# Patient Record
Sex: MALE | Race: WHITE | Employment: UNEMPLOYED | URBAN - METROPOLITAN AREA
[De-identification: names, ages, dates, MRNs, and addresses within clinical notes are randomized per-mention and may not be internally consistent; named-entity substitution may affect disease eponyms.]

---

## 2017-01-04 ENCOUNTER — OFFICE VISIT (OUTPATIENT)
Dept: INTERNAL MEDICINE CLINIC | Age: 42
End: 2017-01-04

## 2017-01-04 VITALS
DIASTOLIC BLOOD PRESSURE: 74 MMHG | BODY MASS INDEX: 24.14 KG/M2 | WEIGHT: 163 LBS | SYSTOLIC BLOOD PRESSURE: 116 MMHG | RESPIRATION RATE: 16 BRPM | HEIGHT: 69 IN | TEMPERATURE: 97.6 F | OXYGEN SATURATION: 97 % | HEART RATE: 72 BPM

## 2017-01-04 DIAGNOSIS — Z51.81 ENCOUNTER FOR MEDICATION MONITORING: ICD-10-CM

## 2017-01-04 DIAGNOSIS — F84.0 AUTISM: ICD-10-CM

## 2017-01-04 DIAGNOSIS — F10.21 ALCOHOL DEPENDENCE IN REMISSION (HCC): ICD-10-CM

## 2017-01-04 DIAGNOSIS — Z00.00 ROUTINE GENERAL MEDICAL EXAMINATION AT A HEALTH CARE FACILITY: ICD-10-CM

## 2017-01-04 DIAGNOSIS — I10 ESSENTIAL HYPERTENSION: Primary | ICD-10-CM

## 2017-01-04 DIAGNOSIS — G40.909 SEIZURE DISORDER (HCC): ICD-10-CM

## 2017-01-04 NOTE — PATIENT INSTRUCTIONS

## 2017-01-04 NOTE — PROGRESS NOTES
CC:  Chief Complaint   Patient presents with    Hypertension     HISTORY OF PRESENT ILLNESS  Larry Herring is a 37 y.o. male. Presents for 6 month follow up evaluation. He has HTN, seizure disorder, alcohol dependence, and Asperger's syndrome. Today he has no complaints. States that he has been feeling well.     Soc Hx  Lives alone in an apartment. Single. No children. Never smoker. Drinks 14 or more beers per week; he would not quantify but states that a 12-pack of beer lasts 3-4 days. Denies recreational drug use.  Gets regular exercise by walking.     ROS   Constitutional: negative for fevers, chills   ENT: negative for sore throat, runny nose, nasal congestion, sneezing, ear pains  Respiratory: negative for cough, dyspnea   CV: negative for chest pain, palpitations, lower extremity edema   GI: negative for heartburn, abd pain, nausea, vomiting, diarrhea, constipation   Genitourinary: negative for frequency, dysuria and hematuria   Musculoskel: negative for myalgias, arthralgias, back pain, muscle weakness, joint pain   Neurological: negative for headaches, dizziness, gait problems   Behavl/Psych: negative for feelings of anxiety; positive for mild depression (has been on depression medication briefly in past)     Patient Active Problem List   Diagnosis Code    Autism F84.0    Asperger syndrome F84.5    Seizure disorder ([facility] G40.909    Hypertension I10    EtOH dependence ([address] F10.20    Alcohol withdrawal ([facility] F10.239    Advance care planning Z71.89     Past Medical History   Diagnosis Date    Asperger syndrome 12/14/2011    Autism      dx 2010- highly functional    Other ill-defined conditions(799.89)      aspergers, syncopal episodes    Seizure disorder ([address] 5/22/2012    Seizures ([address]      Allergies   Allergen Reactions    Aspartame Other (comments)     Family believes it causes his seizures     Current Outpatient Prescriptions   Medication Sig Dispense Refill    lisinopril (PRINIVIL, ZESTRIL) 10 mg tablet TAKE 1 TABLET EVERY DAY FOR HYPERTENSION 90 Tab 1    levETIRAcetam (KEPPRA) 750 mg tablet Take 1 Tab by mouth two (2) times a day. 120 Tab 1    MULTIVITAMIN PO Take 1 Tab by mouth daily. PHYSICAL EXAM  Visit Vitals    /74 (BP 1 Location: Left arm, BP Patient Position: Sitting)    Pulse 72    Temp 97.6 °F (36.4 °C) (Oral)    Resp 16    Ht 5' 9\" (1.753 m)    Wt 163 lb (73.9 kg)    SpO2 97%    BMI 24.07 kg/m2       General: Well-developed and well-nourished, no distress. HEENT:  Head normocephalic/atraumatic, no scleral icterus  Lungs:  Clear to ausculation bilaterally. Good air movement. Heart:  Regular rate and rhythm, normal S1 and S2, no murmur, gallop, or rub  Back: Kyphosis. Normal ROM. Extremities: No clubbing, cyanosis, or edema. Neurological: Alert and oriented. Psychiatric: Normal mood; slightly flat affect. Behavior is normal.     Labs form 2/11/16 reviewed. ASSESSMENT AND PLAN    ICD-10-CM ICD-9-CM    1. Essential hypertension I10 401.9    2. Seizure disorder ([facility] G40.909 345.90    3. Alcohol dependence in remission ([facility] F10.21 303.93    4. Autism F84.0 299.00    5. Routine general medical examination at a health care facility Z00.00 V70.0 LIPID PANEL      METABOLIC PANEL, COMPREHENSIVE      CBC WITH AUTOMATED DIFF      TSH 3RD GENERATION      HEMOGLOBIN A1C WITH EAG   6. Encounter for medication monitoring Z51.81 V58.83 LEVETIRACETAM (KEPPRA)       Gaudencio Le was seen today for hypertension. Diagnoses and all orders for this visit:    Essential hypertension  Well-controlled. Continue lisinopril. Seizure disorder ([facility]  Stable. Continue Keppra and follow up with Dr. Yvonne Fan. Alcohol dependence in remission ([facility]  Counseled on decreasing alcohol intake to no more than 2 beers a day.     Autism    Routine general medical examination at a health care facility  -     LIPID PANEL  -     METABOLIC PANEL, COMPREHENSIVE  -     CBC WITH AUTOMATED DIFF  -     TSH 3RD GENERATION  -     HEMOGLOBIN A1C WITH EAG    Encounter for medication monitoring  -     LEVETIRACETAM (KEPPRA)    Follow-up Disposition:  Return in about 6 months (around 7/4/2017), or if symptoms worsen or fail to improve, for HTN, seizure disorder, Medicare AWV. Provided patient and/or family with advanced directive information and answered pertinent questions. Encouraged patient to provide a copy of advanced directive to the office when available. I have discussed the diagnosis with the patient and the intended plan as seen in the above orders. Patient is in agreement. The patient has received an after-visit summary and questions were answered concerning future plans. I have discussed medication side effects and warnings with the patient as well.

## 2017-01-04 NOTE — PROGRESS NOTES
Reviewed record  In preparation for visit and have obtained necessary documentation. 1. Have you been to the ER, urgent care clinic since your last visit? Hospitalized since your last visit?no  2. Have you seen or consulted any other health care providers outside of the [address] [place] [address] since your last visit? Include any pap smears or colon screening. No  Patient does not currently have advance directives. Patient given information on advance directives and brochure and printed blank advance directive form made available to patient. Patient is also asked to make copy of directives available to this office for our/[facility] records once advanced directives are completed.

## 2017-01-04 NOTE — MR AVS SNAPSHOT
Visit Information Date & Time Provider Department Dept. Phone Encounter #  
 1/4/2017  1:00 PM Ney Shine Darrell Welsh [phone] [id number] Follow-up Instructions Return in about 6 months (around 7/4/2017), or if symptoms worsen or fail to improve, for HTN, seizure disorder, Medicare AWV. Upcoming Health Maintenance Date Due DTaP/Tdap/Td series (1 - Tdap) 3/3/2020* MEDICARE YEARLY EXAM 4/26/2017 *Topic was postponed. The date shown is not the original due date. Allergies as of 1/4/2017  Review Complete On: 1/4/2017 By: Ney Shine MD  
  
 Severity Noted Reaction Type Reactions Aspartame  11/12/2012    Other (comments) Family believes it causes his seizures Current Immunizations  Reviewed on 8/11/2015 Name Date Influenza Vaccine Split 4/2/2012 TD Vaccine 3/2/2012  2:45 PM  
  
 Not reviewed this visit You Were Diagnosed With   
  
 Codes Comments Essential hypertension    -  Primary ICD-10-CM: I10 
ICD-9-CM: 401.9 Seizure disorder ([facility]     ICD-10-CM: G40.909 ICD-9-CM: 345.90 Alcohol dependence in remission [facility])     ICD-10-CM: H76.05 ICD-9-CM: 303.93 Autism     ICD-10-CM: F84.0 ICD-9-CM: 299.00 Routine general medical examination at a health care facility     ICD-10-CM: Z00.00 ICD-9-CM: V70.0 Encounter for medication monitoring     ICD-10-CM: Z51.81 
ICD-9-CM: V58.83 Vitals BP Pulse Temp Resp Height(growth percentile) Weight(growth percentile) 116/74 (BP 1 Location: Left arm, BP Patient Position: Sitting) 72 97.6 °F (36.4 °C) (Oral) 16 5' 9\" (1.753 m) 163 lb (73.9 kg) SpO2 BMI Smoking Status 97% 24.07 kg/m2 Never Smoker BMI and BSA Data Body Mass Index Body Surface Area 24.07 kg/m 2 1.9 m 2 Preferred Pharmacy Pharmacy Name Phone [facility] [address] [place], [place] - [address] [place] [address] [facility] [phone] Your Updated Medication List  
  
   
This list is accurate as of: 1/4/17  1:16 PM.  Always use your most recent med list.  
  
  
  
  
 levETIRAcetam 750 mg tablet Commonly known as:  KEPPRA Take 1 Tab by mouth two (2) times a day. lisinopril 10 mg tablet Commonly known as:  PRINIVIL, ZESTRIL  
TAKE 1 TABLET EVERY DAY FOR HYPERTENSION  
  
 MULTIVITAMIN PO Take 1 Tab by mouth daily. Follow-up Instructions Return in about 6 months (around 7/4/2017), or if symptoms worsen or fail to improve, for HTN, seizure disorder, Medicare AWV. Patient Instructions Well Visit, Ages 25 to 48: Care Instructions Your Care Instructions Physical exams can help you stay healthy. Your doctor has checked your overall health and may have suggested ways to take good care of yourself. He or she also may have recommended tests. At home, you can help prevent illness with healthy eating, regular exercise, and other steps. Follow-up care is a key part of your treatment and safety. Be sure to make and go to all appointments, and call your doctor if you are having problems. It's also a good idea to know your test results and keep a list of the medicines you take. How can you care for yourself at home? · Reach and stay at a healthy weight. This will lower your risk for many problems, such as obesity, diabetes, heart disease, and high blood pressure. · Get at least 30 minutes of physical activity on most days of the week. Walking is a good choice. You also may want to do other activities, such as running, swimming, cycling, or playing tennis or team sports. Discuss any changes in your exercise program with your doctor. · Do not smoke or allow others to smoke around you. If you need help quitting, talk to your doctor about stop-smoking programs and medicines. These can increase your chances of quitting for good.  
· Talk to your doctor about whether you have any risk factors for sexually transmitted infections (STIs). Having one sex partner (who does not have STIs and does not have sex with anyone else) is a good way to avoid these infections. · Use birth control if you do not want to have children at this time. Talk with your doctor about the choices available and what might be best for you. · Protect your skin from too much sun. When you're outdoors from 10 a.m. to 4 p.m., stay in the shade or cover up with clothing and a hat with a wide brim. Wear sunglasses that block UV rays. Even when it's cloudy, put broad-spectrum sunscreen (SPF 30 or higher) on any exposed skin. · See a dentist one or two times a year for checkups and to have your teeth cleaned. · Wear a seat belt in the car. · Drink alcohol in moderation, if at all. That means no more than 2 drinks a day for men and 1 drink a day for women. Follow your doctor's advice about when to have certain tests. These tests can spot problems early. For everyone · Cholesterol. Have the fat (cholesterol) in your blood tested after age 21. Your doctor will tell you how often to have this done based on your age, family history, or other things that can increase your risk for heart disease. · Blood pressure. Have your blood pressure checked during a routine doctor visit. Your doctor will tell you how often to check your blood pressure based on your age, your blood pressure results, and other factors. · Vision. Talk with your doctor about how often to have a glaucoma test. 
· Diabetes. Ask your doctor whether you should have tests for diabetes. · Colon cancer. Have a test for colon cancer at age 48. You may have one of several tests. If you are younger than 48, you may need a test earlier if you have any risk factors. Risk factors include whether you already had a precancerous polyp removed from your colon or whether your parent, brother, sister, or child has had colon cancer. For women · Breast exam and mammogram. Talk to your doctor about when you should have a clinical breast exam and a mammogram. Medical experts differ on whether and how often women under 50 should have these tests. Your doctor can help you decide what is right for you. · Pap test and pelvic exam. Begin Pap tests at age 24. A Pap test is the best way to find cervical cancer. The test often is part of a pelvic exam. Ask how often to have this test. 
· Tests for sexually transmitted infections (STIs). Ask whether you should have tests for STIs. You may be at risk if you have sex with more than one person, especially if your partners do not wear condoms. For men · Tests for sexually transmitted infections (STIs). Ask whether you should have tests for STIs. You may be at risk if you have sex with more than one person, especially if you do not wear a condom. · Testicular cancer exam. Ask your doctor whether you should check your testicles regularly. · Prostate exam. Talk to your doctor about whether you should have a blood test (called a PSA test) for prostate cancer. Experts differ on whether and when men should have this test. Some experts suggest it if you are older than 39 and are -American or have a father or brother who got prostate cancer when he was younger than 72. When should you call for help? Watch closely for changes in your health, and be sure to contact your doctor if you have any problems or symptoms that concern you. Where can you learn more? Go to http://margarette-anthony.info/. Enter P072 in the search box to learn more about \"Well Visit, Ages 25 to 48: Care Instructions. \" Current as of: July 19, 2016 Content Version: 11.1 © 7164-9300 Healthwise, Incorporated. Care instructions adapted under license by [facility] (which disclaims liability or warranty for this information).  If you have questions about a medical condition or this instruction, always ask your healthcare professional. [address] any warranty or liability for your use of this information. Introducing [facility]! Dear Esthela Patel: Thank you for requesting a [employer]In[employer] account. Our records indicate that you already have an active [employer] account. You can access your account anytime at [email or web address] Did you know that you can access your hospital and ER discharge instructions at any time in VaxInnate? You can also review all of your test results from your hospital stay or ER visit. Additional Information If you have questions, please visit the Frequently Asked Questions section of the VaxIn[employer]ate website at [email or web address] Remember, VaxInnate is NOT to be used for urgent needs. For medical emergencies, dial 911. Now available from your iPhone and Android! Please provide this summary of care documentation to your next provider. Your primary care clinician is listed as Bere De Leon. If you have any questions after today's visit, please call [phone].

## 2017-01-06 LAB
ALBUMIN SERPL-MCNC: 4.4 G/DL (ref 3.5–5.5)
ALBUMIN/GLOB SERPL: 2.1 {RATIO} (ref 1.1–2.5)
ALP SERPL-CCNC: 64 IU/L (ref 39–117)
ALT SERPL-CCNC: 21 IU/L (ref 0–44)
AST SERPL-CCNC: 20 IU/L (ref 0–40)
BASOPHILS # BLD AUTO: 0.1 X10E3/UL (ref 0–0.2)
BASOPHILS NFR BLD AUTO: 1 %
BILIRUB SERPL-MCNC: 0.3 MG/DL (ref 0–1.2)
BUN SERPL-MCNC: 14 MG/DL (ref 6–24)
BUN/CREAT SERPL: 21 (ref 9–20)
CALCIUM SERPL-MCNC: 9.2 MG/DL (ref 8.7–10.2)
CHLORIDE SERPL-SCNC: 97 MMOL/L (ref 96–106)
CHOLEST SERPL-MCNC: 192 MG/DL (ref 100–199)
CO2 SERPL-SCNC: 22 MMOL/L (ref 18–29)
CREAT SERPL-MCNC: 0.68 MG/DL (ref 0.76–1.27)
EOSINOPHIL # BLD AUTO: 0.1 X10E3/UL (ref 0–0.4)
EOSINOPHIL NFR BLD AUTO: 3 %
ERYTHROCYTE [DISTWIDTH] IN BLOOD BY AUTOMATED COUNT: 13.5 % (ref 12.3–15.4)
EST. AVERAGE GLUCOSE BLD GHB EST-MCNC: 108 MG/DL
GLOBULIN SER CALC-MCNC: 2.1 G/DL (ref 1.5–4.5)
GLUCOSE SERPL-MCNC: 91 MG/DL (ref 65–99)
HBA1C MFR BLD: 5.4 % (ref 4.8–5.6)
HCT VFR BLD AUTO: 42.3 % (ref 37.5–51)
HDLC SERPL-MCNC: 91 MG/DL
HGB BLD-MCNC: 14.1 G/DL (ref 12.6–17.7)
IMM GRANULOCYTES # BLD: 0 X10E3/UL (ref 0–0.1)
IMM GRANULOCYTES NFR BLD: 0 %
INTERPRETATION, 910389: NORMAL
LDLC SERPL CALC-MCNC: 73 MG/DL (ref 0–99)
LEVETIRACETAM SERPL-MCNC: 33.7 UG/ML (ref 10–40)
LYMPHOCYTES # BLD AUTO: 1.3 X10E3/UL (ref 0.7–3.1)
LYMPHOCYTES NFR BLD AUTO: 26 %
MCH RBC QN AUTO: 30.5 PG (ref 26.6–33)
MCHC RBC AUTO-ENTMCNC: 33.3 G/DL (ref 31.5–35.7)
MCV RBC AUTO: 91 FL (ref 79–97)
MONOCYTES # BLD AUTO: 0.5 X10E3/UL (ref 0.1–0.9)
MONOCYTES NFR BLD AUTO: 9 %
NEUTROPHILS # BLD AUTO: 3.1 X10E3/UL (ref 1.4–7)
NEUTROPHILS NFR BLD AUTO: 61 %
PLATELET # BLD AUTO: 272 X10E3/UL (ref 150–379)
POTASSIUM SERPL-SCNC: 4.3 MMOL/L (ref 3.5–5.2)
PROT SERPL-MCNC: 6.5 G/DL (ref 6–8.5)
RBC # BLD AUTO: 4.63 X10E6/UL (ref 4.14–5.8)
SODIUM SERPL-SCNC: 136 MMOL/L (ref 134–144)
TRIGL SERPL-MCNC: 141 MG/DL (ref 0–149)
TSH SERPL DL<=0.005 MIU/L-ACNC: 1.94 UIU/ML (ref 0.45–4.5)
VLDLC SERPL CALC-MCNC: 28 MG/DL (ref 5–40)
WBC # BLD AUTO: 5.1 X10E3/UL (ref 3.4–10.8)

## 2017-01-08 NOTE — PROGRESS NOTES
Inform patient that all his lab results returned normal. This includes his kidney and liver tests, blood counts, thyroid, diabetes screening test, cholesterol, and Keppra level. Keep up the good work!

## 2017-01-13 DIAGNOSIS — I10 ESSENTIAL HYPERTENSION WITH GOAL BLOOD PRESSURE LESS THAN 130/85: ICD-10-CM

## 2017-01-13 RX ORDER — LISINOPRIL 10 MG/1
TABLET ORAL
Qty: 90 TAB | Refills: 1 | Status: SHIPPED | OUTPATIENT
Start: 2017-01-13 | End: 2017-06-13 | Stop reason: SDUPTHER

## 2017-01-13 NOTE — TELEPHONE ENCOUNTER
States he is completely out and has no refills, would like a new script sent over to them today if possible   States that it should go to [facility]

## 2017-03-06 ENCOUNTER — OFFICE VISIT (OUTPATIENT)
Dept: INTERNAL MEDICINE CLINIC | Age: 42
End: 2017-03-06

## 2017-03-06 VITALS
HEIGHT: 69 IN | DIASTOLIC BLOOD PRESSURE: 66 MMHG | OXYGEN SATURATION: 98 % | HEART RATE: 74 BPM | SYSTOLIC BLOOD PRESSURE: 100 MMHG | TEMPERATURE: 97.4 F | WEIGHT: 163 LBS | RESPIRATION RATE: 16 BRPM | BODY MASS INDEX: 24.14 KG/M2

## 2017-03-06 DIAGNOSIS — Z48.02 ENCOUNTER FOR REMOVAL OF SUTURES: Primary | ICD-10-CM

## 2017-03-06 DIAGNOSIS — G40.909 SEIZURE DISORDER (HCC): ICD-10-CM

## 2017-03-06 NOTE — PROGRESS NOTES
Reviewed record  In preparation for visit and have obtained necessary documentation. 1. Have you been to the ER, urgent care clinic since your last visit? Hospitalized since your last visit?seen at [address] [place] [address] and had stitches placed above right eye. Patient states he may have had a seizure  2. Have you seen or consulted any other health care providers outside of the [address] [place] [place] [address] since your last visit? Include any pap smears or colon screening. No  Patient declines information on advanced directives.

## 2017-03-06 NOTE — PATIENT INSTRUCTIONS
Learning About Stitches and Staples Removal  When are stitches and staples removed? Your doctor will tell you when to have your stitches or staples removed, usually in 7 to 14 days. How long you'll be told to wait will depend on things like where the wound is located, how big and how deep the wound is, and what your general health is like. Do not remove the stitches on your own. Stitches on the face are usually removed within a week. But stitches and staples on other areas of the body, such as on the back or belly or over a joint, may need to stay in place longer, often a week or two. Be sure to follow your doctor's instructions. How are stitches and staples removed? It usually doesn't hurt when the doctor removes the stitches or staples. You may feel a tug as each stitch or staple is removed. · You will either be seated or lying down. · To remove stitches, the doctor will use scissors to cut each of the knots and then pull the threads out. · To remove staples, the doctor will use a tool to take out the staples one at a time. · The area may still feel tender after the stitches or staples are gone. But it should feel better within a few minutes or up to a few hours. What can you expect after stitches and staples are removed? Depending on the type and location of the cut, you will have a scar. Scars usually fade over time. Keep the area clean, but you won't need a bandage. When should you call for help? Call your doctor now or seek immediate medical care if:  · You have new pain, or your pain gets worse. · You have trouble moving the area near the scar. · You have symptoms of infection, such as:  ¨ Increased pain, swelling, warmth, or redness around the scar. ¨ Red streaks leading from the scar. ¨ Pus draining from the scar. ¨ A fever. Watch closely for changes in your health, and be sure to contact your doctor if:  · The scar opens. · You do not get better as expected.   Follow-up care is a key part of your treatment and safety. Be sure to make and go to all appointments, and call your doctor if you do not get better as expected. It's also a good idea to keep a list of the medicines you take. Where can you learn more? Go to http://margarette-anthony.info/. Enter Y524 in the search box to learn more about \"Learning About Stitches and Staples Removal.\"  Current as of: May 27, 2016  Content Version: 11.1  © 4198-0004 [employer]. Care instructions adapted under license by [employer] (which disclaims liability or warranty for this information). If you have questions about a medical condition or this instruction, always ask your healthcare professional. [address] any warranty or liability for your use of this information.

## 2017-03-06 NOTE — MR AVS SNAPSHOT
Visit Information Date & Time Provider Department Dept. Phone Encounter #  
 3/6/2017 11:30 AM [place]ma Scott [facility] [place] [phone] [id number] Follow-up Instructions Return in about 2 months (around 5/6/2017), or if symptoms worsen or fail to improve, for HTN. Your Appointments 4/3/2017  2:15 PM  
Follow Up with MD [facility]) Appt Note: f/u 3/2/2017 CW  
 [address] [address]  
[phone]  
  
   
 [address] [place] [id number] Upcoming Health Maintenance Date Due DTaP/Tdap/Td series (1 - Tdap) 3/3/2020* MEDICARE YEARLY EXAM 4/26/2017 *Topic was postponed. The date shown is not the original due date. Allergies as of 3/6/2017  Review Complete On: 3/6/2017 By: Sulma Scott MD  
  
 Severity Noted Reaction Type Reactions Aspartame  11/12/2012    Other (comments) Family believes it causes his seizures Current Immunizations  Reviewed on 8/11/2015 Name Date Influenza Vaccine Split 4/2/2012 TD Vaccine 3/2/2012  2:45 PM  
  
 Not reviewed this visit You Were Diagnosed With   
  
 Codes Comments Encounter for removal of sutures    -  Primary ICD-10-CM: Z48.02 
ICD-9-CM: V58.32 Seiz[facility] ([facility]     ICD-10-CM: G40.909 ICD-9-CM: 345.90 Vitals BP Pulse Temp Resp Height(growth percentile) Weight(growth percentile) 100/66 (BP 1 Location: Left arm, BP Patient Position: Sitting) 74 97.4 °F (36.3 °C) (Oral) 16 5' 9\" (1.753 m) 163 lb (73.9 kg) SpO2 BMI Smoking Status 98% 24.07 kg/m2 Never Smoker BMI and BSA Data Body Mass Index Body Surface Area 24.07 kg/m 2 1.9 m 2 Preferred Pharmacy Pharmacy Name Phone [facility] [address] [place], [place] - [address] [place] [address] Nan Sullivan [phone] Your Updated Medication List  
 This list is accurate as of: 3/6/17 11:55 AM.  Always use your most recent med list.  
  
  
  
  
 levETIRAcetam 750 mg tablet Commonly known as:  KEPPRA Take 1 Tab by mouth two (2) times a day. lisinopril 10 mg tablet Commonly known as:  PRINIVIL, ZESTRIL  
TAKE 1 TABLET EVERY DAY FOR HYPERTENSION  
  
 MULTIVITAMIN PO Take 1 Tab by mouth daily. Follow-up Instructions Return in about 2 months (around 5/6/2017), or if symptoms worsen or fail to improve, for HTN. Patient Instructions Learning About Stitches and Staples Removal 
When are stitches and staples removed? Your doctor will tell you when to have your stitches or staples removed, usually in 7 to 14 days. How long you'll be told to wait will depend on things like where the wound is located, how big and how deep the wound is, and what your general health is like. Do not remove the stitches on your own. Stitches on the face are usually removed within a week. But stitches and staples on other areas of the body, such as on the back or belly or over a joint, may need to stay in place longer, often a week or two. Be sure to follow your doctor's instructions. How are stitches and staples removed? It usually doesn't hurt when the doctor removes the stitches or staples. You may feel a tug as each stitch or staple is removed. · You will either be seated or lying down. · To remove stitches, the doctor will use scissors to cut each of the knots and then pull the threads out. · To remove staples, the doctor will use a tool to take out the staples one at a time. · The area may still feel tender after the stitches or staples are gone. But it should feel better within a few minutes or up to a few hours. What can you expect after stitches and staples are removed? Depending on the type and location of the cut, you will have a scar. Scars usually fade over time. Keep the area clean, but you won't need a bandage. When should you call for help? Call your doctor now or seek immediate medical care if: 
· You have new pain, or your pain gets worse. · You have trouble moving the area near the scar. · You have symptoms of infection, such as: 
¨ Increased pain, swelling, warmth, or redness around the scar. ¨ Red streaks leading from the scar. ¨ Pus draining from the scar. ¨ A fever. Watch closely for changes in your health, and be sure to contact your doctor if: · The scar opens. · You do not get better as expected. Follow-up care is a key part of your treatment and safety. Be sure to make and go to all appointments, and call your doctor if you do not get better as expected. It's also a good idea to keep a list of the medicines you take. Where can you learn more? Go to http://margarette-anthony.info/. Enter L386 in the search box to learn more about \"Learning About Stitches and Staples Removal.\" Current as of: May 27, 2016 Content Version: 11.1 © 5505-0173 [employer]. Care instructions adapted under license by [employer] (which disclaims liability or warranty for this information). If you have questions about a medical condition or this instruction, always ask your healthcare professional. [address] any warranty or liability for your use of this information. Introducing [facility]! Dear Jose Raul Moya: Thank you for requesting a [employer] account. Our records indicate that you already have an active [employer] account. You can access your account anytime at [email or web address] Did you know that you can access your hospital and ER discharge instructions at any time in [employer]? You can also review all of your test results from your hospital stay or ER visit. Additional Information If you have questions, please visit the Frequently Asked Questions section of the [employer] website at [email or web address] Remember, MyChart is NOT to be used for urgent needs. For medical emergencies, dial 911. Now available from your iPhone and Android! Please provide this summary of care documentation to your next provider. Your primary care clinician is listed as Lisset Montero. If you have any questions after today's visit, please call [phone].

## 2017-03-06 NOTE — PROGRESS NOTES
CC:  Chief Complaint   Patient presents with    Suture Removal     HISTORY OF PRESENT ILLNESS  Juju Schofield is a 40 y.o. male. Presents for suture removal. He has HTN, seizure disorder, alcohol dependence, and Asperger's syndrome. Reports he was seen at [facility] ED a week ago after sustaining a fall. Had sutures placed above his right eyelid. He thinks he had a seizure. Denies any further seizures, dyspnea, chest pain. Soc Hx  Lives alone in an apartment. Single. No children. Never smoker. Drinks about 20 beers a week. . Denies recreational drug use. Gets regular exercise by walking.      Patient Active Problem List   Diagnosis Code    Autism F84.0    Asperger syndrome F84.5    Seizure disorder ([facility] G40.909    Hypertension I10    EtOH dependence ([address] F10.20    Alcohol withdrawal ([facility] F10.239    Advance care planning Z71.89     Past Medical History:   Diagnosis Date    Asperger syndrome 12/14/2011    Autism     dx 2010- highly functional    Other ill-defined conditions(799.89)     aspergers, syncopal episodes    Seizure disorder ([address] 5/22/2012    Seizures ([address]      Allergies   Allergen Reactions    Aspartame Other (comments)     Family believes it causes his seizures     Current Outpatient Prescriptions   Medication Sig Dispense Refill    lisinopril (PRINIVIL, ZESTRIL) 10 mg tablet TAKE 1 TABLET EVERY DAY FOR HYPERTENSION 90 Tab 1    levETIRAcetam (KEPPRA) 750 mg tablet Take 1 Tab by mouth two (2) times a day. 120 Tab 1    MULTIVITAMIN PO Take 1 Tab by mouth daily. PHYSICAL EXAM  Visit Vitals    /66 (BP 1 Location: Left arm, BP Patient Position: Sitting)    Pulse 74    Temp 97.4 °F (36.3 °C) (Oral)    Resp 16    Ht 5' 9\" (1.753 m)    Wt 163 lb (73.9 kg)    SpO2 98%    BMI 24.07 kg/m2       General: Well-developed and well-nourished, no distress. HEENT:  Head normocephalic/atraumatic, no scleral icterus  Lungs:  Clear to ausculation bilaterally.  Good air movement. Heart:  Regular rate and rhythm, normal S1 and S2, no murmur, gallop, or rub  Skin: No clubbing, cyanosis, or edema. 2 black sutures removed from just above right eyebrow with no difficulty. Neurological: Alert and oriented. Psychiatric: Normal mood and affect. Behavior is normal.     Results for orders placed or performed in visit on 01/04/17   LIPID PANEL   Result Value Ref Range    Cholesterol, total 192 100 - 199 mg/dL    Triglyceride 141 0 - 149 mg/dL    HDL Cholesterol 91 >39 mg/dL    VLDL, calculated 28 5 - 40 mg/dL    LDL, calculated 73 0 - 99 mg/dL   METABOLIC PANEL, COMPREHENSIVE   Result Value Ref Range    Glucose 91 65 - 99 mg/dL    BUN 14 6 - 24 mg/dL    Creatinine 0.68 (L) 0.76 - 1.27 mg/dL    GFR est non- >59 mL/min/1.73    GFR est  >59 mL/min/1.73    BUN/Creatinine ratio 21 (H) 9 - 20    Sodium 136 134 - 144 mmol/L    Potassium 4.3 3.5 - 5.2 mmol/L    Chloride 97 96 - 106 mmol/L    CO2 22 18 - 29 mmol/L    Calcium 9.2 8.7 - 10.2 mg/dL    Protein, total 6.5 6.0 - 8.5 g/dL    Albumin 4.4 3.5 - 5.5 g/dL    GLOBULIN, TOTAL 2.1 1.5 - 4.5 g/dL    A-G Ratio 2.1 1.1 - 2.5    Bilirubin, total 0.3 0.0 - 1.2 mg/dL    Alk. phosphatase 64 39 - 117 IU/L    AST (SGOT) 20 0 - 40 IU/L    ALT (SGPT) 21 0 - 44 IU/L   CBC WITH AUTOMATED DIFF   Result Value Ref Range    WBC 5.1 3.4 - 10.8 x10E3/uL    RBC 4.63 4.14 - 5.80 x10E6/uL    HGB 14.1 12.6 - 17.7 g/dL    HCT 42.3 37.5 - 51.0 %    MCV 91 79 - 97 fL    MCH 30.5 26.6 - 33.0 pg    MCHC 33.3 31.5 - 35.7 g/dL    RDW 13.5 12.3 - 15.4 %    PLATELET 615 197 - 492 x10E3/uL    NEUTROPHILS 61 %    Lymphocytes 26 %    MONOCYTES 9 %    EOSINOPHILS 3 %    BASOPHILS 1 %    ABS. NEUTROPHILS 3.1 1.4 - 7.0 x10E3/uL    Abs Lymphocytes 1.3 0.7 - 3.1 x10E3/uL    ABS. MONOCYTES 0.5 0.1 - 0.9 x10E3/uL    ABS. EOSINOPHILS 0.1 0.0 - 0.4 x10E3/uL    ABS. BASOPHILS 0.1 0.0 - 0.2 x10E3/uL    IMMATURE GRANULOCYTES 0 %    ABS. IMM.  GRANS. 0.0 0.0 - 0.1 x10E3/uL   TSH 3RD GENERATION   Result Value Ref Range    TSH 1.940 0.450 - 4.500 uIU/mL   HEMOGLOBIN A1C WITH EAG   Result Value Ref Range    Hemoglobin A1c 5.4 4.8 - 5.6 %    Estimated average glucose 108 mg/dL   LEVETIRACETAM (KEPPRA)   Result Value Ref Range    LEVETIRACETAM, S 33.7 10.0 - 40.0 ug/mL   CVD REPORT   Result Value Ref Range    INTERPRETATION Note          ASSESSMENT AND PLAN    ICD-10-CM ICD-9-CM    1. Encounter for removal of sutures Z48.02 V58.32    2. Seizure disorder ([facility] G40.909 345.90      Current treatment plan is effective. No change in therapy. Follow up with Dr. Raymond Caban (Neurology) as scheduled next month. Advised strongly to cut down on beer/alcohol to no more than 14 drinks per week. Keep area above right eyelid clean. No bandage/Band-Aid needed. Follow-up Disposition:  Return in about 2 months (around 5/6/2017), or if symptoms worsen or fail to improve, for HTN. Provided patient and/or family with advanced directive information and answered pertinent questions. Encouraged patient to provide a copy of advanced directive to the office when available. I have discussed the diagnosis with the patient and the intended plan as seen in the above orders. Patient is in agreement. The patient has received an after-visit summary and questions were answered concerning future plans. I have discussed medication side effects and warnings with the patient as well.

## 2017-04-03 ENCOUNTER — OFFICE VISIT (OUTPATIENT)
Dept: NEUROLOGY | Age: 42
End: 2017-04-03

## 2017-04-03 VITALS
SYSTOLIC BLOOD PRESSURE: 100 MMHG | RESPIRATION RATE: 18 BRPM | HEART RATE: 67 BPM | OXYGEN SATURATION: 98 % | TEMPERATURE: 97.3 F | DIASTOLIC BLOOD PRESSURE: 60 MMHG

## 2017-04-03 DIAGNOSIS — G93.0 BRAIN CYST: ICD-10-CM

## 2017-04-03 DIAGNOSIS — G40.909 SEIZURE DISORDER (HCC): Primary | ICD-10-CM

## 2017-04-03 DIAGNOSIS — F84.5 ASPERGER SYNDROME: ICD-10-CM

## 2017-04-03 RX ORDER — LEVETIRACETAM 750 MG/1
3000 TABLET, FILM COATED, EXTENDED RELEASE ORAL DAILY
Qty: 120 TAB | Refills: 1 | Status: SHIPPED | OUTPATIENT
Start: 2017-04-03 | End: 2017-06-14 | Stop reason: SDUPTHER

## 2017-04-03 NOTE — MR AVS SNAPSHOT
Visit Information Date & Time Provider Department Dept. Phone Encounter #  
 4/3/2017  2:15 PM Saurabh Gu MD [facility] at [address]  Follow-up Instructions Return in about 3 months (around 7/3/2017). Your Appointments 5/8/2017  1:00 PM  
ROUTINE CARE with Katia Connell MD  
[facility]) Appt Note: 1mth f/u; HTN  
 [address] [address] [phone]  
  
   
 [address] Upcoming Health Maintenance Date Due DTaP/Tdap/Td series (1 - Tdap) 3/3/2020* MEDICARE YEARLY EXAM 4/26/2017 *Topic was postponed. The date shown is not the original due date. Allergies as of 4/3/2017  Review Complete On: 4/3/2017 By: Saurabh Gu MD  
  
 Severity Noted Reaction Type Reactions Aspartame  11/12/2012    Other (comments) Family believes it causes his seizures Current Immunizations  Reviewed on 8/11/2015 Name Date Influenza Vaccine Split 4/2/2012 TD Vaccine 3/2/2012  2:45 PM  
  
 Not reviewed this visit You Were Diagnosed With   
  
 Codes Comments Seizure disorder ([facility]    -  Primary ICD-10-CM: B45.878 ICD-9-CM: 345.90 Asperger syndrome     ICD-10-CM: F84.5 ICD-9-CM: 299.80 Brain cyst     ICD-10-CM: G93.0 ICD-9-CM: 874. 0 Vitals BP Pulse Temp Resp SpO2 Smoking Status 100/60 67 97.3 °F (36.3 °C) 18 98% Never Smoker Vitals History Preferred Pharmacy Pharmacy Name Phone [facility] [address] [place], [place] - [address] [place] [address] [place] [phone] Your Updated Medication List  
  
   
This list is accurate as of: 4/3/17  2:52 PM.  Always use your most recent med list.  
  
  
  
  
 KEPPRA  mg ER tablet Generic drug:  levETIRAcetam  
Take 4 Tabs by mouth daily. lisinopril 10 mg tablet Commonly known as:  Dayna Thompson  
 TAKE 1 TABLET EVERY DAY FOR HYPERTENSION  
  
 MULTIVITAMIN PO Take 1 Tab by mouth daily. Prescriptions Sent to Pharmacy Refills KEPPRA  mg ER tablet 1 Sig: Take 4 Tabs by mouth daily. Class: Normal  
 Pharmacy: [address] #: [phone] Route: Oral  
  
Follow-up Instructions Return in about 3 months (around 7/3/2017). To-Do List   
 04/10/2017 Imaging:  MRI BRAIN W WO CONT Patient Instructions PRESCRIPTION REFILL POLICY [facility] Statement to Patients April 1, 2014 In an effort to ensure the large volume of patient prescription refills is processed in the most efficient and expeditious manner, we are asking our patients to assist us by calling your Pharmacy for all prescription refills, this will include also your  Mail Order Pharmacy. The pharmacy will contact our office electronically to continue the refill process. Please do not wait until the last minute to call your pharmacy. We need at least 48 hours (2days) to fill prescriptions. We also encourage you to call your pharmacy before going to  your prescription to make sure it is ready. With regard to controlled substance prescription refill requests (narcotic refills) that need to be picked up at our office, we ask your cooperation by providing us with at least 72 hours (3days) notice that you will need a refill. We will not refill narcotic prescription refill requests after 4:00pm on any weekday, Monday through Thursday, or after 2:00pm on Fridays, or on the weekends. We encourage everyone to explore another way of getting your prescription refill request processed using [employer], our patient web portal through our electronic medical record system.  [employer] is an efficient and effective way to communicate your medication request directly to the office and downloadable as an ama on your smart phone . [employer] also features a review functionality that allows you to view your medication list as well as leave messages for your physician. Are you ready to get connected? If so please review the attatched instructions or speak to any of our staff to get you set up right away! Thank you so much for your cooperation. Should you have any questions please contact our Practice Administrator. The Physicians and Staff,  [facility] Thank you for choosing [facility] for your  
 
care. You may receive a survey about your visit. We appreciate you taking time  
 
to complete this survey as we use your feedback to improve our services. We  
 
realize we are not perfect, but we strive to provide excellent care. Introducing [facility] SERVICES! Dear Christiano Rose: Thank you for requesting a [employer] account. Our records indicate that you already have an active [employer] account. You can access your account anytime at [email or web address] Did you know that you can access your hospital and ER discharge instructions at any time in [employer]? You can also review all of your test results from your hospital stay or ER visit. Additional Information If you have questions, please visit the Frequently Asked Questions section of the [employer] website at [email or web address] Remember, [employer] is NOT to be used for urgent needs. For medical emergencies, dial 911. Now available from your iPhone and Android! Please provide this summary of care documentation to your next provider. Your primary care clinician is listed as Abrahan Padilla. If you have any questions after today's visit, please call [phone].

## 2017-04-03 NOTE — PROGRESS NOTES
Meliton Gosselin is a 40 y.o. male came back for follow-up. He has seizure disorder and autism/asperger syndrome. He has been having syncopal episodes associated with tensing/seizure-like activity. He has been on Keppra. He is currently taking 1500 mg twice a day. Continues to have a seizure every 3-4 months. His last seizure was a month ago. He was at [employer] where he works and was eating when he collapsed. Coworkers saw him have seizure-like activity in the extremities. He has been compliant with his medications. He continues to drink alcohol at least 2-3 drinks every night. Mother thinks that he drinks because of his social setback related to underlying autism and his inability to do things that he wants to do. EXAM  Exam:  Visit Vitals    /60    Pulse 67    Temp 97.3 °F (36.3 °C)    Resp 18    SpO2 98%     Gen:Alert, oriented. Speaks very little. CV: RRR  Lungs: non labored breathing  Abd: non distending  Neuro: A&O x 3, no dysarthria or aphasia  CN II-XII: PERRL, EOMI, face symmetric, tongue/palate midline  Motor: strength 5/5 all four ext  Sensory: intact to LT  Gait: normal    LABS/ IMAGING  MRI Results (most recent):    Results from Hospital Encounter encounter on 01/23/12   MRI BRAIN W AND W/O CONTRAST   Narrative **Final Report**      ICD Codes / Adm. Diagnosis: 345.90   / SEIZURE DISORDER    Examination:  MR BRAIN Sudarshan Guerrero  - [id number] - Jan 23 2012  1:00PM  Accession No:  [id number]  Reason:  new onset sz      REPORT:  INDICATION: seizures. 345.9. TECHNIQUE: Sagittal T1, axial FLAIR, T2, T1 and gradient echo T2-weighted   images of the head were obtained followed by intravenous infusion 15 mL   gadolinium repeat axial and coronal T1-weighted images and axial diffusion   weighted images. Angled thin coronal T2 temporal lobe images.     COMPARISON: CT head 01/12/2012    In the left posterior parahippocampal gyrus is a focal lesion measuring   approximately 11 x 10 x 8 mm which has heterogeneous internal architecture   with the rim of hypointensity and progressive blooming of hypointensity on   gradient-echo T2-weighted images. This is consistent with a cavernous   angioma. Adjacent to this is a prominent venous structure with imaging   characteristics consistent with a venous angioma (DVA). The area of decreased attenuation in the right midbrain on the CT scan is   better delineated on the MRI. This represents a well defined cyst of   uncertain etiology and significance. This measures approximately 11 x 6 x 6   mm. There is no significant displacement of adjacent structures in this is   felt to be quite chronic. No associated abnormal enhancement or restricted   diffusion. Otherwise normal signal in the cerebral hemispheres, brainstem and   cerebellum. Ventricular size and configuration are normal.   Normal flow-voids are present in the vertebral, basilar and carotid artery   systems. The craniocervical junction is normal.   The structures of the cranial base including paranasal sinuses are    unremarkable. IMPRESSION:   1. Left posterior parahippocampal cavernous angioma. 2. Associated venous angioma (developmental venous anomaly) left posterior   parahippocampal area. 3. Nonaggressive appearing and possibly incidental 11 mm cyst in the right   midbrain. Depending on clinical circumstance consider followup in 6 to 18   months. Signing/Reading Doctor: Maria T Mac ([id number])    Approved: Maria T Mac ([id number])  01/23/2012                                      EEG was normal      ASSESSMENT    ICD-10-CM ICD-9-CM    1. Seizure disorder (HCC) G40.909 345.90 KEPPRA  mg ER tablet   2. Asperger syndrome F84.5 299.80    3.  Brain cyst G93.0 348.0 MRI BRAIN W WO CONT   4       Cavernous angioma      PLAN  We will switch Keppra to extended release 1500 mg twice a day   If this does not help, will consider an adjunctive medication  The MRI findings appear chronic but will follow up on the cyst noted in the midbrain  I again discussed the importance of quitting alcohol and he states that he will try    Marianela Patel MD

## 2017-04-03 NOTE — PATIENT INSTRUCTIONS
[facility]   Statement to Patients  April 1, 2014      In an effort to ensure the large volume of patient prescription refills is processed in the most efficient and expeditious manner, we are asking our patients to assist us by calling your Pharmacy for all prescription refills, this will include also your  Mail Order Pharmacy. The pharmacy will contact our office electronically to continue the refill process. Please do not wait until the last minute to call your pharmacy. We need at least 48 hours (2days) to fill prescriptions. We also encourage you to call your pharmacy before going to  your prescription to make sure it is ready. With regard to controlled substance prescription refill requests (narcotic refills) that need to be picked up at our office, we ask your cooperation by providing us with at least 72 hours (3days) notice that you will need a refill. We will not refill narcotic prescription refill requests after 4:00pm on any weekday, Monday through Thursday, or after 2:00pm on Fridays, or on the weekends. We encourage everyone to explore another way of getting your prescription refill request processed using [employer], our patient web portal through our electronic medical record system. [employer] is an efficient and effective way to communicate your medication request directly to the office and  downloadable as an ama on your smart phone . [employer] also features a review functionality that allows you to view your medication list as well as leave messages for your physician. Are you ready to get connected? If so please review the attatched instructions or speak to any of our staff to get you set up right away! Thank you so much for your cooperation. Should you have any questions please contact our Practice Administrator.     The Physicians and Staff,  [facility]     Thank you for choosing [facility] for your     care. You may receive a survey about your visit. We appreciate you taking time     to complete this survey as we use your feedback to improve our services. We     realize we are not perfect, but we strive to provide excellent care.

## 2017-04-05 ENCOUNTER — TELEPHONE (OUTPATIENT)
Dept: NEUROLOGY | Age: 42
End: 2017-04-05

## 2017-04-05 NOTE — TELEPHONE ENCOUNTER
Spoke with pharmacy. The er version of keppra does require a PA. I verbalized understanding, and will start the process.

## 2017-04-11 ENCOUNTER — TELEPHONE (OUTPATIENT)
Dept: NEUROLOGY | Age: 42
End: 2017-04-11

## 2017-04-11 NOTE — TELEPHONE ENCOUNTER
Pt mother calling, pt needs a refill on medication and was for generic, Dr. Abhinav Quiñones was trying to get brand name approved, wants to check and see if brand name was approved before she ok's the refill for the generic, pt mother needs a call today so there is no delay in medication.

## 2017-04-11 NOTE — TELEPHONE ENCOUNTER
Pt's mother returning phone call. She said at last appt there was talk about switching generic Keppra to brand name. She said she got a letter saying they approved it. When she called the pharmacy for 30 day supply it was going to cost $430. Please give her a call back, she would like to know if there are any other options.

## 2017-04-12 NOTE — TELEPHONE ENCOUNTER
LM for patient to try contacting [facility] for patient assistance. Left the website and telephone information for patient.

## 2017-06-13 DIAGNOSIS — I10 ESSENTIAL HYPERTENSION WITH GOAL BLOOD PRESSURE LESS THAN 130/85: ICD-10-CM

## 2017-06-13 RX ORDER — LISINOPRIL 10 MG/1
TABLET ORAL
Qty: 90 TAB | Refills: 1 | Status: SHIPPED | OUTPATIENT
Start: 2017-06-13 | End: 2017-10-31 | Stop reason: SDUPTHER

## 2017-06-14 RX ORDER — LEVETIRACETAM 750 MG/1
TABLET ORAL
Qty: 360 TAB | Refills: 3 | Status: SHIPPED | OUTPATIENT
Start: 2017-06-14 | End: 2018-03-22 | Stop reason: SDUPTHER

## 2017-08-21 ENCOUNTER — OFFICE VISIT (OUTPATIENT)
Dept: NEUROLOGY | Age: 42
End: 2017-08-21

## 2017-08-21 VITALS
WEIGHT: 177 LBS | HEART RATE: 72 BPM | OXYGEN SATURATION: 98 % | DIASTOLIC BLOOD PRESSURE: 84 MMHG | RESPIRATION RATE: 14 BRPM | BODY MASS INDEX: 26.22 KG/M2 | HEIGHT: 69 IN | SYSTOLIC BLOOD PRESSURE: 120 MMHG

## 2017-08-21 DIAGNOSIS — G93.0 BRAIN CYST: Primary | ICD-10-CM

## 2017-08-21 DIAGNOSIS — F84.5 ASPERGER SYNDROME: ICD-10-CM

## 2017-08-21 DIAGNOSIS — G40.909 SEIZURE DISORDER (HCC): ICD-10-CM

## 2017-08-21 NOTE — MR AVS SNAPSHOT
Visit Information Date & Time Provider Department Dept. Phone Encounter #  
 8/21/2017  2:15 PM Vamshi Perry MD [facility] at [address] [id number] Follow-up Instructions Return in about 6 months (around 2/21/2018). Upcoming Health Maintenance Date Due  
 MEDICARE YEARLY EXAM 4/26/2017 INFLUENZA AGE 9 TO ADULT 8/1/2017 DTaP/Tdap/Td series (1 - Tdap) 3/3/2020* *Topic was postponed. The date shown is not the original due date. Allergies as of 8/21/2017  Review Complete On: 8/21/2017 By: Vamshi Perry MD  
  
 Severity Noted Reaction Type Reactions Aspartame  11/12/2012    Other (comments) Family believes it causes his seizures Current Immunizations  Reviewed on 8/11/2015 Name Date Influenza Vaccine Split 4/2/2012 TD Vaccine 3/2/2012  2:45 PM  
  
 Not reviewed this visit You Were Diagnosed With   
  
 Codes Comments Brain cyst    -  Primary ICD-10-CM: G93.0 ICD-9-CM: 348.0 [facility]     ICD-10-CM: G40.909 ICD-9-CM: 345.90 Asperger syndrome     ICD-10-CM: F84.5 ICD-9-CM: 299.80 Vitals BP Pulse Resp Height(growth percentile) Weight(growth percentile) SpO2  
 120/84 72 14 5' 9\" (1.753 m) 177 lb (80.3 kg) 98% BMI Smoking Status 26.14 kg/m2 Never Smoker Vitals History BMI and BSA Data Body Mass Index Body Surface Area  
 26.14 kg/m 2 1.98 m 2 Preferred Pharmacy Pharmacy Name Phone [address] [facility] [address] [facility] [address] [phone] Your Updated Medication List  
  
   
This list is accurate as of: 8/21/17  2:40 PM.  Always use your most recent med list.  
  
  
  
  
 levETIRAcetam 750 mg tablet Commonly known as:  KEPPRA TAKE 2 TABLETS TWICE DAILY  
  
 lisinopril 10 mg tablet Commonly known as:  PRINIVIL, ZESTRIL  
TAKE 1 TABLET EVERY DAY FOR HYPERTENSION  
  
 MULTIVITAMIN PO Take 1 Tab by mouth daily. Follow-up Instructions Return in about 6 months (around 2/21/2018). To-Do List   
 08/28/2017 Imaging:  MRI BRAIN W WO CONT Patient Instructions A Healthy Lifestyle: Care Instructions Your Care Instructions A healthy lifestyle can help you feel good, stay at a healthy weight, and have plenty of energy for both work and play. A healthy lifestyle is something you can share with your whole family. A healthy lifestyle also can lower your risk for serious health problems, such as high blood pressure, heart disease, and diabetes. You can follow a few steps listed below to improve your health and the health of your family. Follow-up care is a key part of your treatment and safety. Be sure to make and go to all appointments, and call your doctor if you are having problems. Its also a good idea to know your test results and keep a list of the medicines you take. How can you care for yourself at home? · Do not eat too much sugar, fat, or fast foods. You can still have dessert and treats now and then. The goal is moderation. · Start small to improve your eating habits. Pay attention to portion sizes, drink less juice and soda pop, and eat more fruits and vegetables. ¨ Eat a healthy amount of food. A 3-ounce serving of meat, for example, is about the size of a deck of cards. Fill the rest of your plate with vegetables and whole grains. ¨ Limit the amount of soda and sports drinks you have every day. Drink more water when you are thirsty. ¨ Eat at least 5 servings of fruits and vegetables every day. It may seem like a lot, but it is not hard to reach this goal. A serving or helping is 1 piece of fruit, 1 cup of vegetables, or 2 cups of leafy, raw vegetables. Have an apple or some carrot sticks as an afternoon snack instead of a candy bar.  Try to have fruits and/or vegetables at every meal. 
 · Make exercise part of your daily routine. You may want to start with simple activities, such as walking, bicycling, or slow swimming. Try to be active 30 to 60 minutes every day. You do not need to do all 30 to 60 minutes all at once. For example, you can exercise 3 times a day for 10 or 20 minutes. Moderate exercise is safe for most people, but it is always a good idea to talk to your doctor before starting an exercise program. 
· Keep moving. Naima Pour the lawn, work in the garden, or [place]. Take the stairs instead of the elevator at work. · If you smoke, quit. People who smoke have an increased risk for heart attack, stroke, cancer, and other lung illnesses. Quitting is hard, but there are ways to boost your chance of quitting tobacco for good. ¨ Use nicotine gum, patches, or lozenges. ¨ Ask your doctor about stop-smoking programs and medicines. ¨ Keep trying. In addition to reducing your risk of diseases in the future, you will notice some benefits soon after you stop using tobacco. If you have shortness of breath or asthma symptoms, they will likely get better within a few weeks after you quit. · Limit how much alcohol you drink. Moderate amounts of alcohol (up to 2 drinks a day for men, 1 drink a day for women) are okay. But drinking too much can lead to liver problems, high blood pressure, and other health problems. Family health If you have a family, there are many things you can do together to improve your health. · Eat meals together as a family as often as possible. · Eat healthy foods. This includes fruits, vegetables, lean meats and dairy, and whole grains. · Include your family in your fitness plan. Most people think of activities such as jogging or tennis as the way to fitness, but there are many ways you and your family can be more active. Anything that makes you breathe hard and gets your heart pumping is exercise. Here are some tips: ¨ Walk to do errands or to take your child to school or the bus. ¨ Go for a family bike ride after dinner instead of watching TV. Where can you learn more? Go to http://margarette-anthony.info/. Enter Q853 in the search box to learn more about \"A Healthy Lifestyle: Care Instructions. \" Current as of: July 26, 2016 Content Version: 11.3 © 8732-1705 [employer]. Care instructions adapted under license by [employer] (which disclaims liability or warranty for this information). If you have questions about a medical condition or this instruction, always ask your healthcare professional. [address] any warranty or liability for your use of this information. Introducing [facility] & HEALTH SERVICES! Dear Lindsay Carroll: Thank you for requesting a LoveThatFit account. Our records indicate that you already have an active LoveThatFit account. You can access your account anytime at [email or web address] Did you know that you can access your hospital and ER discharge instructions at any time in LoveThatFit? You can also review all of your test results from your hospital stay or ER visit. Additional Information If you have questions, please visit the Frequently Asked Questions section of the LoveThat[employer] website at [email or web address] Remember, LoveThatFit is NOT to be used for urgent needs. For medical emergencies, dial 911. Now available from your iPhone and Android! Please provide this summary of care documentation to your next provider. Your primary care clinician is listed as Cornelius Kaiser. If you have any questions after today's visit, please call [phone].

## 2017-08-21 NOTE — PROGRESS NOTES
Emily Felix is a 40 y.o. male came back for follow-up. He has seizure disorder and autism/asperger syndrome. He states that he has been doing quite well and has not had any further seizures since April. He was having seizures every 3-4 months prior to that. He is on Keppra 1500 mg twice a day. His seizures have been witnessed to be generalized tonic-clonic type . He continues to drink alcohol at least 2-3 drinks every night. Mother thinks that he drinks because of his social setback related to underlying autism and his inability to do things that he wants to do. 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  A follow-up MRI was recommended but he has not been able to do it yet. EXAM  Exam:  Visit Vitals    /84    Pulse 72    Resp 14    Ht 5' 9\" (1.753 m)    Wt 80.3 kg (177 lb)    SpO2 98%    BMI 26.14 kg/m2     Gen:Alert, oriented. Speaks very little. CV: RRR  Lungs: non labored breathing  Abd: non distending  Neuro: A&O x 3, no dysarthria or aphasia  CN II-XII: PERRL, EOMI, face symmetric, tongue/palate midline  Motor: strength 5/5 all four ext  Sensory: intact to LT  Gait: normal    LABS/ IMAGING  MRI Results (most recent):    Results from Hospital Encounter encounter on 01/23/12   MRI BRAIN W AND W/O CONTRAST   Narrative **Final Report**      ICD Codes / Adm. Diagnosis: 345.90   / SEIZURE DISORDER    Examination:  MR BRAIN Ciera Carina  - [id number] - Jan 23 2012  1:00PM  Accession No:  [id number]  Reason:  new onset sz      REPORT:  INDICATION: seizures. 345.9. TECHNIQUE: Sagittal T1, axial FLAIR, T2, T1 and gradient echo T2-weighted   images of the head were obtained followed by intravenous infusion 15 mL   gadolinium repeat axial and coronal T1-weighted images and axial diffusion   weighted images. Angled thin coronal T2 temporal lobe images.     COMPARISON: CT head 01/12/2012    In the left posterior parahippocampal gyrus is a focal lesion measuring   approximately 11 x 10 x 8 mm which has heterogeneous internal architecture   with the rim of hypointensity and progressive blooming of hypointensity on   gradient-echo T2-weighted images. This is consistent with a cavernous   angioma. Adjacent to this is a prominent venous structure with imaging   characteristics consistent with a venous angioma (DVA). The area of decreased attenuation in the right midbrain on the CT scan is   better delineated on the MRI. This represents a well defined cyst of   uncertain etiology and significance. This measures approximately 11 x 6 x 6   mm. There is no significant displacement of adjacent structures in this is   felt to be quite chronic. No associated abnormal enhancement or restricted   diffusion. Otherwise normal signal in the cerebral hemispheres, brainstem and   cerebellum. Ventricular size and configuration are normal.   Normal flow-voids are present in the vertebral, basilar and carotid artery   systems. The craniocervical junction is normal.   The structures of the cranial base including paranasal sinuses are    unremarkable. IMPRESSION:   1. Left posterior parahippocampal cavernous angioma. 2. Associated venous angioma (developmental venous anomaly) left posterior   parahippocampal area. 3. Nonaggressive appearing and possibly incidental 11 mm cyst in the right   midbrain. Depending on clinical circumstance consider followup in 6 to 18   months. Signing/Reading Doctor: Raul Contreras ([id number])    Approved: Raul Contreras ([id number])  01/23/2012                                      EEG was normal      ASSESSMENT    ICD-10-CM ICD-9-CM    1. Brain cyst G93.0 348.0 MRI BRAIN W WO CONT   2. Seizure disorder ([facility] G40.909 345.90    3. Asperger syndrome F84.5 299.80    4       Cavernous angioma      PLAN  He has been doing well.   Continue Keppra to extended release 1500 mg twice a day   The MRI findings are chronic but will follow up on the cyst noted in the midbrain and I have reordered the MRI  I again discussed the importance of quitting alcohol     Patsy Irizarry MD

## 2017-08-21 NOTE — PATIENT INSTRUCTIONS

## 2017-10-31 DIAGNOSIS — I10 ESSENTIAL HYPERTENSION WITH GOAL BLOOD PRESSURE LESS THAN 130/85: ICD-10-CM

## 2017-10-31 RX ORDER — LISINOPRIL 10 MG/1
TABLET ORAL
Qty: 90 TAB | Refills: 0 | Status: SHIPPED | OUTPATIENT
Start: 2017-10-31 | End: 2018-01-03 | Stop reason: SDUPTHER

## 2018-01-03 ENCOUNTER — OFFICE VISIT (OUTPATIENT)
Dept: INTERNAL MEDICINE CLINIC | Age: 43
End: 2018-01-03

## 2018-01-03 VITALS
WEIGHT: 186 LBS | RESPIRATION RATE: 16 BRPM | HEIGHT: 69 IN | SYSTOLIC BLOOD PRESSURE: 129 MMHG | DIASTOLIC BLOOD PRESSURE: 76 MMHG | TEMPERATURE: 98 F | HEART RATE: 74 BPM | OXYGEN SATURATION: 98 % | BODY MASS INDEX: 27.55 KG/M2

## 2018-01-03 DIAGNOSIS — G93.0 BRAIN CYST: ICD-10-CM

## 2018-01-03 DIAGNOSIS — F84.0 AUTISM: ICD-10-CM

## 2018-01-03 DIAGNOSIS — Z71.89 ADVANCE CARE PLANNING: ICD-10-CM

## 2018-01-03 DIAGNOSIS — Z00.00 ROUTINE GENERAL MEDICAL EXAMINATION AT A HEALTH CARE FACILITY: ICD-10-CM

## 2018-01-03 DIAGNOSIS — F84.5 ASPERGER SYNDROME: ICD-10-CM

## 2018-01-03 DIAGNOSIS — Z13.31 SCREENING FOR DEPRESSION: ICD-10-CM

## 2018-01-03 DIAGNOSIS — F10.20 UNCOMPLICATED ALCOHOL DEPENDENCE (HCC): ICD-10-CM

## 2018-01-03 DIAGNOSIS — I10 ESSENTIAL HYPERTENSION: ICD-10-CM

## 2018-01-03 DIAGNOSIS — E66.3 OVERWEIGHT (BMI 25.0-29.9): ICD-10-CM

## 2018-01-03 DIAGNOSIS — I10 ESSENTIAL HYPERTENSION WITH GOAL BLOOD PRESSURE LESS THAN 130/85: ICD-10-CM

## 2018-01-03 DIAGNOSIS — Z00.00 MEDICARE ANNUAL WELLNESS VISIT, SUBSEQUENT: Primary | ICD-10-CM

## 2018-01-03 DIAGNOSIS — G40.909 SEIZURE DISORDER (HCC): ICD-10-CM

## 2018-01-03 RX ORDER — LISINOPRIL 10 MG/1
TABLET ORAL
Qty: 90 TAB | Refills: 3 | Status: SHIPPED | OUTPATIENT
Start: 2018-01-03 | End: 2018-11-13 | Stop reason: SDUPTHER

## 2018-01-03 NOTE — PATIENT INSTRUCTIONS
Schedule of Personalized Health Plan    The best way to stay healthy is to live a healthy lifestyle. A healthy lifestyle includes regular exercise, eating a well-balanced diet, keeping a healthy weight and not smoking. Regular physical exams and screening tests are another important way to take care of yourself. Preventive exams provided by health care providers can find health problems early when treatment works best and can keep you from getting certain diseases or illnesses. Preventive services include exams, lab tests, screenings, shots, monitoring and information to help you take care of your own health. All people over 65 should have a pneumonia shot. Pneumonia shots are usually only needed once in a lifetime unless your doctor decides differently. All people over 65 should have a yearly flu shot. People over 65 are at medium to high risk for Hepatitis B. Three shots are needed for complete protection. In addition to your physical exam, some screening tests are recommended:    Bone mass measurement (dexa scan) is recommended every two years if you have certain risk factors, such as personal history of vertebral fracture or chronic steroid medication use    Diabetes Mellitus screening is recommended every year. Glaucoma is an eye disease caused by high pressure in the eye. An eye exam is recommended every year. Cardiovascular screening tests that check your cholesterol and other blood fat (lipid) levels are recommended every five years. Colorectal Cancer screening tests help to find pre-cancerous polyps (growths in the colon) so they can be removed before they turn into cancer. Tests ordered for screening depend on your personal and family history risk factors.     Screening for Prostate Cancer is recommended yearly with a digital rectal exam and/or a PSA test    Here is a list of your current Health Maintenance items with a due date:  Health Maintenance   Topic Date Due    DTaP/Tdap/Td series (1 - Tdap) 03/03/2022 (Originally 3/3/2012)    MEDICARE YEARLY EXAM  01/04/2019    Pneumococcal 19-64 Medium Risk  Addressed    Influenza Age 5 to Adult  Addressed

## 2018-01-03 NOTE — PROGRESS NOTES
Reviewed record  In preparation for visit and have obtained necessary documentation. 1. Have you been to the ER, urgent care clinic since your last visit? Hospitalized since your last visit?no  2. Have you seen or consulted any other health care providers outside of the [address] [place] [address] since your last visit? Include any pap smears or colon screening. no  Advanced directives: Patient does not currently have advance directives. Patient given information on advance directives and brochure and printed blank advance directive form made available to patient. Patient is also asked to make copy of directives available to this office for our/[facility] records once advanced directives are completed. Patients vital signs discussed with physician.

## 2018-01-03 NOTE — ACP (ADVANCE CARE PLANNING)

## 2018-01-03 NOTE — MR AVS SNAPSHOT
Visit Information Date & Time Provider Department Dept. Phone Encounter #  
 1/3/2018  2:00 PM Alba Pizarro MD [facility] [phone] [id number] Follow-up Instructions Return in about 6 months (around 7/3/2018), or if symptoms worsen or fail to improve. Upcoming Health Maintenance Date Due DTaP/Tdap/Td series (1 - Tdap) 3/3/2022* MEDICARE YEARLY EXAM 1/4/2019 *Topic was postponed. The date shown is not the original due date. Allergies as of 1/3/2018  Review Complete On: 1/3/2018 By: Alba Pizarro MD  
  
 Severity Noted Reaction Type Reactions Aspartame  11/12/2012    Other (comments) Family believes it causes his seizures Current Immunizations  Reviewed on 8/11/2015 Name Date Influenza Vaccine Split 4/2/2012 TD Vaccine 3/2/2012  2:45 PM  
  
 Not reviewed this visit You Were Diagnosed With   
  
 Codes Comments Medicare annual wellness visit, subsequent    -  Primary ICD-10-CM: Z00.00 ICD-9-CM: V70.0 Routine general medical examination at a health care facility     ICD-10-CM: Z00.00 ICD-9-CM: V70.0 Essential hypertension     ICD-10-CM: I10 
ICD-9-CM: 401.9 Seizure disorder ([address]     ICD-10-CM: G40.909 ICD-9-CM: 345.90 Alcohol dependence in remission [facility])     ICD-10-CM: T45.85 ICD-9-CM: 303.93 Brain cyst     ICD-10-CM: G93.0 ICD-9-CM: 474. 0 Autism     ICD-10-CM: F84.0 ICD-9-CM: 299.00 Asperger syndrome     ICD-10-CM: F84.5 ICD-9-CM: 299.80 Screening for depression     ICD-10-CM: Z13.89 ICD-9-CM: V79.0 Advance care planning     ICD-10-CM: Z71.89 ICD-9-CM: V65.49 Essential hypertension with goal blood pressure less than 130/85     ICD-10-CM: I10 
ICD-9-CM: 401.9 Vitals BP Pulse Temp Resp Height(growth percentile) Weight(growth percentile)  129/76 (BP 1 Location: Left arm, BP Patient Position: Sitting) 74 98 °F (36.7 °C) (Oral) 16 5' 9\" (1.753 m) 186 lb (84.4 kg) SpO2 BMI Smoking Status 98% 27.47 kg/m2 Never Smoker BMI and BSA Data Body Mass Index Body Surface Area  
 27.47 kg/m 2 2.03 m 2 Preferred Pharmacy Pharmacy Name Phone [facility] [phone] [facility] [address] [facility] [address] [phone] Your Updated Medication List  
  
   
This list is accurate as of: 1/3/18  2:28 PM.  Always use your most recent med list.  
  
  
  
  
 levETIRAcetam 750 mg tablet Commonly known as:  KEPPRA TAKE 2 TABLETS TWICE DAILY  
  
 lisinopril 10 mg tablet Commonly known as:  PRINIVIL, ZESTRIL  
TAKE 1 TABLET EVERY DAY FOR HYPERTENSION  
  
 MULTIVITAMIN PO Take 1 Tab by mouth daily. Prescriptions Sent to Pharmacy Refills  
 lisinopril (PRINIVIL, ZESTRIL) 10 mg tablet 3 Sig: TAKE 1 TABLET EVERY DAY FOR HYPERTENSION Class: Normal  
 Pharmacy: [address] [place] [address], [address] [place] [address] #: [phone] We Performed the Following CBC WITH AUTOMATED DIFF [70744 CPT(R)] HEMOGLOBIN A1C WITH EAG [22820 CPT(R)] LEVETIRACETAM (KEPPRA) [id number] CPT(R)] LIPID PANEL [71928 CPT(R)] METABOLIC PANEL, COMPREHENSIVE [20846 CPT(R)] TSH RFX ON ABNORMAL TO FREE T4 [[id number] Custom] Follow-up Instructions Return in about 6 months (around 7/3/2018), or if symptoms worsen or fail to improve. Patient Instructions Schedule of Personalized Health Plan The best way to stay healthy is to live a healthy lifestyle. A healthy lifestyle includes regular exercise, eating a well-balanced diet, keeping a healthy weight and not smoking. Regular physical exams and screening tests are another important way to take care of yourself.  Preventive exams provided by health care providers can find health problems early when treatment works best and can keep you from getting certain diseases or illnesses. Preventive services include exams, lab tests, screenings, shots, monitoring and information to help you take care of your own health. All people over 65 should have a pneumonia shot. Pneumonia shots are usually only needed once in a lifetime unless your doctor decides differently. All people over 65 should have a yearly flu shot. People over 65 are at medium to high risk for Hepatitis B. Three shots are needed for complete protection. In addition to your physical exam, some screening tests are recommended: 
 
Bone mass measurement (dexa scan) is recommended every two years if you have certain risk factors, such as personal history of vertebral fracture or chronic steroid medication use Diabetes Mellitus screening is recommended every year. Glaucoma is an eye disease caused by high pressure in the eye. An eye exam is recommended every year. Cardiovascular screening tests that check your cholesterol and other blood fat (lipid) levels are recommended every five years. Colorectal Cancer screening tests help to find pre-cancerous polyps (growths in the colon) so they can be removed before they turn into cancer. Tests ordered for screening depend on your personal and family history risk factors. Screening for Prostate Cancer is recommended yearly with a digital rectal exam and/or a PSA test 
 
Here is a list of your current Health Maintenance items with a due date: 
Health Maintenance Topic Date Due  
 DTaP/Tdap/Td series (1 - Tdap) 03/03/2022 (Originally 3/3/2012)  MEDICARE YEARLY EXAM  01/04/2019  Pneumococcal 19-64 Medium Risk  Addressed  Influenza Age 5 to Adult  Addressed Introducing [facility] SERVICES! Dear Maria Elena Washington: Thank you for requesting a [email or web address] account. Our records indicate that you have previously registered for a [email or web address] account but its currently inactive. Please call our [email or web address] support line at [phone]. Additional Information If you have questions, please visit the Frequently Asked Questions section of the [employer] website at [email or web address] Remember, [employer] is NOT to be used for urgent needs. For medical emergencies, dial 911. Now available from your iPhone and Android! Please provide this summary of care documentation to your next provider. Your primary care clinician is listed as Sanjana Curtis. If you have any questions after today's visit, please call [phone].

## 2018-01-03 NOTE — PROGRESS NOTES
This is a Subsequent Medicare Annual Wellness Visit providing Personalized Prevention Plan Services (PPPS) (Performed 12 months after initial AWV and PPPS )    I have reviewed the patient's medical history in detail and updated the computerized patient record. History   Frank Hill is a 40 y.o. male. Presents for [facility] Visit. He has HTN, seizure disorder, alcohol dependence, autism, and Asperger's syndrome. Today he has no complaints. Last seizure was in April 2017. On Keppra 1500 mg twice daily. Last MRI brain was on 1/23/12: Left posterior parahippocampal cavernous angioma. Nonaggressive appearing and possibly incidental 11 mm cyst in the right midbrain. Dr. Scott Epley has ordered a repeat MRI but patient has not had it yet. Patient reports compliance with lisinopril; no side effects. Also compliant with low-salt diet.     Soc Hx  Lives alone in an apartment. Single. No children. Never smoker. Drinks a 12-pack of beer every 3 days. Denies recreational drug use. Gets regular exercise by walking.      Health Maintenance  Flu vaccine: declined       Tetanus vaccine: Td 3/2/12     Lipids: will check today (nonfasting)  A1c: will check today  Advanced Directives: discussed, given information  End of Life: discussed, given information      ROS   A complete review of systems was performed and is negative except for those mentioned in the HPI.     Past Medical History:   Diagnosis Date    Asperger syndrome 12/14/2011    Autism     dx 2010- highly functional    Other ill-defined conditions(799.89)     aspergers, syncopal episodes    Seiz[facility] disorder ([facility] 5/22/2012    Seizures ([facility]       Past Surgical History:   Procedure Laterality Date    HX GI      pyloric stenosis 1976    HX HEENT      adenoids 1978     Current Outpatient Prescriptions   Medication Sig Dispense Refill    lisinopril (PRINIVIL, ZESTRIL) 10 mg tablet TAKE 1 TABLET EVERY DAY FOR HYPERTENSION 90 Tab 3    levETIRAcetam (KEPPRA) 750 mg tablet TAKE 2 TABLETS TWICE DAILY 360 Tab 3    MULTIVITAMIN PO Take 1 Tab by mouth daily. Allergies   Allergen Reactions    Aspartame Other (comments)     Family believes it causes his seizures     Family History   Problem Relation Age of Onset    Diabetes Father     Heart Disease Father     Cancer Mother      Social History   Substance Use Topics    Smoking status: Never Smoker    Smokeless tobacco: Never Used    Alcohol use 5.0 oz/week     10 Cans of beer per week     Patient Active Problem List   Diagnosis Code    Autism F84.0    Asperger syndrome F84.5    Seizure disorder ([facility] G40.909    Hypertension I10    EtOH dependence ([facility] F10.20    [facility] withdrawal ([facility] F10.239    Advance care planning Z71.89    Brain cyst G93.0       Depression Risk Factor Screening:     PHQ over the last two weeks 1/3/2018   Little interest or pleasure in doing things Not at all   Feeling down, depressed or hopeless Not at all   Total Score PHQ 2 0     Alcohol Risk Factor Screening: You do not drink alcohol or very rarely. You average more than 14 drinks a week. Functional Ability and Level of Safety:     Hearing Loss   Hearing is good. Activities of Daily Living   Self-care. Requires assistance with: no ADLs    Fall Risk   No flowsheet data found. Abuse Screen   Patient is not abused    Review of Systems   Pertinent items are noted in HPI. Physical Examination     Evaluation of Cognitive Function:  Mood/affect:  happy  Appearance: age appropriate  Family member/caregiver input: none    Visit Vitals    /76 (BP 1 Location: Left arm, BP Patient Position: Sitting)    Pulse 74    Temp 98 °F (36.7 °C) (Oral)    Resp 16    Ht 5' 9\" (1.753 m)    Wt 186 lb (84.4 kg)    SpO2 98%    BMI 27.47 kg/m2       General: Well-developed and well-nourished, no distress. HEENT:  Head normocephalic/atraumatic, no scleral icterus  Lungs:  Clear to ausculation bilaterally. Good air movement. Heart:  Regular rate and rhythm, normal S1 and S2, no murmur, gallop, or rub  Extremities: No clubbing, cyanosis, or edema. Neurological: Alert and oriented. Psychiatric: Normal mood and affect. Behavior is normal.     Patient Care Team:  Leisa Vallejo MD as PCP - General (Internal Medicine)  Kiya Jefferson MD (Neurology)  Rayray Fisher MD (Neurology)        Advice/Referrals/Counseling   Education and counseling provided:  Are appropriate based on today's review and evaluation  End-of-Life planning (with patient's consent)  Cardiovascular screening blood test  Diabetes screening test    Assessment/Plan       ICD-10-CM ICD-9-CM    1. Medicare annual wellness visit, subsequent Z00.00 V70.0    2. Routine general medical examination at a health care facility O69.94 C04.1 METABOLIC PANEL, COMPREHENSIVE      CBC WITH AUTOMATED DIFF      HEMOGLOBIN A1C WITH EAG      TSH RFX ON ABNORMAL TO FREE T4      LIPID PANEL   3. Essential hypertension I10 401.9    4. Seizure disorder ([facility] G40.909 345.90 LEVETIRACETAM (KEPPRA)   5. Uncomplicated alcohol dependence ([facility] F10.20 303.90    6. Brain cyst G93.0 348.0    7. Autism F84.0 299.00    8. Asperger syndrome F84.5 299.80    9. Screening for depression Z13.89 V79.0    10. Advance care planning Z71.89 V65.49    11. Essential hypertension with goal blood pressure less than 130/85 I10 401.9 lisinopril (PRINIVIL, ZESTRIL) 10 mg tablet   12. Overweight (BMI 25.0-29. 9) E66.3 278.02        Diagnoses and all orders for this visit:    1. Medicare annual wellness visit, subsequent    2. Routine general medical examination at a health care facility  -     METABOLIC PANEL, COMPREHENSIVE  -     CBC WITH AUTOMATED DIFF  -     HEMOGLOBIN A1C WITH EAG  -     TSH RFX ON ABNORMAL TO FREE T4  -     LIPID PANEL    3. Essential hypertension  Controlled. Continue lisinopril 10 mg daily.   -     Refill lisinopril (PRINIVIL, ZESTRIL) 10 mg tablet; TAKE 1 TABLET EVERY DAY FOR HYPERTENSION    4. Seizure disorder (HCC)  -     LEVETIRACETAM (KEPPRA)    5. Uncomplicated alcohol dependence ([facility]  Counseled on reducing beer intake to no more than 2 beers a day. 6. Brain cyst  To have repeat MRI. 7. Autism  Stable. 8. Asperger syndrome  Stable. 9. Screening for depression    10. Advance care planning    11. Overweight  Discussed the patient's BMI with him. The BMI follow up plan is as follows: dietary management education, guidance, and counseling; encourage exercise; monitor weight; prescribed dietary intake. Follow up BMI in 6 months. Follow-up Disposition:  Return in about 6 months (around 7/3/2018), or if symptoms worsen or fail to improve, for HTN, weight. Patient seen and had Medicare Annual Wellness Exam; Wellness Schedule printed, reviewed, and given to patient.

## 2018-01-05 ENCOUNTER — TELEPHONE (OUTPATIENT)
Dept: NEUROLOGY | Age: 43
End: 2018-01-05

## 2018-01-05 LAB
ALBUMIN SERPL-MCNC: 4.4 G/DL (ref 3.5–5.5)
ALBUMIN/GLOB SERPL: 1.8 {RATIO} (ref 1.2–2.2)
ALP SERPL-CCNC: 60 IU/L (ref 39–117)
ALT SERPL-CCNC: 20 IU/L (ref 0–44)
AST SERPL-CCNC: 20 IU/L (ref 0–40)
BASOPHILS # BLD AUTO: 0.1 X10E3/UL (ref 0–0.2)
BASOPHILS NFR BLD AUTO: 1 %
BILIRUB SERPL-MCNC: 0.3 MG/DL (ref 0–1.2)
BUN SERPL-MCNC: 13 MG/DL (ref 6–24)
BUN/CREAT SERPL: 18 (ref 9–20)
CALCIUM SERPL-MCNC: 8.7 MG/DL (ref 8.7–10.2)
CHLORIDE SERPL-SCNC: 95 MMOL/L (ref 96–106)
CHOLEST SERPL-MCNC: 179 MG/DL (ref 100–199)
CO2 SERPL-SCNC: 27 MMOL/L (ref 18–29)
CREAT SERPL-MCNC: 0.74 MG/DL (ref 0.76–1.27)
EOSINOPHIL # BLD AUTO: 0.2 X10E3/UL (ref 0–0.4)
EOSINOPHIL NFR BLD AUTO: 2 %
ERYTHROCYTE [DISTWIDTH] IN BLOOD BY AUTOMATED COUNT: 13.6 % (ref 12.3–15.4)
EST. AVERAGE GLUCOSE BLD GHB EST-MCNC: 105 MG/DL
GLOBULIN SER CALC-MCNC: 2.5 G/DL (ref 1.5–4.5)
GLUCOSE SERPL-MCNC: 90 MG/DL (ref 65–99)
HBA1C MFR BLD: 5.3 % (ref 4.8–5.6)
HCT VFR BLD AUTO: 41.6 % (ref 37.5–51)
HDLC SERPL-MCNC: 90 MG/DL
HGB BLD-MCNC: 14.1 G/DL (ref 13–17.7)
IMM GRANULOCYTES # BLD: 0 X10E3/UL (ref 0–0.1)
IMM GRANULOCYTES NFR BLD: 0 %
INTERPRETATION, 910389: NORMAL
LDLC SERPL CALC-MCNC: 75 MG/DL (ref 0–99)
LEVETIRACETAM SERPL-MCNC: 71.5 UG/ML (ref 10–40)
LYMPHOCYTES # BLD AUTO: 1.6 X10E3/UL (ref 0.7–3.1)
LYMPHOCYTES NFR BLD AUTO: 25 %
MCH RBC QN AUTO: 29.7 PG (ref 26.6–33)
MCHC RBC AUTO-ENTMCNC: 33.9 G/DL (ref 31.5–35.7)
MCV RBC AUTO: 88 FL (ref 79–97)
MONOCYTES # BLD AUTO: 0.7 X10E3/UL (ref 0.1–0.9)
MONOCYTES NFR BLD AUTO: 11 %
NEUTROPHILS # BLD AUTO: 3.8 X10E3/UL (ref 1.4–7)
NEUTROPHILS NFR BLD AUTO: 61 %
PLATELET # BLD AUTO: 249 X10E3/UL (ref 150–379)
POTASSIUM SERPL-SCNC: 4.3 MMOL/L (ref 3.5–5.2)
PROT SERPL-MCNC: 6.9 G/DL (ref 6–8.5)
RBC # BLD AUTO: 4.75 X10E6/UL (ref 4.14–5.8)
SODIUM SERPL-SCNC: 137 MMOL/L (ref 134–144)
TRIGL SERPL-MCNC: 72 MG/DL (ref 0–149)
TSH SERPL DL<=0.005 MIU/L-ACNC: 2.27 UIU/ML (ref 0.45–4.5)
VLDLC SERPL CALC-MCNC: 14 MG/DL (ref 5–40)
WBC # BLD AUTO: 6.3 X10E3/UL (ref 3.4–10.8)

## 2018-01-05 NOTE — TELEPHONE ENCOUNTER
----- Message from Aaron Falk MD sent at 1/5/2018  2:44 PM EST -----  Please inform patient that the [address] level was quite high. He should reduce the dose of Keppra to 750 mg twice a day (total 1500 mg per day )and will recheck level in a couple of weeks. ----- Message -----     From: Ezequiel Engel MD     Sent: 1/5/2018   1:06 PM       To: Rozina Valera LPN, #    All your labs returned normal. Your Keppra level was high. Dr. King Lucio is letting Dr. Rohan Rueda know. Stay on your same dose of Keppra for now.

## 2018-01-08 ENCOUNTER — TELEPHONE (OUTPATIENT)
Dept: NEUROLOGY | Age: 43
End: 2018-01-08

## 2018-01-08 NOTE — PROGRESS NOTES
Spoke with patients Mother Lulu Hood and after verifying name and date of birth of patient gave her test results per Dr. Corina Tobin. Lab results faxed to Dr Sagar Baldwin at [phone].

## 2018-01-10 ENCOUNTER — TELEPHONE (OUTPATIENT)
Dept: NEUROLOGY | Age: 43
End: 2018-01-10

## 2018-01-17 ENCOUNTER — TELEPHONE (OUTPATIENT)
Dept: NEUROLOGY | Age: 43
End: 2018-01-17

## 2018-01-17 NOTE — TELEPHONE ENCOUNTER
----- Message from Donato Guerrero sent at 1/17/2018 12:37 PM EST -----  Regarding: /Telephone  Irvin Gaviria pt's mother called requesting a call back from a missed call from . Pt best contact number is [phone] or [phone].

## 2018-01-29 ENCOUNTER — TELEPHONE (OUTPATIENT)
Dept: NEUROLOGY | Age: 43
End: 2018-01-29

## 2018-01-29 NOTE — TELEPHONE ENCOUNTER
Maria T Vidales had a seizure this morning about 8am  BP was elevated when paramedics arrived at the scene. Has not backed off the medications. Has been working more hours than usual and was possible trigger.

## 2018-01-29 NOTE — TELEPHONE ENCOUNTER
D/W mother. Continue Keppra for now and ensure that he gets enough rest/sleep and does not work back-to-back shifts.

## 2018-01-29 NOTE — TELEPHONE ENCOUNTER
----- Message from Jazmin Rooney sent at 1/29/2018  2:50 PM EST -----  Regarding: Dr. Tanisha Metcalf, mother would like a ilana back from the nurse or Dr. Niki Welch. Mrs. Karen Montero called earlier today and left a message,but has not received a call back yet. It is important that she get a call back today. Mrs. Karen Montero can be reached at [phone].

## 2018-02-12 ENCOUNTER — TELEPHONE (OUTPATIENT)
Dept: NEUROLOGY | Age: 43
End: 2018-02-12

## 2018-02-12 NOTE — TELEPHONE ENCOUNTER
Pt mother calling, stated pt had another seizure, was told to call back if it happens again to discuss medication.

## 2018-02-14 NOTE — TELEPHONE ENCOUNTER
----- Message from Gonway Dial sent at 2/14/2018  9:42 AM EST -----  Regarding:  Jairon Hensley, mother, stated she would like a call back from the nurse.     Contact [phone]

## 2018-02-20 ENCOUNTER — OFFICE VISIT (OUTPATIENT)
Dept: NEUROLOGY | Age: 43
End: 2018-02-20

## 2018-02-20 VITALS
HEART RATE: 77 BPM | DIASTOLIC BLOOD PRESSURE: 60 MMHG | WEIGHT: 181 LBS | HEIGHT: 69 IN | RESPIRATION RATE: 14 BRPM | BODY MASS INDEX: 26.81 KG/M2 | SYSTOLIC BLOOD PRESSURE: 130 MMHG | OXYGEN SATURATION: 98 %

## 2018-02-20 DIAGNOSIS — G93.0 BRAIN CYST: ICD-10-CM

## 2018-02-20 DIAGNOSIS — F10.20 UNCOMPLICATED ALCOHOL DEPENDENCE (HCC): ICD-10-CM

## 2018-02-20 DIAGNOSIS — G40.909 SEIZURE DISORDER (HCC): Primary | ICD-10-CM

## 2018-02-20 RX ORDER — TOPIRAMATE 50 MG/1
50 TABLET, FILM COATED ORAL 2 TIMES DAILY
Qty: 60 TAB | Refills: 1 | Status: SHIPPED | OUTPATIENT
Start: 2018-02-20 | End: 2018-03-29 | Stop reason: SDUPTHER

## 2018-02-20 NOTE — PATIENT INSTRUCTIONS

## 2018-02-20 NOTE — PROGRESS NOTES
Brie Schwab is a 40 y.o. male came back for follow-up. He has seizure disorder and autism/asperger syndrome. He was doing quite well until a few weeks ago when he had a seizure. Then about a couple weeks later he had another seizure. First 1 occurred while he was sitting at the train station and watching trains go by. The second seizure occurred while he was at work but had not actually started working. He continues to take medications as recommended. He is on Keppra 1500 mg twice a day. His last level was supratherapeutic. His seizures have been witnessed to be generalized tonic-clonic type . He continues to drink alcohol at least 3-4 drinks every night. Mother thinks that he drinks because of his social setback related to underlying autism and his inability to do things that he wants to do. 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  A follow-up MRI was recommended but he has not been able to do it yet. EXAM  Exam:  Visit Vitals    /60    Pulse 77    Resp 14    Ht 5' 9\" (1.753 m)    Wt 82.1 kg (181 lb)    SpO2 98%    BMI 26.73 kg/m2     Gen:Alert, oriented. Speaks very little. CV: RRR  Lungs: non labored breathing  Abd: non distending  Neuro: A&O x 3, no dysarthria or aphasia  CN II-XII: PERRL, EOMI, face symmetric, tongue/palate midline  Motor: strength 5/5 all four ext  Sensory: intact to LT  Gait: normal    LABS/ IMAGING  MRI Results (most recent):    Results from Hospital Encounter encounter on 01/23/12   MRI BRAIN W AND W/O CONTRAST   Narrative **Final Report**      ICD Codes / Adm. Diagnosis: 345.90   / SEIZURE DISORDER    Examination:  MR BRAIN Charline Barthel  - [id number] - Jan 23 2012  1:00PM  Accession No:  [id number]  Reason:  new onset sz      REPORT:  INDICATION: seizures. 345.9.     TECHNIQUE: Sagittal T1, axial FLAIR, T2, T1 and gradient echo T2-weighted   images of the head were obtained followed by intravenous infusion 15 mL   gadolinium repeat axial and coronal T1-weighted images and axial diffusion   weighted images. Angled thin coronal T2 temporal lobe images. COMPARISON: CT head 01/12/2012    In the left posterior parahippocampal gyrus is a focal lesion measuring   approximately 11 x 10 x 8 mm which has heterogeneous internal architecture   with the rim of hypointensity and progressive blooming of hypointensity on   gradient-echo T2-weighted images. This is consistent with a cavernous   angioma. Adjacent to this is a prominent venous structure with imaging   characteristics consistent with a venous angioma (DVA). The area of decreased attenuation in the right midbrain on the CT scan is   better delineated on the MRI. This represents a well defined cyst of   uncertain etiology and significance. This measures approximately 11 x 6 x 6   mm. There is no significant displacement of adjacent structures in this is   felt to be quite chronic. No associated abnormal enhancement or restricted   diffusion. Otherwise normal signal in the cerebral hemispheres, brainstem and   cerebellum. Ventricular size and configuration are normal.   Normal flow-voids are present in the vertebral, basilar and carotid artery   systems. The craniocervical junction is normal.   The structures of the cranial base including paranasal sinuses are    unremarkable. IMPRESSION:   1. Left posterior parahippocampal cavernous angioma. 2. Associated venous angioma (developmental venous anomaly) left posterior   parahippocampal area. 3. Nonaggressive appearing and possibly incidental 11 mm cyst in the right   midbrain. Depending on clinical circumstance consider followup in 6 to 18   months.           Signing/Reading Doctor: Lindsay Huddleston ([id number])    Approved: Lindsay Huddleston ([id number])  01/23/2012                                      EEG was normal      ASSESSMENT    ICD-10-CM ICD-9-CM    1. Seizure disorder ([facility] G40.909 345.90 topiramate (TOPAMAX) 50 mg tablet   2. Uncomplicated alcohol dependence (HCC) F10.20 303.90 topiramate (TOPAMAX) 50 mg tablet   3. Brain cyst G93.0 348.0 MRI BRAIN W WO CONT   4       Cavernous angioma      PLAN  He has had recent breakthrough seizures despite being supratherapeutic levels of Keppra. I suspect that underlying alcohol use is playing a role. We again had a lengthy discussion about importance of quitting alcohol and its interaction with seizure medications. Alcohol withdrawal seizures is always a concern.   Continue Keppra 1500 mg twice a day for now  We will introduce topiramate 50 mg twice a day and at times it can curb the desire to drink alcohol  Family is looking at certain programs for him to interact with other individuals to help with his social isolation problem  The MRI findings are chronic but will follow up on the cyst noted in the midbrain and I have reordered the MRI  Continue periodic follow-up    Ector Hutchison MD

## 2018-02-20 NOTE — LETTER
2/20/2018 1:57 PM 
 
Patient:  Kirit Mcbride YOB: 1975 Date of Visit: 2/20/2018 Dear Enid Fajardo MD 
[address] [place] [address] VIA In Basket 
 : 
 
 
Mr. Ayden Bellamy came back for follow-up today . Below are the relevant portions of my assessment and plan of care. If you have questions, please do not hesitate to call me. I look forward to following Mr. Anastasia Vargas along with you. Sincerely, Capo Evans MD

## 2018-02-20 NOTE — MR AVS SNAPSHOT
[facility] [address] [facility] 
[phone] Patient: Kirit Mcbride MRN: [id number] YJR:[id number] Visit Information Date & Time Provider Department Dept. Phone Encounter #  
 2/20/2018  1:30 PM Capo Evans MD [facility] at [address] [id number] Follow-up Instructions Return in about 3 months (around 5/20/2018). Your Appointments 3/20/2018  9:40 AM  
Follow Up with Nichole Javier MD  
[facility] at [address]) Appt Note: 4 week follow kru 2/20  
 [address] [place] [address]  
[phone]  
  
   
 [address] [address] Upcoming Health Maintenance Date Due DTaP/Tdap/Td series (1 - Tdap) 3/3/2022* MEDICARE YEARLY EXAM 1/4/2019 *Topic was postponed. The date shown is not the original due date. Allergies as of 2/20/2018  Review Complete On: 2/20/2018 By: Capo Evans MD  
  
 Severity Noted Reaction Type Reactions Aspartame  11/12/2012    Other (comments) Family believes it causes his seizures Current Immunizations  Reviewed on 8/11/2015 Name Date Influenza Vaccine Split 4/2/2012 TD Vaccine 3/2/2012  2:45 PM  
  
 Not reviewed this visit You Were Diagnosed With   
  
 Codes Comments Seizure disorder ([facility]    -  Primary ICD-10-CM: M91.682 ICD-9-CM: 345.90 Uncomplicated alcohol dependence ([facility]     ICD-10-CM: F10.20 ICD-9-CM: 303.90 Brain cyst     ICD-10-CM: G93.0 ICD-9-CM: 051. 0 Vitals BP Pulse Resp Height(growth percentile) Weight(growth percentile) SpO2  
 130/60 77 14 5' 9\" (1.753 m) 181 lb (82.1 kg) 98% BMI Smoking Status 26.73 kg/m2 Never Smoker Vitals History BMI and BSA Data  Body Mass Index Body Surface Area  
 26.73 kg/m 2 2 m 2  
  
  
 Preferred Pharmacy Pharmacy Name Phone [facility] [place], [facility] [address] [facility], [address] [phone] Your Updated Medication List  
  
   
This list is accurate as of: 2/20/18  1:51 PM.  Always use your most recent med list.  
  
  
  
  
 levETIRAcetam 750 mg tablet Commonly known as:  KEPPRA TAKE 2 TABLETS TWICE DAILY  
  
 lisinopril 10 mg tablet Commonly known as:  PRINIVIL, ZESTRIL  
TAKE 1 TABLET EVERY DAY FOR HYPERTENSION  
  
 MULTIVITAMIN PO Take 1 Tab by mouth daily. topiramate 50 mg tablet Commonly known as:  TOPAMAX Take 1 Tab by mouth two (2) times a day. Prescriptions Sent to Pharmacy Refills  
 topiramate (TOPAMAX) 50 mg tablet 1 Sig: Take 1 Tab by mouth two (2) times a day. Class: Normal  
 Pharmacy: [facility] [address] ELICIA GOLD [address] [facility], [address]  #: [phone] Route: Oral  
  
Follow-up Instructions Return in about 3 months (around 5/20/2018). To-Do List   
 02/26/2018 Imaging:  MRI BRAIN W WO CONT Patient Instructions A Healthy Lifestyle: Care Instructions Your Care Instructions A healthy lifestyle can help you feel good, stay at a healthy weight, and have plenty of energy for both work and play. A healthy lifestyle is something you can share with your whole family. A healthy lifestyle also can lower your risk for serious health problems, such as high blood pressure, heart disease, and diabetes. You can follow a few steps listed below to improve your health and the health of your family. Follow-up care is a key part of your treatment and safety. Be sure to make and go to all appointments, and call your doctor if you are having problems. It's also a good idea to know your test results and keep a list of the medicines you take. How can you care for yourself at home? · Do not eat too much sugar, fat, or fast foods.  You can still have dessert and treats now and then. The goal is moderation. · Start small to improve your eating habits. Pay attention to portion sizes, drink less juice and soda pop, and eat more fruits and vegetables. ¨ Eat a healthy amount of food. A 3-ounce serving of meat, for example, is about the size of a deck of cards. Fill the rest of your plate with vegetables and whole grains. ¨ Limit the amount of soda and sports drinks you have every day. Drink more water when you are thirsty. ¨ Eat at least 5 servings of fruits and vegetables every day. It may seem like a lot, but it is not hard to reach this goal. A serving or helping is 1 piece of fruit, 1 cup of vegetables, or 2 cups of leafy, raw vegetables. Have an apple or some carrot sticks as an afternoon snack instead of a candy bar. Try to have fruits and/or vegetables at every meal. 
· Make exercise part of your daily routine. You may want to start with simple activities, such as walking, bicycling, or slow swimming. Try to be active 30 to 60 minutes every day. You do not need to do all 30 to 60 minutes all at once. For example, you can exercise 3 times a day for 10 or 20 minutes. Moderate exercise is safe for most people, but it is always a good idea to talk to your doctor before starting an exercise program. 
· Keep moving. Renetta [place] the lawn, work in the garden, or [employer]. Take the stairs instead of the elevator at work. · If you smoke, quit. People who smoke have an increased risk for heart attack, stroke, cancer, and other lung illnesses. Quitting is hard, but there are ways to boost your chance of quitting tobacco for good. ¨ Use nicotine gum, patches, or lozenges. ¨ Ask your doctor about stop-smoking programs and medicines. ¨ Keep trying.  
In addition to reducing your risk of diseases in the future, you will notice some benefits soon after you stop using tobacco. If you have shortness of breath or asthma symptoms, they will likely get better within a few weeks after you quit. · Limit how much alcohol you drink. Moderate amounts of alcohol (up to 2 drinks a day for men, 1 drink a day for women) are okay. But drinking too much can lead to liver problems, high blood pressure, and other health problems. Family health If you have a family, there are many things you can do together to improve your health. · Eat meals together as a family as often as possible. · Eat healthy foods. This includes fruits, vegetables, lean meats and dairy, and whole grains. · Include your family in your fitness plan. Most people think of activities such as jogging or tennis as the way to fitness, but there are many ways you and your family can be more active. Anything that makes you breathe hard and gets your heart pumping is exercise. Here are some tips: 
¨ Walk to do errands or to take your child to school or the bus. ¨ Go for a family bike ride after dinner instead of watching TV. Where can you learn more? Go to http://margarette-anthony.info/. Enter C179 in the search box to learn more about \"A Healthy Lifestyle: Care Instructions. \" Current as of: May 12, 2017 Content Version: 11.4 © 6842-5475 [employer]. Care instructions adapted under license by [employer] (which disclaims liability or warranty for this information). If you have questions about a medical condition or this instruction, always ask your healthcare professional. [place] [address] any warranty or liability for your use of this information. Introducing [facility]! Dear Te Quan: Thank you for requesting a "[employer] account. Our records indicate that you have previously registered for a "[employer] account but its currently inactive. Please call our "[employer] support line at [phone]. Additional Information If you have questions, please visit the Frequently Asked Questions section of the [employer] website at [email or web address]. [employer]. [email or web address] Remember, [employer] is NOT to be used for urgent needs. For medical emergencies, dial 911. Now available from your iPhone and Android! Please provide this summary of care documentation to your next provider. Your primary care clinician is listed as Solomon Bonilla. If you have any questions after today's visit, please call [phone].

## 2018-02-26 ENCOUNTER — TELEPHONE (OUTPATIENT)
Dept: NEUROLOGY | Age: 43
End: 2018-02-26

## 2018-02-26 NOTE — TELEPHONE ENCOUNTER
Message from hello: clem cl: believes pt needs to be taken to the hospital, requesting o/c to be advised of which on to go to

## 2018-03-23 RX ORDER — LEVETIRACETAM 750 MG/1
TABLET ORAL
Qty: 360 TAB | Refills: 3 | Status: SHIPPED | OUTPATIENT
Start: 2018-03-23 | End: 2019-01-15 | Stop reason: SDUPTHER

## 2018-03-29 DIAGNOSIS — G40.909 SEIZURE DISORDER (HCC): ICD-10-CM

## 2018-03-29 DIAGNOSIS — F10.20 UNCOMPLICATED ALCOHOL DEPENDENCE (HCC): ICD-10-CM

## 2018-03-29 RX ORDER — TOPIRAMATE 50 MG/1
50 TABLET, FILM COATED ORAL 2 TIMES DAILY
Qty: 60 TAB | Refills: 1 | Status: SHIPPED | OUTPATIENT
Start: 2018-03-29 | End: 2018-05-11 | Stop reason: SDUPTHER

## 2018-03-29 NOTE — TELEPHONE ENCOUNTER
Requesting 90 day supply be sent to [email or web address]. Requested Prescriptions     Pending Prescriptions Disp Refills    topiramate (TOPAMAX) 50 mg tablet 60 Tab 1     Sig: Take 1 Tab by mouth two (2) times a day.

## 2018-05-11 DIAGNOSIS — F10.20 UNCOMPLICATED ALCOHOL DEPENDENCE (HCC): ICD-10-CM

## 2018-05-11 DIAGNOSIS — G40.909 SEIZURE DISORDER (HCC): ICD-10-CM

## 2018-05-14 RX ORDER — TOPIRAMATE 50 MG/1
TABLET, FILM COATED ORAL
Qty: 120 TAB | Refills: 1 | Status: SHIPPED | OUTPATIENT
Start: 2018-05-14 | End: 2018-07-20 | Stop reason: SDUPTHER

## 2018-07-20 DIAGNOSIS — G40.909 SEIZURE DISORDER (HCC): ICD-10-CM

## 2018-07-20 DIAGNOSIS — F10.20 UNCOMPLICATED ALCOHOL DEPENDENCE (HCC): ICD-10-CM

## 2018-07-23 DIAGNOSIS — F10.20 UNCOMPLICATED ALCOHOL DEPENDENCE (HCC): ICD-10-CM

## 2018-07-23 DIAGNOSIS — G40.909 SEIZURE DISORDER (HCC): ICD-10-CM

## 2018-07-23 RX ORDER — TOPIRAMATE 50 MG/1
TABLET, FILM COATED ORAL
Qty: 180 TAB | Refills: 1 | Status: SHIPPED | OUTPATIENT
Start: 2018-07-23 | End: 2019-03-11 | Stop reason: SDUPTHER

## 2018-07-23 RX ORDER — TOPIRAMATE 50 MG/1
TABLET, FILM COATED ORAL
Qty: 180 TAB | Refills: 1 | Status: SHIPPED | OUTPATIENT
Start: 2018-07-23 | End: 2018-07-23 | Stop reason: SDUPTHER

## 2018-07-23 NOTE — TELEPHONE ENCOUNTER
Requested Prescriptions     Pending Prescriptions Disp Refills    topiramate (TOPAMAX) 50 mg tablet 180 Tab 1

## 2019-01-16 RX ORDER — LEVETIRACETAM 750 MG/1
TABLET ORAL
Qty: 360 TAB | Refills: 3 | Status: SHIPPED | OUTPATIENT
Start: 2019-01-16 | End: 2019-10-07 | Stop reason: ALTCHOICE

## 2019-03-11 DIAGNOSIS — G40.909 SEIZURE DISORDER (HCC): ICD-10-CM

## 2019-03-11 DIAGNOSIS — F10.20 UNCOMPLICATED ALCOHOL DEPENDENCE (HCC): ICD-10-CM

## 2019-03-12 RX ORDER — TOPIRAMATE 50 MG/1
TABLET, FILM COATED ORAL
Qty: 180 TAB | Refills: 1 | Status: ON HOLD | OUTPATIENT
Start: 2019-03-12 | End: 2019-06-11 | Stop reason: SDUPTHER

## 2019-04-15 ENCOUNTER — TELEPHONE (OUTPATIENT)
Dept: NEUROLOGY | Age: 44
End: 2019-04-15

## 2019-04-15 NOTE — TELEPHONE ENCOUNTER
Patient's mother calling to schedule follow up appointment. I have scheduled patient for 07/16/19 -- patient had a seizure yesterday and patient's mother is wanting to revisit some of the medication and conditions of patient so she is requesting a sooner time. Please advise.     Best # [phone] Navid Watkins; patient's mother)

## 2019-05-29 ENCOUNTER — HOSPITAL ENCOUNTER (EMERGENCY)
Age: 44
Discharge: HOME | End: 2019-05-29
Attending: EMERGENCY MEDICINE | Admitting: EMERGENCY MEDICINE
Payer: MEDICARE

## 2019-05-29 ENCOUNTER — APPOINTMENT (OUTPATIENT)
Dept: CT IMAGING | Age: 44
End: 2019-05-29
Attending: EMERGENCY MEDICINE
Payer: MEDICARE

## 2019-05-29 VITALS
WEIGHT: 180 LBS | RESPIRATION RATE: 14 BRPM | HEART RATE: 84 BPM | OXYGEN SATURATION: 86 % | DIASTOLIC BLOOD PRESSURE: 58 MMHG | TEMPERATURE: 98 F | SYSTOLIC BLOOD PRESSURE: 107 MMHG | BODY MASS INDEX: 26.58 KG/M2

## 2019-05-29 DIAGNOSIS — R56.9 SEIZURE (HCC): Primary | ICD-10-CM

## 2019-05-29 LAB
ALBUMIN SERPL-MCNC: 3.6 G/DL (ref 3.5–5)
ALBUMIN/GLOB SERPL: 1.2 {RATIO} (ref 1.1–2.2)
ALP SERPL-CCNC: 70 U/L (ref 45–117)
ALT SERPL-CCNC: 23 U/L (ref 12–78)
ANION GAP SERPL CALC-SCNC: 8 MMOL/L (ref 5–15)
APPEARANCE UR: CLEAR
AST SERPL-CCNC: 18 U/L (ref 15–37)
BACTERIA URNS QL MICRO: NEGATIVE /HPF
BASOPHILS # BLD: 0.1 K/UL (ref 0–0.1)
BASOPHILS NFR BLD: 1 % (ref 0–1)
BILIRUB SERPL-MCNC: 0.4 MG/DL (ref 0.2–1)
BILIRUB UR QL: NEGATIVE
BUN SERPL-MCNC: 21 MG/DL (ref 6–20)
BUN/CREAT SERPL: 19 (ref 12–20)
CALCIUM SERPL-MCNC: 8.2 MG/DL (ref 8.5–10.1)
CHLORIDE SERPL-SCNC: 106 MMOL/L (ref 97–108)
CO2 SERPL-SCNC: 25 MMOL/L (ref 21–32)
COLOR UR: NORMAL
COMMENT, HOLDF: NORMAL
CREAT SERPL-MCNC: 1.12 MG/DL (ref 0.7–1.3)
DIFFERENTIAL METHOD BLD: NORMAL
EOSINOPHIL # BLD: 0.2 K/UL (ref 0–0.4)
EOSINOPHIL NFR BLD: 2 % (ref 0–7)
EPITH CASTS URNS QL MICRO: NORMAL /LPF
ERYTHROCYTE [DISTWIDTH] IN BLOOD BY AUTOMATED COUNT: 13.1 % (ref 11.5–14.5)
GLOBULIN SER CALC-MCNC: 3 G/DL (ref 2–4)
GLUCOSE SERPL-MCNC: 101 MG/DL (ref 65–100)
GLUCOSE UR STRIP.AUTO-MCNC: NEGATIVE MG/DL
HCT VFR BLD AUTO: 43.6 % (ref 36.6–50.3)
HGB BLD-MCNC: 14.6 G/DL (ref 12.1–17)
HGB UR QL STRIP: NEGATIVE
HYALINE CASTS URNS QL MICRO: NORMAL /LPF (ref 0–5)
IMM GRANULOCYTES # BLD AUTO: 0 K/UL (ref 0–0.04)
IMM GRANULOCYTES NFR BLD AUTO: 0 % (ref 0–0.5)
KETONES UR QL STRIP.AUTO: NEGATIVE MG/DL
LACTATE BLD-SCNC: 0.69 MMOL/L (ref 0.4–2)
LACTATE SERPL-SCNC: 3.7 MMOL/L (ref 0.4–2)
LEUKOCYTE ESTERASE UR QL STRIP.AUTO: NEGATIVE
LYMPHOCYTES # BLD: 1.6 K/UL (ref 0.8–3.5)
LYMPHOCYTES NFR BLD: 16 % (ref 12–49)
MCH RBC QN AUTO: 29.7 PG (ref 26–34)
MCHC RBC AUTO-ENTMCNC: 33.5 G/DL (ref 30–36.5)
MCV RBC AUTO: 88.8 FL (ref 80–99)
MONOCYTES # BLD: 0.8 K/UL (ref 0–1)
MONOCYTES NFR BLD: 8 % (ref 5–13)
NEUTS SEG # BLD: 7.5 K/UL (ref 1.8–8)
NEUTS SEG NFR BLD: 73 % (ref 32–75)
NITRITE UR QL STRIP.AUTO: NEGATIVE
NRBC # BLD: 0 K/UL (ref 0–0.01)
NRBC BLD-RTO: 0 PER 100 WBC
PH UR STRIP: 6.5 [PH] (ref 5–8)
PLATELET # BLD AUTO: 227 K/UL (ref 150–400)
PMV BLD AUTO: 9.6 FL (ref 8.9–12.9)
POTASSIUM SERPL-SCNC: 3.9 MMOL/L (ref 3.5–5.1)
PROT SERPL-MCNC: 6.6 G/DL (ref 6.4–8.2)
PROT UR STRIP-MCNC: NEGATIVE MG/DL
RBC # BLD AUTO: 4.91 M/UL (ref 4.1–5.7)
RBC #/AREA URNS HPF: NORMAL /HPF (ref 0–5)
SAMPLES BEING HELD,HOLD: NORMAL
SODIUM SERPL-SCNC: 139 MMOL/L (ref 136–145)
SP GR UR REFRACTOMETRY: 1.02 (ref 1–1.03)
UA: UC IF INDICATED,UAUC: NORMAL
UROBILINOGEN UR QL STRIP.AUTO: 0.2 EU/DL (ref 0.2–1)
WBC # BLD AUTO: 10.1 K/UL (ref 4.1–11.1)
WBC URNS QL MICRO: NORMAL /HPF (ref 0–4)

## 2019-05-29 PROCEDURE — 83605 ASSAY OF LACTIC ACID: CPT

## 2019-05-29 PROCEDURE — 80053 COMPREHEN METABOLIC PANEL: CPT

## 2019-05-29 PROCEDURE — 96365 THER/PROPH/DIAG IV INF INIT: CPT

## 2019-05-29 PROCEDURE — 85025 COMPLETE CBC W/AUTO DIFF WBC: CPT

## 2019-05-29 PROCEDURE — 99285 EMERGENCY DEPT VISIT HI MDM: CPT

## 2019-05-29 PROCEDURE — 81001 URINALYSIS AUTO W/SCOPE: CPT

## 2019-05-29 PROCEDURE — 74011250636 HC RX REV CODE- 250/636: Performed by: EMERGENCY MEDICINE

## 2019-05-29 PROCEDURE — 70450 CT HEAD/BRAIN W/O DYE: CPT

## 2019-05-29 PROCEDURE — 93005 ELECTROCARDIOGRAM TRACING: CPT

## 2019-05-29 PROCEDURE — 36415 COLL VENOUS BLD VENIPUNCTURE: CPT

## 2019-05-29 RX ORDER — LEVETIRACETAM 5 MG/ML
500 INJECTION INTRAVASCULAR
Status: COMPLETED | OUTPATIENT
Start: 2019-05-29 | End: 2019-05-29

## 2019-05-29 RX ADMIN — LEVETIRACETAM 500 MG: 5 INJECTION INTRAVENOUS at 19:16

## 2019-05-29 NOTE — ED TRIAGE NOTES
EMS reports seizure, witnessed at [facility], EMS was able to talk with mother and reports taking Keppra. EMS reports tonic clonic seizure, IV placed and 5 Versed given in route. Patient has hematoma under right eye. Patient is alert and oriented x3, high functioning autism. Patient connected to monitor x3. Seizure precautions initiated.

## 2019-05-30 ENCOUNTER — HOSPITAL ENCOUNTER (OUTPATIENT)
Age: 44
Setting detail: OBSERVATION
Discharge: HOME | End: 2019-06-01
Attending: EMERGENCY MEDICINE | Admitting: HOSPITALIST
Payer: MEDICARE

## 2019-05-30 ENCOUNTER — TELEPHONE (OUTPATIENT)
Dept: NEUROLOGY | Age: 44
End: 2019-05-30

## 2019-05-30 DIAGNOSIS — R56.9 SEIZURES (HCC): ICD-10-CM

## 2019-05-30 DIAGNOSIS — F10.939 ALCOHOL WITHDRAWAL SYNDROME WITH COMPLICATION (HCC): ICD-10-CM

## 2019-05-30 DIAGNOSIS — F10.10 ALCOHOL ABUSE: Primary | ICD-10-CM

## 2019-05-30 LAB
ALBUMIN SERPL-MCNC: 3.6 G/DL (ref 3.5–5)
ALBUMIN/GLOB SERPL: 1.2 {RATIO} (ref 1.1–2.2)
ALP SERPL-CCNC: 72 U/L (ref 45–117)
ALT SERPL-CCNC: 24 U/L (ref 12–78)
AMPHET UR QL SCN: NEGATIVE
ANION GAP SERPL CALC-SCNC: 8 MMOL/L (ref 5–15)
AST SERPL-CCNC: 21 U/L (ref 15–37)
ATRIAL RATE: 118 BPM
BARBITURATES UR QL SCN: NEGATIVE
BASOPHILS # BLD: 0.1 K/UL (ref 0–0.1)
BASOPHILS NFR BLD: 1 % (ref 0–1)
BENZODIAZ UR QL: POSITIVE
BILIRUB SERPL-MCNC: 0.5 MG/DL (ref 0.2–1)
BUN SERPL-MCNC: 16 MG/DL (ref 6–20)
BUN/CREAT SERPL: 16 (ref 12–20)
CALCIUM SERPL-MCNC: 8.6 MG/DL (ref 8.5–10.1)
CALCULATED P AXIS, ECG09: 59 DEGREES
CALCULATED R AXIS, ECG10: 18 DEGREES
CALCULATED T AXIS, ECG11: 42 DEGREES
CANNABINOIDS UR QL SCN: NEGATIVE
CHLORIDE SERPL-SCNC: 109 MMOL/L (ref 97–108)
CO2 SERPL-SCNC: 23 MMOL/L (ref 21–32)
COCAINE UR QL SCN: NEGATIVE
COMMENT, HOLDF: NORMAL
COMMENT, HOLDF: NORMAL
CREAT SERPL-MCNC: 0.98 MG/DL (ref 0.7–1.3)
DIAGNOSIS, 93000: NORMAL
DIFFERENTIAL METHOD BLD: NORMAL
DRUG SCRN COMMENT,DRGCM: ABNORMAL
EOSINOPHIL # BLD: 0.2 K/UL (ref 0–0.4)
EOSINOPHIL NFR BLD: 3 % (ref 0–7)
ERYTHROCYTE [DISTWIDTH] IN BLOOD BY AUTOMATED COUNT: 13.2 % (ref 11.5–14.5)
ETHANOL SERPL-MCNC: <10 MG/DL
GLOBULIN SER CALC-MCNC: 3.1 G/DL (ref 2–4)
GLUCOSE SERPL-MCNC: 82 MG/DL (ref 65–100)
HCT VFR BLD AUTO: 44.4 % (ref 36.6–50.3)
HGB BLD-MCNC: 14.3 G/DL (ref 12.1–17)
IMM GRANULOCYTES # BLD AUTO: 0 K/UL (ref 0–0.04)
IMM GRANULOCYTES NFR BLD AUTO: 0 % (ref 0–0.5)
LIPASE SERPL-CCNC: 131 U/L (ref 73–393)
LYMPHOCYTES # BLD: 1.4 K/UL (ref 0.8–3.5)
LYMPHOCYTES NFR BLD: 20 % (ref 12–49)
MCH RBC QN AUTO: 30 PG (ref 26–34)
MCHC RBC AUTO-ENTMCNC: 32.2 G/DL (ref 30–36.5)
MCV RBC AUTO: 93.1 FL (ref 80–99)
METHADONE UR QL: NEGATIVE
MONOCYTES # BLD: 0.7 K/UL (ref 0–1)
MONOCYTES NFR BLD: 10 % (ref 5–13)
NEUTS SEG # BLD: 4.5 K/UL (ref 1.8–8)
NEUTS SEG NFR BLD: 66 % (ref 32–75)
NRBC # BLD: 0 K/UL (ref 0–0.01)
NRBC BLD-RTO: 0 PER 100 WBC
OPIATES UR QL: NEGATIVE
P-R INTERVAL, ECG05: 138 MS
PCP UR QL: NEGATIVE
PLATELET # BLD AUTO: 215 K/UL (ref 150–400)
PMV BLD AUTO: 9.7 FL (ref 8.9–12.9)
POTASSIUM SERPL-SCNC: 4.2 MMOL/L (ref 3.5–5.1)
PROT SERPL-MCNC: 6.7 G/DL (ref 6.4–8.2)
Q-T INTERVAL, ECG07: 332 MS
QRS DURATION, ECG06: 92 MS
QTC CALCULATION (BEZET), ECG08: 465 MS
RBC # BLD AUTO: 4.77 M/UL (ref 4.1–5.7)
SAMPLES BEING HELD,HOLD: NORMAL
SAMPLES BEING HELD,HOLD: NORMAL
SODIUM SERPL-SCNC: 140 MMOL/L (ref 136–145)
VENTRICULAR RATE, ECG03: 118 BPM
WBC # BLD AUTO: 6.8 K/UL (ref 4.1–11.1)

## 2019-05-30 PROCEDURE — 85025 COMPLETE CBC W/AUTO DIFF WBC: CPT

## 2019-05-30 PROCEDURE — 80053 COMPREHEN METABOLIC PANEL: CPT

## 2019-05-30 PROCEDURE — 96365 THER/PROPH/DIAG IV INF INIT: CPT

## 2019-05-30 PROCEDURE — 80307 DRUG TEST PRSMV CHEM ANLYZR: CPT

## 2019-05-30 PROCEDURE — 65390000012 HC CONDITION CODE 44 OBSERVATION

## 2019-05-30 PROCEDURE — 90791 PSYCH DIAGNOSTIC EVALUATION: CPT

## 2019-05-30 PROCEDURE — 74011000250 HC RX REV CODE- 250: Performed by: HOSPITALIST

## 2019-05-30 PROCEDURE — 80177 DRUG SCRN QUAN LEVETIRACETAM: CPT

## 2019-05-30 PROCEDURE — 96366 THER/PROPH/DIAG IV INF ADDON: CPT

## 2019-05-30 PROCEDURE — 74011250637 HC RX REV CODE- 250/637: Performed by: EMERGENCY MEDICINE

## 2019-05-30 PROCEDURE — 36415 COLL VENOUS BLD VENIPUNCTURE: CPT

## 2019-05-30 PROCEDURE — 83690 ASSAY OF LIPASE: CPT

## 2019-05-30 PROCEDURE — 74011250637 HC RX REV CODE- 250/637: Performed by: HOSPITALIST

## 2019-05-30 PROCEDURE — 99284 EMERGENCY DEPT VISIT MOD MDM: CPT

## 2019-05-30 PROCEDURE — 74011250636 HC RX REV CODE- 250/636: Performed by: HOSPITALIST

## 2019-05-30 PROCEDURE — 65660000000 HC RM CCU STEPDOWN

## 2019-05-30 RX ORDER — CHLORDIAZEPOXIDE HYDROCHLORIDE 25 MG/1
25 CAPSULE, GELATIN COATED ORAL ONCE
Status: COMPLETED | OUTPATIENT
Start: 2019-05-30 | End: 2019-05-30

## 2019-05-30 RX ORDER — LORAZEPAM 2 MG/ML
4 INJECTION INTRAMUSCULAR
Status: DISCONTINUED | OUTPATIENT
Start: 2019-05-30 | End: 2019-06-01 | Stop reason: HOSPADM

## 2019-05-30 RX ORDER — TOPIRAMATE 25 MG/1
50 TABLET ORAL 2 TIMES DAILY
Status: DISCONTINUED | OUTPATIENT
Start: 2019-05-30 | End: 2019-06-01 | Stop reason: HOSPADM

## 2019-05-30 RX ORDER — LISINOPRIL 10 MG/1
10 TABLET ORAL DAILY
Status: DISCONTINUED | OUTPATIENT
Start: 2019-05-31 | End: 2019-06-01 | Stop reason: HOSPADM

## 2019-05-30 RX ORDER — LEVETIRACETAM 500 MG/1
1500 TABLET ORAL 2 TIMES DAILY
Status: DISCONTINUED | OUTPATIENT
Start: 2019-05-30 | End: 2019-06-01 | Stop reason: HOSPADM

## 2019-05-30 RX ORDER — LORAZEPAM 2 MG/ML
2 INJECTION INTRAMUSCULAR
Status: DISCONTINUED | OUTPATIENT
Start: 2019-05-30 | End: 2019-06-01 | Stop reason: HOSPADM

## 2019-05-30 RX ADMIN — CHLORDIAZEPOXIDE HYDROCHLORIDE 25 MG: 25 CAPSULE ORAL at 19:38

## 2019-05-30 RX ADMIN — FOLIC ACID: 5 INJECTION, SOLUTION INTRAMUSCULAR; INTRAVENOUS; SUBCUTANEOUS at 19:33

## 2019-05-30 RX ADMIN — LEVETIRACETAM 1500 MG: 500 TABLET, FILM COATED ORAL at 19:34

## 2019-05-30 NOTE — ED TRIAGE NOTES
Pt presents after having a seizure yesterday. Family states in triage that it was \"a long seizure which isnt normal for him\" Denies any changes in medication recently or missed doses. Pt was seen at [facility] after seizure. CT Scan was done as well. Pt family states he has been consuming 6-8 beers a day to help medicate himself. Pt does state in the triage barrera, \"I wouldn't hurt someone else\" Pt will not answer when asked if he has thoughts of hurting himself.  Family states he has suicidal ideation

## 2019-05-30 NOTE — BSMART NOTE
Writer met with this patient in addition with Dr. Chan Zaman. The patient denies suicidal ideation with plan or intent. He shared that some times he feels sad because he is still single. He denies homicidal ideation and perceptual disruption. The plan is to pursue medical admission at this time. Writer provided the family with a list of substance abuse resources for after his discharge.         JING Ramsey, Supervisee in Social Work

## 2019-05-30 NOTE — TELEPHONE ENCOUNTER
Spoke with mother. Advised by another Dr. Joseluis Marks if he was showing detox symptoms that [facility] would be able to help with patient. Dr Also suggested [facility] as well. Mother appreciated the information and stated she would give it more thought and hopefully the patient would agree to help. I verbalized understanding.

## 2019-05-30 NOTE — DISCHARGE INSTRUCTIONS
Patient Education        Seizure: Care Instructions  Your Care Instructions    Seizures are caused by abnormal patterns of electrical signals in the brain. They are different for each person. Seizures can affect movement, speech, vision, or awareness. Some people have only slight shaking of a hand and do not pass out. Other people may pass out and have violent shaking of the whole body. Some people appear to stare into space. They are awake, but they can't respond normally. Later, they may not remember what happened. You may need tests to identify the type and cause of the seizures. A seizure may occur only once, or you may have them more than one time. Taking medicines as directed and following up with your doctor may help keep you from having more seizures. The doctor has checked you carefully, but problems can develop later. If you notice any problems or new symptoms, get medical treatment right away. Follow-up care is a key part of your treatment and safety. Be sure to make and go to all appointments, and call your doctor if you are having problems. It's also a good idea to know your test results and keep a list of the medicines you take. How can you care for yourself at home? · Be safe with medicines. Take your medicines exactly as prescribed. Call your doctor if you think you are having a problem with your medicine. · Do not do any activity that could be dangerous to you or others until your doctor says it is safe to do so. For example, do not drive a car, operate machinery, swim, or climb ladders. · Be sure that anyone treating you for any health problem knows that you have had a seizure and what medicines you are taking for it. · Identify and avoid things that may make you more likely to have a seizure. These may include lack of sleep, alcohol or drug use, stress, or not eating. · Make sure you go to your follow-up appointment. When should you call for help?   Call 911 anytime you think you may need emergency care. For example, call if:    · You have another seizure.     · You have more than one seizure in 24 hours.     · You have new symptoms, such as trouble walking, speaking, or thinking clearly.    Call your doctor now or seek immediate medical care if:    · You are not acting normally.    Watch closely for changes in your health, and be sure to contact your doctor if you have any problems. Where can you learn more? Go to http://margarette-anthony.info/. Enter I205 in the search box to learn more about \"Seizure: Care Instructions. \"  Current as of: Tania 3, 2018  Content Version: 11.9  © 2322-8837 [employer]. Care instructions adapted under license by [employer] (which disclaims liability or warranty for this information). If you have questions about a medical condition or this instruction, always ask your healthcare professional. [address] any warranty or liability for your use of this information.

## 2019-05-30 NOTE — H&P
History & Physical    Primary Care Provider: Billie Noe MD  Source of Information: Patient     History of Presenting Illness:   Trixie Chowdary is a 38 y.o. male who presents with recurrent seizures     Trixie Chowdary is a 40 y.o. male with history of seizure disorder and autism/asperger syndrome. He was diagnosed of seizure dz since 2011 and was on max dose of keppra 1500 bid. He also was drinking daily over 20 years per parents. Patient recently has increasing seizures and he had one major seizure and fall at home yesterday, was seen in ED yesterday and then discharged home with neurologist follow up. He was seen by his Neurologist Dr. Cyndee Laird today and he was send to ED for alcohol detox. per parents he has been drinking daily for 'years/ 6-8 beers/ starts at 8/ finishes by 10-11  Patient has autism, he is a poor historian. He is living alone now.         Review of Systems:  Limited due to his autism     Past Medical History:   Diagnosis Date    Asperger syndrome 12/14/2011    Autism     dx 2010- highly functional    Other ill-defined conditions(799.89)     aspergers, syncopal episodes    Seizure disorder ([facility] [address] [facility] 5/22/2012    Seizures ([facility] [address]       Past Surgical History:   Procedure Laterality Date    HX GI      pyloric stenosis 1976    HX HEENT      adenoids 1978     Prior to Admission medications    Medication Sig Start Date End Date Taking? Authorizing Provider   topiramate (TOPAMAX) 50 mg tablet TAKE 1 TABLET TWICE DAILY 3/12/19   Joe Butt MD   lisinopril (PRINIVIL, ZESTRIL) 10 mg tablet TAKE 1 TABLET EVERY DAY FOR HYPERTENSION. SCHEDULE APPT. FOR FURTHER REFILLS ([phone]) 1/17/19   Billie Noe MD   levETIRAcetam (KEPPRA) 750 mg tablet TAKE 2 TABLETS TWICE DAILY 1/16/19   Jonah Tristanr, MD   MULTIVITAMIN PO Take 1 Tab by mouth daily.     Provider, Historical     Allergies   Allergen Reactions    Aspartame Other (comments)     Family believes it causes his seizures      Family History   Problem Relation Age of Onset    Diabetes Father     Heart Disease Father     Cancer Mother         SOCIAL HISTORY:  Patient resides:  Independently x   Assisted Living    SNF    With family care       Smoking history:   None x   Former    Chronic      Alcohol history:   None    Social    Chronic x     Ambulates:   Independently x   w/cane    w/walker    w/wc    CODE STATUS:  DNR    Full x   Other      Objective:     Physical Exam:     Visit Vitals  Pulse 86   SpO2 95%           General:  Alert, cooperative, no distress, appears stated age. Answer simple questins. Head:  Normocephalic, without obvious abnormality,rt frontal head bruise due to recent fall/seizure    Eyes:  Conjunctivae/corneas clear. PERRL, EOMs intact. Nose: Nares normal. Septum midline. Mucosa normal. No drainage or sinus tenderness. Throat: Lips, mucosa, and tongue normal. Teeth and gums normal.   Neck: Supple, symmetrical, trachea midline, no adenopathy, thyroid: no enlargement/tenderness/nodules, no carotid bruit and no JVD. Back:   Symmetric, no curvature. ROM normal. No CVA tenderness. Lungs:   Clear to auscultation bilaterally. Chest wall:  No tenderness or deformity. Heart:  Regular rate and rhythm, S1, S2 normal, no murmur, click, rub or gallop. Abdomen:   Soft, non-tender. Bowel sounds normal. No masses,  No organomegaly. Extremities: Extremities normal, atraumatic, no cyanosis or edema. Pulses: 2+ and symmetric all extremities. Skin: Skin color, texture, turgor normal. No rashes or lesions   Neurologic: CNII-XII intact.  No focal weakness              Data Review:     Recent Days:  Recent Labs     05/29/19  1721   WBC 10.1   HGB 14.6   HCT 43.6        Recent Labs     05/29/19  1721      K 3.9      CO2 25   *   BUN 21*   CREA 1.12   CA 8.2*   ALB 3.6   SGOT 18   ALT 23     No results for input(s): PH, PCO2, PO2, HCO3, FIO2 in the last 72 hours. 24 Hour Results:  Recent Results (from the past 24 hour(s))   URINALYSIS W/ REFLEX CULTURE    Collection Time: 05/29/19  8:06 PM   Result Value Ref Range    Color YELLOW/STRAW      Appearance CLEAR CLEAR      Specific gravity 1.020 1.003 - 1.030      pH (UA) 6.5 5.0 - 8.0      Protein NEGATIVE  NEG mg/dL    Glucose NEGATIVE  NEG mg/dL    Ketone NEGATIVE  NEG mg/dL    Bilirubin NEGATIVE  NEG      Blood NEGATIVE  NEG      Urobilinogen 0.2 0.2 - 1.0 EU/dL    Nitrites NEGATIVE  NEG      Leukocyte Esterase NEGATIVE  NEG      WBC 0-4 0 - 4 /hpf    RBC 0-5 0 - 5 /hpf    Epithelial cells FEW FEW /lpf    Bacteria NEGATIVE  NEG /hpf    UA:UC IF INDICATED CULTURE NOT INDICATED BY UA RESULT CNI      Hyaline cast 0-2 0 - 5 /lpf   POC LACTIC ACID    Collection Time: 05/29/19  8:41 PM   Result Value Ref Range    Lactic Acid (POC) 0.69 0.40 - 2.00 mmol/L         Imaging:     Assessment:     Active Problems:    Alcohol withdrawal ([facility] (10/31/2014)           Plan: 1. Recurrent seizure: per mother , she is counting his meds in med bottle and thought he is complaint with his meds, could due to his heavily drinking vs withdrawal. Will continue his home keppra, ativan per JARROD eugene , neurologist following   2. Alcohol dependence/alcohol with drawl: JARROD, will consult psych, and , he may needs sub acute rehab for substance abuse  3.  HTN: continue lisinopirl ,will adjust        Signed By: Leesa Flores MD     May 30, 2019

## 2019-05-30 NOTE — ED NOTES
_____Crowell___________________ in to talk with patient and explain plan of care with  understanding and  written & verbal instructions

## 2019-05-30 NOTE — TELEPHONE ENCOUNTER
Pt's mother calling, wondering if [facility] is equip to help pt go through detox for alcohol. Pt's mother is afraid if he tries to stop on his own he will continue to have seizures. Please call back as soon as possible. She has taken off of work today to figure out where to take him.

## 2019-05-30 NOTE — ED PROVIDER NOTES
'been drinking daily for 'years/ 6-8 beers/ starts at 8/ finishes by 10-11; on keppra for seizures/ 'thinking about quitting/ neurologist sent me here to be admitted for etoh withdrawls;     Parents bedside/ confirm story/ no concerns regarding SI/ HI/ 'not sure if yo give him a taper he wont still drink'; Pt confirms this;     pt denies HA, vison changes, diff swallowing, CP, SOB, Abd pain, F/Ch, N/V, D/Cons or other current systemic complaints           Past Medical History:   Diagnosis Date    Asperger syndrome 12/14/2011    Autism     dx 2010- highly functional    Other ill-defined conditions(799.89)     aspergers, syncopal episodes    Seizure disorder ([facility] [address] 5/22/2012    Seizures ([facility] [address]        Past Surgical History:   Procedure Laterality Date    HX GI      pyloric stenosis 1976    HX HEENT      adenoids 1978         Family History:   Problem Relation Age of Onset    Diabetes Father     Heart Disease Father     Cancer Mother        Social History     Socioeconomic History    Marital status: SINGLE     Spouse name: Not on file    Number of children: Not on file    Years of education: Not on file    Highest education level: Not on file   Occupational History    Not on file   Social Needs    Financial resource strain: Not on file    Food insecurity:     Worry: Not on file     Inability: Not on file    Transportation needs:     Medical: Not on file     Non-medical: Not on file   Tobacco Use    Smoking status: Never Smoker    Smokeless tobacco: Never Used   Substance and Sexual Activity    Alcohol use:  Yes     Alcohol/week: 5.0 oz     Types: 10 Cans of beer per week    Drug use: No    Sexual activity: Not on file   Lifestyle    Physical activity:     Days per week: Not on file     Minutes per session: Not on file    Stress: Not on file   Relationships    Social connections:     Talks on phone: Not on file     Gets together: Not on file     Attends Shinto service: Not on file     Active member of club or organization: Not on file     Attends meetings of clubs or organizations: Not on file     Relationship status: Not on file    Intimate partner violence:     Fear of current or ex partner: Not on file     Emotionally abused: Not on file     Physically abused: Not on file     Forced sexual activity: Not on file   Other Topics Concern    Not on file   Social History Narrative    Not on file         ALLERGIES: Aspartame    Review of Systems   Constitutional: Negative for appetite change, chills and fever. HENT: Negative for congestion, mouth sores, trouble swallowing and voice change. Eyes: Negative for visual disturbance. Respiratory: Negative for cough, chest tightness and shortness of breath. Cardiovascular: Negative for chest pain, palpitations and leg swelling. Gastrointestinal: Negative for abdominal pain, constipation, diarrhea, nausea and vomiting. Genitourinary: Negative for dysuria. Musculoskeletal: Positive for myalgias. Skin: Positive for wound. Negative for rash. Neurological: Positive for seizures. All other systems reviewed and are negative. Vitals:    05/30/19 1535   Pulse: 86   SpO2: 95%            Physical Exam   Constitutional: He is oriented to person, place, and time. He appears well-developed and well-nourished. No distress. NAD, AxOx4, speaking in complete sentences  GCS = 15       HENT:   Head: Normocephalic and atraumatic. Mouth/Throat: Oropharynx is clear and moist. No oropharyngeal exudate. Cn intact    Noted R sided facial abrasions   Eyes: Pupils are equal, round, and reactive to light. Conjunctivae and EOM are normal. Right eye exhibits no discharge. Left eye exhibits no discharge. Neck: Normal range of motion. Neck supple. Cardiovascular: Normal rate, regular rhythm, normal heart sounds and intact distal pulses. Exam reveals no gallop and no friction rub. No murmur heard.   Pulmonary/Chest: Effort normal and breath sounds normal. No respiratory distress. He has no wheezes. He has no rales. He exhibits no tenderness. Abdominal: Soft. Bowel sounds are normal. He exhibits no distension and no mass. There is no tenderness. There is no rebound and no guarding. Genitourinary:   Genitourinary Comments: Pt denies urinary/ Testicular/ scrotal or penile  complaints   Musculoskeletal: Normal range of motion. He exhibits no edema, tenderness or deformity. Lymphadenopathy:     He has no cervical adenopathy. Neurological: He is alert and oriented to person, place, and time. No cranial nerve deficit. Coordination normal.   pt has motor/ CV/ Sensation grossly intact to all extremities, R = L in strength;   Skin: Skin is warm and dry. Capillary refill takes less than 2 seconds. No rash noted. No erythema. Psychiatric: His mood appears anxious. His affect is not angry, not blunt, not labile and not inappropriate. His speech is not rapid and/or pressured, not delayed, not tangential and not slurred. He is not agitated, not aggressive, not hyperactive, not slowed, not withdrawn, not actively hallucinating and not combative. Thought content is not paranoid and not delusional. Cognition and memory are not impaired. He does not express impulsivity or inappropriate judgment. He exhibits a depressed mood. He expresses no homicidal and no suicidal ideation. He expresses no suicidal plans and no homicidal plans. He is communicative. He exhibits normal recent memory and normal remote memory. 'tired of being single I guess' He is attentive. Nursing note and vitals reviewed. MDM       Procedures      5:05 PM  Patient is being evaluated for admission to the hospital by the hospitalist, Dr Lima Webber . The results of their tests and reasons for their admission have been discussed with them and/or available family. They convey agreement and understanding for the need to be admitted and for their admission diagnosis.   Consultation has been made with the inpatient physician specialist for hospitalization.

## 2019-05-31 PROCEDURE — 96366 THER/PROPH/DIAG IV INF ADDON: CPT

## 2019-05-31 PROCEDURE — 74011250637 HC RX REV CODE- 250/637: Performed by: HOSPITALIST

## 2019-05-31 PROCEDURE — 99218 HC RM OBSERVATION: CPT

## 2019-05-31 PROCEDURE — 74011250636 HC RX REV CODE- 250/636: Performed by: HOSPITALIST

## 2019-05-31 PROCEDURE — 65390000012 HC CONDITION CODE 44 OBSERVATION

## 2019-05-31 PROCEDURE — 74011000250 HC RX REV CODE- 250: Performed by: HOSPITALIST

## 2019-05-31 RX ADMIN — FOLIC ACID: 5 INJECTION, SOLUTION INTRAMUSCULAR; INTRAVENOUS; SUBCUTANEOUS at 18:52

## 2019-05-31 RX ADMIN — LISINOPRIL 10 MG: 10 TABLET ORAL at 08:55

## 2019-05-31 RX ADMIN — TOPIRAMATE 50 MG: 25 TABLET, FILM COATED ORAL at 00:52

## 2019-05-31 RX ADMIN — TOPIRAMATE 50 MG: 25 TABLET, FILM COATED ORAL at 08:55

## 2019-05-31 RX ADMIN — TOPIRAMATE 50 MG: 25 TABLET, FILM COATED ORAL at 21:25

## 2019-05-31 RX ADMIN — LEVETIRACETAM 1500 MG: 500 TABLET, FILM COATED ORAL at 08:55

## 2019-05-31 RX ADMIN — LEVETIRACETAM 1500 MG: 500 TABLET, FILM COATED ORAL at 21:25

## 2019-05-31 NOTE — PROGRESS NOTES
Hospitalist Progress Note  Joy Mesa NP  Answering service: [phone] from in house phone  Cell: [phone]   Date of Service:  2019  NAME:  Leonid Triplett  :  1975  MRN:  [id number]    Admission Summary:   Pt presented to the ED with seizure. He was diagnosed with a seizure dz since  and on max dose of keppra 1500 bid. He also has been drinking daily for over 20 years per parents (6-8 beers/day). Patient recently has had increasing seizure, having a major seizure and fall at home the day before admit. He was seen in ED the day before this admit and discharged home with neurologist follow up. He was seen by his Neurologist Dr. Pako Zapien the day of admit and was sent to ED for alcohol detox. history of seizure disorder and autism/asperger syndrome    Interval history / Subjective:   Pt in bed resting. Has no new complaints, does not feel irritable and denies any further seizure like activity. Assessment & Plan:     Seizure:   - CT: No acute intracranial abnormality.  - MRI is ordered  - has a pmhx of seizures  - pt reports compliance with meds, mother verifies through pill counting his meds  - could due to his heavily drinking vs withdrawal.   - continue keppra and topamax (home dose)  - neurologist has seen    Hx Alcohol dependence/alcohol withdrawl:   - CIWA protocol in place, nurse reports score has been 0    Hx HTN: continue lisinopril, BP controlled    Code status: Full  DVT prophylaxis: SCDs  Care Plan discussed with: patient, nurse  Disposition: home when able/completed testing     Hospital Problems  Date Reviewed: 1/3/2018          Codes Class Noted POA    Alcohol withdrawal ([facility] ICD-10-CM: Q43.015  ICD-9-CM: 291.81  10/31/2014 Yes        * (Principal) Seizure ([facility] [address] ICD-10-CM: R56.9  ICD-9-CM: 780.39  2012 Yes            Review of Systems:   Denies HA. No chest pain or pressure.  No respiratory or GI complaints, eating well. Vital Signs:   Last 24hrs VS reviewed since prior progress note. Most recent are:  Visit Vitals  /61 (BP 1 Location: Left arm, BP Patient Position: At rest)   Pulse 98   Temp 98 °F (36.7 °C)   Resp 15   Ht 5' 10\" (1.778 m)   Wt 83.7 kg (184 lb 8.4 oz)   SpO2 99%   BMI 26.48 kg/m²     No intake or output data in the 24 hours ending 05/31/19 1702     Physical Examination:         Constitutional:  No acute distress, cooperative, pleasant    ENT:  Oral MM moist, oropharynx benign. Resp:  CTA bilaterally. No wheezing/rhonchi/rales. No accessory muscle use   CV:  Regular rhythm, normal rate. SR on tele. GI:  Soft, non distended, non tender. Normoactive bowel sounds    Musculoskeletal:  No edema, warm, 2+ pulses throughout    Neurologic:  Moves all extremities. AAOx3, CN II-XII reviewed   Skin:  Abrasion under R eye. Small bruise to L knee. Data Review:   Review and/or order of clinical lab test  Review and/or order of tests in the radiology section of CPT  Review and/or order of tests in the medicine section of CPT    Labs:     Recent Labs     05/30/19  1741 05/29/19  1721   WBC 6.8 10.1   HGB 14.3 14.6   HCT 44.4 43.6    227     Recent Labs     05/30/19  1741 05/29/19  1721    139   K 4.2 3.9   * 106   CO2 23 25   BUN 16 21*   CREA 0.98 1.12   GLU 82 101*   CA 8.6 8.2*     Recent Labs     05/30/19  1741 05/29/19  1721   SGOT 21 18   ALT 24 23   AP 72 70   TBILI 0.5 0.4   TP 6.7 6.6   ALB 3.6 3.6   GLOB 3.1 3.0   LPSE 131  --      No results for input(s): INR, PTP, APTT in the last 72 hours. No lab exists for component: INREXT   No results for input(s): FE, TIBC, PSAT, FERR in the last 72 hours. No results found for: FOL, RBCF   No results for input(s): PH, PCO2, PO2 in the last 72 hours. No results for input(s): CPK, CKNDX, TROIQ in the last 72 hours.     No lab exists for component: CPKMB  Lab Results   Component Value Date/Time    Cholesterol, total 179 01/03/2018 02:32 PM    HDL Cholesterol 90 01/03/2018 02:32 PM    LDL, calculated 75 01/03/2018 02:32 PM    Triglyceride 72 01/03/2018 02:32 PM     No results found for: [facility]  Lab Results   Component Value Date/Time    Color YELLOW/STRAW 05/29/2019 08:06 PM    Appearance CLEAR 05/29/2019 08:06 PM    Specific gravity 1.020 05/29/2019 08:06 PM    pH (UA) 6.5 05/29/2019 08:06 PM    Protein NEGATIVE  05/29/2019 08:06 PM    Glucose NEGATIVE  05/29/2019 08:06 PM    Ketone NEGATIVE  05/29/2019 08:06 PM    Bilirubin NEGATIVE  05/29/2019 08:06 PM    Urobilinogen 0.2 05/29/2019 08:06 PM    Nitrites NEGATIVE  05/29/2019 08:06 PM    Leukocyte Esterase NEGATIVE  05/29/2019 08:06 PM    Epithelial cells FEW 05/29/2019 08:06 PM    Bacteria NEGATIVE  05/29/2019 08:06 PM    WBC 0-4 05/29/2019 08:06 PM    RBC 0-5 05/29/2019 08:06 PM     Medications Reviewed:     Current Facility-Administered Medications   Medication Dose Route Frequency    lisinopril (PRINIVIL, ZESTRIL) tablet 10 mg  10 mg Oral DAILY    levETIRAcetam (KEPPRA) tablet 1,500 mg  1,500 mg Oral BID    topiramate (TOPAMAX) tablet 50 mg  50 mg Oral BID    LORazepam (ATIVAN) injection 2 mg  2 mg IntraVENous Q1H PRN    LORazepam (ATIVAN) injection 4 mg  4 mg IntraVENous Q1H PRN    0.9% sodium chloride 5,925 mL with folic acid 1 mg, thiamine 100 mg, mvi, adult no. 4 with vit K 10 mL infusion   IntraVENous Q24H   ____________________________________________________________________  EXPECTED LENGTH OF STAY: 2d 16h  ACTUAL LENGTH OF STAY:          [address], NP

## 2019-05-31 NOTE — PROGRESS NOTES
Problem: Alcohol Withdrawal  Goal: *Able to cope  Outcome: Progressing Towards Goal   Patient doing well. CIWA scale of 0 at this time.  Will continue to monitor

## 2019-05-31 NOTE — ROUTINE PROCESS
TRANSFER - OUT REPORT:    Verbal report given to Jason(name) on Xu Rad  being transferred to 6S(unit) for routine progression of care       Report consisted of patients Situation, Background, Assessment and   Recommendations(SBAR). Information from the following report(s) ED Summary and Recent Results was reviewed with the receiving nurse. Lines:   Peripheral IV 05/30/19 Right Forearm (Active)        Opportunity for questions and clarification was provided.       Patient transported with:   [employer]

## 2019-05-31 NOTE — PROGRESS NOTES
Reason for Admission:   Admitted from home with possible seizure. Patient admits to drinking daily 8-12 beers. He lives alone. But mother is involved in his care. He also has a history of autism/asperger syndrome. RRAT Score:   9                  Plan for utilizing home health:    No needs identified at this time. Current Advanced Directive/Advance Care Plan: does not have and declined to have one completed. Likelihood of Readmission:  Low risk                         Transition of Care Plan:   Anticipate that he will discharge to home once medically stable. CM will follow and assist with any needs that may arise. Care Management Interventions  PCP Verified by CM: Yes(Dr Hein)  Palliative Care Criteria Met (RRAT>21 & CHF Dx)?: No  Mode of Transport at Discharge: (family car)  Current Support Network: Lives Alone  Confirm Follow Up Transport: Self  Plan discussed with Pt/Family/Caregiver: Yes   Resource Information Provided?: (NA)  Discharge Location  Discharge Placement: [address] RN CRM  Ext [phone]           Code 44 completed.

## 2019-05-31 NOTE — ADT AUTH CERT NOTES
INITIAL PHYSICIAN ORDER: OBSERVATION/OUTPATIENT IN A BED OBSERVATION; Neuro/Stroke; seizure [[id number]] (Order [id number])   ADT Transfer   Date and Time: 5/31/2019 12:15 PM Department: [facility] 6S NEURO-SCI TELE Ordering/Authorizing: Mary Khoury NP   Order Providers     Authorizing   Mary Khoury          Order Information     Order Date/Time Release Date/Time Start Date/Time End Date/Time   05/31/19 12:15 PM None 05/31/19 12:15 PM 05/31/19 12:15 PM   CSN:   [id number]   Order Details     Frequency Duration Priority Order Class   ONE TIME 1  occurrence Routine ADT Pend Transfer   Reprint OrderRequisition - Outpatient Only     INITIAL PHYSICIAN ORDER: OBSERVATION/OUTPATIENT IN A BED (Order #[id number]) on 5/31/19   Standing Order Information     Remaining Occurrences Interval Last Released      0/1 ONE TIME 5/31/2019             Released Orders       Released On Scheduled For Released By    1. 5/31/2019 12:15 PM 5/31/2019 12:15 PM Mary Khoury NP (auto-released)                  Order Questions     Question Answer Comment   Patient Class: OBSERVATION    Type of Bed Neuro/Stroke    Reason for Observation seizure    Admitting Diagnosis Seizure [facility])    Admitting Physician Alexander Tobin    Attending Physician Evin Rodrigues At Acknowledged By Acknowledged On   Placing Order 05/31/19 [facility] Juwan Rodriguez RN 05/31/19 1311   Cosign Order Info     Action Created on Responsible Provider Signed by Signed on   Ordering 05/31/19 121Sarahi Jimenez MD     Cosign Info     Action Created on Responsible Provider Signed by Signed on   Ordering 05/31/19 1215 Rivka Jimenez MD     Additional Information     Associated Reports   View Encounter   Priority and Order Details   Process Instructions     Plan of Care: See Progress Notes, H&P and Physician Orders    NPI Information        Electronically signed by Authorizing Provider: Pat Chen Daily Collado [id number]     Order start date/time: 5/31/2019 12:15 PM  Electronically signed by Co-signing Provider (if required):    Kathy Hagen MD [id number]             Order Report     Order Details

## 2019-05-31 NOTE — CONSULTS
NEUROLOGY CONSULT NOTE    Name Armando Romero Age 38 y.o. MRN [id number]  1975     Consulting Physician: Pedro Worthington MD      Chief Complaint:  EtOH abuse, seizure d/o     Assessment:     · Principal Problem:  ·   Seizure ([facility] [address] (2012)  ·   · Active Problems:  ·   Alcohol withdrawal ([facility] [address] (10/31/2014)  ·   ·   38year old LHM with a h/o seizure d/o followed by Dr. Jaylene Layne, autism spectrum d/o, EtOH abuse admitted for EtOH detox. Last drink 3 days prior with last seizure activity 19, subsequent fall with head injury. He is certainly at risk for EtOH withdrawal seizures. Neurological examination is presently non-focal.  Will continue current AEDs. Recommendations:   Continue LEV 1500mg BID, TPM 50mg BID  Seizure precautions  [facility] protocol-Psychiatry consulted for EtOH abuse  May give Ativan 2mg IV for seizure duration greater than or equal to 3 minutes or seizure frequency of 3 or more seizures per 8 hours  MRI Brain WWO to reassess R midbrain cyst and L cavernous angioma    Thank you very much for this referral. I appreciate the opportunity to participate in this patient's care. History of Present Illness: This is a 38 y.o.  left handed  male with a h/o seizure d/o dx  and followed by Dr. Jaylene Layne, autism spectrum d/o, EtOH abuse admitted for EtOH detox. He admits to drinking several beers daily for several years now. He reports his last drink was 3 days ago, and family has brought him in for detoxification and seizure management. He reports last seizure activity was 2 days prior with fall/head injury described as generalized tonic clonic activity. Parents report his last seizure prior to this was 2 months ago. He currently lives alone and reports compliance with AEDs including LEV 1500mg BID and TPM 50mg BID. 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   There has been ongoing concern that his alcoholism is contributing to his breakthrough seizures. Allergies   Allergen Reactions    Aspartame Other (comments)     Family believes it causes his seizures        Prior to Admission medications    Medication Sig Start Date End Date Taking? Authorizing Provider   levETIRAcetam (KEPPRA) 750 mg tablet TAKE 2 TABLETS TWICE DAILY 1/16/19  Yes Rina Mcbride MD   topiramate (TOPAMAX) 50 mg tablet TAKE 1 TABLET TWICE DAILY 3/12/19   Brittany Butt MD   lisinopril (PRINIVIL, ZESTRIL) 10 mg tablet TAKE 1 TABLET EVERY DAY FOR HYPERTENSION. SCHEDULE APPT. FOR FURTHER REFILLS ([phone]) 1/17/19   Efra Boudreaux MD   MULTIVITAMIN PO Take 1 Tab by mouth daily. Provider, Historical       Past Medical History:   Diagnosis Date    Asperger syndrome 12/14/2011    Autism     dx 2010- highly functional    Other ill-defined conditions(799.89)     aspergers, syncopal episodes    Seizure disorder ([facility] [address] 5/22/2012    Seizures ([facility] [address]         Past Surgical History:   Procedure Laterality Date    HX GI      pyloric stenosis 1976    HX HEENT      adenoids 1978        Social History     Tobacco Use    Smoking status: Never Smoker    Smokeless tobacco: Never Used   Substance Use Topics    Alcohol use: Yes     Alcohol/week: 5.0 oz     Types: 10 Cans of beer per week        Family History   Problem Relation Age of Onset    Diabetes Father     Heart Disease Father     Cancer Mother         Review of Systems:   Comprehensive review of systems performed and negative except for as listed above. Exam:     Visit Vitals  /62 (BP 1 Location: Left arm, BP Patient Position: At rest)   Pulse 92   Temp 98.1 °F (36.7 °C)   Resp 18   Ht 5' 10\" (1.778 m)   Wt 83.6 kg (184 lb 4.9 oz)   SpO2 99%   BMI 26.44 kg/m²        General: Patient in no apparent distress   Head: Normocephalic, atraumatic, anicteric sclera   Lungs:  Clear to auscultation bilaterally, No wheezes or rubs   Cardiac: Regular rate and rhythm with no murmurs. Abd: Bowel sounds were audible. No tenderness on palpation   Ext: No pedal edema   Skin: No overt signs of rash     Neurological Exam:  Mental Status: Alert and oriented to person place and time   Speech: Fluent no aphasia or dysarthria. Cranial Nerves:   Intact visual fields. Facial sensation is normal. Facial movement is symmetric. Palate is midline. Normal sternocleidomastoid strength. Tongue is midline. Hearing is intact bilaterally. Eyes: PERRL, EOM's full, no nystagmus, no ptosis. Motor:  Full and symmetric strength of upper and lower proximal and distal muscles. Normal bulk and tone. Reflexes:   Deep tendon reflexes 2+/4 and symmetrical.  Plantar response is downgoing b/l. Sensory:   Symmetrically intact  with no perceived deficits modalities involving small or large fibers. Gait:  Gait is balanced   Tremor:   No tremor noted. Cerebellar:  Finger to nose and heel over shin to knee was demonstrated competently   Neurovascular: No carotid bruits. Imaging  CT Results (maximum last 3): Results from [place] encounter on 05/29/19   CT HEAD WO CONT    Narrative EXAM:  CT HEAD WITHOUT CONTRAST  INDICATION: Head injury, seizure. COMPARISON: 4/2/2012. CONTRAST: None. TECHNIQUE: Unenhanced CT of the head was performed using 5 mm images. Brain and  bone windows were generated. Sagittal and coronal reformations were generated. CT dose reduction was achieved through use of a standardized protocol tailored  for this examination and automatic exposure control for dose modulation. FINDINGS:  The ventricles and sulci are normal in size, shape and configuration and  midline. There is no significant white matter disease. There is no intracranial hemorrhage. There is no extra-axial collection, mass, mass effect or midline shift. The basilar cisterns are open. No acute infarct is identified. The bone windows demonstrate no abnormalities.     The visualized portions of the paranasal sinuses and mastoid air cells are  clear. Impression IMPRESSION: No acute intracranial abnormality. MRI Results (maximum last 3): Results from [place] encounter on 01/23/12   MRI BRAIN W AND W/O CONTRAST    Narrative **Final Report**       ICD Codes / Adm. Diagnosis: 345.90   / SEIZURE DISORDER    Examination:  MR BRAIN Ibeth Wilson  - [id number] - Jan 23 2012  1:00PM  Accession No:  [id number]  Reason:  new onset sz      REPORT:  INDICATION: seizures. 345.9. TECHNIQUE: Sagittal T1, axial FLAIR, T2, T1 and gradient echo T2-weighted   images of the head were obtained followed by intravenous infusion 15 mL   gadolinium repeat axial and coronal T1-weighted images and axial diffusion   weighted images. Angled thin coronal T2 temporal lobe images. COMPARISON: CT head 01/12/2012    In the left posterior parahippocampal gyrus is a focal lesion measuring   approximately 11 x 10 x 8 mm which has heterogeneous internal architecture   with the rim of hypointensity and progressive blooming of hypointensity on   gradient-echo T2-weighted images. This is consistent with a cavernous   angioma. Adjacent to this is a prominent venous structure with imaging   characteristics consistent with a venous angioma (DVA). The area of decreased attenuation in the right midbrain on the CT scan is   better delineated on the MRI. This represents a well defined cyst of   uncertain etiology and significance. This measures approximately 11 x 6 x 6   mm. There is no significant displacement of adjacent structures in this is   felt to be quite chronic. No associated abnormal enhancement or restricted   diffusion. Otherwise normal signal in the cerebral hemispheres, brainstem and   cerebellum. Ventricular size and configuration are normal.   Normal flow-voids are present in the vertebral, basilar and carotid artery   systems.     The craniocervical junction is normal.   The structures of the cranial base including paranasal sinuses are    unremarkable. IMPRESSION:   1. Left posterior parahippocampal cavernous angioma. 2. Associated venous angioma (developmental venous anomaly) left posterior   parahippocampal area. 3. Nonaggressive appearing and possibly incidental 11 mm cyst in the right   midbrain. Depending on clinical circumstance consider followup in 6 to 18   months. Signing/Reading Doctor: Jason Farrell ([id number])    Approved: Jason Farrell ([id number])  01/23/2012                                      Lab Review  Lab Results   Component Value Date/Time    WBC 6.8 05/30/2019 05:41 PM    HCT 44.4 05/30/2019 05:41 PM    HGB 14.3 05/30/2019 05:41 PM    PLATELET 341 46/33/5874 05:41 PM     Lab Results   Component Value Date/Time    Sodium 140 05/30/2019 05:41 PM    Potassium 4.2 05/30/2019 05:41 PM    Chloride 109 (H) 05/30/2019 05:41 PM    CO2 23 05/30/2019 05:41 PM    Glucose 82 05/30/2019 05:41 PM    BUN 16 05/30/2019 05:41 PM    Creatinine 0.98 05/30/2019 05:41 PM    Calcium 8.6 05/30/2019 05:41 PM     No components found for: TROPQUANT  No results found for: NASEEM    Signed:  Heriberto Zuñiga DO  5/31/2019  12:22 PM

## 2019-06-01 ENCOUNTER — APPOINTMENT (OUTPATIENT)
Dept: MRI IMAGING | Age: 44
End: 2019-06-01
Attending: PSYCHIATRY & NEUROLOGY
Payer: MEDICARE

## 2019-06-01 VITALS
HEIGHT: 70 IN | OXYGEN SATURATION: 98 % | RESPIRATION RATE: 14 BRPM | TEMPERATURE: 97.6 F | SYSTOLIC BLOOD PRESSURE: 109 MMHG | BODY MASS INDEX: 26.34 KG/M2 | WEIGHT: 184 LBS | DIASTOLIC BLOOD PRESSURE: 66 MMHG | HEART RATE: 88 BPM

## 2019-06-01 LAB
LEVETIRACETAM SERPL-MCNC: 62.9 UG/ML (ref 10–40)
MAGNESIUM SERPL-MCNC: 2.2 MG/DL (ref 1.6–2.4)
TSH SERPL DL<=0.05 MIU/L-ACNC: 1.63 UIU/ML (ref 0.36–3.74)

## 2019-06-01 PROCEDURE — 84443 ASSAY THYROID STIM HORMONE: CPT

## 2019-06-01 PROCEDURE — 74011250637 HC RX REV CODE- 250/637: Performed by: HOSPITALIST

## 2019-06-01 PROCEDURE — 93005 ELECTROCARDIOGRAM TRACING: CPT

## 2019-06-01 PROCEDURE — 96366 THER/PROPH/DIAG IV INF ADDON: CPT

## 2019-06-01 PROCEDURE — 99218 HC RM OBSERVATION: CPT

## 2019-06-01 PROCEDURE — 36415 COLL VENOUS BLD VENIPUNCTURE: CPT

## 2019-06-01 PROCEDURE — 83735 ASSAY OF MAGNESIUM: CPT

## 2019-06-01 RX ADMIN — LEVETIRACETAM 1500 MG: 500 TABLET, FILM COATED ORAL at 08:49

## 2019-06-01 RX ADMIN — LISINOPRIL 10 MG: 10 TABLET ORAL at 08:49

## 2019-06-01 RX ADMIN — TOPIRAMATE 50 MG: 25 TABLET, FILM COATED ORAL at 08:49

## 2019-06-01 NOTE — DISCHARGE SUMMARY
Discharge Summary     PATIENT ID: Heather Quintana  MRN: [id number]   YOB: 1975    DATE OF ADMISSION: 5/30/2019  4:48 PM    DATE OF DISCHARGE: 6/1/2019  PRIMARY CARE PROVIDER: Tj Watson MD   ATTENDING PHYSICIAN: Eliza Hennessy  DISCHARGING PROVIDER: Dariel Ponce NP. To contact this individual call [phone] and ask the  to page. If unavailable ask to be transferred the Adult Hospitalist Department. CONSULTATIONS: IP CONSULT TO HOSPITALIST  IP CONSULT TO PSYCHIATRY  IP CONSULT TO NEUROLOGY    ADMITTING DIAGNOSES & HOSPITAL COURSE:   Pt presented to the ED with seizure. He was diagnosed with a seizure dz since 2011 and on max dose of keppra 1500 bid. He also has been drinking daily for over 20 years per parents (6-8 beers/day). Patient recently has had increasing seizure, having a major seizure and fall at home the day before admit. He was seen in ED the day before this admit and discharged home with neurologist follow up. He was seen by his Neurologist Dr. Sharmin Lubin the day of admit and was sent to ED for alcohol detox.      History of seizure disorder and autism/asperger syndrome    DISCHARGE DIAGNOSES / PLAN:      Seizures with pmhx seizure:   - CT: No acute intracranial abnormality.  - MRI attempted but pt crawled out of the MRi scanner.  Discussed with   - pt reports compliance with meds, mother verifies through pill counting his meds  - could due to his heavily drinking, no s/s withdrawl  - continue keppra and topamax (home dose)  - neurologist has seen and pt should follow up outpatient    Sinus Arrhythmia:   - episodes of tachy rates  - asymptomatic  - TSH, Mg normal  - suggest outpatient Holter Monitoring     Hx Alcohol dependence/alcohol withdrawl:   - CIWA protocol in place, nurse reports score has been 0  - pt counseled on stopping alcohol use     Hx HTN: continue lisinopril, BP controlled     FOLLOW UP APPOINTMENTS:    Follow-up Information     Follow up With Specialties Details Why Contact Info      Schedule an appointment as soon as possible for a visit  [facility] substance abuse partial hospitalization program.  Please call [phone]    Abner Cobb MD Cardiology  call office to arrange Holter Monitor [address] [facility]  [address] [facility] [phone]      Lianet Grande MD Neurology In 4 weeks  [address] [facility]  [phone]      Ruchi Boykin, [address] [place] [address] Internal Medicine   [address]  [phone]           ADDITIONAL CARE RECOMMENDATIONS:   - STOP DRINKING ALCOHOL (BEER)    - consider cardiology consult for Holter Monitor to assess for arrythmias    DIET: Regular Diet    ACTIVITY: Activity as tolerated    DISCHARGE MEDICATIONS:  Current Discharge Medication List      CONTINUE these medications which have NOT CHANGED    Details   levETIRAcetam (KEPPRA) 750 mg tablet TAKE 2 TABLETS TWICE DAILY  Qty: 360 Tab, Refills: 3      MULTIVITAMIN PO Take 1 Tab by mouth daily. topiramate (TOPAMAX) 50 mg tablet TAKE 1 TABLET TWICE DAILY  Qty: 180 Tab, Refills: 1    Associated Diagnoses: Seizure disorder ([address] Uncomplicated alcohol dependence ([facility])      lisinopril (PRINIVIL, ZESTRIL) 10 mg tablet TAKE 1 TABLET EVERY DAY FOR HYPERTENSION. SCHEDULE APPT. FOR FURTHER REFILLS ([phone])  Qty: 90 Tab, Refills: 3    Associated Diagnoses: Essential hypertension with goal blood pressure less than 130/85           NOTIFY YOUR PHYSICIAN FOR ANY OF THE FOLLOWING:   Fever over 101 degrees for 24 hours. Chest pain, shortness of breath, fever, chills, nausea, vomiting, diarrhea, change in mentation, falling, weakness, bleeding. Severe pain or pain not relieved by medications. Or, any other signs or symptoms that you may have questions about.     DISPOSITION:    Home With:   OT  PT  HH  RN       Long term SNF/Inpatient Rehab    Independent/assisted living    Hospice   xx Other: Home     PATIENT CONDITION AT DISCHARGE:   Functional status    Poor     Deconditioned    xx Independent      Cognition    xx Lucid     Forgetful     Dementia      Catheters/lines (plus indication)    Guajardo     PICC     PEG    xx None      Code status   xx  Full code     DNR      PHYSICAL EXAMINATION AT DISCHARGE:  /66 (BP 1 Location: Left arm)   Pulse 88   Temp 97.6 °F (36.4 °C)   Resp 14   Ht 5' 10\" (1.778 m)   Wt 83.5 kg (184 lb)   SpO2 98%   BMI 26.40 kg/m²     Pt in bed, parents at bedside, No new complaints and feels well. Does not know why he climbed out of the MRI earlier today. Have discussed case with Dr. Qasim Freedman with neurology who indicates an MRI could be pursued outpatient if needed. Discussed discharge plans with pt and his parents: no new medications changes and suggest outpatient follow up for sinus arrhythmias. Constitutional:  No acute distress, cooperative, pleasant    ENT:  Oral MM moist, oropharynx benign. Resp:  No accessory muscle use. CV:  Regular rhythm, normal rate. SA on tele. SR on EKG    GI:  good appetite, no n/v    Musculoskeletal:  No edema, warm, 2+ pulses throughout    Neurologic:  Moves all extremities. AAOx3   Skin:  Abrasion under R eye. Small bruise to L knee. CHRONIC MEDICAL DIAGNOSES:  Problem List as of 6/1/2019 Date Reviewed: 1/3/2018          Codes Class Noted - Resolved    Brain cyst ICD-10-CM: G93.0  ICD-9-CM: 348.0  4/3/2017 - Present        Advance care planning ICD-10-CM: Z71.89  ICD-9-CM: V65.49  4/25/2016 - Present    Overview Signed 4/25/2016  4:50 PM by Rizwana Billy MD     4/25/16: Discussed with patient and his mother. They were given a booklet and form.                EtOH dependence ([facility] [address] ICD-10-CM: F10.20  ICD-9-CM: 303.90  10/31/2014 - Present        Alcohol withdrawal ([facility] [address] ICD-10-CM: K12.862  ICD-9-CM: 291.81  10/31/2014 - Present        Hypertension ICD-10-CM: I10  ICD-9-CM: 401.9  9/12/2014 - Present        * (Principal) Seizure [facility]) ICD-10-CM: R56.9  ICD-9-CM: 780.39  5/22/2012 - Present        Asperger syndrome ICD-10-CM: F84.5  ICD-9-CM: 299.80  12/14/2011 - Present        Autism ICD-10-CM: F84.0  ICD-9-CM: 299.00  Unknown - Present            Greater than 30 minutes were spent with the patient on counseling and coordination of care    Signed:   Gregorio Larson NP  6/1/2019  3:24 PM

## 2019-06-01 NOTE — PROGRESS NOTES
I have reviewed discharge instructions with the patient. The patient verbalized understanding. Patient home via POV, family is driving.

## 2019-06-01 NOTE — DISCHARGE INSTRUCTIONS
Discharge Instructions     PATIENT ID: Anatoliy Bravo  MRN: [id number]   YOB: 1975    DATE OF ADMISSION: 5/30/2019  4:48 PM    DATE OF DISCHARGE: 6/1/2019  PRIMARY CARE PROVIDER: Rima Mcqueen MD   ATTENDING PHYSICIAN: Starla Morrison MD  DISCHARGING PROVIDER: Preeti Walden NP. To contact this individual call [phone] and ask the  to page. If unavailable ask to be transferred the Adult Hospitalist Department. DISCHARGE DIAGNOSES   Seizure  Alcohol Abuse  Sinus Arrhthmia    CONSULTATIONS: IP CONSULT TO HOSPITALIST  IP CONSULT TO PSYCHIATRY  IP CONSULT TO NEUROLOGY    FOLLOW UP APPOINTMENTS:   Follow-up Information     Follow up With Specialties Details Why Contact Info      Schedule an appointment as soon as possible for a visit  [facility] substance abuse partial hospitalization program.  Please call [phone]    Miranda Zavala MD Cardiology  call office to arrange Holter Monitor [address]  [address] [address]  [facility] [address] [facility]      Art Bermudez MD Neurology In 4 weeks  [facility] [address]  [phone]           ADDITIONAL CARE RECOMMENDATIONS:   - Kiley (BEER)    - consider cardiology consult for Holter Monitor to assess for arrythmias    DIET: Regular Diet    ACTIVITY: Activity as tolerated    · It is important that you take the medication exactly as they are prescribed. · Keep your medication in the bottles provided by the pharmacist and keep a list of the medication names, dosages, and times to be taken in your wallet. · Do not take other medications without consulting your doctor. NOTIFY YOUR PHYSICIAN FOR ANY OF THE FOLLOWING:   Fever over 101 degrees for 24 hours. Chest pain, shortness of breath, fever, chills, nausea, vomiting, diarrhea, change in mentation, falling, weakness, bleeding. Severe pain or pain not relieved by medications.   Or, any other signs or symptoms that you may have questions about.     DISPOSITION:    Home With:   OT  PT  HH  RN       SNF/Inpatient Rehab/LTAC    Independent/assisted living    Hospice   xx Other: Home     CDMP Checked:   Yes *x*     PROBLEM LIST Updated:  Yes *x*     Signed:   Tamar Reyes NP  6/1/2019  2:47 PM

## 2019-06-01 NOTE — PROGRESS NOTES
Patient returned from MRI. Unable to stay still for test. \"Tried to crawl out of the tube\" per radiology tech.

## 2019-06-01 NOTE — CONSULTS
[address]    Name:  Av Weiss  MR#:  [id number]  :  1975  ACCOUNT #:  [de-identified]  DATE OF SERVICE:  2019    BEHAVIORAL HEALTH CONSULTATION    CHIEF COMPLAINT:  Alcohol withdrawal.    HISTORY OF PRESENT ILLNESS:  The patient is a 67-year-old male who is being seen in the medical unit for psychiatric consultation for complaint mentioned above. He presented to the emergency room with recurrent seizures. His past medical history is significant for seizure disorder and autism spectrum disorder. He has also struggled with alcohol, drinking for over 20 years. He states that, on average, he drinks 6 beers a night, and on weekends, he drinks all the way up to 12 beers. He is currently taking the maximum dose of Keppra which is 1500 twice a day. He had one witnessed seizure and fall at home prior to admission. He denies typical withdrawal symptoms, no tremors noted on my assessment. He denies being depressed or anxious. His urine drug screen is positive for benzodiazepine and blood alcohol level, as mentioned, was less than 10. He tells me that he has never been to any rehab facility. He denies suicidal ideation, homicidal ideation, auditory or visual hallucination. He states that sometimes he feels sad as he is single. He has nobody with him. He also lives alone. PAST MEDICAL HISTORY:  See H and P. PAST PSYCHIATRIC HOSPITALIZATION:  He has never been admitted in any psychiatric facility. He has never seen a psychiatrist.    PSYCHOSOCIAL HISTORY:  He is single. There are no children. MENTAL STATUS EXAMINATION:  He is alert and oriented in all spheres. He is dressed in hospital apparel. Mood is mildly dysphoric. Affect is blunted. Speech normal rate and rhythm. Thought process are slow and goal directed. He denies suicidal ideation, homicidal ideation, auditory or visual hallucinations. Memory seems intact.   Intelligence seems below average. Insight is poor. Judgment is poor. ASSESSMENT AND PLAN:  The patient meets the criteria for adjustment disorder, unspecified. He currently denies any physical withdrawal symptoms, no tremors noted on my exam.  Please continue CIWA protocol. I highly encouraged him to maintain sobriety and to be active in the recovery community. I recommend [facility] substance abuse partial hospitalization program.  Please call [phone]. Please seek Case Management's assistance. There is no indication for inpatient psychiatric admission. Please discharge to home once medically stable. Thank you for this kind consult. Please call with questions.       RENA SANTOS NP      [id number]  D:  05/31/2019 14:22  T:  05/31/2019 17:48  JOB #:  [id number]

## 2019-06-01 NOTE — PROGRESS NOTES
Problem: SEIZURE DISORDER (ADULT)  Goal: *Cognitive status will be restored to baseline  Outcome: Progressing Towards Goal   Patient has been seizure free since admit, siderails x3, neuro check q4  Problem: Alcohol Withdrawal  Goal: *Able to cope  Outcome: Progressing Towards Goal   CIWA scale 0-1 since admission. Problem: Falls - Risk of  Goal: *Absence of Falls  Description  Document Rhina Beans Fall Risk and appropriate interventions in the flowsheet. Outcome: Progressing Towards Goal     Problem: Pressure Injury - Risk of  Goal: *Prevention of pressure injury  Description  Document Elpidio Scale and appropriate interventions in the flowsheet.   Outcome: Progressing Towards Goal     Problem: Pain  Goal: *Control of Pain  Outcome: Progressing Towards Goal

## 2019-06-01 NOTE — PROGRESS NOTES
A Spiritual Care Partner Volunteer visited patient in Rm 662 on 6/1/2019.   Documented by:  Chaplain Dewitt MDiv, MS, [facility]  [phone])

## 2019-06-02 LAB
ATRIAL RATE: 77 BPM
CALCULATED P AXIS, ECG09: 61 DEGREES
CALCULATED R AXIS, ECG10: 52 DEGREES
CALCULATED T AXIS, ECG11: 29 DEGREES
DIAGNOSIS, 93000: NORMAL
P-R INTERVAL, ECG05: 142 MS
Q-T INTERVAL, ECG07: 354 MS
QRS DURATION, ECG06: 94 MS
QTC CALCULATION (BEZET), ECG08: 400 MS
VENTRICULAR RATE, ECG03: 77 BPM

## 2019-06-03 NOTE — ED PROVIDER NOTES
EMERGENCY DEPARTMENT HISTORY AND PHYSICAL EXAM      Date: 5/29/2019  Patient Name: Armando Romero    History of Presenting Illness     Chief Complaint   Patient presents with    Seizure       History Provided By: Patient's Mother    HPI: Armando Romero, 38 y.o. male with PMHx significant for Asperger's syndrome and epilepsy, presents to the ED with cc of generalized convulsive seizure approximately 1 hour before arrival.  Patient was walking and felt suddenly weak and then collapsed and had a generalized seizure. Witnessed by bystanders lasting past 1 to 2 minutes. Positive amnesia of the event with moderate postictal state. No incontinence or tongue biting. Mother reports the patient has had seizures for many years but is probably had 2 episodes in the last 6 months. He has not had any recent changes any of his antiepileptic drugs. No recent illnesses and no recent difficulties with sleeping. No other associated symptoms and no other exacerbating or ameliorating factors. There are no other complaints, changes, or physical findings at this time. PCP: Polina Grijalva MD    No current facility-administered medications on file prior to encounter. Current Outpatient Medications on File Prior to Encounter   Medication Sig Dispense Refill    topiramate (TOPAMAX) 50 mg tablet TAKE 1 TABLET TWICE DAILY 180 Tab 1    lisinopril (PRINIVIL, ZESTRIL) 10 mg tablet TAKE 1 TABLET EVERY DAY FOR HYPERTENSION. SCHEDULE APPT. FOR FURTHER REFILLS ([phone]) 90 Tab 3    levETIRAcetam (KEPPRA) 750 mg tablet TAKE 2 TABLETS TWICE DAILY 360 Tab 3    MULTIVITAMIN PO Take 1 Tab by mouth daily.          Past History     Past Medical History:  Past Medical History:   Diagnosis Date    Asperger syndrome 12/14/2011    Autism     dx 2010- highly functional    Other ill-defined conditions(799.89)     aspergers, syncopal episodes    Seizure disorder ([facility] [address] 5/22/2012    Seizures ([facility] [address]        Past Surgical History:  Past Surgical History:   Procedure Laterality Date    HX GI      pyloric stenosis 1976    HX HEENT      adenoids 1978       Family History:  Family History   Problem Relation Age of Onset    Diabetes Father     Heart Disease Father     Cancer Mother        Social History:  Social History     Tobacco Use    Smoking status: Never Smoker    Smokeless tobacco: Never Used   Substance Use Topics    Alcohol use: Yes     Alcohol/week: 5.0 oz     Types: 10 Cans of beer per week    Drug use: No       Allergies: Allergies   Allergen Reactions    Aspartame Other (comments)     Family believes it causes his seizures         Review of Systems   Review of Systems   Constitutional: Negative for chills, diaphoresis, fatigue and fever. HENT: Negative for ear pain and sore throat. Eyes: Negative for pain and redness. Respiratory: Negative for cough and shortness of breath. Cardiovascular: Negative for chest pain and leg swelling. Gastrointestinal: Negative for abdominal pain, diarrhea, nausea and vomiting. Endocrine: Negative for cold intolerance and heat intolerance. Genitourinary: Negative for flank pain and hematuria. Musculoskeletal: Negative for back pain and neck stiffness. Skin: Negative for rash and wound. Neurological: Positive for seizures, weakness and headaches. Negative for dizziness and syncope. Psychiatric/Behavioral: Positive for confusion. All other systems reviewed and are negative. Physical Exam   Physical Exam   Constitutional: He is oriented to person, place, and time. He appears well-developed and well-nourished. HENT:   Head: Normocephalic and atraumatic. Mouth/Throat: Oropharynx is clear and moist. No oropharyngeal exudate. Patient with soft tissue scalp hematoma on the occiput. Approximately 2 cm in diameter. No laceration or step-off. Eyes: Pupils are equal, round, and reactive to light. Conjunctivae and EOM are normal.   Neck: Normal range of motion. Cardiovascular: Normal rate and regular rhythm. No murmur heard. Pulmonary/Chest: Effort normal and breath sounds normal. No respiratory distress. He has no wheezes. Abdominal: Soft. Bowel sounds are normal. He exhibits no distension. There is no tenderness. Musculoskeletal: Normal range of motion. He exhibits no edema or deformity. Neurological: He is alert and oriented to person, place, and time. Coordination normal.   Patient appears mildly confused but answers questions appropriately and responds normally to both verbal and painful stimuli. No facial droop, pronator drift, or lower extremely weakness. Skin: Skin is warm and dry. No rash noted. Psychiatric: He has a normal mood and affect. His behavior is normal.   Nursing note and vitals reviewed. Diagnostic Study Results     Labs -   No results found for this or any previous visit (from the past 24 hour(s)). Radiologic Studies -   CT HEAD WO CONT   Final Result   IMPRESSION: No acute intracranial abnormality. CT Results  (Last 48 hours)    None        CXR Results  (Last 48 hours)    None            Medical Decision Making   I am the first provider for this patient. I reviewed the vital signs, available nursing notes, past medical history, past surgical history, family history and social history. Vital Signs-Reviewed the patient's vital signs. No data found. Records Reviewed: Nursing Notes and Old Medical Records    Differential Diagnosis:    Intracerebral hemorrhage versus seizures versus medication noncompliance    Provider Notes (Medical Decision Making):   Patient feeling much better after observation in the ER. No seizures over the last several months and patient and patient's mother would like to follow-up with her neurologist as outpatient. Page placed to Dr. Benjamin Venegas who has not responded as of this time. They would prefer to follow-up the next day with her neurologist which I think is reasonable.   Return precautions given. ED Course:     Initial assessment performed. The patients presenting problems have been discussed, and they are in agreement with the care plan formulated and outlined with them. I have encouraged them to ask questions as they arise throughout their visit. Critical Care Time:     None    Disposition:  10:38 AM  Jayson Miles's  results have been reviewed with him. He has been counseled regarding his diagnosis. He verbally conveys understanding and agreement of the signs, symptoms, diagnosis, treatment and prognosis and additionally agrees to follow up as recommended with Dr. Rut Ray MD in 24 - 48 hours. He also agrees with the care-plan and conveys that all of his questions have been answered. I have also put together some discharge instructions for him that include: 1) educational information regarding their diagnosis, 2) how to care for their diagnosis at home, as well a 3) list of reasons why they would want to return to the ED prior to their follow-up appointment, should their condition change. PLAN:  1. Discharge Medication List as of 5/29/2019  9:06 PM        2. Follow-up Information     Follow up With Specialties Details Why Contact Info    Rut Ray MD Internal Medicine In 3 days  [address]      Charles Mcbride MD Neurology In 1 week  [facility] [address]  [phone]          Return to ED if worse     Diagnosis     Clinical Impression:   1.  Seizure ([address]

## 2019-06-04 ENCOUNTER — PATIENT OUTREACH (OUTPATIENT)
Dept: INTERNAL MEDICINE CLINIC | Facility: CLINIC | Age: 44
End: 2019-06-04

## 2019-06-04 NOTE — PROGRESS NOTES
Hospital Discharge Follow-Up      Date/Time:  6/4/2019 12:08 PM    Patient was admitted to [facility] (Observation) on 5/30/19 and discharged on 6/1/19 for Seizures with pmhx seizure. The physician discharge summary was available at the time of outreach. Patient was contacted within 2 business days of discharge. Top Challenges reviewed with the provider   - No further seizures in hospital    - Alcohol abuse- drinking daily for over 20 years per parents (6-8 beers/day). - Head CT - no acute abnormality. Brain  MRI not done- pt crawled out of scanner    - Psych consult- cleared for d/c. Recommended outpt partial hospitalization Substance Abuse Program @ [address] consult re Sinus Arrhythmia eval, possible Holter Monitor    - PCP f/u scheduled for 6/6/19 @ 10:30 AM. Attempred to contact pt to inform, messages left    - Neuro f/u 7/16/19 w/ Dr Yolanda Begum of communication with provider :chart routing    Inpatient RRAT score: 9  Was this a readmission? no   Patient stated reason for the readmission: n/a    Nurse Navigator (NN) attempted to contact pt & 2 listed EC (PHI)>. Messages left. Reviewed discharge instructions and red flags- unable to reach pt     Disease Specific:   N/A    Summary of patient's top problems:  1. Seizures with pmhx seizure-   Head CT-: no acute intracranial abnormality. MRI attempted but pt crawled out of the [address]. Pt reported compliance with meds, mother verified  through pill counting his meds. Serum Keppra level 62.9 (elevated). Seizure could be due to his heavy drinking. Neurologist has seen and pt should f/u outpatient. No s/s withdrawal. D/c'd home. Continue Keppra and Topamax (home dose). 2. Sinus Arrhythmia- episodes of tachy rate. Asymptomatic. TSH, Mag normal. Suggest outpt Cardiology consult for Holter Monitor to assess for arrythmias  3. Hx Alcohol dependence/alcohol withdrawal- CIWA protocol.  Drug Screen + Benzodiazepines. Ethyl Alcohol level <10. Psych consult. Cleared for d/c. Pt counseled on stopping alcohol use. Recommended contact [id number] [address] Substance Abuse Partial Hospitalization Program [address]  orders at discharge: [address]: n/a  Date of initial visit: [address] [facility] ordered/company: none  Durable Medical Equipment received: n/a    Barriers to care? Alcohol Abuse, ineffective coping    Advance Care Planning:   Does patient have an Advance Directive:  not on file     Medication(s):   New Medications at Discharge: none  Changed Medications at Discharge: none  Discontinued Medications at Discharge: none    Medication reconciliation was performed- unable to reach pt     Referral to Pharm D needed: no     Current Outpatient Medications   Medication Sig    topiramate (TOPAMAX) 50 mg tablet TAKE 1 TABLET TWICE DAILY    lisinopril (PRINIVIL, ZESTRIL) 10 mg tablet TAKE 1 TABLET EVERY DAY FOR HYPERTENSION. SCHEDULE APPT. FOR FURTHER REFILLS ([phone])    levETIRAcetam (KEPPRA) 750 mg tablet TAKE 2 TABLETS TWICE DAILY    MULTIVITAMIN PO Take 1 Tab by mouth daily. No current facility-administered medications for this visit. There are no discontinued medications. [facility] follow up appointment(s):   Future Appointments   Date Time Provider Stephany Blum   6/6/2019 10:30 AM Ava Vu  [address] [place]   7/16/2019  1:40 PM Rajan Loomis  [facility]      Non-[facility] follow up appointment(s): none  [facility]:  out of service area       Goals      Transitions of [address] care coordination to prevent complications post hospitalization. 6/4/19- attempted to contact pt & 2 Emerg Contacts listed on PHI. Messages left x3. Plan- PCP f/u scheduled for 6/6/19 @ 10:30 AM. Message left w/ appt date/time. Will attempt to contact pt in 1 day if no response-ID.

## 2019-06-04 NOTE — PROGRESS NOTES
NN KRISHAN F/U Progress Note    Goals      Transitions of Care- colaboration & care coordination to prevent complications post hospitalization. 6/4/19- call from pt's mother in response to NN message. Mother reported checked on pt during her lunch break. Doing ok. No further seizures. Taking meds as prescribed. NN informed of PCP appt on 6/6/19. Mother reported pt doesn't drive. Pt dependent on mother/father to transport to appts. Mother to check w/ employer if can take take to bring pt. Plan- mother to contact NN 6/5/19 morning to confirm appt-ID.    6/4/19- attempted to contact pt & 2 Emerg Contacts listed on PHI. Messages left x3. Plan- PCP f/u scheduled for 6/6/19 @ 10:30 AM. Message left w/ appt date/time. Will attempt to contact pt in 1 day if no response-ID.

## 2019-06-06 ENCOUNTER — OFFICE VISIT (OUTPATIENT)
Dept: INTERNAL MEDICINE CLINIC | Facility: CLINIC | Age: 44
End: 2019-06-06

## 2019-06-06 ENCOUNTER — PATIENT OUTREACH (OUTPATIENT)
Dept: INTERNAL MEDICINE CLINIC | Facility: CLINIC | Age: 44
End: 2019-06-06

## 2019-06-06 VITALS
DIASTOLIC BLOOD PRESSURE: 82 MMHG | BODY MASS INDEX: 27.06 KG/M2 | RESPIRATION RATE: 16 BRPM | HEART RATE: 76 BPM | HEIGHT: 70 IN | OXYGEN SATURATION: 98 % | SYSTOLIC BLOOD PRESSURE: 123 MMHG | TEMPERATURE: 97.6 F | WEIGHT: 189 LBS

## 2019-06-06 DIAGNOSIS — G40.909 SEIZURE DISORDER (HCC): ICD-10-CM

## 2019-06-06 DIAGNOSIS — Z00.00 MEDICARE ANNUAL WELLNESS VISIT, SUBSEQUENT: Primary | ICD-10-CM

## 2019-06-06 DIAGNOSIS — I10 ESSENTIAL HYPERTENSION: ICD-10-CM

## 2019-06-06 DIAGNOSIS — F10.20 UNCOMPLICATED ALCOHOL DEPENDENCE (HCC): ICD-10-CM

## 2019-06-06 DIAGNOSIS — E66.3 OVERWEIGHT (BMI 25.0-29.9): ICD-10-CM

## 2019-06-06 DIAGNOSIS — F41.1 GENERALIZED ANXIETY DISORDER: ICD-10-CM

## 2019-06-06 DIAGNOSIS — Z13.31 SCREENING FOR DEPRESSION: ICD-10-CM

## 2019-06-06 DIAGNOSIS — R00.0 TACHYCARDIA: ICD-10-CM

## 2019-06-06 RX ORDER — SERTRALINE HYDROCHLORIDE 25 MG/1
25 TABLET, FILM COATED ORAL DAILY
Qty: 30 TAB | Refills: 1 | Status: SHIPPED | OUTPATIENT
Start: 2019-06-06 | End: 2019-09-06 | Stop reason: SDUPTHER

## 2019-06-06 RX ORDER — SERTRALINE HYDROCHLORIDE 25 MG/1
25 TABLET, FILM COATED ORAL DAILY
Qty: 30 TAB | Refills: 1 | Status: SHIPPED | OUTPATIENT
Start: 2019-06-06 | End: 2019-06-06 | Stop reason: CLARIF

## 2019-06-06 NOTE — PATIENT INSTRUCTIONS
Medicare Wellness Visit, Male    The best way to live healthy is to have a lifestyle where you eat a well-balanced diet, exercise regularly, limit alcohol use, and quit all forms of tobacco/nicotine, if applicable. Regular preventive services are another way to keep healthy. Preventive services (vaccines, screening tests, monitoring & exams) can help personalize your care plan, which helps you manage your own care. Screening tests can find health problems at the earliest stages, when they are easiest to treat. [facility] follows the current, evidence-based guidelines published by the [facility] ([facility]) when recommending preventive services for our patients. Because we follow these guidelines, sometimes recommendations change over time as research supports it. (For example, a prostate screening blood test is no longer routinely recommended for men with no symptoms.)  Of course, you and your doctor may decide to screen more often for some diseases, based on your risk and co-morbidities (chronic disease you are already diagnosed with). Preventive services for you include:  - Medicare offers their members a free annual wellness visit, which is time for you and your primary care provider to discuss and plan for your preventive service needs. Take advantage of this benefit every year!  -All adults over age 72 should receive the recommended pneumonia vaccines. Current USPSTF guidelines recommend a series of two vaccines for the best pneumonia protection.   -All adults should have a flu vaccine yearly and an ECG.  All adults age 61 and older should receive a shingles vaccine once in their lifetime.    -All adults age 38-68 who are overweight should have a diabetes screening test once every three years.   -Other screening tests & preventive services for persons with diabetes include: an eye exam to screen for diabetic retinopathy, a kidney function test, a foot exam, and stricter control over your cholesterol.   -Cardiovascular screening for adults with routine risk involves an electrocardiogram (ECG) at intervals determined by the provider.   -Colorectal cancer screening should be done for adults age 54-65 with no increased risk factors for colorectal cancer. There are a number of acceptable methods of screening for this type of cancer. Each test has its own benefits and drawbacks. Discuss with your provider what is most appropriate for you during your annual wellness visit. The different tests include: colonoscopy (considered the best screening method), a fecal occult blood test, a fecal DNA test, and sigmoidoscopy.  -All adults born between [facility] should be screened once for Hepatitis C.  -An Abdominal Aortic Aneurysm (AAA) Screening is recommended for men age 73-68 who has ever smoked in their lifetime.      Here is a list of your current Health Maintenance items (your personalized list of preventive services) with a due date:  Health Maintenance Due   Topic Date Due    Pneumococcal Vaccine (1 of 1 - PPSV23) 03/03/1981

## 2019-06-06 NOTE — PROGRESS NOTES
NN KRISHAN Progress Note    Goals      Transitions of Care- colaboration & care coordination to prevent complications post hospitalization. 6/6/19- mother left VMM on 6/5/19 @ 3:33 PM informing NN pt would be attending 6/6/19 PCP KRISHAN appt @ 10:30 AM. Plan- pt to attend scheduled KRISHAN PCP f/u appt-ID.    6/4/19- call from pt's mother in response to NN message. Mother reported checked on pt during her lunch break. Doing ok. No further seizures. Taking meds as prescribed. NN informed of PCP appt on 6/6/19. Mother reported pt doesn't drive. Pt dependent on mother/father to transport to appts. Mother to check w/ employer if can take take to bring pt. Plan- mother to contact NN 6/5/19 morning to confirm appt-ID.    6/4/19- attempted to contact pt & 2 Emerg Contacts listed on PHI. Messages left x3. Plan- PCP f/u scheduled for 6/6/19 @ 10:30 AM. Message left w/ appt date/time. Will attempt to contact pt in 1 day if no response-ID.

## 2019-06-06 NOTE — PROGRESS NOTES
CC:   Chief Complaint   Patient presents with   [facility] Follow Up   [place] Wellness Visit       HISTORY OF PRESENT ILLNESS  Xu Jorgensen is a 38 y.o. male. Accompanied by his mother. He is seen today for Transition of Care services and Medicare AWV following a hospital discharge for seizures on 6/1/19. Our office Nurse Navigator performed an outreach to Mr. Beckie Bravo on 6/4/19 (within 2 business days of discharge) to complete medication reconciliation and a telephonic assessment of his condition. He was last seen in clinic on 1/3/18; did not follow up in 6 months as instructed. He has HTN, seizure disorder since 2011, alcohol dependence, autism, and Asperger's syndrome    He was admitted to [facility] from 5/30-6/1/19 after having a major seizure with a fall at home the day before admission likely related to heavy drinking. Was seen by Dr. Torri Espinal (Neurology) earlier on the day of admission and was sent to the ED for alcohol detoxication. Had been drinking 6-8 beers a day. No sings of withdrawal during admission. CT head showed no acute intracranial abnormality. MRI was attempted but patient was unable to follow instructions and crawled out of the MRI scanner. During hospitalization he had episodes of tachycardic rates. BP remained controlled on lisinopril 10 mg daily. Labs including TSH and Mg were normal.  He was discharged on his same doses of Keppra 750 2 tabs BID and Topamax 50 mg BID. He was counseled on alcohol cessation. Last MRI brain was on 1/23/12: Left posterior parahippocampal cavernous angioma. Nonaggressive appearing and possibly incidental 11 mm cyst in the right midbrain. Today patient without complaints. His mother reports that he had a seizure on 4/14/19. Since being home from hospital, no seizures or falls. Reports compliance with his medications; he manages them on his own. He claims he is no longer drinking beer but his mother cannot verify this.   She does report that he has been having anxiety on a nearly daily basis over the past few months. He denies fevers, chills, CP, SOB heart palpitations, abdominal pain, N/V/D/C, leg swelling. Soc Hx  Lives alone in  house. Single. No children. Never smoker. Drinks 6-8 beers a day for the past 20 yrs. Denies recreational drug use. Gets regular exercise by walking. Does not drive.        Health Maintenance  Flu vaccine: declined                                                    Tetanus vaccine: Td 3/2/12     Pneumonia vaccine: due for this, his mother declined                           Lipids: 1/3/18 (tot chol 179, LDL 75)  A1c: 1/3/18 (5.3%)  Advanced Directives: discussed, given information  End of Life: discussed, given information                        ROS   A complete review of systems was performed and is negative except for those mentioned in the HPI. Patient Active Problem List   Diagnosis Code    Autism F84.0    Asperger syndrome F84.5    Seizure ([address] R56.9    Hypertension I10    EtOH dependence ([address] F10.20    Alcohol withdrawal ([facility] F10.239    Advance care planning Z71.89    Brain cyst G93.0     Past Medical History:   Diagnosis Date    Asperger syndrome 12/14/2011    Autism     dx 2010- highly functional    Other ill-defined conditions(799.89)     aspergers, syncopal episodes    Seizure disorder ([facility] 5/22/2012    Seizures (HCC)      Allergies   Allergen Reactions    Aspartame Other (comments)     Family believes it causes his seizures       Current Outpatient Medications   Medication Sig Dispense Refill    sertraline (ZOLOFT) 25 mg tablet Take 1 Tab by mouth daily. Indications: Repeated Episodes of Anxiety 30 Tab 1    topiramate (TOPAMAX) 50 mg tablet TAKE 1 TABLET TWICE DAILY 180 Tab 1    lisinopril (PRINIVIL, ZESTRIL) 10 mg tablet TAKE 1 TABLET EVERY DAY FOR HYPERTENSION. SCHEDULE APPT.  FOR FURTHER REFILLS ([phone]) 90 Tab 3    levETIRAcetam (KEPPRA) 750 mg tablet TAKE 2 TABLETS TWICE DAILY 360 Tab 3    MULTIVITAMIN PO Take 1 Tab by mouth daily. PHYSICAL EXAM  Visit Vitals  /82 (BP 1 Location: Right arm, BP Patient Position: Sitting)   Pulse 76   Temp 97.6 °F (36.4 °C) (Oral)   Resp 16   Ht 5' 10\" (1.778 m)   Wt 189 lb (85.7 kg)   SpO2 98%   BMI 27.12 kg/m²       General: Well-developed and well-nourished, no distress. HEENT:  Head normocephalic/atraumatic, no scleral icterus  Neck: Supple. No carotid bruits, JVD, lymphadenopathy, or thyromegaly. Lungs:  Clear to ausculation bilaterally. Good air movement. Heart:  Regular rate and rhythm, normal S1 and S2, no murmur, gallop, or rub  Abdomen: Soft, non-distended, normal bowel sounds, no tenderness, no guarding, masses, rebound tenderness, or HSM. Extremities: No clubbing, cyanosis, or edema. Back: Kyphosis. No paravertebral, vertebral, or CVA tenderness. Neurological: Alert and oriented. Psychiatric: Normal mood and affect. Behavior is normal.         ASSESSMENT AND PLAN    ICD-10-CM ICD-9-CM    1. Medicare annual wellness visit, subsequent Z00.00 V70.0    2. Seizure disorder ([facility] G40.909 345.90    3. Essential hypertension I10 401.9    4. Uncomplicated alcohol dependence ([facility] F10.20 303.90    5. Generalized anxiety disorder F41.1 300.02 sertraline (ZOLOFT) 25 mg tablet      DISCONTINUED: sertraline (ZOLOFT) 25 mg tablet   6. Tachycardia R00.0 785.0    7. Overweight (BMI 25.0-29. 9) E66.3 278.02    8. Screening for depression Z13.31 V79.0 DEPRESSION SCREEN ANNUAL     Diagnoses and all orders for this visit:    1. Medicare annual wellness visit, subsequent    2. Seizure disorder ([facility]    3. Essential hypertension    4. Uncomplicated alcohol dependence ([facility]    5. Generalized anxiety disorder  -     sertraline (ZOLOFT) 25 mg tablet; Take 1 Tab by mouth daily. Indications: Repeated Episodes of Anxiety    6. Tachycardia    7. Overweight (BMI 25.0-29.9)    8.  Screening for depression  -     DEPRESSION SCREEN ANNUAL      Follow-up and Dispositions    · Return in about 2 months (around 8/6/2019), or if symptoms worsen or fail to improve, for Anxiety, alcohol. I have discussed the diagnosis with the patient and the intended plan as seen in the above orders. Patient is in agreement. The patient has received an after-visit summary and questions were answered concerning future plans. I have discussed medication side effects and warnings with the patient as well. This is the Subsequent Medicare Annual Wellness Exam, performed 12 months or more after the Initial AWV or the last Subsequent AWV    I have reviewed the patient's medical history in detail and updated the computerized patient record. History   Linus Mendez is a 38 y.o. male. He is seen today for Transition of Care services following a hospital discharge for seizures on 6/1/19. Our office Nurse Navigator performed an outreach to Mr. Suzan Gerardo on 6/4/19 (within 2 business days of discharge) to complete medication reconciliation and a telephonic assessment of his condition. He was last seen in clinic on 1/3/18; did not follow up in 6 months as instructed. He has HTN, seizure disorder since 2011, alcohol dependence, autism, and Asperger's syndrome    He was admitted to [facility] from 5/30-6/1/19 after having a major seizure with a fall at home the day before admission likely related to heavy drinking. Was seen by Dr. Marquez Tierney (Neurology) earlier on the day of admission and was sent to the ED for alcohol detoxication. Had been drinking 6-8 beers a day. No sings of withdrawal during admission. CT head showed no acute intracranial abnormality. MRI was attempted but patient was unable to follow instructions and crawled out of the MRI scanner. During hospitalization he had episodes of tachycardic rates. BP remained controlled on lisinopril 10 mg daily. Labs including TSH and Mg were normal.  He was discharged on his same doses of Keppra 750 2 tabs BID and Topamax 50 mg BID. He was counseled on alcohol cessation. Last MRI brain was on 1/23/12: Left posterior parahippocampal cavernous angioma. Nonaggressive appearing and possibly incidental 11 mm cyst in the right midbrain. Dr. Odalys Carrillo has ordered a repeat MRI but patient has not had it yet. Soc Hx  Lives alone in an apartment. Single. No children. Never smoker. Drinks 6-8 beers a day. Denies recreational drug use. Gets regular exercise by walking.      Health Maintenance  Flu vaccine: declined                                                    Tetanus vaccine: Td 3/2/12     Pneumonia vaccine: due for this                           Lipids: 1/3/18 (tot chol 179, LDL 75)  A1c: 1/3/18 (5.3%)  Advanced Directives: discussed, given information  End of Life: discussed, given information                        ROS   A complete review of systems was performed and is negative except for those mentioned in the HPI. Past Medical History:   Diagnosis Date    Asperger syndrome 12/14/2011    Autism     dx 2010- highly functional    Other ill-defined conditions(799.89)     aspergers, syncopal episodes    Seizure disorder ([facility] 5/22/2012    Seizures ([facility]       Past Surgical History:   Procedure Laterality Date    HX GI      pyloric stenosis 1976    HX HEENT      adenoids 1978     Current Outpatient Medications   Medication Sig Dispense Refill    sertraline (ZOLOFT) 25 mg tablet Take 1 Tab by mouth daily. Indications: Repeated Episodes of Anxiety 30 Tab 1    topiramate (TOPAMAX) 50 mg tablet TAKE 1 TABLET TWICE DAILY 180 Tab 1    lisinopril (PRINIVIL, ZESTRIL) 10 mg tablet TAKE 1 TABLET EVERY DAY FOR HYPERTENSION. SCHEDULE APPT. FOR FURTHER REFILLS ([phone]) 90 Tab 3    levETIRAcetam (KEPPRA) 750 mg tablet TAKE 2 TABLETS TWICE DAILY 360 Tab 3    MULTIVITAMIN PO Take 1 Tab by mouth daily.        Allergies   Allergen Reactions    Aspartame Other (comments)     Family believes it causes his seizures     Family History Problem Relation Age of Onset    Diabetes Father     Heart Disease Father     Cancer Mother      Social History     Tobacco Use    Smoking status: Never Smoker    Smokeless tobacco: Never Used   Substance Use Topics    Alcohol use: Yes     Alcohol/week: 5.0 oz     Types: 10 Cans of beer per week     Patient Active Problem List   Diagnosis Code    Autism F84.0    Asperger syndrome F84.5    Seizure ([facility] R56.9    Hypertension I10    EtOH dependence ([facility] F10.20    Alcohol withdrawal ([facility] F10.239    Advance care planning Z71.89    Brain cyst G93.0       Depression Risk Factor Screening:     3 most recent PHQ Screens 6/6/2019   Little interest or pleasure in doing things Not at all   Feeling down, depressed, irritable, or hopeless Not at all   Total Score PHQ 2 0     Alcohol Risk Factor Screening: You average more than 14 drinks a week. Functional Ability and Level of Safety:   Hearing Loss  Hearing is good. Activities of Daily Living  The home contains: no safety equipment. Patient does total self care    Fall Risk  No flowsheet data found. 2 falls over the past 12 months, both associated with seizures    Abuse Screen  Patient is not abused    Cognitive Screening   Evaluation of Cognitive Function:  Has your family/caregiver stated any concerns about your memory: no  Normal    Patient Care Team   Patient Care Team:  Geovany Uribe MD as PCP - General (Internal Medicine)  Cynthia Urena MD (Neurology)  Mary Keane MD (Neurology)  Kiran Olivera RN as Ambulatory Care Navigator (Internal Medicine)    Assessment/Plan   Education and counseling provided:  Are appropriate based on today's review and evaluation  Pneumococcal Vaccine    Diagnoses and all orders for this visit:    1. Medicare annual wellness visit, subsequent    2. Seizure disorder ([facility]  Controlled. Continue Keppra and Topamax as scheduled. 3. Essential hypertension    4.  Uncomplicated alcohol dependence ([facility]  Patient and mother are going to [facility] for assistance with alcohol cessation. He already has a . [facility] provided [facility] manages [facility]. 5. Generalized anxiety disorder  His mother would like him to start on anti-anxiety medication but wants to discuss with patient's father first.  -     Gave printed prescription: sertraline (ZOLOFT) 25 mg tablet; Take 1 Tab by mouth daily. Indications: Repeated Episodes of Anxiety    6. Tachycardia  His mother plans to schedule an appointment with Dr Derek Hnedrix as instructed. 7. Overweight (BMI 25.0-29. 9)  Discussed the patient's BMI with him  The BMI follow up plan is as follows: dietary management education, guidance, and counseling; encourage exercise; monitor weight; prescribed dietary intake. Follow up BMI in 2 months. 8. Screening for depression  -     [address] labs at next clinic visit. Follow-up and Dispositions    · Return in about 2 months (around 8/6/2019), or if symptoms worsen or fail to improve, for Anxiety, alcohol.          Health Maintenance Due   Topic Date Due    Pneumococcal 0-64 years (1 of 1 - PPSV23) [birth year]1982

## 2019-06-06 NOTE — PROGRESS NOTES
Elizabeth Trevizo  Identified pt with two pt identifiers(name and ). Chief Complaint   Patient presents with   [facility] Annual Wellness Visit       1. Have you been to the ER, urgent care clinic since your last visit? Hospitalized since your last visit? [facility] and [facility]    2. Have you seen or consulted any other health care providers outside of the [facility] since your last visit? Include any pap smears or colon screening. Dr Bárbara Bazan provider has been notified of reason for visit, vitals and flowsheets obtained on patients. Patient received paperwork for advance directives.     Reviewed record In preparation for visit, huddled with provider and have obtained necessary documentation      Health Maintenance Due   Topic    Pneumococcal 0-64 years (1 of 1 - PPSV23)    MEDICARE YEARLY EXAM        Wt Readings from Last 3 Encounters:   19 189 lb (85.7 kg)   19 184 lb (83.5 kg)   19 184 lb 8.4 oz (83.7 kg)     Temp Readings from Last 3 Encounters:   19 97.6 °F (36.4 °C) (Oral)   19 97.6 °F (36.4 °C)   19 98 °F (36.7 °C)     BP Readings from Last 3 Encounters:   19 123/82   19 109/66   19 107/58     Pulse Readings from Last 3 Encounters:   19 76   19 88   19 84     Vitals:    19 1044   BP: 123/82   Pulse: 76   Resp: 16   Temp: 97.6 °F (36.4 °C)   TempSrc: Oral   SpO2: 98%   Weight: 189 lb (85.7 kg)   Height: 5' 10\" (1.778 m)   PainSc:   0 - No pain         Learning Assessment:  :     Learning Assessment 2017 4/3/2017 2016 2014   PRIMARY LEARNER Patient Patient Patient Patient   HIGHEST LEVEL OF EDUCATION - PRIMARY LEARNER  - - GRADUATED HIGH SCHOOL OR GED GRADUATED HIGH SCHOOL OR GED   BARRIERS PRIMARY LEARNER - - COGNITIVE OTHER   CO-LEARNER CAREGIVER - - Yes -   [address] [place] [address] LANGUAGE ENGLISH ENGLISH ENGLISH ENGLISH    NEED - - No -   LEARNER PREFERENCE PRIMARY DEMONSTRATION DEMONSTRATION READING READING   LEARNING SPECIAL TOPICS - - no -   ANSWERED BY patient patient self patient   RELATIONSHIP SELF SELF SELF SELF       Depression Screening:  :     3 most recent PHQ Screens 6/6/2019   Little interest or pleasure in doing things Not at all   Feeling down, depressed, irritable, or hopeless Not at all   Total Score PHQ 2 0       Fall Risk Assessment:  :     No flowsheet data found. Abuse Screening:  :     No flowsheet data found. ADL Screening:  :     ADL Assessment 2/9/2015   Feeding yourself No Help Needed   Getting from bed to chair No Help Needed   Getting dressed No Help Needed   Bathing or showering No Help Needed   Walk across the room (includes cane/walker) No Help Needed   Using the telphone No Help Needed   Taking your medications Help Needed   Preparing meals No Help Needed   Managing money (expenses/bills) No Help Needed   Moderately strenuous housework (laundry) No Help Needed   Shopping for personal items (toiletries/medicines) No Help Needed   Shopping for groceries No Help Needed   Driving No Help Needed   Climbing a flight of stairs No Help Needed   Getting to places beyond walking distances No Help Needed                 Medication reconciliation up to date and corrected with patient at this time.

## 2019-06-10 ENCOUNTER — TELEPHONE (OUTPATIENT)
Dept: NEUROLOGY | Age: 44
End: 2019-06-10

## 2019-06-10 ENCOUNTER — HOSPITAL ENCOUNTER (EMERGENCY)
Age: 44
Discharge: HOME | DRG: 101 | End: 2019-06-10
Attending: EMERGENCY MEDICINE
Payer: MEDICARE

## 2019-06-10 ENCOUNTER — HOSPITAL ENCOUNTER (INPATIENT)
Age: 44
LOS: 4 days | Discharge: HOME HEALTH | DRG: 101 | End: 2019-06-14
Attending: EMERGENCY MEDICINE | Admitting: INTERNAL MEDICINE
Payer: MEDICARE

## 2019-06-10 ENCOUNTER — APPOINTMENT (OUTPATIENT)
Dept: CT IMAGING | Age: 44
DRG: 101 | End: 2019-06-10
Attending: EMERGENCY MEDICINE
Payer: MEDICARE

## 2019-06-10 VITALS
DIASTOLIC BLOOD PRESSURE: 85 MMHG | OXYGEN SATURATION: 96 % | HEART RATE: 107 BPM | RESPIRATION RATE: 14 BRPM | SYSTOLIC BLOOD PRESSURE: 115 MMHG

## 2019-06-10 DIAGNOSIS — G93.0 BRAIN CYST: ICD-10-CM

## 2019-06-10 DIAGNOSIS — F84.5 ASPERGER SYNDROME: ICD-10-CM

## 2019-06-10 DIAGNOSIS — R93.0 ABNORMAL MRI OF HEAD: ICD-10-CM

## 2019-06-10 DIAGNOSIS — G40.209 COMPLEX PARTIAL SEIZURE EVOLVING TO GENERALIZED SEIZURE (HCC): ICD-10-CM

## 2019-06-10 DIAGNOSIS — R55 CONVULSIVE SYNCOPE: ICD-10-CM

## 2019-06-10 DIAGNOSIS — R56.9 SEIZURE (HCC): ICD-10-CM

## 2019-06-10 DIAGNOSIS — I65.23 BILATERAL CAROTID ARTERY STENOSIS: ICD-10-CM

## 2019-06-10 DIAGNOSIS — G40.909 SEIZURE DISORDER (HCC): Primary | ICD-10-CM

## 2019-06-10 DIAGNOSIS — R41.82 ALTERED MENTAL STATUS, UNSPECIFIED ALTERED MENTAL STATUS TYPE: ICD-10-CM

## 2019-06-10 LAB
ANION GAP BLD CALC-SCNC: 21 MMOL/L (ref 10–20)
ANION GAP SERPL CALC-SCNC: 8 MMOL/L (ref 5–15)
BUN BLD-MCNC: 16 MG/DL (ref 9–20)
BUN SERPL-MCNC: 16 MG/DL (ref 6–20)
BUN/CREAT SERPL: 15 (ref 12–20)
CA-I BLD-MCNC: 1.13 MMOL/L (ref 1.12–1.32)
CALCIUM SERPL-MCNC: 8.5 MG/DL (ref 8.5–10.1)
CHLORIDE BLD-SCNC: 108 MMOL/L (ref 98–107)
CHLORIDE SERPL-SCNC: 109 MMOL/L (ref 97–108)
CK SERPL-CCNC: 138 U/L (ref 39–308)
CO2 BLD-SCNC: 16 MMOL/L (ref 21–32)
CO2 SERPL-SCNC: 23 MMOL/L (ref 21–32)
COMMENT, HOLDF: NORMAL
CREAT BLD-MCNC: 1 MG/DL (ref 0.6–1.3)
CREAT SERPL-MCNC: 1.07 MG/DL (ref 0.7–1.3)
ERYTHROCYTE [DISTWIDTH] IN BLOOD BY AUTOMATED COUNT: 12.9 % (ref 11.5–14.5)
ETHANOL SERPL-MCNC: <10 MG/DL
GLUCOSE BLD-MCNC: 83 MG/DL (ref 65–100)
GLUCOSE SERPL-MCNC: 117 MG/DL (ref 65–100)
HCT VFR BLD AUTO: 47.6 % (ref 36.6–50.3)
HCT VFR BLD CALC: 44 % (ref 36.6–50.3)
HGB BLD-MCNC: 15.7 G/DL (ref 12.1–17)
MCH RBC QN AUTO: 30.1 PG (ref 26–34)
MCHC RBC AUTO-ENTMCNC: 33 G/DL (ref 30–36.5)
MCV RBC AUTO: 91.2 FL (ref 80–99)
NRBC # BLD: 0 K/UL (ref 0–0.01)
NRBC BLD-RTO: 0 PER 100 WBC
PLATELET # BLD AUTO: 294 K/UL (ref 150–400)
PMV BLD AUTO: 9.3 FL (ref 8.9–12.9)
POTASSIUM BLD-SCNC: 4.3 MMOL/L (ref 3.5–5.1)
POTASSIUM SERPL-SCNC: 4.1 MMOL/L (ref 3.5–5.1)
RBC # BLD AUTO: 5.22 M/UL (ref 4.1–5.7)
SAMPLES BEING HELD,HOLD: NORMAL
SERVICE CMNT-IMP: ABNORMAL
SODIUM BLD-SCNC: 140 MMOL/L (ref 136–145)
SODIUM SERPL-SCNC: 140 MMOL/L (ref 136–145)
WBC # BLD AUTO: 7.5 K/UL (ref 4.1–11.1)

## 2019-06-10 PROCEDURE — 70450 CT HEAD/BRAIN W/O DYE: CPT

## 2019-06-10 PROCEDURE — 80047 BASIC METABLC PNL IONIZED CA: CPT

## 2019-06-10 PROCEDURE — 80048 BASIC METABOLIC PNL TOTAL CA: CPT

## 2019-06-10 PROCEDURE — 99284 EMERGENCY DEPT VISIT MOD MDM: CPT

## 2019-06-10 PROCEDURE — 85027 COMPLETE CBC AUTOMATED: CPT

## 2019-06-10 PROCEDURE — 74011000258 HC RX REV CODE- 258: Performed by: INTERNAL MEDICINE

## 2019-06-10 PROCEDURE — 74011250636 HC RX REV CODE- 250/636: Performed by: INTERNAL MEDICINE

## 2019-06-10 PROCEDURE — 74011250637 HC RX REV CODE- 250/637: Performed by: INTERNAL MEDICINE

## 2019-06-10 PROCEDURE — 74011250636 HC RX REV CODE- 250/636: Performed by: EMERGENCY MEDICINE

## 2019-06-10 PROCEDURE — 36415 COLL VENOUS BLD VENIPUNCTURE: CPT

## 2019-06-10 PROCEDURE — 65660000000 HC RM CCU STEPDOWN

## 2019-06-10 PROCEDURE — 99285 EMERGENCY DEPT VISIT HI MDM: CPT

## 2019-06-10 PROCEDURE — 82550 ASSAY OF CK (CPK): CPT

## 2019-06-10 PROCEDURE — 96366 THER/PROPH/DIAG IV INF ADDON: CPT

## 2019-06-10 PROCEDURE — 96365 THER/PROPH/DIAG IV INF INIT: CPT

## 2019-06-10 PROCEDURE — 96374 THER/PROPH/DIAG INJ IV PUSH: CPT

## 2019-06-10 PROCEDURE — 96361 HYDRATE IV INFUSION ADD-ON: CPT

## 2019-06-10 PROCEDURE — 77030033269 HC SLV COMPR SCD KNE2 CARD -B

## 2019-06-10 PROCEDURE — 80307 DRUG TEST PRSMV CHEM ANLYZR: CPT

## 2019-06-10 RX ORDER — LORAZEPAM 2 MG/ML
2 INJECTION INTRAMUSCULAR
Status: DISCONTINUED | OUTPATIENT
Start: 2019-06-10 | End: 2019-06-12

## 2019-06-10 RX ORDER — ACETAMINOPHEN 325 MG/1
650 TABLET ORAL
Status: DISCONTINUED | OUTPATIENT
Start: 2019-06-10 | End: 2019-06-10

## 2019-06-10 RX ORDER — LEVETIRACETAM 10 MG/ML
1000 INJECTION INTRAVASCULAR
Status: COMPLETED | OUTPATIENT
Start: 2019-06-10 | End: 2019-06-10

## 2019-06-10 RX ORDER — LEVETIRACETAM 500 MG/1
1500 TABLET ORAL 2 TIMES DAILY
Status: CANCELLED | OUTPATIENT
Start: 2019-06-11

## 2019-06-10 RX ORDER — TOPIRAMATE 25 MG/1
25 TABLET ORAL EVERY 12 HOURS
Status: DISCONTINUED | OUTPATIENT
Start: 2019-06-10 | End: 2019-06-10

## 2019-06-10 RX ORDER — LORAZEPAM 2 MG/ML
1 INJECTION INTRAMUSCULAR
Status: COMPLETED | OUTPATIENT
Start: 2019-06-10 | End: 2019-06-10

## 2019-06-10 RX ORDER — ONDANSETRON 2 MG/ML
4 INJECTION INTRAMUSCULAR; INTRAVENOUS
Status: DISCONTINUED | OUTPATIENT
Start: 2019-06-10 | End: 2019-06-14 | Stop reason: HOSPADM

## 2019-06-10 RX ORDER — TOPIRAMATE 25 MG/1
50 TABLET ORAL EVERY 12 HOURS
Status: DISCONTINUED | OUTPATIENT
Start: 2019-06-10 | End: 2019-06-11

## 2019-06-10 RX ORDER — SODIUM CHLORIDE 0.9 % (FLUSH) 0.9 %
5-40 SYRINGE (ML) INJECTION AS NEEDED
Status: DISCONTINUED | OUTPATIENT
Start: 2019-06-10 | End: 2019-06-14 | Stop reason: HOSPADM

## 2019-06-10 RX ORDER — FOLIC ACID 1 MG/1
1 TABLET ORAL DAILY
Status: DISCONTINUED | OUTPATIENT
Start: 2019-06-11 | End: 2019-06-14 | Stop reason: HOSPADM

## 2019-06-10 RX ORDER — LISINOPRIL 5 MG/1
10 TABLET ORAL DAILY
Status: DISCONTINUED | OUTPATIENT
Start: 2019-06-11 | End: 2019-06-14 | Stop reason: HOSPADM

## 2019-06-10 RX ORDER — SODIUM CHLORIDE 0.9 % (FLUSH) 0.9 %
5-40 SYRINGE (ML) INJECTION EVERY 8 HOURS
Status: DISCONTINUED | OUTPATIENT
Start: 2019-06-10 | End: 2019-06-14 | Stop reason: HOSPADM

## 2019-06-10 RX ORDER — ASPIRIN 325 MG/1
100 TABLET, FILM COATED ORAL DAILY
Status: DISCONTINUED | OUTPATIENT
Start: 2019-06-11 | End: 2019-06-14 | Stop reason: HOSPADM

## 2019-06-10 RX ORDER — ACETAMINOPHEN 325 MG/1
650 TABLET ORAL
Status: DISCONTINUED | OUTPATIENT
Start: 2019-06-10 | End: 2019-06-14 | Stop reason: HOSPADM

## 2019-06-10 RX ADMIN — LEVETIRACETAM 1500 MG: 100 INJECTION, SOLUTION INTRAVENOUS at 23:03

## 2019-06-10 RX ADMIN — LORAZEPAM 1 MG: 2 INJECTION INTRAMUSCULAR; INTRAVENOUS at 16:21

## 2019-06-10 RX ADMIN — Medication 10 ML: at 22:06

## 2019-06-10 RX ADMIN — TOPIRAMATE 50 MG: 25 TABLET, FILM COATED ORAL at 22:06

## 2019-06-10 RX ADMIN — LEVETIRACETAM 1000 MG: 10 INJECTION INTRAVENOUS at 14:15

## 2019-06-10 RX ADMIN — SODIUM CHLORIDE 1000 ML: 900 INJECTION, SOLUTION INTRAVENOUS at 16:26

## 2019-06-10 NOTE — DISCHARGE INSTRUCTIONS
Patient Education        Epilepsy: Care Instructions  Your Care Instructions    Epilepsy is a common condition that causes repeated seizures. The seizures are caused by bursts of electrical activity in the brain that aren't normal. Seizures may cause problems with muscle control, movement, speech, vision, or awareness. They can be scary. Epilepsy affects each person differently. Some people have only a few seizures. Others get them more often. If you know what triggers a seizure, you may be able to avoid having one. You can take medicines to control and reduce seizures. You and your doctor will need to find the right combination, schedule, and dose of medicine. This may take time and careful changes. Seizures may get worse and happen more often over time. Follow-up care is a key part of your treatment and safety. Be sure to make and go to all appointments, and call your doctor if you are having problems. It's also a good idea to know your test results and keep a list of the medicines you take. How can you care for yourself at home? · Be safe with medicines. Take your medicines exactly as prescribed. Call your doctor if you think you are having a problem with your medicine. · Make a treatment plan with your doctor. Be sure to follow your plan. · Try to identify and avoid things that may make you more likely to have a seizure. These may include:  ? Not getting enough sleep. ? Using drugs or alcohol. ? Being emotionally stressed. ? Skipping meals. · Keep a record of any seizures you have. Note the date, time of day, and any details about the seizure that you can remember. Your doctor can use this information to plan or adjust your medicine or other treatment. · Be sure that any doctor treating you for another condition knows that you have epilepsy. Each doctor should know what medicines you are taking, if any. · Wear a medical ID bracelet. You can buy this at most [employer].  If you have a seizure that leaves you unconscious or unable to speak for yourself, this bracelet will let those who are treating you know that you have epilepsy. · Talk to your doctor about whether it is safe for you to do certain activities, such as drive or swim. When should you call for help? Call 911 anytime you think you may need emergency care. For example, call if:    · A seizure does not stop as it normally does.     · You have new symptoms such as:  ? Numbness, tingling, or weakness on one side of your body or face. ? Vision changes. ? Trouble speaking or thinking clearly.    Call your doctor now or seek immediate medical care if:    · You have a fever.     · You have a severe headache.    Watch closely for changes in your health, and be sure to contact your doctor if:    · The normal pattern or features of your seizures change. Where can you learn more? Go to http://margarette-anthony.info/. Ashley Fong in the search box to learn more about \"Epilepsy: Care Instructions. \"  Current as of: Tania 3, 2018  Content Version: 11.9  © 8163-7580 [employer]. Care instructions adapted under license by [employer] (which disclaims liability or warranty for this information). If you have questions about a medical condition or this instruction, always ask your healthcare professional. [place] [address] any warranty or liability for your use of this information.

## 2019-06-10 NOTE — ED NOTES
TRANSFER - OUT REPORT:    Verbal report given to [facility] (name) on Emile Crimes  being transferred to Neuro (unit) for routine progression of care       Report consisted of patients Situation, Background, Assessment and   Recommendations(SBAR). Information from the following report(s) SBAR, ED Summary, [facility] and Recent Results was reviewed with the receiving nurse. Lines:   Peripheral IV 06/10/19 Right Antecubital (Active)   Site Assessment Clean, dry, & intact 6/10/2019  4:10 PM   Phlebitis Assessment 0 6/10/2019  4:10 PM   Infiltration Assessment 0 6/10/2019  4:10 PM   Dressing Status Clean, dry, & intact 6/10/2019  4:10 PM   Dressing Type Transparent 6/10/2019  4:10 PM   Hub Color/Line Status Pink;Capped;Flushed;Patent 6/10/2019  4:10 PM        Opportunity for questions and clarification was provided.

## 2019-06-10 NOTE — TELEPHONE ENCOUNTER
Pt's mom is calling stating the pt is at ER right now and pt had a break through seizure and needs to know if he should increase the medication.  Please call back

## 2019-06-10 NOTE — ED PROVIDER NOTES
EMERGENCY DEPARTMENT HISTORY AND PHYSICAL EXAM      Date: 6/10/2019  Patient Name: Carleen Ventura  Patient Age and Sex: 38 y.o. male     History of Presenting Illness     Chief Complaint   Patient presents with    Seizure       History Provided By: EMS, mother, patient    HPI: Carleen Ventura is a 58-year-old male with a history of seizure disorder presenting with seizure. EMS states that patient was recently in the hospital for seizures and had some medications changed. Today he was at home per mother and had a tonic-clonic seizure. Usually he is postictal for short period of time, however it lasted for longer which is what prompted her calling EMS. Patient denies any pain anywhere and is unable to state what happened. There are no other complaints, changes, or physical findings at this time. PCP: Cooper Mcdermott MD    No current facility-administered medications on file prior to encounter. Current Outpatient Medications on File Prior to Encounter   Medication Sig Dispense Refill    sertraline (ZOLOFT) 25 mg tablet Take 1 Tab by mouth daily. Indications: Repeated Episodes of Anxiety 30 Tab 1    topiramate (TOPAMAX) 50 mg tablet TAKE 1 TABLET TWICE DAILY 180 Tab 1    lisinopril (PRINIVIL, ZESTRIL) 10 mg tablet TAKE 1 TABLET EVERY DAY FOR HYPERTENSION. SCHEDULE APPT. FOR FURTHER REFILLS ([phone]) 90 Tab 3    levETIRAcetam (KEPPRA) 750 mg tablet TAKE 2 TABLETS TWICE DAILY 360 Tab 3    MULTIVITAMIN PO Take 1 Tab by mouth daily.          Past History     Past Medical History:  Past Medical History:   Diagnosis Date    Asperger syndrome 12/14/2011    Autism     dx 2010- highly functional    Other ill-defined conditions(799.89)     aspergers, syncopal episodes    Seizure disorder ([facility] [address] 5/22/2012    Seizures ([facility] [address]        Past Surgical History:  Past Surgical History:   Procedure Laterality Date    HX GI      pyloric stenosis 1976    HX HEENT      adenoids 1978       Family History:  Family History   Problem Relation Age of Onset    Diabetes Father     Heart Disease Father     Cancer Mother        Social History:  Social History     Tobacco Use    Smoking status: Never Smoker    Smokeless tobacco: Never Used   Substance Use Topics    Alcohol use: Yes     Alcohol/week: 5.0 oz     Types: 10 Cans of beer per week    Drug use: No       Allergies: Allergies   Allergen Reactions    Aspartame Other (comments)     Family believes it causes his seizures         Review of Systems   Review of Systems   Constitutional: Negative for chills and fever. Respiratory: Negative for cough and shortness of breath. Cardiovascular: Negative for chest pain. Gastrointestinal: Negative for abdominal pain, constipation, diarrhea, nausea and vomiting. Neurological: Positive for seizures. Negative for weakness and numbness. All other systems reviewed and are negative. Physical Exam   Physical Exam   Constitutional: He is oriented to person, place, and time. He appears well-developed and well-nourished. HENT:   Head: Normocephalic and atraumatic. Eyes: Conjunctivae and EOM are normal.   Neck: Normal range of motion. Neck supple. Cardiovascular: Regular rhythm. Tachycardic   Pulmonary/Chest: Effort normal and breath sounds normal. No respiratory distress. Abdominal: Soft. He exhibits no distension. There is no tenderness. Musculoskeletal: Normal range of motion. Neurological: He is alert and oriented to person, place, and time. Patient is alert and oriented, but slow to answer questions and slightly confused   Skin: Skin is warm and dry. Psychiatric: He has a normal mood and affect. Nursing note and vitals reviewed.        Diagnostic Study Results     Labs -     Recent Results (from the past 12 hour(s))   SAMPLES BEING HELD    Collection Time: 06/10/19  1:50 PM   Result Value Ref Range    SAMPLES BEING HELD RED,LAV,GREEN     COMMENT        Add-on orders for these samples will be processed based on acceptable specimen integrity and analyte stability, which may vary by analyte. CBC W/O DIFF    Collection Time: 06/10/19  1:51 PM   Result Value Ref Range    WBC 7.5 4.1 - 11.1 K/uL    RBC 5.22 4.10 - 5.70 M/uL    HGB 15.7 12.1 - 17.0 g/dL    HCT 47.6 36.6 - 50.3 %    MCV 91.2 80.0 - 99.0 FL    MCH 30.1 26.0 - 34.0 PG    MCHC 33.0 30.0 - 36.5 g/dL    RDW 12.9 11.5 - 14.5 %    PLATELET 243 632 - 062 K/uL    MPV 9.3 8.9 - 12.9 FL    NRBC 0.0 0  WBC    ABSOLUTE NRBC 0.00 0.00 - 5.78 K/uL   METABOLIC PANEL, BASIC    Collection Time: 06/10/19  1:51 PM   Result Value Ref Range    Sodium 140 136 - 145 mmol/L    Potassium 4.1 3.5 - 5.1 mmol/L    Chloride 109 (H) 97 - 108 mmol/L    CO2 23 21 - 32 mmol/L    Anion gap 8 5 - 15 mmol/L    Glucose 117 (H) 65 - 100 mg/dL    BUN 16 6 - 20 MG/DL    Creatinine 1.07 0.70 - 1.30 MG/DL    BUN/Creatinine ratio 15 12 - 20      GFR est AA >60 >60 ml/min/1.73m2    GFR est non-AA >60 >60 ml/min/1.73m2    Calcium 8.5 8.5 - 10.1 MG/DL       Radiologic Studies -   No orders to display     CT Results  (Last 48 hours)    None        CXR Results  (Last 48 hours)    None            Medical Decision Making   I am the first provider for this patient. I reviewed the vital signs, available nursing notes, past medical history, past surgical history, family history and social history. Vital Signs-Reviewed the patient's vital signs. Patient Vitals for the past 12 hrs:   Pulse Resp BP SpO2   06/10/19 1500 (!) 107 14 115/85 96 %   06/10/19 1430 (!) 111 19 111/72 93 %   06/10/19 1400 (!) 114 19 117/88 93 %       Records Reviewed: Nursing Notes and Old Medical Records    Provider Notes (Medical Decision Making):   Patient presents after a seizure. DDx: seizure 2/2 noncompliance, infection, increased stressor, electrolyte anomaly (hypoglycemia, etc), ETOH withdrawal. Less likely related to meningitis or brain mass at this time.   Will obtain UA, labs and provide loading dose of antiepileptic. Elma Miles's  results have been reviewed with him. He has been counseled regarding his diagnosis. He verbally conveys understanding and agreement of the signs, symptoms, diagnosis, treatment and prognosis and additionally agrees to follow up as recommended with Neurology. He also agrees with the care-plan and conveys that all of his questions have been answered. He understands also the seizure precautions. I have also put together some discharge instructions for him that include: 1) educational information regarding their diagnosis, 2) how to care for their diagnosis at home, as well a 3) list of reasons why they would want to return to the ED prior to their follow-up appointment, should their condition change. ED Course:   Initial assessment performed. The patients presenting problems have been discussed, and they are in agreement with the care plan formulated and outlined with them. I have encouraged them to ask questions as they arise throughout their visit. 3:20 PM  Reassessed patient. He is more alert and no longer confused and answering questions appropriately. Spoke with his mom who is now at bedside who states that patient has been without alcohol for 2 weeks now and was recently admitted for alcohol withdrawal seizures and was put on Keppra and Topamax. He sees Dr. Miner Friday for his seizures. 1 of their concerns is regarding his heart rate being all over the place and while in the hospital EKGs and heart enzymes were all negative. They told them to follow-up with cardiology for a heart monitor. I reassured them that here his heart monitor has shown sinus tachycardia with some PACs. No arrhythmias. Patient is not complaining of any chest pains and no troponin needed. He will follow-up with his neurologist about changing his seizure medications. Critical Care Time:   0    Disposition:  Discharge Note:  The patient has been re-evaluated and is ready for discharge. Reviewed available results with patient. Counseled patient on diagnosis and care plan. Patient has expressed understanding, and all questions have been answered. Patient agrees with plan and agrees to follow up as recommended, or to return to the ED if their symptoms worsen. Discharge instructions have been provided and explained to the patient, along with reasons to return to the ED. PLAN:  Current Discharge Medication List        2. Follow-up Information     Follow up With Specialties Details Why Contact Info    Your Neurologist  Schedule an appointment as soon as possible for a visit          3. Return to ED if worse     Diagnosis     Clinical Impression:   1. Seizure disorder [facility])        Attestations:    Silvestre Faulkner M.D. Please note that this dictation was completed with [employer], the computer voice recognition software. Quite often unanticipated grammatical, syntax, homophones, and other interpretive errors are inadvertently transcribed by the computer software. Please disregard these errors. Please excuse any errors that have escaped final proofreading. Thank you.

## 2019-06-10 NOTE — ED PROVIDER NOTES
EMERGENCY DEPARTMENT HISTORY AND PHYSICAL EXAM      Date: 6/10/2019  Patient Name: Juni Grijalva    Please note that this dictation was completed with [employer], the computer voice recognition software. Quite often unanticipated grammatical, syntax, homophones, and other interpretive errors are inadvertently transcribed by the computer software. Please disregard these errors. Please excuse any errors that have escaped final proofreading. History of Presenting Illness     Chief Complaint   Patient presents with    Seizure       History Provided By: Patient and Patient's Father    HPI: Juni Grijalva, 38 y.o. male, with past medical history significant for alcohol abuse, status post alcohol withdrawal 2 weeks ago, history of seizures on Keppra presenting the emergency department with chief complaint of seizure. He was just discharged less than an hour after his arrival.  On his way home he had a tonic-clonic seizure, biting the left side of his tongue. His family brought him back immediately. I evaluated the patient in the parking lot. History limited due to postictal state. Per prior note patient has not had any alcohol in 2 weeks. He has been compliant with his medicines. He had a dose of Keppra on prior visit. PCP: Rizwana Billy MD    Current Facility-Administered Medications on File Prior to Encounter   Medication Dose Route Frequency Provider Last Rate Last Dose    [COMPLETED] levETIRAcetam in saline (iso-os) (KEPPRA) infusion 1,000 mg  1,000 mg IntraVENous NOW Muna Saunders MD   Stopped at 06/10/19 0288    [DISCONTINUED] levETIRAcetam (KEPPRA) 1,000 mg in 0.9% sodium chloride 100 mL IVPB  1,000 mg IntraVENous NOW Muna Saudners MD         Current Outpatient Medications on File Prior to Encounter   Medication Sig Dispense Refill    sertraline (ZOLOFT) 25 mg tablet Take 1 Tab by mouth daily.  Indications: Repeated Episodes of Anxiety 30 Tab 1    topiramate (TOPAMAX) 50 mg tablet TAKE 1 TABLET TWICE DAILY 180 Tab 1    lisinopril (PRINIVIL, ZESTRIL) 10 mg tablet TAKE 1 TABLET EVERY DAY FOR HYPERTENSION. SCHEDULE APPT. FOR FURTHER REFILLS ([phone]) 90 Tab 3    levETIRAcetam (KEPPRA) 750 mg tablet TAKE 2 TABLETS TWICE DAILY 360 Tab 3    MULTIVITAMIN PO Take 1 Tab by mouth daily. Past History     Past Medical History:  Past Medical History:   Diagnosis Date    Asperger syndrome 12/14/2011    Autism     dx 2010- highly functional    Other ill-defined conditions(799.89)     aspergers, syncopal episodes    Seizure disorder ([facility] [address] 5/22/2012    Seizures ([facility] [address]        Past Surgical History:  Past Surgical History:   Procedure Laterality Date    HX GI      pyloric stenosis 1976    HX HEENT      adenoids 1978       Family History:  Family History   Problem Relation Age of Onset    Diabetes Father     Heart Disease Father     Cancer Mother        Social History:  Social History     Tobacco Use    Smoking status: Never Smoker    Smokeless tobacco: Never Used   Substance Use Topics    Alcohol use: Yes     Alcohol/week: 5.0 oz     Types: 10 Cans of beer per week    Drug use: No       Allergies: Allergies   Allergen Reactions    Aspartame Other (comments)     Family believes it causes his seizures         Review of Systems   Review of Systems   Reason unable to perform ROS: Patient is post ictal.   Constitutional: Negative for chills and fever. HENT: Negative for congestion and sore throat. Eyes: Negative for visual disturbance. Respiratory: Negative for cough and shortness of breath. Cardiovascular: Negative for chest pain and leg swelling. Gastrointestinal: Negative for abdominal pain, blood in stool, diarrhea and nausea. Endocrine: Negative for polyuria. Genitourinary: Negative for dysuria and testicular pain. Musculoskeletal: Positive for myalgias. Negative for arthralgias and joint swelling. Skin: Negative for rash.    Allergic/Immunologic: Negative for immunocompromised state. Neurological: Positive for seizures and weakness. Negative for headaches. Hematological: Does not bruise/bleed easily. Psychiatric/Behavioral: Negative for confusion. Physical Exam   Physical Exam   Constitutional: Confused male sitting in a car in the emergency department parking lot with blood coming from the left side of his mouth. HENT:   Head: Normocephalic and atraumatic. Moist mucous membranes   Eyes: Pupils are equal, round, and reactive to light. Conjunctivae are normal. Right eye exhibits no discharge. Left eye exhibits no discharge. Neck: Normal range of motion. Neck supple. No tracheal deviation present. Cardiovascular: Normal rate, regular rhythm and normal heart sounds. No murmur heard. Pulmonary/Chest: Effort normal and breath sounds normal. No respiratory distress. no wheezes. no rales. Abdominal: Soft. Bowel sounds are normal. There is no tenderness. There is no rebound and no guarding. Musculoskeletal: Normal range of motion. exhibits no edema, tenderness or deformity. Neurological: alert and oriented to person, place, and time. Skin: Skin is warm and dry. No rash noted. No erythema. Psychiatric: behavior is normal.   Nursing note and vitals reviewed. Diagnostic Study Results     Labs -     Recent Results (from the past 12 hour(s))   SAMPLES BEING HELD    Collection Time: 06/10/19  1:50 PM   Result Value Ref Range    SAMPLES BEING HELD RED,LAV,GREEN     COMMENT        Add-on orders for these samples will be processed based on acceptable specimen integrity and analyte stability, which may vary by analyte.    CBC W/O DIFF    Collection Time: 06/10/19  1:51 PM   Result Value Ref Range    WBC 7.5 4.1 - 11.1 K/uL    RBC 5.22 4.10 - 5.70 M/uL    HGB 15.7 12.1 - 17.0 g/dL    HCT 47.6 36.6 - 50.3 %    MCV 91.2 80.0 - 99.0 FL    MCH 30.1 26.0 - 34.0 PG    MCHC 33.0 30.0 - 36.5 g/dL    RDW 12.9 11.5 - 14.5 %    PLATELET 855 393 - 403 K/uL    MPV 9.3 8.9 - 12.9 FL    NRBC 0.0 0  WBC    ABSOLUTE NRBC 0.00 0.00 - 4.19 K/uL   METABOLIC PANEL, BASIC    Collection Time: 06/10/19  1:51 PM   Result Value Ref Range    Sodium 140 136 - 145 mmol/L    Potassium 4.1 3.5 - 5.1 mmol/L    Chloride 109 (H) 97 - 108 mmol/L    CO2 23 21 - 32 mmol/L    Anion gap 8 5 - 15 mmol/L    Glucose 117 (H) 65 - 100 mg/dL    BUN 16 6 - 20 MG/DL    Creatinine 1.07 0.70 - 1.30 MG/DL    BUN/Creatinine ratio 15 12 - 20      GFR est AA >60 >60 ml/min/1.73m2    GFR est non-AA >60 >60 ml/min/1.73m2    Calcium 8.5 8.5 - 10.1 MG/DL       Radiologic Studies -   CT HEAD WO CONT    (Results Pending)     CT Results  (Last 48 hours)    None        CXR Results  (Last 48 hours)    None            Medical Decision Making   I am the first provider for this patient. I reviewed the vital signs, available nursing notes, past medical history, past surgical history, family history and social history. Vital Signs-Reviewed the patient's vital signs. Patient Vitals for the past 12 hrs:   Temp Pulse Resp BP SpO2   06/10/19 1609 98 °F (36.7 °C) (!) 129 15 139/86 95 %       Pulse Oximetry Analysis -95% on RA    Cardiac Monitor:   NSR      Records Reviewed: Nursing Notes and Old Medical Records    Provider Notes (Medical Decision Making):   Patient with recurrent seizure,  will need to be hospitalized. Patient already had a Keppra bolus. Will consider giving Dilantin IV, will admit for neurology consultation and further stabilization. Does not appear to be due to alcohol withdrawal at this time. ED Course:   Initial assessment performed. The patients presenting problems have been discussed, and they are in agreement with the care plan formulated and outlined with them. I have encouraged them to ask questions as they arise throughout their visit.     ED Course as of Benito 13 2310   Mon Benito 10, 2019   [id number] Discussed with Dr. Rianna Fraire. Will see and evaluate patient for admission    [AR] ED Course User Index  [AR] True DO Gideon         Critical Care Time:   none    Disposition:  Patient will be admitted to the hospitalist for further stabilization. Diagnosis     Clinical Impression:   1. Seizure, recurrent, uncontrolled      Attestations:   This note was completed by Irvin Conner DO

## 2019-06-10 NOTE — ED NOTES
Patient discharged by Dr. Michel Owens. Patient provided with discharge instructions Rx and instructions on follow up care. Patient out of ED ambulatort accompanied by family.

## 2019-06-10 NOTE — H&P
Hospitalist Admission Note    NAME: Letitia Hawley   :  1975   MRN:  [id number]     Date/Time:  6/10/2019 7:02 PM    Patient PCP: Michel Callejas MD  _____________________________________________________________________  Given the patient's current clinical presentation, I have a high level of concern for decompensation if discharged from the emergency department. Complex decision making was performed, which includes reviewing the patient's available past medical records, laboratory results, and x-ray films. My assessment of this patient's clinical condition and my plan of care is as follows. Assessment / Plan:    Breakthrough seizures  Abnormal MRI   -MRI brain  showing Left posterior parahippocampal cavernous angioma. Associated venous angioma (developmental venous anomaly) left posterior parahippocampal area. Nonaggressive appearing and possibly incidental 11 mm cyst in the right midbrain. Depending on clinical circumstance consider followup in 6 to 18 months.    -MRI attempted during [facility] admission but could not be completed. -continue Keppra 1500mg but administer IV  -continue topamax  -consult Neurology in AM  -seizure precaution. Ativan IV prn    Alcohol abuse  -last drink 2 weeks ago. Was consuming 6-8 beers daily for 20 years  -folic acid and thiamine supplements  -out of window for DT or withdrawal sz. Monitor    Autism / Asperger syndrome    HTN  -continue lisinopril    Depression  -prescribed zoloft recently but has not started it yet. Will hold for now. Code Status:  Full  Surrogate Decision Maker: parents    DVT Prophylaxis:  SCD  GI Prophylaxis: not indicated    Baseline:   Lives alone. Parents are nearby and help out. Subjective:   CHIEF COMPLAINT:   Recurrent seizures    HISTORY OF PRESENT ILLNESS:     Lizett Manley is a 38 y.o.    male with a history of HTN, Autism, seizure disorder since  and chronic alcohol abuse who presents back to the ER, after just leaving less than an hour ago, for recurrent seizures. Patient had a recent admission for seizures at [facility] from 5/30-6/01. He is maintained on keppra and topamax and follows with a Dr Woodard Najjar (neurology). The patient reportedly just came out of alcohol rehab and has been sober x 2 weeks. This morning he had a seizure and followed by a longer than usual post ictal state prompting mom to call for EMS. He was back to baseline in the ER and was given keppra 1000mg IV and discharged. Parents was driving him home when he had another generalized seizure in the car so they made a U turn and drove into the ambulance bay. CT head nothing acute or changed. We were asked to admit for work up and evaluation of the above problems. Past Medical History:   Diagnosis Date    Asperger syndrome 12/14/2011    Autism     dx 2010- highly functional    Other ill-defined conditions(799.89)     aspergers, syncopal episodes    Seizure disorder ([facility] [address] 5/22/2012    Seizures ([facility] [address]         Past Surgical History:   Procedure Laterality Date    HX GI      pyloric stenosis 1976    HX HEENT      adenoids 1978       Social History     Tobacco Use    Smoking status: Never Smoker    Smokeless tobacco: Never Used   Substance Use Topics    Alcohol use: Yes     Alcohol/week: 5.0 oz     Types: 10 Cans of beer per week        Family History   Problem Relation Age of Onset    Diabetes Father     Heart Disease Father     Cancer Mother      Allergies   Allergen Reactions    Aspartame Other (comments)     Family believes it causes his seizures        Prior to Admission medications    Medication Sig Start Date End Date Taking? Authorizing Provider   topiramate (TOPAMAX) 50 mg tablet TAKE 1 TABLET TWICE DAILY 3/12/19  Yes Loren Lima MD   lisinopril (PRINIVIL, ZESTRIL) 10 mg tablet TAKE 1 TABLET EVERY DAY FOR HYPERTENSION. SCHEDULE APPT.  FOR FURTHER REFILLS ([phone]) 1/17/19  Yes Melvina Chelsea Dorantes MD   levETIRAcetam (KEPPRA) 750 mg tablet TAKE 2 TABLETS TWICE DAILY 1/16/19  Yes Rajan Loomis MD   MULTIVITAMIN PO Take 1 Tab by mouth daily. Yes Provider, Historical   sertraline (ZOLOFT) 25 mg tablet Take 1 Tab by mouth daily. Indications: Repeated Episodes of Anxiety 6/6/19   Ava Vu MD       REVIEW OF SYSTEMS:       Total of 12 systems reviewed as follows:       POSITIVE= underlined text  Negative = text not underlined  General:  fever, chills, sweats, generalized weakness, weight loss/gain,      loss of appetite   Eyes:    blurred vision, eye pain, loss of vision, double vision  ENT:    rhinorrhea, pharyngitis   Respiratory:   cough, sputum production, SOB, EISENBERG, wheezing, pleuritic pain   Cardiology:   chest pain, palpitations, orthopnea, PND, edema, syncope   Gastrointestinal:  abdominal pain , N/V, diarrhea, dysphagia, constipation, bleeding   Genitourinary:  frequency, urgency, dysuria, hematuria, incontinence   Muskuloskeletal :  arthralgia, myalgia, back pain  Hematology:  easy bruising, nose or gum bleeding, lymphadenopathy   Dermatological: rash, ulceration, pruritis, color change / jaundice  Endocrine:   hot flashes or polydipsia   Neurological:  headache, dizziness, confusion, focal weakness, paresthesia,     Speech difficulties, memory loss, gait difficulty, seizures  Psychological: Feelings of anxiety, depression, agitation    Objective:   VITALS:    Visit Vitals  /74   Pulse 94   Temp 98 °F (36.7 °C)   Resp 14   SpO2 97%       PHYSICAL EXAM:    General:    Alert, cooperative, no distress, appears stated age. HEENT: Atraumatic, anicteric sclerae, pink conjunctivae     No oral ulcers, mucosa moist, throat clear, dentition fair  Neck:  Supple, symmetrical,  thyroid: non tender  Lungs:   Clear to auscultation bilaterally. No Wheezing or Rhonchi. No rales. Chest wall:  No tenderness  No Accessory muscle use.   Heart:   Regular  rhythm,  No  murmur   No edema  Abdomen:   Soft, non-tender. Not distended. Bowel sounds normal  Extremities: No cyanosis. No clubbing, Skin turgor normal, Capillary refill normal, Radial dial pulse 2+  Skin:     Not pale. Not Jaundiced  No rashes   Psych:  Good insight. Not depressed. Not anxious or agitated. Neurologic: EOMs intact. No facial asymmetry. No aphasia or slurred speech. Symmetrical strength, Sensation grossly intact. Alert and oriented X person, place, president, situation. _______________________________________________________________________  Care Plan discussed with:    Comments   Patient x    Family  x  parents   RN     Care Manager                    Consultant:      _______________________________________________________________________  Expected  Disposition:   Home with Family x   HH/PT/OT/RN    SNF/LTC    AZAEL    ________________________________________________________________________  TOTAL TIME:  39  Minutes    Critical Care Provided     Minutes non procedure based      Comments    x Reviewed previous records   >50% of visit spent in counseling and coordination of care  Discussion with patient and/or family and questions answered       Given the patient's current clinical presentation, I have a high level of concern for decompensation if discharged from the ED. Complex decision making was performed which includes reviewing the patient's available past medical records, laboratory results, and Xray films. I have also directly communicated my plan and discussed this case with the involved ED physician.     ____________________________________________________________________  Dayanna Villarreal MD    Procedures: see electronic medical records for all procedures/Xrays and details which were not copied into this note but were reviewed prior to creation of Plan.     LAB DATA REVIEWED:    Recent Results (from the past 24 hour(s))   SAMPLES BEING HELD    Collection Time: 06/10/19  1:50 PM   Result Value Ref Range SAMPLES BEING HELD RED,LAV,GREEN     COMMENT        Add-on orders for these samples will be processed based on acceptable specimen integrity and analyte stability, which may vary by analyte. CBC W/O DIFF    Collection Time: 06/10/19  1:51 PM   Result Value Ref Range    WBC 7.5 4.1 - 11.1 K/uL    RBC 5.22 4.10 - 5.70 M/uL    HGB 15.7 12.1 - 17.0 g/dL    HCT 47.6 36.6 - 50.3 %    MCV 91.2 80.0 - 99.0 FL    MCH 30.1 26.0 - 34.0 PG    MCHC 33.0 30.0 - 36.5 g/dL    RDW 12.9 11.5 - 14.5 %    PLATELET 658 108 - 205 K/uL    MPV 9.3 8.9 - 12.9 FL    NRBC 0.0 0  WBC    ABSOLUTE NRBC 0.00 0.00 - 9.94 K/uL   METABOLIC PANEL, BASIC    Collection Time: 06/10/19  1:51 PM   Result Value Ref Range    Sodium 140 136 - 145 mmol/L    Potassium 4.1 3.5 - 5.1 mmol/L    Chloride 109 (H) 97 - 108 mmol/L    CO2 23 21 - 32 mmol/L    Anion gap 8 5 - 15 mmol/L    Glucose 117 (H) 65 - 100 mg/dL    BUN 16 6 - 20 MG/DL    Creatinine 1.07 0.70 - 1.30 MG/DL    BUN/Creatinine ratio 15 12 - 20      GFR est AA >60 >60 ml/min/1.73m2    GFR est non-AA >60 >60 ml/min/1.73m2    Calcium 8.5 8.5 - 10.1 MG/DL   CK    Collection Time: 06/10/19  4:29 PM   Result Value Ref Range     39 - 308 U/L   ETHYL ALCOHOL    Collection Time: 06/10/19  4:29 PM   Result Value Ref Range    ALCOHOL(ETHYL),SERUM <10 <10 MG/DL   POC CHEM8    Collection Time: 06/10/19  4:29 PM   Result Value Ref Range    Calcium, ionized (POC) 1.13 1.12 - 1.32 mmol/L    Sodium (POC) 140 136 - 145 mmol/L    Potassium (POC) 4.3 3.5 - 5.1 mmol/L    Chloride (POC) 108 (H) 98 - 107 mmol/L    CO2 (POC) 16 (L) 21 - 32 mmol/L    Anion gap (POC) 21 (H) 10 - 20 mmol/L    Glucose (POC) 83 65 - 100 mg/dL    BUN (POC) 16 9 - 20 mg/dL    Creatinine (POC) 1.0 0.6 - 1.3 mg/dL    GFRAA, POC >60 >60 ml/min/1.73m2    GFRNA, POC >60 >60 ml/min/1.73m2    Hematocrit (POC) 44 36.6 - 50.3 %    Comment Comment Not Indicated.

## 2019-06-10 NOTE — ED NOTES
Pt. Presents to ED today via EMS after a witnessed seizure. Pt. Well known to EMS and normally does not want to be transported to the hospital.  Upon arrival patient is alert and oriented x2. PT. Placed in position of comfort with call bell in reach.

## 2019-06-10 NOTE — PROGRESS NOTES
Pharmacy Medication Reconciliation     The patient was interviewed regarding current PTA medication list, use and drug allergies;  patient was not present in room, but obtained permission from parents to discuss drug regimen with visitor(s) present. The patient was questioned regarding use of any other inhalers, topical products, over the counter medications, herbal medications, vitamin products or ophthalmic/nasal/otic medication use. Allergy Update: Aspartame    Recommendations/Findings: The following amendments were made to the [facility] [address]:   1) Additions: none    2) Deletions: none    3) Changes: none  Note: patient was prescribed sertraline 25mg po daily on 6/6/19, but has not started regimen.      -Clarified PTA med list with patient's parents interview (they handle medications) and Rx query. PTA medication list was corrected to the following:     Prior to Admission Medications   Prescriptions Last Dose Informant Patient Reported? Taking? MULTIVITAMIN PO 6/9/2019 at Unknown time Parent Yes Yes   Sig: Take 1 Tab by mouth daily. levETIRAcetam (KEPPRA) 750 mg tablet 6/10/2019 at Unknown time Parent No Yes   Sig: TAKE 2 TABLETS TWICE DAILY   lisinopril (PRINIVIL, ZESTRIL) 10 mg tablet 6/10/2019 at Unknown time Parent No Yes   Sig: TAKE 1 TABLET EVERY DAY FOR HYPERTENSION. SCHEDULE APPT. FOR FURTHER REFILLS ([phone])   sertraline (ZOLOFT) 25 mg tablet  Parent No No   Sig: Take 1 Tab by mouth daily.  Indications: Repeated Episodes of Anxiety   topiramate (TOPAMAX) 50 mg tablet 6/10/2019 at Unknown time Parent No Yes   Sig: TAKE 1 TABLET TWICE DAILY      Facility-Administered Medications: None          Thank you,  SERAFIN Ortiz

## 2019-06-10 NOTE — ED TRIAGE NOTES
Patient arrives to the emergency department via vehicle with a chief complaint of a seizure that lasted less than 5 minutes and was his second today. Patient had been seen at the ED and discharged.

## 2019-06-11 ENCOUNTER — APPOINTMENT (OUTPATIENT)
Dept: VASCULAR SURGERY | Age: 44
DRG: 101 | End: 2019-06-11
Attending: PSYCHIATRY & NEUROLOGY
Payer: MEDICARE

## 2019-06-11 ENCOUNTER — PATIENT OUTREACH (OUTPATIENT)
Dept: INTERNAL MEDICINE CLINIC | Facility: CLINIC | Age: 44
End: 2019-06-11

## 2019-06-11 PROBLEM — R55 CONVULSIVE SYNCOPE: Status: ACTIVE | Noted: 2019-06-11

## 2019-06-11 PROBLEM — I65.23 BILATERAL CAROTID ARTERY STENOSIS: Status: ACTIVE | Noted: 2019-06-11

## 2019-06-11 PROBLEM — R41.82 ALTERED MENTAL STATUS, UNSPECIFIED: Status: ACTIVE | Noted: 2019-06-11

## 2019-06-11 PROBLEM — R93.0 ABNORMAL MRI OF HEAD: Status: ACTIVE | Noted: 2019-06-11

## 2019-06-11 PROBLEM — G40.209 COMPLEX PARTIAL SEIZURE EVOLVING TO GENERALIZED SEIZURE (HCC): Status: ACTIVE | Noted: 2019-06-11

## 2019-06-11 LAB
ANION GAP SERPL CALC-SCNC: 6 MMOL/L (ref 5–15)
BUN SERPL-MCNC: 12 MG/DL (ref 6–20)
BUN/CREAT SERPL: 13 (ref 12–20)
CALCIUM SERPL-MCNC: 8.1 MG/DL (ref 8.5–10.1)
CHLORIDE SERPL-SCNC: 113 MMOL/L (ref 97–108)
CO2 SERPL-SCNC: 21 MMOL/L (ref 21–32)
CREAT SERPL-MCNC: 0.91 MG/DL (ref 0.7–1.3)
GLUCOSE SERPL-MCNC: 89 MG/DL (ref 65–100)
MAGNESIUM SERPL-MCNC: 2.2 MG/DL (ref 1.6–2.4)
POTASSIUM SERPL-SCNC: 3.9 MMOL/L (ref 3.5–5.1)
SODIUM SERPL-SCNC: 140 MMOL/L (ref 136–145)

## 2019-06-11 PROCEDURE — 74011250636 HC RX REV CODE- 250/636: Performed by: INTERNAL MEDICINE

## 2019-06-11 PROCEDURE — 95953 NEURO EEG 24 HR: CPT | Performed by: PSYCHIATRY & NEUROLOGY

## 2019-06-11 PROCEDURE — 93880 EXTRACRANIAL BILAT STUDY: CPT

## 2019-06-11 PROCEDURE — 36415 COLL VENOUS BLD VENIPUNCTURE: CPT

## 2019-06-11 PROCEDURE — 94760 N-INVAS EAR/PLS OXIMETRY 1: CPT

## 2019-06-11 PROCEDURE — 83735 ASSAY OF MAGNESIUM: CPT

## 2019-06-11 PROCEDURE — 74011000258 HC RX REV CODE- 258: Performed by: INTERNAL MEDICINE

## 2019-06-11 PROCEDURE — 95816 EEG AWAKE AND DROWSY: CPT | Performed by: PSYCHIATRY & NEUROLOGY

## 2019-06-11 PROCEDURE — 74011250637 HC RX REV CODE- 250/637: Performed by: PSYCHIATRY & NEUROLOGY

## 2019-06-11 PROCEDURE — 80048 BASIC METABOLIC PNL TOTAL CA: CPT

## 2019-06-11 PROCEDURE — 74011250637 HC RX REV CODE- 250/637: Performed by: INTERNAL MEDICINE

## 2019-06-11 PROCEDURE — 74011250636 HC RX REV CODE- 250/636: Performed by: HOSPITALIST

## 2019-06-11 PROCEDURE — C9254 INJECTION, LACOSAMIDE: HCPCS | Performed by: PSYCHIATRY & NEUROLOGY

## 2019-06-11 PROCEDURE — 74011250636 HC RX REV CODE- 250/636: Performed by: PSYCHIATRY & NEUROLOGY

## 2019-06-11 PROCEDURE — 65660000000 HC RM CCU STEPDOWN

## 2019-06-11 PROCEDURE — 74011000258 HC RX REV CODE- 258: Performed by: PSYCHIATRY & NEUROLOGY

## 2019-06-11 RX ORDER — TOPIRAMATE 100 MG/1
100 TABLET, FILM COATED ORAL 2 TIMES DAILY
Qty: 60 TAB | Refills: 2 | Status: SHIPPED | OUTPATIENT
Start: 2019-06-11 | End: 2019-06-14 | Stop reason: SDUPTHER

## 2019-06-11 RX ORDER — HEPARIN SODIUM 5000 [USP'U]/ML
5000 INJECTION, SOLUTION INTRAVENOUS; SUBCUTANEOUS EVERY 8 HOURS
Status: DISCONTINUED | OUTPATIENT
Start: 2019-06-11 | End: 2019-06-14 | Stop reason: HOSPADM

## 2019-06-11 RX ORDER — LORAZEPAM 2 MG/ML
2 INJECTION INTRAMUSCULAR ONCE
Status: COMPLETED | OUTPATIENT
Start: 2019-06-11 | End: 2019-06-11

## 2019-06-11 RX ORDER — LACOSAMIDE 10 MG/ML
100 INJECTION, SOLUTION INTRAVENOUS EVERY 12 HOURS
Status: DISCONTINUED | OUTPATIENT
Start: 2019-06-11 | End: 2019-06-11 | Stop reason: CLARIF

## 2019-06-11 RX ORDER — TOPIRAMATE 25 MG/1
100 TABLET ORAL EVERY 12 HOURS
Status: DISCONTINUED | OUTPATIENT
Start: 2019-06-11 | End: 2019-06-14 | Stop reason: HOSPADM

## 2019-06-11 RX ADMIN — HEPARIN SODIUM 5000 UNITS: 5000 INJECTION INTRAVENOUS; SUBCUTANEOUS at 12:42

## 2019-06-11 RX ADMIN — Medication 100 MG: at 10:37

## 2019-06-11 RX ADMIN — Medication 10 ML: at 22:08

## 2019-06-11 RX ADMIN — LEVETIRACETAM 1500 MG: 100 INJECTION, SOLUTION INTRAVENOUS at 12:42

## 2019-06-11 RX ADMIN — LISINOPRIL 10 MG: 5 TABLET ORAL at 10:37

## 2019-06-11 RX ADMIN — Medication 10 ML: at 06:00

## 2019-06-11 RX ADMIN — TOPIRAMATE 50 MG: 25 TABLET, FILM COATED ORAL at 10:37

## 2019-06-11 RX ADMIN — TOPIRAMATE 100 MG: 25 TABLET, FILM COATED ORAL at 20:05

## 2019-06-11 RX ADMIN — HEPARIN SODIUM 5000 UNITS: 5000 INJECTION INTRAVENOUS; SUBCUTANEOUS at 20:18

## 2019-06-11 RX ADMIN — FOLIC ACID 1 MG: 1 TABLET ORAL at 10:37

## 2019-06-11 RX ADMIN — LORAZEPAM 2 MG: 2 INJECTION, SOLUTION INTRAMUSCULAR; INTRAVENOUS at 20:05

## 2019-06-11 RX ADMIN — Medication 10 ML: at 12:43

## 2019-06-11 RX ADMIN — LEVETIRACETAM 1500 MG: 100 INJECTION, SOLUTION INTRAVENOUS at 22:52

## 2019-06-11 RX ADMIN — SODIUM CHLORIDE 100 MG: 900 INJECTION, SOLUTION INTRAVENOUS at 12:42

## 2019-06-11 NOTE — PROGRESS NOTES
Telehospitalist: Nurse requested call back. I called back several times but number was not picking up. Alternate number sent by RN and case discussed collaboratively.  Ativan will be given x1 IV

## 2019-06-11 NOTE — TELEPHONE ENCOUNTER
LM to call office      Per MD:    Rina Mcbride MD  You 2 hours ago (8:33 AM)     If he is already taking 100 mg twice a day, he should increase it to 150 mg twice a day    Routing comment        Rina Mcbride MD  You 2 hours ago (8:29 AM)     He should increase topiramate to 100 mg twice daily. Samantha Villalobos will send a new Rx.

## 2019-06-11 NOTE — PROGRESS NOTES
* No surgery found *  * No surgery found *  Bedside and Verbal shift change report given to Sallie RN (oncoming nurse) by Namita Prather RN (offgoing nurse). Report included the following information SBAR, Kardex, Recent Results, Med Rec Status and Cardiac Rhythm SR. Zone Phone:   [phone]      Significant changes during shift:  Admission        Patient Information    Tiffanie Adhikari  38 y.o.  6/10/2019  4:03 PM by Dorian Schulte MD. Tiffanie Adhikari was admitted from Home    Problem List    Patient Active Problem List    Diagnosis Date Noted    Brain cyst 04/03/2017    Advance care planning 04/25/2016    EtOH dependence ([facility] [address] 10/31/2014    Alcohol withdrawal ([facility] [address] 10/31/2014    Hypertension 09/12/2014    Seizure ([facility] [address] 05/22/2012    Asperger syndrome 12/14/2011    Autism      Past Medical History:   Diagnosis Date    Asperger syndrome 12/14/2011    Autism     dx 2010- highly functional    Other ill-defined conditions(799.89)     aspergers, syncopal episodes    Seizure disorder ([facility] [address] 5/22/2012    Seizures ([facility] [address]          Core Measures:    CVA: No No  CHF:No No  PNA:No No    Post Op Surgical (If Applicable):     Number times ambulated in hallway past shift:  0  Number of times OOB to chair past shift:   0  NG Tube: No  Incentive Spirometer: No  Drains: No   Volume  0  Dressing Present:  No  Flatus:  Not applicable    Activity Status:    OOB to Chair No  Ambulated this shift No   Bed Rest Not applicable    Supplemental O2: (If Applicable)    NC No  NRB No  Venti-mask No  On 0 Liters/min      LINES AND DRAINS:    Central Line? No   PICC LINE? No   Urinary Catheter? No   DVT prophylaxis:    DVT prophylaxis Med- No  DVT prophylaxis SCD or VIV- Yes     Wounds: (If Applicable)    Wounds- No    Location 0    Patient Safety:    Falls Score Total Score: 4  Safety Level_______  Bed Alarm On? Yes  Sitter?  No    Plan for upcoming shift: 0        Discharge Plan: yes TBD    Active Consults:  IP CONSULT TO NEUROLOGY  IP CONSULT TO NEUROLOGY  IP CONSULT TO HOSPITALIST

## 2019-06-11 NOTE — PROGRESS NOTES
NN KRISHAN F/U Progress Note    Goals      Transitions of Care- colaboration & care coordination to prevent complications post hospitalization. 6/11/19- received CC In [facility] ADT message informing of pt's 6/10/19 admission (30 day readm) Dx Seizures. Per EMR review pt attended 6/6/19 post hosp KRISHAN appt. Plan- NN to monitor EMR for update re d/c date/plan-ID    6/6/19- mother left VMM on 6/5/19 @ 3:33 PM informing NN pt would be attending 6/6/19 PCP KRISHAN appt @ 10:30 AM. Plan- pt to attend scheduled KRISHAN PCP f/u appt-ID.    6/4/19- call from pt's mother in response to NN message. Mother reported checked on pt during her lunch break. Doing ok. No further seizures. Taking meds as prescribed. NN informed of PCP appt on 6/6/19. Mother reported pt doesn't drive. Pt dependent on mother/father to transport to appts. Mother to check w/ employer if can take take to bring pt. Plan- mother to contact NN 6/5/19 morning to confirm appt-ID.    6/4/19- attempted to contact pt & 2 Emerg Contacts listed on PHI. Messages left x3. Plan- PCP f/u scheduled for 6/6/19 @ 10:30 AM. Message left w/ appt date/time. Will attempt to contact pt in 1 day if no response-ID.

## 2019-06-11 NOTE — PROGRESS NOTES
Spiritual Care Partner Volunteer visited patient in Neuro on June 11, 2019.     Documented by:    ELLIOT Alvarez, [address] [facility] ([place], [place]),   BRANDIN SEPULVEDA [facility] Paging Service  [phone])

## 2019-06-11 NOTE — PROGRESS NOTES
TRANSFER - IN REPORT:    Verbal report received from Sallie RN(name) on Kade Delarosa  being received from [facility] FRISCO RN(unit) for routine progression of care      Report consisted of patients Situation, Background, Assessment and   Recommendations(SBAR). Information from the following report(s) SBAR, Kardex, Recent Results, Med Rec Status and Cardiac Rhythm SR was reviewed with the receiving nurse. Opportunity for questions and clarification was provided. Assessment completed upon patients arrival to unit and care assumed.

## 2019-06-11 NOTE — PROGRESS NOTES
Hospitalist Progress Note    NAME: Ambrosio Young   :  1975   MRN:  [id number]       Assessment / Plan:  Breakthrough seizures  Abnormal MRI 2012  -no seizures since admission   -EEG pending   -neurology consult: added Vimpat  due to abnormal EEG   -cont a/seizure meds IV for now   -MRI brain  showing Left posterior parahippocampal cavernous angioma. Associated venous angioma (developmental venous anomaly) left posterior parahippocampal area. Nonaggressive appearing and possibly incidental 11 mm cyst in the right midbrain. Depending on clinical circumstance consider followup in 6 to 18 months.    MRI attempted during [facility] admission but could not be completed. D/w Dr Anthony Carcamo today, hold off MRI for now till EEG is back to normal     HTN  -BP stable   -continue lisinopril    Alcohol abuse  Autism / Asperger syndrome  Depression  -last drink 2 weeks ago. Was consuming 6-8 beers daily for 20 years  out of window for DT or withdrawal sz. Monitor  -folic acid and thiamine supplements  -prescribed zoloft recently but has not started it yet. Will hold for now.           Code Status:  Full  Surrogate Decision Maker: parents  DVT Prophylaxis: heparin      Baseline:   Lives alone. He is not driving. He was about to start a new job. Parents are nearby and help out. Recommended Disposition: pending clinical progress, home with family once [id number] [address] with neurology      Subjective:     Chief Complaint / Reason for Physician Visit: following seizure DO   Awake and communicative  Mother bedside   Carotid duplex in progress     Discussed with RN events overnight.      Review of Systems:  Symptom Y/N Comments  Symptom Y/N Comments   Fever/Chills n   Chest Pain n    Poor Appetite    Edema     Cough    Abdominal Pain n    Sputum    Joint Pain     SOB/EISENBERG n   Pruritis/Rash     Nausea/vomit    Tolerating PT/OT     Diarrhea    Tolerating Diet     Constipation    Other       Could NOT obtain due to:      Objective: VITALS:   Last 24hrs VS reviewed since prior progress note. Most recent are:  Patient Vitals for the past 24 hrs:   Temp Pulse Resp BP SpO2   06/11/19 0732 98.4 °F (36.9 °C) 74 16 101/81 97 %   06/11/19 0333 98.2 °F (36.8 °C) 71 16 104/70 97 %   06/10/19 2242 97.7 °F (36.5 °C) 78 16 107/66 97 %   06/10/19 2022 98.6 °F (37 °C) 83 16 137/81 100 %   06/10/19 1930 -- 86 14 133/82 96 %   06/10/19 1900 97.5 °F (36.4 °C) 90 18 122/78 99 %   06/10/19 1830 -- 94 14 123/74 97 %   06/10/19 1630 -- (!) 114 19 111/73 93 %   06/10/19 1609 98 °F (36.7 °C) (!) 129 15 139/86 95 %       Intake/Output Summary (Last 24 hours) at 6/11/2019 1052  Last data filed at 6/10/2019 2303  Gross per 24 hour   Intake 100 ml   Output 1 ml   Net 99 ml        PHYSICAL EXAM:  General: WD, WN. Alert, cooperative, no acute distress    EENT:  EOMI. Anicteric sclerae. MMM  Resp:  CTA bilaterally, no wheezing or rales. No accessory muscle use  CV:  Regular  rhythm,  No edema  GI:  Soft, Non distended, Non tender.  +Bowel sounds  Neurologic:  Alert and oriented X 3, normal speech,   Psych:   fair insight. Not anxious nor agitated  Skin:  No rashes. No jaundice    Reviewed most current lab test results and cultures  YES  Reviewed most current radiology test results   YES  Review and summation of old records today    NO  Reviewed patient's current orders and MAR    YES  PMH/SH reviewed - no change compared to H&P  ________________________________________________________________________  Care Plan discussed with:    Comments   Patient y    Family  y Mother bedside    RN y    Care Manager y    Consultant  y Neurology                     y Multidiciplinary team rounds were held today with , nursing, pharmacist and clinical coordinator. Patient's plan of care was discussed; medications were reviewed and discharge planning was addressed.      ________________________________________________________________________  Total NON critical care TIME: 28 Minutes    Total CRITICAL CARE TIME Spent:   Minutes non procedure based      Comments   >50% of visit spent in counseling and coordination of care y Coordination of care    ________________________________________________________________________  Niko Elkins MD     Procedures: see electronic medical records for all procedures/Xrays and details which were not copied into this note but were reviewed prior to creation of Plan. LABS:  I reviewed today's most current labs and imaging studies.   Pertinent labs include:  Recent Labs     06/10/19  1351   WBC 7.5   HGB 15.7   HCT 47.6        Recent Labs     06/11/19  0513 06/10/19  1351    140   K 3.9 4.1   * 109*   CO2 21 23   GLU 89 117*   BUN 12 16   CREA 0.91 1.07   CA 8.1* 8.5   MG 2.2  --        Signed: Niko Elkins MD

## 2019-06-12 ENCOUNTER — APPOINTMENT (OUTPATIENT)
Dept: MRI IMAGING | Age: 44
DRG: 101 | End: 2019-06-12
Attending: PSYCHIATRY & NEUROLOGY
Payer: MEDICARE

## 2019-06-12 LAB
25(OH)D3 SERPL-MCNC: 36.9 NG/ML (ref 30–100)
CK SERPL-CCNC: 124 U/L (ref 39–308)
ERYTHROCYTE [SEDIMENTATION RATE] IN BLOOD: 5 MM/HR (ref 0–15)
FOLATE SERPL-MCNC: 18.9 NG/ML (ref 5–21)
LEFT CCA DIST DIAS: 39.3 CM/S
LEFT CCA DIST SYS: 122.9 CM/S
LEFT CCA PROX DIAS: 29.3 CM/S
LEFT CCA PROX SYS: 103.2 CM/S
LEFT ECA DIAS: 18.35 CM/S
LEFT ECA SYS: 76.4 CM/S
LEFT ICA DIST DIAS: 26.1 CM/S
LEFT ICA DIST SYS: 58.5 CM/S
LEFT ICA MID DIAS: 30 CM/S
LEFT ICA MID SYS: 69.5 CM/S
LEFT ICA PROX DIAS: 20.7 CM/S
LEFT ICA PROX SYS: 78.8 CM/S
LEFT ICA/CCA SYS: 0.64
LEFT SUBCLAVIAN DIAS: 0 CM/S
LEFT SUBCLAVIAN SYS: 110.9 CM/S
LEFT VERTEBRAL DIAS: 16.69 CM/S
LEFT VERTEBRAL SYS: 49.1 CM/S
MAGNESIUM SERPL-MCNC: 2 MG/DL (ref 1.6–2.4)
RIGHT CCA DIST DIAS: 27.7 CM/S
RIGHT CCA DIST SYS: 95 CM/S
RIGHT CCA PROX DIAS: 26.2 CM/S
RIGHT CCA PROX SYS: 103.6 CM/S
RIGHT ECA DIAS: 18.98 CM/S
RIGHT ECA SYS: 87.2 CM/S
RIGHT ICA DIST DIAS: 33.4 CM/S
RIGHT ICA DIST SYS: 70.9 CM/S
RIGHT ICA MID DIAS: 29.1 CM/S
RIGHT ICA MID SYS: 71.9 CM/S
RIGHT ICA PROX DIAS: 23 CM/S
RIGHT ICA PROX SYS: 76.4 CM/S
RIGHT ICA/CCA SYS: 0.8
RIGHT SUBCLAVIAN DIAS: 0 CM/S
RIGHT SUBCLAVIAN SYS: 88.4 CM/S
RIGHT VERTEBRAL DIAS: 14.14 CM/S
RIGHT VERTEBRAL SYS: 47.4 CM/S
TSH SERPL DL<=0.05 MIU/L-ACNC: 2.7 UIU/ML (ref 0.36–3.74)
VIT B12 SERPL-MCNC: 802 PG/ML (ref 193–986)

## 2019-06-12 PROCEDURE — 84443 ASSAY THYROID STIM HORMONE: CPT

## 2019-06-12 PROCEDURE — 36415 COLL VENOUS BLD VENIPUNCTURE: CPT

## 2019-06-12 PROCEDURE — 74011000258 HC RX REV CODE- 258: Performed by: INTERNAL MEDICINE

## 2019-06-12 PROCEDURE — 86038 ANTINUCLEAR ANTIBODIES: CPT

## 2019-06-12 PROCEDURE — 74011250637 HC RX REV CODE- 250/637: Performed by: INTERNAL MEDICINE

## 2019-06-12 PROCEDURE — 82607 VITAMIN B-12: CPT

## 2019-06-12 PROCEDURE — 74011250636 HC RX REV CODE- 250/636: Performed by: HOSPITALIST

## 2019-06-12 PROCEDURE — 74011250636 HC RX REV CODE- 250/636: Performed by: PSYCHIATRY & NEUROLOGY

## 2019-06-12 PROCEDURE — 85652 RBC SED RATE AUTOMATED: CPT

## 2019-06-12 PROCEDURE — 83735 ASSAY OF MAGNESIUM: CPT

## 2019-06-12 PROCEDURE — 74011250637 HC RX REV CODE- 250/637: Performed by: HOSPITALIST

## 2019-06-12 PROCEDURE — 65660000000 HC RM CCU STEPDOWN

## 2019-06-12 PROCEDURE — 80177 DRUG SCRN QUAN LEVETIRACETAM: CPT

## 2019-06-12 PROCEDURE — 74011250636 HC RX REV CODE- 250/636: Performed by: INTERNAL MEDICINE

## 2019-06-12 PROCEDURE — 82550 ASSAY OF CK (CPK): CPT

## 2019-06-12 PROCEDURE — 82746 ASSAY OF FOLIC ACID SERUM: CPT

## 2019-06-12 PROCEDURE — 80201 ASSAY OF TOPIRAMATE: CPT

## 2019-06-12 PROCEDURE — 82306 VITAMIN D 25 HYDROXY: CPT

## 2019-06-12 PROCEDURE — 74011250637 HC RX REV CODE- 250/637: Performed by: PSYCHIATRY & NEUROLOGY

## 2019-06-12 PROCEDURE — 94760 N-INVAS EAR/PLS OXIMETRY 1: CPT

## 2019-06-12 RX ORDER — LORAZEPAM 2 MG/ML
1-2 INJECTION INTRAMUSCULAR
Status: DISCONTINUED | OUTPATIENT
Start: 2019-06-12 | End: 2019-06-14 | Stop reason: HOSPADM

## 2019-06-12 RX ORDER — GUAIFENESIN 600 MG/1
600 TABLET, EXTENDED RELEASE ORAL 2 TIMES DAILY
Status: DISCONTINUED | OUTPATIENT
Start: 2019-06-12 | End: 2019-06-14 | Stop reason: HOSPADM

## 2019-06-12 RX ADMIN — Medication 100 MG: at 10:00

## 2019-06-12 RX ADMIN — HEPARIN SODIUM 5000 UNITS: 5000 INJECTION INTRAVENOUS; SUBCUTANEOUS at 04:06

## 2019-06-12 RX ADMIN — FOLIC ACID 1 MG: 1 TABLET ORAL at 10:00

## 2019-06-12 RX ADMIN — TOPIRAMATE 100 MG: 25 TABLET, FILM COATED ORAL at 21:49

## 2019-06-12 RX ADMIN — GUAIFENESIN 600 MG: 600 TABLET, EXTENDED RELEASE ORAL at 17:57

## 2019-06-12 RX ADMIN — HEPARIN SODIUM 5000 UNITS: 5000 INJECTION INTRAVENOUS; SUBCUTANEOUS at 14:51

## 2019-06-12 RX ADMIN — Medication 10 ML: at 04:07

## 2019-06-12 RX ADMIN — Medication 1 LOZENGE: at 17:58

## 2019-06-12 RX ADMIN — TOPIRAMATE 100 MG: 25 TABLET, FILM COATED ORAL at 09:56

## 2019-06-12 RX ADMIN — LISINOPRIL 10 MG: 5 TABLET ORAL at 09:57

## 2019-06-12 RX ADMIN — LEVETIRACETAM 1500 MG: 100 INJECTION, SOLUTION INTRAVENOUS at 14:50

## 2019-06-12 RX ADMIN — HEPARIN SODIUM 5000 UNITS: 5000 INJECTION INTRAVENOUS; SUBCUTANEOUS at 21:50

## 2019-06-12 RX ADMIN — Medication 10 ML: at 21:50

## 2019-06-12 RX ADMIN — LORAZEPAM 2 MG: 2 INJECTION INTRAMUSCULAR; INTRAVENOUS at 19:11

## 2019-06-12 RX ADMIN — Medication 10 ML: at 14:51

## 2019-06-12 NOTE — PROGRESS NOTES
Bedside and Verbal shift change report given to Arti Macias Daylin (oncoming nurse) by Jose Churchill RN (offgoing nurse). Report included the following information SBAR, Kardex, Recent Results, Med Rec Status and Cardiac Rhythm SR.     Zone Phone:   [phone]        Significant changes during shift:  Vimpat discontinued. Patient c/o sore throat and cough. Dr Liliana Barlow notifed   Patient Information     Stephanie Fields  38 y.o.  6/10/2019  4:03 PM by Jorge Aranda MD. Stephanie Fields was admitted from Home     Problem List          Patient Active Problem List     Diagnosis Date Noted    Abnormal MRI of head 06/11/2019    Altered mental status, unspecified 06/11/2019    Complex partial seizure evolving to generalized seizure ([address] 06/11/2019    Bilateral carotid artery stenosis 06/11/2019    Convulsive syncope 06/11/2019    Brain cyst 04/03/2017    Advance care planning 04/25/2016    EtOH dependence ([address] 10/31/2014    Alcohol withdrawal ([address] 10/31/2014    Hypertension 09/12/2014    Seizure ([address] 05/22/2012    Asperger syndrome 12/14/2011    Autism             Past Medical History:   Diagnosis Date    Asperger syndrome 12/14/2011    Autism       dx 2010- highly functional    Other ill-defined conditions(799.89)       aspergers, syncopal episodes    Seizure disorder ([facility] 5/22/2012    Seizures ([facility])              Core Measures:     CVA: No No  CHF:No No  PNA:No No     Post Op Surgical (If Applicable):      Number times ambulated in hallway past shift:  0  Number of times OOB to chair past shift:   0  NG Tube: No  Incentive Spirometer: No  Drains: No   Volume  0  Dressing Present:  No  Flatus:  Not applicable     Activity Status:     OOB to Chair yes  Ambulated this shift yes   Bed Rest Not applicable     Supplemental O2: (If Applicable)     NC No  NRB No  Venti-mask No  On 0 Liters/min        LINES AND DRAINS:     Central Line? No   PICC LINE? No   Urinary Catheter?  No   DVT prophylaxis:     DVT prophylaxis Med- No  DVT prophylaxis SCD or VIV- Yes      Wounds: (If Applicable)     Wounds- No     Location 0     Patient Safety:     Falls Score Total Score: 4  Safety Level_______  Bed Alarm On? Yes  Sitter? No     Plan for upcoming shift:  Safety, seizure precautions.  Cough meds         Discharge Plan: yes TBD     Active Consults:  IP CONSULT TO NEUROLOGY  IP CONSULT TO NEUROLOGY  IP CONSULT TO HOSPITALIST

## 2019-06-12 NOTE — PROGRESS NOTES
Paged EEG tech because pt pull the wires, not able to talk to them; DR Viktor Woody was aware he stated ok .

## 2019-06-12 NOTE — CONSULTS
Consult  REFERRED BY:  Liset Sullivan MD    CHIEF COMPLAINT: Seizures      Subjective:     Tiffanie Adhikari is a 38 y.o. right-handed  male seen as a new patient to me at the request of Dr. Alec Huertas for evaluation of new problem of 2 recurring and breakthrough seizures yesterday requiring hospital admission today. Patient was just seen at [facility] on May 30 for the same problem, but never had an EEG or an MRI done, and was just discharged home. Patient was seen in the ER yesterday after the first seizure, bolused with Keppra and then went home but then had another seizure and came back and was readmitted. Patient had a past history of alcohol abuse but apparently has not drank for almost 2 weeks now. Patient is not taking any other drugs or alcohol to precipitate this event. Patient does have a history of a benign cyst of the brainstem, and a left posterior parahippocampal cavernous angioma on MRI scan done 2012, but not had any recent scans of MRI type but his CAT scan on admission showed stable lesions compared to previous scans. Patient denies any new headache, focal weakness, visual changes, or any other causes for the seizures or new neurologic deficits after the seizures. His EEG does show occasional dysrhythmic activity. We will try to obtain a 24-hour overnight video EEG monitor. Patient condition discussed with both parents and patient in detail. Chart reviewed in detail, CT and EEG reviewed in the PACS system, and agree with report as described above. Old records reviewed carefully on the chart in addition an old neurologic consult also reviewed as patient followed by Dr. Bhargavi Calhoun for years. Patient has only been treated with Keppra, and Topamax just recently added because of seizures.     Past Medical History:   Diagnosis Date    Asperger syndrome 12/14/2011    Autism     dx 2010- highly functional    Other ill-defined conditions(799.89)     aspergers, syncopal episodes    [facility] 5/22/2012    Seizures ([facility])       Past Surgical History:   Procedure Laterality Date    HX GI      pyloric stenosis 1976    HX HEENT      adenoids 1978     Family History   Problem Relation Age of Onset    Diabetes Father     Heart Disease Father     Cancer Mother         Breast    Anxiety Mother     COPD Mother     Other Mother         Neuropathy    Sleep Apnea Mother    [facility] Arthritis-osteo Mother     Other Brother         Pituitary tumor      Social History     Tobacco Use    Smoking status: Never Smoker    Smokeless tobacco: Never Used   Substance Use Topics    Alcohol use:  Yes     Alcohol/week: 5.0 oz     Types: 10 Cans of beer per week         Current Facility-Administered Medications:     topiramate (TOPAMAX) tablet 100 mg, 100 mg, Oral, Q12H, Delmi Mcguire MD, 100 mg at 06/11/19 2005    lacosamide (VIMPAT) 100 mg in 0.9% sodium chloride 100 mL IVPB, 100 mg, IntraVENous, Q12H, Delmi Mcguire MD, 100 mg at 06/11/19 1242    heparin (porcine) injection 5,000 Units, 5,000 Units, SubCUTAneous, Q8H, Trista Hendrix MD, 5,000 Units at 06/11/19 2018    sodium chloride (NS) flush 5-40 mL, 5-40 mL, IntraVENous, Q8H, Tonja Doshi MD, 10 mL at 06/11/19 1243    sodium chloride (NS) flush 5-40 mL, 5-40 mL, IntraVENous, PRN, Tonja Doshi MD    acetaminophen (TYLENOL) tablet 650 mg, 650 mg, Oral, Q4H PRN, Tonja Doshi MD    ondansetron [facility]) injection 4 mg, 4 mg, IntraVENous, Q4H PRN, Tonja Doshi MD    lisinopril (PRINIVIL, ZESTRIL) tablet 10 mg, 10 mg, Oral, DAILY, Tonja Doshi MD, 10 mg at 06/11/19 1037    LORazepam (ATIVAN) injection 2 mg, 2 mg, IntraVENous, Q6H PRN, Tonja Doshi MD    folic acid (FOLVITE) tablet 1 mg, 1 mg, Oral, DAILY, Tonja Doshi MD, 1 mg at 06/11/19 1037    thiamine mononitrate (B-1) tablet 100 mg, 100 mg, Oral, DAILY, Tonja Doshi MD, 100 mg at 06/11/19 1037    levETIRAcetam (KEPPRA) 1,500 mg in 0.9% sodium chloride 100 mL IVPB, 1,500 mg, IntraVENous, Q12H, Khalif Lawson MD, 1,500 mg at 06/11/19 1242        Allergies   Allergen Reactions    Aspartame Other (comments)     Family believes it causes his seizures      MRI Results (most recent):  Results from [place] encounter on 01/23/12   MRI BRAIN W AND W/O CONTRAST    Narrative **Final Report**       ICD Codes / Adm. Diagnosis: 345.90   / SEIZURE DISORDER    Examination:  MR BRAIN Edith Burton  - [id number] - Jan 23 2012  1:00PM  Accession No:  [id number]  Reason:  new onset sz      REPORT:  INDICATION: seizures. 345.9. TECHNIQUE: Sagittal T1, axial FLAIR, T2, T1 and gradient echo T2-weighted   images of the head were obtained followed by intravenous infusion 15 mL   gadolinium repeat axial and coronal T1-weighted images and axial diffusion   weighted images. Angled thin coronal T2 temporal lobe images. COMPARISON: CT head 01/12/2012    In the left posterior parahippocampal gyrus is a focal lesion measuring   approximately 11 x 10 x 8 mm which has heterogeneous internal architecture   with the rim of hypointensity and progressive blooming of hypointensity on   gradient-echo T2-weighted images. This is consistent with a cavernous   angioma. Adjacent to this is a prominent venous structure with imaging   characteristics consistent with a venous angioma (DVA). The area of decreased attenuation in the right midbrain on the CT scan is   better delineated on the MRI. This represents a well defined cyst of   uncertain etiology and significance. This measures approximately 11 x 6 x 6   mm. There is no significant displacement of adjacent structures in this is   felt to be quite chronic. No associated abnormal enhancement or restricted   diffusion. Otherwise normal signal in the cerebral hemispheres, brainstem and   cerebellum. Ventricular size and configuration are normal.   Normal flow-voids are present in the vertebral, basilar and carotid artery   systems.     The craniocervical junction is normal.   The structures of the cranial base including paranasal sinuses are    unremarkable. IMPRESSION:   1. Left posterior parahippocampal cavernous angioma. 2. Associated venous angioma (developmental venous anomaly) left posterior   parahippocampal area. 3. Nonaggressive appearing and possibly incidental 11 mm cyst in the right   midbrain. Depending on clinical circumstance consider followup in 6 to 18   months. Signing/Reading Doctor: Edgardo Cabrera ([id number])    Approved: Edgardo Cabrera ([id number])  01/23/2012                                      Results from Hospital Encounter encounter on 01/23/12   MRI BRAIN W AND W/O CONTRAST    Narrative **Final Report**       ICD Codes / Adm. Diagnosis: 345.90   / SEIZURE DISORDER    Examination:  MR BRAIN Taina Jarvis  - [id number] - Jan 23 2012  1:00PM  Accession No:  [id number]  Reason:  new onset sz      REPORT:  INDICATION: seizures. 345.9. TECHNIQUE: Sagittal T1, axial FLAIR, T2, T1 and gradient echo T2-weighted   images of the head were obtained followed by intravenous infusion 15 mL   gadolinium repeat axial and coronal T1-weighted images and axial diffusion   weighted images. Angled thin coronal T2 temporal lobe images. COMPARISON: CT head 01/12/2012    In the left posterior parahippocampal gyrus is a focal lesion measuring   approximately 11 x 10 x 8 mm which has heterogeneous internal architecture   with the rim of hypointensity and progressive blooming of hypointensity on   gradient-echo T2-weighted images. This is consistent with a cavernous   angioma. Adjacent to this is a prominent venous structure with imaging   characteristics consistent with a venous angioma (DVA). The area of decreased attenuation in the right midbrain on the CT scan is   better delineated on the MRI. This represents a well defined cyst of   uncertain etiology and significance. This measures approximately 11 x 6 x 6   mm.  There is no significant displacement of adjacent structures in this is   felt to be quite chronic. No associated abnormal enhancement or restricted   diffusion. Otherwise normal signal in the cerebral hemispheres, brainstem and   cerebellum. Ventricular size and configuration are normal.   Normal flow-voids are present in the vertebral, basilar and carotid artery   systems. The craniocervical junction is normal.   The structures of the cranial base including paranasal sinuses are    unremarkable. IMPRESSION:   1. Left posterior parahippocampal cavernous angioma. 2. Associated venous angioma (developmental venous anomaly) left posterior   parahippocampal area. 3. Nonaggressive appearing and possibly incidental 11 mm cyst in the right   midbrain. Depending on clinical circumstance consider followup in 6 to 18   months. Signing/Reading Doctor: Kb Garcia ([id number])    Approved: Kb Garcia ([id number])  01/23/2012                                    Review of Systems:  Review of systems not obtained due to patient factors. Vitals:    06/11/19 0333 06/11/19 0732 06/11/19 1634 06/11/19 1946   BP: 104/70 101/81 102/77 (!) 142/94   Pulse: 71 74 68 92   Resp: 16 16 16 18   Temp: 98.2 °F (36.8 °C) 98.4 °F (36.9 °C) 98.4 °F (36.9 °C) 97.5 °F (36.4 °C)   SpO2: 97% 97% 99% 98%     Objective:     I      NEUROLOGICAL EXAM:    Appearance: The patient is well developed, well nourished, provides a poor history and is in no acute distress. Mental Status: Oriented to time but not the day of the month, place and person, and the president, cognitive function is slow and mildly abnormal and speech is fluent and no aphasia or dysarthria. Mood and affect appropriate, but patient appears mildly autistic. Cranial Nerves:   Intact visual fields. Fundi are benign, disc are flat, no lesions seen on funduscopy. IMTIAZ, EOM's full, no nystagmus, no ptosis. Facial sensation is normal. Corneal reflexes are not tested.  Facial movement is symmetric. Hearing is normal bilaterally. Palate is midline with normal sternocleidomastoid and trapezius muscles are normal. Tongue is midline. Neck without meningismus or bruits  Temporal arteries are not tender or enlarged  TMJ areas are not tender on palpation   Motor:  4/5 strength in upper and lower proximal and distal muscles. Normal bulk and tone. No fasciculations. Rapid alternating movement is symmetric and slow bilaterally   Reflexes:   Deep tendon reflexes 2+/4 and symmetrical.  No babinski or clonus present   Sensory:   Normal to touch, pinprick and vibration and temperature. DSS is intact   Gait:  Normal gait for patient's age. Tremor:   No tremor noted. Cerebellar:  No abnormal cerebellar signs present on Romberg and tandem testing and finger-nose-finger exam.   Neurovascular:  Normal heart sounds and regular rhythm, peripheral pulses intact, and no carotid bruits. Assessment:   Active Problems:    Asperger syndrome (12/14/2011)      Seizure ([address] (5/22/2012)      Brain cyst (4/3/2017)      Abnormal MRI of head (6/11/2019)      Altered mental status, unspecified (6/11/2019)      Complex partial seizure evolving to generalized seizure ([address] (6/11/2019)        Plan:     Patient with recurring seizures, despite near maximal doses of Keppra, and even high levels of Keppra, so we will start Vimpat, to provide quick therapeutic levels, check an overnight video EEG, and follow his levels and clinical course for further treatment and evaluation. Very difficult case, due to patient's asked Buerger's syndrome, and recurring seizures despite therapeutic drugs and doses of medications. We will follow carefully, discussed with patient's in detail, reviewed all the records as above, and 1 hour for the patient and the family in detail.     Signed By: Fuentes Su MD     June 11, 2019       CC: Liset Sullivan MD  FAX: [phone]

## 2019-06-12 NOTE — PROGRESS NOTES
Hospitalist Progress Note    NAME: Elizabeth Trevizo   :  1975   MRN:  [id number]       Assessment / Plan:  Breakthrough seizures  Abnormal MRI   -no seizures since admission   -EEG overnight done only 3 hr then stopped due to agitation   -neurology consult: added Vimpat  but then stopped    incr topamax    -cont a/seizure meds IV for now   -MRI brain  showing Left posterior parahippocampal cavernous angioma. Associated venous angioma (developmental venous anomaly) left posterior parahippocampal area. Nonaggressive appearing and possibly incidental 11 mm cyst in the right midbrain. Depending on clinical circumstance consider followup in 6 to 18 months.    MRI attempted during [address] [place] [address] admission but could not be completed. D/w Dr Felicia Salazar today, hold off MRI for now till EEG is back to normal     HTN  -BP stable   -continue lisinopril    Alcohol abuse  Autism / Asperger syndrome  Depression  -last drink 2 weeks ago. Was consuming 6-8 beers daily for 20 years  out of window for DT or withdrawal sz. Monitor  -folic acid and thiamine supplements  -prescribed zoloft recently but has not started it yet. Will hold for now.           Code Status:  Full  Surrogate Decision Maker: parents  DVT Prophylaxis: heparin      Baseline:   Lives alone. He is not driving. He was about to start a new job. Parents are nearby and help out. Recommended Disposition: pending clinical progress, home with family once [facility] with neurology      Subjective:     Chief Complaint / Reason for Physician Visit: following seizure DO   Awake and communicative  Mother bedside   C/o sore throat and dry cough/congection     Discussed with RN events overnight.      Review of Systems:  Symptom Y/N Comments  Symptom Y/N Comments   Fever/Chills n   Chest Pain n    Poor Appetite    Edema     Cough    Abdominal Pain n    Sputum    Joint Pain     SOB/EISENBERG n   Pruritis/Rash     Nausea/vomit    Tolerating PT/OT     Diarrhea    Tolerating Diet Constipation    Other       Could NOT obtain due to:      Objective:     VITALS:   Last 24hrs VS reviewed since prior progress note. Most recent are:  Patient Vitals for the past 24 hrs:   Temp Pulse Resp BP SpO2   06/11/19 0732 98.4 °F (36.9 °C) 74 16 101/81 97 %   06/11/19 0333 98.2 °F (36.8 °C) 71 16 104/70 97 %   06/10/19 2242 97.7 °F (36.5 °C) 78 16 107/66 97 %   06/10/19 2022 98.6 °F (37 °C) 83 16 137/81 100 %   06/10/19 1930 -- 86 14 133/82 96 %   06/10/19 1900 97.5 °F (36.4 °C) 90 18 122/78 99 %   06/10/19 1830 -- 94 14 123/74 97 %   06/10/19 1630 -- (!) 114 19 111/73 93 %   06/10/19 1609 98 °F (36.7 °C) (!) 129 15 139/86 95 %       Intake/Output Summary (Last 24 hours) at 6/11/2019 1052  Last data filed at 6/10/2019 2303  Gross per 24 hour   Intake 100 ml   Output 1 ml   Net 99 ml        PHYSICAL EXAM:  General: WD, WN. Alert, cooperative, no acute distress    EENT:  EOMI. Anicteric sclerae. MMM  Resp:  CTA bilaterally, no wheezing or rales. No accessory muscle use  CV:  Regular  rhythm,  No edema  GI:  Soft, Non distended, Non tender.  +Bowel sounds  Neurologic:  Alert and oriented X 3, normal speech,   Psych:   fair insight. Not anxious nor agitated  Skin:  No rashes. No jaundice    Reviewed most current lab test results and cultures  YES  Reviewed most current radiology test results   YES  Review and summation of old records today    NO  Reviewed patient's current orders and MAR    YES  PMH/SH reviewed - no change compared to H&P  ________________________________________________________________________  Care Plan discussed with:    Comments   Patient y    Family  y Mother bedside    RN y    Care Manager y    Consultant  y Neurology                     y Multidiciplinary team rounds were held today with , nursing, pharmacist and clinical coordinator. Patient's plan of care was discussed; medications were reviewed and discharge planning was addressed. ________________________________________________________________________  Total NON critical care TIME:  25 Minutes    Total CRITICAL CARE TIME Spent:   Minutes non procedure based      Comments   >50% of visit spent in counseling and coordination of care     ________________________________________________________________________  Sirisha Barnard MD     Procedures: see electronic medical records for all procedures/Xrays and details which were not copied into this note but were reviewed prior to creation of Plan. LABS:  I reviewed today's most current labs and imaging studies.   Pertinent labs include:  Recent Labs     06/10/19  1351   WBC 7.5   HGB 15.7   HCT 47.6        Recent Labs     06/11/19  0513 06/10/19  1351    140   K 3.9 4.1   * 109*   CO2 21 23   GLU 89 117*   BUN 12 16   CREA 0.91 1.07   CA 8.1* 8.5   MG 2.2  --        Signed: Sirisha Barnard MD

## 2019-06-12 NOTE — PROGRESS NOTES
Bedside and Verbal shift change report given to cheyenne REYES (oncoming nurse) by Koki Quintero (offgoing nurse). Report included the following information SBAR, Kardex, Recent Results, Med Rec Status and Cardiac Rhythm SR.     [facility] Phone:   [phone]        Significant changes during shift: Pt was ok thought the night, slept well   Patient Information     Larry Herring  38 y.o.  6/10/2019  4:03 PM by Loetta Fothergill, MD. Larry Herring was admitted from Home     Problem List          Patient Active Problem List     Diagnosis Date Noted    Abnormal MRI of head 06/11/2019    Altered mental status, unspecified 06/11/2019    Complex partial seizure evolving to generalized seizure ([address] 06/11/2019    Bilateral carotid artery stenosis 06/11/2019    Convulsive syncope 06/11/2019    Brain cyst 04/03/2017    Advance care planning 04/25/2016    EtOH dependence ([address] 10/31/2014    Alcohol withdrawal ([facility] 10/31/2014    Hypertension 09/12/2014    Seizure ([facility] 05/22/2012    Asperger syndrome 12/14/2011    Autism             Past Medical History:   Diagnosis Date    Asperger syndrome 12/14/2011    Autism       dx 2010- highly functional    Other ill-defined conditions(799.89)       aspergers, syncopal episodes    Seizure disorder ([facility] 5/22/2012    Seizures ([facility])              Core Measures:     CVA: No No  CHF:No No  PNA:No No     Post Op Surgical (If Applicable):      Number times ambulated in hallway past shift:  0  Number of times OOB to chair past shift:   0  NG Tube: No  Incentive Spirometer: No  Drains: No   Volume  0  Dressing Present:  No  Flatus:  Not applicable     Activity Status:     OOB to Chair No  Ambulated this shift No   Bed Rest Not applicable     Supplemental O2: (If Applicable)     NC No  NRB No  Venti-mask No  On 0 Liters/min        LINES AND DRAINS:     Central Line? No   PICC LINE? No   Urinary Catheter?  No   DVT prophylaxis:     DVT prophylaxis Med- No  DVT prophylaxis SCD or VIV- Yes      Wounds: (If Applicable)     Wounds- No     Location 0     Patient Safety:     Falls Score Total Score: 4  Safety Level_______  Bed Alarm On? Yes  Sitter?  No     Plan for upcoming shift:  Safety, seizure precautions           Discharge Plan: yes TBD     Active Consults:  IP CONSULT TO NEUROLOGY  IP CONSULT TO NEUROLOGY  IP CONSULT TO HOSPITALIST

## 2019-06-12 NOTE — PROGRESS NOTES
Spoke to DR Fariha Hong that pt became very anxious, restlessness , not able to be controlled by parents, they think that maybe reaction to VIMPAT, MD stated to hold the medicine for tonight, pt already got 2 mg of ativan ordered by tele

## 2019-06-12 NOTE — PROGRESS NOTES
Medicare pt has received, reviewed, and signed 2nd IM letter informing them of their right to appeal the discharge. Signed copy has been placed on pt bedside chart.                 Sharmin Sherwood  [phone]

## 2019-06-12 NOTE — PROCEDURES
[address]  EEG    Name:  Av Weiss  MR#:  [id number]  :  1975  ACCOUNT #:  [de-identified]  DATE OF SERVICE:  06/10/2019      CLINICAL INDICATION:  The patient is a 81-year-old male with a history of seizures. The patient has recurrent breakthrough seizures. EEG to rule out seizures, rule out encephalopathy, and rule out cortical abnormality. EEG CLASSIFICATION:  This patient is dysrhythmia grade III, generalized maximum bifrontal.    DESCRIPTION OF THE RECORD:  The background of this record contained a posteriorly located occipital alpha rhythm of 9-10 Hz that does attenuate some with eye opening. Throughout the recording, there was considerable periods of drowsiness and some frequent stages of sleep with K complexes and sleep spindles seen in the central head regions. The predominant abnormality is seen was a generalized shock wave and spike discharges seen in the bifrontal head region for the frequency of about 6-7 Hz that occurred episodic in rest of the recording. It is perhaps seemed to be slightly more accentuated by periods of drowsiness. During this time, there were no clinical correlates of spells in the patient. The discharges were best at 1-2 seconds only. Again, no clinical changes described in the patient on the notes. There are no other focal abnormalities, no slowing. The patient frequently had drowsiness and stage of sleep with K complexes and sleep spindles seen in central head regions. INTERPRETATION:  This is an abnormal electroencephalogram due to the generalized shock wave and spike wave discharges seen in the bifrontal head regions probably consistent with a primary generalized EEG seizure type. No focal abnormalities identified. Clinical correlation recommended. Repeat studies, maybe a prolonged 24-hour ambulatory studies may be of further value on this patient if clinically indicated.       Nandini Miguel MD      [id number]  D: 06/11/2019 23:42  T:  06/12/2019 4:32  JOB #:  [id number]

## 2019-06-12 NOTE — PROGRESS NOTES
* No surgery found *  * No surgery found *  Bedside and Verbal shift change report given to [address] (oncoming nurse) by Tray Hull RN (offgoing nurse). Report included the following information SBAR, Kardex, Recent Results, Med Rec Status and Cardiac Rhythm SR. [facility] Phone:   [phone]      Significant changes during shift:  Pt very anxious and restlessness ,  ativan 2 mg was given. DR Niko Martinez ok to hold the VIMPAT at night      Patient Information    Juju Schofield  38 y.o.  6/10/2019  4:03 PM by Tiffany Mejia MD. Juju Schofield was admitted from Home    Problem List    Patient Active Problem List    Diagnosis Date Noted    Abnormal MRI of head 06/11/2019    Altered mental status, unspecified 06/11/2019    Complex partial seizure evolving to generalized seizure ([address] 06/11/2019    Bilateral carotid artery stenosis 06/11/2019    Convulsive syncope 06/11/2019    Brain cyst 04/03/2017    Advance care planning 04/25/2016    EtOH dependence ([address] 10/31/2014    Alcohol withdrawal ([address] 10/31/2014    Hypertension 09/12/2014    Seizure ([address] 05/22/2012    Asperger syndrome 12/14/2011    Autism      Past Medical History:   Diagnosis Date    Asperger syndrome 12/14/2011    Autism     dx 2010- highly functional    Other ill-defined conditions(799.89)     aspergers, syncopal episodes    Seizure disorder ([address] 5/22/2012    Seizures ([facility]          Core Measures:    CVA: No No  CHF:No No  PNA:No No    Post Op Surgical (If Applicable):     Number times ambulated in hallway past shift:  0  Number of times OOB to chair past shift:   0  NG Tube: No  Incentive Spirometer: No  Drains: No   Volume  0  Dressing Present:  No  Flatus:  Not applicable    Activity Status:    OOB to Chair No  Ambulated this shift No   Bed Rest Not applicable    Supplemental O2: (If Applicable)    NC No  NRB No  Venti-mask No  On 0 Liters/min      LINES AND DRAINS:    Central Line? No   PICC LINE? No   Urinary Catheter?  No   DVT prophylaxis:    DVT prophylaxis Med- No  DVT prophylaxis SCD or VIV- Yes     Wounds: (If Applicable)    Wounds- No    Location 0    Patient Safety:    Falls Score Total Score: 4  Safety Level_______  Bed Alarm On? Yes  Sitter?  No    Plan for upcoming shift:  Safety, seizure precautions        Discharge Plan: yes TBD    Active Consults:  IP CONSULT TO NEUROLOGY  IP CONSULT TO NEUROLOGY  IP CONSULT TO HOSPITALIST

## 2019-06-12 NOTE — PROGRESS NOTES
Reason for Admission:   Patient came to ed for seizure episode. He lives in one story home alone. His parents are nearby and help out. He denies using dme. He does not work at present but is in the process of starting a new job he states. His mother is in the room with him. Per mother he is on disability. He states he was independent prior to coming to the hospital.  PMHX significant for alcohol withdrawal due to alcohol abuse. RRAT Score: 16                 Do you (patient/family) have any concerns for transition/discharge? No               Plan for utilizing home health:   He has not used home health services or rehab in the past per his mother. Current Advanced Directive/Advance Care Plan:  Not on file. Transition of Care Plan:   1) Home with family. Patient has a  with [facility] per mother by the name of Kiki Hollis. He has a [employer] worker who takes him to his appointments and to grocery shopping. His parents state they also assist him. Per mother states he used to drink six beers per night and he was detoxed two weeks ago at [facility] and has not had a drink so far. He plans to go home with family when discharged and will follow up with medical care team.  OZZY iniguez and made aware of patient's admission. Care Management Interventions  PCP Verified by CM:  Yes  Transition of Care Consult (CM Consult): Discharge Planning  Current Support Network: Lives Alone  Confirm Follow Up Transport: Family  Plan discussed with Pt/Family/Caregiver: Yes  Discharge Location  Discharge Placement: Home

## 2019-06-12 NOTE — PROGRESS NOTES
Problem: Falls - Risk of  Goal: *Absence of Falls  Description  Document Jon Werner Fall Risk and appropriate interventions in the flowsheet.   Outcome: Progressing Towards Goal     Problem: Patient Education: Go to Patient Education Activity  Goal: Patient/Family Education  Outcome: Progressing Towards Goal     Problem: Seizure Disorder (Adult)  Goal: *STG: Remains free of seizure activity  Outcome: Progressing Towards Goal  Goal: *STG: Maintains lab values within therapeutic range  Outcome: Progressing Towards Goal  Goal: *STG/LTG: Complies with medication therapy  Outcome: Progressing Towards Goal  Goal: *STG: Remains free of injury during seizure activity  Outcome: Progressing Towards Goal  Goal: *STG: Remains safe in hospital  Outcome: Progressing Towards Goal  Goal: Interventions  Outcome: Progressing Towards Goal     Problem: Patient Education: Go to Patient Education Activity  Goal: Patient/Family Education  Outcome: Progressing Towards Goal

## 2019-06-13 LAB
GLUCOSE BLD STRIP.AUTO-MCNC: 106 MG/DL (ref 65–100)
LEVETIRACETAM SERPL-MCNC: 32.5 UG/ML (ref 10–40)
SERVICE CMNT-IMP: ABNORMAL
TOPIRAMATE SERPL-MCNC: 3.8 UG/ML (ref 2–25)

## 2019-06-13 PROCEDURE — 80177 DRUG SCRN QUAN LEVETIRACETAM: CPT

## 2019-06-13 PROCEDURE — 74011250637 HC RX REV CODE- 250/637: Performed by: PSYCHIATRY & NEUROLOGY

## 2019-06-13 PROCEDURE — 74011250636 HC RX REV CODE- 250/636: Performed by: HOSPITALIST

## 2019-06-13 PROCEDURE — 80201 ASSAY OF TOPIRAMATE: CPT

## 2019-06-13 PROCEDURE — 74011000258 HC RX REV CODE- 258: Performed by: INTERNAL MEDICINE

## 2019-06-13 PROCEDURE — 74011250636 HC RX REV CODE- 250/636: Performed by: INTERNAL MEDICINE

## 2019-06-13 PROCEDURE — 97116 GAIT TRAINING THERAPY: CPT | Performed by: PHYSICAL THERAPIST

## 2019-06-13 PROCEDURE — 94760 N-INVAS EAR/PLS OXIMETRY 1: CPT

## 2019-06-13 PROCEDURE — 97161 PT EVAL LOW COMPLEX 20 MIN: CPT | Performed by: PHYSICAL THERAPIST

## 2019-06-13 PROCEDURE — 65660000000 HC RM CCU STEPDOWN

## 2019-06-13 PROCEDURE — 36415 COLL VENOUS BLD VENIPUNCTURE: CPT

## 2019-06-13 PROCEDURE — 74011250637 HC RX REV CODE- 250/637: Performed by: INTERNAL MEDICINE

## 2019-06-13 PROCEDURE — 74011250637 HC RX REV CODE- 250/637: Performed by: HOSPITALIST

## 2019-06-13 PROCEDURE — 82962 GLUCOSE BLOOD TEST: CPT

## 2019-06-13 RX ADMIN — Medication 10 ML: at 04:57

## 2019-06-13 RX ADMIN — Medication 10 ML: at 01:48

## 2019-06-13 RX ADMIN — GUAIFENESIN 600 MG: 600 TABLET, EXTENDED RELEASE ORAL at 08:11

## 2019-06-13 RX ADMIN — FOLIC ACID 1 MG: 1 TABLET ORAL at 08:11

## 2019-06-13 RX ADMIN — Medication 10 ML: at 22:00

## 2019-06-13 RX ADMIN — LEVETIRACETAM 1500 MG: 100 INJECTION, SOLUTION INTRAVENOUS at 13:18

## 2019-06-13 RX ADMIN — LEVETIRACETAM 1500 MG: 100 INJECTION, SOLUTION INTRAVENOUS at 01:48

## 2019-06-13 RX ADMIN — LISINOPRIL 10 MG: 5 TABLET ORAL at 08:11

## 2019-06-13 RX ADMIN — GUAIFENESIN 600 MG: 600 TABLET, EXTENDED RELEASE ORAL at 18:24

## 2019-06-13 RX ADMIN — HEPARIN SODIUM 5000 UNITS: 5000 INJECTION INTRAVENOUS; SUBCUTANEOUS at 20:41

## 2019-06-13 RX ADMIN — HEPARIN SODIUM 5000 UNITS: 5000 INJECTION INTRAVENOUS; SUBCUTANEOUS at 13:18

## 2019-06-13 RX ADMIN — Medication 100 MG: at 08:11

## 2019-06-13 RX ADMIN — HEPARIN SODIUM 5000 UNITS: 5000 INJECTION INTRAVENOUS; SUBCUTANEOUS at 04:56

## 2019-06-13 RX ADMIN — Medication 10 ML: at 13:18

## 2019-06-13 RX ADMIN — TOPIRAMATE 100 MG: 25 TABLET, FILM COATED ORAL at 20:41

## 2019-06-13 RX ADMIN — TOPIRAMATE 100 MG: 25 TABLET, FILM COATED ORAL at 08:11

## 2019-06-13 NOTE — PROGRESS NOTES
Not able to do MRI, tech from MRI explain to mother that MRI can be done with concious sedation and she stated that she wants to be done the MRI with concious sedation, need to notify DR Corey Nelson about it, incoming nurse Nneka Mercedesire aware to call in AM.

## 2019-06-13 NOTE — PROGRESS NOTES
Not able to do MRI, tech from MRI explain to mother that MRI can be done with concious sedation and she stated that she wants to be done the MRI with concious sedation, need to notify DR Vidya Goodwin about it, incoming nurse aware

## 2019-06-13 NOTE — PROGRESS NOTES
Not able to do MRI, tech from MRI explain to mother that MRI can be done with concious sedation and she stated that she wants to be done the MRI with concious sedation

## 2019-06-13 NOTE — PROGRESS NOTES
Hospitalist Progress Note    NAME: Minesh Keating   :  1975   MRN:  [id number]       Assessment / Plan:  Breakthrough seizures  Abnormal MRI   -no seizures since admission   -neurology help appreciated:  Patient is EEG for 3 hours last night that showed no seizures, just occasional sharp wave or spike wave seen in the bifrontal head regions infrequently but no seizures at all. Levels are pending and we had to stop the Vimpat because he said he got agitated on that but it may have just been the wires. He is continuing his old dose of Keppra and increased dose of Topamax at 100 mg twice daily. MRI pending  Very difficult case, due to patient's aspburgers syndrome, and recurring seizures despite therapeutic drugs and doses of medications.  -cont a/seizure meds IV for now   -MRI brain  showing Left posterior parahippocampal cavernous angioma. Associated venous angioma (developmental venous anomaly) left posterior parahippocampal area. Nonaggressive appearing and possibly incidental 11 mm cyst in the right midbrain. Depending on clinical circumstance consider followup in 6 to 18 months.        --> will attempt MRI with conscious sedation    HTN  -BP stable   -continue lisinopril    Alcohol abuse  Autism / Asperger syndrome  Depression  -last drink 2 weeks PTA. Was consuming 6-8 beers daily for 20 years  out of window for DT or withdrawal sz. Monitor  -folic acid and thiamine supplements  -prescribed zoloft recently but has not started it yet. Will hold for now.           Code Status:  Full  Surrogate Decision Maker: parents  DVT Prophylaxis: heparin      Baseline:   Lives alone. He is not driving. He was about to start a new job. Parents are nearby and help out.    Recommended Disposition: pending clinical progress, home with family once Quique Hinson with neurology   PT: [facility]      Subjective:     Chief Complaint / Reason for Physician Visit: following seizure DO   Reports cough /congestion is better   No seizures   Ambulating in the room independently     Discussed with RN events overnight. Review of Systems:  Symptom Y/N Comments  Symptom Y/N Comments   Fever/Chills n   Chest Pain n    Poor Appetite    Edema     Cough    Abdominal Pain n    Sputum    Joint Pain     SOB/EISENBERG n   Pruritis/Rash     Nausea/vomit    Tolerating PT/OT     Diarrhea    Tolerating Diet     Constipation    Other       Could NOT obtain due to:      Objective:     VITALS:   Last 24hrs VS reviewed since prior progress note. Most recent are:  Patient Vitals for the past 24 hrs:   Temp Pulse Resp BP SpO2   06/11/19 0732 98.4 °F (36.9 °C) 74 16 101/81 97 %   06/11/19 0333 98.2 °F (36.8 °C) 71 16 104/70 97 %   06/10/19 2242 97.7 °F (36.5 °C) 78 16 107/66 97 %   06/10/19 2022 98.6 °F (37 °C) 83 16 137/81 100 %   06/10/19 1930 -- 86 14 133/82 96 %   06/10/19 1900 97.5 °F (36.4 °C) 90 18 122/78 99 %   06/10/19 1830 -- 94 14 123/74 97 %   06/10/19 1630 -- (!) 114 19 111/73 93 %   06/10/19 1609 98 °F (36.7 °C) (!) 129 15 139/86 95 %       Intake/Output Summary (Last 24 hours) at 6/11/2019 1052  Last data filed at 6/10/2019 2303  Gross per 24 hour   Intake 100 ml   Output 1 ml   Net 99 ml        PHYSICAL EXAM:  General: WD, WN. Alert, cooperative, no acute distress    EENT:  EOMI. Anicteric sclerae. MMM  Resp:  CTA bilaterally, no wheezing or rales. No accessory muscle use  CV:  Regular  rhythm,  No edema  GI:  Soft, Non distended, Non tender.  +Bowel sounds  Neurologic:  Alert and oriented X 3, normal speech,   Psych:   fair insight. Not anxious nor agitated  Skin:  No rashes.   No jaundice    Reviewed most current lab test results and cultures  YES  Reviewed most current radiology test results   YES  Review and summation of old records today    NO  Reviewed patient's current orders and MAR    YES  PMH/SH reviewed - no change compared to H&P  ________________________________________________________________________  Care Plan discussed with: Comments   Patient y    Family  y Mother bedside    RN y    Care Manager y    Consultant  y Neurology                     y Multidiciplinary team rounds were held today with , nursing, pharmacist and clinical coordinator. Patient's plan of care was discussed; medications were reviewed and discharge planning was addressed. ________________________________________________________________________  Total NON critical care TIME:  25 Minutes    Total CRITICAL CARE TIME Spent:   Minutes non procedure based      Comments   >50% of visit spent in counseling and coordination of care     ________________________________________________________________________  Rufus Monson MD     Procedures: see electronic medical records for all procedures/Xrays and details which were not copied into this note but were reviewed prior to creation of Plan. LABS:  I reviewed today's most current labs and imaging studies.   Pertinent labs include:  Recent Labs     06/10/19  1351   WBC 7.5   HGB 15.7   HCT 47.6        Recent Labs     06/11/19  0513 06/10/19  1351    140   K 3.9 4.1   * 109*   CO2 21 23   GLU 89 117*   BUN 12 16   CREA 0.91 1.07   CA 8.1* 8.5   MG 2.2  --        Signed: Rufus Monson MD

## 2019-06-13 NOTE — PROGRESS NOTES
Consult  REFERRED BY:  Ava Vu MD    CHIEF COMPLAINT: Seizures      Subjective:     Kathleen Parr is a 38 y.o. right-handed  male seen at the request of Dr. Fabienne Garcia for evaluation of new problem of 2 recurring and breakthrough seizures yesterday requiring hospital admission. Patient had an overnight video EEG monitoring, but only got about 3 hours time and before he ripped electrodes off, but he had no recorded seizures this occasional sharp wave or spike wave seen infrequently in the frontal head regions bilaterally. His levels are still pending. Patient was just seen at [facility] on May 30 for the same problem, but never had an EEG or an MRI done, and was just discharged home. We have ordered the MRI today and is still pending. Patient was seen in the ER yesterday after the first seizure, bolused with Keppra and then went home but then had another seizure and came back and was readmitted. Patient had a past history of alcohol abuse but apparently has not drank for almost 2 weeks now. Patient is not taking any other drugs or alcohol to precipitate this event. Patient does have a history of a benign cyst of the brainstem, and a left posterior parahippocampal cavernous angioma on MRI scan done 2012, but not had any recent scans of MRI type but his CAT scan on admission showed stable lesions compared to previous scans. Patient denies any new headache, focal weakness, visual changes, or any other causes for the seizures or new neurologic deficits after the seizures. His EEG does show occasional dysrhythmic activity. Patient condition discussed with both parents and patient in detail. Chart reviewed in detail, CT and EEG reviewed in the PACS system, and agree with report as described above. Old records reviewed carefully on the chart in addition an old neurologic consult also reviewed as patient followed by Dr. Narcisa Ernandez for years.   Patient has only been treated with Keppra, and Topamax just recently added because of seizures. Past Medical History:   Diagnosis Date    Asperger syndrome 12/14/2011    Autism     dx 2010- highly functional    Other ill-defined conditions(799.89)     aspergers, syncopal episodes    Seizure disorder ([facility] 5/22/2012    Seizures ([facility]       Past Surgical History:   Procedure Laterality Date    HX GI      pyloric stenosis 1976    HX HEENT      adenoids 1978     Family History   Problem Relation Age of Onset    Diabetes Father     Heart Disease Father     Cancer Mother         Breast    Anxiety Mother     COPD Mother     Other Mother         Neuropathy    Sleep Apnea Mother     Arthritis-osteo Mother     Other Brother         Pituitary tumor      Social History     Tobacco Use    Smoking status: Never Smoker    Smokeless tobacco: Never Used   Substance Use Topics    Alcohol use:  Yes     Alcohol/week: 5.0 oz     Types: 10 Cans of beer per week         Current Facility-Administered Medications:     LORazepam (ATIVAN) injection 1-2 mg, 1-2 mg, IntraVENous, Q6H PRN, Yonathan Cohn MD, 2 mg at 06/12/19 1911    guaiFENesin ER (MUCINEX) tablet 600 mg, 600 mg, Oral, BID, Linda Mane MD, 600 mg at 06/12/19 1757    benzocaine-menthol (CHLORASEPTIC MAX) lozenge 1 Lozenge, 1 Lozenge, Oral, Q2H PRN, Linda Mane MD, 1 Lozenge at 06/12/19 1758    topiramate (TOPAMAX) tablet 100 mg, 100 mg, Oral, Q12H, Yonathan Cohn MD, 100 mg at 06/12/19 2149    heparin (porcine) injection 5,000 Units, 5,000 Units, SubCUTAneous, Q8H, Linda Mane MD, 5,000 Units at 06/12/19 2150    sodium chloride (NS) flush 5-40 mL, 5-40 mL, IntraVENous, Q8H, Gabriel Broussard MD, 10 mL at 06/12/19 2150    sodium chloride (NS) flush 5-40 mL, 5-40 mL, IntraVENous, PRN, Gabriel Broussard MD, 10 mL at 06/12/19 0407    acetaminophen (TYLENOL) tablet 650 mg, 650 mg, Oral, Q4H PRN, Gabriel Broussard MD    ondansetron [facility]) injection 4 mg, 4 mg, IntraVENous, Q4H PRN, Tan, Jethro Jack MD    lisinopril (PRINIVIL, ZESTRIL) tablet 10 mg, 10 mg, Oral, DAILY, Suman Kaur MD, 10 mg at 74/07/85 5414    folic acid (FOLVITE) tablet 1 mg, 1 mg, Oral, DAILY, Suman Kaur MD, 1 mg at 06/12/19 1000    thiamine mononitrate (B-1) tablet 100 mg, 100 mg, Oral, DAILY, Suman Kaur MD, 100 mg at 06/12/19 1000    levETIRAcetam (KEPPRA) 1,500 mg in 0.9% sodium chloride 100 mL IVPB, 1,500 mg, IntraVENous, Q12H, Suman Kaur MD, 1,500 mg at 06/12/19 1450        Allergies   Allergen Reactions    Aspartame Other (comments)     Family believes it causes his seizures      MRI Results (most recent):  Results from [facility] on 01/23/12   MRI BRAIN W AND W/O CONTRAST    Narrative **Final Report**       ICD Codes / Adm. Diagnosis: 345.90   / SEIZURE DISORDER    Examination:  MR BRAIN Chasity Healy  - [id number] - Jan 23 2012  1:00PM  Accession No:  [id number]  Reason:  new onset sz      REPORT:  INDICATION: seizures. 345.9. TECHNIQUE: Sagittal T1, axial FLAIR, T2, T1 and gradient echo T2-weighted   images of the head were obtained followed by intravenous infusion 15 mL   gadolinium repeat axial and coronal T1-weighted images and axial diffusion   weighted images. Angled thin coronal T2 temporal lobe images. COMPARISON: CT head 01/12/2012    In the left posterior parahippocampal gyrus is a focal lesion measuring   approximately 11 x 10 x 8 mm which has heterogeneous internal architecture   with the rim of hypointensity and progressive blooming of hypointensity on   gradient-echo T2-weighted images. This is consistent with a cavernous   angioma. Adjacent to this is a prominent venous structure with imaging   characteristics consistent with a venous angioma (DVA). The area of decreased attenuation in the right midbrain on the CT scan is   better delineated on the MRI. This represents a well defined cyst of   uncertain etiology and significance.  This measures approximately 11 x 6 x 6 mm. There is no significant displacement of adjacent structures in this is   felt to be quite chronic. No associated abnormal enhancement or restricted   diffusion. Otherwise normal signal in the cerebral hemispheres, brainstem and   cerebellum. Ventricular size and configuration are normal.   Normal flow-voids are present in the vertebral, basilar and carotid artery   systems. The craniocervical junction is normal.   The structures of the cranial base including paranasal sinuses are    unremarkable. IMPRESSION:   1. Left posterior parahippocampal cavernous angioma. 2. Associated venous angioma (developmental venous anomaly) left posterior   parahippocampal area. 3. Nonaggressive appearing and possibly incidental 11 mm cyst in the right   midbrain. Depending on clinical circumstance consider followup in 6 to 18   months. Signing/Reading Doctor: Myrna Aguayo ([id number])    Approved: Myrna Aguayo ([id number])  01/23/2012                                      Results from Hospital Encounter encounter on 01/23/12   MRI BRAIN W AND W/O CONTRAST    Narrative **Final Report**       ICD Codes / Adm. Diagnosis: 345.90   / SEIZURE DISORDER    Examination:  MR BRAIN Wild Dominguez  - [id number] - Jan 23 2012  1:00PM  Accession No:  [id number]  Reason:  new onset sz      REPORT:  INDICATION: seizures. 345.9. TECHNIQUE: Sagittal T1, axial FLAIR, T2, T1 and gradient echo T2-weighted   images of the head were obtained followed by intravenous infusion 15 mL   gadolinium repeat axial and coronal T1-weighted images and axial diffusion   weighted images. Angled thin coronal T2 temporal lobe images. COMPARISON: CT head 01/12/2012    In the left posterior parahippocampal gyrus is a focal lesion measuring   approximately 11 x 10 x 8 mm which has heterogeneous internal architecture   with the rim of hypointensity and progressive blooming of hypointensity on   gradient-echo T2-weighted images.  This is consistent with a cavernous   angioma. Adjacent to this is a prominent venous structure with imaging   characteristics consistent with a venous angioma (DVA). The area of decreased attenuation in the right midbrain on the CT scan is   better delineated on the MRI. This represents a well defined cyst of   uncertain etiology and significance. This measures approximately 11 x 6 x 6   mm. There is no significant displacement of adjacent structures in this is   felt to be quite chronic. No associated abnormal enhancement or restricted   diffusion. Otherwise normal signal in the cerebral hemispheres, brainstem and   cerebellum. Ventricular size and configuration are normal.   Normal flow-voids are present in the vertebral, basilar and carotid artery   systems. The craniocervical junction is normal.   The structures of the cranial base including paranasal sinuses are    unremarkable. IMPRESSION:   1. Left posterior parahippocampal cavernous angioma. 2. Associated venous angioma (developmental venous anomaly) left posterior   parahippocampal area. 3. Nonaggressive appearing and possibly incidental 11 mm cyst in the right   midbrain. Depending on clinical circumstance consider followup in 6 to 18   months. Signing/Reading Doctor: Danielle Campbell ([id number])    Approved: Danielle Campbell ([id number])  01/23/2012                                    Review of Systems:  Review of systems not obtained due to patient factors. Vitals:    06/12/19 1114 06/12/19 1514 06/12/19 1540 06/12/19 2130   BP: 106/65 117/72 112/82 122/74   Pulse: (!) 107 75 82 99   Resp: 16 16 16 18   Temp: 97.6 °F (36.4 °C) 98.2 °F (36.8 °C) 97.9 °F (36.6 °C) 97.4 °F (36.3 °C)   SpO2: 97% 97% 98% 94%     Objective:     I      NEUROLOGICAL EXAM:    Appearance: The patient is well developed, well nourished, provides a poor history and is in no acute distress.    Mental Status: Oriented to time but not the day of the month, place and person, and the president, cognitive function is slow and mildly abnormal and speech is fluent and no aphasia or dysarthria. Mood and affect appropriate, but patient appears mildly autistic. Cranial Nerves:   Intact visual fields. Fundi are benign, disc are flat, no lesions seen on funduscopy. IMTIAZ, EOM's full, no nystagmus, no ptosis. Facial sensation is normal. Corneal reflexes are not tested. Facial movement is symmetric. Hearing is normal bilaterally. Palate is midline with normal sternocleidomastoid and trapezius muscles are normal. Tongue is midline. Neck without meningismus or bruits  Temporal arteries are not tender or enlarged  TMJ areas are not tender on palpation   Motor:  4/5 strength in upper and lower proximal and distal muscles. Normal bulk and tone. No fasciculations. Rapid alternating movement is symmetric and slow bilaterally   Reflexes:   Deep tendon reflexes 2+/4 and symmetrical.  No babinski or clonus present   Sensory:   Normal to touch, pinprick and vibration and temperature. DSS is intact   Gait:  Normal gait for patient's age. Tremor:   No tremor noted. Cerebellar:  No abnormal cerebellar signs present on Romberg and tandem testing and finger-nose-finger exam.   Neurovascular:  Normal heart sounds and regular rhythm, peripheral pulses intact, and no carotid bruits. Assessment:   Active Problems:    Asperger syndrome (12/14/2011)      Seizure ([address] (5/22/2012)      Brain cyst (4/3/2017)      Abnormal MRI of head (6/11/2019)      Altered mental status, unspecified (6/11/2019)      Complex partial seizure evolving to generalized seizure ([address] (6/11/2019)      Bilateral carotid artery stenosis (6/11/2019)      Convulsive syncope (6/11/2019)        Plan:     Patient is EEG for 3 hours last night that showed no seizures, just occasional sharp wave or spike wave seen in the bifrontal head regions infrequently but no seizures at all.   Levels are pending and we had to stop the Vimpat because he said he got agitated on that but it may have just been the wires. He is continuing his old dose of Keppra and increased dose of Topamax at 100 mg twice daily. MRI pending  Very difficult case, due to patient's aspburgers syndrome, and recurring seizures despite therapeutic drugs and doses of medications. We will follow carefully, discussed with patient's in detail, reviewed all the records as above, and 1 hour for the patient and the family in detail.     Signed By: Sukhdeep Chiang MD     June 12, 2019       CC: Leola Ochoa MD  FAX: [phone]

## 2019-06-13 NOTE — PROGRESS NOTES
Bedside and Verbal shift change report given to [address] (oncoming nurse) by Matheus Vázquez RN (offgoing nurse). Report included t  he following information SBAR, Kardex, Recent Results, Med Rec Status and Cardiac Rhythm SR.     [facility] Phone:   [phone]        Significant changes during shift:  MRI not done  Patient Information     Unruly Caballero  38 y.o.  6/10/2019  4:03 PM by Irvin Lynne MD. Unruly Caballero was admitted from Home     Problem List          Patient Active Problem List     Diagnosis Date Noted    Abnormal MRI of head 06/11/2019    Altered mental status, unspecified 06/11/2019    Complex partial seizure evolving to generalized seizure ([address] 06/11/2019    Bilateral carotid artery stenosis 06/11/2019    Convulsive syncope 06/11/2019    Brain cyst 04/03/2017    Advance care planning 04/25/2016    EtOH dependence ([address] 10/31/2014    Alcohol withdrawal ([address] 10/31/2014    Hypertension 09/12/2014    Seizure ([address] 05/22/2012    Asperger syndrome 12/14/2011    Autism             Past Medical History:   Diagnosis Date    Asperger syndrome 12/14/2011    Autism       dx 2010- highly functional    Other ill-defined conditions(799.89)       aspergers, syncopal episodes    Seizure disorder ([address] 5/22/2012    Seizures (HCC)              Core Measures:     CVA: No No  CHF:No No  PNA:No No     Post Op Surgical (If Applicable):      Number times ambulated in hallway past shift:  0  Number of times OOB to chair past shift:   0  NG Tube: No  Incentive Spirometer: No  Drains: No   Volume  0  Dressing Present:  No  Flatus:  Not applicable     Activity Status:     OOB to Chair yes  Ambulated this shift yes   Bed Rest Not applicable     Supplemental O2: (If Applicable)     NC No  NRB No  Venti-mask No  On 0 Liters/min        LINES AND DRAINS:     Central Line? No   PICC LINE? No   Urinary Catheter?  No   DVT prophylaxis:     DVT prophylaxis Med- No  DVT prophylaxis SCD or VIV- Yes      Wounds: (If Applicable)     Wounds- No     Location 0     Patient Safety:     Falls Score Total Score: 4  Safety Level_______  Bed Alarm On? Yes  Sitter? No     Plan for upcoming shift:  Safety, seizure precautions.  Cough meds         Discharge Plan: yes TBD     Active Consults:  IP CONSULT TO NEUROLOGY  IP CONSULT TO NEUROLOGY  IP CONSULT TO HOSPITALIST

## 2019-06-13 NOTE — PROGRESS NOTES
Bedside and Verbal shift change report given to Arti Vitalesarah Mao (oncoming nurse) by Johnson Pham RN (offgoing nurse). Report included the following information SBAR, Kardex, Recent Results, Med Rec Status and Cardiac Rhythm SR.     [facility] Phone:   [phone]        Significant changes during shift:  signed consent for MRI with sedation  Patient Information     Unruly Caballero  38 y.o.  6/10/2019  4:03 PM by Irvin Lynne MD. Unruly Caballero was admitted from Home     Problem List          Patient Active Problem List     Diagnosis Date Noted    Abnormal MRI of head 06/11/2019    Altered mental status, unspecified 06/11/2019    Complex partial seizure evolving to generalized seizure ([address] 06/11/2019    Bilateral carotid artery stenosis 06/11/2019    Convulsive syncope 06/11/2019    Brain cyst 04/03/2017    Advance care planning 04/25/2016    EtOH dependence ([address] 10/31/2014    Alcohol withdrawal ([address] 10/31/2014    Hypertension 09/12/2014    Seizure ([address] 05/22/2012    Asperger syndrome 12/14/2011    Autism             Past Medical History:   Diagnosis Date    Asperger syndrome 12/14/2011    Autism       dx 2010- highly functional    Other ill-defined conditions(799.89)       aspergers, syncopal episodes    Seizure disorder ([address] 5/22/2012    Seizures (HCC)              Core Measures:     CVA: No No  CHF:No No  PNA:No No     Post Op Surgical (If Applicable):      Number times ambulated in hallway past shift:  0  Number of times OOB to chair past shift:   0  NG Tube: No  Incentive Spirometer: No  Drains: No   Volume  0  Dressing Present:  No  Flatus:  Not applicable     Activity Status:     OOB to Chair yes  Ambulated this shift yes   Bed Rest Not applicable     Supplemental O2: (If Applicable)     NC No  NRB No  Venti-mask No  On 0 Liters/min        LINES AND DRAINS:     Central Line? No   PICC LINE? No   Urinary Catheter?  No   DVT prophylaxis:     DVT prophylaxis Med- No  DVT prophylaxis SCD or VIV- Yes      Wounds: (If Applicable)     Wounds- No     Location 0     Patient Safety:     Falls Score Total Score: 4  Safety Level_______  Bed Alarm On? Yes  Sitter? No     Plan for upcoming shift:  Safety, seizure precautions.  Cough meds NPO after MN for MRI  with conscious sedation         Discharge Plan: yes TBD     Active Consults:  IP CONSULT TO NEUROLOGY  IP CONSULT TO NEUROLOGY  IP CONSULT TO HOSPITALIST

## 2019-06-13 NOTE — INTERDISCIPLINARY ROUNDS
Bedside interdisciplinary rounds were held today to discuss patient plan of care and outcomes. The following members were present: Physician, Nurse, Clinical Care Leader, Pharmacy, Physical Therapy, and Case Management.     Plan:    MRI tomorrow morning with conscious sedation

## 2019-06-13 NOTE — ROUTINE PROCESS
Bedside and Verbal shift change report given to Renee Phan RN (oncoming nurse) by Jim Mike nurse).  Report included t  he following information SBAR, Kardex, Recent Results, Med Rec Status and Cardiac Rhythm SR.     [facility] Phone:   [phone]        Significant changes during shift:   pt asleep during night without complaints  Patient Information     Kalen Quiñones  38 y.o.  6/10/2019  4:03 PM by Radha Morillo MD. Fabricio Miles was admitted from Home     Problem List             Patient Active Problem List     Diagnosis Date Noted    Abnormal MRI of head 06/11/2019    Altered mental status, unspecified 06/11/2019    Complex partial seizure evolving to generalized seizure ([address] 06/11/2019    Bilateral carotid artery stenosis 06/11/2019    Convulsive syncope 06/11/2019    Brain cyst 04/03/2017    Advance care planning 04/25/2016    EtOH dependence ([address] 10/31/2014    Alcohol withdrawal ([address] 10/31/2014    Hypertension 09/12/2014    Seizure ([address] 05/22/2012    Asperger syndrome 12/14/2011    Autism                Past Medical History:   Diagnosis Date    Asperger syndrome 12/14/2011    Autism       dx 2010- highly functional    Other ill-defined conditions(799.89)       aspergers, syncopal episodes    Seizure disorder ([address] 5/22/2012    Seizures (HCC)              Core Measures:     CVA: No No  CHF:No No  PNA:No No     Post Op Surgical (If Applicable):      Number times ambulated in hallway past shift:  0  Number of times OOB to chair past shift:   0  NG Tube: No  Incentive Spirometer: No  Drains: No   Volume  0  Dressing Present:  No  Flatus:  Not applicable     Activity Status:     OOB to Chair yes  Ambulated this shift yes   Bed Rest Not applicable     Supplemental X7: (UI Applicable)     NC No  NRB No  Venti-mask No  On 0 Liters/min        LINES AND DRAINS:     Central Line? No   PICC LINE? No   Urinary Catheter? No   DVT prophylaxis:     DVT prophylaxis Med- No  DVT prophylaxis SCD or VIV- Yes    Wounds: (If Applicable)     Wounds- No     Location 0     Patient Safety:     Falls Score Total Score: 4  Safety Level_______  Bed Alarm On? Yes  Sitter? No     Plan for upcoming shift:  Safety, seizure precautions.  Cough meds         Discharge Plan: yes TBD     Active Consults:  IP CONSULT TO NEUROLOGY  IP CONSULT TO NEUROLOGY  IP CONSULT TO HOSPITALIST

## 2019-06-13 NOTE — PROCEDURES
[address]  EEG    Name:  Celestine Recinos  MR#:  [id number]  :  1975  ACCOUNT #:  [de-identified]  DATE OF SERVICE:  2019      CLINICAL INDICATION:  This is a prolonged EEG video monitoring on the patient for possible recurring seizures. The patient admitted with two breakthrough seizures. He is here to rule out recurring seizures. The patient with abnormal EEG. The patient is on medications of Keppra and Topamax. The patient also on medication of Vimpat. The patient has long history of seizures. EEG CLASSIFICATION:  On this patient is dysrhythmia grade III, infrequent bifrontal.  Stefan and sharp wave discharges are seen. DESCRIPTION OF THE RECORD:  This is an overnight prolonged video EEG monitoring on the patient with possible seizures. This study began on 2019 at approximately 05:22 p.m. and ended at 2019 in approximately 07:27 a.m. Total time of this study was 17 hours approximately. During this time and in initial portions of recording, the patient had a posteriorly located occipital alpha rhythm of 9-10 Hz that did attenuate some with eye opening. There was at times of seeing occasional sharp wave discharge in the bifrontal head regions with occasional may be spike discharge seen, but they were somewhat infrequent and improved significantly from the patient's EEG earlier that morning. No abnormal prolonged discharges were seen. Alpha rhythm was 9-10 Hz, occipital alpha rhythm attenuated with eye opening. There were no other areas of focal slowing or spike discharges seen. The patient did enter drowsiness and brief stages of sleep with K complexes and sleep spindles seen in the central head regions. However during the recording, there was persistent movement, and electrode artifact and then approximately 3 hours in the recording, the patient appeared to remove all the electrodes due to frustration.   The rest of the recording did not produce any readable EEG activity. INTERPRETATION:  This is an abnormal electroencephalogram due to the dysrhythmic discharges seen in the bifrontal head regions of sharp wave discharges and spikes occasionally seen suggestive of a lower threshold for clinically observed seizures of the generalized type. Clinical correlation recommended. Repeat studies may also be of some value following this patient's admission.         Omid Chu MD      [id number]  D:  06/13/2019 0:48  T:  06/13/2019 8:18  JOB #:  [id number]

## 2019-06-13 NOTE — PROGRESS NOTES
Problem: Mobility Impaired (Adult and Pediatric)  Goal: *Acute Goals and Plan of Care (Insert Text)  Description  Physical Therapy Goals  Initiated 6/13/2019  1. Patient will move from supine to sit and sit to supine  in bed with modified independence within 7 day(s). 2.  Patient will transfer from bed to chair and chair to bed with modified independence using the least restrictive device within 7 day(s). 3.  Patient will perform sit to stand with modified independence within 7 day(s). 4.  Patient will ambulate with modified independence for 300 feet with the least restrictive device within 7 day(s). 5.  Patient will ascend/descend 4 stairs with 1 handrail(s) with modified independence within 7 day(s). Outcome: Progressing Towards Goal   PHYSICAL THERAPY EVALUATION  Patient: Letitia Hawley (61 y.o. male)  Date: 6/13/2019  Primary Diagnosis: Seizure [facility]) [R56.9]        Precautions:   Fall    ASSESSMENT :  Based on the objective data described below, the patient presents with decreased functional mobility from completely independent prior level of function. Patient currently needing SBA for bed mobility and transfers with good initially standing balance. Amb approx 125 feet with no assistive device with mild path deviations and is generally unstable with never overtly lost his balance. Able to self correct with all balance losses. Patient is likely not far from his baseline but mother feels he is slightly more unsteady. His mother does not feel that she can have him stay with her and may be interested in a paid medicaid caregiver to assist him at home. [facility] and may need some additional support at home until returns to baseline. Patient will benefit from skilled intervention to address the above impairments. Patient?s rehabilitation potential is considered to be Good  Factors which may influence rehabilitation potential include:   ? None noted  ?          Mental ability/status  ? Medical condition  ? Home/family situation and support systems  ? Safety awareness  ? Pain tolerance/management  ? Other:      PLAN :  Recommendations and Planned Interventions:  ?           Bed Mobility Training             ? Neuromuscular Re-Education  ? Transfer Training                   ? Orthotic/Prosthetic Training  ? Gait Training                         ? Modalities  ? Therapeutic Exercises           ? Edema Management/Control  ? Therapeutic Activities            ? Patient and Family Training/Education  ? Other (comment):    Frequency/Duration: Patient will be followed by physical therapy  4 times a week to address goals. Discharge Recommendations: Home Health  Further Equipment Recommendations for Discharge: likely none      SUBJECTIVE:   Patient stated ? I like to watch sports. ?    OBJECTIVE DATA SUMMARY:   HISTORY:    Past Medical History:   Diagnosis Date    Asperger syndrome 12/14/2011    Autism     dx 2010- highly functional    Other ill-defined conditions(799.89)     aspergers, syncopal episodes    Seizure disorder ([facility] [address] 5/22/2012    Seizures ([facility] [address]      Past Surgical History:   Procedure Laterality Date    HX GI      pyloric stenosis 1976    HX HEENT      adenoids 1978     Prior Level of Function/Home Situation: Patient lives alone and has high functioning autism. Mother assists with cleaning and patient eats out majority of the time for meals. Does not work or drive.     Personal factors and/or comorbidities impacting plan of care:     Home Situation  Home Environment: Private residence  # Steps to Enter: 4  Rails to Enter: Yes  One/Two Story Residence: One story  Living Alone: No  Support Systems: Family member(s)  Patient Expects to be Discharged to[de-identified] Private residence  Current DME Used/Available at Home: None    EXAMINATION/PRESENTATION/DECISION MAKING:   Critical Behavior:  Neurologic State: Alert  Orientation Level: Oriented X4  Cognition: Appropriate for age attention/concentration     Hearing: Auditory  Auditory Impairment: None    Range Of Motion:  AROM: Generally decreased, functional                       Strength:    Strength: Generally decreased, functional                    Tone & Sensation:                                  Coordination:     Vision:      Functional Mobility:  Bed Mobility:  Rolling: Supervision  Supine to Sit: Supervision     Scooting: Supervision  Transfers:  Sit to Stand: Stand-by assistance  Stand to Sit: Stand-by assistance                       Balance:   Sitting: Intact  Standing: Impaired  Standing - Static: Constant support;Good  Standing - Dynamic : Constant support; Fair  Ambulation/Gait Training:  Distance (ft): 125 Feet (ft)  Assistive Device: Gait belt  Ambulation - Level of Assistance: Contact guard assistance;Assist x1     Gait Description (WDL): Exceptions to WDL  Gait Abnormalities: Decreased step clearance; Path deviations        Base of Support: Narrowed     Speed/Tati: Pace decreased (<100 feet/min); Slow  Step Length: Left shortened;Right shortened         Functional Measure:  Barthel Index:    Bathin  Bladder: 10  Bowels: 10  Groomin  Dressin  Feeding: 10  Mobility: 10  Stairs: 5  Toilet Use: 10  Transfer (Bed to Chair and Back): 10  Total: 80/100       Percentage of impairment   0%   1-19%   20-39%   40-59%   60-79%   80-99%   100%   Barthel Score 0-100 100 99-80 79-60 59-40 20-39 1-19   0     The Barthel ADL Index: Guidelines  1. The index should be used as a record of what a patient does, not as a record of what a patient could do. 2. The main aim is to establish degree of independence from any help, physical or verbal, however minor and for whatever reason. 3. The need for supervision renders the patient not independent. 4. A patient's performance should be established using the best available evidence. Asking the patient, friends/relatives and nurses are the usual sources, but direct observation and common sense are also important. However direct testing is not needed. 5. Usually the patient's performance over the preceding 24-48 hours is important, but occasionally longer periods will be relevant. 6. Middle categories imply that the patient supplies over 50 per cent of the effort. 7. Use of aids to be independent is allowed. Fatmata Kamara., Barthel, D.W. (5732). Functional evaluation: the Barthel Index. [address] [facility] (14)2. Krys Luna destini STEVENSON Traylor, Emir Sorto., Mari Conner., [place], [address] [facility] (1999). Measuring the change indisability after inpatient rehabilitation; comparison of the responsiveness of the Barthel Index and Functional [place] Measure. Journal of Neurology, Neurosurgery, and Psychiatry, 66(4), 399-697. Payton Hamilton NJuliJROB, MANUEL Roldan, & Pauline Xiao MROB. (2004.) Assessment of post-stroke quality of life in cost-effectiveness studies: The usefulness of the Barthel Index and the EuroQoL-5D. Quality of Life Research, 13, 427-43         Pain:  Pain Scale 1: Numeric (0 - 10)  Pain Intensity 1: 0              Activity Tolerance:   VSS  Please refer to the flowsheet for vital signs taken during this treatment. After treatment:   ?         Patient left in no apparent distress sitting up in chair  ? Patient left in no apparent distress in bed  ? Call bell left within reach  ? Nursing notified  ? Caregiver present  ? Bed alarm activated    COMMUNICATION/EDUCATION:   The patient?s plan of care was discussed with: Physical Therapist, Occupational Therapist and Registered Nurse. ?         Fall prevention education was provided and the patient/caregiver indicated understanding. ? Patient/family have participated as able in goal setting and plan of care. ?         Patient/family agree to work toward stated goals and plan of care.   ? Patient understands intent and goals of therapy, but is neutral about his/her participation. ? Patient is unable to participate in goal setting and plan of care.     Thank you for this referral.  Gin Head, PT, DPT   Time Calculation: 14 mins

## 2019-06-14 ENCOUNTER — APPOINTMENT (OUTPATIENT)
Dept: MRI IMAGING | Age: 44
DRG: 101 | End: 2019-06-14
Attending: PSYCHIATRY & NEUROLOGY
Payer: MEDICARE

## 2019-06-14 VITALS
HEART RATE: 97 BPM | DIASTOLIC BLOOD PRESSURE: 56 MMHG | SYSTOLIC BLOOD PRESSURE: 91 MMHG | TEMPERATURE: 98.1 F | RESPIRATION RATE: 18 BRPM | BODY MASS INDEX: 27.15 KG/M2 | OXYGEN SATURATION: 97 % | WEIGHT: 189.2 LBS

## 2019-06-14 LAB
ANA SER QL: NEGATIVE
LEVETIRACETAM SERPL-MCNC: 41.8 UG/ML (ref 10–40)
TOPIRAMATE SERPL-MCNC: 8.4 UG/ML (ref 2–25)

## 2019-06-14 PROCEDURE — 97116 GAIT TRAINING THERAPY: CPT | Performed by: PHYSICAL THERAPIST

## 2019-06-14 PROCEDURE — 36415 COLL VENOUS BLD VENIPUNCTURE: CPT

## 2019-06-14 PROCEDURE — 74011250637 HC RX REV CODE- 250/637: Performed by: PSYCHIATRY & NEUROLOGY

## 2019-06-14 PROCEDURE — 74011250636 HC RX REV CODE- 250/636: Performed by: INTERNAL MEDICINE

## 2019-06-14 PROCEDURE — 80201 ASSAY OF TOPIRAMATE: CPT

## 2019-06-14 PROCEDURE — 74011250637 HC RX REV CODE- 250/637: Performed by: INTERNAL MEDICINE

## 2019-06-14 PROCEDURE — A9575 INJ GADOTERATE MEGLUMI 0.1ML: HCPCS | Performed by: HOSPITALIST

## 2019-06-14 PROCEDURE — 99152 MOD SED SAME PHYS/QHP 5/>YRS: CPT

## 2019-06-14 PROCEDURE — 80177 DRUG SCRN QUAN LEVETIRACETAM: CPT

## 2019-06-14 PROCEDURE — 74011250637 HC RX REV CODE- 250/637: Performed by: HOSPITALIST

## 2019-06-14 PROCEDURE — 74011250636 HC RX REV CODE- 250/636: Performed by: HOSPITALIST

## 2019-06-14 PROCEDURE — 74011250636 HC RX REV CODE- 250/636: Performed by: RADIOLOGY

## 2019-06-14 PROCEDURE — 70553 MRI BRAIN STEM W/O & W/DYE: CPT

## 2019-06-14 PROCEDURE — 74011000258 HC RX REV CODE- 258: Performed by: INTERNAL MEDICINE

## 2019-06-14 RX ORDER — SODIUM CHLORIDE 9 MG/ML
25 INJECTION, SOLUTION INTRAVENOUS CONTINUOUS
Status: DISCONTINUED | OUTPATIENT
Start: 2019-06-14 | End: 2019-06-14

## 2019-06-14 RX ORDER — TOPIRAMATE 100 MG/1
100 TABLET, FILM COATED ORAL 2 TIMES DAILY
Qty: 60 TAB | Refills: 2 | Status: SHIPPED | OUTPATIENT
Start: 2019-06-14 | End: 2019-07-29 | Stop reason: SDUPTHER

## 2019-06-14 RX ORDER — MIDAZOLAM HYDROCHLORIDE 1 MG/ML
5 INJECTION, SOLUTION INTRAMUSCULAR; INTRAVENOUS
Status: DISCONTINUED | OUTPATIENT
Start: 2019-06-14 | End: 2019-06-14

## 2019-06-14 RX ORDER — GADOTERATE MEGLUMINE 376.9 MG/ML
17 INJECTION INTRAVENOUS
Status: COMPLETED | OUTPATIENT
Start: 2019-06-14 | End: 2019-06-14

## 2019-06-14 RX ORDER — FENTANYL CITRATE 50 UG/ML
100 INJECTION, SOLUTION INTRAMUSCULAR; INTRAVENOUS
Status: DISCONTINUED | OUTPATIENT
Start: 2019-06-14 | End: 2019-06-14

## 2019-06-14 RX ADMIN — Medication 10 ML: at 01:37

## 2019-06-14 RX ADMIN — MIDAZOLAM 1 MG: 1 INJECTION INTRAMUSCULAR; INTRAVENOUS at 08:48

## 2019-06-14 RX ADMIN — TOPIRAMATE 100 MG: 25 TABLET, FILM COATED ORAL at 09:47

## 2019-06-14 RX ADMIN — GUAIFENESIN 600 MG: 600 TABLET, EXTENDED RELEASE ORAL at 09:48

## 2019-06-14 RX ADMIN — FOLIC ACID 1 MG: 1 TABLET ORAL at 09:48

## 2019-06-14 RX ADMIN — Medication 10 ML: at 06:29

## 2019-06-14 RX ADMIN — LEVETIRACETAM 1500 MG: 100 INJECTION, SOLUTION INTRAVENOUS at 01:58

## 2019-06-14 RX ADMIN — HEPARIN SODIUM 5000 UNITS: 5000 INJECTION INTRAVENOUS; SUBCUTANEOUS at 06:00

## 2019-06-14 RX ADMIN — FENTANYL CITRATE 25 MCG: 50 INJECTION, SOLUTION INTRAMUSCULAR; INTRAVENOUS at 08:48

## 2019-06-14 RX ADMIN — MIDAZOLAM 3 MG: 1 INJECTION INTRAMUSCULAR; INTRAVENOUS at 08:27

## 2019-06-14 RX ADMIN — FENTANYL CITRATE 50 MCG: 50 INJECTION, SOLUTION INTRAMUSCULAR; INTRAVENOUS at 08:27

## 2019-06-14 RX ADMIN — GADOTERATE MEGLUMINE 20 ML: 376.9 INJECTION INTRAVENOUS at 09:24

## 2019-06-14 RX ADMIN — LISINOPRIL 10 MG: 5 TABLET ORAL at 09:52

## 2019-06-14 RX ADMIN — Medication 1 LOZENGE: at 02:00

## 2019-06-14 RX ADMIN — Medication 100 MG: at 09:48

## 2019-06-14 NOTE — PROGRESS NOTES
Problem: Mobility Impaired (Adult and Pediatric)  Goal: *Acute Goals and Plan of Care (Insert Text)  Description  Physical Therapy Goals  Initiated 6/13/2019  1. Patient will move from supine to sit and sit to supine  in bed with modified independence within 7 day(s). 2.  Patient will transfer from bed to chair and chair to bed with modified independence using the least restrictive device within 7 day(s). 3.  Patient will perform sit to stand with modified independence within 7 day(s). 4.  Patient will ambulate with modified independence for 300 feet with the least restrictive device within 7 day(s). 5.  Patient will ascend/descend 4 stairs with 1 handrail(s) with modified independence within 7 day(s). Note:   PHYSICAL THERAPY TREATMENT  Patient: Juni Grijalva (26 y.o. male)  Date: 6/14/2019  Diagnosis: Seizure ([facility] [address] [R56.9] <principal problem not specified>       Precautions: Fall  Chart, physical therapy assessment, plan of care and goals were reviewed. ASSESSMENT:  Patient continues to do well with mobility and shows improvement in balance today. Independent bed mobility. Good sitting balance EOB. Sit to stand with supervision. Patient ambulated 220' with gait belt only with SBA. Patient with some variation in gait speed but balance is good with no LOB. Patient also ambulated up and down 12 steps with one handrail and CGA. Patient up in chair at end of session with bed alarm in place and parents in room. Patient is likely close to baseline. Will check on patient if still here Monday but likely does not need further PT at this time. Progression toward goals:  ?    Improving appropriately and progressing toward goals  ? Improving slowly and progressing toward goals  ? Not making progress toward goals and plan of care will be adjusted     PLAN:  Patient continues to benefit from skilled intervention to address the above impairments.   Continue treatment per established plan of care.  Discharge Recommendations:  None  Further Equipment Recommendations for Discharge:  none      SUBJECTIVE:   Patient stated ? I'll walk? OBJECTIVE DATA SUMMARY:   Critical Behavior:  Neurologic State: Alert, Appropriate for age  Orientation Level: Oriented to person, Oriented to situation, Oriented to time  Cognition: Appropriate for age attention/concentration, Impulsive     Functional Mobility Training:  Bed Mobility:  Rolling: Independent  Supine to Sit: Independent  Sit to Supine: Independent  Scooting: Independent        Transfers:  Sit to Stand: Supervision  Stand to Sit: Independent                             Balance:  Sitting: Intact  Standing: Intact  Standing - Static: Good  Standing - Dynamic : Good  Ambulation/Gait Training:  Distance (ft): 220 Feet (ft)  Assistive Device: Gait belt  Ambulation - Level of Assistance: Contact guard assistance                                               Stairs:  Number of Stairs Trained: 12  Stairs - Level of Assistance: Contact guard assistance   Rail Use: Right         Pain:  Pain Scale 1: Numeric (0 - 10)  Pain Intensity 1: 0              Activity Tolerance:   good  Please refer to the flowsheet for vital signs taken during this treatment. After treatment:   ?    Patient left in no apparent distress sitting up in chair  ? Patient left in no apparent distress in bed  ? Call bell left within reach  ? Nursing notified  ? Caregiver present  ?     Bed alarm activated    COMMUNICATION/COLLABORATION:   The patient?s plan of care was discussed with: Registered Nurse    Fernando Ramos, PT   Time Calculation: 11 mins

## 2019-06-14 NOTE — PROGRESS NOTES
Patient arrived from room on room air, alert to self, time and situation. Will call family for consent.

## 2019-06-14 NOTE — PROGRESS NOTES
0700: Received bedside report from Aguilar off going nurse. Assumed care of patient. Problem: Falls - Risk of  Goal: *Absence of Falls  Description  Document Felicitas Getting Fall Risk and appropriate interventions in the flowsheet. Outcome: Progressing Towards Goal     Problem: Seizure Disorder (Adult)  Goal: *STG: Remains free of seizure activity  Outcome: Progressing Towards Goal   0910: TRANSFER - IN REPORT:    Verbal report received from MRI (name) on Patrizia Preston  being received from MRI (unit) for routine progression of care      Report consisted of patients Situation, Background, Assessment and   Recommendations(SBAR). Information from the following report(s) SBAR, Procedure Summary and MAR was reviewed with the receiving nurse. Opportunity for questions and clarification was provided. Assessment completed upon patients arrival to unit and care assumed. 1400: Discharge instructions reviewed with patient and parents, including medications and follow up appointments. Pt verbalized understanding. Opportunity for questions provided, none at this time. Pt stable and ready for discharge.

## 2019-06-14 NOTE — DISCHARGE SUMMARY
Hospitalist Discharge Summary     Patient ID:  Georgia Stevens  [id number]  60 y.o.  1975  6/10/2019    PCP on record: Farheen Biggs MD    Admit date: 6/10/2019  Discharge date and time: 6/14/2019    DISCHARGE DIAGNOSIS:    Breakthrough seizures  Abnormal MRI 2012, stable   HTN  Alcohol abuse  Autism / Asperger syndrome  Depression  Code Status:  Full    CONSULTATIONS:  IP CONSULT TO NEUROLOGY  IP CONSULT TO NEUROLOGY  IP CONSULT TO HOSPITALIST    Excerpted HPI from H&P of Pako Pittman MD:    HISTORY OF PRESENT ILLNESS:     Deidre Oliveira is a 38 y.o.  male with a history of HTN, Autism, seizure disorder since 2011 and chronic alcohol abuse who presents back to the ER, after just leaving less than an hour ago, for recurrent seizures. Patient had a recent admission for seizures at [facility] from 5/30-6/01. He is maintained on keppra and topamax and follows with a Dr Reny Romano (neurology). The patient reportedly just came out of alcohol rehab and has been sober x 2 weeks. This morning he had a seizure and followed by a longer than usual post ictal state prompting mom to call for EMS. He was back to baseline in the ER and was given keppra 1000mg IV and discharged. Parents was driving him home when he had another generalized seizure in the car so they made a U turn and drove into the ambulance bay. CT head nothing acute or changed. We were asked to admit for work up and evaluation of the above problems. ______________________________________________________________________  DISCHARGE SUMMARY/HOSPITAL COURSE:  for full details see H&P, daily progress notes, labs, consult notes. Breakthrough seizures  Abnormal MRI 2012  -no seizures since admission   -neurology help appreciated:  Patient's EEG for 3 hours on prolonged monitoring that showed no seizures, just occasional sharp wave or spike wave seen in the bifrontal head regions infrequently but no seizures at all.   Levels are both therapeutic though Topamax is low therapy and we had to stop the Vimpat because he said he got agitated on that but it may have just been the wires. He is continuing his old dose of Keppra and increased dose of Topamax at 100 mg twice daily. MRI reviewed and unchanged   Very difficult case, due to patient's aspburgers syndrome, and recurring seizures despite therapeutic drugs and doses of medications  -MRI brain 2012 showing Left posterior parahippocampal cavernous angioma.   Associated venous angioma (developmental venous anomaly) left posterior parahippocampal area.   Nonaggressive appearing and possibly incidental 11 mm cyst in the right midbrain. Depending on clinical circumstance consider followup in 6 to 18 months.    MRI 6/14 done under conscious sedation: No acute changes no masses. Stable findings.     HTN  -BP stable   -continue lisinopril     Alcohol abuse  Autism / Asperger syndrome  Depression  -last drink 2 weeks PTA.  Was consuming 6-8 beers daily for 20 years  -folic acid and thiamine supplemented here   -prescribed zoloft recently but has not started it yet.  Ok to start as prescribed            Code Status:  Full  Surrogate Decision Maker: parents  DVT Prophylaxis: heparin      Baseline:   Lives alone. He is not driving. He was about to start a new job. Parents are nearby and help out. Recommended Disposition: home today   PT: [facility]         _______________________________________________________________________  Patient seen and examined by me on discharge day. PHYSICAL EXAM:  General:          WD, WN. Alert, cooperative, no acute distress    EENT:              EOMI. Anicteric sclerae. MMM  Resp:               CTA bilaterally, no wheezing or rales.   No accessory muscle use  CV:                  Regular  rhythm,  No edema  GI:                   Soft, Non distended, Non tender.  +Bowel sounds  Neurologic:      Alert and oriented X 3, normal speech,   Psych:             fair insight. Not anxious nor agitated  Skin:                No rashes. No jaundice      _______________________________________________________________________  DISCHARGE MEDICATIONS:   Current Discharge Medication List      CONTINUE these medications which have CHANGED    Details   topiramate (TOPAMAX) 100 mg tablet Take 1 Tab by mouth two (2) times a day. Qty: 60 Tab, Refills: 2         CONTINUE these medications which have NOT CHANGED    Details   lisinopril (PRINIVIL, ZESTRIL) 10 mg tablet TAKE 1 TABLET EVERY DAY FOR HYPERTENSION. SCHEDULE APPT. FOR FURTHER REFILLS ([phone])  Qty: 90 Tab, Refills: 3    Associated Diagnoses: Essential hypertension with goal blood pressure less than 130/85      levETIRAcetam (KEPPRA) 750 mg tablet TAKE 2 TABLETS TWICE DAILY  Qty: 360 Tab, Refills: 3      MULTIVITAMIN PO Take 1 Tab by mouth daily. sertraline (ZOLOFT) 25 mg tablet Take 1 Tab by mouth daily.  Indications: Repeated Episodes of Anxiety  Qty: 30 Tab, Refills: 1    Associated Diagnoses: Generalized anxiety disorder             Follow-up Information     Follow up With Specialties Details Why Contact Info    Yara Spring MD Internal Medicine  As needed [address]/ [address]  [phone]      primary neurology   In 2 weeks 2-3 weeks          ________________________________________________________________    Risk of deterioration: Moderate    Condition at Discharge:  Stable  __________________________________________________________________    Disposition  Home with family and home health services    ____________________________________________________________________    Code Status: Full Code  ___________________________________________________________________      Total time in minutes spent coordinating this discharge (includes going over instructions, follow-up, prescriptions, and preparing report for sign off to her PCP) :  > 30   minutes    Signed:  Sim Yadav MD

## 2019-06-14 NOTE — PROGRESS NOTES
Hospitalist Progress Note    NAME: Mayito Kwon   :  1975   MRN:  [id number]       Assessment / Plan:  Breakthrough seizures  Abnormal MRI   -no seizures since admission   -neurology help appreciated:  Patient's EEG for 3 hours on prolonged monitoring that showed no seizures, just occasional sharp wave or spike wave seen in the bifrontal head regions infrequently but no seizures at all. Levels are both therapeutic though Topamax is low therapy and we had to stop the Vimpat because he said he got agitated on that but it may have just been the wires. He is continuing his old dose of Keppra and increased dose of Topamax at 100 mg twice daily. MRI reviewed and unchanged   Very difficult case, due to patient's aspburgers syndrome, and recurring seizures despite therapeutic drugs and doses of medications  -MRI brain  showing Left posterior parahippocampal cavernous angioma. Associated venous angioma (developmental venous anomaly) left posterior parahippocampal area. Nonaggressive appearing and possibly incidental 11 mm cyst in the right midbrain. Depending on clinical circumstance consider followup in 6 to 18 months.    MRI  done under conscious sedation: No acute changes no masses. Stable findings. HTN  -BP stable   -continue lisinopril    Alcohol abuse  Autism / Asperger syndrome  Depression  -last drink 2 weeks PTA. Was consuming 6-8 beers daily for 20 years  -folic acid and thiamine supplemented here   -prescribed zoloft recently but has not started it yet. Ok to start as prescribed            Code Status:  Full  Surrogate Decision Maker: parents  DVT Prophylaxis: heparin      Baseline:   Lives alone. He is not driving. He was about to start a new job. Parents are nearby and help out.    Recommended Disposition: home today   PT: [facility]      Subjective:     Chief Complaint / Reason for Physician Visit: following seizure DO   Cough /congestion - resolved   No seizures   Ambulating in the room independently   Both parents were at bedside today     Discussed with RN events overnight. Review of Systems:  Symptom Y/N Comments  Symptom Y/N Comments   Fever/Chills n   Chest Pain n    Poor Appetite    Edema     Cough    Abdominal Pain n    Sputum    Joint Pain     SOB/EISENBERG n   Pruritis/Rash     Nausea/vomit    Tolerating PT/OT     Diarrhea    Tolerating Diet     Constipation    Other       Could NOT obtain due to:      Objective:     VITALS:   Last 24hrs VS reviewed since prior progress note. Most recent are:  Patient Vitals for the past 24 hrs:   Temp Pulse Resp BP SpO2   06/11/19 0732 98.4 °F (36.9 °C) 74 16 101/81 97 %   06/11/19 0333 98.2 °F (36.8 °C) 71 16 104/70 97 %   06/10/19 2242 97.7 °F (36.5 °C) 78 16 107/66 97 %   06/10/19 2022 98.6 °F (37 °C) 83 16 137/81 100 %   06/10/19 1930 -- 86 14 133/82 96 %   06/10/19 1900 97.5 °F (36.4 °C) 90 18 122/78 99 %   06/10/19 1830 -- 94 14 123/74 97 %   06/10/19 1630 -- (!) 114 19 111/73 93 %   06/10/19 1609 98 °F (36.7 °C) (!) 129 15 139/86 95 %       Intake/Output Summary (Last 24 hours) at 6/11/2019 1052  Last data filed at 6/10/2019 2303  Gross per 24 hour   Intake 100 ml   Output 1 ml   Net 99 ml        PHYSICAL EXAM:  General: WD, WN. Alert, cooperative, no acute distress    EENT:  EOMI. Anicteric sclerae. MMM  Resp:  CTA bilaterally, no wheezing or rales. No accessory muscle use  CV:  Regular  rhythm,  No edema  GI:  Soft, Non distended, Non tender.  +Bowel sounds  Neurologic:  Alert and oriented X 3, normal speech,   Psych:   fair insight. Not anxious nor agitated  Skin:  No rashes.   No jaundice    Reviewed most current lab test results and cultures  YES  Reviewed most current radiology test results   YES  Review and summation of old records today    NO  Reviewed patient's current orders and MAR    YES  PMH/SH reviewed - no change compared to H&P  ________________________________________________________________________  Care Plan discussed with: Comments   Patient y    Family  y Mother/father  bedside    RN y    Care Manager y    Consultant  y Neurology                     y Multidiciplinary team rounds were held today with , nursing, pharmacist and clinical coordinator. Patient's plan of care was discussed; medications were reviewed and discharge planning was addressed. ________________________________________________________________________  Total NON critical care TIME: 35 Minutes    Total CRITICAL CARE TIME Spent:   Minutes non procedure based      Comments   >50% of visit spent in counseling and coordination of care y Dc coordination    ________________________________________________________________________  Arvind Lombardi MD     Procedures: see electronic medical records for all procedures/Xrays and details which were not copied into this note but were reviewed prior to creation of Plan. LABS:  I reviewed today's most current labs and imaging studies.   Pertinent labs include:  Recent Labs     06/10/19  1351   WBC 7.5   HGB 15.7   HCT 47.6        Recent Labs     06/11/19  0513 06/10/19  1351    140   K 3.9 4.1   * 109*   CO2 21 23   GLU 89 117*   BUN 12 16   CREA 0.91 1.07   CA 8.1* 8.5   MG 2.2  --        Signed: Arvind Lombardi MD

## 2019-06-14 NOTE — PROGRESS NOTES
KRISHAN Plan-- Home with family to assist.     Patient has seen the neurologist today . When he is discharged his family will be transporting him. List of medicaid aides given to patient's mother if needed in the future. Spoke with parents and patient is doing much better with ambulation and no physical therapy needs are needed and family agree.

## 2019-06-14 NOTE — DISCHARGE INSTRUCTIONS
Patient Discharge Instructions    Jimmy Liu / [id number] : 1975    Admitted 6/10/2019 Discharged: 2019         DISCHARGE DIAGNOSIS:         Seizure disorder   - MRI was unchanged         Take Home Medications         General drug facts     If you have a very bad allergy, wear an allergy ID at all times. It is important that you take the medication exactly as they are prescribed. Keep your medication in the bottles provided by the pharmacist.  Keep a list of all your drugs (prescription, natural products, vitamins, OTC) with you. Give this list to your doctor. Do not take other medications without consulting your doctor. Do not share your drugs with others and do not take anyone else's drugs. Keep all drugs out of the reach of children and pets. Most drugs may be thrown away in household trash after mixing with coffee grounds or bay litter and sealing in a plastic bag. Keep a list Call your doctor for help with any side effects. If in the [place] you may also call the FDA at 5-993-XUM-8951    Talk with the doctor before starting any new drug, including OTC, natural products, or vitamins. What to do at Home    1. Recommended diet: regular     2. Recommended activity: Activity as tolerated    you should not drive for 6 months after your last seizure or until cleared by your neurologist. Continue seizure precautions including no swimming alone, use showers instead of bath tubs, no climbing on roofs or power tools. If you experience any of the following symptoms then please go to the emergency room:  Seizures lasting greater than 1 minute, multiple seizures without waking up in between, or unusual seizure activity. If you experience any of the following symptoms then please call your primary care physician or return to the emergency room if you cannot get hold of your doctor:   Fever, chills, nausea, vomiting, diarrhea, change in mentation, falling, or bleeding.     3. Bring these papers with you to your follow up appointments. The papers will help your doctors be sure to continue the care plan from the hospital.      Follow-up with:   PCP: Ava Vu MD  Follow-up Information     Follow up With Specialties Details Why Contact Info    Ava Vu MD Internal Medicine  As needed [address]  [phone]      primary neurology   In 2 weeks 2-3 weeks             Please call for your own appointment        Information obtained by :  I understand that if any problems occur once I am at home I am to contact my physician. I understand and acknowledge receipt of the instructions indicated above.                                                                                                                                            Physician's or R.N.'s Signature                                                                  Date/Time                                                                                                                                              Patient or Representative Signature                                                          Date/Time

## 2019-06-14 NOTE — PROGRESS NOTES
Consult  REFERRED BY:  Inge Ceron MD    CHIEF COMPLAINT: Seizures      Subjective:     Jimmy Liu is a 38 y.o. right-handed  male seen at the request of Dr. Misael Hunt for evaluation of new problem of 2 recurring and breakthrough seizures yesterday requiring hospital admission. Patient had an overnight video EEG monitoring, but only got about 3 hours time and before he ripped electrodes off, but he had no recorded seizures this occasional sharp wave or spike wave seen infrequently in the frontal head regions bilaterally. Topamax is low therapeutic level but therapeutic at 3.8 from yesterday, and his Keppra levels are therapeutic also. Patient could not tolerate the MRI scan, so 1 with sedation is being reordered today. Patient was just seen at [facility] on May 30 for the same problem, but never had an EEG or an MRI done, and was just discharged home. Patient was seen in the ER day of admission after the first seizure, bolused with Keppra and then went home but then had another seizure and came back and was readmitted. Patient had a past history of alcohol abuse but apparently has not drank for almost 2 weeks now. Patient is not taking any other drugs or alcohol to precipitate this event. Patient does have a history of a benign cyst of the brainstem, and a left posterior parahippocampal cavernous angioma on MRI scan done 2012, but not had any recent scans of MRI type but his CAT scan on admission showed stable lesions compared to previous scans. Patient denies any new headache, focal weakness, visual changes, or any other causes for the seizures or new neurologic deficits after the seizures. His EEG does show occasional dysrhythmic activity. Patient condition discussed with both parents and patient in detail. Chart reviewed in detail, CT and EEG reviewed in the PACS system, and agree with report as described above.   Old records reviewed carefully on the chart in addition an old neurologic consult also reviewed as patient followed by Dr. Bro Quinteros for years. Patient has only been treated with Keppra, and Topamax just recently added because of seizures. Past Medical History:   Diagnosis Date    Asperger syndrome 12/14/2011    Autism     dx 2010- highly functional    Other ill-defined conditions(799.89)     aspergers, syncopal episodes    Seizure disorder ([address] 5/22/2012    [facility] [address] [address]       Past Surgical History:   Procedure Laterality Date    HX GI      pyloric stenosis 1976    HX HEENT      adenoids 1978     Family History   Problem Relation Age of Onset    Diabetes Father     Heart Disease Father     Cancer Mother         Breast    Anxiety Mother     COPD Mother     Other Mother         Neuropathy    Sleep Apnea Mother     Arthritis-osteo Mother     Other Brother         Pituitary tumor      Social History     Tobacco Use    Smoking status: Never Smoker    Smokeless tobacco: Never Used   Substance Use Topics    Alcohol use:  Yes     Alcohol/week: 5.0 oz     Types: 10 Cans of beer per week         Current Facility-Administered Medications:     LORazepam (ATIVAN) injection 1-2 mg, 1-2 mg, IntraVENous, Q6H PRN, Shereen Hamman, MD, 2 mg at 06/12/19 1911    guaiFENesin ER (MUCINEX) tablet 600 mg, 600 mg, Oral, BID, Teo Loera MD, 600 mg at 06/13/19 1824    benzocaine-menthol (CHLORASEPTIC MAX) lozenge 1 Lozenge, 1 Lozenge, Oral, Q2H PRN, Teo Loera MD, 1 Lozenge at 06/12/19 1758    topiramate (TOPAMAX) tablet 100 mg, 100 mg, Oral, Q12H, Shereen Hamman, MD, 100 mg at 06/13/19 2041    heparin (porcine) injection 5,000 Units, 5,000 Units, SubCUTAneous, Q8H, Teo Loera MD, 5,000 Units at 06/13/19 2041    sodium chloride (NS) flush 5-40 mL, 5-40 mL, IntraVENous, Q8H, Vinay Borges MD, 10 mL at 06/13/19 2200    sodium chloride (NS) flush 5-40 mL, 5-40 mL, IntraVENous, PRN, Vinay Borges MD, 10 mL at 06/13/19 0148    acetaminophen (TYLENOL) tablet 650 mg, 650 mg, Oral, Q4H PRN, Lary Mcintyre MD    ondansetron [facility]) injection 4 mg, 4 mg, IntraVENous, Q4H PRN, Lary Mcintyre MD    lisinopril (PRINIVIL, ZESTRIL) tablet 10 mg, 10 mg, Oral, DAILY, Lary Mcintyre MD, 10 mg at 33/50/27 1687    folic acid (FOLVITE) tablet 1 mg, 1 mg, Oral, DAILY, Lary Mcintyre MD, 1 mg at 06/13/19 9279    thiamine mononitrate (B-1) tablet 100 mg, 100 mg, Oral, DAILY, Lary Mcintyre MD, 100 mg at 06/13/19 5648    levETIRAcetam (KEPPRA) 1,500 mg in 0.9% sodium chloride 100 mL IVPB, 1,500 mg, IntraVENous, Q12H, Lary Mcintyre MD, 1,500 mg at 06/13/19 1318        Allergies   Allergen Reactions    Aspartame Other (comments)     Family believes it causes his seizures      MRI Results (most recent):  Results from [place] encounter on 01/23/12   MRI BRAIN W AND W/O CONTRAST    Narrative **Final Report**       ICD Codes / Adm. Diagnosis: 345.90   / SEIZURE DISORDER    Examination:  MR BRAIN Efra Tomas  - [id number] - Jan 23 2012  1:00PM  Accession No:  [id number]  Reason:  new onset sz      REPORT:  INDICATION: seizures. 345.9. TECHNIQUE: Sagittal T1, axial FLAIR, T2, T1 and gradient echo T2-weighted   images of the head were obtained followed by intravenous infusion 15 mL   gadolinium repeat axial and coronal T1-weighted images and axial diffusion   weighted images. Angled thin coronal T2 temporal lobe images. COMPARISON: CT head 01/12/2012    In the left posterior parahippocampal gyrus is a focal lesion measuring   approximately 11 x 10 x 8 mm which has heterogeneous internal architecture   with the rim of hypointensity and progressive blooming of hypointensity on   gradient-echo T2-weighted images. This is consistent with a cavernous   angioma. Adjacent to this is a prominent venous structure with imaging   characteristics consistent with a venous angioma (DVA).   The area of decreased attenuation in the right midbrain on the CT scan is   better delineated on the MRI. This represents a well defined cyst of   uncertain etiology and significance. This measures approximately 11 x 6 x 6   mm. There is no significant displacement of adjacent structures in this is   felt to be quite chronic. No associated abnormal enhancement or restricted   diffusion. Otherwise normal signal in the cerebral hemispheres, brainstem and   cerebellum. Ventricular size and configuration are normal.   Normal flow-voids are present in the vertebral, basilar and carotid artery   systems. The craniocervical junction is normal.   The structures of the cranial base including paranasal sinuses are    unremarkable. IMPRESSION:   1. Left posterior parahippocampal cavernous angioma. 2. Associated venous angioma (developmental venous anomaly) left posterior   parahippocampal area. 3. Nonaggressive appearing and possibly incidental 11 mm cyst in the right   midbrain. Depending on clinical circumstance consider followup in 6 to 18   months. Signing/Reading Doctor: Louisa Spurling ([id number])    Approved: Louisa Spurling ([id number])  01/23/2012                                      Results from Hospital Encounter encounter on 01/23/12   MRI BRAIN W AND W/O CONTRAST    Narrative **Final Report**       ICD Codes / Adm. Diagnosis: 345.90   / SEIZURE DISORDER    Examination:  MR BRAIN Socorro Jimenes  - [id number] - Jan 23 2012  1:00PM  Accession No:  [id number]  Reason:  new onset sz      REPORT:  INDICATION: seizures. 345.9. TECHNIQUE: Sagittal T1, axial FLAIR, T2, T1 and gradient echo T2-weighted   images of the head were obtained followed by intravenous infusion 15 mL   gadolinium repeat axial and coronal T1-weighted images and axial diffusion   weighted images. Angled thin coronal T2 temporal lobe images.     COMPARISON: CT head 01/12/2012    In the left posterior parahippocampal gyrus is a focal lesion measuring   approximately 11 x 10 x 8 mm which has heterogeneous internal architecture   with the rim of hypointensity and progressive blooming of hypointensity on   gradient-echo T2-weighted images. This is consistent with a cavernous   angioma. Adjacent to this is a prominent venous structure with imaging   characteristics consistent with a venous angioma (DVA). The area of decreased attenuation in the right midbrain on the CT scan is   better delineated on the MRI. This represents a well defined cyst of   uncertain etiology and significance. This measures approximately 11 x 6 x 6   mm. There is no significant displacement of adjacent structures in this is   felt to be quite chronic. No associated abnormal enhancement or restricted   diffusion. Otherwise normal signal in the cerebral hemispheres, brainstem and   cerebellum. Ventricular size and configuration are normal.   Normal flow-voids are present in the vertebral, basilar and carotid artery   systems. The craniocervical junction is normal.   The structures of the cranial base including paranasal sinuses are    unremarkable. IMPRESSION:   1. Left posterior parahippocampal cavernous angioma. 2. Associated venous angioma (developmental venous anomaly) left posterior   parahippocampal area. 3. Nonaggressive appearing and possibly incidental 11 mm cyst in the right   midbrain. Depending on clinical circumstance consider followup in 6 to 18   months. Signing/Reading Doctor: Houston Pina ([id number])    Approved: Houston Pina ([id number])  01/23/2012                                    Review of Systems:  Review of systems not obtained due to patient factors. Vitals:    06/13/19 0732 06/13/19 1213 06/13/19 1502 06/13/19 2017   BP: 129/62 105/68 95/67 110/76   Pulse: 75 (!) 102 85 89   Resp: 18 18 18 18   Temp: 98.1 °F (36.7 °C) 99.3 °F (37.4 °C) 97.3 °F (36.3 °C) 97.3 °F (36.3 °C)   SpO2: 91% 95% 93% 95%     Objective:     I      NEUROLOGICAL EXAM:    Appearance:   The patient is well developed, well nourished, provides a poor history and is in no acute distress. Mental Status: Oriented to time but not the day of the month, place and person, and the president, cognitive function is slow and mildly abnormal and speech is fluent and no aphasia or dysarthria. Mood and affect appropriate, but patient appears mildly autistic. Cranial Nerves:   Intact visual fields. Fundi are benign, disc are flat, no lesions seen on funduscopy. IMTIAZ, EOM's full, no nystagmus, no ptosis. Facial sensation is normal. Corneal reflexes are not tested. Facial movement is symmetric. Hearing is normal bilaterally. Palate is midline with normal sternocleidomastoid and trapezius muscles are normal. Tongue is midline. Neck without meningismus or bruits  Temporal arteries are not tender or enlarged  TMJ areas are not tender on palpation   Motor:  4/5 strength in upper and lower proximal and distal muscles. Normal bulk and tone. No fasciculations. Rapid alternating movement is symmetric and slow bilaterally   Reflexes:   Deep tendon reflexes 2+/4 and symmetrical.  No babinski or clonus present   Sensory:   Normal to touch, pinprick and vibration and temperature. DSS is intact   Gait:  Normal gait for patient's age. Tremor:   No tremor noted. Cerebellar:  No abnormal cerebellar signs present on Romberg and tandem testing and finger-nose-finger exam.   Neurovascular:  Normal heart sounds and regular rhythm, peripheral pulses intact, and no carotid bruits.            Assessment:   Active Problems:    Asperger syndrome (12/14/2011)      Seizure ([address] (5/22/2012)      Brain cyst (4/3/2017)      Abnormal MRI of head (6/11/2019)      Altered mental status, unspecified (6/11/2019)      Complex partial seizure evolving to generalized seizure ([address] (6/11/2019)      Bilateral carotid artery stenosis (6/11/2019)      Convulsive syncope (6/11/2019)        Plan:     Patient's EEG for 3 hours on prolonged monitoring that showed no seizures, just occasional sharp wave or spike wave seen in the bifrontal head regions infrequently but no seizures at all. Levels are both therapeutic though Topamax is low therapy and we had to stop the Vimpat because he said he got agitated on that but it may have just been the wires. He is continuing his old dose of Keppra and increased dose of Topamax at 100 mg twice daily. MRI pending  Very difficult case, due to patient's aspburgers syndrome, and recurring seizures despite therapeutic drugs and doses of medications. We will follow carefully, discussed with patient's in detail, reviewed all the records as above, and 1 hour for the patient and the family in detail.     Signed By: Patt Dejesus MD     June 13, 2019       CC: Avis Webber MD  FAX: [phone]

## 2019-06-15 NOTE — PROGRESS NOTES
Consult  REFERRED BY:  Tj Watson MD    CHIEF COMPLAINT: Seizures      Subjective:     Heather Quintana is a 38 y.o. right-handed  male seen at the request of Dr. Chauncey Carrion for evaluation of new problem of 2 recurring and breakthrough seizures yesterday requiring hospital admission. Patient had an overnight video EEG monitoring, but only got about 3 hours time and before he ripped electrodes off, but he had no recorded seizures this occasional sharp wave or spike wave seen infrequently in the frontal head regions bilaterally. Topamax is low therapeutic level but therapeutic at 3.8 from yesterday, and his Keppra levels are therapeutic also. Patient could not tolerate the MRI scan, so 1 with sedation is being reordered today. Patient was just seen at [facility] on May 30 for the same problem, but never had an EEG or an MRI done, and was just discharged home. Patient was seen in the ER day of admission after the first seizure, bolused with Keppra and then went home but then had another seizure and came back and was readmitted. Patient had a past history of alcohol abuse but apparently has not drank for almost 2 weeks now. Patient is not taking any other drugs or alcohol to precipitate this event. Patient does have a history of a benign cyst of the brainstem, and a left posterior parahippocampal cavernous angioma on MRI scan done 2012, but not had any recent scans of MRI type but his CAT scan on admission showed stable lesions compared to previous scans. Patient denies any new headache, focal weakness, visual changes, or any other causes for the seizures or new neurologic deficits after the seizures. His EEG does show occasional dysrhythmic activity. Patient condition discussed with both parents and patient in detail. Chart reviewed in detail, CT and EEG reviewed in the PACS system, and agree with report as described above.   Old records reviewed carefully on the chart in addition an old neurologic consult also reviewed as patient followed by Dr. Agusitn Palmer for years. Patient has only been treated with Keppra, and Topamax just recently added because of seizures. Patient had an MRI scan that looks stable, showing no change in all and looking very good. Patient had no further seizures, his Topamax level is increased to 8 in the therapeutic range of 2-10, and his Keppra level slightly high at 41 but he is tolerating the medication well and 40 is the upper limits of normal so we will just keep him on that dose since is already dropped some from before, and he is doing so well on the medication  He is to follow-up with Dr. Agustin Palmer his neurologist we discussed his condition with the patient and his family in detail and they agree with plans and therapy as above. Past Medical History:   Diagnosis Date    Asperger syndrome 12/14/2011    Autism     dx 2010- highly functional    Other ill-defined conditions(799.89)     aspergers, syncopal episodes    Seiz[facility] disorder [facility] [address] 5/22/2012    Seizures ([facility] [address]       Past Surgical History:   Procedure Laterality Date    HX GI      pyloric stenosis 1976    HX HEENT      adenoids 1978     Family History   Problem Relation Age of Onset    Diabetes Father     Heart Disease Father     Cancer Mother         Breast    Anxiety Mother     COPD Mother     Other Mother         Neuropathy    Sleep Apnea Mother     Arthritis-osteo Mother     Other Brother         Pituitary tumor      Social History     Tobacco Use    Smoking status: Never Smoker    Smokeless tobacco: Never Used   Substance Use Topics    Alcohol use: Yes     Alcohol/week: 5.0 oz     Types: 10 Cans of beer per week       No current facility-administered medications for this encounter.      Current Outpatient Medications:     topiramate (TOPAMAX) 100 mg tablet, Take 1 Tab by mouth two (2) times a day., Disp: 60 Tab, Rfl: 2    lisinopril (PRINIVIL, ZESTRIL) 10 mg tablet, TAKE 1 TABLET EVERY DAY FOR HYPERTENSION. SCHEDULE APPT. FOR FURTHER REFILLS ([phone]), Disp: 90 Tab, Rfl: 3    levETIRAcetam (KEPPRA) 750 mg tablet, TAKE 2 TABLETS TWICE DAILY, Disp: 360 Tab, Rfl: 3    MULTIVITAMIN PO, Take 1 Tab by mouth daily. , Disp: , Rfl:     sertraline (ZOLOFT) 25 mg tablet, Take 1 Tab by mouth daily. Indications: Repeated Episodes of Anxiety, Disp: 30 Tab, Rfl: 1        Allergies   Allergen Reactions    Aspartame Other (comments)     Family believes it causes his seizures      MRI Results (most recent):  Results from Hospital Encounter encounter on 06/10/19   MRI BRAIN W WO CONT    Narrative EXAM:  MRI BRAIN W WO CONT    INDICATION:    Seizures new or progressive    COMPARISON:  January 23, 2012. CONTRAST: 17 ml Dotarem. TECHNIQUE:    Multiplanar multisequence acquisition without and with contrast of the brain. FINDINGS:  Diffuse motion artifact. Diffusion imaging does not show acute ischemic changes . Minimal nonspecific white matter disease. Ventricle size is normal and stable. No extra-axial fluid collection hemorrhage or shift. Temporal lobe no show no focal abnormality and appear symmetrical.  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 No new lesion is  seen. Incidental diffuse mucosal thickening within the a stomach air cells bilaterally      Impression  impression: No acute changes no masses. Stable findings. Results from [place] encounter on 06/10/19   MRI BRAIN W WO CONT    Narrative EXAM:  MRI BRAIN W WO CONT    INDICATION:    Seizures new or progressive    COMPARISON:  January 23, 2012. CONTRAST: 17 ml Dotarem. TECHNIQUE:    Multiplanar multisequence acquisition without and with contrast of the brain. FINDINGS:  Diffuse motion artifact. Diffusion imaging does not show acute ischemic changes . Minimal nonspecific white matter disease.   Ventricle size is normal and stable. No extra-axial fluid collection hemorrhage or shift. Temporal lobe no show no focal abnormality and appear symmetrical.  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 No new lesion is  seen. Incidental diffuse mucosal thickening within the a stomach air cells bilaterally      Impression  impression: No acute changes no masses. Stable findings. Review of Systems:  Review of systems not obtained due to patient factors. Vitals:    06/14/19 0900 06/14/19 0905 06/14/19 0952 06/14/19 1239   BP: 112/71 98/75 137/78 91/56   Pulse: 77 83 92 97   Resp: 14 16  18   Temp:    98.1 °F (36.7 °C)   SpO2: 95% 95%  97%   Weight:         Objective:     I      NEUROLOGICAL EXAM:    Appearance: The patient is well developed, well nourished, provides a poor history and is in no acute distress. Mental Status: Oriented to time but not the day of the month, place and person, and the president, cognitive function is slow and mildly abnormal and speech is fluent and no aphasia or dysarthria. Mood and affect appropriate, but patient appears mildly autistic. Cranial Nerves:   Intact visual fields. Fundi are benign, disc are flat, no lesions seen on funduscopy. IMTIAZ, EOM's full, no nystagmus, no ptosis. Facial sensation is normal. Corneal reflexes are not tested. Facial movement is symmetric. Hearing is normal bilaterally. Palate is midline with normal sternocleidomastoid and trapezius muscles are normal. Tongue is midline. Neck without meningismus or bruits  Temporal arteries are not tender or enlarged  TMJ areas are not tender on palpation   Motor:  4/5 strength in upper and lower proximal and distal muscles. Normal bulk and tone. No fasciculations.   Rapid alternating movement is symmetric and slow bilaterally   Reflexes:   Deep tendon reflexes 2+/4 and symmetrical.  No babinski or clonus present   Sensory: Normal to touch, pinprick and vibration and temperature. DSS is intact   Gait:  Normal gait for patient's age. Tremor:   No tremor noted. Cerebellar:  No abnormal cerebellar signs present on Romberg and tandem testing and finger-nose-finger exam.   Neurovascular:  Normal heart sounds and regular rhythm, peripheral pulses intact, and no carotid bruits. Assessment:   Active Problems:    Asperger syndrome (12/14/2011)      Seizure ([address] (5/22/2012)      Brain cyst (4/3/2017)      Abnormal MRI of head (6/11/2019)      Altered mental status, unspecified (6/11/2019)      Complex partial seizure evolving to generalized seizure ([address] (6/11/2019)      Bilateral carotid artery stenosis (6/11/2019)      Convulsive syncope (6/11/2019)        Plan:     Patient's EEG for 3 hours on prolonged monitoring that showed no seizures, just occasional sharp wave or spike wave seen in the bifrontal head regions infrequently but no seizures at all. Levels are both therapeutic though Topamax is low therapy and we had to stop the Vimpat because he said he got agitated on that but it may have just been the wires. He is continuing his old dose of Keppra and increased dose of Topamax at 100 mg twice daily. MRI was unchanged, showing no new lesions, stable showing no new abnormalities  Very difficult case, due to patient's aspburgers syndrome, and recurring seizures despite therapeutic drugs and doses of medications. Patient had an MRI scan that looks stable, showing no change in all and looking very good.   Patient had no further seizures, his Topamax level is increased to 8 in the therapeutic range of 2-10, and his Keppra level slightly high at 41 but he is tolerating the medication well and 40 is the upper limits of normal so we will just keep him on that dose since is already dropped some from before, and he is doing so well on the medication  He is to follow-up with Dr. Tripp Jump his neurologist we discussed his condition with the patient and his family in detail and they agree with plans and therapy as above.    June 14, 2019       CC: Lorrie Arias MD  FAX: [phone]

## 2019-06-16 LAB — LEVETIRACETAM SERPL-MCNC: 30.6 UG/ML (ref 10–40)

## 2019-06-17 ENCOUNTER — OFFICE VISIT (OUTPATIENT)
Dept: CARDIOLOGY CLINIC | Age: 44
End: 2019-06-17

## 2019-06-17 ENCOUNTER — CLINICAL SUPPORT (OUTPATIENT)
Dept: CARDIOLOGY CLINIC | Age: 44
End: 2019-06-17

## 2019-06-17 ENCOUNTER — PATIENT OUTREACH (OUTPATIENT)
Dept: INTERNAL MEDICINE CLINIC | Facility: CLINIC | Age: 44
End: 2019-06-17

## 2019-06-17 VITALS
WEIGHT: 185.8 LBS | RESPIRATION RATE: 16 BRPM | BODY MASS INDEX: 26.6 KG/M2 | DIASTOLIC BLOOD PRESSURE: 80 MMHG | HEIGHT: 70 IN | HEART RATE: 88 BPM | SYSTOLIC BLOOD PRESSURE: 110 MMHG | OXYGEN SATURATION: 97 %

## 2019-06-17 DIAGNOSIS — Z87.898 HISTORY OF ALCOHOL USE: ICD-10-CM

## 2019-06-17 DIAGNOSIS — R00.2 HEART PALPITATIONS: Primary | ICD-10-CM

## 2019-06-17 DIAGNOSIS — R56.9 SEIZURE (HCC): ICD-10-CM

## 2019-06-17 DIAGNOSIS — R00.0 TACHYCARDIA: ICD-10-CM

## 2019-06-17 DIAGNOSIS — R00.2 HEART PALPITATIONS: ICD-10-CM

## 2019-06-17 LAB — TOPIRAMATE SERPL-MCNC: 8.3 UG/ML (ref 2–25)

## 2019-06-17 NOTE — PATIENT INSTRUCTIONS
A 30 day loop monitor has been ordered for you. This will be mailed to the address given to us. Please read over the instructions given to you about [employer] loop monitors. If you have any insurance questions regarding coverage and out of pocket cost please contact the number on the instructions.

## 2019-06-17 NOTE — PROGRESS NOTES
[address]:   [id number]    History of Present Illness:  Mr. Becki Constantino is a 39 yo M with history of seizures followed by Dr. Rhett Edmond with recent hospitalization and adjustment of his seizure medications, referred for episodes of tachycardia. His dad is here with him, who helps give his history. While he was in the hospital, he had episodes where his heart rate went from the 70s to the 140s. He apparently was unaware when he had these spells. He denies any chest pain, shortness of breath, palpitations, lightheadedness, dizziness or syncope. He is compensated on exam with clear lungs. He has a history of alcohol use, six to eight beers a day. He denies any tobacco use. Blood pressure is 110/80 with a heart rate of 88. Soc hx. No tobacco.  Fam hx. No early CAD. Assessment and Plan:    1. Tachycardia. Concerning for possible arrhythmia and will proceed with a one week loop monitor. 2. Asperger's. 3. Seizure disorder. Followed closely by neurology. He  has a past medical history of Asperger syndrome (12/14/2011), Autism, Other ill-defined conditions(799.89), Seizure disorder ([facility] [address] (5/22/2012), and Seizures ([facility] [address] All other systems negative except as above. PE  Vitals:    06/17/19 1348   BP: 110/80   Pulse: 88   Resp: 16   SpO2: 97%   Weight: 185 lb 12.8 oz (84.3 kg)   Height: 5' 10\" (1.778 m)    Body mass index is 26.66 kg/m².    General appearance - alert, well appearing, and in no distress  Mental status - affect appropriate to mood  Eyes - sclera anicteric, moist mucous membranes  Neck - supple, no JVD  Chest - clear to auscultation, no wheezes, rales or rhonchi  Heart - normal rate, regular rhythm, normal S1, S2, no murmurs, rubs, clicks or gallops  Abdomen - soft, nontender, nondistended, no masses or organomegaly  Neurological - no focal deficit  Extremities - peripheral pulses normal, no pedal edema      Recent Labs:  Lab Results   Component Value Date/Time Cholesterol, total 179 01/03/2018 02:32 PM    HDL Cholesterol 90 01/03/2018 02:32 PM    LDL, calculated 75 01/03/2018 02:32 PM    Triglyceride 72 01/03/2018 02:32 PM     Lab Results   Component Value Date/Time    Creatinine (POC) 1.0 06/10/2019 04:29 PM    Creatinine 0.91 06/11/2019 05:13 AM     Lab Results   Component Value Date/Time    BUN 12 06/11/2019 05:13 AM    BUN (POC) 16 06/10/2019 04:29 PM     Lab Results   Component Value Date/Time    Potassium 3.9 06/11/2019 05:13 AM     Lab Results   Component Value Date/Time    Hemoglobin A1c 5.3 01/03/2018 02:32 PM     Lab Results   Component Value Date/Time    HGB 15.7 06/10/2019 01:51 PM     Lab Results   Component Value Date/Time    PLATELET 667 62/70/9923 01:51 PM       Reviewed:  Past Medical History:   Diagnosis Date    Asperger syndrome 12/14/2011    Autism     dx 2010- highly functional    Other ill-defined conditions(799.89)     aspergers, syncopal episodes    Seizure disorder ([facility] [address] 5/22/2012    Seizures ([facility] [address]      Social History     Tobacco Use   Smoking Status Never Smoker   Smokeless Tobacco Never Used     Social History     Substance and Sexual Activity   Alcohol Use Yes    Alcohol/week: 5.0 oz    Types: 10 Cans of beer per week    Frequency: Monthly or less    Drinks per session: 1 or 2    Comment: occasional     Allergies   Allergen Reactions    Aspartame Other (comments)     Family believes it causes his seizures       Current Outpatient Medications   Medication Sig    topiramate (TOPAMAX) 100 mg tablet Take 1 Tab by mouth two (2) times a day.  sertraline (ZOLOFT) 25 mg tablet Take 1 Tab by mouth daily. Indications: Repeated Episodes of Anxiety    lisinopril (PRINIVIL, ZESTRIL) 10 mg tablet TAKE 1 TABLET EVERY DAY FOR HYPERTENSION. SCHEDULE APPT. FOR FURTHER REFILLS ([phone])    levETIRAcetam (KEPPRA) 750 mg tablet TAKE 2 TABLETS TWICE DAILY    MULTIVITAMIN PO Take 1 Tab by mouth daily.      No current facility-administered medications for this visit.         Dandre Yates MD  [facility] heart and Vascular [place]  [address] [facility] [address]  [address]

## 2019-06-17 NOTE — PROGRESS NOTES
Hospital Discharge Follow-Up      Date/Time:  6/19/2019 9:20 AM    Patient was admitted to [facility] on 6/10/19 and discharged on 6/14/19 for Breakthrough seizures. The physician discharge summary was available at the time of outreach. Patient was contacted within 1 business day of discharge. Top Challenges reviewed with the provider   - Topamax increased to 100 mg 2x/day    - Keppra & Topamax serum levels therapeutic @ d/c    - Neurology f/u 7/16/19    - Last PCP f/u 6/6/19. Next f/u 8/14/19    - Pt attended 6/17/19 Cardio appt w/ Dr Angie Jay for Tachycardia. Loop Monitor ordered     Method of communication with provider :chart routing    Inpatient RRAT score: 12  Was this a readmission? yes   Patient stated reason for the readmission: unable to reach pt/mother    Nurse Navigator (NN) attempted to contact the pt's mother by telephone x2 to perform post hospital discharge assessment. LVMs. No response. Disease Specific:   N/A    Summary of patient's top problems:  1. Breakthrough seizures, Abnormal MRI 2012- -no seizures since admission. Neuro consult. EEG for 3 hours on prolonged monitoring- showed no seizures, just occasional sharp wave or spike wave seen in the bifrontal head regions infrequently. MRI 6/14/19 done under conscious sedation: No acute changes no masses. Stable findings. Keppra & Topamax levels both therapeutic though Topamax is low therapeutic. Vimpat d/c'd because pt said he got agitated on it, but may have just been the wires. Continuing his old dose of Keppra and increased dose of Topamax at 100 mg twice daily. Very difficult case, due to patient's Aspburgers Syndrome, and recurring seizures despite therapeutic drugs and doses of medications. Outpt Neuro f/u w/ Dr Elaine Holman. 2. Alcohol abuse, Autism / Asperger Syndrome, Depression- last drink 2 weeks PTA.  Was consuming 6-8 beers daily for 20 years. Folic Acid and Thiamine supplemented in hosp.     Recently prescribed Zoloft but has not started it yet. Ok to start as prescribed. ALCOHOL(ETHYL),SERUM level <10.    3. HTN -BP stable. Continue Lisinopril.      Home Health orders at discharge: [address]: n/a  Date of initial visit: [address] ordered/company: none  Durable Medical Equipment received: n/a    Barriers to care? TBD    Advance Care Planning:   Does patient have an Advance Directive:  not on file     Medication(s):   New Medications at Discharge: none    Changed Medications at Discharge:   topiramate (TOPAMAX) 100 mg tablet Take 1 Tab by mouth two (2) times a day. Qty: 60 Tab, Refills: 2         Discontinued Medications at Discharge: none    Medication reconciliation was not performed- unable to reach pt/,other. LVMs. Referral to Pharm D needed: no     Current Outpatient Medications   Medication Sig    topiramate (TOPAMAX) 100 mg tablet Take 1 Tab by mouth two (2) times a day.  sertraline (ZOLOFT) 25 mg tablet Take 1 Tab by mouth daily. Indications: Repeated Episodes of Anxiety    lisinopril (PRINIVIL, ZESTRIL) 10 mg tablet TAKE 1 TABLET EVERY DAY FOR HYPERTENSION. SCHEDULE APPT. FOR FURTHER REFILLS ([phone])    levETIRAcetam (KEPPRA) 750 mg tablet TAKE 2 TABLETS TWICE DAILY    MULTIVITAMIN PO Take 1 Tab by mouth daily. No current facility-administered medications for this visit. There are no discontinued medications. [facility] follow up appointment(s):   Future Appointments   Date Time Provider Stephany Meii   7/16/2019  1:40 PM Rula Dumont  [facility] [place]    8/14/2019  2:00 PM Lakhwinder Ray  [facility]      Non-[facility] follow up appointment(s): none    [facility]:  out of service area     Goals Addressed                 This Visit's Progress     Transitions of Care- colaboration & care coordination to prevent complications post hospitalization. 6/18/19- no response from mother to 6/17/19 NN message.  2nd attempt to contact. LVM. Per EMR review pt attended 6/17/19 Cardio f/u w/ Dr Mariza Dewitt. \"Tachycardia. Concern for possible arrhythmia. Will proceed with a one week loop monitor\". Plan- Loop Monitor as per Cardio. NEXT NN F/U ~ 1 WEEK- ID.        6/17/19- adm 6/10-6/14 not a readm. [address] stay 5/3019-6/1/19 was Observation (outpt in a bed). Attempted to contact mother Bel Yanes. Message left. Per EMR review pt has Cardio appt today @ 1:40 PM w/ DR Mariza Dewitt. Neuro appt w/ Dr Rena Gutierrez 7/16/19 @ 1:40 pm. Plan- 2nd attempt to contact mother on 6/18/19 if no return call-ID.     6/11/19- received CC [facility] ADT message informing of pt's 6/10/19 admission Dx Seizures. Per EMR review pt attended 6/6/19 post hosp [facility] appt. Plan- NN to monitor EMR for update re d/c date/plan-ID    6/6/19- mother left VMM on 6/5/19 @ 3:33 PM informing NN pt would be attending 6/6/19 PCP [facility] appt @ 10:30 AM. Plan- pt to attend scheduled [facility] PCP f/u appt-ID.    6/4/19- call from pt's mother in response to NN message. Mother reported checked on pt during her lunch break. Doing ok. No further seizures. Taking meds as prescribed. NN informed of PCP appt on 6/6/19. Mother reported pt doesn't drive. Pt dependent on mother/father to transport to appts. Mother to check w/ employer if can take take to bring pt. Plan- mother to contact NN 6/5/19 morning to confirm appt-ID.    6/4/19- attempted to contact pt & 2 Emerg Contacts listed on PHI. Messages left x3. Plan- PCP f/u scheduled for 6/6/19 @ 10:30 AM. Message left w/ appt date/time. Will attempt to contact pt in 1 day if no response-ID.

## 2019-06-20 ENCOUNTER — TELEPHONE (OUTPATIENT)
Dept: CARDIOLOGY CLINIC | Age: 44
End: 2019-06-20

## 2019-06-28 ENCOUNTER — OFFICE VISIT (OUTPATIENT)
Dept: INTERNAL MEDICINE CLINIC | Facility: CLINIC | Age: 44
End: 2019-06-28

## 2019-06-28 VITALS
OXYGEN SATURATION: 96 % | SYSTOLIC BLOOD PRESSURE: 101 MMHG | HEIGHT: 70 IN | DIASTOLIC BLOOD PRESSURE: 65 MMHG | WEIGHT: 185.2 LBS | TEMPERATURE: 97.6 F | RESPIRATION RATE: 18 BRPM | BODY MASS INDEX: 26.51 KG/M2 | HEART RATE: 85 BPM

## 2019-06-28 DIAGNOSIS — J06.9 UPPER RESPIRATORY TRACT INFECTION, UNSPECIFIED TYPE: Primary | ICD-10-CM

## 2019-06-28 RX ORDER — AZITHROMYCIN 250 MG/1
TABLET, FILM COATED ORAL
Qty: 5 TAB | Refills: 0 | Status: SHIPPED | OUTPATIENT
Start: 2019-06-28 | End: 2019-08-14

## 2019-06-28 NOTE — PATIENT INSTRUCTIONS
Drink plenty of fluids and get adequate rest.    Please contact our office if your symptoms worsen or do not improve. Please call 911 or go directly to the Emergency Department if you develop shortness of breath, chest pain, difficulty breathing or worsening of your symptoms. Upper Respiratory Infection (Cold): Care Instructions  Your Care Instructions    An upper respiratory infection, or URI, is an infection of the nose, sinuses, or throat. URIs are spread by coughs, sneezes, and direct contact. The common cold is the most frequent kind of URI. The flu and sinus infections are other kinds of URIs. Almost all URIs are caused by viruses. Antibiotics won't cure them. But you can treat most infections with home care. This may include drinking lots of fluids and taking over-the-counter pain medicine. You will probably feel better in 4 to 10 days. The doctor has checked you carefully, but problems can develop later. If you notice any problems or new symptoms, get medical treatment right away. Follow-up care is a key part of your treatment and safety. Be sure to make and go to all appointments, and call your doctor if you are having problems. It's also a good idea to know your test results and keep a list of the medicines you take. How can you care for yourself at home? · To prevent dehydration, drink plenty of fluids, enough so that your urine is light yellow or clear like water. Choose water and other caffeine-free clear liquids until you feel better. If you have kidney, heart, or liver disease and have to limit fluids, talk with your doctor before you increase the amount of fluids you drink. · Take an over-the-counter pain medicine, such as acetaminophen (Tylenol), ibuprofen (Advil, Motrin), or naproxen (Aleve). Read and follow all instructions on the label. · Before you use cough and cold medicines, check the label.  These medicines may not be safe for young children or for people with certain health problems. · Be careful when taking over-the-counter cold or flu medicines and Tylenol at the same time. Many of these medicines have acetaminophen, which is Tylenol. Read the labels to make sure that you are not taking more than the recommended dose. Too much acetaminophen (Tylenol) can be harmful. · Get plenty of rest.  · Do not smoke or allow others to smoke around you. If you need help quitting, talk to your doctor about stop-smoking programs and medicines. These can increase your chances of quitting for good. When should you call for help? Call 911 anytime you think you may need emergency care. For example, call if:    · You have severe trouble breathing.    Call your doctor now or seek immediate medical care if:    · You seem to be getting much sicker.     · You have new or worse trouble breathing.     · You have a new or higher fever.     · You have a new rash.    Watch closely for changes in your health, and be sure to contact your doctor if:    · You have a new symptom, such as a sore throat, an earache, or sinus pain.     · You cough more deeply or more often, especially if you notice more mucus or a change in the color of your mucus.     · You do not get better as expected. Where can you learn more? Go to http://margarette-anthony.info/. Enter C822 in the search box to learn more about \"Upper Respiratory Infection (Cold): Care Instructions. \"  Current as of: September 5, 2018  Content Version: 11.9  © 7651-3207 [employer]. Care instructions adapted under license by [employer] (which disclaims liability or warranty for this information). If you have questions about a medical condition or this instruction, always ask your healthcare professional. [place] [address] any warranty or liability for your use of this information.

## 2019-06-28 NOTE — PROGRESS NOTES
Mayito Kwon  Identified pt with two pt identifiers(name and ). Chief Complaint   Patient presents with    Sinus Infection     runny nose, sore throat, heart monitor, mucus    Fatigue       Reviewed record In preparation for visit and have obtained necessary documentation. Has info on advanced directive but has not filled them out. 1. Have you been to the ER, urgent care clinic or hospitalized since your last visit? Was in hospital for 2 weks    2. Have you seen or consulted any other health care providers outside of the [address] [place] [address] Marcelo since your last visit? Include any pap smears or colon screening. No    Vitals reviewed with provider. Health Maintenance reviewed: There are no preventive care reminders to display for this patient. doin  Wt Readings from Last 3 Encounters:   19 185 lb 3.2 oz (84 kg)   19 185 lb 12.8 oz (84.3 kg)   19 189 lb 3.2 oz (85.8 kg)   g things Not at all   Feeling down, depressed, irritable, or hopeless Not at all   Total Score PHQ 2 0    2019           Getting dressed    Bathing or showering                                Shopping for groceries    Driving    Climbing a flight of stairs  Learning Assessment 2017 4/3/2017 2016 2014   PRIMARY LEARNER Patient Patient Patient Patient   HIGHEST LEVEL OF EDUCATION - PRIMARY LEARNER  - - GRADUATED HIGH SCHOOL OR GED GRADUATED HIGH SCHOOL OR GED   BARRIERS PRIMARY LEARNER - - [address] CAREGIVER - - Yes -   Aamir [address]    NEED - - No -   LEARNER PREFERENCE PRIMARY DEMONSTRATION DEMONSTRATION READING READING   LEARNING SPECIAL TOPICS - - no -   ANSWERED BY patient patient self patient   RELATIONSHIP SELF SELF SELF SELF

## 2019-06-28 NOTE — PROGRESS NOTES
GRACE Quiñones is a 38y.o. year old male patient of Tom Dumas MD who presents with c/o sinus inf. Pt has history of has Autism, Asperger syndrome, Seizure ([address] Hypertension, EtOH dependence ([address] Alcohol withdrawal ([address] Advance care planning, Brain cyst, Abnormal MRI of head, Altered mental status, unspecified, Complex partial seizure evolving to generalized seizure ([address] Bilateral carotid artery stenosis, and Convulsive syncope on their problem list..    C/o runny nose, sore throat, fatigue, mucus build-up. Has had dry cough. Sx started when pt got out of hospital on 6-14. Denies fevers or chills. Denies SOB. Mom has also been sick, has been on two antibiotics. Pt had recent hospitalization for seizures on 5/30-6/1 and 6/10-6/14, follows with Dr. Yvonne Fan. Currently wearing a cardiac monitor per Cardiology to eval tachycardia. Denies chest pain.         Patient Active Problem List   Diagnosis Code    Autism F84.0    Asperger syndrome F84.5    Seizure ([address] R56.9    Hypertension I10    EtOH dependence ([address] F10.20    Alcohol withdrawal ([address] F10.239    Advance care planning Z71.89    Brain cyst G93.0    Abnormal MRI of head R93.0    Altered mental status, unspecified R41.82    Complex partial seizure evolving to generalized seizure ([address] G40.209    Bilateral carotid artery stenosis I65.23    Convulsive syncope R55     Past Medical History:   Diagnosis Date    Asperger syndrome 12/14/2011    Autism     dx 2010- highly functional    Other ill-defined conditions(799.89)     aspergers, syncopal episodes    Seizure disorder ([address] 5/22/2012    Seizures ([address]      Past Surgical History:   Procedure Laterality Date    HX GI      pyloric stenosis 1976    HX HEENT      adenoids 1978     Social History     Socioeconomic History    Marital status: SINGLE     Spouse name: Not on file    Number of children: Not on file    Years of education: Not on file    Highest education level: Not on file   Tobacco Use    Smoking status: Never Smoker    Smokeless tobacco: Never Used   Substance and Sexual Activity    Alcohol use: Yes     Alcohol/week: 5.0 oz     Types: 10 Cans of beer per week     Frequency: Monthly or less     Drinks per session: 1 or 2     Comment: occasional    Drug use: No     Family History   Problem Relation Age of Onset    Diabetes Father     Heart Disease Father     Cancer Mother         Breast    Anxiety Mother     COPD Mother     Other Mother         Neuropathy    Sleep Apnea Mother     Arthritis-osteo Mother     Other Brother         Pituitary tumor     Allergies   Allergen Reactions    Aspartame Other (comments)     Family believes it causes his seizures       MEDICATIONS  Current Outpatient Medications   Medication Sig    topiramate (TOPAMAX) 100 mg tablet Take 1 Tab by mouth two (2) times a day.  sertraline (ZOLOFT) 25 mg tablet Take 1 Tab by mouth daily. Indications: Repeated Episodes of Anxiety    lisinopril (PRINIVIL, ZESTRIL) 10 mg tablet TAKE 1 TABLET EVERY DAY FOR HYPERTENSION. SCHEDULE APPT. FOR FURTHER REFILLS ([phone])    levETIRAcetam (KEPPRA) 750 mg tablet TAKE 2 TABLETS TWICE DAILY    MULTIVITAMIN PO Take 1 Tab by mouth daily. No current facility-administered medications for this visit. REVIEW OF SYSTEMS  Per HPI        Visit Vitals  /65 (BP 1 Location: Left arm, BP Patient Position: Sitting)   Pulse 85   Temp 97.6 °F (36.4 °C) (Oral)   Resp 18   Ht 5' 10\" (1.778 m)   Wt 185 lb 3.2 oz (84 kg)   SpO2 96%   BMI 26.57 kg/m²         General: Well-developed, well-nourished. In no distress. A&O x 3. Head: Normocephalic, atraumatic. Eyes: Conjunctiva clear. Pupils equal, round, reactive to light. Extraocular movements intact. Ears/Nose: TM's and ear canals normal bilaterally. Nares normal. Septum midline. Normal nasal mucosa. Mild maxillary sinus tenderness. Mouth/Throat: Lips, mucosa, and tongue normal. Oropharynx benign. Neck: Supple, symmetrical, trachea midline, no lymphadenopathy, no carotid bruits, no JVD, thyroid: not enlarged, symmetric, no tenderness/mass/nodules. Lungs: Clear to auscultation bilaterally. No crackles or wheezes. No use of accessory muscles. Speaks in full sentences without SOB. Frequent cough present. Chest Wall: No tenderness or deformity. Heart: RRR, normal S1 and S2, no murmur, click, rub, or gallop. Skin: No rashes or lesions. Neurovasc: No edema appreciated. Musculoskeletal: Gait normal.   Psychiatric: Normal mood and affect. Behavior is normal.       No results found for any visits on 06/28/19. ASSESSMENT and PLAN  Diagnoses and all orders for this visit:    1. Upper respiratory tract infection, unspecified type  -     azithromycin (ZITHROMAX Z-CHASIDY) 250 mg tablet; Take 2 tabs day 1, 1 tab the next 4 days  -treat as bacterial given duration and recent hospital stay  -contact office if sx worsen or do not improve          Patient Instructions     Drink plenty of fluids and get adequate rest.    Please contact our office if your symptoms worsen or do not improve. Please call 911 or go directly to the Emergency Department if you develop shortness of breath, chest pain, difficulty breathing or worsening of your symptoms. Upper Respiratory Infection (Cold): Care Instructions  Your Care Instructions    An upper respiratory infection, or URI, is an infection of the nose, sinuses, or throat. URIs are spread by coughs, sneezes, and direct contact. The common cold is the most frequent kind of URI. The flu and sinus infections are other kinds of URIs. Almost all URIs are caused by viruses. Antibiotics won't cure them. But you can treat most infections with home care. This may include drinking lots of fluids and taking over-the-counter pain medicine. You will probably feel better in 4 to 10 days. The doctor has checked you carefully, but problems can develop later.  If you notice any problems or new symptoms, get medical treatment right away. Follow-up care is a key part of your treatment and safety. Be sure to make and go to all appointments, and call your doctor if you are having problems. It's also a good idea to know your test results and keep a list of the medicines you take. How can you care for yourself at home? · To prevent dehydration, drink plenty of fluids, enough so that your urine is light yellow or clear like water. Choose water and other caffeine-free clear liquids until you feel better. If you have kidney, heart, or liver disease and have to limit fluids, talk with your doctor before you increase the amount of fluids you drink. · Take an over-the-counter pain medicine, such as acetaminophen (Tylenol), ibuprofen (Advil, Motrin), or naproxen (Aleve). Read and follow all instructions on the label. · Before you use cough and cold medicines, check the label. These medicines may not be safe for young children or for people with certain health problems. · Be careful when taking over-the-counter cold or flu medicines and Tylenol at the same time. Many of these medicines have acetaminophen, which is Tylenol. Read the labels to make sure that you are not taking more than the recommended dose. Too much acetaminophen (Tylenol) can be harmful. · Get plenty of rest.  · Do not smoke or allow others to smoke around you. If you need help quitting, talk to your doctor about stop-smoking programs and medicines. These can increase your chances of quitting for good. When should you call for help? Call 911 anytime you think you may need emergency care.  For example, call if:    · You have severe trouble breathing.    Call your doctor now or seek immediate medical care if:    · You seem to be getting much sicker.     · You have new or worse trouble breathing.     · You have a new or higher fever.     · You have a new rash.    Watch closely for changes in your health, and be sure to contact your doctor if:    · You have a new symptom, such as a sore throat, an earache, or sinus pain.     · You cough more deeply or more often, especially if you notice more mucus or a change in the color of your mucus.     · You do not get better as expected. Where can you learn more? Go to http://margarette-anthony.info/. Enter F358 in the search box to learn more about \"Upper Respiratory Infection (Cold): Care Instructions. \"  Current as of: September 5, 2018  Content Version: 11.9  © 8749-5123 [employer]. Care instructions adapted under license by [facility] (which disclaims liability or warranty for this information). If you have questions about a medical condition or this instruction, always ask your healthcare professional. [place] [address] any warranty or liability for your use of this information. Please keep your follow-up appointment with Billie Noe MD.         Health Maintenance Due   Topic Date Due    Pneumococcal 0-64 years (1 of 1 - PPSV23) [birth year]1982       I have discussed the diagnosis with the patient and the intended plan as seen in the above orders. Patient is in agreement. The patient has received an after-visit summary and questions were answered concerning future plans. I have discussed medication side effects and warnings with the patient as well. Warning signs for the above conditions were discussed including when to call our office or go to the emergency room. The nurse provided the patient and/or family with advanced directive information if needed and encouraged the patient to provide a copy to the office when available.

## 2019-07-03 ENCOUNTER — TELEPHONE (OUTPATIENT)
Dept: CARDIOLOGY CLINIC | Age: 44
End: 2019-07-03

## 2019-07-03 ENCOUNTER — TELEPHONE (OUTPATIENT)
Dept: NEUROLOGY | Age: 44
End: 2019-07-03

## 2019-07-03 NOTE — TELEPHONE ENCOUNTER
Patient's mother, Veverly Adjutant, called requesting the patient's heart monitor results. She stated that you can call the patient's father, Lily Arango, back at [phone]. She stated that they are waiting for the results to see if he is able to go back to work. Please advise.   Thanks

## 2019-07-03 NOTE — TELEPHONE ENCOUNTER
Dr. Delmi Barragan, I spoke with patient's mother and she is requesting a letter to be faxed stating ok for patient to work, from a Neurological perspective. Patient got a new job at [employer] and needs this letter to start.   Thanks

## 2019-07-03 NOTE — TELEPHONE ENCOUNTER
Returned call to Treva Conklin patients father. He was given test results and denies any further questions at this time.

## 2019-07-03 NOTE — TELEPHONE ENCOUNTER
Azalea Gonzales MD  You 2 minutes ago (11:45 AM)      Please let pt know loop noted benign PACs and occasional very brief tachycardia.  As he is having no symptoms, no therapy is indicated.  If he develops palpitations, could consider low dose BB toprol 25 mg once daily.  Can f/u on prn basis here.  thx

## 2019-07-11 ENCOUNTER — TELEPHONE (OUTPATIENT)
Dept: NEUROLOGY | Age: 44
End: 2019-07-11

## 2019-07-11 NOTE — TELEPHONE ENCOUNTER
Pt's mom calling stating something about a letter that needs to be faxed- Please call back   Fax: [phone]

## 2019-07-11 NOTE — TELEPHONE ENCOUNTER
Patient's mother is requesting another letter for patient's employer including a \"seizure protocol\". She would like this completed ASAP because his employer is holding a job for him.  She will fax the requirements for the letter this AM.

## 2019-07-29 NOTE — TELEPHONE ENCOUNTER
Requested Prescriptions     Pending Prescriptions Disp Refills    topiramate (TOPAMAX) 100 mg tablet 60 Tab 2     Sig: Take 1 Tab by mouth two (2) times a day.

## 2019-07-30 RX ORDER — TOPIRAMATE 100 MG/1
100 TABLET, FILM COATED ORAL 2 TIMES DAILY
Qty: 60 TAB | Refills: 2 | Status: SHIPPED | OUTPATIENT
Start: 2019-07-30 | End: 2019-10-25 | Stop reason: SDUPTHER

## 2019-08-14 ENCOUNTER — OFFICE VISIT (OUTPATIENT)
Dept: INTERNAL MEDICINE CLINIC | Facility: CLINIC | Age: 44
End: 2019-08-14

## 2019-08-14 VITALS
SYSTOLIC BLOOD PRESSURE: 95 MMHG | DIASTOLIC BLOOD PRESSURE: 60 MMHG | HEIGHT: 70 IN | WEIGHT: 185 LBS | BODY MASS INDEX: 26.48 KG/M2 | OXYGEN SATURATION: 98 % | TEMPERATURE: 97.5 F | RESPIRATION RATE: 16 BRPM | HEART RATE: 82 BPM

## 2019-08-14 DIAGNOSIS — I10 ESSENTIAL HYPERTENSION: Primary | ICD-10-CM

## 2019-08-14 DIAGNOSIS — F10.21 ALCOHOL DEPENDENCE IN REMISSION (HCC): ICD-10-CM

## 2019-08-14 DIAGNOSIS — R56.9 SEIZURE (HCC): ICD-10-CM

## 2019-08-14 DIAGNOSIS — F41.1 GENERALIZED ANXIETY DISORDER: ICD-10-CM

## 2019-08-14 NOTE — PROGRESS NOTES
Christiano Gibson  Identified pt with two pt identifiers(name and ). Chief Complaint   Patient presents with    Alcohol Problem    Anxiety       1. Have you been to the ER, urgent care clinic since your last visit? Hospitalized since your last visit? NO    2. Have you seen or consulted any other health care providers outside of the [address] [place] [address] since your last visit? Include any pap smears or colon screening. NO    Today's provider has been notified of reason for visit, vitals and flowsheets obtained on patients. Patient declines information on advanced directives. Reviewed record In preparation for visit, huddled with provider and have obtained necessary documentation      Health Maintenance Due   Topic    Influenza Age 5 to Adult        Wt Readings from Last 3 Encounters:   19 185 lb 3.2 oz (84 kg)   19 185 lb 12.8 oz (84.3 kg)   19 189 lb 3.2 oz (85.8 kg)     Temp Readings from Last 3 Encounters:   19 97.6 °F (36.4 °C) (Oral)   19 98.1 °F (36.7 °C)   19 97.6 °F (36.4 °C) (Oral)     BP Readings from Last 3 Encounters:   19 101/65   19 110/80   19 91/56     Pulse Readings from Last 3 Encounters:   19 85   19 88   19 97     There were no vitals filed for this visit.       Learning Assessment:  :     Learning Assessment 2017 4/3/2017 2016 2014   PRIMARY LEARNER Patient Patient Patient Patient   HIGHEST LEVEL OF EDUCATION - PRIMARY LEARNER  - - GRADUATED HIGH SCHOOL OR GED GRADUATED HIGH SCHOOL OR GED   BARRIERS PRIMARY LEARNER - - COGNITIVE OTHER   CO-LEARNER CAREGIVER - - Yes -   R [address] - - GRADUATED HIGH SCHOOL OR GED -   [address] Murillo ENGLISH ENGLISH    NEED - - No -   LEARNER PREFERENCE PRIMARY DEMONSTRATION DEMONSTRATION READING READING   LEARNING SPECIAL TOPICS - - no -   ANSWERED BY patient patient self patient RELATIONSHIP SELF SELF SELF SELF       Depression Screening:  :     3 most recent PHQ Screens 6/6/2019   Little interest or pleasure in doing things Not at all   Feeling down, depressed, irritable, or hopeless Not at all   Total Score PHQ 2 0       Fall Risk Assessment:  :     No flowsheet data found. Abuse Screening:  :     No flowsheet data found. ADL Screening:  :     ADL Assessment 6/6/2019   Feeding yourself No Help Needed   Getting from bed to chair No Help Needed   Getting dressed No Help Needed   Bathing or showering No Help Needed   Walk across the room (includes cane/walker) No Help Needed   Using the telphone No Help Needed   Taking your medications Help Needed   Preparing meals No Help Needed   Managing money (expenses/bills) No Help Needed   Moderately strenuous housework (laundry) No Help Needed   Shopping for personal items (toiletries/medicines) No Help Needed   Shopping for groceries No Help Needed   Driving No Help Needed   Climbing a flight of stairs No Help Needed   Getting to places beyond walking distances No Help Needed                 Medication reconciliation up to date and corrected with patient at this time.

## 2019-08-14 NOTE — PATIENT INSTRUCTIONS
Patient Instructions  Your blood pressure is on the lower side of normal at 95/60 today. Decrease lisinopril 10 mg from 1 tablet a day to only 1/2 tablet a day.

## 2019-08-14 NOTE — PROGRESS NOTES
CC:   Chief Complaint   Patient presents with    Alcohol Problem    Anxiety       HISTORY OF PRESENT ILLNESS  Marissa Ortega is a 38 y.o. male. Presents for follow up evaluation. Last seen in clinic by me on 6/6/19. He has HTN, seizure disorder since 2011, alcohol dependence, autism, and Asperger's syndrome. Was hospitalized at [facility] from 5/30-6/1/19 and at [address] [place] [address] from 6/10-6/14/19 with seizures. Follows with Dr. Cesilia Balderrama (Neurology). Today he has no complaints. Denies having any seizures since last hospital discharge. Also stopped drinking beer completely; none since last hospitalization. No longer having palpitations. Was evaluated by Dr. Israel Mccarthy; Holter monitor showed only benign PAC's. Thinks his anxiety is better. Soc Hx  Lives alone in a house. Single. No children. Never smoker. No beer since 6/9/19; used to drink 6-8 beers a day for the past 20 yrs. Denies recreational drug use. Gets regular exercise by walking. Does not drive. His mother puts his medications in a pill box for him to take. ROS  A complete review of systems was performed and is negative except for those mentioned in the HPI.      Patient Active Problem List   Diagnosis Code    Autism F84.0    Asperger syndrome F84.5    Seizure ([address] R56.9    Hypertension I10    EtOH dependence ([address] F10.20    Alcohol withdrawal ([address] F10.239    Advance care planning Z71.89    Brain cyst G93.0    Abnormal MRI of head R93.0    Altered mental status, unspecified R41.82    Complex partial seizure evolving to generalized seizure ([address] G40.209    Bilateral carotid artery stenosis I65.23    Convulsive syncope R55     Past Medical History:   Diagnosis Date    Asperger syndrome 12/14/2011    Autism     dx 2010- highly functional    Other ill-defined conditions(799.89)     aspergers, syncopal episodes    Seizure disorder ([address] 5/22/2012    Seizures ([facility])      Allergies   Allergen Reactions    Aspartame Other (comments)     Family believes it causes his seizures       Current Outpatient Medications   Medication Sig Dispense Refill    topiramate (TOPAMAX) 100 mg tablet Take 1 Tab by mouth two (2) times a day. 60 Tab 2    sertraline (ZOLOFT) 25 mg tablet Take 1 Tab by mouth daily. Indications: Repeated Episodes of Anxiety 30 Tab 1    lisinopril (PRINIVIL, ZESTRIL) 10 mg tablet TAKE 1 TABLET EVERY DAY FOR HYPERTENSION. SCHEDULE APPT. FOR FURTHER REFILLS ([phone]) 90 Tab 3    levETIRAcetam (KEPPRA) 750 mg tablet TAKE 2 TABLETS TWICE DAILY 360 Tab 3    MULTIVITAMIN PO Take 1 Tab by mouth daily. PHYSICAL EXAM  Visit Vitals  BP 95/60 (BP 1 Location: Left arm, BP Patient Position: Sitting)   Pulse 82   Temp 97.5 °F (36.4 °C) (Oral)   Resp 16   Ht 5' 10\" (1.778 m)   Wt 185 lb (83.9 kg)   SpO2 98%   BMI 26.54 kg/m²       General: Well-developed and well-nourished, no distress. HEENT:  Head normocephalic/atraumatic, no scleral icterus  Lungs:  Clear to ausculation bilaterally. Good air movement. Heart:  Regular rate and rhythm, normal S1 and S2, no murmur, gallop, or rub  Extremities: No clubbing, cyanosis, or edema. Neurological: Alert and oriented. Psychiatric: Normal mood and affect. Behavior is normal.         ASSESSMENT AND PLAN    ICD-10-CM ICD-9-CM    1. Essential hypertension I10 401.9    2. Seizure ([facility] R56.9 780.39    3. Alcohol dependence in remission ([facility] F10.21 303.93    4. Generalized anxiety disorder F41.1 300.02      Diagnoses and all orders for this visit:    1. Essential hypertension  Patient Instructions  Your blood pressure is on the lower side of normal at 95/60 today. Decrease lisinopril 10 mg from 1 tablet a day to only 1/2 tablet a day. 2. Seizure ([address]  Controlled on present doses of Topamax and Keppra. 3. Alcohol dependence in remission ([facility]    4. Generalized anxiety disorder  Continue sertraline 25 mg daily.       Follow-up and Dispositions    · Return in about 3 months (around 11/14/2019), or if symptoms worsen or fail to improve, for HTN, anxiety. I have discussed the diagnosis with the patient and the intended plan as seen in the above orders. Patient is in agreement. The patient has received an after-visit summary and questions were answered concerning future plans. I have discussed medication side effects and warnings with the patient as well.

## 2019-09-06 ENCOUNTER — OFFICE VISIT (OUTPATIENT)
Dept: INTERNAL MEDICINE CLINIC | Facility: CLINIC | Age: 44
End: 2019-09-06

## 2019-09-06 VITALS
BODY MASS INDEX: 26.14 KG/M2 | WEIGHT: 182.6 LBS | OXYGEN SATURATION: 95 % | HEIGHT: 70 IN | SYSTOLIC BLOOD PRESSURE: 90 MMHG | TEMPERATURE: 98.3 F | RESPIRATION RATE: 14 BRPM | DIASTOLIC BLOOD PRESSURE: 56 MMHG | HEART RATE: 87 BPM

## 2019-09-06 DIAGNOSIS — R56.9 SEIZURE (HCC): Primary | ICD-10-CM

## 2019-09-06 DIAGNOSIS — E16.2 LOW BLOOD SUGAR: ICD-10-CM

## 2019-09-06 DIAGNOSIS — F41.1 GENERALIZED ANXIETY DISORDER: ICD-10-CM

## 2019-09-06 DIAGNOSIS — I95.9 HYPOTENSION, UNSPECIFIED HYPOTENSION TYPE: ICD-10-CM

## 2019-09-06 LAB — HBA1C MFR BLD HPLC: 5.1 %

## 2019-09-06 RX ORDER — SERTRALINE HYDROCHLORIDE 25 MG/1
25 TABLET, FILM COATED ORAL DAILY
Qty: 90 TAB | Refills: 1 | Status: SHIPPED | OUTPATIENT
Start: 2019-09-06 | End: 2019-12-02 | Stop reason: SDUPTHER

## 2019-09-06 NOTE — PROGRESS NOTES
HPI  Mr. Migel Whitehead is a 38y.o. year old male, he is seen today for   Neighbor saw him fall, called rescue squad who called mother. Says glucose level was 63. Seizures started about 8 years ago. No clear etiology. Seizures are grand mal. Mother didn't witness. Post ictal confusion. No change in diet or activity. No alcohol in past weeks. Doesn't eat snacks. May have had seizure month ago - mother not sure. Had several in June. Patient denies pain. No vision changes. No n/v. Mother hasn't noticed change in mental status. Forgot to take evening dose of medications last night. Chief Complaint   Patient presents with    Blood sugar problem     Room 2B// Called EMT last night, BS was 69, declined transport // has happened in the past     Seizure     has neurologFilomena bach        Prior to Admission medications    Medication Sig Start Date End Date Taking? Authorizing Provider   sertraline (ZOLOFT) 25 mg tablet Take 1 Tab by mouth daily. Indications: Repeated Episodes of Anxiety 9/6/19  Yes Annika Hughes MD   topiramate (TOPAMAX) 100 mg tablet Take 1 Tab by mouth two (2) times a day. 7/30/19  Yes Jose Santos MD   levETIRAcetam (KEPPRA) 750 mg tablet TAKE 2 TABLETS TWICE DAILY 1/16/19  Yes Jose Santos MD   MULTIVITAMIN PO Take 1 Tab by mouth daily. Yes Provider, Historical         Allergies   Allergen Reactions    Aspartame Other (comments)     Family believes it causes his seizures         REVIEW OF SYSTEMS:  Per HPI    PHYSICAL EXAM:  Visit Vitals  BP 90/56   Pulse 87   Temp 98.3 °F (36.8 °C) (Oral)   Resp 14   Ht 5' 10\" (1.778 m)   Wt 182 lb 9.6 oz (82.8 kg)   SpO2 95%   BMI 26.20 kg/m²     Constitutional: Appears well-developed and well-nourished. No distress. HENT:   Head: +slight swelling with redness above left eye with some scratches on face and dried blood under right nostril  Normocephalic and atraumatic. Eyes: No scleral icterus.    Ears: tm wnl on left, scarred on right  Cardiovascular: Normal S1/S2, regular rhythm. No murmurs, rubs, or gallops. Pulmonary/Chest: Effort normal and breath sounds normal. No respiratory distress. No wheezes, rhonchi, or rales. Ext: No edema. Neurological: Alert. CN II-XII intact, strength 5/5 b/l UE and LE  Psychiatric: Normal mood and affect. Behavior is normal.     Lab Results   Component Value Date/Time    Sodium 140 06/11/2019 05:13 AM    Potassium 3.9 06/11/2019 05:13 AM    Chloride 113 (H) 06/11/2019 05:13 AM    CO2 21 06/11/2019 05:13 AM    Anion gap 6 06/11/2019 05:13 AM    Glucose 89 06/11/2019 05:13 AM    BUN 12 06/11/2019 05:13 AM    Creatinine 0.91 06/11/2019 05:13 AM    BUN/Creatinine ratio 13 06/11/2019 05:13 AM    GFR est AA >60 06/11/2019 05:13 AM    GFR est non-AA >60 06/11/2019 05:13 AM    Calcium 8.1 (L) 06/11/2019 05:13 AM    Bilirubin, total 0.5 05/30/2019 05:41 PM    AST (SGOT) 21 05/30/2019 05:41 PM    Alk. phosphatase 72 05/30/2019 05:41 PM    Protein, total 6.7 05/30/2019 05:41 PM    Albumin 3.6 05/30/2019 05:41 PM    Globulin 3.1 05/30/2019 05:41 PM    A-G Ratio 1.2 05/30/2019 05:41 PM    ALT (SGPT) 24 05/30/2019 05:41 PM     Lab Results   Component Value Date/Time    Hemoglobin A1c 5.3 01/03/2018 02:32 PM    Hemoglobin A1c 5.4 01/04/2017 01:35 PM      Lab Results   Component Value Date/Time    Cholesterol, total 179 01/03/2018 02:32 PM    HDL Cholesterol 90 01/03/2018 02:32 PM    LDL, calculated 75 01/03/2018 02:32 PM    VLDL, calculated 14 01/03/2018 02:32 PM    Triglyceride 72 01/03/2018 02:32 PM          ASSESSMENT/PLAN  Diagnoses and all orders for this visit:    1. Seizure ([address]  -     TOPIRAMATE  -     LEVETIRACETAM (KEPPRA)  -     METABOLIC PANEL, COMPREHENSIVE  -     CBC WITH AUTOMATED DIFF    2. Low blood sugar  -     AMB POC HEMOGLOBIN A1C    3. Generalized anxiety disorder  -     sertraline (ZOLOFT) 25 mg tablet; Take 1 Tab by mouth daily. Indications: Repeated Episodes of Anxiety    4.  Hypotension, unspecified hypotension type        Reassured glucose not low for patient without DM. Suggested eating snacks such as cheese stick, nuts, greek yogurt between meals. BP on low side so stop lisinopril. Mother will call neurologist today to discuss new seizure. There are no preventive care reminders to display for this patient. Reviewed plan of care. Patient has provided input and agrees with goals. The nurse provided the patient and/or family with advanced directive information if needed and encouraged the patient to provide a copy to the office when available.

## 2019-09-06 NOTE — PROGRESS NOTES
Dominick Patel  Identified pt with two pt identifiers(name and ). Chief Complaint   Patient presents with    Blood sugar problem     Room 2B// Called EMT last night, BS was 69, declined transport    Seizure     has Filomena scott       1. Have you been to the ER, urgent care clinic since your last visit? Hospitalized since your last visit? NO    2. Have you seen or consulted any other health care providers outside of the [address] [place] [address] since your last visit? Include any pap smears or colon screening. NO      Provider notified of reason for visit, vitals and flowsheets obtained on patients. Patient received paperwork for advance directive during previous visit but has not completed at this time     Reviewed record In preparation for visit, huddled with provider and have obtained necessary documentation      There are no preventive care reminders to display for this patient.     Wt Readings from Last 3 Encounters:   19 182 lb 9.6 oz (82.8 kg)   19 185 lb (83.9 kg)   19 185 lb 3.2 oz (84 kg)     Temp Readings from Last 3 Encounters:   19 97.5 °F (36.4 °C) (Oral)   19 97.6 °F (36.4 °C) (Oral)   19 98.1 °F (36.7 °C)     BP Readings from Last 3 Encounters:   19 95/60   19 101/65   19 110/80     Pulse Readings from Last 3 Encounters:   19 82   19 85   19 88     Vitals:    19 0945   Resp: 14   Weight: 182 lb 9.6 oz (82.8 kg)   Height: 5' 10\" (1.778 m)   PainSc:   0 - No pain         Learning Assessment:  :     Learning Assessment 2017 4/3/2017 2016 2014   PRIMARY LEARNER Patient Patient Patient Patient   HIGHEST LEVEL OF EDUCATION - PRIMARY LEARNER  - - GRADUATED HIGH SCHOOL OR GED GRADUATED HIGH SCHOOL OR GED   BARRIERS PRIMARY LEARNER - - COGNITIVE OTHER   CO-LEARNER CAREGIVER - - Yes -   [address] [place] [address] LANGUAGE ENGLISH ENGLISH ENGLISH ENGLISH    NEED - - No -   LEARNER PREFERENCE PRIMARY DEMONSTRATION DEMONSTRATION READING READING   LEARNING SPECIAL TOPICS - - no -   ANSWERED BY patient patient self patient   RELATIONSHIP SELF SELF SELF SELF       Depression Screening:  :     3 most recent PHQ Screens 6/6/2019   Little interest or pleasure in doing things Not at all   Feeling down, depressed, irritable, or hopeless Not at all   Total Score PHQ 2 0       Fall Risk Assessment:  :     Fall Risk Assessment, last 12 mths 9/6/2019   Able to walk? Yes   Fall in past 12 months? No       Abuse Screening:  :     Abuse Screening Questionnaire 9/6/2019   Do you ever feel afraid of your partner? N   Are you in a relationship with someone who physically or mentally threatens you? N   Is it safe for you to go home? Y       ADL Screening:  :     ADL Assessment 6/6/2019   Feeding yourself No Help Needed   Getting from bed to chair No Help Needed   Getting dressed No Help Needed   Bathing or showering No Help Needed   Walk across the room (includes cane/walker) No Help Needed   Using the telphone No Help Needed   Taking your medications Help Needed   Preparing meals No Help Needed   Managing money (expenses/bills) No Help Needed   Moderately strenuous housework (laundry) No Help Needed   Shopping for personal items (toiletries/medicines) No Help Needed   Shopping for groceries No Help Needed   Driving No Help Needed   Climbing a flight of stairs No Help Needed   Getting to places beyond walking distances No Help Needed         Medication reconciliation up to date and corrected with patient at this time.

## 2019-09-11 LAB
ALBUMIN SERPL-MCNC: 4.6 G/DL (ref 3.5–5.5)
ALBUMIN/GLOB SERPL: 2.2 {RATIO} (ref 1.2–2.2)
ALP SERPL-CCNC: 80 IU/L (ref 39–117)
ALT SERPL-CCNC: 28 IU/L (ref 0–44)
AST SERPL-CCNC: 23 IU/L (ref 0–40)
BASOPHILS # BLD AUTO: 0 X10E3/UL (ref 0–0.2)
BASOPHILS NFR BLD AUTO: 0 %
BILIRUB SERPL-MCNC: 0.5 MG/DL (ref 0–1.2)
BUN SERPL-MCNC: 20 MG/DL (ref 6–24)
BUN/CREAT SERPL: 19 (ref 9–20)
CALCIUM SERPL-MCNC: 9.4 MG/DL (ref 8.7–10.2)
CHLORIDE SERPL-SCNC: 100 MMOL/L (ref 96–106)
CO2 SERPL-SCNC: 21 MMOL/L (ref 20–29)
CREAT SERPL-MCNC: 1.05 MG/DL (ref 0.76–1.27)
EOSINOPHIL # BLD AUTO: 0.1 X10E3/UL (ref 0–0.4)
EOSINOPHIL NFR BLD AUTO: 0 %
ERYTHROCYTE [DISTWIDTH] IN BLOOD BY AUTOMATED COUNT: 13.3 % (ref 12.3–15.4)
GLOBULIN SER CALC-MCNC: 2.1 G/DL (ref 1.5–4.5)
GLUCOSE SERPL-MCNC: 97 MG/DL (ref 65–99)
HCT VFR BLD AUTO: 43.6 % (ref 37.5–51)
HGB BLD-MCNC: 14.9 G/DL (ref 13–17.7)
IMM GRANULOCYTES # BLD AUTO: 0 X10E3/UL (ref 0–0.1)
IMM GRANULOCYTES NFR BLD AUTO: 0 %
LEVETIRACETAM SERPL-MCNC: 63.8 UG/ML (ref 10–40)
LYMPHOCYTES # BLD AUTO: 1.2 X10E3/UL (ref 0.7–3.1)
LYMPHOCYTES NFR BLD AUTO: 9 %
MCH RBC QN AUTO: 30.2 PG (ref 26.6–33)
MCHC RBC AUTO-ENTMCNC: 34.2 G/DL (ref 31.5–35.7)
MCV RBC AUTO: 88 FL (ref 79–97)
MONOCYTES # BLD AUTO: 1.2 X10E3/UL (ref 0.1–0.9)
MONOCYTES NFR BLD AUTO: 9 %
NEUTROPHILS # BLD AUTO: 11.3 X10E3/UL (ref 1.4–7)
NEUTROPHILS NFR BLD AUTO: 82 %
PLATELET # BLD AUTO: 245 X10E3/UL (ref 150–450)
POTASSIUM SERPL-SCNC: 4.3 MMOL/L (ref 3.5–5.2)
PROT SERPL-MCNC: 6.7 G/DL (ref 6–8.5)
RBC # BLD AUTO: 4.93 X10E6/UL (ref 4.14–5.8)
SODIUM SERPL-SCNC: 138 MMOL/L (ref 134–144)
TOPIRAMATE SERPL-MCNC: 6.7 UG/ML (ref 2–25)
WBC # BLD AUTO: 13.9 X10E3/UL (ref 3.4–10.8)

## 2019-09-11 NOTE — PROGRESS NOTES
These are similar to previous values - please follow up with neurologist if you haven't already.  The wbc was high likely due to seizure

## 2019-10-07 ENCOUNTER — OFFICE VISIT (OUTPATIENT)
Dept: NEUROLOGY | Age: 44
End: 2019-10-07

## 2019-10-07 VITALS
SYSTOLIC BLOOD PRESSURE: 122 MMHG | RESPIRATION RATE: 18 BRPM | HEIGHT: 70 IN | DIASTOLIC BLOOD PRESSURE: 80 MMHG | OXYGEN SATURATION: 97 % | HEART RATE: 83 BPM | BODY MASS INDEX: 26.48 KG/M2 | WEIGHT: 185 LBS

## 2019-10-07 DIAGNOSIS — G93.0 BRAIN CYST: ICD-10-CM

## 2019-10-07 DIAGNOSIS — G40.909 SEIZURE DISORDER (HCC): Primary | ICD-10-CM

## 2019-10-07 DIAGNOSIS — F10.20 UNCOMPLICATED ALCOHOL DEPENDENCE (HCC): ICD-10-CM

## 2019-10-07 NOTE — PROGRESS NOTES
Dasha Faulkner is a 38 y.o. male came back for follow-up. He has seizure disorder and autism/asperger syndrome. Since last seen, he has been hospitalized twice for seizures and as per mother both times alcohol was a salam[place] factor. He continues to take Keppra 1500 mg twice daily along with topiramate 100 mg twice daily  Mother is wondering if we could optimize his medications further as he continues to have seizure at least once every month. RECAP  His seizures have been witnessed to be generalized tonic-clonic type . He continues to drink alcohol at least 3-4 drinks most days of the week. His seizures have typically been 24 to 48 hours after his last drink. Mother thinks that he drinks because of his social setback related to underlying autism and his inability to do things that he wants to do. 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  A follow-up MRI was recommended but he has not been able to do it yet. EXAM  Exam:  Visit Vitals  /80   Pulse 83   Resp 18   Ht 5' 10\" (1.778 m)   Wt 83.9 kg (185 lb)   SpO2 97%   BMI 26.54 kg/m²     Gen:Alert, oriented. Speaks very little. CV: RRR  Lungs: non labored breathing  Abd: non distending  Neuro: A&O x 3, no dysarthria or aphasia  CN II-XII: PERRL, EOMI, face symmetric, tongue/palate midline  Motor: strength 5/5 all four ext  Sensory: intact to LT  Gait: normal    LABS/ IMAGING  MRI Results (most recent):  Results from Hospital Encounter encounter on 06/10/19   MRI BRAIN W WO CONT    Narrative EXAM:  MRI BRAIN W WO CONT    INDICATION:    Seizures new or progressive    COMPARISON:  January 23, 2012. CONTRAST: 17 ml Dotarem. TECHNIQUE:    Multiplanar multisequence acquisition without and with contrast of the brain. FINDINGS:  Diffuse motion artifact. Diffusion imaging does not show acute ischemic changes . Minimal nonspecific white matter disease. Ventricle size is normal and stable.   No extra-axial fluid collection hemorrhage or shift. Temporal lobe no show no focal abnormality and appear symmetrical.  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 No new lesion is  seen. Incidental diffuse mucosal thickening within the a stomach air cells bilaterally      Impression  impression: No acute changes no masses. Stable findings. EEG showed occasional bifrontal sharp/spike discharges      ASSESSMENT    ICD-10-CM ICD-9-CM    1. Seizure disorder ([facility]) G40.909 345.90 brivaracetam (BRIVIACT) 100 mg tablet   2. Uncomplicated alcohol dependence ([facility] F10.20 303.90    3. Brain cyst G93.0 348.0    4       Cavernous angioma      PLAN  He continues to have seizures at least once per month  Alcohol use seems to be a key factor in these recurrent seizures  We discussed that alcohol withdrawal related seizures cannot be prevented with the time of medications and he will need to quit  Since he is having some irritability on Keppra, will try to switch him to Briviact 100 mg twice daily  Continue topiramate 100 mg twice daily for now and depending upon his clinical course we may consider increasing the dose  His MRI findings are chronic and stable  Continue periodic follow-up    Hernandez Drew MD  This note will not be viewable in [place].

## 2019-10-07 NOTE — PROGRESS NOTES
Mr. Laz Alan presents today to follow up seizure. Patient reports seizures approximately once monthly. His last seizure was last Friday. Depression screening done on patient.

## 2019-10-16 ENCOUNTER — HOSPITAL ENCOUNTER (EMERGENCY)
Age: 44
Discharge: HOME | End: 2019-10-16
Attending: EMERGENCY MEDICINE
Payer: MEDICARE

## 2019-10-16 VITALS
OXYGEN SATURATION: 96 % | HEART RATE: 85 BPM | DIASTOLIC BLOOD PRESSURE: 79 MMHG | TEMPERATURE: 97.8 F | RESPIRATION RATE: 16 BRPM | SYSTOLIC BLOOD PRESSURE: 125 MMHG

## 2019-10-16 DIAGNOSIS — R56.9 SEIZURE (HCC): Primary | ICD-10-CM

## 2019-10-16 DIAGNOSIS — Z86.69 HISTORY OF SEIZURE DISORDER: ICD-10-CM

## 2019-10-16 LAB
ALBUMIN SERPL-MCNC: 3.8 G/DL (ref 3.5–5)
ALBUMIN/GLOB SERPL: 1.1 {RATIO} (ref 1.1–2.2)
ALP SERPL-CCNC: 87 U/L (ref 45–117)
ALT SERPL-CCNC: 36 U/L (ref 12–78)
ANION GAP SERPL CALC-SCNC: 6 MMOL/L (ref 5–15)
AST SERPL-CCNC: 32 U/L (ref 15–37)
ATRIAL RATE: 93 BPM
BASOPHILS # BLD: 0.1 K/UL (ref 0–0.1)
BASOPHILS NFR BLD: 1 % (ref 0–1)
BILIRUB SERPL-MCNC: 1 MG/DL (ref 0.2–1)
BUN SERPL-MCNC: 22 MG/DL (ref 6–20)
BUN/CREAT SERPL: 20 (ref 12–20)
CALCIUM SERPL-MCNC: 9 MG/DL (ref 8.5–10.1)
CALCULATED P AXIS, ECG09: 30 DEGREES
CALCULATED R AXIS, ECG10: 11 DEGREES
CALCULATED T AXIS, ECG11: 27 DEGREES
CHLORIDE SERPL-SCNC: 110 MMOL/L (ref 97–108)
CO2 SERPL-SCNC: 23 MMOL/L (ref 21–32)
CREAT SERPL-MCNC: 1.1 MG/DL (ref 0.7–1.3)
DIAGNOSIS, 93000: NORMAL
DIFFERENTIAL METHOD BLD: ABNORMAL
EOSINOPHIL # BLD: 0.1 K/UL (ref 0–0.4)
EOSINOPHIL NFR BLD: 1 % (ref 0–7)
ERYTHROCYTE [DISTWIDTH] IN BLOOD BY AUTOMATED COUNT: 13.4 % (ref 11.5–14.5)
ETHANOL SERPL-MCNC: <10 MG/DL
GLOBULIN SER CALC-MCNC: 3.4 G/DL (ref 2–4)
GLUCOSE SERPL-MCNC: 98 MG/DL (ref 65–100)
HCT VFR BLD AUTO: 44.8 % (ref 36.6–50.3)
HGB BLD-MCNC: 15 G/DL (ref 12.1–17)
IMM GRANULOCYTES # BLD AUTO: 0 K/UL (ref 0–0.04)
IMM GRANULOCYTES NFR BLD AUTO: 0 % (ref 0–0.5)
LYMPHOCYTES # BLD: 0.9 K/UL (ref 0.8–3.5)
LYMPHOCYTES NFR BLD: 9 % (ref 12–49)
MAGNESIUM SERPL-MCNC: 2 MG/DL (ref 1.6–2.4)
MCH RBC QN AUTO: 29.4 PG (ref 26–34)
MCHC RBC AUTO-ENTMCNC: 33.5 G/DL (ref 30–36.5)
MCV RBC AUTO: 87.8 FL (ref 80–99)
MONOCYTES # BLD: 0.6 K/UL (ref 0–1)
MONOCYTES NFR BLD: 6 % (ref 5–13)
NEUTS SEG # BLD: 8.4 K/UL (ref 1.8–8)
NEUTS SEG NFR BLD: 83 % (ref 32–75)
NRBC # BLD: 0 K/UL (ref 0–0.01)
NRBC BLD-RTO: 0 PER 100 WBC
P-R INTERVAL, ECG05: 112 MS
PLATELET # BLD AUTO: 236 K/UL (ref 150–400)
PMV BLD AUTO: 9.3 FL (ref 8.9–12.9)
POTASSIUM SERPL-SCNC: 4.8 MMOL/L (ref 3.5–5.1)
PROT SERPL-MCNC: 7.2 G/DL (ref 6.4–8.2)
Q-T INTERVAL, ECG07: 354 MS
QRS DURATION, ECG06: 92 MS
QTC CALCULATION (BEZET), ECG08: 440 MS
RBC # BLD AUTO: 5.1 M/UL (ref 4.1–5.7)
SODIUM SERPL-SCNC: 139 MMOL/L (ref 136–145)
TROPONIN I SERPL-MCNC: <0.05 NG/ML
VENTRICULAR RATE, ECG03: 93 BPM
WBC # BLD AUTO: 10.1 K/UL (ref 4.1–11.1)

## 2019-10-16 PROCEDURE — 36415 COLL VENOUS BLD VENIPUNCTURE: CPT

## 2019-10-16 PROCEDURE — 84484 ASSAY OF TROPONIN QUANT: CPT

## 2019-10-16 PROCEDURE — 80307 DRUG TEST PRSMV CHEM ANLYZR: CPT

## 2019-10-16 PROCEDURE — 85025 COMPLETE CBC W/AUTO DIFF WBC: CPT

## 2019-10-16 PROCEDURE — 80053 COMPREHEN METABOLIC PANEL: CPT

## 2019-10-16 PROCEDURE — 83735 ASSAY OF MAGNESIUM: CPT

## 2019-10-16 PROCEDURE — 93005 ELECTROCARDIOGRAM TRACING: CPT

## 2019-10-16 PROCEDURE — 99285 EMERGENCY DEPT VISIT HI MDM: CPT

## 2019-10-16 NOTE — ED NOTES
Pt. Mother at bedside. States patient has been started on new medication for seizures but has been unable to start. Pt. Normally not brought to ED after episode. Pt. Mom debating whether or not she wants lab work to be completed. Mom to let RN know.

## 2019-10-16 NOTE — ED NOTES
Patient discharged by Dr. Omi Dawson. Patient provided with discharge instructions Rx and instructions on follow up care. Patient out of ED ambulatory accompanied by family.

## 2019-10-16 NOTE — ED PROVIDER NOTES
EMERGENCY DEPARTMENT HISTORY AND PHYSICAL EXAM      Date: 10/16/2019  Patient Name: Heather Gramajo    History of Presenting Illness     Chief Complaint   Patient presents with    Seizure     x1 today       History Provided By: Patient, Patient's Father and Patient's Mother    HPI: Heather Gramajo, 38 y.o. male presents to the ED with history of seizures, high functioning Asperger's, here after seizure today while riding in car with his work counselor. Patient sees Dr. Christiano Mora for neurology and recently had an increase in his Topamax, maintaining his Keppra dose and was about to switch from [employer] to [employer] but mom says she has not done the switch yet. Both family and the patient denies any recent illnesses, no dental work and he denies any pain at this time. He has a history of biting his tongue says he did not bite his tongue today. He has not had a fever and denies any chest pain, shortness of breath, abdominal pain, diarrhea, difficulty with urination or any other complaint. Denies any headache. The patient's mother relays that he occasionally uses alcohol and did have likely 4-5 beers on Saturday night, but both her and the patient's father does not know him to have alcohol withdrawal seizures. There are no other complaints, changes, or physical findings at this time. PCP: Mercedes Mendoza MD    No current facility-administered medications on file prior to encounter. Current Outpatient Medications on File Prior to Encounter   Medication Sig Dispense Refill    brivaracetam (BRIVIACT) 100 mg tablet Take 1 Tab by mouth two (2) times a day. Max Daily Amount: 200 mg. 60 Tab 2    sertraline (ZOLOFT) 25 mg tablet Take 1 Tab by mouth daily. Indications: Repeated Episodes of Anxiety 90 Tab 1    topiramate (TOPAMAX) 100 mg tablet Take 1 Tab by mouth two (2) times a day. 60 Tab 2    MULTIVITAMIN PO Take 1 Tab by mouth daily.          Past History     Past Medical History:  Past Medical History:   Diagnosis Date    Asperger syndrome 12/14/2011    Autism     dx 2010- highly functional    Other ill-defined conditions(799.89)     aspergers, syncopal episodes    Seiz[facility] disorder ([facility] 5/22/2012    Seizures ([facility]        Past Surgical History:  Past Surgical History:   Procedure Laterality Date    HX GI      pyloric stenosis 1976    HX HEENT      adenoids 1978       Family History:  Family History   Problem Relation Age of Onset    Diabetes Father     Heart Disease Father     Cancer Mother         Breast    Anxiety Mother     COPD Mother     Other Mother         Neuropathy    Sleep Apnea Mother     Arthritis-osteo Mother     Other Brother         Pituitary tumor       Social History:  Social History     Tobacco Use    Smoking status: Never Smoker    Smokeless tobacco: Never Used   Substance Use Topics    Alcohol use: Yes     Alcohol/week: 8.3 standard drinks     Types: 10 Cans of beer per week     Frequency: Monthly or less     Drinks per session: 1 or 2     Comment: occasional    Drug use: No       Allergies: Allergies   Allergen Reactions    Aspartame Other (comments)     Family believes it causes his seizures         Review of Systems   Review of Systems   Constitutional: Negative for activity change and appetite change. HENT: Negative for congestion, ear pain, mouth sores, postnasal drip, sinus pressure, sinus pain, sore throat and voice change. Eyes: Negative for visual disturbance. Respiratory: Negative for chest tightness and shortness of breath. Cardiovascular: Negative for chest pain. Gastrointestinal: Negative for abdominal distention and abdominal pain. Genitourinary: Negative for difficulty urinating, dysuria, frequency and hematuria. Musculoskeletal: Negative for back pain and neck pain. Neurological: Positive for seizures. Negative for dizziness, tremors, syncope, speech difficulty, weakness, light-headedness, numbness and headaches.    Hematological: Negative for adenopathy. All other systems reviewed and are negative. Physical Exam   Physical Exam   Vital signs and nursing notes reviewed    CONSTITUTIONAL: Alert, in mild distress; well-developed; well-nourished. Wears glasses. HEAD:  Normocephalic, atraumatic  EYES: PERRL; EOM's intact. ENTM: Nose: no rhinorrhea; Throat: no erythema or exudate, mucous membranes moist, tongue without lacerations or abrasions. Neck:  Supple. trachea is midline. No carotid bruits bilaterally. RESP: Chest clear, equal breath sounds. - W/R/R  CV: S1 and S2 WNL; No murmurs, gallops or rubs. 2+ radial and DP pulses bilaterally. Heart rate 94, regular. GI: non-distended, normal bowel sounds, abdomen soft and non-tender. No masses or organomegaly. : No costo-vertebral angle tenderness. BACK:  Non-tender, normal appearance  UPPER EXT:  Normal inspection. no joint or soft tissue swelling  LOWER EXT: No edema, no calf tenderness. NEURO: Alert and oriented x3, 5/5 strength and light touch sensation intact in bilateral upper and lower extremities. SKIN: No rashes; Warm and dry  PSYCH: Normal mood, normal affect    Diagnostic Study Results     Labs -     Recent Results (from the past 12 hour(s))   EKG, 12 LEAD, INITIAL    Collection Time: 10/16/19 11:44 AM   Result Value Ref Range    Ventricular Rate 93 BPM    Atrial Rate 93 BPM    P-R Interval 112 ms    QRS Duration 92 ms    Q-T Interval 354 ms    QTC Calculation (Bezet) 440 ms    Calculated P Axis 30 degrees    Calculated R Axis 11 degrees    Calculated T Axis 27 degrees    Diagnosis       Sinus rhythm with premature atrial complexes  When compared with ECG of 01-JUN-2019 14:30,  premature atrial complexes are now present     CBC WITH AUTOMATED DIFF    Collection Time: 10/16/19 12:32 PM   Result Value Ref Range    WBC 10.1 4.1 - 11.1 K/uL    RBC 5.10 4. 10 - 5.70 M/uL    HGB 15.0 12.1 - 17.0 g/dL    HCT 44.8 36.6 - 50.3 %    MCV 87.8 80.0 - 99.0 FL    MCH 29.4 26.0 - 34.0 PG    MCHC 33.5 30.0 - 36.5 g/dL    RDW 13.4 11.5 - 14.5 %    PLATELET 655 021 - 535 K/uL    MPV 9.3 8.9 - 12.9 FL    NRBC 0.0 0  WBC    ABSOLUTE NRBC 0.00 0.00 - 0.01 K/uL    NEUTROPHILS 83 (H) 32 - 75 %    LYMPHOCYTES 9 (L) 12 - 49 %    MONOCYTES 6 5 - 13 %    EOSINOPHILS 1 0 - 7 %    BASOPHILS 1 0 - 1 %    IMMATURE GRANULOCYTES 0 0.0 - 0.5 %    ABS. NEUTROPHILS 8.4 (H) 1.8 - 8.0 K/UL    ABS. LYMPHOCYTES 0.9 0.8 - 3.5 K/UL    ABS. MONOCYTES 0.6 0.0 - 1.0 K/UL    ABS. EOSINOPHILS 0.1 0.0 - 0.4 K/UL    ABS. BASOPHILS 0.1 0.0 - 0.1 K/UL    ABS. IMM. GRANS. 0.0 0.00 - 0.04 K/UL    DF AUTOMATED     METABOLIC PANEL, COMPREHENSIVE    Collection Time: 10/16/19 12:32 PM   Result Value Ref Range    Sodium 139 136 - 145 mmol/L    Potassium 4.8 3.5 - 5.1 mmol/L    Chloride 110 (H) 97 - 108 mmol/L    CO2 23 21 - 32 mmol/L    Anion gap 6 5 - 15 mmol/L    Glucose 98 65 - 100 mg/dL    BUN 22 (H) 6 - 20 MG/DL    Creatinine 1.10 0.70 - 1.30 MG/DL    BUN/Creatinine ratio 20 12 - 20      GFR est AA >60 >60 ml/min/1.73m2    GFR est non-AA >60 >60 ml/min/1.73m2    Calcium 9.0 8.5 - 10.1 MG/DL    Bilirubin, total 1.0 0.2 - 1.0 MG/DL    ALT (SGPT) 36 12 - 78 U/L    AST (SGOT) 32 15 - 37 U/L    Alk. phosphatase 87 45 - 117 U/L    Protein, total 7.2 6.4 - 8.2 g/dL    Albumin 3.8 3.5 - 5.0 g/dL    Globulin 3.4 2.0 - 4.0 g/dL    A-G Ratio 1.1 1.1 - 2.2     MAGNESIUM    Collection Time: 10/16/19 12:32 PM   Result Value Ref Range    Magnesium 2.0 1.6 - 2.4 mg/dL   TROPONIN I    Collection Time: 10/16/19 12:32 PM   Result Value Ref Range    Troponin-I, Qt. <0.05 <0.05 ng/mL   ETHYL ALCOHOL    Collection Time: 10/16/19 12:32 PM   Result Value Ref Range    ALCOHOL(ETHYL),SERUM <10 <10 MG/DL       Radiologic Studies -   No orders to display     CT Results  (Last 48 hours)    None        CXR Results  (Last 48 hours)    None          Medical Decision Making   I am the first provider for this patient.     I reviewed the vital signs, available nursing notes, past medical history, past surgical history, family history and social history. Vital Signs-Reviewed the patient's vital signs. Patient Vitals for the past 12 hrs:   Temp Pulse Resp BP SpO2   10/16/19 1113 97.8 °F (36.6 °C) (!) 105 16 113/88 96 %   2:23 PM    HR now in the 90s  EKG interpretation: (Preliminary)  EKG performed at 11:44 AM, as interpreted by me shows a sinus rhythm at a rate of 93, PACs, normal axis, normal intervals, no visible acute ischemic changes. Records Reviewed: Nursing Notes and Old Medical Records    Provider Notes (Medical Decision Making):   76-year-old male status post seizure, now back to normal baseline mental status without concerning electrolyte changes or other abnormal lab work to suggest reason for seizure. Likely patient's baseline admits to trying to improve seizure control with home medications. Will have patient transition to [address] [place] [address] as recommended by his neurologist previously, plan for discharge. ED Course:   Initial assessment performed. The patients presenting problems have been discussed, and they are in agreement with the care plan formulated and outlined with them. I have encouraged them to ask questions as they arise throughout their visit. ED Course as of Oct 16 1424   Wed Oct 16, 2019   1412 Have been unable to reach Dr. Andrea Severino by phone. Just sent him a [employer] in hopes of reaching him to clarify medications per family. [TL]      ED Course User Index  [TL] Mireya Wan MD     2:32 PM  Called back and confirmed that Keppra to [address] [place] [address] transition could be immediate. No further recommendations at this time. Disposition:  Discharge    Discharge Note:  2:24 PM  The pt is ready for discharge. The pt's signs, symptoms, diagnosis, and discharge instructions have been discussed and pt has conveyed their understanding. The pt is to follow up as recommended or return to ER should their symptoms worsen.  Plan has been discussed and pt is in agreement. PLAN:  1. Current Discharge Medication List        2. Follow-up Information     Follow up With Specialties Details Why Contact Info    Julia Payton MD Neurology  Please follow-up with Dr. Rodriguez Payment about ongoing seizures for [address]  [phone]         The emergency department pharmacist reviewed the [address] and Briviact dosing and you can do the medication switch immediately without any taper. [facility] EMERGENCY DEPT Emergency Medicine  If symptoms worsen including recurrent seizure, change in mental status, fever, chest pain, shortness of breath or other new concerning symptoms. [address] [place] [address]  [phone]        Return to ED if worse     Diagnosis     Clinical Impression:   1. Seizure ([address]    2. History of seizure disorder        Attestations:    Kaity Chin MD    Please note that this dictation was completed with [employer], the computer voice recognition software. Quite often unanticipated grammatical, syntax, homophones, and other interpretive errors are inadvertently transcribed by the computer software. Please disregard these errors. Please excuse any errors that have escaped final proofreading. Thank you.

## 2019-10-16 NOTE — ED NOTES
Pt. Presents to ED today for complaints of a seizure. Pt. Is a poor historian and reports that he does not remember what happened. PT. Has a history of seizures and he states that he had one recently. Per EMS patient at baseline. Awaiting patient mother to arrive as patient is autistic.

## 2019-10-28 ENCOUNTER — TELEPHONE (OUTPATIENT)
Dept: NEUROLOGY | Age: 44
End: 2019-10-28

## 2019-10-28 DIAGNOSIS — G40.909 SEIZURE DISORDER (HCC): ICD-10-CM

## 2019-10-28 RX ORDER — TOPIRAMATE 100 MG/1
TABLET, FILM COATED ORAL
Qty: 60 TAB | Refills: 2 | Status: SHIPPED | OUTPATIENT
Start: 2019-10-28 | End: 2019-10-29 | Stop reason: SDUPTHER

## 2019-10-28 NOTE — TELEPHONE ENCOUNTER
----- Message from Lamonte Bernheim sent at 10/28/2019  4:08 PM EDT -----  Regarding: Dr. Carol Woodall (if not patient):KISHOR GUERRA       Relationship of caller (if not patient): Mom      Best contact number(s):[phone]      Name of medication and dosage if known: Topiramate 150mg twice daily       Is patient out of this medication (yes/no):  No      Pharmacy name: [facility] order    Pharmacy listed in chart? (yes/no):Yes  Pharmacy phone number:N/A      Details to clarify the request:      Lamonte Bernheim

## 2019-10-29 DIAGNOSIS — G40.909 SEIZURE DISORDER (HCC): Primary | ICD-10-CM

## 2019-10-29 RX ORDER — TOPIRAMATE 100 MG/1
100 TABLET, FILM COATED ORAL 2 TIMES DAILY
Qty: 180 TAB | Refills: 2 | Status: SHIPPED | OUTPATIENT
Start: 2019-10-29 | End: 2020-01-05 | Stop reason: DRUGHIGH

## 2019-10-29 RX ORDER — TOPIRAMATE 50 MG/1
50 TABLET, FILM COATED ORAL 2 TIMES DAILY
Qty: 180 TAB | Refills: 3 | Status: ON HOLD | OUTPATIENT
Start: 2019-10-29 | End: 2019-12-15 | Stop reason: SDUPTHER

## 2019-10-29 NOTE — TELEPHONE ENCOUNTER
Dr. Marcell Moncada, patient's mother stated ED MD spoke with you and you advised increase Topamax to 150mg BID. Patient has been on this recently and is tolerating it well. She would like a 90d supply of this increased dose sent to mail order pharmacy. Thanks!

## 2019-10-29 NOTE — TELEPHONE ENCOUNTER
Topiramate 100mg BID, per Dr. Gonzales Backbone. Patient may increase to 150mg BID, if needed, per Dr. Gonzales Backbone.

## 2019-10-29 NOTE — TELEPHONE ENCOUNTER
Patient's mother called to double check Topiramate dose as 150mg BID. She is requesting a refill of Topiramate and Briviact to be sent to mail order pharmacy. Please send in, if appropriate.  Thanks  (Please verify dose and send 90d supply)

## 2019-10-29 NOTE — TELEPHONE ENCOUNTER
----- Message from Ngozi Agudelo sent at 10/29/2019 10:00 AM EDT -----  Regarding: /Telephone  Patient return call    Caller's first and last name and relationship (if not the patient):      Best contact number(s):[phone]      Whose call is being returned:Vincenzo      Details to clarify the request: Pt called requesting a call back from a miss call       Ngozi Agudelo

## 2019-10-29 NOTE — TELEPHONE ENCOUNTER
I left a message for Ms. Miles to call back. Need to clarify if patient just needs a refill of this medication sent in.

## 2019-11-06 ENCOUNTER — OFFICE VISIT (OUTPATIENT)
Dept: INTERNAL MEDICINE CLINIC | Facility: CLINIC | Age: 44
End: 2019-11-06

## 2019-11-06 VITALS
DIASTOLIC BLOOD PRESSURE: 72 MMHG | OXYGEN SATURATION: 98 % | SYSTOLIC BLOOD PRESSURE: 108 MMHG | BODY MASS INDEX: 26.63 KG/M2 | RESPIRATION RATE: 16 BRPM | TEMPERATURE: 98 F | WEIGHT: 186 LBS | HEART RATE: 81 BPM | HEIGHT: 70 IN

## 2019-11-06 DIAGNOSIS — R56.9 SEIZURE (HCC): Primary | ICD-10-CM

## 2019-11-06 DIAGNOSIS — F41.1 GENERALIZED ANXIETY DISORDER: ICD-10-CM

## 2019-11-06 DIAGNOSIS — F10.20 UNCOMPLICATED ALCOHOL DEPENDENCE (HCC): ICD-10-CM

## 2019-11-06 DIAGNOSIS — E66.3 OVERWEIGHT (BMI 25.0-29.9): ICD-10-CM

## 2019-11-06 NOTE — PATIENT INSTRUCTIONS
Learning About Alcohol Use Disorder  What is alcohol use disorder? Alcohol use disorder means that a person drinks alcohol even though it causes harm to themselves or others. It can range from mild to severe. The more signs of this disorder you have, the more severe it may be. Moderate to severe alcohol use disorder is sometimes called addiction. People who have it may find it hard to control their use of alcohol. People who have this disorder may argue with others about how much they're drinking. Their job may be affected because of drinking. They may drink when it's dangerous or illegal, such as when they drive. They also may have a strong need, or craving, to drink. They may feel like they must drink just to get by. Their drinking may increase their risk of getting hurt or being in a car crash. Over time, drinking too much alcohol may cause health problems. These may include high blood pressure, liver problems, or problems with digestion. What are the signs? Maybe you've wondered about your alcohol habits, or how to tell if your drinking is becoming a problem. Here are some of the signs of alcohol use disorder. You may have it if you have two or more of the following signs:  · You drink larger amounts of alcohol than you ever meant to. Or you've been drinking for a longer time than you ever meant to. · You can't cut down or control your use. Or you constantly wish you could cut down. · You spend a lot of time getting or drinking alcohol or recovering from its effects. · You have strong cravings for alcohol. · You can no longer do your main jobs at work, at school, or at home. · You keep drinking alcohol, even though your use hurts your relationships. · You have stopped doing important activities because of your alcohol use. · You drink alcohol in situations where doing so is dangerous. · You keep drinking alcohol even though you know it's causing health problems.   · You need more and more alcohol to get the same effect, or you get less effect from the same amount over time. This is called tolerance. · You have uncomfortable symptoms when you stop drinking alcohol or use less. This is called withdrawal.  Alcohol use disorder can range from mild to severe. The more signs you have, the more severe the disorder may be. Moderate to severe alcohol use disorder is sometimes called addiction. You might not realize that your drinking is a problem. You might not drink large amounts when you drink. Or you might go for days or weeks between drinking episodes. But even if you don't drink very often, your drinking could still be harmful and put you at risk. How is alcohol use disorder treated? Getting help is up to you. But you don't have to do it alone. There are many people and kinds of treatments that can help. Treatment for alcohol use disorder can include:  · Group therapy, one or more types of counseling, and alcohol education. · Medicines that help to:  ? Reduce withdrawal symptoms and help you safely stop drinking. ? Reduce cravings for alcohol. · Support groups. These groups include Alcoholics Anonymous and [employer] (Self-Management and Recovery Training). Some people are able to stop or cut back on drinking with help from a counselor. People who have moderate to severe alcohol use disorder may need medical treatment. They may need to stay in a hospital or treatment center. You may have a treatment team to help you. This team may include a psychologist or psychiatrist, counselors, doctors, social workers, nurses, and a . A  helps plan and manage your treatment. Follow-up care is a key part of your treatment and safety. Be sure to make and go to all appointments, and call your doctor if you are having problems. It's also a good idea to know your test results and keep a list of the medicines you take. Where can you learn more?   Go to http://nida.info/. Enter [id number] in the search box to learn more about \"Learning About Alcohol Use Disorder. \"  Current as of: February 5, 2019  Content Version: 12.2  © 2646-9275 [employer]. Care instructions adapted under license by [employer] (which disclaims liability or warranty for this information). If you have questions about a medical condition or this instruction, always ask your healthcare professional. [place] [address] any warranty or liability for your use of this information.

## 2019-11-06 NOTE — PROGRESS NOTES
Savanah Swartz  Identified pt with two pt identifiers(name and ). Chief Complaint   Patient presents with    Anxiety    Other     \"blister\" on middle finger right hand       1. Have you been to the ER, urgent care clinic since your last visit? Hospitalized since your last visit? NO    2. Have you seen or consulted any other health care providers outside of the [address] [place] [address] since your last visit? Include any pap smears or colon screening. NO    Today's provider has been notified of reason for visit, vitals and flowsheets obtained on patients.        Reviewed record In preparation for visit, huddled with provider and have obtained necessary documentation      Health Maintenance Due   Topic    Influenza Age 5 to Adult        Wt Readings from Last 3 Encounters:   19 186 lb (84.4 kg)   10/07/19 185 lb (83.9 kg)   19 182 lb 9.6 oz (82.8 kg)     Temp Readings from Last 3 Encounters:   19 98 °F (36.7 °C) (Oral)   10/16/19 97.8 °F (36.6 °C)   19 98.3 °F (36.8 °C) (Oral)     BP Readings from Last 3 Encounters:   19 108/72   10/16/19 125/79   10/07/19 122/80     Pulse Readings from Last 3 Encounters:   19 81   10/16/19 85   10/07/19 83     Vitals:    19 1310   BP: 108/72   Pulse: 81   Resp: 16   Temp: 98 °F (36.7 °C)   TempSrc: Oral   SpO2: 98%   Weight: 186 lb (84.4 kg)   Height: 5' 10\" (1.778 m)   PainSc:   0 - No pain         Learning Assessment:  :     Learning Assessment 10/7/2019 2017 4/3/2017 2016 2014   PRIMARY LEARNER Patient Patient Patient Patient Patient   HIGHEST LEVEL OF EDUCATION - PRIMARY LEARNER  - - - GRADUATED HIGH SCHOOL OR GED GRADUATED HIGH SCHOOL OR GED   BARRIERS PRIMARY LEARNER - - - COGNITIVE OTHER   CO-LEARNER CAREGIVER - - - Yes -   [address] [employer] [address]    NEED - - - No -   LEARNER PREFERENCE PRIMARY DEMONSTRATION DEMONSTRATION DEMONSTRATION READING READING   LEARNING SPECIAL TOPICS - - - no -   ANSWERED BY self patient patient self patient   RELATIONSHIP SELF SELF SELF SELF SELF       Depression Screening:  :     3 most recent PHQ Screens 10/7/2019   Little interest or pleasure in doing things Not at all   Feeling down, depressed, irritable, or hopeless Not at all   Total Score PHQ 2 0       Fall Risk Assessment:  :     Fall Risk Assessment, last 12 mths 9/6/2019   Able to walk? Yes   Fall in past 12 months? No       Abuse Screening:  :     Abuse Screening Questionnaire 9/6/2019   Do you ever feel afraid of your partner? N   Are you in a relationship with someone who physically or mentally threatens you? N   Is it safe for you to go home? Y       ADL Screening:  :     ADL Assessment 6/6/2019   Feeding yourself No Help Needed   Getting from bed to chair No Help Needed   Getting dressed No Help Needed   Bathing or showering No Help Needed   Walk across the room (includes cane/walker) No Help Needed   Using the telphone No Help Needed   Taking your medications Help Needed   Preparing meals No Help Needed   Managing money (expenses/bills) No Help Needed   Moderately strenuous housework (laundry) No Help Needed   Shopping for personal items (toiletries/medicines) No Help Needed   Shopping for groceries No Help Needed   Driving No Help Needed   Climbing a flight of stairs No Help Needed   Getting to places beyond walking distances No Help Needed                 Medication reconciliation up to date and corrected with patient at this time.

## 2019-11-06 NOTE — PROGRESS NOTES
CC:   Chief Complaint   Patient presents with    Anxiety    Other     \"blister\" on middle finger right hand       HISTORY OF PRESENT ILLNESS  Barber Nance is a 38 y.o. male. Presents for 3 month follow up evaluation. He has HTN, seizure disorder since 2011, alcohol dependence, autism, and Asperger's syndrome. Was seen at [facility] ED on 10/16/19. Dr. Parvez Hendrix, his regular neurologist, was consulted and recommended changing him from [employer] to [facility].       Today he has no complaints. Denies having any seizures since ED visit. Tolerating Briviact and Topamax with no side effects. Thinks his anxiety is better on sertraline.     Soc Hx  Lives alone in a house. Single. No children. Never smoker. Back to drinking beer, now 2-3 beers a day; used to drink 6-8 beers a day for 20 yrs. Denies recreational drug use. Gets regular exercise by walking. Does not drive. His mother puts his medications in a pill box for him to take.     ROS  A complete review of systems was performed and is negative except for those mentioned in the HPI.      Patient Active Problem List   Diagnosis Code    Autism F84.0    Asperger syndrome F84.5    Seizure ([address] R56.9    Hypertension I10    EtOH dependence ([address] F10.20    Alcohol withdrawal ([address] F10.239    Advance care planning Z71.89    Brain cyst G93.0    Abnormal MRI of head R93.0    Altered mental status, unspecified R41.82    Complex partial seizure evolving to generalized seizure ([address] G40.209    Bilateral carotid artery stenosis I65.23    Convulsive syncope R55    Generalized anxiety disorder F41.1     Past Medical History:   Diagnosis Date    Asperger syndrome 12/14/2011    Autism     dx 2010- highly functional    Other ill-defined conditions(799.89)     aspergers, syncopal episodes    Seizure disorder ([address] 5/22/2012    Seizures (HCC)      Allergies   Allergen Reactions    Aspartame Other (comments)     Family believes it causes his seizures       Current Outpatient Medications   Medication Sig Dispense Refill    topiramate (TOPAMAX) 100 mg tablet Take 1 Tab by mouth two (2) times a day. 180 Tab 2    brivaracetam (BRIVIACT) 100 mg tablet Take 1 Tab by mouth two (2) times a day. Max Daily Amount: 200 mg. 180 Tab 2    topiramate (TOPAMAX) 50 mg tablet Take 1 Tab by mouth two (2) times a day. 180 Tab 3    sertraline (ZOLOFT) 25 mg tablet Take 1 Tab by mouth daily. Indications: Repeated Episodes of Anxiety 90 Tab 1    MULTIVITAMIN PO Take 1 Tab by mouth daily. PHYSICAL EXAM  Visit Vitals  /72 (BP 1 Location: Left arm, BP Patient Position: Sitting)   Pulse 81   Temp 98 °F (36.7 °C) (Oral)   Resp 16   Ht 5' 10\" (1.778 m)   Wt 186 lb (84.4 kg)   SpO2 98%   BMI 26.69 kg/m²       General: Well-developed and well-nourished, no distress. HEENT:  Head normocephalic/atraumatic, no scleral icterus  Lungs:  Clear to ausculation bilaterally. Good air movement. Heart:  Regular rate and rhythm, normal S1 and S2, no murmur, gallop, or rub  Back: Kyphosis. Non-tender lipoma at thoracic spine area. Extremities: No clubbing, cyanosis, or edema. Neurological: Alert and oriented. Psychiatric: Normal mood and affect. Behavior is normal.         ASSESSMENT AND PLAN    ICD-10-CM ICD-9-CM    1. Seizure ([facility] R56.9 780.39    2. Generalized anxiety disorder F41.1 300.02    3. Uncomplicated alcohol dependence ([facility] F10.20 303.90    4. Overweight (BMI 25.0-29. 9) E66.3 278.02      Diagnoses and all orders for this visit:    1. Seizure ([facility]  Stable on Briviact and Topamax. Follow up with Dr. Ayaka Pradhan as scheduled in 5/2020, sooner if needed. 2. Generalized anxiety disorder  Controlled on sertraline 25 mg daily. 3. Uncomplicated alcohol dependence ([facility]  Patient says he is trying to quit drinking beer. 4. Overweight (BMI 25.0-29. 9)  Counseled on diet, exercise, and weight loss.       Follow-up and Dispositions    · Return in about 3 months (around 2/6/2020), or if symptoms worsen or fail to improve, for Anxiety, seizures, EtOH. I have discussed the diagnosis with the patient and the intended plan as seen in the above orders. Patient is in agreement. The patient has received an after-visit summary and questions were answered concerning future plans. I have discussed medication side effects and warnings with the patient as well.

## 2019-12-02 DIAGNOSIS — F41.1 GENERALIZED ANXIETY DISORDER: ICD-10-CM

## 2019-12-02 NOTE — TELEPHONE ENCOUNTER
REFILL     PCP: Boyd Orozco MD     Last appt: 11/6/2019   Future Appointments   Date Time Provider Stephany Blum   2/7/2020  1:40 PM Boyd Orozco  [facility]   5/7/2020  1:40 PM Nader Ruiz MD [facility]        Requested Prescriptions     Pending Prescriptions Disp Refills    sertraline (ZOLOFT) 25 mg tablet 90 Tab 1     Sig: Take 1 Tab by mouth daily.  Indications: Repeated Episodes of Anxiety

## 2019-12-02 NOTE — TELEPHONE ENCOUNTER
Pt's mother, Anny Sanders, came in today to request a 90-day refill (if not possible will take a shorter supply) be called into the [address] [place] [address] on file for sertraline (ZOLOFT) 25 mg tablet. Pt has \"about two weeks\" left of this medication.

## 2019-12-03 RX ORDER — SERTRALINE HYDROCHLORIDE 25 MG/1
25 TABLET, FILM COATED ORAL DAILY
Qty: 90 TAB | Refills: 3 | Status: SHIPPED | OUTPATIENT
Start: 2019-12-03 | End: 2019-12-13 | Stop reason: DRUGHIGH

## 2019-12-05 ENCOUNTER — TELEPHONE (OUTPATIENT)
Dept: NEUROLOGY | Age: 44
End: 2019-12-05

## 2019-12-05 NOTE — TELEPHONE ENCOUNTER
----- Message from Heriberto Pouch sent at 12/5/2019  1:52 PM EST -----  Regarding: /Telephone  General Message/Vendor Calls    Caller's first and last name: Kira azalea      Reason for call: needing a letter      Callback required yes/no and why: yes      Best contact number(s): [phone]      Details to clarify the request: Pt mom called in regards to needing a letter mom will give more details when call is return from [facility] and a ninety day supply of pt medication briviact .       Heriberto Pouch

## 2019-12-06 DIAGNOSIS — G40.909 SEIZURE DISORDER (HCC): ICD-10-CM

## 2019-12-06 NOTE — TELEPHONE ENCOUNTER
Dr. Imelda Brown, patient's mother is requesting a letter stating patient's medical condition, medications and need for substance abuse counseling. This is to enable patient to continue [place] from the [place]. Please complete at your convenience.  Thanks

## 2019-12-09 ENCOUNTER — TELEPHONE (OUTPATIENT)
Dept: NEUROLOGY | Age: 44
End: 2019-12-09

## 2019-12-10 NOTE — TELEPHONE ENCOUNTER
I read the letter prepared by Dr. Randall Mann to patient's mother. I will mail her copy upon my return to [facility] [place] [place] tomorrow.

## 2019-12-13 ENCOUNTER — APPOINTMENT (OUTPATIENT)
Dept: CT IMAGING | Age: 44
DRG: 101 | End: 2019-12-13
Attending: EMERGENCY MEDICINE
Payer: MEDICARE

## 2019-12-13 ENCOUNTER — HOSPITAL ENCOUNTER (INPATIENT)
Age: 44
LOS: 2 days | Discharge: HOME | DRG: 101 | End: 2019-12-15
Attending: EMERGENCY MEDICINE | Admitting: INTERNAL MEDICINE
Payer: MEDICARE

## 2019-12-13 DIAGNOSIS — F10.939 ALCOHOL WITHDRAWAL SYNDROME WITH COMPLICATION (HCC): ICD-10-CM

## 2019-12-13 DIAGNOSIS — S09.90XA CLOSED HEAD INJURY, INITIAL ENCOUNTER: Primary | ICD-10-CM

## 2019-12-13 DIAGNOSIS — F84.5 ASPERGER SYNDROME: ICD-10-CM

## 2019-12-13 DIAGNOSIS — G40.909 SEIZURE DISORDER (HCC): ICD-10-CM

## 2019-12-13 DIAGNOSIS — R56.9 SEIZURES (HCC): ICD-10-CM

## 2019-12-13 LAB
ALBUMIN SERPL-MCNC: 3.9 G/DL (ref 3.5–5)
ALBUMIN/GLOB SERPL: 1.2 {RATIO} (ref 1.1–2.2)
ALP SERPL-CCNC: 75 U/L (ref 45–117)
ALT SERPL-CCNC: 31 U/L (ref 12–78)
ANION GAP SERPL CALC-SCNC: 6 MMOL/L (ref 5–15)
AST SERPL-CCNC: 22 U/L (ref 15–37)
BASOPHILS # BLD: 0.1 K/UL (ref 0–0.1)
BASOPHILS NFR BLD: 1 % (ref 0–1)
BILIRUB SERPL-MCNC: 0.3 MG/DL (ref 0.2–1)
BUN SERPL-MCNC: 18 MG/DL (ref 6–20)
BUN/CREAT SERPL: 15 (ref 12–20)
CALCIUM SERPL-MCNC: 8.6 MG/DL (ref 8.5–10.1)
CHLORIDE SERPL-SCNC: 110 MMOL/L (ref 97–108)
CK SERPL-CCNC: 134 U/L (ref 39–308)
CO2 SERPL-SCNC: 27 MMOL/L (ref 21–32)
CREAT SERPL-MCNC: 1.18 MG/DL (ref 0.7–1.3)
DIFFERENTIAL METHOD BLD: ABNORMAL
EOSINOPHIL # BLD: 0.1 K/UL (ref 0–0.4)
EOSINOPHIL NFR BLD: 1 % (ref 0–7)
ERYTHROCYTE [DISTWIDTH] IN BLOOD BY AUTOMATED COUNT: 13.6 % (ref 11.5–14.5)
GLOBULIN SER CALC-MCNC: 3.2 G/DL (ref 2–4)
GLUCOSE SERPL-MCNC: 87 MG/DL (ref 65–100)
HCT VFR BLD AUTO: 46.2 % (ref 36.6–50.3)
HGB BLD-MCNC: 15.4 G/DL (ref 12.1–17)
IMM GRANULOCYTES # BLD AUTO: 0 K/UL (ref 0–0.04)
IMM GRANULOCYTES NFR BLD AUTO: 0 % (ref 0–0.5)
LACTATE BLD-SCNC: 1.16 MMOL/L (ref 0.4–2)
LYMPHOCYTES # BLD: 0.8 K/UL (ref 0.8–3.5)
LYMPHOCYTES NFR BLD: 9 % (ref 12–49)
MCH RBC QN AUTO: 30.4 PG (ref 26–34)
MCHC RBC AUTO-ENTMCNC: 33.3 G/DL (ref 30–36.5)
MCV RBC AUTO: 91.3 FL (ref 80–99)
MONOCYTES # BLD: 0.7 K/UL (ref 0–1)
MONOCYTES NFR BLD: 8 % (ref 5–13)
NEUTS SEG # BLD: 7.2 K/UL (ref 1.8–8)
NEUTS SEG NFR BLD: 81 % (ref 32–75)
NRBC # BLD: 0 K/UL (ref 0–0.01)
NRBC BLD-RTO: 0 PER 100 WBC
PLATELET # BLD AUTO: 279 K/UL (ref 150–400)
PMV BLD AUTO: 9.4 FL (ref 8.9–12.9)
POTASSIUM SERPL-SCNC: 4.4 MMOL/L (ref 3.5–5.1)
PROT SERPL-MCNC: 7.1 G/DL (ref 6.4–8.2)
RBC # BLD AUTO: 5.06 M/UL (ref 4.1–5.7)
RBC MORPH BLD: ABNORMAL
SODIUM SERPL-SCNC: 143 MMOL/L (ref 136–145)
WBC # BLD AUTO: 8.9 K/UL (ref 4.1–11.1)

## 2019-12-13 PROCEDURE — 85025 COMPLETE CBC W/AUTO DIFF WBC: CPT

## 2019-12-13 PROCEDURE — 80053 COMPREHEN METABOLIC PANEL: CPT

## 2019-12-13 PROCEDURE — 99284 EMERGENCY DEPT VISIT MOD MDM: CPT

## 2019-12-13 PROCEDURE — 65660000000 HC RM CCU STEPDOWN

## 2019-12-13 PROCEDURE — 74011250636 HC RX REV CODE- 250/636

## 2019-12-13 PROCEDURE — 96374 THER/PROPH/DIAG INJ IV PUSH: CPT

## 2019-12-13 PROCEDURE — 82550 ASSAY OF CK (CPK): CPT

## 2019-12-13 PROCEDURE — 74011250636 HC RX REV CODE- 250/636: Performed by: INTERNAL MEDICINE

## 2019-12-13 PROCEDURE — 36415 COLL VENOUS BLD VENIPUNCTURE: CPT

## 2019-12-13 PROCEDURE — 70450 CT HEAD/BRAIN W/O DYE: CPT

## 2019-12-13 PROCEDURE — 74011250637 HC RX REV CODE- 250/637: Performed by: INTERNAL MEDICINE

## 2019-12-13 PROCEDURE — 90715 TDAP VACCINE 7 YRS/> IM: CPT | Performed by: INTERNAL MEDICINE

## 2019-12-13 PROCEDURE — 94760 N-INVAS EAR/PLS OXIMETRY 1: CPT

## 2019-12-13 PROCEDURE — 83605 ASSAY OF LACTIC ACID: CPT

## 2019-12-13 RX ORDER — SODIUM CHLORIDE 9 MG/ML
75 INJECTION, SOLUTION INTRAVENOUS CONTINUOUS
Status: DISCONTINUED | OUTPATIENT
Start: 2019-12-13 | End: 2019-12-15 | Stop reason: HOSPADM

## 2019-12-13 RX ORDER — SERTRALINE HYDROCHLORIDE 25 MG/1
100 TABLET, FILM COATED ORAL
COMMUNITY
End: 2020-09-18

## 2019-12-13 RX ORDER — HEPARIN SODIUM 5000 [USP'U]/ML
5000 INJECTION, SOLUTION INTRAVENOUS; SUBCUTANEOUS EVERY 8 HOURS
Status: DISCONTINUED | OUTPATIENT
Start: 2019-12-13 | End: 2019-12-15 | Stop reason: HOSPADM

## 2019-12-13 RX ORDER — SODIUM CHLORIDE 0.9 % (FLUSH) 0.9 %
5-40 SYRINGE (ML) INJECTION AS NEEDED
Status: DISCONTINUED | OUTPATIENT
Start: 2019-12-13 | End: 2019-12-15 | Stop reason: HOSPADM

## 2019-12-13 RX ORDER — SODIUM CHLORIDE 0.9 % (FLUSH) 0.9 %
5-40 SYRINGE (ML) INJECTION EVERY 8 HOURS
Status: DISCONTINUED | OUTPATIENT
Start: 2019-12-13 | End: 2019-12-15 | Stop reason: HOSPADM

## 2019-12-13 RX ORDER — LORAZEPAM 2 MG/ML
1 INJECTION INTRAMUSCULAR
Status: DISCONTINUED | OUTPATIENT
Start: 2019-12-13 | End: 2019-12-15 | Stop reason: HOSPADM

## 2019-12-13 RX ORDER — LEVETIRACETAM 10 MG/ML
1000 INJECTION INTRAVASCULAR
Status: COMPLETED | OUTPATIENT
Start: 2019-12-13 | End: 2019-12-13

## 2019-12-13 RX ORDER — ACETAMINOPHEN 325 MG/1
650 TABLET ORAL
Status: DISCONTINUED | OUTPATIENT
Start: 2019-12-13 | End: 2019-12-15 | Stop reason: HOSPADM

## 2019-12-13 RX ORDER — DOCUSATE SODIUM 100 MG/1
100 CAPSULE, LIQUID FILLED ORAL
Status: DISCONTINUED | OUTPATIENT
Start: 2019-12-13 | End: 2019-12-15 | Stop reason: HOSPADM

## 2019-12-13 RX ORDER — TOPIRAMATE 25 MG/1
75 TABLET ORAL 2 TIMES DAILY
Status: DISCONTINUED | OUTPATIENT
Start: 2019-12-13 | End: 2019-12-13 | Stop reason: SDUPTHER

## 2019-12-13 RX ORDER — LORAZEPAM 2 MG/ML
1 INJECTION INTRAMUSCULAR
Status: COMPLETED | OUTPATIENT
Start: 2019-12-13 | End: 2019-12-13

## 2019-12-13 RX ORDER — SERTRALINE HYDROCHLORIDE 25 MG/1
25 TABLET, FILM COATED ORAL
Status: DISCONTINUED | OUTPATIENT
Start: 2019-12-13 | End: 2019-12-15 | Stop reason: HOSPADM

## 2019-12-13 RX ORDER — ONDANSETRON 2 MG/ML
4 INJECTION INTRAMUSCULAR; INTRAVENOUS
Status: DISCONTINUED | OUTPATIENT
Start: 2019-12-13 | End: 2019-12-15 | Stop reason: HOSPADM

## 2019-12-13 RX ORDER — LORAZEPAM 2 MG/ML
INJECTION INTRAMUSCULAR
Status: COMPLETED
Start: 2019-12-13 | End: 2019-12-13

## 2019-12-13 RX ORDER — TOPIRAMATE 100 MG/1
100 TABLET, FILM COATED ORAL 2 TIMES DAILY
Status: DISCONTINUED | OUTPATIENT
Start: 2019-12-13 | End: 2019-12-13 | Stop reason: SDUPTHER

## 2019-12-13 RX ADMIN — TETANUS TOXOID, REDUCED DIPHTHERIA TOXOID AND ACELLULAR PERTUSSIS VACCINE, ADSORBED 0.5 ML: 5; 2.5; 8; 8; 2.5 SUSPENSION INTRAMUSCULAR at 20:02

## 2019-12-13 RX ADMIN — SODIUM CHLORIDE 75 ML/HR: 900 INJECTION, SOLUTION INTRAVENOUS at 22:14

## 2019-12-13 RX ADMIN — TOPIRAMATE 175 MG: 25 TABLET, FILM COATED ORAL at 22:15

## 2019-12-13 RX ADMIN — SERTRALINE HYDROCHLORIDE 25 MG: 25 TABLET ORAL at 22:15

## 2019-12-13 RX ADMIN — BRIVARACETAM 100 MG: 25 TABLET, FILM COATED ORAL at 23:06

## 2019-12-13 RX ADMIN — Medication 10 ML: at 23:08

## 2019-12-13 RX ADMIN — LORAZEPAM 1 MG: 2 INJECTION INTRAMUSCULAR; INTRAVENOUS at 17:54

## 2019-12-13 RX ADMIN — LORAZEPAM 1 MG: 2 INJECTION INTRAMUSCULAR at 17:54

## 2019-12-13 RX ADMIN — LEVETIRACETAM 1000 MG: 10 INJECTION INTRAVENOUS at 19:52

## 2019-12-13 NOTE — ED NOTES
Pt's mother uses call light to inform staff that pt is having a szr. Pt in bed showing generalized muscle spasms. Given 1mg Iv ativan per md vo. szr lasts approx 3mins. Per mother this is a typical szr for pt. Pt drowsy but responds slowly to voice.  szr precautions in place

## 2019-12-13 NOTE — ED PROVIDER NOTES
EMERGENCY DEPARTMENT HISTORY AND PHYSICAL EXAM      Date: 12/13/2019  Patient Name: Gerardo Fuentes    History of Presenting Illness     Chief Complaint   Patient presents with    Seizure     had a seizure pta and hit the left side of his face on the floor. A&Ox4       History Provided By: Patient and Patient's Mother    HPI: Gerardo Fuentes, 38 y.o. male presents to the ED with cc of seizure and head injury. The patient has a history of seizures and states that he has been compliant with his medications. His last prior seizure was 2 months ago. He denies any recent fever, chills, dysuria or cough. He was at a [facility] center this afternoon when the seizure occurred. His mother was told that it was a full body seizure and that he did not return to baseline for 10 minutes. She is unsure of the actual duration of the seizure itself. The patient states he has mild head pain due to his head hitting the floor during the seizure. He denies neck pain, chest pain or shortness of breath. He states that his vision is normal out of his left eye, where he has a large contusion. He is not sure of his tetanus status. There are no other complaints, changes, or physical findings at this time. PCP: Rupali Bustamante MD    No current facility-administered medications on file prior to encounter. Current Outpatient Medications on File Prior to Encounter   Medication Sig Dispense Refill    sertraline (ZOLOFT) 25 mg tablet Take 1 Tab by mouth daily. Indications: Repeated Episodes of Anxiety 90 Tab 3    topiramate (TOPAMAX) 100 mg tablet Take 1 Tab by mouth two (2) times a day. 180 Tab 2    brivaracetam (BRIVIACT) 100 mg tablet Take 1 Tab by mouth two (2) times a day. Max Daily Amount: 200 mg. 180 Tab 2    topiramate (TOPAMAX) 50 mg tablet Take 1 Tab by mouth two (2) times a day. 180 Tab 3    MULTIVITAMIN PO Take 1 Tab by mouth daily.          Past History     Past Medical History:  Past Medical History:   Diagnosis Date    Asperger syndrome 12/14/2011    Autism     dx 2010- highly functional    Other ill-defined conditions(799.89)     aspergers, syncopal episodes    Seizure disorder ([facility] [address] 5/22/2012    Seizures ([facility]        Past Surgical History:  Past Surgical History:   Procedure Laterality Date    HX GI      pyloric stenosis 1976    HX HEENT      adenoids 1978       Family History:  Family History   Problem Relation Age of Onset    Diabetes Father     Heart Disease Father     Cancer Mother         Breast    Anxiety Mother     COPD Mother     Other Mother         Neuropathy    Sleep Apnea Mother     Arthritis-osteo Mother     Other Brother         Pituitary tumor       Social History:  Social History     Tobacco Use    Smoking status: Never Smoker    Smokeless tobacco: Never Used   Substance Use Topics    Alcohol use: Yes     Alcohol/week: 8.3 standard drinks     Types: 10 Cans of beer per week     Frequency: Monthly or less     Drinks per session: 1 or 2     Comment: occasional    Drug use: No       Allergies: Allergies   Allergen Reactions    Aspartame Other (comments)     Family believes it causes his seizures         Review of Systems   Review of Systems   HENT: Negative for congestion. Eyes: Negative. Cardiovascular: Negative for chest pain. Gastrointestinal: Negative for abdominal pain. Endocrine: Negative for heat intolerance. Genitourinary: Negative. Musculoskeletal: Negative for back pain. Skin: Negative for rash. Allergic/Immunologic: Negative for immunocompromised state. Hematological: Does not bruise/bleed easily. Psychiatric/Behavioral: Negative. All other systems reviewed and are negative. Physical Exam   Physical Exam  Vitals signs and nursing note reviewed. Constitutional:       General: He is not in acute distress. Appearance: He is well-developed. HENT:      Head: Normocephalic.       Ears:      Comments: Left periorbital contusion  Eyes: Extraocular Movements: Extraocular movements intact. Pupils: Pupils are equal, round, and reactive to light. Neck:      Musculoskeletal: Normal range of motion. Cardiovascular:      Rate and Rhythm: Normal rate and regular rhythm. Pulses: Normal pulses. Heart sounds: Normal heart sounds. Pulmonary:      Effort: Pulmonary effort is normal.      Breath sounds: Normal breath sounds. Abdominal:      General: Bowel sounds are normal.      Palpations: Abdomen is soft. Skin:     General: Skin is warm and dry. Neurological:      General: No focal deficit present. Mental Status: He is alert and oriented to person, place, and time. Coordination: Coordination normal.   Psychiatric:         Mood and Affect: Mood normal.         Behavior: Behavior normal.         Diagnostic Study Results     Labs -     Recent Results (from the past 12 hour(s))   CBC WITH AUTOMATED DIFF    Collection Time: 12/13/19  2:59 PM   Result Value Ref Range    WBC 8.9 4.1 - 11.1 K/uL    RBC 5.06 4.10 - 5.70 M/uL    HGB 15.4 12.1 - 17.0 g/dL    HCT 46.2 36.6 - 50.3 %    MCV 91.3 80.0 - 99.0 FL    MCH 30.4 26.0 - 34.0 PG    MCHC 33.3 30.0 - 36.5 g/dL    RDW 13.6 11.5 - 14.5 %    PLATELET 574 775 - 326 K/uL    MPV 9.4 8.9 - 12.9 FL    NRBC 0.0 0  WBC    ABSOLUTE NRBC 0.00 0.00 - 0.01 K/uL    NEUTROPHILS 81 (H) 32 - 75 %    LYMPHOCYTES 9 (L) 12 - 49 %    MONOCYTES 8 5 - 13 %    EOSINOPHILS 1 0 - 7 %    BASOPHILS 1 0 - 1 %    IMMATURE GRANULOCYTES 0 0.0 - 0.5 %    ABS. NEUTROPHILS 7.2 1.8 - 8.0 K/UL    ABS. LYMPHOCYTES 0.8 0.8 - 3.5 K/UL    ABS. MONOCYTES 0.7 0.0 - 1.0 K/UL    ABS. EOSINOPHILS 0.1 0.0 - 0.4 K/UL    ABS. BASOPHILS 0.1 0.0 - 0.1 K/UL    ABS. IMM.  GRANS. 0.0 0.00 - 0.04 K/UL    DF AUTOMATED      RBC COMMENTS NORMOCYTIC, NORMOCHROMIC     METABOLIC PANEL, COMPREHENSIVE    Collection Time: 12/13/19  2:59 PM   Result Value Ref Range    Sodium 143 136 - 145 mmol/L    Potassium 4.4 3.5 - 5.1 mmol/L Chloride 110 (H) 97 - 108 mmol/L    CO2 27 21 - 32 mmol/L    Anion gap 6 5 - 15 mmol/L    Glucose 87 65 - 100 mg/dL    BUN 18 6 - 20 MG/DL    Creatinine 1.18 0.70 - 1.30 MG/DL    BUN/Creatinine ratio 15 12 - 20      GFR est AA >60 >60 ml/min/1.73m2    GFR est non-AA >60 >60 ml/min/1.73m2    Calcium 8.6 8.5 - 10.1 MG/DL    Bilirubin, total 0.3 0.2 - 1.0 MG/DL    ALT (SGPT) 31 12 - 78 U/L    AST (SGOT) 22 15 - 37 U/L    Alk. phosphatase 75 45 - 117 U/L    Protein, total 7.1 6.4 - 8.2 g/dL    Albumin 3.9 3.5 - 5.0 g/dL    Globulin 3.2 2.0 - 4.0 g/dL    A-G Ratio 1.2 1.1 - 2.2         Radiologic Studies -   CT HEAD WO CONT   Final Result   IMPRESSION: No acute intracranial abnormality. Soft tissue hematoma overlying   the left frontal bone in the left supraorbital region              CT Results  (Last 48 hours)               12/13/19 1529  CT HEAD WO CONT Final result    Impression:  IMPRESSION: No acute intracranial abnormality. Soft tissue hematoma overlying   the left frontal bone in the left supraorbital region               Narrative:  EXAM: CT HEAD WO CONT       INDICATION: head injury s/p fall from seizure       COMPARISON: 12/13/2019. CONTRAST: None. TECHNIQUE: Unenhanced CT of the head was performed using 5 mm images. Brain and   bone windows were generated. CT dose reduction was achieved through use of a   standardized protocol tailored for this examination and automatic exposure   control for dose modulation. FINDINGS:   The ventricles and sulci are normal in size, shape and configuration and   midline. There is no significant white matter disease. There is no intracranial   hemorrhage, extra-axial collection, mass, mass effect or midline shift. The   basilar cisterns are open. No acute infarct is identified. The bone windows   demonstrate no abnormalities. Partial opacification of the left frontal sinus is   noted. There is a soft tissue hematoma overlying the left frontal bone. CXR Results  (Last 48 hours)    None          Medical Decision Making   I am the first provider for this patient. I reviewed the vital signs, available nursing notes, past medical history, past surgical history, family history and social history. Vital Signs-Reviewed the patient's vital signs. Patient Vitals for the past 12 hrs:   Temp Pulse Resp BP SpO2   12/13/19 1426 97.4 °F (36.3 °C) (!) 113 18 (!) 144/102 100 %           Records Reviewed: Nursing Notes, Old Medical Records, Previous Radiology Studies and Previous Laboratory Studies    Provider Notes (Medical Decision Making):   Seizure, electrolyte abnormality, closed head injury, intracranial hemorrhage, contusion, abrasion, laceration    ED Course:   Initial assessment performed. The patients presenting problems have been discussed, and they are in agreement with the care plan formulated and outlined with them. I have encouraged them to ask questions as they arise throughout their visit. Consult note:    I discussed the case with , neurology, because the patient had a second seizure in the emergency department. She recommends Keppra 1 g twice a day and to continue the patient's normal dosage of Topamax    Consult note: The patient is being admitted by [facility], specialist         Critical Care Time:   0    Disposition:  admit    PLAN:  1. Current Discharge Medication List        2. Follow-up Information    None       Return to ED if worse     Diagnosis     Clinical Impression:   1. Closed head injury, initial encounter    2. Seizures ([address]    3. Alcohol withdrawal syndrome with complication ([address]    4. Asperger syndrome    5. Seizure disorder [facility])        Attestations:    Josh Muniz MD    Please note that this dictation was completed with [employer], the [employer] voice recognition software.   Quite often unanticipated grammatical, syntax, homophones, and other interpretive errors are inadvertently transcribed by the computer software. Please disregard these errors. Please excuse any errors that have escaped final proofreading. Thank you.

## 2019-12-14 LAB
ANION GAP SERPL CALC-SCNC: 6 MMOL/L (ref 5–15)
BASOPHILS # BLD: 0.1 K/UL (ref 0–0.1)
BASOPHILS NFR BLD: 1 % (ref 0–1)
BUN SERPL-MCNC: 14 MG/DL (ref 6–20)
BUN/CREAT SERPL: 15 (ref 12–20)
CALCIUM SERPL-MCNC: 8.7 MG/DL (ref 8.5–10.1)
CHLORIDE SERPL-SCNC: 111 MMOL/L (ref 97–108)
CO2 SERPL-SCNC: 23 MMOL/L (ref 21–32)
CREAT SERPL-MCNC: 0.96 MG/DL (ref 0.7–1.3)
DIFFERENTIAL METHOD BLD: NORMAL
EOSINOPHIL # BLD: 0.1 K/UL (ref 0–0.4)
EOSINOPHIL NFR BLD: 1 % (ref 0–7)
ERYTHROCYTE [DISTWIDTH] IN BLOOD BY AUTOMATED COUNT: 13.5 % (ref 11.5–14.5)
GLUCOSE SERPL-MCNC: 101 MG/DL (ref 65–100)
HCT VFR BLD AUTO: 44.3 % (ref 36.6–50.3)
HGB BLD-MCNC: 14.6 G/DL (ref 12.1–17)
IMM GRANULOCYTES # BLD AUTO: 0 K/UL (ref 0–0.04)
IMM GRANULOCYTES NFR BLD AUTO: 0 % (ref 0–0.5)
LYMPHOCYTES # BLD: 1.4 K/UL (ref 0.8–3.5)
LYMPHOCYTES NFR BLD: 18 % (ref 12–49)
MCH RBC QN AUTO: 29.6 PG (ref 26–34)
MCHC RBC AUTO-ENTMCNC: 33 G/DL (ref 30–36.5)
MCV RBC AUTO: 89.9 FL (ref 80–99)
MONOCYTES # BLD: 0.7 K/UL (ref 0–1)
MONOCYTES NFR BLD: 9 % (ref 5–13)
NEUTS SEG # BLD: 5.5 K/UL (ref 1.8–8)
NEUTS SEG NFR BLD: 71 % (ref 32–75)
NRBC # BLD: 0 K/UL (ref 0–0.01)
NRBC BLD-RTO: 0 PER 100 WBC
PLATELET # BLD AUTO: 238 K/UL (ref 150–400)
PMV BLD AUTO: 9.5 FL (ref 8.9–12.9)
POTASSIUM SERPL-SCNC: 3.8 MMOL/L (ref 3.5–5.1)
RBC # BLD AUTO: 4.93 M/UL (ref 4.1–5.7)
SODIUM SERPL-SCNC: 140 MMOL/L (ref 136–145)
WBC # BLD AUTO: 7.9 K/UL (ref 4.1–11.1)

## 2019-12-14 PROCEDURE — 65660000000 HC RM CCU STEPDOWN

## 2019-12-14 PROCEDURE — 80201 ASSAY OF TOPIRAMATE: CPT

## 2019-12-14 PROCEDURE — 94760 N-INVAS EAR/PLS OXIMETRY 1: CPT

## 2019-12-14 PROCEDURE — 36415 COLL VENOUS BLD VENIPUNCTURE: CPT

## 2019-12-14 PROCEDURE — 80048 BASIC METABOLIC PNL TOTAL CA: CPT

## 2019-12-14 PROCEDURE — 85025 COMPLETE CBC W/AUTO DIFF WBC: CPT

## 2019-12-14 PROCEDURE — 74011250637 HC RX REV CODE- 250/637: Performed by: INTERNAL MEDICINE

## 2019-12-14 PROCEDURE — 74011250636 HC RX REV CODE- 250/636: Performed by: INTERNAL MEDICINE

## 2019-12-14 RX ADMIN — TOPIRAMATE 175 MG: 25 TABLET, FILM COATED ORAL at 08:59

## 2019-12-14 RX ADMIN — SERTRALINE HYDROCHLORIDE 25 MG: 25 TABLET ORAL at 23:32

## 2019-12-14 RX ADMIN — HEPARIN SODIUM 5000 UNITS: 5000 INJECTION INTRAVENOUS; SUBCUTANEOUS at 13:57

## 2019-12-14 RX ADMIN — Medication 10 ML: at 21:56

## 2019-12-14 RX ADMIN — ACETAMINOPHEN 650 MG: 325 TABLET ORAL at 11:33

## 2019-12-14 RX ADMIN — HEPARIN SODIUM 5000 UNITS: 5000 INJECTION INTRAVENOUS; SUBCUTANEOUS at 21:56

## 2019-12-14 RX ADMIN — TOPIRAMATE 175 MG: 25 TABLET, FILM COATED ORAL at 17:30

## 2019-12-14 RX ADMIN — BRIVARACETAM 100 MG: 25 TABLET, FILM COATED ORAL at 08:59

## 2019-12-14 RX ADMIN — SODIUM CHLORIDE 75 ML/HR: 900 INJECTION, SOLUTION INTRAVENOUS at 14:55

## 2019-12-14 RX ADMIN — BRIVARACETAM 100 MG: 25 TABLET, FILM COATED ORAL at 17:30

## 2019-12-14 NOTE — PROGRESS NOTES
Hospitalist Progress Note    NAME: Danielito Schulte   :  1975   MRN:  [id number]       Assessment / Plan:    History of seizure disorder  · Relapsed now  · Medications were adjusted 2 months ago and compliance with medication has been reported on admission  · Currently on Briviact 100 mg twice daily, Topamax from 50 mg daily  · He was loaded with Keppra in the ER  · Topamax was increased to 175 mg twice daily on admission  · Ativan was started as needed for seizure episodes  · Seizures and falls precautions  · IV fluids  · Neuro eval is appreciated    Left frontal and orbital hematoma  · No blurred vision or vision changes were reported on admission  · Traumatic  · Tylenol, warm compressions    25.0 - 29.9 Overweight / Body mass index is 26.7 kg/m². Code status: Full  Prophylaxis: Hep SQ  Recommended Disposition: Home w/Family     Subjective:     Patient was seen and examined this morning. No acute events since admission. He was sitting in chair watching TV. Reported that he feels okay    Review of Systems:  Symptom Y/N Comments  Symptom Y/N Comments   Fever/Chills n   Chest Pain n    Poor Appetite n   Edema n    Cough n   Abdominal Pain n    Sputum n   Joint Pain n    SOB/EISENBERG n   Pruritis/Rash n    Nausea/vomit n   Tolerating PT/OT y    Diarrhea n   Tolerating Diet y    Constipation n   Other         Objective:     VITALS:   Last 24hrs VS reviewed since prior progress note.  Most recent are:  Patient Vitals for the past 24 hrs:   Temp Pulse Resp BP SpO2   19 0747 98.4 °F (36.9 °C) 75 16 104/68 98 %   19 0337 97.9 °F (36.6 °C) 79 17 131/88 97 %   19 0007 98.3 °F (36.8 °C) 92 18 136/86 94 %   19 97.4 °F (36.3 °C) (!) 103 19 142/83 94 %   19 -- -- -- 142/75 95 %   19 -- -- -- -- 93 %   190 -- 85 16 128/72 95 %   19 1426 97.4 °F (36.3 °C) (!) 113 18 (!) 144/102 100 %     No intake or output data in the 24 hours ending 19 7328 PHYSICAL EXAM:  General: WD, WN. Alert, cooperative, no acute distress    EENT:  EOMI. Anicteric sclerae. MMM  Resp:  CTA bilaterally, no wheezing or rales. No accessory muscle use  CV:  Regular  rhythm,  No edema  GI:  Soft, Non distended, Non tender.  +Bowel sounds  Neurologic:  Alert and oriented X 3, normal speech,   Psych:   Good insight. Not anxious nor agitated  Skin:  No rashes. No jaundice, left thigh bruises and ecchymosis    Reviewed most current lab test results and cultures  YES  Reviewed most current radiology test results   YES  Review and summation of old records today    NO  Reviewed patient's current orders and MAR    YES  PMH/SH reviewed - no change compared to H&P  ________________________________________________________________________  Care Plan discussed with:    Comments   Patient x    Family      RN x    Care Manager     Consultant                        Multidiciplinary team rounds were held today with , nursing, pharmacist and clinical coordinator. Patient's plan of care was discussed; medications were reviewed and discharge planning was addressed. ________________________________________________________________________  Total NON critical care TIME:  35  Minutes    Total CRITICAL CARE TIME Spent:   Minutes non procedure based      Comments   >50% of visit spent in counseling and coordination of care     ________________________________________________________________________  Adithya Canales MD     Procedures: see electronic medical records for all procedures/Xrays and details which were not copied into this note but were reviewed prior to creation of Plan. LABS:  I reviewed today's most current labs and imaging studies.   Pertinent labs include:  Recent Labs     12/14/19  0546 12/13/19  1459   WBC 7.9 8.9   HGB 14.6 15.4   HCT 44.3 46.2    279     Recent Labs     12/14/19  0546 12/13/19  1459    143   K 3.8 4.4   * 110*   CO2 23 27   * 87   BUN 14 18   CREA 0.96 1.18   CA 8.7 8.6   ALB  --  3.9   TBILI  --  0.3   SGOT  --  22   ALT  --  31       Signed: Caio Burton MD

## 2019-12-14 NOTE — PROGRESS NOTES
Pharmacy Clarification of Prior to Admission Medication Regimen     The patient was interviewed regarding clarification of the prior to admission medication regimen. Patient's mom was present in room and obtained permission from patient to discuss drug regimen with visitor(s) present. Patient was questioned regarding use of any other inhalers, topical products, over the counter medications, herbal medications, vitamin products or ophthalmic/nasal/otic medication use. Information Obtained From: Patient, Patient's mom, Rx query    Pertinent Pharmacy Findings:  Updated patients preferred outpatient pharmacy to: [address] [place] [address] Rd     PTA medication list was corrected to the following:     Prior to Admission Medications   Prescriptions Last Dose Informant Taking? MULTIVITAMIN PO 12/13/2019 at Unknown time Self Yes   Sig: Take 1 Tab by mouth daily. brivaracetam (BRIVIACT) 100 mg tablet 12/13/2019 at Unknown time Self Yes   Sig: Take 1 Tab by mouth two (2) times a day. Max Daily Amount: 200 mg.   sertraline (ZOLOFT) 25 mg tablet 12/12/2019 at Unknown time Self Yes   Sig: Take 25 mg by mouth nightly. topiramate (TOPAMAX) 100 mg tablet 12/13/2019 at Unknown time Self Yes   Sig: Take 1 Tab by mouth two (2) times a day. topiramate (TOPAMAX) 50 mg tablet 12/13/2019 at Unknown time Self Yes   Sig: Take 1 Tab by mouth two (2) times a day.       Facility-Administered Medications: None          Thank you,  Arelis Bearden  Medication History Pharmacy Technician

## 2019-12-14 NOTE — CONSULTS
IP CONSULT TO NEUROLOGY  Consult performed by: Odessa Sparks MD  Consult ordered by: Roderick Puckett MD            NEUROLOGY CONSULT    NAME Agus Ya AGE 38 y.o. MRN [id number]  1975     REQUESTING PHYSICIAN: Yue Quiroz MD      CHIEF COMPLAINT:  SEIZURE     This is a 38 y.o. left handed male with a medical history of autism who is admitted after having a seizure followed by 10 minutes of confusion. He reports that he is compliant withhis medications. He lives alone. He has an alcohol habit. Last drink was  \"days ago\". He is treated by Dr. Kennedi Osborne at [facility]. He hit his left orbit on the floor and has swelling and bruising but vision is intact. Spoke with parents who suggested that he had never had seizures prior to drinking alcohol and that they were unable to influence him to stop drinking. ASSESSMENT AND PLAN   1. Epilepsy  Continue breviact  Continue topamax  Follow up with neurologist two weeks after discharge  Advised to eliminate alcohol from his diet      2. 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  Alcoholism    ALLERGIES:  Aspartame     Current Facility-Administered Medications   Medication Dose Route Frequency    acetaminophen (TYLENOL) tablet 650 mg  650 mg Oral Q6H PRN    ondansetron (ZOFRAN) injection 4 mg  4 mg IntraVENous Q4H PRN    brivaracetam (BRIVIACT) tablet 100 mg  100 mg Oral BID    sertraline (ZOLOFT) tablet 25 mg  25 mg Oral QHS    sodium chloride (NS) flush 5-40 mL  5-40 mL IntraVENous Q8H    sodium chloride (NS) flush 5-40 mL  5-40 mL IntraVENous PRN    docusate sodium (COLACE) capsule 100 mg  100 mg Oral DAILY PRN    heparin (porcine) injection 5,000 Units  5,000 Units SubCUTAneous Q8H    0.9% sodium chloride infusion  75 mL/hr IntraVENous CONTINUOUS    LORazepam (ATIVAN) injection 1 mg  1 mg IntraVENous Q2H PRN    influenza vaccine - (6 mos+)(PF) (FLUARIX/FLULAVAL/FLUZONE QUAD) injection 0.5 mL  0.5 mL IntraMUSCular PRIOR TO DISCHARGE    topiramate (TOPAMAX) tablet 175 mg  175 mg Oral BID       Past Medical History:   Diagnosis Date    Asperger syndrome 12/14/2011    Autism     dx 2010- highly functional    Other ill-defined conditions(799.89)     aspergers, syncopal episodes    Seizure disorder ([facility] [address] 5/22/2012    Seizures ([facility] [address]        Social History     Tobacco Use    Smoking status: Never Smoker    Smokeless tobacco: Never Used   Substance Use Topics    Alcohol use: Yes     Alcohol/week: 8.3 standard drinks     Types: 10 Cans of beer per week     Frequency: Monthly or less     Drinks per session: 1 or 2     Comment: occasional       Family History   Problem Relation Age of Onset    Diabetes Father     Heart Disease Father     Cancer Mother         Breast    Anxiety Mother     COPD Mother     Other Mother         Neuropathy    Sleep Apnea Mother     Arthritis-osteo Mother     Other Brother         Pituitary tumor     Review of Systems   Constitutional: Negative for chills and fever. HENT: Negative for ear pain. Eyes: Negative for pain and discharge. Respiratory: Negative for cough and hemoptysis. Cardiovascular: Negative for chest pain and claudication. Gastrointestinal: Negative for constipation and diarrhea. Genitourinary: Negative for flank pain and hematuria. Musculoskeletal: Negative for back pain and myalgias. Skin: Negative for itching and rash. Neurological: Negative for headaches. Endo/Heme/Allergies: Negative for environmental allergies. Does not bruise/bleed easily. Psychiatric/Behavioral: Positive for depression and substance abuse. Negative for hallucinations. The patient is nervous/anxious. Visit Vitals  /88   Pulse 88   Temp 97.4 °F (36.3 °C)   Resp 16   Wt 186 lb 1.1 oz (84.4 kg)   SpO2 96%   BMI 26.70 kg/m²      General: Well developed, well nourished.  Patient in no apparent distress   Head: Normocephalic, atraumatic, anicteric sclera   Neck Normal ROM, No thyromegally   Lungs:  Clear to auscultation bilaterally, No wheezes or rubs   Cardiac: Regular rate and rhythm with no murmurs. Abd: Bowel sounds were audible. No tenderness on palpation   Ext: No pedal edema   Skin: Supple no rash. Left eye swollen shut     NeurologicExam:  Mental Status: Alert and oriented to person place and time   Speech: Fluent no aphasia or dysarthria. Cranial Nerves:  II - XII Intact able to see out of swollen eye   Motor:  Full and symmetric strength of upper and lower proximal and distal muscles. Normal bulk and tone. Reflexes:   Deep tendon reflexes 2+/4 and symmetric. Sensory:   Symmetric and intact with no perceived deficits modalities involving small or large fibers. Gait:  deferred   Tremor:   No tremor noted. Cerebellar:  Coordination intact. Neurovascular: No carotid bruits. No JVD       REVIEWED IMAGING:    MRI :  Results from Hospital Encounter encounter on 06/10/19   MRI BRAIN W WO CONT    Narrative EXAM:  MRI BRAIN W WO CONT    INDICATION:    Seizures new or progressive    COMPARISON:  January 23, 2012. CONTRAST: 17 ml Dotarem. TECHNIQUE:    Multiplanar multisequence acquisition without and with contrast of the brain. FINDINGS:  Diffuse motion artifact. Diffusion imaging does not show acute ischemic changes . Minimal nonspecific white matter disease. Ventricle size is normal and stable. No extra-axial fluid collection hemorrhage or shift. Temporal lobe no show no focal abnormality and appear symmetrical.  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 No new lesion is  seen. Incidental diffuse mucosal thickening within the a stomach air cells bilaterally      Impression  impression: No acute changes no masses. Stable findings.        CT:  Results from Hospital Encounter encounter on 12/13/19   CT HEAD WO CONT    Narrative EXAM: CT HEAD WO CONT    INDICATION: head injury s/p fall from seizure    COMPARISON: 12/13/2019. CONTRAST: None. TECHNIQUE: Unenhanced CT of the head was performed using 5 mm images. Brain and  bone windows were generated. CT dose reduction was achieved through use of a  standardized protocol tailored for this examination and automatic exposure  control for dose modulation. FINDINGS:  The ventricles and sulci are normal in size, shape and configuration and  midline. There is no significant white matter disease. There is no intracranial  hemorrhage, extra-axial collection, mass, mass effect or midline shift. The  basilar cisterns are open. No acute infarct is identified. The bone windows  demonstrate no abnormalities. Partial opacification of the left frontal sinus is  noted. There is a soft tissue hematoma overlying the left frontal bone. Impression IMPRESSION: No acute intracranial abnormality.  Soft tissue hematoma overlying  the left frontal bone in the left supraorbital region           REVIEWED LABS:  Lab Results   Component Value Date/Time    WBC 7.9 12/14/2019 05:46 AM    HCT 44.3 12/14/2019 05:46 AM    HGB 14.6 12/14/2019 05:46 AM    PLATELET 181 03/50/5437 05:46 AM     Lab Results   Component Value Date/Time    Sodium 140 12/14/2019 05:46 AM    Potassium 3.8 12/14/2019 05:46 AM    Chloride 111 (H) 12/14/2019 05:46 AM    CO2 23 12/14/2019 05:46 AM    Glucose 101 (H) 12/14/2019 05:46 AM    BUN 14 12/14/2019 05:46 AM    Creatinine 0.96 12/14/2019 05:46 AM    Calcium 8.7 12/14/2019 05:46 AM     Lab Results   Component Value Date/Time    Vitamin B12 802 06/12/2019 04:14 AM    Folate 18.9 06/12/2019 04:14 AM     Lab Results   Component Value Date/Time    LDL, calculated 75 01/03/2018 02:32 PM     Lab Results   Component Value Date/Time    Hemoglobin A1c 5.3 01/03/2018 02:32 PM    Hemoglobin A1c (POC) 5.1 09/06/2019 09:50 AM

## 2019-12-14 NOTE — ED NOTES
TRANSFER - OUT REPORT:    Verbal report given to NATHANAEL(name) on [facility]  being transferred to nstu(unit) for routine progression of care       Report consisted of patients Situation, Background, Assessment and   Recommendations(SBAR). Information from the following report(s) SBAR, ED Summary and MAR was reviewed with the receiving nurse. Lines:       Opportunity for questions and clarification was provided.       Patient transported with:   Gertrude

## 2019-12-14 NOTE — PROGRESS NOTES
Problem: Falls - Risk of  Goal: *Absence of Falls  Description  Document Ivedwar Kc Fall Risk and appropriate interventions in the flowsheet.   Outcome: Progressing Towards Goal  Note: Fall Risk Interventions:            Medication Interventions: Evaluate medications/consider consulting pharmacy, Teach patient to arise slowly, Patient to call before getting OOB                   Problem: Patient Education: Go to Patient Education Activity  Goal: Patient/Family Education  Outcome: Progressing Towards Goal

## 2019-12-14 NOTE — H&P
Hospitalist Admission Note    NAME: Paulino Ceja   :  1975   MRN:  [id number]     Date/Time:  2019 7:47 PM    Patient PCP: Dillon Archer MD  ________________________________________________________________________    My assessment of this patient's clinical condition and my plan of care is as follows. Assessment / Plan:  Seizure relapse  History of seizure disorder  -Seizure medications were adjusted about 2 months ago and  he has been compliant with his medications  -He is currently on Briviact 100 mg p.o. twice daily and Topamax 150 mg p.o. twice daily  -He was loaded with Keppra in the emergency department  -Since we have room to go up on the dose of Topamax, will increase it to 175 mg p.o. twice daily. Continue Briviact or formulary during hospitalization  -Start Ativan as needed for seizures. Seizure and fall precautions.  -Start IV hydration with normal saline.  -Telemetry neurologist recommended admission to hospital service per ER physician for dose adjustments given recurrent seizures  -Check lactic acid level. CK level is normal.    Left frontal and orbital hematoma  -He currently does not report any blurred vision or other vision changes  -He does have left frontal headache most likely from trauma and hematoma  -Pain control with Tylenol. Warm compresses. Monitor the swelling for any progression given proximity to the eye  -He will need a quick outpatient follow-up with ophthalmologist upon discharge      Code Status: Full code  Surrogate Decision Maker: Mother Blaine Naidu    DVT Prophylaxis: Heparin  GI Prophylaxis: not indicated    Baseline: From home, has Asperger's disease at baseline        Subjective:   CHIEF COMPLAINT: Seizures    HISTORY OF PRESENT ILLNESS:     Zunilda Sy is a 38 y.o.   male who presents with past medical history of Asperger's disorder, seizures is coming to the hospital chief complaints of seizure relapse.   Patient reports being in his neutrophil count is about the same morning when he had 2 episodes of seizures involving both his his arms. He also felt as if his whole body was spasming. Patient had another episode of seizure in the emergency department for which she required Ativan and after that episode he was drowsy. Patient reports being compliant with his seizure medications. He reports that his last seizure medications were adjusted about 2 months ago and since then he has been compliant. Currently reports pain in the left frontal area which is 3 x 10, radiating down to the neck without any aggravating or relieving factors. He does not report any other symptoms at this time. On arrival to the hospital, he was noted to be tachycardic and blood pressure was 144/102. On labs he was noted to have a normal CBC. CMP was within normal limits. CT head showed no acute process. We were asked to admit for work up and evaluation of the above problems. Past Medical History:   Diagnosis Date    Asperger syndrome 12/14/2011    Autism     dx 2010- highly functional    Other ill-defined conditions(799.89)     aspergers, syncopal episodes    Seizure disorder ([facility] [address] 5/22/2012    Seizures ([facility] [address]         Past Surgical History:   Procedure Laterality Date    HX GI      pyloric stenosis 1976    HX HEENT      adenoids 1978       Social History     Tobacco Use    Smoking status: Never Smoker    Smokeless tobacco: Never Used   Substance Use Topics    Alcohol use:  Yes     Alcohol/week: 8.3 standard drinks     Types: 10 Cans of beer per week     Frequency: Monthly or less     Drinks per session: 1 or 2     Comment: occasional        Family History   Problem Relation Age of Onset    Diabetes Father     Heart Disease Father     Cancer Mother         Breast    Anxiety Mother     COPD Mother     Other Mother         Neuropathy    Sleep Apnea Mother     Arthritis-osteo Mother     Other Brother         Pituitary tumor     Allergies   Allergen Reactions    Aspartame Other (comments)     Family believes it causes his seizures        Prior to Admission medications    Medication Sig Start Date End Date Taking? Authorizing Provider   sertraline (ZOLOFT) 25 mg tablet Take 25 mg by mouth nightly. Yes Provider, Historical   topiramate (TOPAMAX) 100 mg tablet Take 1 Tab by mouth two (2) times a day. 10/29/19  Yes Erickson Hinton MD   brivaracetam (BRIVIACT) 100 mg tablet Take 1 Tab by mouth two (2) times a day. Max Daily Amount: 200 mg. 10/29/19  Yes Erickson Hinton MD   topiramate (TOPAMAX) 50 mg tablet Take 1 Tab by mouth two (2) times a day. 10/29/19  Yes Erickson Hinton MD   MULTIVITAMIN PO Take 1 Tab by mouth daily. Yes Provider, Historical       REVIEW OF SYSTEMS:     I am not able to complete the review of systems because:    The patient is intubated and sedated    The patient has altered mental status due to his acute medical problems    The patient has baseline aphasia from prior stroke(s)    The patient has baseline dementia and is not reliable historian    The patient is in acute medical distress and unable to provide information           Total of 12 systems reviewed as follows:       POSITIVE= underlined text  Negative = text not underlined  General:  fever, chills, sweats, generalized weakness, weight loss/gain,      loss of appetite   Eyes:    blurred vision, eye pain, loss of vision, double vision  ENT:    rhinorrhea, pharyngitis   Respiratory:   cough, sputum production, SOB, EISENBERG, wheezing, pleuritic pain   Cardiology:   chest pain, palpitations, orthopnea, PND, edema, syncope   Gastrointestinal:  abdominal pain , N/V, diarrhea, dysphagia, constipation, bleeding   Genitourinary:  frequency, urgency, dysuria, hematuria, incontinence   Muskuloskeletal :  arthralgia, myalgia, back pain  Hematology:  easy bruising, nose or gum bleeding, lymphadenopathy   Dermatological: rash, ulceration, pruritis, color change / jaundice  Endocrine: hot flashes or polydipsia   Neurological:  headache, dizziness, confusion, focal weakness, paresthesia,     Speech difficulties, memory loss, gait difficulty  Psychological: Feelings of anxiety, depression, agitation    Objective:   VITALS:    Visit Vitals  /72   Pulse (!) 113   Temp 97.4 °F (36.3 °C)   Resp 18   Wt 84.4 kg (186 lb 1.1 oz)   SpO2 93%   BMI 26.70 kg/m²       PHYSICAL EXAM:    General:    no distress, appears stated age. HEENT: Atraumatic, anicteric sclerae, pink conjunctivae     No oral ulcers, left eye hematoma  Neck:  Supple, symmetrical,  thyroid: non tender  Lungs:   Clear to auscultation bilaterally. No Wheezing or Rhonchi. No rales. Chest wall:  No tenderness  No Accessory muscle use. Heart:   Regular  rhythm,  No  murmur   No edema  Abdomen:   Soft, non-tender. Not distended. Bowel sounds normal  Extremities: No cyanosis. No clubbing,      Skin turgor normal, Capillary refill normal, Radial dial pulse 2+  Skin:     Not pale. Not Jaundiced  No rashes   Psych:  Not anxious or agitated. Neurologic: No aphasia or slurred speech. Symmetrical strength, Sensation grossly intact.  Alert and oriented X 2    _______________________________________________________________________  Care Plan discussed with:    Comments   Patient y    Family  y    RN y    Care Manager                    Consultant:      _______________________________________________________________________  Expected  Disposition:   Home with Family y   HH/PT/OT/RN    SNF/LTC    AZAEL    ________________________________________________________________________  TOTAL TIME: 61 Minutes    Critical Care Provided     Minutes non procedure based      Comments    y Reviewed previous records   >50% of visit spent in counseling and coordination of care y Discussion with patient and/or family and questions answered       ________________________________________________________________________  Signed: Collette Broker, MD    Procedures: see electronic medical records for all procedures/Xrays and details which were not copied into this note but were reviewed prior to creation of Plan. LAB DATA REVIEWED:    Recent Results (from the past 24 hour(s))   CBC WITH AUTOMATED DIFF    Collection Time: 12/13/19  2:59 PM   Result Value Ref Range    WBC 8.9 4.1 - 11.1 K/uL    RBC 5.06 4.10 - 5.70 M/uL    HGB 15.4 12.1 - 17.0 g/dL    HCT 46.2 36.6 - 50.3 %    MCV 91.3 80.0 - 99.0 FL    MCH 30.4 26.0 - 34.0 PG    MCHC 33.3 30.0 - 36.5 g/dL    RDW 13.6 11.5 - 14.5 %    PLATELET 889 981 - 544 K/uL    MPV 9.4 8.9 - 12.9 FL    NRBC 0.0 0  WBC    ABSOLUTE NRBC 0.00 0.00 - 0.01 K/uL    NEUTROPHILS 81 (H) 32 - 75 %    LYMPHOCYTES 9 (L) 12 - 49 %    MONOCYTES 8 5 - 13 %    EOSINOPHILS 1 0 - 7 %    BASOPHILS 1 0 - 1 %    IMMATURE GRANULOCYTES 0 0.0 - 0.5 %    ABS. NEUTROPHILS 7.2 1.8 - 8.0 K/UL    ABS. LYMPHOCYTES 0.8 0.8 - 3.5 K/UL    ABS. MONOCYTES 0.7 0.0 - 1.0 K/UL    ABS. EOSINOPHILS 0.1 0.0 - 0.4 K/UL    ABS. BASOPHILS 0.1 0.0 - 0.1 K/UL    ABS. IMM. GRANS. 0.0 0.00 - 0.04 K/UL    DF AUTOMATED      RBC COMMENTS NORMOCYTIC, NORMOCHROMIC     METABOLIC PANEL, COMPREHENSIVE    Collection Time: 12/13/19  2:59 PM   Result Value Ref Range    Sodium 143 136 - 145 mmol/L    Potassium 4.4 3.5 - 5.1 mmol/L    Chloride 110 (H) 97 - 108 mmol/L    CO2 27 21 - 32 mmol/L    Anion gap 6 5 - 15 mmol/L    Glucose 87 65 - 100 mg/dL    BUN 18 6 - 20 MG/DL    Creatinine 1.18 0.70 - 1.30 MG/DL    BUN/Creatinine ratio 15 12 - 20      GFR est AA >60 >60 ml/min/1.73m2    GFR est non-AA >60 >60 ml/min/1.73m2    Calcium 8.6 8.5 - 10.1 MG/DL    Bilirubin, total 0.3 0.2 - 1.0 MG/DL    ALT (SGPT) 31 12 - 78 U/L    AST (SGOT) 22 15 - 37 U/L    Alk.  phosphatase 75 45 - 117 U/L    Protein, total 7.1 6.4 - 8.2 g/dL    Albumin 3.9 3.5 - 5.0 g/dL    Globulin 3.2 2.0 - 4.0 g/dL    A-G Ratio 1.2 1.1 - 2.2     CK    Collection Time: 12/13/19  2:59 PM   Result Value Ref Range  39 - 308 U/L

## 2019-12-15 VITALS
RESPIRATION RATE: 16 BRPM | HEART RATE: 83 BPM | BODY MASS INDEX: 26.7 KG/M2 | TEMPERATURE: 98.4 F | DIASTOLIC BLOOD PRESSURE: 73 MMHG | OXYGEN SATURATION: 99 % | WEIGHT: 186.07 LBS | SYSTOLIC BLOOD PRESSURE: 109 MMHG

## 2019-12-15 PROBLEM — R56.9 SEIZURES (HCC): Status: RESOLVED | Noted: 2019-12-13 | Resolved: 2019-12-15

## 2019-12-15 PROCEDURE — 90686 IIV4 VACC NO PRSV 0.5 ML IM: CPT | Performed by: INTERNAL MEDICINE

## 2019-12-15 PROCEDURE — 74011250636 HC RX REV CODE- 250/636: Performed by: INTERNAL MEDICINE

## 2019-12-15 PROCEDURE — 94760 N-INVAS EAR/PLS OXIMETRY 1: CPT

## 2019-12-15 PROCEDURE — 74011250637 HC RX REV CODE- 250/637: Performed by: INTERNAL MEDICINE

## 2019-12-15 PROCEDURE — 90471 IMMUNIZATION ADMIN: CPT

## 2019-12-15 PROCEDURE — 94761 N-INVAS EAR/PLS OXIMETRY MLT: CPT

## 2019-12-15 RX ORDER — TOPIRAMATE 25 MG/1
25 TABLET ORAL 2 TIMES DAILY
Qty: 90 TAB | Refills: 2 | Status: SHIPPED | OUTPATIENT
Start: 2019-12-15 | End: 2020-01-05 | Stop reason: DRUGHIGH

## 2019-12-15 RX ADMIN — INFLUENZA VIRUS VACCINE 0.5 ML: 15; 15; 15; 15 SUSPENSION INTRAMUSCULAR at 13:03

## 2019-12-15 RX ADMIN — TOPIRAMATE 175 MG: 25 TABLET, FILM COATED ORAL at 09:03

## 2019-12-15 RX ADMIN — Medication 10 ML: at 06:42

## 2019-12-15 RX ADMIN — BRIVARACETAM 100 MG: 25 TABLET, FILM COATED ORAL at 09:03

## 2019-12-15 RX ADMIN — HEPARIN SODIUM 5000 UNITS: 5000 INJECTION INTRAVENOUS; SUBCUTANEOUS at 06:42

## 2019-12-15 NOTE — PROGRESS NOTES
Problem: Falls - Risk of  Goal: *Absence of Falls  Description  Document Eduarda Whalen Fall Risk and appropriate interventions in the flowsheet.   Outcome: Progressing Towards Goal  Note: Fall Risk Interventions:            Medication Interventions: Evaluate medications/consider consulting pharmacy         History of Falls Interventions: Bed/chair exit alarm, Door open when patient unattended         Problem: Patient Education: Go to Patient Education Activity  Goal: Patient/Family Education  Outcome: Progressing Towards Goal

## 2019-12-15 NOTE — PROGRESS NOTES
Problem: Falls - Risk of  Goal: *Absence of Falls  Description  Document Jonathan Poon Fall Risk and appropriate interventions in the flowsheet.   Outcome: Progressing Towards Goal  Note: Fall Risk Interventions:            Medication Interventions: Evaluate medications/consider consulting pharmacy         History of Falls Interventions: Bed/chair exit alarm

## 2019-12-15 NOTE — PROGRESS NOTES
Problem: Falls - Risk of  Goal: *Absence of Falls  Description  Document Ayden Palomino Fall Risk and appropriate interventions in the flowsheet.   12/15/2019 1111 by Payton Pina RN  Outcome: Progressing Towards Goal  Note: Fall Risk Interventions:            Medication Interventions: Evaluate medications/consider consulting pharmacy         History of Falls Interventions: Bed/chair exit alarm, Door open when patient unattended      12/15/2019 0753 by Payton Pina RN  Outcome: Progressing Towards Goal  Note: Fall Risk Interventions:            Medication Interventions: Evaluate medications/consider consulting pharmacy         History of Falls Interventions: Bed/chair exit alarm, Door open when patient unattended         Problem: Patient Education: Go to Patient Education Activity  Goal: Patient/Family Education  12/15/2019 1111 by Payton Pina RN  Outcome: Progressing Towards Goal  12/15/2019 0753 by Payton Pina RN  Outcome: Progressing Towards Goal

## 2019-12-15 NOTE — DISCHARGE INSTRUCTIONS
Patient Education        Learning About Alcohol Use Disorder  What is alcohol use disorder? Alcohol use disorder means that a person drinks alcohol even though it causes harm to themselves or others. It can range from mild to severe. The more signs of this disorder you have, the more severe it may be. Moderate to severe alcohol use disorder is sometimes called addiction. People who have it may find it hard to control their use of alcohol. People who have this disorder may argue with others about how much they're drinking. Their job may be affected because of drinking. They may drink when it's dangerous or illegal, such as when they drive. They also may have a strong need, or craving, to drink. They may feel like they must drink just to get by. Their drinking may increase their risk of getting hurt or being in a car crash. Over time, drinking too much alcohol may cause health problems. These may include high blood pressure, liver problems, or problems with digestion. What are the signs? Maybe you've wondered about your alcohol habits, or how to tell if your drinking is becoming a problem. Here are some of the signs of alcohol use disorder. You may have it if you have two or more of the following signs:  · You drink larger amounts of alcohol than you ever meant to. Or you've been drinking for a longer time than you ever meant to. · You can't cut down or control your use. Or you constantly wish you could cut down. · You spend a lot of time getting or drinking alcohol or recovering from its effects. · You have strong cravings for alcohol. · You can no longer do your main jobs at work, at school, or at home. · You keep drinking alcohol, even though your use hurts your relationships. · You have stopped doing important activities because of your alcohol use. · You drink alcohol in situations where doing so is dangerous. · You keep drinking alcohol even though you know it's causing health problems.   · You need more and more alcohol to get the same effect, or you get less effect from the same amount over time. This is called tolerance. · You have uncomfortable symptoms when you stop drinking alcohol or use less. This is called withdrawal.  Alcohol use disorder can range from mild to severe. The more signs you have, the more severe the disorder may be. Moderate to severe alcohol use disorder is sometimes called addiction. You might not realize that your drinking is a problem. You might not drink large amounts when you drink. Or you might go for days or weeks between drinking episodes. But even if you don't drink very often, your drinking could still be harmful and put you at risk. How is alcohol use disorder treated? Getting help is up to you. But you don't have to do it alone. There are many people and kinds of treatments that can help. Treatment for alcohol use disorder can include:  · Group therapy, one or more types of counseling, and alcohol education. · Medicines that help to:  ? Reduce withdrawal symptoms and help you safely stop drinking. ? Reduce cravings for alcohol. · Support groups. These groups include Alcoholics Anonymous and [employer] (Self-Management and Recovery Training). Some people are able to stop or cut back on drinking with help from a counselor. People who have moderate to severe alcohol use disorder may need medical treatment. They may need to stay in a hospital or treatment center. You may have a treatment team to help you. This team may include a psychologist or psychiatrist, counselors, doctors, social workers, nurses, and a . A  helps plan and manage your treatment. Follow-up care is a key part of your treatment and safety. Be sure to make and go to all appointments, and call your doctor if you are having problems. It's also a good idea to know your test results and keep a list of the medicines you take. Where can you learn more?   Go to http://nida.info/. Enter [id number] [id number] in the search box to learn more about \"Learning About Alcohol Use Disorder. \"  Current as of: February 5, 2019  Content Version: 12.2  © 8404-4725 [employer], [employer]. Care instructions adapted under license by [employer] (which disclaims liability or warranty for this information). If you have questions about a medical condition or this instruction, always ask your healthcare professional. [place] [address] any warranty or liability for your use of this information.        · Topamax was increased to 175 mg twice daily and cont Briviact 100 mg twice daily  · NO ALCOHOL INTAKE  F/U with neuro as out patient

## 2019-12-15 NOTE — PROGRESS NOTES
KRISHAN: Follow-up Care Appointments (PCP, Neurology)    Pt to discharge home by private vehicle with family. No PT/OT consults or recommendations at this time. No qualifying dx for [facility] visit. Pt/family to call and schedule PCP & Neurology f/u appointments as offices are closed at this time. All information entered into pt AVS.     Pt has no additional CM needs at this time. Care Management Interventions  PCP Verified by CM: Yes  Palliative Care Criteria Met (RRAT>21 & CHF Dx)?: No  Mode of Transport at Discharge:  Other (see comment)(by private vehicle with family)  Transition of Care Consult (CM Consult): Discharge Planning  Discharge Durable Medical Equipment: No  Health Maintenance Reviewed: Yes  Physical Therapy Consult: No  Occupational Therapy Consult: No  Speech Therapy Consult: No  Current Support Network: Family Lives Nearby, Lives Alone  Confirm Follow Up Transport: Family(Family vs [facility] )  Plan discussed with Pt/Family/Caregiver: Yes  Discharge Location  Discharge Placement: Home    JING Foster Supervisee in Social Work, [employer]  [phone]

## 2019-12-15 NOTE — PROGRESS NOTES
Discharge teaching for meds and upcoming appts reviewed with patient and paitient's parents. Patient's parents reviewed changes with Topamax medications.

## 2019-12-15 NOTE — DISCHARGE SUMMARY
Hospitalist Discharge Summary     Patient ID:  Linda Macedo  [id number]  75 y.o.  1975  12/13/2019    PCP on record: Thong Meza MD    Admit date: 12/13/2019  Discharge date and time: 12/15/2019    DISCHARGE DIAGNOSIS:    History of seizure disorder  · Relapsed now    Left frontal and orbital hematoma    CONSULTATIONS:  IP CONSULT TO NEUROLOGY  IP CONSULT TO HOSPITALIST    Excerpted HPI from H&P of Rankin Runner, MD:      ______________________________________________________________________  DISCHARGE SUMMARY/HOSPITAL COURSE:  for full details see H&P, daily progress notes, labs, consult notes. History of seizure disorder  · Relapsed now  · CT head no lesions  · Topamax was increased to 175 mg twice daily and cont Briviact 100 mg twice daily  · NO ALCOHOL INTAKE  · F/U with neuro as out patient    Left frontal and orbital hematoma    Tyler Culp is a 38 y.o.   male who presents with past medical history of Asperger's disorder, seizures is coming to the hospital chief complaints of seizure relapse. Patient reports being in his neutrophil count is about the same morning when he had 2 episodes of seizures involving both his his arms. He also felt as if his whole body was spasming. Patient had another episode of seizure in the emergency department for which she required Ativan and after that episode he was drowsy. Patient reports being compliant with his seizure medications. He reports that his last seizure medications were adjusted about 2 months ago and since then he has been compliant. Currently reports pain in the left frontal area which is 3 x 10, radiating down to the neck without any aggravating or relieving factors. He does not report any other symptoms at this time.     On arrival to the hospital, he was noted to be tachycardic and blood pressure was 144/102. On labs he was noted to have a normal CBC. CMP was within normal limits.   CT head showed no acute process. _______________________________________________________________________  Patient seen and examined by me on discharge day. Pertinent Findings:  Gen:    Not in distress  Chest: Clear lungs  CVS:   Regular rhythm. No edema  Abd:  Soft, not distended, not tender  Neuro:  Alert  _______________________________________________________________________  DISCHARGE MEDICATIONS:   Current Discharge Medication List      CONTINUE these medications which have CHANGED    Details   !! topiramate (TOPAMAX) 25 mg tablet Take 1 Tab by mouth two (2) times a day. Qty: 90 Tab, Refills: 2    Associated Diagnoses: Seizure disorder ([address]       ! ! - Potential duplicate medications found. Please discuss with provider. CONTINUE these medications which have NOT CHANGED    Details   !! topiramate (TOPAMAX) 100 mg tablet Take 1 Tab by mouth two (2) times a day. Qty: 180 Tab, Refills: 2      brivaracetam (BRIVIACT) 100 mg tablet Take 1 Tab by mouth two (2) times a day. Max Daily Amount: 200 mg. Qty: 180 Tab, Refills: 2    Associated Diagnoses: Seizure disorder ([address]      MULTIVITAMIN PO Take 1 Tab by mouth daily. !! - Potential duplicate medications found. Please discuss with provider. Patient Follow Up Instructions:    Activity: Activity as tolerated  Diet: Regular Diet  Wound Care: None needed      Follow-up Information     Follow up With Specialties Details Why Contact Info    Queen Dalila MD Internal Medicine Schedule an appointment as soon as possible for a visit in 1 week For hospital follow up [address]  [phone]      Queen Dalila MD Internal Medicine Schedule an appointment as soon as possible for a visit in 1 week For wound re-check [address] [place] [address]  [phone]          ________________________________________________________________    Risk of deterioration: Low    Condition at Discharge: Stable  __________________________________________________________________    Disposition  Home with family, no needs    ____________________________________________________________________    Code Status: Full Code  ___________________________________________________________________      Total time in minutes spent coordinating this discharge (includes going over instructions, follow-up, prescriptions, and preparing report for sign off to her PCP) :  35 minutes    Signed:  Robert Aguilar MD

## 2019-12-15 NOTE — PROGRESS NOTES
Bedside and Verbal shift change report given to 31 Alvina Murillo (oncoming nurse) by Pat Meneses RN (offgoing nurse). Report included the following information [facility] Phone:   [phone]      Significant changes during shift:  Hematoma to left eye remains, no c/o pain no distress noted. Requires bed alarm at night due to impulsiveness. Patient Information    Keith Carey  38 y.o.  12/13/2019  4:36 PM by Emi Gonzales MD. 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    Brain cyst 04/03/2017    Advance care planning 04/25/2016    EtOH dependence ([address] 10/31/2014    Alcohol withdrawal ([address] 10/31/2014    Hypertension 09/12/2014    Seizure ([address] 05/22/2012    Asperger syndrome 12/14/2011    Autism      Past Medical History:   Diagnosis Date    Asperger syndrome 12/14/2011    Autism     dx 2010- highly functional    Other ill-defined conditions(799.89)     aspergers, syncopal episodes    Seizure disorder ([address] 5/22/2012    Seizures ([address]          Core Measures:    CVA: No No  CHF:No No  PNA:No No        Activity Status:    OOB to Chair No  Ambulated this shift No   Bed Rest No    Supplemental O2: (If Applicable)    NC No  NRB No  Venti-mask No  On n/a Liters/min      LINES AND DRAINS:        DVT prophylaxis:    DVT prophylaxis Med- Yes  DVT prophylaxis SCD or VIV- No     Wounds: (If Applicable)    Wounds- No    Location hematoma left eye     Patient Safety:    Falls Score Total Score: 2  Safety Level_______  Bed Alarm On? Yes  Sitter?  No    Plan for upcoming shift: seizure precautions, safety         Discharge Plan: No TBD    Active Consults:  IP CONSULT TO NEUROLOGY  IP CONSULT TO HOSPITALIST

## 2019-12-15 NOTE — PROGRESS NOTES
Bedside and Verbal shift change report given Gill Huang RN to (oncoming nurse) by Abdoulaye Wheeler (offgoing nurse). Report included the following information SBAR, Kardex, Intake/Output and MAR.

## 2019-12-16 ENCOUNTER — TELEPHONE (OUTPATIENT)
Dept: NEUROLOGY | Age: 44
End: 2019-12-16

## 2019-12-16 DIAGNOSIS — G40.909 SEIZURE DISORDER (HCC): Primary | ICD-10-CM

## 2019-12-16 LAB — TOPIRAMATE SERPL-MCNC: 7.1 UG/ML (ref 2–25)

## 2019-12-16 RX ORDER — ALPRAZOLAM 0.5 MG/1
0.5 TABLET ORAL
Qty: 30 TAB | Refills: 1 | Status: SHIPPED | OUTPATIENT
Start: 2019-12-16 | End: 2020-01-14

## 2019-12-16 NOTE — TELEPHONE ENCOUNTER
Pt had a seizure over the weekend and was in the hospital at [address]. She would like a call back to discuss.

## 2019-12-16 NOTE — TELEPHONE ENCOUNTER
I speak with patient's mother, Trenton Fish, and she would like to discuss treatment options for patient with Dr. Kelly Barnett directly. Dr. Kelly Barnett, please call at your convenience.  Thanks

## 2019-12-16 NOTE — TELEPHONE ENCOUNTER
Discussed with mother. Advised to seek additional psychiatric help to get the addicted from alcohol. May use alprazolam as needed if he feels really anxious instead of drinking. Continue meds as are.

## 2019-12-17 ENCOUNTER — PATIENT OUTREACH (OUTPATIENT)
Dept: FAMILY MEDICINE CLINIC | Age: 44
End: 2019-12-17

## 2019-12-17 NOTE — PROGRESS NOTES
Hospital Discharge Follow-Up      Date/Time:  2019 8:45 AM    Patient was admitted to [facility] on 19 and discharged on 12/15/19 for seizure. The physician discharge summary was available at the time of outreach. Patient was contacted within 1 business days of discharge. Top Challenges reviewed with the provider     Advance Care Planning:   Does patient have an Advance Directive:  not on file        Method of communication with provider :none    Inpatient RRAT score: 15  Was this a readmission? no   Patient stated reason for the readmission: n/a    Care Transition Nurse (CTN) contacted the patient by telephone to perform post hospital discharge assessment. Verified name and  with patient as identifiers. Provided introduction to self, and explanation of the CTN role. Patient received hospital discharge instructions. CTN reviewed discharge instructions and red flags with patient who verbalized understanding. Patient given an opportunity to ask questions and does not have any further questions or concerns at this time. The patient agrees to contact the PCP office for questions related to their healthcare. CTN provided contact information for future reference. Disease Specific:   N/A    Patients top risk factors for readmission:  lack of knowledge about disease, level of motivation, medical condition, medication management    Home Health orders at discharge: [address]: n/a  Date of initial visit: [facility] ordered at discharge: none  [address] received: n/a    Medication(s):   New Medications at Discharge: none  Changed Medications at Discharge: !! topiramate (TOPAMAX) 25 mg tablet Take 1 Tab by mouth two (2) times a day.   Qty: 90 Tab, Refills: 2    Discontinued Medications at Discharge: none    Medication reconciliation was performed with patient, who verbalizes understanding of administration of home medications. There were no barriers to obtaining medications identified at this time. Referral to Pharm D needed: no     Current Outpatient Medications   Medication Sig    ALPRAZolam (XANAX) 0.5 mg tablet Take 1 Tab by mouth two (2) times daily as needed for Anxiety. Max Daily Amount: 1 mg.  topiramate (TOPAMAX) 25 mg tablet Take 1 Tab by mouth two (2) times a day.  sertraline (ZOLOFT) 25 mg tablet Take 25 mg by mouth nightly.  topiramate (TOPAMAX) 100 mg tablet Take 1 Tab by mouth two (2) times a day.  brivaracetam (BRIVIACT) 100 mg tablet Take 1 Tab by mouth two (2) times a day. Max Daily Amount: 200 mg.    MULTIVITAMIN PO Take 1 Tab by mouth daily. No current facility-administered medications for this visit. There are no discontinued medications.     [facility] follow up appointment(s):   Future Appointments   Date Time Provider [facility]   2/7/2020  1:40 PM Rima Mcqueen  [address] [place]   5/7/2020  1:40 PM Mika Butt MD [address]      Non-[facility] follow up appointment(s): PCP TBD  [facility]:  n/a

## 2020-01-02 ENCOUNTER — TELEPHONE (OUTPATIENT)
Dept: NEUROLOGY | Age: 45
End: 2020-01-02

## 2020-01-02 NOTE — TELEPHONE ENCOUNTER
Pt's mother calling stating pt's Jasonneemarosi Downing is not working. She is wondering what the next steps are.  Please call back

## 2020-01-02 NOTE — TELEPHONE ENCOUNTER
Patient's mother reported patient had a seizure on New Year's Nancy. She stated it was unrelated to alcohol. Please advise.

## 2020-01-05 ENCOUNTER — HOSPITAL ENCOUNTER (EMERGENCY)
Age: 45
Discharge: HOME | End: 2020-01-05
Attending: EMERGENCY MEDICINE
Payer: MEDICARE

## 2020-01-05 VITALS
BODY MASS INDEX: 26.64 KG/M2 | DIASTOLIC BLOOD PRESSURE: 86 MMHG | HEIGHT: 70 IN | WEIGHT: 186.07 LBS | RESPIRATION RATE: 18 BRPM | HEART RATE: 107 BPM | TEMPERATURE: 97.4 F | OXYGEN SATURATION: 94 % | SYSTOLIC BLOOD PRESSURE: 113 MMHG

## 2020-01-05 DIAGNOSIS — G40.909 SEIZURE DISORDER (HCC): Primary | ICD-10-CM

## 2020-01-05 DIAGNOSIS — R56.9 SEIZURE (HCC): ICD-10-CM

## 2020-01-05 DIAGNOSIS — S01.111A LACERATION OF EYEBROW AND FOREHEAD, RIGHT, INITIAL ENCOUNTER: Primary | ICD-10-CM

## 2020-01-05 DIAGNOSIS — S01.81XA LACERATION OF EYEBROW AND FOREHEAD, RIGHT, INITIAL ENCOUNTER: Primary | ICD-10-CM

## 2020-01-05 LAB
ANION GAP SERPL CALC-SCNC: 8 MMOL/L (ref 5–15)
BUN SERPL-MCNC: 18 MG/DL (ref 6–20)
BUN/CREAT SERPL: 18 (ref 12–20)
CALCIUM SERPL-MCNC: 8.9 MG/DL (ref 8.5–10.1)
CHLORIDE SERPL-SCNC: 110 MMOL/L (ref 97–108)
CO2 SERPL-SCNC: 25 MMOL/L (ref 21–32)
CREAT SERPL-MCNC: 1.02 MG/DL (ref 0.7–1.3)
ERYTHROCYTE [DISTWIDTH] IN BLOOD BY AUTOMATED COUNT: 12.5 % (ref 11.5–14.5)
GLUCOSE SERPL-MCNC: 90 MG/DL (ref 65–100)
HCT VFR BLD AUTO: 43.4 % (ref 36.6–50.3)
HGB BLD-MCNC: 14.5 G/DL (ref 12.1–17)
LACTATE BLD-SCNC: 1.68 MMOL/L (ref 0.4–2)
MCH RBC QN AUTO: 30 PG (ref 26–34)
MCHC RBC AUTO-ENTMCNC: 33.4 G/DL (ref 30–36.5)
MCV RBC AUTO: 89.9 FL (ref 80–99)
NRBC # BLD: 0 K/UL (ref 0–0.01)
NRBC BLD-RTO: 0 PER 100 WBC
PLATELET # BLD AUTO: 287 K/UL (ref 150–400)
PMV BLD AUTO: 9.7 FL (ref 8.9–12.9)
POTASSIUM SERPL-SCNC: 3.5 MMOL/L (ref 3.5–5.1)
RBC # BLD AUTO: 4.83 M/UL (ref 4.1–5.7)
SODIUM SERPL-SCNC: 143 MMOL/L (ref 136–145)
WBC # BLD AUTO: 10.5 K/UL (ref 4.1–11.1)

## 2020-01-05 PROCEDURE — 85027 COMPLETE CBC AUTOMATED: CPT

## 2020-01-05 PROCEDURE — 80048 BASIC METABOLIC PNL TOTAL CA: CPT

## 2020-01-05 PROCEDURE — 36415 COLL VENOUS BLD VENIPUNCTURE: CPT

## 2020-01-05 PROCEDURE — 74011000250 HC RX REV CODE- 250: Performed by: EMERGENCY MEDICINE

## 2020-01-05 PROCEDURE — 80201 ASSAY OF TOPIRAMATE: CPT

## 2020-01-05 PROCEDURE — 83605 ASSAY OF LACTIC ACID: CPT

## 2020-01-05 PROCEDURE — 99285 EMERGENCY DEPT VISIT HI MDM: CPT

## 2020-01-05 PROCEDURE — 75810000294 HC INTERM/LAYERED WND RPR

## 2020-01-05 RX ORDER — LIDOCAINE HYDROCHLORIDE AND EPINEPHRINE 20; 10 MG/ML; UG/ML
1.5 INJECTION, SOLUTION INFILTRATION; PERINEURAL ONCE
Status: COMPLETED | OUTPATIENT
Start: 2020-01-05 | End: 2020-01-05

## 2020-01-05 RX ORDER — TOPIRAMATE 200 MG/1
200 TABLET ORAL 2 TIMES DAILY
Qty: 60 TAB | Refills: 2 | Status: SHIPPED | OUTPATIENT
Start: 2020-01-05 | End: 2020-02-27 | Stop reason: SDUPTHER

## 2020-01-05 RX ADMIN — LIDOCAINE HYDROCHLORIDE,EPINEPHRINE BITARTRATE 30 MG: 20; .01 INJECTION, SOLUTION INFILTRATION; PERINEURAL at 16:23

## 2020-01-05 NOTE — DISCHARGE INSTRUCTIONS
Patient Education        Cuts: Care Instructions  Your Care Instructions  A cut can happen anywhere on your body. Stitches, staples, skin adhesives, or pieces of tape called Steri-Strips are sometimes used to keep the edges of a cut together and help it heal. Steri-Strips can be used by themselves or with stitches or staples. Sometimes cuts are left open. If the cut went deep and through the skin, the doctor may have closed the cut in two layers. A deeper layer of stitches brings the deep part of the cut together. These stitches will dissolve and don't need to be removed. The upper layer closure, which could be stitches, staples, Steri-Strips, or adhesive, is what you see on the cut. A cut is often covered by a bandage. The doctor has checked you carefully, but problems can develop later. If you notice any problems or new symptoms, get medical treatment right away. Follow-up care is a key part of your treatment and safety. Be sure to make and go to all appointments, and call your doctor if you are having problems. It's also a good idea to know your test results and keep a list of the medicines you take. How can you care for yourself at home? If a cut is open or closed  · Prop up the sore area on a pillow anytime you sit or lie down during the next 3 days. Try to keep it above the level of your heart. This will help reduce swelling. · Keep the cut dry for the first 24 to 48 hours. After this, you can shower if your doctor okays it. Pat the cut dry. · Don't soak the cut, such as in a bathtub. Your doctor will tell you when it's safe to get the cut wet. · After the first 24 to 48 hours, clean the cut with soap and water 2 times a day unless your doctor gives you different instructions. ? Don't use hydrogen peroxide or alcohol, which can slow healing. ? You may cover the cut with a thin layer of petroleum jelly and a nonstick bandage.   ? If the doctor put a bandage over the cut, put on a new bandage after cleaning the cut or if the bandage gets wet or dirty. · Avoid any activity that could cause your cut to reopen. · Be safe with medicines. Read and follow all instructions on the label. ? If the doctor gave you a prescription medicine for pain, take it as prescribed. ? If you are not taking a prescription pain medicine, ask your doctor if you can take an over-the-counter medicine. If the cut is closed with stitches, staples, or Steri-Strips  · Follow the above instructions for open or closed cuts. · Do not remove the stitches or staples on your own. Your doctor will tell you when to come back to have the stitches or staples removed. · Leave Steri-Strips on until they fall off. If the cut is closed with a skin adhesive  · Follow the above instructions for open or closed cuts. · Leave the skin adhesive on your skin until it falls off on its own. This may take 5 to 10 days. · Do not scratch, rub, or pick at the adhesive. · Do not put the sticky part of a bandage directly on the adhesive. · Do not put any kind of ointment, cream, or lotion over the area. This can make the adhesive fall off too soon. Do not use hydrogen peroxide or alcohol, which can slow healing. When should you call for help? Call your doctor now or seek immediate medical care if:    · You have new pain, or your pain gets worse.     · The skin near the cut is cold or pale or changes color.     · You have tingling, weakness, or numbness near the cut.     · The cut starts to bleed, and blood soaks through the bandage. Oozing small amounts of blood is normal.     · You have trouble moving the area near the cut.     · You have symptoms of infection, such as:  ? Increased pain, swelling, warmth, or redness around the cut.  ? Red streaks leading from the cut.  ? Pus draining from the cut.  ? A fever.    Watch closely for changes in your health, and be sure to contact your doctor if:    · The cut reopens.     · You do not get better as expected. Where can you learn more? Go to http://margarette-anthony.info/. Enter M735 in the search box to learn more about \"Cuts: Care Instructions. \"  Current as of: June 26, 2019  Content Version: 12.2  © 0263-4080 [employer]. Care instructions adapted under license by [employer] (which disclaims liability or warranty for this information). If you have questions about a medical condition or this instruction, always ask your healthcare professional. [place] [address] any warranty or liability for your use of this information.

## 2020-01-05 NOTE — ED PROVIDER NOTES
EMERGENCY DEPARTMENT HISTORY AND PHYSICAL EXAM      Date: 1/5/2020  Patient Name: Armando Morin  Patient Age and Sex: 38 y.o. male     History of Presenting Illness     Chief Complaint   Patient presents with    Seizure     pt wt hx of seizures had a seizure during his sleep. He went to the coffee shop where the manager noticed a significant laceration over the right eye.  ems was called.  Laceration       History Provided By: Patient    HPI: Armando Morin is a 30-year-old male with a history of seizures presenting with seizure and laceration to his right forehead. Patient states and EMS states patient had a seizure during his sleep. Went to the store not realizing he had a laceration over his head and was sent to [facility]. At [facility] they evaluated the laceration and sent him to the ER. Patient denies knowing he had a seizure. Denies any vision changes, arm or leg weakness, headache. Patient states he has a little bit of pain at the laceration site. Is currently on Topamax as well as briviact. According to EMS, glucose was normal in route. He used to have seizures daily but ever since changes in his antiepileptic epileptics he now has a seizure about once a week. There are no other complaints, changes, or physical findings at this time. PCP: Liliya Mercado MD    No current facility-administered medications on file prior to encounter. Current Outpatient Medications on File Prior to Encounter   Medication Sig Dispense Refill    ALPRAZolam (XANAX) 0.5 mg tablet Take 1 Tab by mouth two (2) times daily as needed for Anxiety. Max Daily Amount: 1 mg. 30 Tab 1    topiramate (TOPAMAX) 25 mg tablet Take 1 Tab by mouth two (2) times a day. 90 Tab 2    sertraline (ZOLOFT) 25 mg tablet Take 25 mg by mouth nightly.  topiramate (TOPAMAX) 100 mg tablet Take 1 Tab by mouth two (2) times a day. 180 Tab 2    brivaracetam (BRIVIACT) 100 mg tablet Take 1 Tab by mouth two (2) times a day.  Max Daily Amount: 200 mg. 180 Tab 2    MULTIVITAMIN PO Take 1 Tab by mouth daily. Past History     Past Medical History:  Past Medical History:   Diagnosis Date    Asperger syndrome 12/14/2011    Autism     dx 2010- highly functional    Other ill-defined conditions(799.89)     aspergers, syncopal episodes    Seizure disorder ([address] 5/22/2012    Seizures ([facility]        Past Surgical History:  Past Surgical History:   Procedure Laterality Date    HX GI      pyloric stenosis 1976    HX HEENT      adenoids 1978       Family History:  Family History   Problem Relation Age of Onset    Diabetes Father     Heart Disease Father     Cancer Mother         Breast    Anxiety Mother     COPD Mother     Other Mother         Neuropathy    Sleep Apnea Mother     Arthritis-osteo Mother     Other Brother         Pituitary tumor       Social History:  Social History     Tobacco Use    Smoking status: Never Smoker    Smokeless tobacco: Never Used   Substance Use Topics    Alcohol use: Yes     Alcohol/week: 8.3 standard drinks     Types: 10 Cans of beer per week     Frequency: Monthly or less     Drinks per session: 1 or 2     Comment: occasional    Drug use: No       Allergies: Allergies   Allergen Reactions    Aspartame Other (comments)     Family believes it causes his seizures         Review of Systems   Review of Systems   Constitutional: Negative for chills and fever. Respiratory: Negative for cough and shortness of breath. Cardiovascular: Negative for chest pain. Gastrointestinal: Negative for abdominal pain, constipation, diarrhea, nausea and vomiting. Skin: Positive for wound. Neurological: Positive for seizures. Negative for weakness and numbness. All other systems reviewed and are negative. Physical Exam   Physical Exam  Vitals signs and nursing note reviewed. Constitutional:       Appearance: He is well-developed. HENT:      Head: Normocephalic.       Comments: Patient has a 4 cm laceration over his right eyebrow which is gaping. Pupils equal and reactive and extraocular movements are intact. Minimal tenderness over the orbit. Eyes:      Conjunctiva/sclera: Conjunctivae normal.   Neck:      Musculoskeletal: Normal range of motion and neck supple. Cardiovascular:      Rate and Rhythm: Normal rate and regular rhythm. Pulmonary:      Effort: Pulmonary effort is normal. No respiratory distress. Breath sounds: Normal breath sounds. Abdominal:      General: There is no distension. Palpations: Abdomen is soft. Tenderness: There is no tenderness. Musculoskeletal: Normal range of motion. Skin:     General: Skin is warm and dry. Neurological:      Mental Status: He is alert and oriented to person, place, and time. Psychiatric:      Comments: Groggy but answering questions appropriately.           Diagnostic Study Results     Labs -     Recent Results (from the past 12 hour(s))   CBC W/O DIFF    Collection Time: 01/05/20  4:15 PM   Result Value Ref Range    WBC 10.5 4.1 - 11.1 K/uL    RBC 4.83 4.10 - 5.70 M/uL    HGB 14.5 12.1 - 17.0 g/dL    HCT 43.4 36.6 - 50.3 %    MCV 89.9 80.0 - 99.0 FL    MCH 30.0 26.0 - 34.0 PG    MCHC 33.4 30.0 - 36.5 g/dL    RDW 12.5 11.5 - 14.5 %    PLATELET 387 606 - 194 K/uL    MPV 9.7 8.9 - 12.9 FL    NRBC 0.0 0  WBC    ABSOLUTE NRBC 0.00 0.00 - 8.11 K/uL   METABOLIC PANEL, BASIC    Collection Time: 01/05/20  4:15 PM   Result Value Ref Range    Sodium 143 136 - 145 mmol/L    Potassium 3.5 3.5 - 5.1 mmol/L    Chloride 110 (H) 97 - 108 mmol/L    CO2 25 21 - 32 mmol/L    Anion gap 8 5 - 15 mmol/L    Glucose 90 65 - 100 mg/dL    BUN 18 6 - 20 MG/DL    Creatinine 1.02 0.70 - 1.30 MG/DL    BUN/Creatinine ratio 18 12 - 20      GFR est AA >60 >60 ml/min/1.73m2    GFR est non-AA >60 >60 ml/min/1.73m2    Calcium 8.9 8.5 - 10.1 MG/DL   POC LACTIC ACID    Collection Time: 01/05/20  4:27 PM   Result Value Ref Range    Lactic Acid (POC) 1.68 0.40 - 2.00 mmol/L       Radiologic Studies -   No orders to display     CT Results  (Last 48 hours)    None        CXR Results  (Last 48 hours)    None            Medical Decision Making   I am the first provider for this patient. I reviewed the vital signs, available nursing notes, past medical history, past surgical history, family history and social history. Vital Signs-Reviewed the patient's vital signs. Patient Vitals for the past 12 hrs:   Temp Pulse Resp BP SpO2   01/05/20 1559 -- -- -- -- 97 %   01/05/20 1556 97.4 °F (36.3 °C) 93 18 (!) 144/93 98 %       Records Reviewed: Nursing Notes and Old Medical Records    Provider Notes (Medical Decision Making):   Patient presenting with breakthrough seizure with laceration to forehead. Neurologic exam completely normal otherwise with good strength and cranial nerves normal.  Will need to suture of that laceration. We will continue to monitor his mental status. ED Course:   Initial assessment performed. The patients presenting problems have been discussed, and they are in agreement with the care plan formulated and outlined with them. I have encouraged them to ask questions as they arise throughout their visit. ED Course as of Jan 05 1738   [facility] Jan 05, 2020 1737 In speaking with patient and mother, patient does drink alcohol sometimes. Counseled that his antiepileptics will not work as well when he is drinking alcohol. Alcohol will lower his seizure threshold. Patient verbalized understanding. [JS]      ED Course User Index  [JS] Jennifer Boykin MD      Procedure Note - Laceration Repair:  5:01 PM  Procedure by Jessica Yarbrough PA-C . Complexity: complex  4cm mostly linear laceration to R eyebrow  was irrigated copiously with NS under jet lavage, prepped with Chlorprep and draped in a sterile fashion. The area was anesthetized with 5 mLs of  Lidocaine 2% with epinephrine via local infiltration.   The wound was explored with the following results: No foreign bodies found. The wound was repaired with Two layer suture closure: Subcutaneous Layer:  6 sutures placed, stitch type:subcutaneous, suture: 6-0 braided absorbable. Skin Layer:  8 sutures placed, stitch type:simple interrupted, suture: 6-0 nylon. .  The wound was closed with good hemostasis and approximation. Sterile dressing applied. Estimated blood loss: 2-3 mL  The procedure took 16-30 minutes, and pt tolerated well. Critical Care Time:   0    Disposition:  Discharge Note:  The patient has been re-evaluated and is ready for discharge. Reviewed available results with patient. Counseled patient on diagnosis and care plan. Patient has expressed understanding, and all questions have been answered. Patient agrees with plan and agrees to follow up as recommended, or to return to the ED if their symptoms worsen. Discharge instructions have been provided and explained to the patient, along with reasons to return to the ED. PLAN:  Current Discharge Medication List        2. Follow-up Information     Follow up With Specialties Details Why Contact Info    Your Neurologist  Schedule an appointment as soon as possible for a visit          3. Return to ED if worse     Diagnosis     Clinical Impression:   1. Laceration of eyebrow and forehead, right, initial encounter    2. Seizure [facility])        Attestations:    Arminda Avendano M.D. Please note that this dictation was completed with [employer], the computer voice recognition software. Quite often unanticipated grammatical, syntax, homophones, and other interpretive errors are inadvertently transcribed by the computer software. Please disregard these errors. Please excuse any errors that have escaped final proofreading. Thank you.

## 2020-01-06 NOTE — TELEPHONE ENCOUNTER
Per dr. Randolph Diss like he was again in ER 1/5. Please advise to increase Topiramate to 200 mg BID and continue Briviact.  He needs a follow up to discuss further options

## 2020-01-06 NOTE — TELEPHONE ENCOUNTER
I gave patient's mother this advise. She expressed understanding and agreed to this plan. I have scheduled a follow up appointment fro next week.

## 2020-01-07 ENCOUNTER — TELEPHONE (OUTPATIENT)
Dept: NEUROLOGY | Age: 45
End: 2020-01-07

## 2020-01-07 LAB — TOPIRAMATE SERPL-MCNC: 7.3 UG/ML (ref 2–25)

## 2020-01-07 NOTE — TELEPHONE ENCOUNTER
----- Message from Pete Nurse sent at 1/7/2020 11:32 AM EST -----  Regarding: Dr Wang Matters  Pt needs a refill [facility]  call into [facility], pt can be reach at [phone]

## 2020-01-09 ENCOUNTER — TELEPHONE (OUTPATIENT)
Dept: NEUROLOGY | Age: 45
End: 2020-01-09

## 2020-01-09 NOTE — TELEPHONE ENCOUNTER
Re: Lilia Greer approved    [employer] denied request for the 90-day supply but has approved Briviact 100mg tablet 30-day supply through 12/31/20    Fax sent to [phone].

## 2020-01-09 NOTE — TELEPHONE ENCOUNTER
Pt's mother calling, stating pt's Briviact needs PA. She said it needs to be done today and sent to the mail order. She said he sees Dr. Raymond Caban on Monday and is wondering if he has samples. Please call back      Message sent to nurse and Laly Do for PA.

## 2020-01-13 ENCOUNTER — TELEPHONE (OUTPATIENT)
Dept: NEUROLOGY | Age: 45
End: 2020-01-13

## 2020-01-13 ENCOUNTER — OFFICE VISIT (OUTPATIENT)
Dept: NEUROLOGY | Age: 45
End: 2020-01-13

## 2020-01-13 VITALS
RESPIRATION RATE: 16 BRPM | DIASTOLIC BLOOD PRESSURE: 74 MMHG | HEIGHT: 70 IN | WEIGHT: 176 LBS | HEART RATE: 68 BPM | SYSTOLIC BLOOD PRESSURE: 112 MMHG | OXYGEN SATURATION: 98 % | BODY MASS INDEX: 25.2 KG/M2

## 2020-01-13 DIAGNOSIS — G93.0 BRAIN CYST: ICD-10-CM

## 2020-01-13 DIAGNOSIS — F10.20 UNCOMPLICATED ALCOHOL DEPENDENCE (HCC): ICD-10-CM

## 2020-01-13 DIAGNOSIS — G40.909 SEIZURE DISORDER (HCC): ICD-10-CM

## 2020-01-13 DIAGNOSIS — G40.909 SEIZURE DISORDER (HCC): Primary | ICD-10-CM

## 2020-01-13 NOTE — TELEPHONE ENCOUNTER
----- Message from Kumar Alford sent at 1/13/2020 11:26 AM EST -----  Regarding: Dr. Mosqueda Memory calling from [address] calling to get a pre authorization for pt's Briviact. Stated pt is almost out and would like to see if the pt can  samples from the office until its refilled.  Contact is [phone]

## 2020-01-13 NOTE — PATIENT INSTRUCTIONS
[address] [facility]   Statement to Patients  April 1, 2014      In an effort to ensure the large volume of patient prescription refills is processed in the most efficient and expeditious manner, we are asking our patients to assist us by calling your Pharmacy for all prescription refills, this will include also your  Mail Order Pharmacy. The pharmacy will contact our office electronically to continue the refill process. Please do not wait until the last minute to call your pharmacy. We need at least 48 hours (2days) to fill prescriptions. We also encourage you to call your pharmacy before going to  your prescription to make sure it is ready. With regard to controlled substance prescription refill requests (narcotic refills) that need to be picked up at our office, we ask your cooperation by providing us with at least 72 hours (3days) notice that you will need a refill. We will not refill narcotic prescription refill requests after 4:00pm on any weekday, Monday through Thursday, or after 2:00pm on Fridays, or on the weekends. We encourage everyone to explore another way of getting your prescription refill request processed using Getting-in, our patient web portal through our electronic medical record system. Getting-in is an efficient and effective way to communicate your medication request directly to the office and  downloadable as an ama on your smart phone . Getting-in also features a review functionality that allows you to view your medication list as well as leave messages for your physician. Are you ready to get connected? If so please review the attatched instructions or speak to any of our staff to get you set up right away! Thank you so much for your cooperation. Should you have any questions please contact our Practice Administrator.     The Physicians and Staff,  [facility]

## 2020-01-14 ENCOUNTER — HOSPITAL ENCOUNTER (EMERGENCY)
Age: 45
Discharge: HOME | End: 2020-01-14
Attending: EMERGENCY MEDICINE
Payer: MEDICARE

## 2020-01-14 ENCOUNTER — TELEPHONE (OUTPATIENT)
Dept: NEUROLOGY | Age: 45
End: 2020-01-14

## 2020-01-14 VITALS
SYSTOLIC BLOOD PRESSURE: 128 MMHG | RESPIRATION RATE: 18 BRPM | WEIGHT: 178.79 LBS | HEIGHT: 69 IN | OXYGEN SATURATION: 98 % | TEMPERATURE: 98.6 F | BODY MASS INDEX: 26.48 KG/M2 | HEART RATE: 86 BPM | DIASTOLIC BLOOD PRESSURE: 90 MMHG

## 2020-01-14 DIAGNOSIS — Z48.02 VISIT FOR SUTURE REMOVAL: Primary | ICD-10-CM

## 2020-01-14 PROCEDURE — 75810000275 HC EMERGENCY DEPT VISIT NO LEVEL OF CARE

## 2020-01-14 NOTE — TELEPHONE ENCOUNTER
----- Message from Connor Duarte sent at 1/13/2020  3:26 PM EST -----  Regarding: Dr. Charmayne Shan would like a call back regarding Psychiatrist her son was referred to.  Contact is

## 2020-01-14 NOTE — ED NOTES
Maryann Brown PA-C reviewed discharge instructions with the patient. The patient verbalized understanding. All questions and concerns were addressed. The patient declined a wheelchair and is discharged ambulatory in the care of family members with instructions and prescriptions in hand. Pt is alert and oriented x 4. Respirations are clear and unlabored.

## 2020-01-14 NOTE — ED NOTES
Sutures placed in rt eyebrow line on 01/05. Here for removal.  Wound is well healed with no redness or drainage noted.

## 2020-01-14 NOTE — TELEPHONE ENCOUNTER
Patient's mother requesting another referral for Psychiatry because the physician patient was referred to is now located at [facility]. I referred to Dr. Sarah Aparicio.

## 2020-01-14 NOTE — ED PROVIDER NOTES
EMERGENCY DEPARTMENT HISTORY AND PHYSICAL EXAM      Date: 1/14/2020  Patient Name: Carleen Ventura    History of Presenting Illness     Chief Complaint   Patient presents with    Suture Removal     Patient had stitches placed last Sun and is here to have them removed. History Provided By: Patient    HPI: Carleen Ventura, 38 y.o. male with PMHx significant for seizure disorder, presents by POV to the ED for suture removal. He was seen in the ED 9 days ago and had sutures placed after having a seizure. He notes that the area is healing well. He has no concerns at this time. There are no other complaints, changes, or physical findings at this time. Social Hx: Tobacco (denies), EtOH (denies), Illicit drug use (denies)     PCP: Cooper Mcdermott MD    No current facility-administered medications on file prior to encounter. Current Outpatient Medications on File Prior to Encounter   Medication Sig Dispense Refill    brivaracetam (BRIVIACT) 100 mg tablet Take 1 Tab by mouth two (2) times a day. Max Daily Amount: 200 mg. 70 Tab 0    topiramate (TOPAMAX) 200 mg tablet Take 1 Tab by mouth two (2) times a day. 60 Tab 2    sertraline (ZOLOFT) 25 mg tablet Take 25 mg by mouth nightly.  MULTIVITAMIN PO Take 1 Tab by mouth daily.  [DISCONTINUED] ALPRAZolam (XANAX) 0.5 mg tablet Take 1 Tab by mouth two (2) times daily as needed for Anxiety.  Max Daily Amount: 1 mg. 30 Tab 1       Past History     Past Medical History:  Past Medical History:   Diagnosis Date    Asperger syndrome 12/14/2011    Autism     dx 2010- highly functional    Other ill-defined conditions(799.89)     aspergers, syncopal episodes    Seizure disorder ([facility] 5/22/2012    Seizures ([facility]        Past Surgical History:  Past Surgical History:   Procedure Laterality Date    HX GI      pyloric stenosis 1976    HX HEENT      adenoids 1978       Family History:  Family History   Problem Relation Age of Onset    Diabetes Father     Heart Disease Father     Cancer Mother         Breast    Anxiety Mother     COPD Mother     Other Mother         Neuropathy    Sleep Apnea Mother    [de-identified] Arthritis-osteo Mother     Other Brother         Pituitary tumor       Social History:  Social History     Tobacco Use    Smoking status: Never Smoker    Smokeless tobacco: Never Used   Substance Use Topics    Alcohol use: Yes     Alcohol/week: 8.3 standard drinks     Types: 10 Cans of beer per week     Frequency: Monthly or less     Drinks per session: 1 or 2     Comment: occasional    Drug use: No       Allergies: Allergies   Allergen Reactions    Aspartame Other (comments)     Family believes it causes his seizures         Review of Systems   Review of Systems   Constitutional: Negative for chills, diaphoresis and fever. HENT: Negative for congestion, ear pain, rhinorrhea and sore throat. Respiratory: Negative for cough and shortness of breath. Cardiovascular: Negative for chest pain. Gastrointestinal: Negative for abdominal pain, constipation, diarrhea, nausea and vomiting. Genitourinary: Negative for difficulty urinating, dysuria, frequency and hematuria. Musculoskeletal: Negative for arthralgias and myalgias. Skin: Positive for wound. Neurological: Negative for headaches. All other systems reviewed and are negative. Physical Exam   Physical Exam  Vitals signs and nursing note reviewed. Constitutional:       General: He is not in acute distress. Appearance: He is well-developed. He is not diaphoretic. Comments: 38 y.o.  male    HENT:      Head: Normocephalic. Comments: Non-tender periorbital contusion on the right. Well healing laceration with sutures present to the right eye brow. Eyes:      General:         Right eye: No discharge. Left eye: No discharge. Conjunctiva/sclera: Conjunctivae normal.   Neck:      Musculoskeletal: Normal range of motion and neck supple.    Cardiovascular: Rate and Rhythm: Normal rate. Pulses: Normal pulses. Pulmonary:      Effort: Pulmonary effort is normal.   Skin:     General: Skin is warm and dry. Neurological:      Mental Status: He is alert and oriented to person, place, and time. Psychiatric:         Behavior: Behavior normal.         Diagnostic Study Results     Labs - none    Radiologic Studies - none    Medical Decision Making   I am the first provider for this patient. I reviewed the vital signs, available nursing notes, past medical history, past surgical history, family history and social history. Vital Signs-Reviewed the patient's vital signs. Patient Vitals for the past 12 hrs:   Temp Pulse Resp BP SpO2   01/14/20 1509 98.6 °F (37 °C) 86 18 128/90 98 %       Records Reviewed: Nursing Notes and Old Medical Records    Provider Notes (Medical Decision Making): She returns to the ED for suture removal.  He denies any concerns about the healing of the laceration to the right eyebrow. There are no other complaints at this time. ED Course:   Initial assessment performed. The patients presenting problems have been discussed, and they are in agreement with the care plan formulated and outlined with them. I have encouraged them to ask questions as they arise throughout their visit. Procedure Note - Suture Removal  3:26 PM   Performed by: ANGELO Cho   8 suture (s) were removed from right eyebrow. No signs/sxs of infection noted. Wound healing well. Sutures removed without incident or complications. The procedure took 1-15 minutes, and pt tolerated well. Critical Care Time: None    Disposition:  DISCHARGE NOTE:  3:32 PM  The pt is ready for discharge. The pt's signs, symptoms, diagnosis, and discharge instructions have been discussed and pt has conveyed their understanding. The pt is to follow up as recommended or return to ER should their symptoms worsen. Plan has been discussed and pt is in agreement. PLAN:  1. Discharge Medication List as of 1/14/2020  3:28 PM      CONTINUE these medications which have NOT CHANGED    Details   brivaracetam (BRIVIACT) 100 mg tablet Take 1 Tab by mouth two (2) times a day. Max Daily Amount: 200 mg., Sample, Disp-70 Tab, R-0      topiramate (TOPAMAX) 200 mg tablet Take 1 Tab by mouth two (2) times a day., Normal, Disp-60 Tab, R-2      sertraline (ZOLOFT) 25 mg tablet Take 25 mg by mouth nightly., Historical Med      MULTIVITAMIN PO Take 1 Tab by mouth daily. , Historical Med           2. Follow-up Information     Follow up With Specialties Details Why Bruna Morales MD Internal Medicine  As needed [address]  [phone]          Return to ED if worse     Diagnosis     Clinical Impression:   1. Visit for suture removal          Please note that this dictation was completed with 3FLOZ, the computer voice recognition software. Quite often unanticipated grammatical, syntax, homophones, and other interpretive errors are inadvertently transcribed by the computer software. Please disregards these errors. Please excuse any errors that have escaped final proofreading. This note will not be viewable in [address]

## 2020-01-29 ENCOUNTER — TELEPHONE (OUTPATIENT)
Dept: INTERNAL MEDICINE CLINIC | Facility: CLINIC | Age: 45
End: 2020-01-29

## 2020-01-29 NOTE — TELEPHONE ENCOUNTER
Krish Parr reports that pt is seeing counselor at [facility], Ms Lucinda Boswell, and that she recommends increasing Zoloft. Pt has appt with you on 2/7. Mopm states pt has enough meds that he could increase from 25mg daily to 50mg daily before appt. Notes requested from [facility].

## 2020-01-29 NOTE — TELEPHONE ENCOUNTER
Pt's mother, Claudia Barnard, called to notify Dr. Ken Miller that the pt's counselor is recommending that he increase the dosage of    sertraline (ZOLOFT) 25 mg tablet     Claudia Barnard is requesting a call back to discuss this change with Dr. Ken Miller, please give pt a call back at [phone].

## 2020-01-30 NOTE — TELEPHONE ENCOUNTER
Tell her that is fine with Dr. Darryle Colt for him to increase the dose of Zoloft from 25 to 50 mg immediately.

## 2020-02-13 DIAGNOSIS — G40.909 SEIZURE DISORDER (HCC): ICD-10-CM

## 2020-02-13 RX ORDER — TOPIRAMATE 200 MG/1
200 TABLET ORAL 2 TIMES DAILY
Qty: 60 TAB | Refills: 2 | OUTPATIENT
Start: 2020-02-13

## 2020-02-13 NOTE — TELEPHONE ENCOUNTER
Requested Prescriptions     Pending Prescriptions Disp Refills    topiramate (TOPAMAX) 200 mg tablet 60 Tab 2     Sig: Take 1 Tab by mouth two (2) times a day.

## 2020-02-21 ENCOUNTER — OFFICE VISIT (OUTPATIENT)
Dept: INTERNAL MEDICINE CLINIC | Facility: CLINIC | Age: 45
End: 2020-02-21

## 2020-02-21 VITALS
OXYGEN SATURATION: 98 % | HEART RATE: 108 BPM | TEMPERATURE: 97.5 F | SYSTOLIC BLOOD PRESSURE: 104 MMHG | WEIGHT: 180 LBS | DIASTOLIC BLOOD PRESSURE: 64 MMHG | HEIGHT: 69 IN | RESPIRATION RATE: 16 BRPM | BODY MASS INDEX: 26.66 KG/M2

## 2020-02-21 DIAGNOSIS — F84.5 ASPERGER SYNDROME: ICD-10-CM

## 2020-02-21 DIAGNOSIS — F10.20 UNCOMPLICATED ALCOHOL DEPENDENCE (HCC): ICD-10-CM

## 2020-02-21 DIAGNOSIS — F41.1 GENERALIZED ANXIETY DISORDER: Primary | ICD-10-CM

## 2020-02-21 DIAGNOSIS — R56.9 SEIZURE (HCC): ICD-10-CM

## 2020-02-21 NOTE — PROGRESS NOTES
Mehdi Jaimes  Identified pt with two pt identifiers(name and ). Chief Complaint   Patient presents with    Anxiety     Room 3A //     Seizure    Alcohol Problem       Reviewed record In preparation for visit and have obtained necessary documentation. 1. Have you been to the ER, urgent care clinic or hospitalized since your last visit? No     2. Have you seen or consulted any other health care providers outside of the [address] [place] [address] since your last visit? Include any pap smears or colon screening. No    Patient unsure if they have an advance directive and was advised to bring in a copy if they have one. Vitals reviewed with provider. Health Maintenance reviewed: There are no preventive care reminders to display for this patient.        Wt Readings from Last 3 Encounters:   20 180 lb (81.6 kg)   20 178 lb 12.7 oz (81.1 kg)   20 176 lb (79.8 kg)        Temp Readings from Last 3 Encounters:   20 97.5 °F (36.4 °C) (Oral)   20 98.6 °F (37 °C)   20 97.4 °F (36.3 °C)        BP Readings from Last 3 Encounters:   20 104/64   20 128/90   20 112/74        Pulse Readings from Last 3 Encounters:   20 (!) 108   20 86   20 68        Vitals:    20 1445   BP: 104/64   Pulse: (!) 108   Resp: 16   Temp: 97.5 °F (36.4 °C)   TempSrc: Oral   SpO2: 98%   Weight: 180 lb (81.6 kg)   Height: 5' 9\" (1.753 m)   PainSc:   0 - No pain          Learning Assessment:   :       Learning Assessment 10/7/2019 2017 4/3/2017 2016 2014   PRIMARY LEARNER Patient Patient Patient Patient Patient   HIGHEST LEVEL OF EDUCATION - PRIMARY LEARNER  - - - GRADUATED HIGH SCHOOL OR GED GRADUATED HIGH SCHOOL OR GED   BARRIERS PRIMARY LEARNER - - - COGNITIVE OTHER   CO-LEARNER CAREGIVER - - - Yes -   [address] [place] [address] ENGLISH ENGLISH ENGLISH    NEED - - - No -   LEARNER PREFERENCE PRIMARY DEMONSTRATION DEMONSTRATION DEMONSTRATION READING READING   LEARNING SPECIAL TOPICS - - - no -   ANSWERED BY self patient patient self patient   RELATIONSHIP SELF SELF SELF SELF SELF        Depression Screening:   :       3 most recent PHQ Screens 1/13/2020   Little interest or pleasure in doing things Not at all   Feeling down, depressed, irritable, or hopeless Not at all   Total Score PHQ 2 0        Fall Risk Assessment:   :       Fall Risk Assessment, last 12 mths 9/6/2019   Able to walk? Yes   Fall in past 12 months? No        Abuse Screening:   :       Abuse Screening Questionnaire 9/6/2019   Do you ever feel afraid of your partner? N   Are you in a relationship with someone who physically or mentally threatens you? N   Is it safe for you to go home?  Y        ADL Screening:   :       ADL Assessment 6/6/2019   Feeding yourself No Help Needed   Getting from bed to chair No Help Needed   Getting dressed No Help Needed   Bathing or showering No Help Needed   Walk across the room (includes cane/walker) No Help Needed   Using the telphone No Help Needed   Taking your medications Help Needed   Preparing meals No Help Needed   Managing money (expenses/bills) No Help Needed   Moderately strenuous housework (laundry) No Help Needed   Shopping for personal items (toiletries/medicines) No Help Needed   Shopping for groceries No Help Needed   Driving No Help Needed   Climbing a flight of stairs No Help Needed   Getting to places beyond walking distances No Help Needed

## 2020-02-21 NOTE — PROGRESS NOTES
CC:   Chief Complaint   Patient presents with    Anxiety     Room 3A //     Seizure    Alcohol Problem       HISTORY OF PRESENT ILLNESS  Kade Delarosa is a 38 y.o. male. Presents for 3 month follow up evaluation. He has HTN, seizure disorder since 2011, alcohol dependence, autism, and Asperger's syndrome. Since last clinic visit, hospitalized once and seen in ED 2 other times for seizures (last 1/14/20). Dr. Benjamin Venegas noted that some of the seizures may be alcohol-withdrawal seizures. Patient says that he is trying to cut down on drinking alcohol. Compliant with Briviact and Topamax. Today complains of sore throat for past few days with no fevers, chills, cough, SOB, or muscle aches. Thinks his anxiety is doing is controlled on sertraline.     Soc Hx  Lives alone in a house. Single. No children. Unemployed; previously worked at [employer]. Never smoker. Drinks 3-4 beers a day; previously 6-8 beers a day for 20 yrs. Denies recreational drug use. Gets regular exercise by walking. Does not drive. His mother puts his medications in a pill box for him to take.     ROS  A complete review of systems was performed and is negative except for those mentioned in the HPI.      Patient Active Problem List   Diagnosis Code    Autism F84.0    Asperger syndrome F84.5    Seizure ([address] R56.9    Hypertension I10    EtOH dependence ([address] F10.20    Alcohol withdrawal ([address] F10.239    Advance care planning Z71.89    Brain cyst G93.0    Abnormal MRI of head R93.0    Altered mental status, unspecified R41.82    Complex partial seizure evolving to generalized seizure ([address] G40.209    Bilateral carotid artery stenosis I65.23    Convulsive syncope R55    Generalized anxiety disorder F41.1     Past Medical History:   Diagnosis Date    Asperger syndrome 12/14/2011    Autism     dx 2010- highly functional    Other ill-defined conditions(799.89)     aspergers, syncopal episodes    Seizure disorder ([address] 5/22/2012    Seizures (HCC)      Allergies   Allergen Reactions    Aspartame Other (comments)     Family believes it causes his seizures       Current Outpatient Medications   Medication Sig Dispense Refill    brivaracetam (BRIVIACT) 100 mg tablet Take 1 Tab by mouth two (2) times a day. Max Daily Amount: 200 mg. 70 Tab 0    topiramate (TOPAMAX) 200 mg tablet Take 1 Tab by mouth two (2) times a day. 60 Tab 2    sertraline (ZOLOFT) 25 mg tablet Take 25 mg by mouth nightly.  MULTIVITAMIN PO Take 1 Tab by mouth daily. PHYSICAL EXAM  Visit Vitals  /64 (BP 1 Location: Left arm, BP Patient Position: Sitting)   Pulse (!) 108   Temp 97.5 °F (36.4 °C) (Oral)   Resp 16   Ht 5' 9\" (1.753 m)   Wt 180 lb (81.6 kg)   SpO2 98%   BMI 26.58 kg/m²   6 lb weight loss since last clinic visit 3 months ago    General: Well-developed and well-nourished, no distress. HEENT:  Head normocephalic/atraumatic, no scleral icterus. Oropharynx benign. Tongue dry with yellow coating. Lungs:  Clear to ausculation bilaterally. Good air movement. Heart:  Tachycardic, regular rhythm, normal S1 and S2, no murmur, gallop, or rub  Extremities: No clubbing, cyanosis, or edema. Neurological: Alert and oriented. Psychiatric: Normal mood and affect. Behavior is normal.         ASSESSMENT AND PLAN    ICD-10-CM ICD-9-CM    1. Generalized anxiety disorder F41.1 300.02    2. Seizure ([facility] R56.9 780.39    3. Uncomplicated alcohol dependence ([facility] F10.20 303.90    4. Asperger syndrome F84.5 299.80      Diagnoses and all orders for this visit:    1. Generalized anxiety disorder  Controlled on sertraline 25 mg daily. 2. Seizure ([facility]  Stable on Briviact and Topamax. Follow up with Dr. Rhett Edmond as scheduled. 3. Uncomplicated alcohol dependence ([facility]  Given information on [facility].     4. Asperger syndrome      Follow-up and Dispositions    · Return in about 3 months (around 5/21/2020), or if symptoms worsen or fail to improve, for Anxiety, alcohol dependence. I have discussed the diagnosis with the patient and the intended plan as seen in the above orders. Patient is in agreement. The patient has received an after-visit summary and questions were answered concerning future plans. I have discussed medication side effects and warnings with the patient as well.

## 2020-02-21 NOTE — PATIENT INSTRUCTIONS
Learning About Generalized Anxiety Disorder  What is generalized anxiety disorder? We all worry. It's a normal part of life. But when you have generalized anxiety disorder, you worry about lots of things and have a hard time stopping your worry. This worry or anxiety interferes with your relationships, work, and life. What causes it? The cause is not known. But it may be passed down through families. What are the symptoms? You may feel anxious or worry most days about things like work, relationships, health, or money. You may worry about things that are unlikely to happen. You find it hard to stop or control the worry. Because you worry a lot and try hard to stop worrying, you may feel restless, tired, tense, or cranky. You may also find it hard to think or sleep. And you may have headaches or an upset stomach. How is it diagnosed? Your doctor will ask about your health and how often you worry or feel anxious. He or she may ask about other symptoms, like whether you:  · Feel restless. · Feel tired. · Have a hard time thinking or feel that your mind goes blank. · Feel cranky. · Have tense muscles. · Have sleep problems. A physical exam and tests can help make sure that your symptoms aren't caused by a different condition, such as a thyroid problem. How is it treated? Counseling and medicine can both work to treat anxiety. The two are often used along with lifestyle changes. Cognitive-behavioral therapy (CBT) is a type of counseling that's used to help treat anxiety. In CBT, you learn how to notice and replace thoughts that make you feel worried. It also can help you learn how to relax when you worry. Medicines can help. These medicines are often also used for depression. Selective serotonin reuptake inhibitors (SSRIs) are often tried first. But there are other medicines that your doctor may use. You may need to try a few medicines to find one that works well.   Many people feel better by getting regular exercise, eating healthy meals, and getting good sleep. Mindfulness--focusing on things that happen in the present moment--also can help reduce your anxiety. What can you expect when you have it? Having anxiety can be upsetting. Some people might feel less worried and stressed after a couple of months of treatment. But for other people, it might take longer to feel better. Reaching out to people for help is important. Try not to isolate yourself. Let your family and friends help you. Find someone you can trust and confide in. Talk to that person. When you know what anxiety is--and how you can get help for it--you can start to learn new ways of thinking. This can help you cope and work through your anxiety. Follow-up care is a key part of your treatment and safety. Be sure to make and go to all appointments, and call your doctor if you are having problems. It's also a good idea to know your test results and keep a list of the medicines you take. Where can you learn more? Go to http://margarette-anthony.info/. Enter G110 in the search box to learn more about \"Learning About Generalized Anxiety Disorder. \"  Current as of: May 28, 2019  Content Version: 12.2  © 3838-3664 [employer]. Care instructions adapted under license by [employer] (which disclaims liability or warranty for this information). If you have questions about a medical condition or this instruction, always ask your healthcare professional. [address] any warranty or liability for your use of this information.

## 2020-02-27 DIAGNOSIS — G40.909 SEIZURE DISORDER (HCC): ICD-10-CM

## 2020-02-27 NOTE — TELEPHONE ENCOUNTER
Requested Prescriptions     Pending Prescriptions Disp Refills    brivaracetam (BRIVIACT) 100 mg tablet 70 Tab 0     Sig: Take 1 Tab by mouth two (2) times a day. Max Daily Amount: 200 mg.

## 2020-02-27 NOTE — TELEPHONE ENCOUNTER
Pt's mother calling with questions re Topamax. She said a 90 day prescription was supposed to be sent to [facility] [employer] mail order.  Please call back

## 2020-02-28 RX ORDER — TOPIRAMATE 200 MG/1
200 TABLET ORAL 2 TIMES DAILY
Qty: 180 TAB | Refills: 3 | Status: SHIPPED | OUTPATIENT
Start: 2020-02-28 | End: 2020-12-30

## 2020-03-23 ENCOUNTER — TELEPHONE (OUTPATIENT)
Dept: NEUROLOGY | Age: 45
End: 2020-03-23

## 2020-03-23 DIAGNOSIS — G40.909 SEIZURE DISORDER (HCC): Primary | ICD-10-CM

## 2020-03-23 RX ORDER — CANNABIDIOL 100 MG/ML
SOLUTION ORAL
Qty: 240 ML | Refills: 2 | Status: SHIPPED | OUTPATIENT
Start: 2020-03-23 | End: 2020-04-29

## 2020-03-23 NOTE — TELEPHONE ENCOUNTER
Per Dr. Lars Ozuna, Discussed with mother. Erica Ibarra will start him on Epidiolex.  Prescription printed.    She is working on continuing with psychiatry regarding the addiction

## 2020-03-23 NOTE — TELEPHONE ENCOUNTER
Pt had a seizure last night and pt's mother requesting a call back to find out what she should do.  Please call back

## 2020-03-23 NOTE — TELEPHONE ENCOUNTER
Patient's mother stated patient had a seizure last night. He currently takes Briviact 100mg BID and Topiramate 200mg BID. Patient has not been ill recently and has not missed any medication doses. Patient is no longer abstaining from alcohol. Please advise.

## 2020-03-24 NOTE — TELEPHONE ENCOUNTER
I have faxed signed Epidiolex form with Rx and copy of POA, patient's mother. She signed the starter form for patient. Confirmation received.

## 2020-03-31 ENCOUNTER — TELEPHONE (OUTPATIENT)
Dept: NEUROLOGY | Age: 45
End: 2020-03-31

## 2020-04-02 ENCOUNTER — TELEPHONE (OUTPATIENT)
Dept: NEUROLOGY | Age: 45
End: 2020-04-02

## 2020-04-02 NOTE — TELEPHONE ENCOUNTER
I informed patient's mother that we will initiate PA for patient's medication. Juanita, please initiate PA for Epidiolex.  Thanks

## 2020-04-02 NOTE — TELEPHONE ENCOUNTER
----- Message from Jocelyn Cook sent at 4/2/2020 12:09 PM EDT -----  Regarding: Dr Ariela Barragan  Pt [de-identified] ,mom Renate Villar said she spoke to Nathalie Guzmán the nurse regarding pt prescription, pharmacy said they are waiting for prior authorization, please call [phone], [facility], mom can be reach at [phone]

## 2020-04-02 NOTE — TELEPHONE ENCOUNTER
Re: Epidiolex Dr. Leopold Manes,   I am trying to fill out the PA request for Mr. Allyssa Taylor, and could use some assistance with the followin. Does patient have a confirmed dx of Lennox-Gastaut Syndrome? 2. Does patient have a confirmed dx of Dravet Syndrome? 3. Does the patient have an EEG which has shown a pattern of slow (less than 2.5 Hz) spike-and-wave complexes? Thank you.

## 2020-04-03 NOTE — TELEPHONE ENCOUNTER
Per Dr. Jaimie Ureña was denied. [facility] see if they can schedule a virtual visit so we can discuss if he wants to go on another medication

## 2020-04-03 NOTE — TELEPHONE ENCOUNTER
Re: Epidiolex denied     Denial rec'd from [facility] via [address]. Message from Plan: The benefit provides coverage for the requested drug when medically necessary. However, the information submitted doesnt meet [facility] medical necessity guidelines for coverage. The member must meet the following criteria: has an EEG which showed a pattern of slow (<2.5 Hz) spike-and-wave complexes. This determination was based on the [address] and [employer] Epidiolex (cannabidiol) Oral Solution Coverage Policy.

## 2020-04-07 ENCOUNTER — TELEPHONE (OUTPATIENT)
Dept: NEUROLOGY | Age: 45
End: 2020-04-07

## 2020-04-07 NOTE — TELEPHONE ENCOUNTER
Abhishek Chung spoke with patient's mother, Angelina Cobb, and she received a letter stating why Epidiolex was denied. She is unable to fax it to us. Please call her and determine if we can get this medication approved. Thanks so much!

## 2020-04-15 ENCOUNTER — TELEPHONE (OUTPATIENT)
Dept: NEUROLOGY | Age: 45
End: 2020-04-15

## 2020-04-15 ENCOUNTER — VIRTUAL VISIT (OUTPATIENT)
Dept: NEUROLOGY | Age: 45
End: 2020-04-15

## 2020-04-15 VITALS — HEIGHT: 69 IN | WEIGHT: 180 LBS | BODY MASS INDEX: 26.66 KG/M2

## 2020-04-15 DIAGNOSIS — G40.909 SEIZURE DISORDER (HCC): Primary | ICD-10-CM

## 2020-04-15 DIAGNOSIS — G93.0 BRAIN CYST: ICD-10-CM

## 2020-04-15 DIAGNOSIS — F10.20 UNCOMPLICATED ALCOHOL DEPENDENCE (HCC): ICD-10-CM

## 2020-04-15 RX ORDER — NALTREXONE HYDROCHLORIDE 50 MG/1
50 TABLET, FILM COATED ORAL DAILY
COMMUNITY
End: 2020-11-03 | Stop reason: SDUPTHER

## 2020-04-15 NOTE — PROGRESS NOTES
This is a telemedicine visit that was performed with the originating site at [facility] and the distant site at patient's home. Verbal consent to participate in video visit was obtained. The patient was identified by name and date of birth. This visit occurred during the Coronavirus [phone]) [employer] [facility]. I discussed with the patient the nature of our telemedicine visits, that:     I would evaluate the patient and recommend diagnostics and treatments based on my assessment   Our sessions are not being recorded and that personal health information is protected   Our team would provide follow up care in person if/when the patient needs it    Marlon Curran is a 41 y.o. male came back for follow-up. He has seizure disorder and autism/asperger syndrome. He was last seen in January. Since last seen, he has had one seizure which was on March 22. Prior to that he reported multiple seizures that were suspected to be related to alcohol use. He has not been abstaining from alcohol. Recently saw his a psychiatrist who recommended naltrexone for alcohol dependence. He has not started it yet  Epidiolex was denied by insurance. He is currently on Briviact 100 mg twice daily and topiramate 200 mg twice daily and says that he is compliant with his medications    He has not been employed for the past several months. 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 He continues to drink alcohol at least 3-4 drinks most days of the week. His seizures have typically been 24 to 48 hours after his last drink. Mother thinks that he drinks because of his social setback related to underlying autism and his inability to do things that he wants to do.      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  A follow-up MRI was recommended but he has not been able to do it yet. EXAM  Exam:  Visit Vitals  Ht 5' 9\" (1.753 m)   Wt 81.6 kg (180 lb)   BMI 26.58 kg/m²     Gen:Alert, oriented. Speaks very little. CV: RRR  Lungs: non labored breathing  Abd: non distending  Neuro: A&O x 3, no dysarthria or aphasia  CN II-XII: PERRL, EOMI, face symmetric, tongue/palate midline  Motor: strength 5/5 all four ext  Sensory: intact to LT  Gait: normal    LABS/ IMAGING  MRI Results (most recent):  Results from Hospital Encounter encounter on 06/10/19   MRI BRAIN W WO CONT    Narrative EXAM:  MRI BRAIN W WO CONT    INDICATION:    Seizures new or progressive    COMPARISON:  January 23, 2012. CONTRAST: 17 ml Dotarem. TECHNIQUE:    Multiplanar multisequence acquisition without and with contrast of the brain. FINDINGS:  Diffuse motion artifact. Diffusion imaging does not show acute ischemic changes . Minimal nonspecific white matter disease. Ventricle size is normal and stable. No extra-axial fluid collection hemorrhage or shift. Temporal lobe no show no focal abnormality and appear symmetrical.  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 No new lesion is  seen. Incidental diffuse mucosal thickening within the a stomach air cells bilaterally      Impression  impression: No acute changes no masses. Stable findings.        EEG showed occasional bifrontal sharp/spike discharges  Lab Results   Component Value Date/Time    WBC 10.5 01/05/2020 04:15 PM    HGB 14.5 01/05/2020 04:15 PM    Hematocrit (POC) 44 06/10/2019 04:29 PM    HCT 43.4 01/05/2020 04:15 PM    PLATELET 647 86/42/3090 04:15 PM    MCV 89.9 01/05/2020 04:15 PM     Lab Results   Component Value Date/Time    Sodium 143 01/05/2020 04:15 PM    Potassium 3.5 01/05/2020 04:15 PM    Chloride 110 (H) 01/05/2020 04:15 PM    CO2 25 01/05/2020 04:15 PM    Anion gap 8 01/05/2020 04:15 PM    Glucose 90 01/05/2020 04:15 PM BUN 18 01/05/2020 04:15 PM    Creatinine 1.02 01/05/2020 04:15 PM    BUN/Creatinine ratio 18 01/05/2020 04:15 PM    GFR est AA >60 01/05/2020 04:15 PM    GFR est non-AA >60 01/05/2020 04:15 PM    Calcium 8.9 01/05/2020 04:15 PM    Bilirubin, total 0.3 12/13/2019 02:59 PM    AST (SGOT) 22 12/13/2019 02:59 PM    Alk. phosphatase 75 12/13/2019 02:59 PM    Protein, total 7.1 12/13/2019 02:59 PM    Albumin 3.9 12/13/2019 02:59 PM    Globulin 3.2 12/13/2019 02:59 PM    A-G Ratio 1.2 12/13/2019 02:59 PM    ALT (SGPT) 31 12/13/2019 02:59 PM         ASSESSMENT    ICD-10-CM ICD-9-CM    1. Seizure disorder ([facility] G40.909 345.90    2. Uncomplicated alcohol dependence ([facility] F10.20 303.90    3. Brain cyst G93.0 348.0    4       Cavernous angioma      PLAN  He continues to have occasional seizures  Alcohol use seems to be a key factor in these recurrent seizures  I agree that trying naltrexone to help with alcohol dependence should be tried  He was reassured that there are no significant interactions between his seizure medications and naltrexone  Continue Briviact and topiramate as is for now  His MRI findings are chronic and stable  Follow-up in 3 months    Tonio Galvan MD  This note will not be viewable in MyChart.

## 2020-04-15 NOTE — TELEPHONE ENCOUNTER
I advised patient's mother to discuss the new medication with patient at today's virtual visit. She agreed to this plan.

## 2020-04-15 NOTE — TELEPHONE ENCOUNTER
Called pt's mother to check in pt for VV. She stated pt's psychiatrist prescribed a medication (Naltrexone 50mg once a day) and before he starts taking it she would like to discuss with nurse before the appt.  Please call back

## 2020-05-26 ENCOUNTER — VIRTUAL VISIT (OUTPATIENT)
Dept: INTERNAL MEDICINE CLINIC | Facility: CLINIC | Age: 45
End: 2020-05-26

## 2020-05-26 VITALS — BODY MASS INDEX: 26.66 KG/M2 | HEIGHT: 69 IN | WEIGHT: 180 LBS

## 2020-05-26 DIAGNOSIS — F10.20 UNCOMPLICATED ALCOHOL DEPENDENCE (HCC): ICD-10-CM

## 2020-05-26 DIAGNOSIS — Z00.00 MEDICARE ANNUAL WELLNESS VISIT, SUBSEQUENT: Primary | ICD-10-CM

## 2020-05-26 DIAGNOSIS — R56.9 SEIZURE (HCC): ICD-10-CM

## 2020-05-26 DIAGNOSIS — Z13.31 SCREENING FOR DEPRESSION: ICD-10-CM

## 2020-05-26 DIAGNOSIS — F84.5 ASPERGER SYNDROME: ICD-10-CM

## 2020-05-26 DIAGNOSIS — F41.1 GENERALIZED ANXIETY DISORDER: ICD-10-CM

## 2020-05-26 DIAGNOSIS — I10 ESSENTIAL HYPERTENSION: ICD-10-CM

## 2020-05-26 DIAGNOSIS — F84.0 AUTISM: ICD-10-CM

## 2020-05-26 NOTE — PROGRESS NOTES
Identified pt with two pt identifiers(name and ). Reviewed record in preparation for visit and have obtained necessary documentation. Chief Complaint   Patient presents with    Anxiety     3 month follow up    Alcohol Problem      Vitals:    20 1333   Weight: 180 lb (81.6 kg)   Height: 5' 9\" (1.753 m)       Health Maintenance Due   Topic    Medicare Yearly Exam        Coordination of Care Questionnaire:  :   1) Have you been to an emergency room, urgent care, or hospitalized since your last visit? If yes, where when, and reason for visit? NO      2. Have seen or consulted any other health care provider since your last visit? If yes, where when, and reason for visit? Last week VV physch-[facility]. Dr. Pedro Betancourt last week 4/15/2020 for follow up. Last seizure 2020.

## 2020-05-26 NOTE — PROGRESS NOTES
This is the Subsequent Medicare Annual Wellness Exam, performed 12 months or more after the Initial AWV or the last Subsequent AWV    Consent: Karen Fuentes, who was seen by synchronous (real-time) audio-video technology, and/or his healthcare decision maker, is aware that this patient-initiated, Telehealth encounter on 5/26/2020 is a billable service. While AWVs are fully covered by Medicare, any services rendered on this date that are not included in an AWV are subject to additional billing, with coverage as determined by his insurance carrier. He is aware that he may receive a bill for any such additional services and has provided verbal consent to proceed: Yes. I have reviewed the patient's medical history in detail and updated the computerized patient record. History   Karen Fuentes is a 41 y.o. male. Presents for Medicare AWV and 3 month follow up evaluation.  He has HTN, seizure disorder since 2011, alcohol dependence, generalized anxiety disorder, autism, and Asperger's syndrome. [facility] has no complaints today. Last had a seizure with a fall on 4/25/20. Last saw Dr. Porsha Gunn on 4/15/20; believes alcohol and alcohol-withdrawal are causing his recent seizures. His psychiatrist started him on naltrexone to try to help him cut down on alcohol. Started taking it right after seeing Dr. Porsha Gunn and being informed there were no interactions between naltrexone and his seizure medications. Patient says that he is trying to cut down on drinking alcohol. Denies depressed mood or anxiety. Cardiovascular Review  The patient has hypertension. He reports taking medications as instructed, no medication side effects noted. Diet and Lifestyle: generally follows a low sodium diet, no formal exercise but active during the day. Has a home BP monitor but has not checked his BP recently. Soc Hx  Lives alone in a house. Single. No children. Unemployed; previously worked at [employer].  Never smoker. Drinks 3-4 beers a day (unchanged from last clinic visit); previously 6-8 beers a day for 20 yrs. Denies recreational drug use. Gets regular exercise by walking. Does not drive. His mother puts his medications in a pill box for him to take.     ROS  A complete review of systems was performed and is negative except for those mentioned in the HPI. Patient Active Problem List   Diagnosis Code    Autism F84.0    Asperger syndrome F84.5    Seizure ([address] R56.9    Hypertension I10    EtOH dependence ([address] F10.20    Alcohol withdrawal ([address] F10.239    Advance care planning Z71.89    Brain cyst G93.0    Abnormal MRI of head R93.0    Altered mental status, unspecified R41.82    Complex partial seizure evolving to generalized seizure ([address] G40.209    Bilateral carotid artery stenosis I65.23    Convulsive syncope R55    Generalized anxiety disorder F41.1     Past Medical History:   Diagnosis Date    Asperger syndrome 12/14/2011    Autism     dx 2010- highly functional    Other ill-defined conditions(799.89)     aspergers, syncopal episodes    Seizure disorder ([address] 5/22/2012    Seizures ([address]       Past Surgical History:   Procedure Laterality Date    HX GI      pyloric stenosis 1976    HX HEENT      adenoids 1978     Current Outpatient Medications   Medication Sig Dispense Refill    naltrexone (DEPADE) 50 mg tablet Take 50 mg by mouth daily. Indications: Hasn't started as of 4/15/20      brivaracetam (BRIVIACT) 100 mg tablet Take 1 Tab by mouth two (2) times a day. Max Daily Amount: 200 mg. 60 Tab 3    topiramate (TOPAMAX) 200 mg tablet Take 1 Tab by mouth two (2) times a day. 180 Tab 3    sertraline (ZOLOFT) 25 mg tablet Take 100 mg by mouth nightly.  MULTIVITAMIN PO Take 1 Tab by mouth daily.        Allergies   Allergen Reactions    Aspartame Other (comments)     Family believes it causes his seizures       Family History   Problem Relation Age of Onset    Diabetes Father     Heart Disease Father    [facility] Cancer Mother         Breast    Anxiety Mother     COPD Mother     Other Mother         Neuropathy    Sleep Apnea Mother     Arthritis-osteo Mother     Other Brother         Pituitary tumor     Social History     Tobacco Use    Smoking status: Never Smoker    Smokeless tobacco: Never Used   Substance Use Topics    Alcohol use: Yes     Alcohol/week: 8.3 standard drinks     Types: 10 Cans of beer per week     Frequency: Monthly or less     Drinks per session: 1 or 2     Comment: occasional       Depression Risk Factor Screening:     3 most recent PHQ Screens 5/26/2020   Little interest or pleasure in doing things Not at all   Feeling down, depressed, irritable, or hopeless Not at all   Total Score PHQ 2 0       Alcohol Risk Factor Screening (MALE < 65): Do you average more than 2 drinks per night or 14 drinks a week: No    On any one occasion in the past three months have you have had more than 4 drinks containing alcohol:  Yes      Functional Ability and Level of Safety:   Hearing: Hearing is good. Activities of Daily Living: The home contains: no safety equipment. Patient does total self care    Ambulation: with no difficulty    Fall Risk:  Fall Risk Assessment, last 12 mths 9/6/2019   Able to walk? Yes   Fall in past 12 months?  No       Abuse Screen:  Patient is not abused    Cognitive Screening   Has your family/caregiver stated any concerns about your memory: no  Cognitive Screening: normal by exam    Constitutional: [x] Appears well-developed and well-nourished [x] No apparent distress      [] Abnormal -     Mental status: [x] Alert and awake  [x] Oriented to person/place/time [x] Able to follow commands    [] Abnormal -     Eyes:   EOM    [x]  Normal    [] Abnormal -   Sclera  [x]  Normal    [] Abnormal -          Discharge [x]  None visible   [] Abnormal -     HENT: [x] Normocephalic, atraumatic  [] Abnormal -   [x] Mouth/Throat: Mucous membranes are moist    External Ears [x] Normal  [] Abnormal -    Neck: [x] No visualized mass [] Abnormal -     Pulmonary/Chest: [x] Respiratory effort normal   [x] No visualized signs of difficulty breathing or respiratory distress        [] Abnormal -      Musculoskeletal:   [x] Normal gait with no signs of ataxia         [x] Normal range of motion of neck        [] Abnormal -     Neurological:        [x] No Facial Asymmetry (Cranial nerve 7 motor function) (limited exam due to video visit)          [x] No gaze palsy        [] Abnormal -          Skin:        [x] No significant exanthematous lesions or discoloration noted on facial skin         [] Abnormal -            Psychiatric:       [x] Normal Affect [] Abnormal -        [x] No Hallucinations    Other pertinent observable physical exam findings:- restless        Patient Care Team   Patient Care Team:  Wanda Flores MD as PCP - General (Internal Medicine)  Wanda Flores MD as PCP - [facility]  Shanita Garcia MD (Neurology)  Homero Story MD (Neurology)    Assessment/Plan   Education and counseling provided:  Are appropriate based on today's review and evaluation    Diagnoses and all orders for this visit:    1. Medicare annual wellness visit, subsequent    2. Essential hypertension  Controlled on no medication, by diet alone. 3. Seizure ([address]  Stable on Briviact and Topamax. Follow up with Dr. Janett Arevalo. 4. Autism  Stable. 5. Asperger syndrome  Stable. 7. Uncomplicated alcohol dependence ([address]  Counseled on decreasing beers to no more than 2 a day. Continue naltrexone. 8. Generalized anxiety disorder  Controlled on sertraline 25 mg daily. 9. Screening for depression  -     DEPRESSION SCREEN ANNUAL      Follow-up and Dispositions    · Return in about 3 months (around 8/26/2020), or if symptoms worsen or fail to improve, for HTN, anxiety, alcohol dependence. There are no preventive care reminders to display for this patient.     Lisa Joshi is a 41 y.o. male who was evaluated by an audio-video encounter for concerns as above. Patient identification was verified prior to start of the visit. A caregiver was present when appropriate. Due to this being a TeleHealth encounter (During [facility] public health emergency), evaluation of the following organ systems was limited: Vitals/Constitutional/EENT/Resp/CV/GI//MS/Neuro/Skin/Heme-Lymph-Imm. Pursuant to the emergency declaration under the [address] [place] [address], [facility] waiver authority and the [employer] Act, this Virtual Visit was conducted, with patient's (and/or legal guardian's) consent, to reduce the patient's risk of exposure to COVID-19 and provide necessary medical care. THIS VISIT WAS COMPLETED VIRTUALLY USING [employer]. ME     Services were provided through a synchronous discussion virtually to substitute for in-person clinic visit. I was at home. The patient was at home.       Juel Jeans, MD

## 2020-05-26 NOTE — PATIENT INSTRUCTIONS
Medicare Wellness Visit, Male The best way to live healthy is to have a lifestyle where you eat a well-balanced diet, exercise regularly, limit alcohol use, and quit all forms of tobacco/nicotine, if applicable. Regular preventive services are another way to keep healthy. Preventive services (vaccines, screening tests, monitoring & exams) can help personalize your care plan, which helps you manage your own care. Screening tests can find health problems at the earliest stages, when they are easiest to treat. [facility] follows the current, evidence-based guidelines published by the [facility] Lew Chinmay ([facility]) when recommending preventive services for our patients. Because we follow these guidelines, sometimes recommendations change over time as research supports it. (For example, a prostate screening blood test is no longer routinely recommended for men with no symptoms). Of course, you and your doctor may decide to screen more often for some diseases, based on your risk and co-morbidities (chronic disease you are already diagnosed with). Preventive services for you include: - Medicare offers their members a free annual wellness visit, which is time for you and your primary care provider to discuss and plan for your preventive service needs. Take advantage of this benefit every year! 
-All adults over age 72 should receive the recommended pneumonia vaccines. Current USPSTF guidelines recommend a series of two vaccines for the best pneumonia protection.  
-All adults should have a flu vaccine yearly and tetanus vaccine every 10 years. 
-All adults age 48 and older should receive the shingles vaccines (series of two vaccines).       
-All adults age 38-68 who are overweight should have a diabetes screening test once every three years.  
-Other screening tests & preventive services for persons with diabetes include: an eye exam to screen for diabetic retinopathy, a kidney function test, a foot exam, and stricter control over your cholesterol.  
-Cardiovascular screening for adults with routine risk involves an electrocardiogram (ECG) at intervals determined by the provider.  
-Colorectal cancer screening should be done for adults age 54-65 with no increased risk factors for colorectal cancer. There are a number of acceptable methods of screening for this type of cancer. Each test has its own benefits and drawbacks. Discuss with your provider what is most appropriate for you during your annual wellness visit. The different tests include: colonoscopy (considered the best screening method), a fecal occult blood test, a fecal DNA test, and sigmoidoscopy. 
-All adults born between [facility] should be screened once for Hepatitis C. 
-An Abdominal Aortic Aneurysm (AAA) Screening is recommended for men age 73-68 who has ever smoked in their lifetime. Here is a list of your current Health Maintenance items (your personalized list of preventive services) with a due date: There are no preventive care reminders to display for this patient. Medicare Wellness Visit, Male The best way to live healthy is to have a lifestyle where you eat a well-balanced diet, exercise regularly, limit alcohol use, and quit all forms of tobacco/nicotine, if applicable. Regular preventive services are another way to keep healthy. Preventive services (vaccines, screening tests, monitoring & exams) can help personalize your care plan, which helps you manage your own care. Screening tests can find health problems at the earliest stages, when they are easiest to treat. [facility] follows the current, evidence-based guidelines published by the [facility] Lew Chinmay (USPSTF) when recommending preventive services for our patients.  Because we follow these guidelines, sometimes recommendations change over time as research supports it. (For example, a prostate screening blood test is no longer routinely recommended for men with no symptoms). Of course, you and your doctor may decide to screen more often for some diseases, based on your risk and co-morbidities (chronic disease you are already diagnosed with). Preventive services for you include: - Medicare offers their members a free annual wellness visit, which is time for you and your primary care provider to discuss and plan for your preventive service needs. Take advantage of this benefit every year! 
-All adults over age 72 should receive the recommended pneumonia vaccines. Current USPSTF guidelines recommend a series of two vaccines for the best pneumonia protection.  
-All adults should have a flu vaccine yearly and tetanus vaccine every 10 years. 
-All adults age 48 and older should receive the shingles vaccines (series of two vaccines). -All adults age 38-68 who are overweight should have a diabetes screening test once every three years.  
-Other screening tests & preventive services for persons with diabetes include: an eye exam to screen for diabetic retinopathy, a kidney function test, a foot exam, and stricter control over your cholesterol.  
-Cardiovascular screening for adults with routine risk involves an electrocardiogram (ECG) at intervals determined by the provider.  
-Colorectal cancer screening should be done for adults age 54-65 with no increased risk factors for colorectal cancer. There are a number of acceptable methods of screening for this type of cancer. Each test has its own benefits and drawbacks. Discuss with your provider what is most appropriate for you during your annual wellness visit. The different tests include: colonoscopy (considered the best screening method), a fecal occult blood test, a fecal DNA test, and sigmoidoscopy. -All adults born between [birth year] and 1964 should be screened once for Hepatitis C. 
-An Abdominal Aortic Aneurysm (AAA) Screening is recommended for men age 73-68 who has ever smoked in their lifetime. Here is a list of your current Health Maintenance items (your personalized list of preventive services) with a due date: There are no preventive care reminders to display for this patient.

## 2020-07-02 ENCOUNTER — TELEPHONE (OUTPATIENT)
Dept: NEUROLOGY | Age: 45
End: 2020-07-02

## 2020-07-02 ENCOUNTER — HOSPITAL ENCOUNTER (OUTPATIENT)
Dept: LAB | Age: 45
Discharge: HOME | End: 2020-07-02

## 2020-07-02 NOTE — TELEPHONE ENCOUNTER
[facility] calling to get office notes faxed. Pt is coming in today.  Fax: [phone]

## 2020-07-27 ENCOUNTER — TELEPHONE (OUTPATIENT)
Dept: NEUROLOGY | Age: 45
End: 2020-07-27

## 2020-07-27 NOTE — TELEPHONE ENCOUNTER
I advised patient's mother to call back 8/3/20 to get virtual visit scheduled. The computer systems will be merged by then and we will be able to schedule follow ups.

## 2020-07-27 NOTE — TELEPHONE ENCOUNTER
Per Dr. Peter Prior, Discussed with mother.  Discussed the option of VNS and she will do some research on her own. Lopez Gess will reduce Zoloft 50 mg daily.  Please schedule a virtual visit in about 3 to 4 weeks.

## 2020-07-27 NOTE — TELEPHONE ENCOUNTER
Patient is currently taking Topiramate 200mg BID and Briviact 100mg BID for seizure prevention. He has not missed any doses of medication, per his mother. He also has not had any alcohol in at least one month. Patient had a seizure this past Friday. Patient's mother feels that it may be related to the antidepressant patient is taking. Please advise.

## 2020-07-29 DIAGNOSIS — G40.909 SEIZURE DISORDER (HCC): ICD-10-CM

## 2020-07-29 NOTE — TELEPHONE ENCOUNTER
Requested Prescriptions     Pending Prescriptions Disp Refills    brivaracetam (Briviact) 100 mg tablet 60 Tab 3     Sig: Take 1 Tab by mouth two (2) times a day. Max Daily Amount: 200 mg. Needs to be sent in today or tomorrow so he will get it before he runs out.

## 2020-07-30 DIAGNOSIS — G40.909 SEIZURE DISORDER (HCC): Primary | ICD-10-CM

## 2020-07-30 RX ORDER — CANNABIDIOL 100 MG/ML
200 SOLUTION ORAL 2 TIMES DAILY
Qty: 120 ML | Refills: 1 | Status: SHIPPED | OUTPATIENT
Start: 2020-07-30 | End: 2020-08-11

## 2020-08-03 ENCOUNTER — TELEPHONE (OUTPATIENT)
Dept: NEUROLOGY | Age: 45
End: 2020-08-03

## 2020-08-03 DIAGNOSIS — G40.909 SEIZURE DISORDER (HCC): Primary | ICD-10-CM

## 2020-08-03 DIAGNOSIS — G40.309 GEN IDIOPATHIC EPILEPSY, NOT INTRACTABLE, W/O STAT EPI (HCC): ICD-10-CM

## 2020-08-03 RX ORDER — CLOBAZAM 10 MG/1
TABLET ORAL
Qty: 60 TAB | Refills: 1 | Status: SHIPPED | OUTPATIENT
Start: 2020-08-03 | End: 2020-08-11

## 2020-08-03 NOTE — TELEPHONE ENCOUNTER
Re: Jacinto (Camilla Jones) approved    Approval rec'd from [facility] via [facility]- [id number]  Effective 01/01/20-12/31/20    Fax sent to [phone] [facility]

## 2020-08-03 NOTE — TELEPHONE ENCOUNTER
Re: Epidiolex approved     Approval rec'd from [facility] via [facility]    Effective 08/03/20-12/31/20  PA case #[id number]    Fax [facility].

## 2020-08-11 ENCOUNTER — VIRTUAL VISIT (OUTPATIENT)
Dept: NEUROLOGY | Facility: CLINIC | Age: 45
End: 2020-08-11
Payer: MEDICARE

## 2020-08-11 DIAGNOSIS — G93.0 BRAIN CYST: ICD-10-CM

## 2020-08-11 DIAGNOSIS — F10.20 UNCOMPLICATED ALCOHOL DEPENDENCE (HCC): ICD-10-CM

## 2020-08-11 DIAGNOSIS — G40.909 SEIZURE DISORDER (HCC): Primary | ICD-10-CM

## 2020-08-11 PROCEDURE — G8432 DEP SCR NOT DOC, RNG: HCPCS | Performed by: PSYCHIATRY & NEUROLOGY

## 2020-08-11 PROCEDURE — G8756 NO BP MEASURE DOC: HCPCS | Performed by: PSYCHIATRY & NEUROLOGY

## 2020-08-11 PROCEDURE — 99214 OFFICE O/P EST MOD 30 MIN: CPT | Performed by: PSYCHIATRY & NEUROLOGY

## 2020-08-11 PROCEDURE — G8428 CUR MEDS NOT DOCUMENT: HCPCS | Performed by: PSYCHIATRY & NEUROLOGY

## 2020-08-11 RX ORDER — CANNABIDIOL 100 MG/ML
400 SOLUTION ORAL 2 TIMES DAILY
Qty: 240 ML | Refills: 3 | Status: SHIPPED | OUTPATIENT
Start: 2020-08-11 | End: 2020-08-13 | Stop reason: SDUPTHER

## 2020-08-11 NOTE — PROGRESS NOTES
This is a telemedicine visit that was performed with the originating site at [facility] and the distant site at patient's home. Verbal consent to participate in video visit was obtained. The patient was identified by name and date of birth. This visit occurred during the Coronavirus [phone]) [facility] Emergency. I discussed with the patient the nature of our telemedicine visits, that:     I would evaluate the patient and recommend diagnostics and treatments based on my assessment   Our sessions are not being recorded and that personal health information is protected   Our team would provide follow up care in person if/when the patient needs it    Joey Saenz is a 41 y.o. male was evaluated over a video call today. Mother made this appointment because he had an episode where he abruptly passed out. He was sitting at home, watching television, got up to go to the bathroom and then just collapsed. He may have lost consciousness but it was very brief. There was no witnessed seizure-like activity. He came to quickly and knew what had happened. Did not experience any lightheadedness, dizziness, shortness of breath or palpitations. He has now started taking Epidiolex and takes 200 mg twice daily. Tolerates it fine    He had a  generalized seizure in March and then again in July. He was initially planned to start clobazam but then Epidiolex was approved by insurance and it was started instead. Prior to that he reported multiple seizures that were suspected to be related to alcohol use. He has not been abstaining from alcohol. Recently saw his a psychiatrist who recommended naltrexone for alcohol dependence. He is also on Briviact 100 mg twice daily and topiramate 200 mg twice daily and says that he is compliant with his medications    He has not been employed for the past several months. 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      His typical seizures in the past have been generalized tonic-clonic type, in the setting of alcohol withdrawal.  However he has had seizures outside of alcohol use as well. 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  A follow-up MRI was recommended but he has not been able to do it yet. EXAM  Exam:  There were no vitals taken for this visit. Gen:Alert, oriented. Speaks very little. CV: RRR  Lungs: non labored breathing  Abd: non distending  Neuro: A&O x 3, no dysarthria or aphasia  CN II-XII: PERRL, EOMI, face symmetric, tongue/palate midline  Motor: strength 5/5 all four ext  Sensory: intact to LT  Gait: normal    LABS/ IMAGING  MRI Results (most recent):  Results from Hospital Encounter encounter on 06/10/19   MRI BRAIN W WO CONT    Narrative EXAM:  MRI BRAIN W WO CONT    INDICATION:    Seizures new or progressive    COMPARISON:  January 23, 2012. CONTRAST: 17 ml Dotarem. TECHNIQUE:    Multiplanar multisequence acquisition without and with contrast of the brain. FINDINGS:  Diffuse motion artifact. Diffusion imaging does not show acute ischemic changes . Minimal nonspecific white matter disease. Ventricle size is normal and stable. No extra-axial fluid collection hemorrhage or shift. Temporal lobe no show no focal abnormality and appear symmetrical.  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 No new lesion is  seen. Incidental diffuse mucosal thickening within the a stomach air cells bilaterally      Impression  impression: No acute changes no masses. Stable findings.        EEG showed occasional bifrontal sharp/spike discharges  Lab Results   Component Value Date/Time    WBC 10.5 01/05/2020 04:15 PM    HGB 14.5 01/05/2020 04:15 PM    Hematocrit (POC) 44 06/10/2019 04:29 PM    HCT 43.4 01/05/2020 04:15 PM    PLATELET 001 15/53/2619 04:15 PM    MCV 89.9 01/05/2020 04:15 PM     Lab Results   Component Value Date/Time    Sodium 143 01/05/2020 04:15 PM    Potassium 3.5 01/05/2020 04:15 PM    Chloride 110 (H) 01/05/2020 04:15 PM    CO2 25 01/05/2020 04:15 PM    Anion gap 8 01/05/2020 04:15 PM    Glucose 90 01/05/2020 04:15 PM    BUN 18 01/05/2020 04:15 PM    Creatinine 1.02 01/05/2020 04:15 PM    BUN/Creatinine ratio 18 01/05/2020 04:15 PM    GFR est AA >60 01/05/2020 04:15 PM    GFR est non-AA >60 01/05/2020 04:15 PM    Calcium 8.9 01/05/2020 04:15 PM    Bilirubin, total 0.3 12/13/2019 02:59 PM    Alk. phosphatase 75 12/13/2019 02:59 PM    Protein, total 7.1 12/13/2019 02:59 PM    Albumin 3.9 12/13/2019 02:59 PM    Globulin 3.2 12/13/2019 02:59 PM    A-G Ratio 1.2 12/13/2019 02:59 PM    ALT (SGPT) 31 12/13/2019 02:59 PM         ASSESSMENT    ICD-10-CM ICD-9-CM    1. Seizure disorder (HCC)  G40.909 345.90 cannabidioL (Epidiolex) 100 mg/mL soln   2. Brain cyst  G93.0 348.0    3. Uncomplicated alcohol dependence (HCC)  F10.20 303.90    4       Cavernous angioma      PLAN  He recently had an episode of syncope or collapse. He has had cardiac monitoring in the past and it was unremarkable. It is possible that this may have been a vasovagal episode or less likely a drop seizure  Continue Briviact and topiramate  Increase Epidiolex to 300 mg twice daily for 1 week and then further to 400 mg twice daily  New prescription was sent today    His MRI findings are chronic and stable  Follow-up in 3 months or earlier if needed    Radha Medrano MD  This note will not be viewable in [address]

## 2020-08-11 NOTE — PATIENT INSTRUCTIONS
PRESCRIPTION REFILL POLICY [facility] Statement to Patients April 1, 2014 In an effort to ensure the large volume of patient prescription refills is processed in the most efficient and expeditious manner, we are asking our patients to assist us by calling your Pharmacy for all prescription refills, this will include also your  Mail Order Pharmacy. The pharmacy will contact our office electronically to continue the refill process. Please do not wait until the last minute to call your pharmacy. We need at least 48 hours (2days) to fill prescriptions. We also encourage you to call your pharmacy before going to  your prescription to make sure it is ready. With regard to controlled substance prescription refill requests (narcotic refills) that need to be picked up at our office, we ask your cooperation by providing us with at least 72 hours (3days) notice that you will need a refill. We will not refill narcotic prescription refill requests after 4:00pm on any weekday, Monday through Thursday, or after 2:00pm on Fridays, or on the weekends. We encourage everyone to explore another way of getting your prescription refill request processed using [employer], our patient web portal through our electronic medical record system. [employer] is an efficient and effective way to communicate your medication request directly to the office and  downloadable as an ama on your smart phone . [employer] also features a review functionality that allows you to view your medication list as well as leave messages for your physician. Are you ready to get connected? If so please review the attatched instructions or speak to any of our staff to get you set up right away! Thank you so much for your cooperation. Should you have any questions please contact our Practice Administrator. The Physicians and Staff,  [facility]

## 2020-08-11 NOTE — PROGRESS NOTES
Mr. Jae Mcrae is being evaluated via virtual today to follow up seizures. Patient's mother reported patient had a syncope spell last night.

## 2020-08-13 DIAGNOSIS — G40.909 SEIZURE DISORDER (HCC): ICD-10-CM

## 2020-08-13 RX ORDER — CANNABIDIOL 100 MG/ML
400 SOLUTION ORAL 2 TIMES DAILY
Qty: 240 ML | Refills: 3 | Status: SHIPPED | OUTPATIENT
Start: 2020-08-13 | End: 2020-08-17 | Stop reason: SDUPTHER

## 2020-08-13 RX ORDER — CANNABIDIOL 100 MG/ML
400 SOLUTION ORAL 2 TIMES DAILY
Qty: 240 ML | Refills: 3 | Status: SHIPPED | OUTPATIENT
Start: 2020-08-13 | End: 2020-08-13 | Stop reason: SDUPTHER

## 2020-08-13 NOTE — TELEPHONE ENCOUNTER
Stated mom said pt is now taking 3ml and should up to 4 ml. Pt will run out and refill is needed. Requested Prescriptions     Pending Prescriptions Disp Refills    cannabidioL (Epidiolex) 100 mg/mL soln 240 mL 3     Sig: Take 400 mg by mouth two (2) times a day.

## 2020-08-17 ENCOUNTER — TELEPHONE (OUTPATIENT)
Dept: NEUROLOGY | Facility: CLINIC | Age: 45
End: 2020-08-17

## 2020-08-17 DIAGNOSIS — G40.909 SEIZURE DISORDER (HCC): ICD-10-CM

## 2020-08-17 RX ORDER — CANNABIDIOL 100 MG/ML
400 SOLUTION ORAL 2 TIMES DAILY
Qty: 240 ML | Refills: 3 | Status: CANCELLED | OUTPATIENT
Start: 2020-08-17

## 2020-08-17 RX ORDER — CANNABIDIOL 100 MG/ML
400 SOLUTION ORAL 2 TIMES DAILY
Qty: 240 ML | Refills: 3 | Status: SHIPPED | OUTPATIENT
Start: 2020-08-17 | End: 2020-09-17

## 2020-08-17 NOTE — TELEPHONE ENCOUNTER
Pt's mother calling about medication. It needed to be sent to [facility], not [facility]. Pt needs this sent in asap, Danay stated if it is sent in today they could ship it out and pt would receive in a couple of days.      [phone] ([facility])

## 2020-08-18 NOTE — TELEPHONE ENCOUNTER
Dr. Elva Lassiter has escribed Epidiolex and wanted to ensure the med was received. I called the medication into [address] [place] [address] voice mail.

## 2020-09-17 DIAGNOSIS — G40.909 SEIZURE DISORDER (HCC): ICD-10-CM

## 2020-09-17 RX ORDER — CANNABIDIOL 100 MG/ML
600 SOLUTION ORAL 2 TIMES DAILY
Qty: 300 ML | Refills: 3 | Status: SHIPPED | OUTPATIENT
Start: 2020-09-17 | End: 2021-01-12 | Stop reason: SDUPTHER

## 2020-09-18 ENCOUNTER — VIRTUAL VISIT (OUTPATIENT)
Dept: INTERNAL MEDICINE CLINIC | Age: 45
End: 2020-09-18
Payer: MEDICARE

## 2020-09-18 DIAGNOSIS — Z13.220 SCREENING, LIPID: ICD-10-CM

## 2020-09-18 DIAGNOSIS — F10.20 UNCOMPLICATED ALCOHOL DEPENDENCE (HCC): ICD-10-CM

## 2020-09-18 DIAGNOSIS — F41.1 GENERALIZED ANXIETY DISORDER: ICD-10-CM

## 2020-09-18 DIAGNOSIS — G40.909 SEIZURE DISORDER (HCC): ICD-10-CM

## 2020-09-18 DIAGNOSIS — I10 ESSENTIAL HYPERTENSION: Primary | ICD-10-CM

## 2020-09-18 PROBLEM — R41.82 ALTERED MENTAL STATUS, UNSPECIFIED: Status: RESOLVED | Noted: 2019-06-11 | Resolved: 2020-09-18

## 2020-09-18 PROBLEM — R55 CONVULSIVE SYNCOPE: Status: RESOLVED | Noted: 2019-06-11 | Resolved: 2020-09-18

## 2020-09-18 PROBLEM — G40.209 COMPLEX PARTIAL SEIZURE EVOLVING TO GENERALIZED SEIZURE (HCC): Status: RESOLVED | Noted: 2019-06-11 | Resolved: 2020-09-18

## 2020-09-18 PROCEDURE — G8432 DEP SCR NOT DOC, RNG: HCPCS | Performed by: INTERNAL MEDICINE

## 2020-09-18 PROCEDURE — G8756 NO BP MEASURE DOC: HCPCS | Performed by: INTERNAL MEDICINE

## 2020-09-18 PROCEDURE — 99214 OFFICE O/P EST MOD 30 MIN: CPT | Performed by: INTERNAL MEDICINE

## 2020-09-18 PROCEDURE — G8427 DOCREV CUR MEDS BY ELIG CLIN: HCPCS | Performed by: INTERNAL MEDICINE

## 2020-09-18 RX ORDER — SERTRALINE HYDROCHLORIDE 25 MG/1
50 TABLET, FILM COATED ORAL
Qty: 60 TAB | Refills: 0
Start: 2020-09-18 | End: 2020-11-03

## 2020-09-18 NOTE — PROGRESS NOTES
Aaron Baker  Identified pt with two pt identifiers(name and ). Chief Complaint   Patient presents with    Hypertension    Anxiety    Alcohol Problem       Reviewed record In preparation for visit and have obtained necessary documentation. 1. Have you been to the ER, urgent care clinic or hospitalized since your last visit? Yes. [facility] - PACS     2. Have you seen or consulted any other health care providers outside of the [address] [place] [address] since your last visit? Include any pap smears or colon screening. Yes. [facility] did surgery on left hand on finger    Patient does not have an advance directive. Vitals reviewed with provider. Health Maintenance reviewed:     Health Maintenance Due   Topic    Pneumococcal 0-64 years (1 of 1 - PPSV23)    Flu Vaccine (1)          Wt Readings from Last 3 Encounters:   20 180 lb (81.6 kg)   04/15/20 180 lb (81.6 kg)   20 180 lb (81.6 kg)        Temp Readings from Last 3 Encounters:   20 97.5 °F (36.4 °C) (Oral)   20 98.6 °F (37 °C)   20 97.4 °F (36.3 °C)        BP Readings from Last 3 Encounters:   20 104/64   20 128/90   20 112/74        Pulse Readings from Last 3 Encounters:   20 (!) 108   20 86   20 68      There were no vitals filed for this visit.        Learning Assessment:   :       Learning Assessment 2020 10/7/2019 2017 4/3/2017 2016 2014   PRIMARY LEARNER Patient Patient Patient Patient Patient Patient   HIGHEST LEVEL OF EDUCATION - PRIMARY LEARNER  - - - - GRADUATED HIGH SCHOOL OR GED GRADUATED HIGH SCHOOL OR GED   BARRIERS PRIMARY LEARNER - - - - COGNITIVE OTHER   CO-LEARNER CAREGIVER - - - - Yes -   [facility] ENGLISH ENGLISH ENGLISH    NEED - - - - No -   LEARNER PREFERENCE PRIMARY DEMONSTRATION DEMONSTRATION DEMONSTRATION DEMONSTRATION READING READING   LEARNING SPECIAL TOPICS - - - - no -   ANSWERED BY self self patient patient self patient   RELATIONSHIP SELF SELF SELF SELF SELF SELF        Depression Screening:   :       3 most recent PHQ Screens 5/26/2020   Little interest or pleasure in doing things Not at all   Feeling down, depressed, irritable, or hopeless Not at all   Total Score PHQ 2 0        Fall Risk Assessment:   :       Fall Risk Assessment, last 12 mths 9/6/2019   Able to walk? Yes   Fall in past 12 months? No        Abuse Screening:   :       Abuse Screening Questionnaire 9/6/2019   Do you ever feel afraid of your partner? N   Are you in a relationship with someone who physically or mentally threatens you? N   Is it safe for you to go home?  Y        ADL Screening:   :       ADL Assessment 6/6/2019   Feeding yourself No Help Needed   Getting from bed to chair No Help Needed   Getting dressed No Help Needed   Bathing or showering No Help Needed   Walk across the room (includes cane/walker) No Help Needed   Using the telphone No Help Needed   Taking your medications Help Needed   Preparing meals No Help Needed   Managing money (expenses/bills) No Help Needed   Moderately strenuous housework (laundry) No Help Needed   Shopping for personal items (toiletries/medicines) No Help Needed   Shopping for groceries No Help Needed   Driving No Help Needed   Climbing a flight of stairs No Help Needed   Getting to places beyond walking distances No Help Needed

## 2020-09-18 NOTE — PROGRESS NOTES
Madison Pope is a 41 y.o. male who was seen by synchronous (real-time) audio-video technology on 9/18/2020 for Hypertension; Anxiety; and Alcohol Problem        Assessment & Plan:     Diagnoses and all orders for this visit:    1. Essential hypertension  Controlled on low-salt diet. -     METABOLIC PANEL, COMPREHENSIVE; Future  -     CBC WITH AUTOMATED DIFF; Future    2. Generalized anxiety disorder  -     TSH RFX ON ABNORMAL TO FREE T4; Future    3. Seizure disorder ([facility] [address]  Continue Briviact, Topamax, and Epidiolex per Dr. Patti Schultz. -     TOPIRAMATE; Future    4. Uncomplicated alcohol dependence ([facility] [address]  Alcohol-free since June. Continue naltrexone. 5. Screening, lipid  -     LIPID PANEL; Future      Follow-up and Dispositions    · Return in about 4 months (around 1/18/2021), or if symptoms worsen or fail to improve, for HTN, alcohol dep; have fasting labs 1 week before next appointment. 712  Subjective:     Presents for 4 month follow up evaluation.  He has HTN, seizure disorder since 2011, alcohol dependence, generalized anxiety disorder, autism, and Asperger's syndrome.  He has no complaints today. Last seizure 2 days ago. Following closely with Dr. Patti Schultz. On Briviact and Topamax, recently started on Epidiolex. On naltrexone to keep him alcohol-free; has drunk no beer since June. His sertraline dose was decreased from 100 to 50 mg daily about a month ago by Dr. Patti Schultz to try to reduce seizure frequency according to his mother. Denies depressed mood or change in anxiety level.     Cardiovascular Review  The patient has hypertension.  He reports taking medications as instructed, no medication side effects noted.  Diet and Lifestyle: generally follows a low sodium diet, no formal exercise but active during the day.  Has a home BP monitor but has not checked his BP recently.     Soc Hx  Lives alone in a house. Single.  No children. Unemployed; previously worked at [employer] drunk no beer since June; previously 6-8 beers a day for 20 yrs. Denies recreational drug use. Gets regular exercise by walking. Does not drive. His mother puts his medications in a pill box for him to take.     ROS  A complete review of systems was performed and is negative except for those mentioned in the HPI.     Prior to Admission medications    Medication Sig Start Date End Date Taking? Authorizing Provider   cannabidioL (Epidiolex) 100 mg/mL soln Take 600 mg by mouth two (2) times a day. 9/17/20  Yes Nader Ruiz MD   brivaracetam (Briviact) 100 mg tablet Take 1 Tab by mouth two (2) times a day. Max Daily Amount: 200 mg. 7/29/20  Yes Nader Ruiz MD   naltrexone (DEPADE) 50 mg tablet Take 50 mg by mouth daily. Indications: Hasn't started as of 4/15/20   Yes Provider, Historical   topiramate (TOPAMAX) 200 mg tablet Take 1 Tab by mouth two (2) times a day. 2/28/20  Yes Nader Ruiz MD   sertraline (ZOLOFT) 25 mg tablet Take 100 mg by mouth nightly. Yes Provider, Historical   MULTIVITAMIN PO Take 1 Tab by mouth daily.    Yes Provider, Historical     Patient Active Problem List   Diagnosis Code    Autism F84.0    Asperger syndrome F84.5    Seizure disorder ([facility] [address] G40.909    Hypertension I10    EtOH dependence ([facility] [address] F10.20    Advance care planning Z71.89    Brain cyst G93.0    Abnormal MRI of head R93.0    Bilateral carotid artery stenosis I65.23    Generalized anxiety disorder F41.1       Objective:     Patient-Reported Vitals 9/18/2020   Patient-Reported Weight 180lb   Patient-Reported Height -   Patient-Reported Systolic  151   Patient-Reported Diastolic 78      General: alert, cooperative, no distress   Mental  status: normal mood, behavior, speech, dress, motor activity, and thought processes, able to follow commands   HENT: NCAT   Neck: no visualized mass   Resp: no respiratory distress   Neuro: no gross deficits   Skin: no discoloration or lesions of concern on visible areas Psychiatric: normal affect, consistent with stated mood, no evidence of hallucinations     Additional exam findings: none      We discussed the expected course, resolution and complications of the diagnosis(es) in detail. Medication risks, benefits, costs, interactions, and alternatives were discussed as indicated. I advised him to contact the office if his condition worsens, changes or fails to improve as anticipated. He expressed understanding with the diagnosis(es) and plan. THIS VISIT WAS COMPLETED VIRTUALLY USING DOXY. Ashu Cortes, who was evaluated through a patient-initiated, synchronous (real-time) audio-video encounter, and/or his healthcare decision maker, is aware that it is a billable service, with coverage as determined by his insurance carrier. He provided verbal consent to proceed: Yes, and patient identification was verified. It was conducted pursuant to the emergency declaration under the [address] [place] [address], [address] [place] [address] authority and the [employer] Act. A caregiver was present when appropriate. Ability to conduct physical exam was limited. I was at home. The patient was at home.       Miranda Dela Cruz MD

## 2020-10-13 ENCOUNTER — OFFICE VISIT (OUTPATIENT)
Dept: NEUROLOGY | Facility: CLINIC | Age: 45
End: 2020-10-13
Payer: MEDICARE

## 2020-10-13 VITALS
WEIGHT: 180 LBS | BODY MASS INDEX: 26.66 KG/M2 | HEIGHT: 69 IN | RESPIRATION RATE: 16 BRPM | HEART RATE: 96 BPM | DIASTOLIC BLOOD PRESSURE: 70 MMHG | SYSTOLIC BLOOD PRESSURE: 96 MMHG | OXYGEN SATURATION: 99 %

## 2020-10-13 DIAGNOSIS — G93.0 BRAIN CYST: ICD-10-CM

## 2020-10-13 DIAGNOSIS — G40.909 SEIZURE DISORDER (HCC): Primary | ICD-10-CM

## 2020-10-13 DIAGNOSIS — F10.20 UNCOMPLICATED ALCOHOL DEPENDENCE (HCC): ICD-10-CM

## 2020-10-13 PROCEDURE — G8752 SYS BP LESS 140: HCPCS | Performed by: PSYCHIATRY & NEUROLOGY

## 2020-10-13 PROCEDURE — G8510 SCR DEP NEG, NO PLAN REQD: HCPCS | Performed by: PSYCHIATRY & NEUROLOGY

## 2020-10-13 PROCEDURE — 99214 OFFICE O/P EST MOD 30 MIN: CPT | Performed by: PSYCHIATRY & NEUROLOGY

## 2020-10-13 PROCEDURE — G8754 DIAS BP LESS 90: HCPCS | Performed by: PSYCHIATRY & NEUROLOGY

## 2020-10-13 PROCEDURE — G8427 DOCREV CUR MEDS BY ELIG CLIN: HCPCS | Performed by: PSYCHIATRY & NEUROLOGY

## 2020-10-13 PROCEDURE — G8419 CALC BMI OUT NRM PARAM NOF/U: HCPCS | Performed by: PSYCHIATRY & NEUROLOGY

## 2020-10-13 NOTE — PROGRESS NOTES
Mr. Clay Aaron presents today to follow up seizure disorder. Depression screening done on this patient.

## 2020-10-13 NOTE — PATIENT INSTRUCTIONS
[facility]   Statement to Patients  April 1, 2014      In an effort to ensure the large volume of patient prescription refills is processed in the most efficient and expeditious manner, we are asking our patients to assist us by calling your Pharmacy for all prescription refills, this will include also your  Mail Order Pharmacy. The pharmacy will contact our office electronically to continue the refill process. Please do not wait until the last minute to call your pharmacy. We need at least 48 hours (2days) to fill prescriptions. We also encourage you to call your pharmacy before going to  your prescription to make sure it is ready. With regard to controlled substance prescription refill requests (narcotic refills) that need to be picked up at our office, we ask your cooperation by providing us with at least 72 hours (3days) notice that you will need a refill. We will not refill narcotic prescription refill requests after 4:00pm on any weekday, Monday through Thursday, or after 2:00pm on Fridays, or on the weekends. We encourage everyone to explore another way of getting your prescription refill request processed using [employer], our patient web portal through our electronic medical record system. [employer] is an efficient and effective way to communicate your medication request directly to the office and  downloadable as an ama on your smart phone . [employer] also features a review functionality that allows you to view your medication list as well as leave messages for your physician. Are you ready to get connected? If so please review the attatched instructions or speak to any of our staff to get you set up right away! Thank you so much for your cooperation. Should you have any questions please contact our Practice Administrator.     The Physicians and Staff,  [facility]

## 2020-10-13 NOTE — PROGRESS NOTES
Christiano Zambrano is a 41 y.o. male came back for follow-up. He has not had any further seizures since increase the dose of Epidiolex to 600 mg twice daily. Denies any tolerability issues  Also taking Briviact and topiramate. At the last visit he reported an episode of abruptly passing out when Epidiolex. He had another couple of episodes of seizure-like activity on lower dose but none since he has been on the current dose   He had a  generalized seizure in March and then again in July. He was initially planned to start clobazam but then Epidiolex was approved by insurance and it was started instead. He has been abstaining from alcohol and is on naltrexone for alcohol dependence    He has not been employed for the past several months. 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 His typical seizures in the past have been generalized tonic-clonic type, in the setting of alcohol withdrawal.  However he has had seizures outside of alcohol use as well. 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  A follow-up MRI was recommended but he has not been able to do it yet. EXAM  Exam:  Visit Vitals  BP 96/70   Pulse 96   Resp 16   Ht 5' 9\" (1.753 m)   Wt 81.6 kg (180 lb)   SpO2 99%   BMI 26.58 kg/m²     Gen:Alert, oriented. Speaks very little. CV: RRR  Lungs: non labored breathing  Abd: non distending  Neuro: A&O x 3, no dysarthria or aphasia  CN II-XII: PERRL, EOMI, face symmetric, tongue/palate midline  Motor: strength 5/5 all four ext  Sensory: intact to LT  Gait: normal    LABS/ IMAGING  MRI Results (most recent):  Results from Hospital Encounter encounter on 06/10/19   MRI BRAIN W WO CONT    Narrative EXAM:  MRI BRAIN W WO CONT    INDICATION:    Seizures new or progressive    COMPARISON:  January 23, 2012. CONTRAST: 17 ml Dotarem.     TECHNIQUE:    Multiplanar multisequence acquisition without and with contrast of the brain.    FINDINGS:  Diffuse motion artifact. Diffusion imaging does not show acute ischemic changes . Minimal nonspecific white matter disease. Ventricle size is normal and stable. No extra-axial fluid collection hemorrhage or shift. Temporal lobe no show no focal abnormality and appear symmetrical.  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 No new lesion is  seen. Incidental diffuse mucosal thickening within the a stomach air cells bilaterally      Impression  impression: No acute changes no masses. Stable findings. EEG showed occasional bifrontal sharp/spike discharges  Lab Results   Component Value Date/Time    WBC 10.5 01/05/2020 04:15 PM    HGB 14.5 01/05/2020 04:15 PM    Hematocrit (POC) 44 06/10/2019 04:29 PM    HCT 43.4 01/05/2020 04:15 PM    PLATELET 672 30/13/3194 04:15 PM    MCV 89.9 01/05/2020 04:15 PM     Lab Results   Component Value Date/Time    Sodium 143 01/05/2020 04:15 PM    Potassium 3.5 01/05/2020 04:15 PM    Chloride 110 (H) 01/05/2020 04:15 PM    CO2 25 01/05/2020 04:15 PM    Anion gap 8 01/05/2020 04:15 PM    Glucose 90 01/05/2020 04:15 PM    BUN 18 01/05/2020 04:15 PM    Creatinine 1.02 01/05/2020 04:15 PM    BUN/Creatinine ratio 18 01/05/2020 04:15 PM    GFR est AA >60 01/05/2020 04:15 PM    GFR est non-AA >60 01/05/2020 04:15 PM    Calcium 8.9 01/05/2020 04:15 PM    Bilirubin, total 0.3 12/13/2019 02:59 PM    Alk. phosphatase 75 12/13/2019 02:59 PM    Protein, total 7.1 12/13/2019 02:59 PM    Albumin 3.9 12/13/2019 02:59 PM    Globulin 3.2 12/13/2019 02:59 PM    A-G Ratio 1.2 12/13/2019 02:59 PM    ALT (SGPT) 31 12/13/2019 02:59 PM         ASSESSMENT    ICD-10-CM ICD-9-CM    1. Seizure disorder ([facility] [address]  G40.909 345.90    2. Brain cyst  G93.0 348.0    3.  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 Will repeat MRI next year  Follow-up in 6 months    Tommy Sood MD  This note will not be viewable in [address]

## 2020-10-14 ENCOUNTER — DOCUMENTATION ONLY (OUTPATIENT)
Dept: NEUROLOGY | Facility: CLINIC | Age: 45
End: 2020-10-14

## 2020-10-14 DIAGNOSIS — G40.909 SEIZURE DISORDER (HCC): Primary | ICD-10-CM

## 2020-10-15 ENCOUNTER — DOCUMENTATION ONLY (OUTPATIENT)
Dept: NEUROLOGY | Facility: CLINIC | Age: 45
End: 2020-10-15

## 2020-10-15 LAB
ALBUMIN SERPL-MCNC: 4.6 G/DL (ref 4–5)
ALBUMIN/GLOB SERPL: 2.2 {RATIO} (ref 1.2–2.2)
ALP SERPL-CCNC: 83 IU/L (ref 39–117)
ALT SERPL-CCNC: 28 IU/L (ref 0–44)
AST SERPL-CCNC: 21 IU/L (ref 0–40)
BASOPHILS # BLD AUTO: 0.1 X10E3/UL (ref 0–0.2)
BASOPHILS NFR BLD AUTO: 1 %
BILIRUB SERPL-MCNC: 0.2 MG/DL (ref 0–1.2)
BUN SERPL-MCNC: 20 MG/DL (ref 6–24)
BUN/CREAT SERPL: 21 (ref 9–20)
CALCIUM SERPL-MCNC: 9.3 MG/DL (ref 8.7–10.2)
CHLORIDE SERPL-SCNC: 109 MMOL/L (ref 96–106)
CHOLEST SERPL-MCNC: 238 MG/DL (ref 100–199)
CO2 SERPL-SCNC: 21 MMOL/L (ref 20–29)
CREAT SERPL-MCNC: 0.96 MG/DL (ref 0.76–1.27)
EOSINOPHIL # BLD AUTO: 0.4 X10E3/UL (ref 0–0.4)
EOSINOPHIL NFR BLD AUTO: 5 %
ERYTHROCYTE [DISTWIDTH] IN BLOOD BY AUTOMATED COUNT: 13.3 % (ref 11.6–15.4)
GLOBULIN SER CALC-MCNC: 2.1 G/DL (ref 1.5–4.5)
GLUCOSE SERPL-MCNC: 86 MG/DL (ref 65–99)
HCT VFR BLD AUTO: 41.3 % (ref 37.5–51)
HDLC SERPL-MCNC: 53 MG/DL
HGB BLD-MCNC: 13.9 G/DL (ref 13–17.7)
IMM GRANULOCYTES # BLD AUTO: 0 X10E3/UL (ref 0–0.1)
IMM GRANULOCYTES NFR BLD AUTO: 0 %
LDLC SERPL CALC-MCNC: 133 MG/DL (ref 0–99)
LYMPHOCYTES # BLD AUTO: 1.6 X10E3/UL (ref 0.7–3.1)
LYMPHOCYTES NFR BLD AUTO: 20 %
MCH RBC QN AUTO: 29.4 PG (ref 26.6–33)
MCHC RBC AUTO-ENTMCNC: 33.7 G/DL (ref 31.5–35.7)
MCV RBC AUTO: 87 FL (ref 79–97)
MONOCYTES # BLD AUTO: 0.5 X10E3/UL (ref 0.1–0.9)
MONOCYTES NFR BLD AUTO: 7 %
NEUTROPHILS # BLD AUTO: 5.3 X10E3/UL (ref 1.4–7)
NEUTROPHILS NFR BLD AUTO: 67 %
PLATELET # BLD AUTO: 269 X10E3/UL (ref 150–450)
POTASSIUM SERPL-SCNC: 4.4 MMOL/L (ref 3.5–5.2)
PROT SERPL-MCNC: 6.7 G/DL (ref 6–8.5)
RBC # BLD AUTO: 4.73 X10E6/UL (ref 4.14–5.8)
SODIUM SERPL-SCNC: 140 MMOL/L (ref 134–144)
TOPIRAMATE SERPL-MCNC: 10.3 UG/ML (ref 2–25)
TRIGL SERPL-MCNC: 291 MG/DL (ref 0–149)
TSH SERPL DL<=0.005 MIU/L-ACNC: 2.23 UIU/ML (ref 0.45–4.5)
VLDLC SERPL CALC-MCNC: 52 MG/DL (ref 5–40)
WBC # BLD AUTO: 7.9 X10E3/UL (ref 3.4–10.8)

## 2020-10-15 NOTE — PROGRESS NOTES
I have faxed referral and notes to [facility] and received confirmation.

## 2020-10-26 ENCOUNTER — APPOINTMENT (OUTPATIENT)
Dept: NEUROLOGY | Age: 45
DRG: 101 | End: 2020-10-26
Payer: MEDICARE

## 2020-10-26 ENCOUNTER — HOSPITAL ENCOUNTER (INPATIENT)
Age: 45
LOS: 3 days | Discharge: HOME | DRG: 101 | End: 2020-10-29
Attending: PSYCHIATRY & NEUROLOGY | Admitting: PSYCHIATRY & NEUROLOGY
Payer: MEDICARE

## 2020-10-26 DIAGNOSIS — F84.0 AUTISM: ICD-10-CM

## 2020-10-26 DIAGNOSIS — R93.0 ABNORMAL MRI OF HEAD: ICD-10-CM

## 2020-10-26 DIAGNOSIS — G40.919 INTRACTABLE SEIZURES (HCC): ICD-10-CM

## 2020-10-26 LAB
ALBUMIN SERPL-MCNC: 4 G/DL (ref 3.5–5)
ALBUMIN/GLOB SERPL: 1.4 {RATIO} (ref 1.1–2.2)
ALP SERPL-CCNC: 77 U/L (ref 45–117)
ALT SERPL-CCNC: 35 U/L (ref 12–78)
ANION GAP SERPL CALC-SCNC: 6 MMOL/L (ref 5–15)
AST SERPL-CCNC: 14 U/L (ref 15–37)
BASOPHILS # BLD: 0.1 K/UL (ref 0–0.1)
BASOPHILS NFR BLD: 1 % (ref 0–1)
BILIRUB SERPL-MCNC: 0.5 MG/DL (ref 0.2–1)
BUN SERPL-MCNC: 15 MG/DL (ref 6–20)
BUN/CREAT SERPL: 16 (ref 12–20)
CALCIUM SERPL-MCNC: 8.7 MG/DL (ref 8.5–10.1)
CHLORIDE SERPL-SCNC: 113 MMOL/L (ref 97–108)
CO2 SERPL-SCNC: 23 MMOL/L (ref 21–32)
CREAT SERPL-MCNC: 0.92 MG/DL (ref 0.7–1.3)
DIFFERENTIAL METHOD BLD: ABNORMAL
EOSINOPHIL # BLD: 0.1 K/UL (ref 0–0.4)
EOSINOPHIL NFR BLD: 1 % (ref 0–7)
ERYTHROCYTE [DISTWIDTH] IN BLOOD BY AUTOMATED COUNT: 13.2 % (ref 11.5–14.5)
GLOBULIN SER CALC-MCNC: 2.9 G/DL (ref 2–4)
GLUCOSE SERPL-MCNC: 90 MG/DL (ref 65–100)
HCT VFR BLD AUTO: 38.8 % (ref 36.6–50.3)
HGB BLD-MCNC: 13.1 G/DL (ref 12.1–17)
IMM GRANULOCYTES # BLD AUTO: 0 K/UL (ref 0–0.04)
IMM GRANULOCYTES NFR BLD AUTO: 0 % (ref 0–0.5)
LYMPHOCYTES # BLD: 1 K/UL (ref 0.8–3.5)
LYMPHOCYTES NFR BLD: 14 % (ref 12–49)
MCH RBC QN AUTO: 29.8 PG (ref 26–34)
MCHC RBC AUTO-ENTMCNC: 33.8 G/DL (ref 30–36.5)
MCV RBC AUTO: 88.2 FL (ref 80–99)
MONOCYTES # BLD: 0.5 K/UL (ref 0–1)
MONOCYTES NFR BLD: 6 % (ref 5–13)
NEUTS SEG # BLD: 6 K/UL (ref 1.8–8)
NEUTS SEG NFR BLD: 78 % (ref 32–75)
NRBC # BLD: 0 K/UL (ref 0–0.01)
NRBC BLD-RTO: 0 PER 100 WBC
PLATELET # BLD AUTO: 247 K/UL (ref 150–400)
PMV BLD AUTO: 9 FL (ref 8.9–12.9)
POTASSIUM SERPL-SCNC: 3.8 MMOL/L (ref 3.5–5.1)
PROT SERPL-MCNC: 6.9 G/DL (ref 6.4–8.2)
RBC # BLD AUTO: 4.4 M/UL (ref 4.1–5.7)
SODIUM SERPL-SCNC: 142 MMOL/L (ref 136–145)
WBC # BLD AUTO: 7.6 K/UL (ref 4.1–11.1)

## 2020-10-26 PROCEDURE — 95720 EEG PHY/QHP EA INCR W/VEEG: CPT | Performed by: PSYCHIATRY & NEUROLOGY

## 2020-10-26 PROCEDURE — 74011250636 HC RX REV CODE- 250/636: Performed by: PSYCHIATRY & NEUROLOGY

## 2020-10-26 PROCEDURE — 95714 VEEG EA 12-26 HR UNMNTR: CPT | Performed by: PSYCHIATRY & NEUROLOGY

## 2020-10-26 PROCEDURE — 36415 COLL VENOUS BLD VENIPUNCTURE: CPT

## 2020-10-26 PROCEDURE — 80053 COMPREHEN METABOLIC PANEL: CPT

## 2020-10-26 PROCEDURE — 80201 ASSAY OF TOPIRAMATE: CPT

## 2020-10-26 PROCEDURE — 74011250637 HC RX REV CODE- 250/637: Performed by: PSYCHIATRY & NEUROLOGY

## 2020-10-26 PROCEDURE — 85025 COMPLETE CBC W/AUTO DIFF WBC: CPT

## 2020-10-26 PROCEDURE — 99223 1ST HOSP IP/OBS HIGH 75: CPT | Performed by: PSYCHIATRY & NEUROLOGY

## 2020-10-26 PROCEDURE — 65660000000 HC RM CCU STEPDOWN

## 2020-10-26 RX ORDER — ACETAMINOPHEN 325 MG/1
650 TABLET ORAL
Status: DISCONTINUED | OUTPATIENT
Start: 2020-10-26 | End: 2020-10-29 | Stop reason: HOSPADM

## 2020-10-26 RX ORDER — ENOXAPARIN SODIUM 100 MG/ML
40 INJECTION SUBCUTANEOUS ONCE
Status: DISCONTINUED | OUTPATIENT
Start: 2020-10-26 | End: 2020-10-26

## 2020-10-26 RX ORDER — SERTRALINE HYDROCHLORIDE 50 MG/1
50 TABLET, FILM COATED ORAL
Status: DISCONTINUED | OUTPATIENT
Start: 2020-10-26 | End: 2020-10-29 | Stop reason: HOSPADM

## 2020-10-26 RX ORDER — ENOXAPARIN SODIUM 100 MG/ML
40 INJECTION SUBCUTANEOUS EVERY 24 HOURS
Status: DISCONTINUED | OUTPATIENT
Start: 2020-10-26 | End: 2020-10-29 | Stop reason: HOSPADM

## 2020-10-26 RX ORDER — NALTREXONE HYDROCHLORIDE 50 MG/1
50 TABLET, FILM COATED ORAL DAILY
Status: DISCONTINUED | OUTPATIENT
Start: 2020-10-26 | End: 2020-10-29 | Stop reason: HOSPADM

## 2020-10-26 RX ORDER — LORAZEPAM 2 MG/ML
2 INJECTION INTRAMUSCULAR
Status: DISCONTINUED | OUTPATIENT
Start: 2020-10-26 | End: 2020-10-29 | Stop reason: HOSPADM

## 2020-10-26 RX ORDER — SODIUM CHLORIDE 0.9 % (FLUSH) 0.9 %
5-40 SYRINGE (ML) INJECTION EVERY 8 HOURS
Status: DISCONTINUED | OUTPATIENT
Start: 2020-10-26 | End: 2020-10-29 | Stop reason: HOSPADM

## 2020-10-26 RX ORDER — SODIUM CHLORIDE 0.9 % (FLUSH) 0.9 %
5-40 SYRINGE (ML) INJECTION AS NEEDED
Status: DISCONTINUED | OUTPATIENT
Start: 2020-10-26 | End: 2020-10-29 | Stop reason: HOSPADM

## 2020-10-26 RX ORDER — THERA TABS 400 MCG
1 TAB ORAL DAILY
Status: DISCONTINUED | OUTPATIENT
Start: 2020-10-26 | End: 2020-10-29 | Stop reason: HOSPADM

## 2020-10-26 RX ORDER — TOPIRAMATE 100 MG/1
200 TABLET, FILM COATED ORAL 2 TIMES DAILY
Status: DISCONTINUED | OUTPATIENT
Start: 2020-10-26 | End: 2020-10-29 | Stop reason: HOSPADM

## 2020-10-26 RX ADMIN — TOPIRAMATE 200 MG: 100 TABLET, FILM COATED ORAL at 13:00

## 2020-10-26 RX ADMIN — SERTRALINE HYDROCHLORIDE 50 MG: 50 TABLET ORAL at 22:18

## 2020-10-26 RX ADMIN — THERA TABS 1 TABLET: TAB at 13:00

## 2020-10-26 RX ADMIN — Medication 10 ML: at 16:03

## 2020-10-26 RX ADMIN — NALTREXONE HYDROCHLORIDE 50 MG: 50 TABLET, FILM COATED ORAL at 13:00

## 2020-10-26 RX ADMIN — BRIVARACETAM 100 MG: 50 TABLET, FILM COATED ORAL at 13:00

## 2020-10-26 RX ADMIN — Medication 10 ML: at 22:18

## 2020-10-26 RX ADMIN — Medication 10 ML: at 10:00

## 2020-10-26 RX ADMIN — ENOXAPARIN SODIUM 40 MG: 40 INJECTION SUBCUTANEOUS at 13:01

## 2020-10-26 RX ADMIN — BRIVARACETAM 100 MG: 50 TABLET, FILM COATED ORAL at 18:43

## 2020-10-26 RX ADMIN — TOPIRAMATE 200 MG: 100 TABLET, FILM COATED ORAL at 18:43

## 2020-10-26 NOTE — PROGRESS NOTES
Bedside and Verbal shift change report given to ERIC Gallagher (oncoming nurse) by Zak Frank (offgoing nurse). Report included the following information SBAR, Kardex, Intake/Output, MAR, Recent Results, Cardiac Rhythm NSR and Dual Neuro Assessment.

## 2020-10-26 NOTE — PROGRESS NOTES
Problem: Falls - Risk of  Goal: *Absence of Falls  Description: Document Emiliechandler Mejia Fall Risk and appropriate interventions in the flowsheet. Outcome: Progressing Towards Goal  Note: Fall Risk Interventions:            Medication Interventions: Evaluate medications/consider consulting pharmacy, Teach patient to arise slowly, Patient to call before getting OOB         History of Falls Interventions: Evaluate medications/consider consulting pharmacy, Door open when patient unattended         Problem: Pain  Goal: *Control of Pain  Outcome: Progressing Towards Goal     Problem: Seizure Disorder (Adult)  Goal: *STG: Remains safe in hospital  Outcome: Progressing Towards Goal     Problem: Pressure Injury - Risk of  Goal: *Prevention of pressure injury  Description: Document Elpidio Scale and appropriate interventions in the flowsheet.   Outcome: Progressing Towards Goal  Note: Pressure Injury Interventions:

## 2020-10-27 LAB — TOPIRAMATE SERPL-MCNC: 8.6 UG/ML (ref 2–25)

## 2020-10-27 PROCEDURE — 95720 EEG PHY/QHP EA INCR W/VEEG: CPT | Performed by: PSYCHIATRY & NEUROLOGY

## 2020-10-27 PROCEDURE — 95714 VEEG EA 12-26 HR UNMNTR: CPT | Performed by: PSYCHIATRY & NEUROLOGY

## 2020-10-27 PROCEDURE — 65660000000 HC RM CCU STEPDOWN

## 2020-10-27 PROCEDURE — 74011250636 HC RX REV CODE- 250/636: Performed by: PSYCHIATRY & NEUROLOGY

## 2020-10-27 PROCEDURE — 74011250637 HC RX REV CODE- 250/637: Performed by: PSYCHIATRY & NEUROLOGY

## 2020-10-27 PROCEDURE — 94760 N-INVAS EAR/PLS OXIMETRY 1: CPT

## 2020-10-27 PROCEDURE — 99233 SBSQ HOSP IP/OBS HIGH 50: CPT | Performed by: PSYCHIATRY & NEUROLOGY

## 2020-10-27 RX ADMIN — ENOXAPARIN SODIUM 40 MG: 40 INJECTION SUBCUTANEOUS at 13:44

## 2020-10-27 RX ADMIN — Medication 10 ML: at 22:15

## 2020-10-27 RX ADMIN — NALTREXONE HYDROCHLORIDE 50 MG: 50 TABLET, FILM COATED ORAL at 08:34

## 2020-10-27 RX ADMIN — BRIVARACETAM 100 MG: 50 TABLET, FILM COATED ORAL at 08:34

## 2020-10-27 RX ADMIN — Medication 10 ML: at 05:43

## 2020-10-27 RX ADMIN — SERTRALINE HYDROCHLORIDE 50 MG: 50 TABLET ORAL at 22:14

## 2020-10-27 RX ADMIN — TOPIRAMATE 200 MG: 100 TABLET, FILM COATED ORAL at 08:34

## 2020-10-27 RX ADMIN — TOPIRAMATE 200 MG: 100 TABLET, FILM COATED ORAL at 17:42

## 2020-10-27 RX ADMIN — LORAZEPAM 2 MG: 2 INJECTION INTRAMUSCULAR; INTRAVENOUS at 05:58

## 2020-10-27 RX ADMIN — Medication 10 ML: at 13:44

## 2020-10-27 RX ADMIN — THERA TABS 1 TABLET: TAB at 08:34

## 2020-10-27 RX ADMIN — BRIVARACETAM 100 MG: 50 TABLET, FILM COATED ORAL at 17:42

## 2020-10-27 NOTE — PROGRESS NOTES
Problem: Falls - Risk of  Goal: *Absence of Falls  Description: Document Elias Jamil Fall Risk and appropriate interventions in the flowsheet. Outcome: Progressing Towards Goal  Note: Fall Risk Interventions:            Medication Interventions: Patient to call before getting OOB, Teach patient to arise slowly         History of Falls Interventions: Door open when patient unattended, Evaluate medications/consider consulting pharmacy, Investigate reason for fall         Problem: Patient Education: Go to Patient Education Activity  Goal: Patient/Family Education  Outcome: Progressing Towards Goal     Problem: Pain  Goal: *Control of Pain  Outcome: Progressing Towards Goal  Goal: *PALLIATIVE CARE:  Alleviation of Pain  Outcome: Progressing Towards Goal     Problem: Patient Education: Go to Patient Education Activity  Goal: Patient/Family Education  Outcome: Progressing Towards Goal     Problem: Seizure Disorder (Adult)  Goal: *STG: Remains free of seizure activity  Outcome: Progressing Towards Goal  Goal: *STG: Maintains lab values within therapeutic range  Outcome: Progressing Towards Goal  Goal: *STG/LTG: Complies with medication therapy  Outcome: Progressing Towards Goal  Goal: *STG: Remains free of injury during seizure activity  Outcome: Progressing Towards Goal  Goal: *STG: Remains safe in hospital  Outcome: Progressing Towards Goal  Goal: Interventions  Outcome: Progressing Towards Goal     Problem: Patient Education: Go to Patient Education Activity  Goal: Patient/Family Education  Outcome: Progressing Towards Goal     Problem: Pressure Injury - Risk of  Goal: *Prevention of pressure injury  Description: Document Elpidio Scale and appropriate interventions in the flowsheet.   Outcome: Progressing Towards Goal  Note: Pressure Injury Interventions:

## 2020-10-27 NOTE — PROGRESS NOTES
Problem: Falls - Risk of  Goal: *Absence of Falls  Description: Document Krystyna Villedaantoineervin Fall Risk and appropriate interventions in the flowsheet. Outcome: Progressing Towards Goal  Note: Fall Risk Interventions:            Medication Interventions: Patient to call before getting OOB, Teach patient to arise slowly         History of Falls Interventions: Door open when patient unattended         Problem: Pain  Goal: *Control of Pain  Outcome: Progressing Towards Goal     Problem: Seizure Disorder (Adult)  Goal: *STG: Remains free of seizure activity  Outcome: Progressing Towards Goal  Goal: *STG: Maintains lab values within therapeutic range  Outcome: Progressing Towards Goal  Goal: *STG/LTG: Complies with medication therapy  Outcome: Progressing Towards Goal  Goal: *STG: Remains free of injury during seizure activity  Outcome: Progressing Towards Goal  Goal: *STG: Remains safe in hospital  Outcome: Progressing Towards Goal  Goal: Interventions  Outcome: Progressing Towards Goal     Problem: Pressure Injury - Risk of  Goal: *Prevention of pressure injury  Description: Document Elpidio Scale and appropriate interventions in the flowsheet.   Outcome: Progressing Towards Goal  Note: Pressure Injury Interventions:                      Nutrition Interventions: Document food/fluid/supplement intake

## 2020-10-27 NOTE — PROGRESS NOTES
Bedside shift change report given to Martinez Emerson (oncoming nurse) by Tai Denny (offgoing nurse). Report included the following information SBAR, Kardex, MAR, Recent Results, Cardiac Rhythm NSR, Quality Measures and Dual Neuro Assessment.

## 2020-10-27 NOTE — H&P
[facility]  HISTORY AND PHYSICAL    Name:  Juan Garvey  MR#:  [id number]  :  1975  ACCOUNT #:  [de-identified]  ADMIT DATE:  10/26/2020  ADMISSION H&P for the EMU    HISTORY OF PRESENT ILLNESS:  This is a 67-year-old left-handed male, who is referred to the epilepsy monitoring unit by Dr. Fabiola Madrid due to medically refractory epilepsy. The patient has autism spectrum disorder and is a limited historian. His mother aids in the history and additional history obtained from chart notes. The patient's mom reports an onset of seizures in . The patient first presented to [facility], saw Dr. Cole Brooks in 2012. He was already on Keppra, which had been started at an outside hospital by Dr. Dave Gonzalez. 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  He had repeat imaging in 2019 with stable findings. EEG in 2019 revealed possible generalized spike-and-wave discharges. The patient's Keppra had been increased up to 1500 mg b.i.d. due to ongoing seizures when Dr. Fabiola Madrdi saw him in . Currently on Topamax 200 mg b.i.d., Briviact 100 mg b.i.d., and Epidiolex 600 mg b.i.d, the most recent addition, with possible improvement in the seizure frequency. The patient's mother reports that his last seizure was actually this morning prior to arrival.  He was in the kitchen standing beside the table when he suddenly froze and had a look on his face that she can tell is a seizure. He started to fall to the ground. She grabbed him and fell with him, so that he would not hurt himself. He had heavy breathing, full body shaking. His eyes were closed. His shaking went on for 5+ minutes, then he began grabbing tightly at his mother.   Afterwards, he stayed on the floor for 10 or 15 minutes when he came to, he was groggy and confused. This is a typical event, sometimes however he will turn blue with them, he may bite his tongue, and has only had bladder incontinence on one occasion. She reports a second type of event in which suddenly he appears to faint and does not have any shaking seen. His seizures are occurring 1-2 times a month that she is aware of, indicating she believes he may had more that they were not aware of as he was living by himself up until July. She notes he had poor sleep last night and that could have been a trigger for the seizure today. The patient has a history of drinking alcohol and it was thought that he may be having alcohol withdrawal seizures at one point, but since July when he started staying with his parents after a finger surgery, he has not had any alcohol to drink. There is no family history of epilepsy. No history of head injury. No history of CNS infection. She had a normal pregnancy. However, he was delivered prematurely and had nuchal cord. He was noted to have delay in speech. PAST MEDICAL HISTORY:  1. Autism spectrum disorder. 2.  Pyloric stenosis status post surgery at 3weeks of age. 3.  Status post adenoidectomy. 4.  Finger surgery in 07/2020.  5.  Depression. REVIEW OF SYSTEMS:  Limited due to patient factors. MEDICATIONS AT HOME:  1.  Zoloft 50 mg at bedtime. 2.  Epidiolex 600 mg twice a day. 3.  Briviact 100 mg twice a day. 4.  Topamax 200 mg twice a day. 5.  Multivitamin. 6.  Naltrexone 50 mg a day. ALLERGIES:  ALLERGY TO ASPARTAME. SOCIAL HISTORY:  He was living in a home alone in [place], but started staying with his parents in July after his finger surgery. He has driven in the past, but not in the recent past.  No alcohol use since July. No tobacco or drug use. FAMILY HISTORY:  Father with diabetes and heart disease. Mom with a history of cancer.     PHYSICAL EXAMINATION:  VITAL SIGNS:  Blood pressure 118/72, pulse 70, respiratory rate 16, saturating 99% on room air, temperature is 98. His BMI is 27.6. GENERAL:  He is well-nourished, well-developed, healthy-appearing male, lying in bed, in no distress. HEART:  Has regular rate and rhythm without murmurs. Carotids are 2+. No bruits. EXTREMITIES:  Warm. No edema. 2+ radial pulses. NEUROLOGIC EXAMINATION:  Mental status; he is alert. He is oriented to person. The patient does not answer most of my questions, can answer some appropriately. He is able to follow commands. No dysarthria. Cranial nerve exam; no facial asymmetry or ptosis. Extraocular eye movements are intact. Pupils round and reactive. Tongue midline. Palate elevated symmetrically. Strength and hearing intact. Motor exam; 5/5 throughout. No pronator drift. No tremor. Sensory exam is intact to light touch and pinprick throughout, though exam was difficult due to patient factors. Reflexes symmetric. Toes downgoing. Coordination is intact finger-to-nose bilaterally. Gait is not assessed at this time. LABORATORY DATA:  Studies and reports reviewed above in the HPI. ASSESSMENT AND PLAN:  This is a 41-year-old left-handed male with onset of seizures in 2011 with ongoing seizures 1-2 times a month despite 3 antiseizure drugs and previous trial of high-dose Keppra, with history of alcohol use and possible alcohol withdrawal seizures, now off alcohol since July with ongoing seizures. Seizures are described as motor arrest, loss of consciousness with heavy breathing, full body shaking, eyes closed, lasting for over 5 minutes, last seizure was today with postictal confusion afterwards; has bitten his tongue, only had bladder incontinence on one occasion; has a second type of spell of loss of consciousness without shaking. The patient is admitted to the EMU for continuous video EEG monitoring in order to determine if these are true epileptic events or nonepileptic spells.   Will hold his Epidiolex, continue Topamax and Briviact for now. Will sleep deprive the patient tonight. Check baseline CBC with differential, CMP, Topamax level and urine drug screen.       MD SPENCER [id number]  D:  10/27/2020 8:19  T:  10/27/2020 9:56  JOB #:  [id number]

## 2020-10-27 NOTE — PROGRESS NOTES
Bedside and Verbal shift change report given to Liza Gaston (oncoming nurse) by Kerri Mazariegos (offgoing nurse). Report included the following information SBAR, Kardex, Intake/Output, MAR, Recent Results, Cardiac Rhythm NSR and Dual Neuro Assessment.

## 2020-10-28 PROCEDURE — 99232 SBSQ HOSP IP/OBS MODERATE 35: CPT | Performed by: PSYCHIATRY & NEUROLOGY

## 2020-10-28 PROCEDURE — 74011250636 HC RX REV CODE- 250/636: Performed by: PSYCHIATRY & NEUROLOGY

## 2020-10-28 PROCEDURE — 95720 EEG PHY/QHP EA INCR W/VEEG: CPT | Performed by: PSYCHIATRY & NEUROLOGY

## 2020-10-28 PROCEDURE — 94760 N-INVAS EAR/PLS OXIMETRY 1: CPT

## 2020-10-28 PROCEDURE — 95714 VEEG EA 12-26 HR UNMNTR: CPT | Performed by: PSYCHIATRY & NEUROLOGY

## 2020-10-28 PROCEDURE — 65660000000 HC RM CCU STEPDOWN

## 2020-10-28 PROCEDURE — 74011250637 HC RX REV CODE- 250/637: Performed by: PSYCHIATRY & NEUROLOGY

## 2020-10-28 RX ADMIN — SERTRALINE HYDROCHLORIDE 50 MG: 50 TABLET ORAL at 21:53

## 2020-10-28 RX ADMIN — THERA TABS 1 TABLET: TAB at 10:36

## 2020-10-28 RX ADMIN — TOPIRAMATE 200 MG: 100 TABLET, FILM COATED ORAL at 10:35

## 2020-10-28 RX ADMIN — Medication 10 ML: at 22:00

## 2020-10-28 RX ADMIN — Medication 10 ML: at 14:52

## 2020-10-28 RX ADMIN — BRIVARACETAM 100 MG: 50 TABLET, FILM COATED ORAL at 10:35

## 2020-10-28 RX ADMIN — BRIVARACETAM 100 MG: 50 TABLET, FILM COATED ORAL at 19:08

## 2020-10-28 RX ADMIN — NALTREXONE HYDROCHLORIDE 50 MG: 50 TABLET, FILM COATED ORAL at 10:36

## 2020-10-28 RX ADMIN — ENOXAPARIN SODIUM 40 MG: 40 INJECTION SUBCUTANEOUS at 14:50

## 2020-10-28 RX ADMIN — TOPIRAMATE 200 MG: 100 TABLET, FILM COATED ORAL at 19:08

## 2020-10-28 RX ADMIN — Medication 10 ML: at 06:02

## 2020-10-28 NOTE — PROGRESS NOTES
Bedside shift change report given to Kady Fallon (oncoming nurse) by Ann Marie Ho (offgoing nurse). Report included the following information SBAR, Kardex, Quality Measures and Dual Neuro Assessment.

## 2020-10-28 NOTE — PROGRESS NOTES
Neurology Progress Note     NAME: Rosie Nguyen   :  1975   MRN:  [id number]   DATE:  10/27/2020    Assessment:     Active Problems:    Intractable seizures (Kavon Stephenson) (10/26/2020)      Pt is a 40yo LH male with onset of seizures in  with ongoing seizures 1-2 times a month despite 3 ASDs and previous trial of high-dose Keppra, with history of alcohol use and possible alcohol withdrawal seizures, now off alcohol since July with ongoing seizures. Seizures are described as motor arrest, LOC with heavy breathing, GTC activity, eyes closed, lasting for over 5 minutes, with post-ictal confusion after, may have tongue trauma, only bladder incontinence once. His mother describes a second type of spell of LOC without shaking. Seizure during sleep appeared to have focal onset based on semiology, but not able to localize on EEG. For full details see separate EEG report. He has frequent interictal epileptiform discharges, some that appear generalized, other focal, independent, bilateral and multifocal. Waking background is in theta range. CBC with differential, CMP - unremarkable. Topamax level was 8.6  Plan:   -Continuous video EEG in order to capture additional seizures to better localize onset and to capture all types of spells seen at home  -Continue to hold Epidiolex  -Continue Topamax 200mg bid  -Continue Briviact 100mg bid  -Pt is asked to stay awake if possible during the day today while his mother is visiting.   -UDS was not done, apparently felt to be a suggestion rather than an order. Subjective:    Pt was sleep deprived last night and had a seizure out of sleep early this morning. Pt unable to provide history, was unaware that he had a seizure. Mom is at bedside and seizure discussed. She had several questions about his MRI findings and seizures. Objective:   Chart reviewed since last seen    Current Facility-Administered Medications   Medication Dose Route Frequency    sodium chloride (NS) flush 5-40 mL  5-40 mL IntraVENous Q8H    sodium chloride (NS) flush 5-40 mL  5-40 mL IntraVENous PRN    LORazepam (ATIVAN) injection 2 mg  2 mg IntraVENous Q5MIN PRN    LORazepam (ATIVAN) injection 2 mg  2 mg IntraMUSCular Q5MIN PRN    acetaminophen (TYLENOL) tablet 650 mg  650 mg Oral Q4H PRN    enoxaparin (LOVENOX) injection 40 mg  40 mg SubCUTAneous Q24H    brivaracetam (BRIVIACT) tablet 100 mg  100 mg Oral BID    therapeutic multivitamin (THERAGRAN) tablet 1 Tab  1 Tab Oral DAILY    naltrexone (DEPADE) tablet 50 mg  50 mg Oral DAILY    sertraline (ZOLOFT) tablet 50 mg  50 mg Oral QHS    topiramate (TOPAMAX) tablet 200 mg  200 mg Oral BID       Visit Vitals  /74 (BP 1 Location: Left arm, BP Patient Position: At rest)   Pulse 71   Temp 98.3 °F (36.8 °C)   Resp 15   Wt 84.8 kg (186 lb 15.2 oz)   SpO2 97%   BMI 27.61 kg/m²     Temp (24hrs), Av.2 °F (36.8 °C), Min:97.9 °F (36.6 °C), Max:98.6 °F (37 °C)      No intake/output data recorded. No intake/output data recorded. Physical Exam:  General: Well developed well nourished patient in no apparent distress. Cardiac: Regular rate and rhythm    Neurological Exam:  Mental Status: Oriented to person. Speech - limited spontaneous speech, no dysarthria. Fund of knowledge and memory impaired. Answers few simple questions. Cranial Nerves:   EOMI, no nystagmus, no ptosis. Facial movement is symmetric. Hearing is intact. Motor:     Reflexes:      Sensory:      Gait:     Cerebellar:           Lab Review No results found for this or any previous visit (from the past 24 hour(s)). Additional comments:  I have reviewed the patient's new clinical lab test results. I have personally reviewed the patient's radiographs.   MRI  MRI Results (most recent):  Results from [facility] Encounter encounter on 06/10/19   MRI BRAIN W WO CONT    Narrative EXAM:  MRI BRAIN W WO CONT    INDICATION:    Seizures new or progressive    COMPARISON:  January 23, 2012. CONTRAST: 17 ml Dotarem. TECHNIQUE:    Multiplanar multisequence acquisition without and with contrast of the brain. FINDINGS:  Diffuse motion artifact. Diffusion imaging does not show acute ischemic changes . Minimal nonspecific white matter disease. Ventricle size is normal and stable. No extra-axial fluid collection hemorrhage or shift. Temporal lobe no show no focal abnormality and appear symmetrical.  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 No new lesion is  seen. Incidental diffuse mucosal thickening within the a stomach air cells bilaterally      Impression  impression: No acute changes no masses. Stable findings. Care Plan discussed with:  Patient x   Family x   RN    Care Manager    Consultant/Specialist:       Signed: Sanju Azar MD

## 2020-10-28 NOTE — PROGRESS NOTES
Problem: Falls - Risk of  Goal: *Absence of Falls  Description: Document Juni Taylor Fall Risk and appropriate interventions in the flowsheet. Outcome: Progressing Towards Goal  Note: Fall Risk Interventions:            Medication Interventions: Evaluate medications/consider consulting pharmacy, Patient to call before getting OOB, Teach patient to arise slowly         History of Falls Interventions: Consult care management for discharge planning, Door open when patient unattended, Evaluate medications/consider consulting pharmacy, Investigate reason for fall         Problem: Pain  Goal: *Control of Pain  Outcome: Progressing Towards Goal     Problem: Seizure Disorder (Adult)  Goal: *STG/LTG: Complies with medication therapy  Outcome: Progressing Towards Goal  Goal: *STG: Remains free of injury during seizure activity  Outcome: Progressing Towards Goal  Goal: *STG: Remains safe in hospital  Outcome: Progressing Towards Goal  Goal: Interventions  Outcome: Progressing Towards Goal     Problem: Pressure Injury - Risk of  Goal: *Prevention of pressure injury  Description: Document Elpidio Scale and appropriate interventions in the flowsheet.   Outcome: Progressing Towards Goal  Note: Pressure Injury Interventions:                      Nutrition Interventions: Document food/fluid/supplement intake                     Problem: Seizure Disorder (Adult)  Goal: *STG/LTG: Complies with medication therapy  Outcome: Progressing Towards Goal  Goal: *STG: Remains free of injury during seizure activity  Outcome: Progressing Towards Goal  Goal: *STG: Remains safe in hospital  Outcome: Progressing Towards Goal  Goal: Interventions  Outcome: Progressing Towards Goal

## 2020-10-28 NOTE — PROGRESS NOTES
Problem: Falls - Risk of  Goal: *Absence of Falls  Description: Document Spencer Avery Fall Risk and appropriate interventions in the flowsheet. Outcome: Progressing Towards Goal  Note: Fall Risk Interventions:            Medication Interventions: Evaluate medications/consider consulting pharmacy, Patient to call before getting OOB, Teach patient to arise slowly         History of Falls Interventions: Bed/chair exit alarm, Door open when patient unattended         Problem: Patient Education: Go to Patient Education Activity  Goal: Patient/Family Education  Outcome: Progressing Towards Goal     Problem: Pain  Goal: *Control of Pain  Outcome: Progressing Towards Goal     Problem: Patient Education: Go to Patient Education Activity  Goal: Patient/Family Education  Outcome: Progressing Towards Goal     Problem: Seizure Disorder (Adult)  Goal: *STG: Remains free of seizure activity  Outcome: Progressing Towards Goal  Goal: *STG: Remains free of injury during seizure activity  Outcome: Progressing Towards Goal  Goal: *STG: Remains safe in hospital  Outcome: Progressing Towards Goal     Problem: Pressure Injury - Risk of  Goal: *Prevention of pressure injury  Description: Document Elpidio Scale and appropriate interventions in the flowsheet.   Outcome: Progressing Towards Goal  Note: Pressure Injury Interventions:                      Nutrition Interventions: Document food/fluid/supplement intake

## 2020-10-29 VITALS
SYSTOLIC BLOOD PRESSURE: 95 MMHG | HEART RATE: 75 BPM | BODY MASS INDEX: 26.7 KG/M2 | WEIGHT: 180.78 LBS | TEMPERATURE: 97.9 F | RESPIRATION RATE: 14 BRPM | OXYGEN SATURATION: 97 % | DIASTOLIC BLOOD PRESSURE: 50 MMHG

## 2020-10-29 PROCEDURE — 90471 IMMUNIZATION ADMIN: CPT

## 2020-10-29 PROCEDURE — 90686 IIV4 VACC NO PRSV 0.5 ML IM: CPT | Performed by: PSYCHIATRY & NEUROLOGY

## 2020-10-29 PROCEDURE — 74011250637 HC RX REV CODE- 250/637: Performed by: PSYCHIATRY & NEUROLOGY

## 2020-10-29 PROCEDURE — 95714 VEEG EA 12-26 HR UNMNTR: CPT | Performed by: PSYCHIATRY & NEUROLOGY

## 2020-10-29 PROCEDURE — 95718 EEG PHYS/QHP 2-12 HR W/VEEG: CPT | Performed by: PSYCHIATRY & NEUROLOGY

## 2020-10-29 PROCEDURE — 99239 HOSP IP/OBS DSCHRG MGMT >30: CPT | Performed by: PSYCHIATRY & NEUROLOGY

## 2020-10-29 PROCEDURE — 74011250636 HC RX REV CODE- 250/636: Performed by: PSYCHIATRY & NEUROLOGY

## 2020-10-29 RX ADMIN — INFLUENZA VIRUS VACCINE 0.5 ML: 15; 15; 15; 15 SUSPENSION INTRAMUSCULAR at 14:19

## 2020-10-29 RX ADMIN — ENOXAPARIN SODIUM 40 MG: 40 INJECTION SUBCUTANEOUS at 14:18

## 2020-10-29 RX ADMIN — Medication 10 ML: at 05:53

## 2020-10-29 RX ADMIN — THERA TABS 1 TABLET: TAB at 08:44

## 2020-10-29 RX ADMIN — TOPIRAMATE 200 MG: 100 TABLET, FILM COATED ORAL at 08:44

## 2020-10-29 RX ADMIN — NALTREXONE HYDROCHLORIDE 50 MG: 50 TABLET, FILM COATED ORAL at 08:44

## 2020-10-29 RX ADMIN — Medication 10 ML: at 14:20

## 2020-10-29 RX ADMIN — BRIVARACETAM 100 MG: 50 TABLET, FILM COATED ORAL at 08:44

## 2020-10-29 NOTE — PROGRESS NOTES
Problem: Falls - Risk of  Goal: *Absence of Falls  Description: Document Richard Merlin Fall Risk and appropriate interventions in the flowsheet. Outcome: Progressing Towards Goal  Note: Fall Risk Interventions:            Medication Interventions: Patient to call before getting OOB, Teach patient to arise slowly         History of Falls Interventions: Bed/chair exit alarm, Investigate reason for fall, Room close to nurse's station         Problem: Pain  Goal: *Control of Pain  Outcome: Progressing Towards Goal     Problem: Seizure Disorder (Adult)  Goal: *STG: Remains free of seizure activity  Outcome: Progressing Towards Goal  Goal: *STG: Maintains lab values within therapeutic range  Outcome: Progressing Towards Goal  Goal: *STG/LTG: Complies with medication therapy  Outcome: Progressing Towards Goal  Goal: *STG: Remains free of injury during seizure activity  Outcome: Progressing Towards Goal  Goal: *STG: Remains safe in hospital  Outcome: Progressing Towards Goal  Goal: Interventions  Outcome: Progressing Towards Goal     Problem: Pressure Injury - Risk of  Goal: *Prevention of pressure injury  Description: Document Elpidio Scale and appropriate interventions in the flowsheet.   Outcome: Progressing Towards Goal  Note: Pressure Injury Interventions:                      Nutrition Interventions: Document food/fluid/supplement intake

## 2020-10-29 NOTE — PROGRESS NOTES
Problem: Falls - Risk of  Goal: *Absence of Falls  Description: Document Charisse Sanchez Fall Risk and appropriate interventions in the flowsheet. Outcome: Progressing Towards Goal  Note: Fall Risk Interventions:            Medication Interventions: Evaluate medications/consider consulting pharmacy         History of Falls Interventions: Consult care management for discharge planning         Problem: Pain  Goal: *Control of Pain  Outcome: Progressing Towards Goal  Goal: *PALLIATIVE CARE:  Alleviation of Pain  Outcome: Progressing Towards Goal     Problem: Patient Education: Go to Patient Education Activity  Goal: Patient/Family Education  Outcome: Progressing Towards Goal     Problem: Seizure Disorder (Adult)  Goal: *STG: Remains free of seizure activity  Outcome: Progressing Towards Goal  Goal: *STG: Maintains lab values within therapeutic range  Outcome: Progressing Towards Goal  Goal: *STG/LTG: Complies with medication therapy  Outcome: Progressing Towards Goal  Goal: *STG: Remains free of injury during seizure activity  Outcome: Progressing Towards Goal  Goal: *STG: Remains safe in hospital  Outcome: Progressing Towards Goal  Goal: Interventions  Outcome: Progressing Towards Goal     Problem: Patient Education: Go to Patient Education Activity  Goal: Patient/Family Education  Outcome: Progressing Towards Goal     Problem: Pressure Injury - Risk of  Goal: *Prevention of pressure injury  Description: Document Elpidio Scale and appropriate interventions in the flowsheet.   Outcome: Progressing Towards Goal  Note: Pressure Injury Interventions:                      Nutrition Interventions: Document food/fluid/supplement intake                     Problem: Patient Education: Go to Patient Education Activity  Goal: Patient/Family Education  Outcome: Progressing Towards Goal

## 2020-10-29 NOTE — DISCHARGE SUMMARY
Epilepsy Monitoring Unit Neurology Discharge Summary     Patient ID:  Rosie Nguyen  [id number]  41 y.o.  1975    Admit Date: 10/26/2020    Discharge Date: 10/29/2020      Admission Diagnoses: Intractable epilepsy    Discharge Diagnoses: Intractable localization related epilepsy    Disposition: home    Discharged Condition: good    Hospital Summary:  Pt is a 40yo [address] male with onset of seizures in 2011 with ongoing seizures 1-2 times a month despite 3 ASDs, Briviact 100mg bid, Topamax 200mg bid, and Epidiolex 600mg bid, and previous trial of high-dose Keppra, with history of alcohol use and possible alcohol withdrawal seizures, no alcohol since July with ongoing seizures. Seizures are described as motor arrest, LOC with heavy breathing, GTC activity, eyes closed, lasting for over 5 minutes, with post-ictal confusion after, may have tongue trauma, only bladder incontinence once. His mother describes a second type of spell of LOC without shaking, but this has only occurred twice. 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 He had an abnormal prolonged EEG 6/10/19 with bifrontal sharp waves and spike and waves seen. EEG 2/1/12 was read as normal. Pt is admitted to the EMU for continuous video EEG to determine if refractory spells are in fact epileptic in nature and if so, possibly localize the onset of his seizures. On admission, Epidiolex was held and he was continued on Topamax and Briviact. He was sleep deprived on night 1 in the EMU. He had one seizure out of sleep lasting 1.5 minutes, clinically he had head version to the right and ictal scream, flexion of his extremities and then extension with tonic activity and then finally tonic-clonic activity seen, afterward he has sonorous respirations. There is no clear focal onset of seizure electrographically. Unfortunately, no additional seizures were captured. Interictally, there were frequent R>L frontal sharp waves and spike and waves seen, both synchronus and independent, as well as independent L>R temporal sharp waves. This activity is seen on a background of mixed delta and theta slowing. For full details of EEG findings, please see separate reports. He was restarted on Epidiolex along with his other ASDs. His mother expressed her concern regarding his mood and asked about changing his medications, but the pt is followed by a mental health provider and is on Zoloft 100mg daily. Recommend he continue to follow up with psychiatry. They would like to move forward with more extensive evaluation at [facility] for possible surgical options.      Patient Active Problem List    Diagnosis Date Noted    Intractable seizures ([address] 10/26/2020    Generalized anxiety disorder 08/14/2019    Abnormal MRI of head 06/11/2019    Bilateral carotid artery stenosis 06/11/2019    Brain cyst 04/03/2017    Advance care planning 04/25/2016    EtOH dependence ([address] 10/31/2014    Hypertension 09/12/2014    Seizure disorder ([address] 05/22/2012    Asperger syndrome 12/14/2011    Autism      Past Medical History:   Diagnosis Date    Alcohol withdrawal ([address] 10/31/2014    Asperger syndrome 12/14/2011    Autism     dx 2010- highly functional    Complex partial seizure evolving to generalized seizure ([address] 6/11/2019    Convulsive syncope 6/11/2019    Other ill-defined conditions(799.89)     aspergers, syncopal episodes    Seizure disorder ([address] 5/22/2012    Seizures ([address]       Family History   Problem Relation Age of Onset    Diabetes Father     Heart Disease Father     Cancer Mother         Breast    Anxiety Mother     COPD Mother     Other Mother         Neuropathy    Sleep Apnea Mother     Arthritis-osteo Mother     Other Brother         Pituitary tumor      Social History     Tobacco Use    Smoking status: Never Smoker    Smokeless tobacco: Never Used   Substance Use Topics  Alcohol use: Yes     Alcohol/week: 8.3 standard drinks     Types: 10 Cans of beer per week     Frequency: Monthly or less     Drinks per session: 1 or 2     Comment: occasional     Past Surgical History:   Procedure Laterality Date    HX GI      pyloric stenosis 1976    HX HEENT      adenoids 1978    HX OTHER SURGICAL Left     Finger      Prior to Admission medications    Medication Sig Start Date End Date Taking? Authorizing Provider   sertraline (ZOLOFT) 25 mg tablet Take 2 Tabs by mouth nightly. 9/18/20  Yes Shea Fuentes MD   cannabidioL (Epidiolex) 100 mg/mL soln Take 600 mg by mouth two (2) times a day. 9/17/20  Yes Chip Severino MD   brivaracetam (Briviact) 100 mg tablet Take 1 Tab by mouth two (2) times a day. Max Daily Amount: 200 mg. 7/29/20  Yes Chip Severino MD   naltrexone (DEPADE) 50 mg tablet Take 50 mg by mouth daily. Indications: Hasn't started as of 4/15/20   Yes Provider, Historical   topiramate (TOPAMAX) 200 mg tablet Take 1 Tab by mouth two (2) times a day. 2/28/20  Yes Chip Severino MD   MULTIVITAMIN PO Take 1 Tab by mouth daily. Yes Provider, Historical     Allergies   Allergen Reactions    Aspartame Other (comments)     Family believes it causes his seizures        Discharge Exam:  Exam:  Visit Vitals  /73 (BP 1 Location: Right arm, BP Patient Position: At rest)   Pulse 68   Temp 97.7 °F (36.5 °C)   Resp 13   Wt 82 kg (180 lb 12.4 oz)   SpO2 97%   BMI 26.70 kg/m²     Gen:  CV: RRR  Lungs: non labored breathing  Neuro: A&O x 3, no dysarthria, limited spontaneous speech  CN II-XII: EOMI, face symmetric, no ptosis        Patient Instructions:   Current Discharge Medication List      CONTINUE these medications which have NOT CHANGED    Details   sertraline (ZOLOFT) 25 mg tablet Take 2 Tabs by mouth nightly. Qty: 60 Tab, Refills: 0      cannabidioL (Epidiolex) 100 mg/mL soln Take 600 mg by mouth two (2) times a day.   Qty: 300 mL, Refills: 3    Associated Diagnoses: Seizure disorder ([facility]      brivaracetam (Briviact) 100 mg tablet Take 1 Tab by mouth two (2) times a day. Max Daily Amount: 200 mg. Qty: 60 Tab, Refills: 3    Associated Diagnoses: Seizure disorder (HCC)      naltrexone (DEPADE) 50 mg tablet Take 50 mg by mouth daily. Indications: Hasn't started as of 4/15/20      topiramate (TOPAMAX) 200 mg tablet Take 1 Tab by mouth two (2) times a day. Qty: 180 Tab, Refills: 3    Associated Diagnoses: Seizure disorder (HCC)      MULTIVITAMIN PO Take 1 Tab by mouth daily. Activity: Activity as tolerated and no driving  Diet: Regular Diet    Follow-up with     Signed:   Kierra Ramirez MD  10/29/2020  1:01 PM

## 2020-10-29 NOTE — PROGRESS NOTES
1500  - Significant Medical Information: See chart notes    Preliminary Discharge Plan/Identified;  Demographic and Primary Care Provider (PCP) Mani Nielson MD verified and correct. CM will continue to follow discharge planning needs for continuum of care. Katey Isaac RN, CCM    Reason for Admission:  Emile Benjamin is a 41 y.o. admitted to [facility] for seizures - SEE HPI. RUR Score: 6 %           Plan for utilizing home health:    Not at this time. Does NOT have any DME or [place] services in the home. Lives with parents. PCP: Mani Nielson MD      Are you a current patient: Yes/No:  Yes    Approximate date of last visit:   [address]:  No.    CM One Stop/Healthcare Agent :  Shekhar Bradshaw and Leonid Lay - [phone] or [phone]                       Transition of Care Plan:   Home with family    Care Management Interventions  PCP Verified by CM: Yes  Mode of Transport at Discharge:  Other (see comment)  MyChart Signup: No  Discharge Durable Medical Equipment: No  Health Maintenance Reviewed: Yes  Physical Therapy Consult: No  Occupational Therapy Consult: No  Speech Therapy Consult: No  Current Support Network: Lives with Caregiver, Relative's Home  Confirm Follow Up Transport: Family  Discharge Location  Discharge Placement: Home

## 2020-10-29 NOTE — PROGRESS NOTES
Bedside and Verbal shift change report given to Hailey Wu RN (oncoming nurse) by Jenelle Flower RN (offgoing nurse). Report included the following information SBAR, Kardex, Intake/Output, MAR, Recent Results, Med Rec Status, Cardiac Rhythm NSR, Alarm Parameters  and Dual Neuro Assessment.

## 2020-10-29 NOTE — PROGRESS NOTES
Neurology Progress Note     NAME: Delphina Mcardle   :  1975   MRN:  [id number]   DATE:  10/29/2020    Assessment:     Active Problems:    Intractable seizures ([address] (10/26/2020)      Pt is a 38yo LH male with onset of seizures in  with ongoing seizures 1-2 times a month despite 3 ASDs and previous trial of high-dose Keppra, with history of alcohol use and possible alcohol withdrawal seizures, no alcohol since July with ongoing seizures. Seizures are described as motor arrest, LOC with heavy breathing, GTC activity, eyes closed, lasting for over 5 minutes, with post-ictal confusion after, may have tongue trauma, only bladder incontinence once. His mother describes a second type of spell of LOC without shaking. Pt had no events on day 2 in the EMU. He had one seizure during sleep on night 1 in the EMU, appeared to have focal onset based on semiology, but not able to localize on EEG. For full details see separate EEG report. He has frequent interictal epileptiform discharges, some that appear generalized, other focal, independent, bilateral and multifocal. Waking background is in theta range. CBC with differential, CMP - unremarkable. Topamax level was 8.6  Plan:   -Continuous video EEG in order to capture additional seizures to better localize onset and to capture all types of spells seen at home  -Continue to hold Epidiolex  -Continue Topamax 200mg bid  -Continue Briviact 100mg bid  -UDS was not done, apparently felt to be a suggestion rather than an order. Subjective:   Pt is seen with Mom at bedside and Dad on speaker phone. No seizures overnight. No new complaints.      Objective:   Chart reviewed since last seen    Current Facility-Administered Medications   Medication Dose Route Frequency    influenza vaccine  (6 mos+)(PF) (FLUARIX/FLULAVAL/FLUZONE QUAD) injection 0.5 mL  0.5 mL IntraMUSCular PRIOR TO DISCHARGE    sodium chloride (NS) flush 5-40 mL  5-40 mL IntraVENous Q8H    sodium chloride (NS) flush 5-40 mL  5-40 mL IntraVENous PRN    LORazepam (ATIVAN) injection 2 mg  2 mg IntraVENous Q5MIN PRN    LORazepam (ATIVAN) injection 2 mg  2 mg IntraMUSCular Q5MIN PRN    acetaminophen (TYLENOL) tablet 650 mg  650 mg Oral Q4H PRN    enoxaparin (LOVENOX) injection 40 mg  40 mg SubCUTAneous Q24H    brivaracetam (BRIVIACT) tablet 100 mg  100 mg Oral BID    therapeutic multivitamin (THERAGRAN) tablet 1 Tab  1 Tab Oral DAILY    naltrexone (DEPADE) tablet 50 mg  50 mg Oral DAILY    sertraline (ZOLOFT) tablet 50 mg  50 mg Oral QHS    topiramate (TOPAMAX) tablet 200 mg  200 mg Oral BID       Visit Vitals  /84 (BP 1 Location: Right arm, BP Patient Position: At rest)   Pulse 77   Temp 97.5 °F (36.4 °C)   Resp 15   Wt 82 kg (180 lb 12.4 oz)   SpO2 98%   BMI 26.70 kg/m²     Temp (24hrs), Av.9 °F (36.6 °C), Min:97.3 °F (36.3 °C), Max:98.6 °F (37 °C)      No intake/output data recorded. No intake/output data recorded. Physical Exam:  General: Well developed well nourished patient in no apparent distress. Cardiac: Regular rate and rhythm    Neurological Exam:  Mental Status: Oriented to person. Speech - limited spontaneous speech, no dysarthria. Fund of knowledge and memory impaired. Answers few simple questions. Able to follow commands, but had difficulty with more complex tasks. Cranial Nerves:   EOMI, no nystagmus, no ptosis. Facial movement is symmetric. Hearing is intact. Motor:  No PD, no tremors   Reflexes:      Sensory:      Gait:     Cerebellar:  Intact FTN         Lab Review No results found for this or any previous visit (from the past 24 hour(s)). Additional comments:  I have reviewed the patient's new clinical lab test results. I have personally reviewed the patient's radiographs.   MRI  MRI Results (most recent):  Results from [place] encounter on 06/10/19   MRI BRAIN W WO CONT    Narrative EXAM:  MRI BRAIN W WO CONT    INDICATION:    Seizures new or progressive    COMPARISON:  January 23, 2012. CONTRAST: 17 ml Dotarem. TECHNIQUE:    Multiplanar multisequence acquisition without and with contrast of the brain. FINDINGS:  Diffuse motion artifact. Diffusion imaging does not show acute ischemic changes . Minimal nonspecific white matter disease. Ventricle size is normal and stable. No extra-axial fluid collection hemorrhage or shift. Temporal lobe no show no focal abnormality and appear symmetrical.  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 No new lesion is  seen. Incidental diffuse mucosal thickening within the a stomach air cells bilaterally      Impression  impression: No acute changes no masses. Stable findings. Care Plan discussed with:  Patient x   Family x   RN    Care Manager    Consultant/Specialist:       Signed: Federico Aldridge MD

## 2020-10-29 NOTE — PROGRESS NOTES
Bedside and Verbal shift change report given to Shaquille Lugo RN (oncoming nurse) by Tyler Pelayo RN (offgoing nurse). Report included the following information SBAR, Kardex, Intake/Output, MAR, Recent Results and Cardiac Rhythm NSR.

## 2020-10-29 NOTE — PROGRESS NOTES
I have reviewed discharge instructions with the patient. The patient verbalized understanding. Patient discharged with IV and telemetry removed.

## 2020-10-29 NOTE — ROUTINE PROCESS
Hospital follow-up PCP transitional care appointment has been scheduled with Dr. Shari Williamson for Nov 10 @ 10:30AM (earliest available). Pending patient discharge.   Wilfredo Peres, Care Management Specialist.

## 2020-10-30 ENCOUNTER — PATIENT OUTREACH (OUTPATIENT)
Dept: CASE MANAGEMENT | Age: 45
End: 2020-10-30

## 2020-11-03 ENCOUNTER — PATIENT OUTREACH (OUTPATIENT)
Dept: CASE MANAGEMENT | Age: 45
End: 2020-11-03

## 2020-11-03 DIAGNOSIS — F32.A DEPRESSION, UNSPECIFIED DEPRESSION TYPE: ICD-10-CM

## 2020-11-03 DIAGNOSIS — F10.20 UNCOMPLICATED ALCOHOL DEPENDENCE (HCC): ICD-10-CM

## 2020-11-03 DIAGNOSIS — G40.909 SEIZURE DISORDER (HCC): Primary | ICD-10-CM

## 2020-11-03 RX ORDER — NALTREXONE HYDROCHLORIDE 50 MG/1
50 TABLET, FILM COATED ORAL DAILY
Qty: 90 TAB | Refills: 1 | Status: SHIPPED | OUTPATIENT
Start: 2020-11-03 | End: 2021-09-21 | Stop reason: ALTCHOICE

## 2020-11-03 RX ORDER — SERTRALINE HYDROCHLORIDE 25 MG/1
100 TABLET, FILM COATED ORAL DAILY
Qty: 90 TAB | Refills: 1 | Status: SHIPPED | OUTPATIENT
Start: 2020-11-03 | End: 2020-11-09

## 2020-11-03 RX ORDER — NALTREXONE HYDROCHLORIDE 50 MG/1
50 TABLET, FILM COATED ORAL DAILY
Qty: 90 TAB | Refills: 1 | Status: SHIPPED | OUTPATIENT
Start: 2020-11-03 | End: 2020-11-03 | Stop reason: SDUPTHER

## 2020-11-03 RX ORDER — SERTRALINE HYDROCHLORIDE 25 MG/1
100 TABLET, FILM COATED ORAL DAILY
Qty: 90 TAB | Refills: 1 | Status: SHIPPED | OUTPATIENT
Start: 2020-11-03 | End: 2020-11-03 | Stop reason: SDUPTHER

## 2020-11-03 NOTE — PROCEDURES
[address] [facility]  EMU REPORT    Name:  Xavier Arguello  MR#:  [id number]  :  1975  ACCOUNT #:  [de-identified]  DATE OF SERVICE:  10/29/2020    PROCEDURE:  Continuous video EEG. HISTORY OF PRESENT ILLNESS:  Briefly, this is a 52-year-old left-handed male with autism spectrum disorder and onset of seizures in , with ongoing seizures despite alcohol cessation and use of three antiseizure drugs including Briviact, Topamax and Epidiolex. His events are occurring 1-2 times a month and are described by his mother as motor arrest with loss of consciousness, heavy breathing, generalized tonic-clonic activity, his eyes are closed, they can last for 5 minutes. He has postictal confusion, may have tongue trauma, has only had bladder incontinence on one occasion. She also describes a second type of spell with loss of consciousness but no shaking. 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 He has had an abnormal EEG in the past with bifrontal sharp waves seen. The patient is admitted to the EMU in order to capture these events to determine if they are all epileptic in nature and if so better localize the events for possible epilepsy surgery referral.    MEDICATIONS AT HOME:  1.  Briviact 100 mg b.i.d.  2.  Topamax 200 mg b.i.d.  3.  Epidiolex 600 mg b.i.d.  4.  Zoloft 50 mg a day. 5.  Naltrexone 50 mg a day. 6.  Multivitamin. CONDITIONS:  This represents day four of EMU monitoring with continuous video EEG for the purposes of spell detection, characterization and localization. This study was performed in accordance with International 10-20 system with one channel devoted to limited EKG. No activating procedures were performed on day four. This study was done during states of wakefulness and mostly sleep. The patient's Epidiolex was held on admission. This study began on 10/29/2020 at 0601 and ended on 10/29/2020 at 1303.   I have reviewed this record in its entirety. DESCRIPTION:  Upon maximal arousal, the posterior dominant rhythm has a frequency of 6 Hz with an amplitude of 20uV. This activity is symmetric in the bilateral posterior derivations and attenuated with eye opening. Normal sleep architecture is seen with stage II sleep, recognized by the presence of symmetric vertex waves and sleep spindles. The patient had frequent interictal bilateral independent frontotemporal sharp waves seen occurring singly, left greater than the right, and very rare right frontocentral and frontotemporal sharp waves seen. No other focal abnormalities or electrographic seizures were seen. EVENT ANALYSIS:  There were no push button events on day four. INTERPRETATION:  This is an abnormal EEG due to  1. Diffuse generalized theta slowing of the background. 2.  Left greater than right frontotemporal sharp waves occurring independently and singly. 3.  Rare right frontocentral and frontotemporal sharp waves.       Geovanny Alvarado MD      [id number]  D:  11/03/2020 10:22  T:  11/03/2020 17:14  JOB #:  [id number]

## 2020-11-03 NOTE — PROCEDURES
[address]   EMU REPORT    Name:  Lindsay Goddard  MR#:  [id number]  :  1975  ACCOUNT #:  [de-identified]  DATE OF SERVICE:  10/27/2020    PROCEDURE:  Continuous video EEG. HISTORY OF PRESENT ILLNESS:  Briefly, this is a 43-year-old left-handed male with autism spectrum disorder and onset of seizures in , initially felt to be alcohol related, but these have continued despite alcohol cessation and are occurring 1-2 times a month despite being on three antiseizure drugs, Briviact, Topamax and Epidiolex. These seizures are described by his mother as motor arrest, loss of consciousness with heavy breathing, generalized tonic-clonic activity, eyes are closed, they last up to 5 minutes with postictal confusion after. He may have tongue trauma, and he has only had bladder incontinence on one occasion. He has additional rare spells of LOC without shaking seen. 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 He has had abnormal EEG with bifrontal sharp waves seen. He is admitted to the EMU for continuous video EEG in order to determine if these refractory spells are in fact epileptic in nature, and if so, to possibly localize the onset of the seizures for possible surgical options. MEDICATIONS AT HOME:  1.  Briviact 100 mg b.i.d.  2.  Epidiolex 600 mg b.i.d.  3.  Topamax 200 mg b.i.d.  4.  Zoloft 50 mg nightly. 5.  Naltrexone 50 mg daily. 6.  Multivitamin. CONDITIONS:  This represents day two of EMU monitoring with continuous video EEG for the purposes of spell detection, characterization and localization. This study was performed in accordance with International 10-20 system with one channel devoted to limited EKG. No activating procedures were performed on day two. This study was done during states of wakefulness and sleep. This study began on 10/27/2020 at 06:01 and ended on 10/28/2020 at 06:01.   I have reviewed this record in its entirety. DESCRIPTION:  Upon maximal arousal, the posterior dominant rhythm has a frequency of 7 Hz with an amplitude of 20uV. This activity is symmetric in the bilateral posterior derivations and attenuated with eye opening. Normal sleep architecture is seen with stage II sleep, recognized by the presence of symmetric vertex waves and sleep spindles. The patient has frequent interictal bilateral but independent frontotemporal sharp waves seen more frequently on the right than the left during day two of monitoring. These occur singly with amplitudes up to 80uV. Additionally, he has interictal right frontocentral and frontotemporal sharp waves seen with spread to the left frontotemporal leads. These occur independently but can occur in runs for several seconds with varying frequency during these runs and amplitudes up to 100uV. Again seen is diffuse generalized rhythmic delta activity with frequency of 5 Hz and amplitude of 40uV, a typical example of this is at 12:27:14, it continues until 12:28:46, but then starts again and continues intermittently for a period of time and was felt to represent periods of sleep transition. EVENT ANALYSIS:  There were no push button events during day two in the EMU. INTERPRETATION:  This is an abnormal continuous video EEG due to  1. Right greater than left independent frontotemporal sharp waves occurring singly. 2.  Right frontocentral and frontotemporal sharp waves with spread to left frontotemporal leads. 3.  Intermittent rhythmic generalized delta activity. 4.  Diffuse generalized theta slowing of the background. Slowing of the background and delta activity could be secondary to medications and drowsiness. No typical events were captured on day two. Will continue to monitor the patient in an effort to capture additional seizures and in an effort to better localize onset.       MD SPENCER [id number]  D: 11/03/2020 9:52  T:  11/03/2020 16:17  JOB #:  [id number]

## 2020-11-03 NOTE — PROCEDURES
[address] [facility] [place]  EMU REPORT    Name:  Maciej Parks  MR#:  [id number]  :  1975  ACCOUNT #:  [de-identified]  DATE OF SERVICE:  10/28/2020    PROCEDURE:  Continuous video EEG monitoring. HISTORY OF PRESENT ILLNESS:  Briefly, this is a 76-year-old left-handed male with autism spectrum disorder and onset of seizures in , thought initially to be secondary to alcohol use but subsequently with ongoing seizures despite alcohol cessation. Seizures are occurring 1-2 times a month despite being on 3 antiseizure drugs including Briviact, Topamax and Epidiolex. The patient's mother describes his events as motor arrest followed by loss of consciousness with heavy breathing, generalized tonic-clonic activity. His eyes are closed. They can last for 5 minutes with postictal confusion and may have tongue trauma. He has only had bladder incontinence on one occasion. He has had a second type of spell with just loss of consciousness and no shaking that has occurred rarely. 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 The patient is admitted to the EMU for continuous video EEG monitoring in order to determine underlying etiology of these events and, if epileptic, an attempt to localize onset. MEDICATIONS AT HOME:  1.  Briviact 100 mg b.i.d.  2.  Topamax 200 mg b.i.d.  3.  Epidiolex 600 mg b.i.d.  4.  Sertraline 50 mg q. day. 5.  Naltrexone. 6.  Multivitamin. CONDITIONS:  This represents day 3 of EMU monitoring with continuous video EEG for the purposes of spell detection, characterization and localization. This study was performed in accordance with International 10-20 system with one channel devoted to limited EKG. Photic stimulation was performed as an activating procedure. This study was done during states of wakefulness and sleep.   The patient's Epidiolex was held on admission. This study began on 10/28/2020 at 0601 and ended on 10/29/2020 at [facility]. I have reviewed this record in its entirety. DESCRIPTION:  Upon maximal arousal, the posterior dominant rhythm has a frequency of 7 Hz with an amplitude of 40uV. This activity is symmetric in the bilateral posterior derivations and attenuated with eye opening. Photic stimulation did not significantly alter the tracing. Normal sleep architecture is seen with stage II sleep, recognized by the presence of symmetric vertex waves and sleep spindles. The patient had frequent interictal bilateral, but independent left greater than right frontotemporal sharp waves with amplitudes up to 80uV occurring singly. Additionally, rare right frontocentral sharp waves are seen with spread to the frontotemporal leads and the left frontal leads with amplitudes up to 120uV, typically occurring singly but occasionally in runs of variable frequency lasting for 2-3 seconds. Additionally, occasional diffuse generalized rhythmic delta slowing is seen. EVENT ANALYSIS:  There were no push button events on day 3 in the EMU. INTERPRETATION:  This is an abnormal continuous video EEG due to  1. Diffuse generalized theta slowing of the background. 2.  Intermittent rhythmic generalized delta activity. 3.  Left greater than right independent frontotemporal sharp waves occurring singly. 4.  Rare right frontocentral sharp waves. We will continue to monitor the patient through day 4 in an effort to capture additional seizures to better localize onset.       Joel Villanueva MD      [id number]  D:  11/03/2020 10:17  T:  11/03/2020 16:53  JOB #:  [id number]

## 2020-11-03 NOTE — PROCEDURES
[address] [place]   EMU REPORT    Name:  Hiral Cespedes  MR#:  [id number]  :  1975  ACCOUNT #:  [de-identified]  DATE OF SERVICE:  10/26/202    PROCEDURE:  Continuous video EEG monitoring. HISTORY OF PRESENT ILLNESS:  Briefly, this is a 52-year-old left-handed male with onset of seizures in , with ongoing seizures 1 or 2 times a month despite three antiseizure drugs, Briviact 100 mg b.i.d., Topamax 200 mg b.i.d. and Epidiolex 600 mg b.i.d., and previous high-dose Keppra therapy. Seizures are described as motor arrest with loss of consciousness and heavy breathing, generalized tonic-clonic activity with eyes closed, lasting for over 5 minutes, followed by postictal confusion, may bite his tongue, has only had bladder incontinence once. He has a second type of spell of loss of consciousness without shaking, but this has occurred infrequently. The patient is admitted to the EMU for continuous video EEG to determine if these refractory spells are in fact epileptic in nature, and if so, possibly localize the onset for surgical consideration. MEDICATIONS AT HOME:  1.  Epidiolex 600 mg b.i.d.  2.  Topamax 200 mg b.i.d.  3.  Briviact 100 mg b.i.d.  4.  Zoloft 50 mg nightly. 5.  Multivitamin. CONDITIONS:  This represents day one of EMU monitoring with continuous video EEG for the purposes of spell detection, characterization and localization. This study was performed in accordance with the International 10-20 system with one channel devoted to limited EKG. No activating procedures were performed on day one. This study was done during states of wakefulness and sleep. This study began on 10/26/2020 at 10:58 and ended on 10/27/2020 at 06:01. I have reviewed this record in its entirety. DESCRIPTION:  Upon maximal arousal, the posterior dominant rhythm has a frequency of 7 Hz with an amplitude of 40uV.   This activity is symmetric in the bilateral posterior derivations and attenuated with eye opening. Normal sleep architecture is seen with stage II sleep, recognized by the presence of symmetric vertex waves and sleep spindles. There are frequent left > right, independent, frontotemporal sharp waves seen occurring singly with amplitudes up to 80uV. Additionally, there are right frontocentral sharp waves with spread to the right frontotemporal leads and left frontotemporal leads seen frequently throughout the recording. These occur singly as well as in runs with varying frequency lasting up to several seconds. Additionally, there are periods of rhythmic generalized 5 Hz activity with amplitudes up to 40 microvolts intermittently throughout the recording. EVENT ANALYSIS:  Push button #1 at 05:52:35. The patient is asleep, lying on his left side. His body and head begin to move to the right, and at 05:52:39, an ictal scream is heard. His bilateral arms and legs appear to flex under the sheets. He then has extension and tonic activity until 05:53:00 when tonic-clonic activity is seen. At 05:53:21, this activity stops and sonorous respirations are heard. The staff entered the room at 05:53:12 and began testing him without response. His face is not seen on video for majority of this event. EEG correlate, at 05:51:53, there is diffuse generalizedl rhythmic delta activity seen. At 05:52:00, this becomes high-amplitude sharp waves in the bifrontal regions, right greater than left, with amplitudes up to 80 microvolts and a frequency of 10 Hz. At 05:52:14, this activity stops, and at 05:58:18 generalized rhythmic delta activity is again seen. At 05:52:24, it stops on the left but continues to build on the right. At 05:52:32, there is spread to the left again. This becomes a very high amplitude and evolves into spike-and-wave discharges and the record becomes obscured by muscle artifact until 05:53:21 when there is attenuation across all head regions.     INTERPRETATION:  This is an abnormal continuous video EEG due to  1. Diffuse generalized theta slowing of the background  2. Right frontocentral and frontotemporal sharp waves with spread to the left frontotemporal leads. 3.  Bilateral temporal sharp waves occurring independently  4. One electrographic and clinical seizure captured. Clinically, this seizure is suggestive of a left temporal onset but could not be lateralized or localized on EEG. The patient will continue to be monitored in an effort to capture additional seizures to hopefully better localize onset of his seizures.       Geovanny Alvarado MD      [id number]  D:  11/03/2020 9:40  T:  11/03/2020 14:49  JOB #:  [id number]

## 2020-11-04 ENCOUNTER — TELEPHONE (OUTPATIENT)
Dept: NEUROLOGY | Age: 45
End: 2020-11-04

## 2020-11-04 ENCOUNTER — DOCUMENTATION ONLY (OUTPATIENT)
Dept: NEUROLOGY | Age: 45
End: 2020-11-04

## 2020-11-04 DIAGNOSIS — G40.909 SEIZURE DISORDER (HCC): Primary | ICD-10-CM

## 2020-11-04 NOTE — TELEPHONE ENCOUNTER
Larry Pratt with [facility] Therapy calling to let Harriet Perry know that she is going to be faxing some paperwork over that Dr Maya Fraser needs to fill out in order to get pt started. She did reach out to Theodore the mother and talked to her but needs these forms in order to move forward.

## 2020-11-09 DIAGNOSIS — F32.A DEPRESSION, UNSPECIFIED DEPRESSION TYPE: ICD-10-CM

## 2020-11-09 DIAGNOSIS — G40.909 SEIZURE DISORDER (HCC): ICD-10-CM

## 2020-11-09 RX ORDER — SERTRALINE HYDROCHLORIDE 100 MG/1
100 TABLET, FILM COATED ORAL DAILY
Qty: 90 TAB | Refills: 1 | Status: SHIPPED | OUTPATIENT
Start: 2020-11-09 | End: 2021-04-15

## 2020-11-16 ENCOUNTER — OFFICE VISIT (OUTPATIENT)
Dept: SURGERY | Age: 45
End: 2020-11-16
Payer: MEDICARE

## 2020-11-16 VITALS
HEART RATE: 84 BPM | RESPIRATION RATE: 18 BRPM | WEIGHT: 173.6 LBS | HEIGHT: 69 IN | OXYGEN SATURATION: 99 % | TEMPERATURE: 97.8 F | DIASTOLIC BLOOD PRESSURE: 75 MMHG | BODY MASS INDEX: 25.71 KG/M2 | SYSTOLIC BLOOD PRESSURE: 110 MMHG

## 2020-11-16 DIAGNOSIS — G40.219 PARTIAL SYMPTOMATIC EPILEPSY WITH COMPLEX PARTIAL SEIZURES, INTRACTABLE, WITHOUT STATUS EPILEPTICUS (HCC): Primary | ICD-10-CM

## 2020-11-16 PROCEDURE — 1111F DSCHRG MED/CURRENT MED MERGE: CPT | Performed by: SURGERY

## 2020-11-16 PROCEDURE — G8754 DIAS BP LESS 90: HCPCS | Performed by: SURGERY

## 2020-11-16 PROCEDURE — G8427 DOCREV CUR MEDS BY ELIG CLIN: HCPCS | Performed by: SURGERY

## 2020-11-16 PROCEDURE — G8752 SYS BP LESS 140: HCPCS | Performed by: SURGERY

## 2020-11-16 PROCEDURE — 99203 OFFICE O/P NEW LOW 30 MIN: CPT | Performed by: SURGERY

## 2020-11-16 PROCEDURE — G8419 CALC BMI OUT NRM PARAM NOF/U: HCPCS | Performed by: SURGERY

## 2020-11-16 PROCEDURE — G8510 SCR DEP NEG, NO PLAN REQD: HCPCS | Performed by: SURGERY

## 2020-11-16 NOTE — PROGRESS NOTES
Chief Complaint   Patient presents with    Post OP Follow Up     seen as requested by Dr. Almas Salgado for VNS implantation     1. Have you been to the ER, urgent care clinic since your last visit? Hospitalized since your last visit? No    2. Have you seen or consulted any other health care providers outside of the [address] [place] [address] since your last visit? Include any pap smears or colon screening.  No

## 2020-11-16 NOTE — PROGRESS NOTES
The patient is a 69-year-old man referred by Yair Luz for evaluation for vagus nerve stimulator. The patient has history of intractable partial onset seizure disorder. He at times also has generalized seizures. The patient utilizes multiple antiepileptic medications without full control of his seizures. He recently was evaluated in the epilepsy monitoring unit. Brain surgery for epilepsy has not been recommended. The patient has history of Asperger's syndrome. Past medical history also includes hypertension, ethanol dependence reported to be in remission, brain cyst, bilateral carotid artery stenosis, generalized anxiety disorder. The patient has no active cardiac symptoms. There is no history of MI or coronary intervention. He is ambulatory. There is no history of shortness of breath at rest or with exertion. There is no history of diabetes mellitus. On examination the patient is an alert man in no acute distress. Vital signs blood pressure 110/75 pulse 84 respirations 18 temperature 97.8 O2 saturation 99% on room air weight 173 pounds height 5 feet 9 inches    Head and neck examination showed no jaundice mass or thyromegaly. Left neck anatomy is normal.  Lungs are clear bilaterally without rales rhonchi or wheezes. Heart is regular without murmur gallop or rub. Patient's left chest anatomy is normal.    The patient is alert. He is verbal.  Moves all extremities equally. Facial movement is symmetrical.  Speech is normal.    Lower extremities are without edema. The patient's mother was present during the office visit. I reviewed with the patient and his mother the typical operative and postoperative course for insertion of vagus nerve stimulator (generator and lead) under general anesthesia as an outpatient. Risks versus benefits of surgery were reviewed. Risks would include bleeding, infection, injury to structures in the region of surgery.   Structures at risk in the neck would include carotid artery, jugular vein, vagus nerve, and other cranial or cervical nerves. Injury to the vagus nerve at surgery would cause paralysis of the left vocal cord which could be temporary or permanent. All the risks of surgery would include risks associated with general anesthesia. I also explained that on average two thirds of individuals who receive the vagus nerve stimulator do achieve benefit in terms of reduced frequency of seizures. One third of individuals do not get benefit. Benefit is typically gradually cumulative over about a year. I reviewed with the patient and his mother the more common potential side effects of various nerve stimulation including intermittent hoarseness of voice related to stimulation of the recurrent laryngeal nerve, difficulty swallowing related to stimulation of pharyngeal muscles, etc.    The patient's mother understands and wishes that we proceed. The patient himself did not state any objection to the planned surgery. The patient was counseled at length about the risks of maggy Covid-19 during their perioperative period and any recovery window from their procedure. The patient was made aware that maggy Covid-19  may worsen their prognosis for recovering from their procedure and lend to a higher morbidity and/or mortality risk. All material risks, benefits, and reasonable alternatives including postponing the procedure were discussed. The patient's mother does  wish to proceed with the procedure at this time.

## 2020-11-18 ENCOUNTER — TELEPHONE (OUTPATIENT)
Dept: NEUROLOGY | Age: 45
End: 2020-11-18

## 2020-11-18 NOTE — TELEPHONE ENCOUNTER
Mark Samson  for VNS pt's calling stating she is working w/ Dr Lito De La Cruz to get pts PA for his upcoming surgery on  Dec 8th. Needing more info from Dr Nayeli Choi. Please call.

## 2020-11-18 NOTE — TELEPHONE ENCOUNTER
Vincenzo - I am going to defer to Dr. Nayeli Choi. I do not see any notes from Dr. Nayeli Choi since pt was discharged from the EMU. It is not clear to me that Dr. Nayeli Choi actually placed a referral for VNS. This is not urgent, Dr. Nayeli Choi will be back on Monday of next week.

## 2020-11-18 NOTE — TELEPHONE ENCOUNTER
For VNS PA, need a letter with specific diagnostic code for what type of seizures patient has and that he needs a VNS. The letter is to be faxed to [phone]. Dr. Miranda Hernandes, I will provide letter. What code needs to be used?  Thanks

## 2020-11-18 NOTE — TELEPHONE ENCOUNTER
Aleksandra (VNS Rep) calling stating she needs a script faxed to her for pt's VNS.  Fax: [phone]

## 2020-11-19 NOTE — PROGRESS NOTES
Spoke with patients Mother Cristela Pizarro (on HIPPA) and gave her test results per Dr. Candelaria Fletcher. She stated understanding. PROVIDER:[TOKEN:[2030:MIIS:2030],FOLLOWUP:[1 week]]

## 2020-11-19 NOTE — TELEPHONE ENCOUNTER
I have prepared letter and placed on Dr. Dory hays for review. I will fax it after signature has been obtained.

## 2020-11-30 DIAGNOSIS — Z01.818 PRE-OP TESTING: Primary | ICD-10-CM

## 2020-12-01 DIAGNOSIS — Z01.818 PRE-OP TESTING: ICD-10-CM

## 2020-12-01 NOTE — PERIOP NOTES
[facility]  Preoperative Instructions         Surgery Date 12/8/20          Time of Arrival 1145     1. On the day of your surgery, please report to the Surgical Services Registration Desk and sign in at your designated time. The Surgery Center is located to the right of the Emergency Room. 2. You must have someone with you to drive you home. You should not drive a car for 24 hours following surgery. Please make arrangements for a friend or family member to stay with you for the first 24 hours after your surgery. 3. Do not have anything to eat or drink (including water, gum, mints, coffee, juice) after midnight ?12/7/20? Geralene Ellen ? This may not apply to medications prescribed by your physician. ?(Please note below the special instructions with medications to take the morning of your procedure.)    4. We recommend you do not drink any alcoholic beverages for 24 hours before and after your surgery. 5. Contact your surgeons office for instructions on the following medications: non-steroidal anti-inflammatory drugs (i.e. Advil, Aleve), vitamins, and supplements. (Some surgeons will want you to stop these medications prior to surgery and others may allow you to take them)  **If you are currently taking Plavix, Coumadin, Aspirin and/or other blood-thinning agents, contact your surgeon for instructions. ** Your surgeon will partner with the physician prescribing these medications to determine if it is safe to stop or if you need to continue taking. Please do not stop taking these medications without instructions from your surgeon    6. Wear comfortable clothes. Wear glasses instead of contacts. Do not bring any money or jewelry. Please bring picture ID, insurance card, and any prearranged co-payment or hospital payment. Do not wear make-up, particularly mascara the morning of your surgery. Do not wear nail polish, particularly if you are having foot /hand surgery.   Wear your hair loose or down, no ponytails, buns, sabrina pins or clips. All body piercings must be removed. Please shower with antibacterial soap for three consecutive days before and on the morning of surgery, but do not apply any lotions, powders or deodorants after the shower on the day of surgery. Please use a fresh towels after each shower. Please sleep in clean clothes and change bed linens the night before surgery. Please do not shave for 48 hours prior to surgery. Shaving of the face is acceptable. 7. You should understand that if you do not follow these instructions your surgery may be cancelled. If your physical condition changes (I.e. fever, cold or flu) please contact your surgeon as soon as possible. 8. It is important that you be on time. If a situation occurs where you may be late, please call [phone] (OR Holding Area). 9. If you have any questions and or problems, please call [phone] (Pre-admission Testing). 10. Your surgery time may be subject to change. You will receive a phone call the evening prior if your time changes. 11.  If having outpatient surgery, you must have someone to drive you here, stay with you during the duration of your stay, and to drive you home at time of discharge. Special Instructions: covid testing 12/4 before noon    TAKE ALL MEDICATIONS DAY OF SURGERY EXCEPT:  Vitamin/supplement    I understand a pre-operative phone call will be made to verify my surgery time. In the event that I am not available, I give permission for a message to be left on my answering service and/or with another person?   Yes mother Hal Bulan [phone]         ___________________      __________   _________    Addie Shad of Patient)             (Witness)                (Date and Time)

## 2020-12-04 ENCOUNTER — HOSPITAL ENCOUNTER (OUTPATIENT)
Dept: PREADMISSION TESTING | Age: 45
Discharge: HOME | End: 2020-12-04
Payer: MEDICARE

## 2020-12-04 ENCOUNTER — HOSPITAL ENCOUNTER (OUTPATIENT)
Dept: GENERAL RADIOLOGY | Age: 45
Discharge: HOME | End: 2020-12-04
Payer: MEDICARE

## 2020-12-04 DIAGNOSIS — Z01.818 PRE-OP TESTING: ICD-10-CM

## 2020-12-04 PROCEDURE — 71046 X-RAY EXAM CHEST 2 VIEWS: CPT

## 2020-12-04 PROCEDURE — 87635 SARS-COV-2 COVID-19 AMP PRB: CPT

## 2020-12-05 LAB
HEALTH STATUS, XMCV2T: NORMAL
SARS-COV-2, COV2NT: NOT DETECTED
SOURCE, COVRS: NORMAL
SPECIMEN SOURCE, FCOV2M: NORMAL
SPECIMEN TYPE, XMCV1T: NORMAL

## 2020-12-08 ENCOUNTER — HOSPITAL ENCOUNTER (OUTPATIENT)
Age: 45
Setting detail: OUTPATIENT SURGERY
Discharge: HOME | End: 2020-12-08
Attending: SURGERY | Admitting: SURGERY
Payer: MEDICARE

## 2020-12-08 ENCOUNTER — ANESTHESIA EVENT (OUTPATIENT)
Dept: SURGERY | Age: 45
End: 2020-12-08
Payer: MEDICARE

## 2020-12-08 ENCOUNTER — ANESTHESIA (OUTPATIENT)
Dept: SURGERY | Age: 45
End: 2020-12-08
Payer: MEDICARE

## 2020-12-08 VITALS
SYSTOLIC BLOOD PRESSURE: 115 MMHG | HEART RATE: 65 BPM | HEIGHT: 69 IN | WEIGHT: 171.3 LBS | OXYGEN SATURATION: 95 % | BODY MASS INDEX: 25.37 KG/M2 | TEMPERATURE: 98 F | RESPIRATION RATE: 12 BRPM | DIASTOLIC BLOOD PRESSURE: 62 MMHG

## 2020-12-08 DIAGNOSIS — G40.219 PARTIAL SYMPTOMATIC EPILEPSY WITH COMPLEX PARTIAL SEIZURES, INTRACTABLE, WITHOUT STATUS EPILEPTICUS (HCC): ICD-10-CM

## 2020-12-08 DIAGNOSIS — L76.82 PAIN AT SURGICAL INCISION: Primary | ICD-10-CM

## 2020-12-08 PROCEDURE — 77030002933 HC SUT MCRYL J&J -A: Performed by: SURGERY

## 2020-12-08 PROCEDURE — 76210000016 HC OR PH I REC 1 TO 1.5 HR: Performed by: SURGERY

## 2020-12-08 PROCEDURE — 77030002996 HC SUT SLK J&J -A: Performed by: SURGERY

## 2020-12-08 PROCEDURE — 74011000250 HC RX REV CODE- 250: Performed by: SURGERY

## 2020-12-08 PROCEDURE — 74011250636 HC RX REV CODE- 250/636: Performed by: NURSE ANESTHETIST, CERTIFIED REGISTERED

## 2020-12-08 PROCEDURE — 77030008684 HC TU ET CUF COVD -B: Performed by: ANESTHESIOLOGY

## 2020-12-08 PROCEDURE — 76210000020 HC REC RM PH II FIRST 0.5 HR: Performed by: SURGERY

## 2020-12-08 PROCEDURE — 77030010938 HC CLP LIG TELE -A: Performed by: SURGERY

## 2020-12-08 PROCEDURE — 74011250636 HC RX REV CODE- 250/636: Performed by: ANESTHESIOLOGY

## 2020-12-08 PROCEDURE — C1778 LEAD, NEUROSTIMULATOR: HCPCS | Performed by: SURGERY

## 2020-12-08 PROCEDURE — 77030010507 HC ADH SKN DERMBND J&J -B: Performed by: SURGERY

## 2020-12-08 PROCEDURE — 77030026438 HC STYL ET INTUB CARD -A: Performed by: NURSE ANESTHETIST, CERTIFIED REGISTERED

## 2020-12-08 PROCEDURE — 77030002888 HC SUT CHRMC J&J -A: Performed by: SURGERY

## 2020-12-08 PROCEDURE — 74011250636 HC RX REV CODE- 250/636: Performed by: SURGERY

## 2020-12-08 PROCEDURE — 74011250637 HC RX REV CODE- 250/637: Performed by: SURGERY

## 2020-12-08 PROCEDURE — 74011000250 HC RX REV CODE- 250: Performed by: NURSE ANESTHETIST, CERTIFIED REGISTERED

## 2020-12-08 PROCEDURE — 64568 OPN IMPLTJ CRNL NRV NEA&PG: CPT | Performed by: SURGERY

## 2020-12-08 PROCEDURE — 76010000132 HC OR TIME 2.5 TO 3 HR: Performed by: SURGERY

## 2020-12-08 PROCEDURE — 77030002986 HC SUT PROL J&J -A: Performed by: SURGERY

## 2020-12-08 PROCEDURE — 95976 ALYS SMPL CN NPGT PRGRMG: CPT | Performed by: SURGERY

## 2020-12-08 PROCEDURE — 77030013079 HC BLNKT BAIR HGGR 3M -A: Performed by: NURSE ANESTHETIST, CERTIFIED REGISTERED

## 2020-12-08 PROCEDURE — 76060000036 HC ANESTHESIA 2.5 TO 3 HR: Performed by: SURGERY

## 2020-12-08 PROCEDURE — 77030018673: Performed by: SURGERY

## 2020-12-08 PROCEDURE — C1767 GENERATOR, NEURO NON-RECHARG: HCPCS | Performed by: SURGERY

## 2020-12-08 PROCEDURE — 2709999900 HC NON-CHARGEABLE SUPPLY: Performed by: SURGERY

## 2020-12-08 PROCEDURE — 77030020256 HC SOL INJ NACL 0.9%  500ML: Performed by: SURGERY

## 2020-12-08 PROCEDURE — 77030019908 HC STETH ESOPH SIMS -A: Performed by: NURSE ANESTHETIST, CERTIFIED REGISTERED

## 2020-12-08 DEVICE — IMPLANTABLE LEAD
Type: IMPLANTABLE DEVICE | Site: CHEST | Status: FUNCTIONAL
Brand: VNS THERAPY® PERENNIAFLEX® MODEL 304 LEAD

## 2020-12-08 DEVICE — GENERATOR NEUROSTIMULATOR FOR VNS THER SENTIVA: Type: IMPLANTABLE DEVICE | Site: CHEST | Status: FUNCTIONAL

## 2020-12-08 RX ORDER — FENTANYL CITRATE 50 UG/ML
INJECTION, SOLUTION INTRAMUSCULAR; INTRAVENOUS AS NEEDED
Status: DISCONTINUED | OUTPATIENT
Start: 2020-12-08 | End: 2020-12-08 | Stop reason: HOSPADM

## 2020-12-08 RX ORDER — ROCURONIUM BROMIDE 10 MG/ML
INJECTION, SOLUTION INTRAVENOUS AS NEEDED
Status: DISCONTINUED | OUTPATIENT
Start: 2020-12-08 | End: 2020-12-08 | Stop reason: HOSPADM

## 2020-12-08 RX ORDER — EPHEDRINE SULFATE/0.9% NACL/PF 50 MG/5 ML
SYRINGE (ML) INTRAVENOUS AS NEEDED
Status: DISCONTINUED | OUTPATIENT
Start: 2020-12-08 | End: 2020-12-08 | Stop reason: HOSPADM

## 2020-12-08 RX ORDER — ONDANSETRON 2 MG/ML
INJECTION INTRAMUSCULAR; INTRAVENOUS AS NEEDED
Status: DISCONTINUED | OUTPATIENT
Start: 2020-12-08 | End: 2020-12-08 | Stop reason: HOSPADM

## 2020-12-08 RX ORDER — MORPHINE SULFATE 10 MG/ML
2 INJECTION, SOLUTION INTRAMUSCULAR; INTRAVENOUS
Status: DISCONTINUED | OUTPATIENT
Start: 2020-12-08 | End: 2020-12-08 | Stop reason: HOSPADM

## 2020-12-08 RX ORDER — PHENYLEPHRINE HCL IN 0.9% NACL 0.4MG/10ML
SYRINGE (ML) INTRAVENOUS AS NEEDED
Status: DISCONTINUED | OUTPATIENT
Start: 2020-12-08 | End: 2020-12-08 | Stop reason: HOSPADM

## 2020-12-08 RX ORDER — SODIUM CHLORIDE, SODIUM LACTATE, POTASSIUM CHLORIDE, CALCIUM CHLORIDE 600; 310; 30; 20 MG/100ML; MG/100ML; MG/100ML; MG/100ML
25 INJECTION, SOLUTION INTRAVENOUS CONTINUOUS
Status: DISCONTINUED | OUTPATIENT
Start: 2020-12-08 | End: 2020-12-08 | Stop reason: HOSPADM

## 2020-12-08 RX ORDER — PROPOFOL 10 MG/ML
INJECTION, EMULSION INTRAVENOUS AS NEEDED
Status: DISCONTINUED | OUTPATIENT
Start: 2020-12-08 | End: 2020-12-08 | Stop reason: HOSPADM

## 2020-12-08 RX ORDER — FENTANYL CITRATE 50 UG/ML
25 INJECTION, SOLUTION INTRAMUSCULAR; INTRAVENOUS
Status: DISCONTINUED | OUTPATIENT
Start: 2020-12-08 | End: 2020-12-08 | Stop reason: HOSPADM

## 2020-12-08 RX ORDER — HYDROCODONE BITARTRATE AND ACETAMINOPHEN 5; 325 MG/1; MG/1
1 TABLET ORAL
Qty: 10 TAB | Refills: 0 | Status: SHIPPED | OUTPATIENT
Start: 2020-12-08 | End: 2020-12-11

## 2020-12-08 RX ORDER — SODIUM CHLORIDE, SODIUM LACTATE, POTASSIUM CHLORIDE, CALCIUM CHLORIDE 600; 310; 30; 20 MG/100ML; MG/100ML; MG/100ML; MG/100ML
25 INJECTION, SOLUTION INTRAVENOUS CONTINUOUS
Status: DISCONTINUED | OUTPATIENT
Start: 2020-12-08 | End: 2020-12-09 | Stop reason: HOSPADM

## 2020-12-08 RX ORDER — DIPHENHYDRAMINE HYDROCHLORIDE 50 MG/ML
INJECTION, SOLUTION INTRAMUSCULAR; INTRAVENOUS AS NEEDED
Status: DISCONTINUED | OUTPATIENT
Start: 2020-12-08 | End: 2020-12-08 | Stop reason: HOSPADM

## 2020-12-08 RX ORDER — FENTANYL CITRATE 50 UG/ML
50 INJECTION, SOLUTION INTRAMUSCULAR; INTRAVENOUS AS NEEDED
Status: DISCONTINUED | OUTPATIENT
Start: 2020-12-08 | End: 2020-12-08 | Stop reason: HOSPADM

## 2020-12-08 RX ORDER — MIDAZOLAM HYDROCHLORIDE 1 MG/ML
INJECTION, SOLUTION INTRAMUSCULAR; INTRAVENOUS AS NEEDED
Status: DISCONTINUED | OUTPATIENT
Start: 2020-12-08 | End: 2020-12-08 | Stop reason: HOSPADM

## 2020-12-08 RX ORDER — GLYCOPYRROLATE 0.2 MG/ML
INJECTION INTRAMUSCULAR; INTRAVENOUS AS NEEDED
Status: DISCONTINUED | OUTPATIENT
Start: 2020-12-08 | End: 2020-12-08 | Stop reason: HOSPADM

## 2020-12-08 RX ORDER — NEOSTIGMINE METHYLSULFATE 1 MG/ML
INJECTION INTRAVENOUS AS NEEDED
Status: DISCONTINUED | OUTPATIENT
Start: 2020-12-08 | End: 2020-12-08 | Stop reason: HOSPADM

## 2020-12-08 RX ORDER — SUCCINYLCHOLINE CHLORIDE 20 MG/ML
INJECTION INTRAMUSCULAR; INTRAVENOUS AS NEEDED
Status: DISCONTINUED | OUTPATIENT
Start: 2020-12-08 | End: 2020-12-08 | Stop reason: HOSPADM

## 2020-12-08 RX ORDER — DEXAMETHASONE SODIUM PHOSPHATE 4 MG/ML
INJECTION, SOLUTION INTRA-ARTICULAR; INTRALESIONAL; INTRAMUSCULAR; INTRAVENOUS; SOFT TISSUE AS NEEDED
Status: DISCONTINUED | OUTPATIENT
Start: 2020-12-08 | End: 2020-12-08 | Stop reason: HOSPADM

## 2020-12-08 RX ORDER — ONDANSETRON 2 MG/ML
4 INJECTION INTRAMUSCULAR; INTRAVENOUS AS NEEDED
Status: DISCONTINUED | OUTPATIENT
Start: 2020-12-08 | End: 2020-12-08 | Stop reason: HOSPADM

## 2020-12-08 RX ORDER — LIDOCAINE HYDROCHLORIDE 10 MG/ML
0.1 INJECTION, SOLUTION EPIDURAL; INFILTRATION; INTRACAUDAL; PERINEURAL AS NEEDED
Status: DISCONTINUED | OUTPATIENT
Start: 2020-12-08 | End: 2020-12-08 | Stop reason: HOSPADM

## 2020-12-08 RX ORDER — MIDAZOLAM HYDROCHLORIDE 1 MG/ML
1 INJECTION, SOLUTION INTRAMUSCULAR; INTRAVENOUS AS NEEDED
Status: DISCONTINUED | OUTPATIENT
Start: 2020-12-08 | End: 2020-12-08 | Stop reason: HOSPADM

## 2020-12-08 RX ADMIN — WATER 2 G: 1 INJECTION INTRAMUSCULAR; INTRAVENOUS; SUBCUTANEOUS at 14:15

## 2020-12-08 RX ADMIN — PROPOFOL 200 MG: 10 INJECTION, EMULSION INTRAVENOUS at 14:12

## 2020-12-08 RX ADMIN — GLYCOPYRROLATE 0.4 MG: 0.2 INJECTION, SOLUTION INTRAMUSCULAR; INTRAVENOUS at 16:21

## 2020-12-08 RX ADMIN — ROCURONIUM BROMIDE 10 MG: 10 INJECTION INTRAVENOUS at 14:55

## 2020-12-08 RX ADMIN — SUCCINYLCHOLINE CHLORIDE 140 MG: 20 INJECTION, SOLUTION INTRAMUSCULAR; INTRAVENOUS at 14:12

## 2020-12-08 RX ADMIN — ONDANSETRON HYDROCHLORIDE 4 MG: 2 INJECTION, SOLUTION INTRAMUSCULAR; INTRAVENOUS at 16:20

## 2020-12-08 RX ADMIN — Medication 80 MCG: at 14:32

## 2020-12-08 RX ADMIN — SODIUM CHLORIDE, POTASSIUM CHLORIDE, SODIUM LACTATE AND CALCIUM CHLORIDE: 600; 310; 30; 20 INJECTION, SOLUTION INTRAVENOUS at 16:48

## 2020-12-08 RX ADMIN — FENTANYL CITRATE 50 MCG: 50 INJECTION, SOLUTION INTRAMUSCULAR; INTRAVENOUS at 14:09

## 2020-12-08 RX ADMIN — FENTANYL CITRATE 50 MCG: 50 INJECTION, SOLUTION INTRAMUSCULAR; INTRAVENOUS at 15:42

## 2020-12-08 RX ADMIN — SODIUM CHLORIDE, POTASSIUM CHLORIDE, SODIUM LACTATE AND CALCIUM CHLORIDE: 600; 310; 30; 20 INJECTION, SOLUTION INTRAVENOUS at 14:02

## 2020-12-08 RX ADMIN — DIPHENHYDRAMINE HYDROCHLORIDE 25 MG: 50 INJECTION, SOLUTION INTRAMUSCULAR; INTRAVENOUS at 14:22

## 2020-12-08 RX ADMIN — Medication 80 MCG: at 14:29

## 2020-12-08 RX ADMIN — Medication 3 AMPULE: at 14:05

## 2020-12-08 RX ADMIN — Medication 10 MG: at 14:37

## 2020-12-08 RX ADMIN — MIDAZOLAM HYDROCHLORIDE 1 MG: 1 INJECTION, SOLUTION INTRAMUSCULAR; INTRAVENOUS at 14:02

## 2020-12-08 RX ADMIN — GLYCOPYRROLATE 0.1 MG: 0.2 INJECTION, SOLUTION INTRAMUSCULAR; INTRAVENOUS at 15:31

## 2020-12-08 RX ADMIN — PROPOFOL 100 MG: 10 INJECTION, EMULSION INTRAVENOUS at 14:40

## 2020-12-08 RX ADMIN — PROPOFOL 50 MG: 10 INJECTION, EMULSION INTRAVENOUS at 14:46

## 2020-12-08 RX ADMIN — FENTANYL CITRATE 25 MCG: 50 INJECTION, SOLUTION INTRAMUSCULAR; INTRAVENOUS at 14:46

## 2020-12-08 RX ADMIN — ROCURONIUM BROMIDE 10 MG: 10 INJECTION INTRAVENOUS at 14:46

## 2020-12-08 RX ADMIN — PROPOFOL 50 MG: 10 INJECTION, EMULSION INTRAVENOUS at 15:42

## 2020-12-08 RX ADMIN — GLYCOPYRROLATE 0.2 MG: 0.2 INJECTION, SOLUTION INTRAMUSCULAR; INTRAVENOUS at 14:26

## 2020-12-08 RX ADMIN — FENTANYL CITRATE 50 MCG: 50 INJECTION, SOLUTION INTRAMUSCULAR; INTRAVENOUS at 14:55

## 2020-12-08 RX ADMIN — GLYCOPYRROLATE 0.1 MG: 0.2 INJECTION, SOLUTION INTRAMUSCULAR; INTRAVENOUS at 16:39

## 2020-12-08 RX ADMIN — SODIUM CHLORIDE, POTASSIUM CHLORIDE, SODIUM LACTATE AND CALCIUM CHLORIDE 25 ML/HR: 600; 310; 30; 20 INJECTION, SOLUTION INTRAVENOUS at 13:55

## 2020-12-08 RX ADMIN — PROPOFOL 100 MG: 10 INJECTION, EMULSION INTRAVENOUS at 16:23

## 2020-12-08 RX ADMIN — FENTANYL CITRATE 25 MCG: 50 INJECTION, SOLUTION INTRAMUSCULAR; INTRAVENOUS at 14:22

## 2020-12-08 RX ADMIN — NEOSTIGMINE METHYLSULFATE 3 MG: 1 INJECTION INTRAVENOUS at 16:21

## 2020-12-08 RX ADMIN — DEXAMETHASONE SODIUM PHOSPHATE 8 MG: 4 INJECTION, SOLUTION INTRAMUSCULAR; INTRAVENOUS at 14:22

## 2020-12-08 NOTE — DISCHARGE INSTRUCTIONS
Discharge Instructions Following Insertion of Vagus Nerve Stimulator        Keep incisions dry for one day, then OK to shower. Leave Dermabond (plastic coating) in place until it falls off. This usually occurs in about 2 weeks. No lifting over 15 pounds or other vigorous exertion with the left arm for two weeks. Take pain medicines as prescribed as needed for pain or use Tylenol or ibuprofen or naproxen. See Dr Paris Redd in two weeks in the office for follow up. Call for appointment  - [phone]          If any problems, call Dr. Paris Redd at [phone] or [phone] (after hours). Franck Andujar MD    TO PREVENT AN INFECTION      1. [address] YOUR HANDS     To prevent infection, good handwashing is the most important thing you or your caregiver can do.  Wash your hands with soap and water or use the hand  we gave you before you touch any wounds. 2. SHOWER     Use the antibacterial soap we gave you when you take a shower.  Shower with this soap until your wounds are healed.  To reach all areas of your body, you may need someone to help you.  Dont forget to clean your belly button with every shower. 3.  USE CLEAN SHEETS     Use freshly cleaned sheets on your bed after surgery.  To keep the surgery site clean, do not allow pets to sleep with you while your wound is still healing. 4. STOP SMOKING     Stop smoking, or at least cut back on smoking     Smoking slows your healing. 5.  CONTROL YOUR BLOOD SUGAR     High blood sugars slow wound healing.  If you are diabetic, control your blood sugar levels before and after your surgery. How to Care for Your Wound After Its Treated With  DERMABOND* Topical Skin Adhesive  DERMABOND* Topical Skin Adhesive (2-octyl cyanoacrylate) is a sterile, liquid skin adhesive  that holds wound edges together. The film will usually remain in place for 5 to 10 days, then  naturally fall off your skin.   The following will answer some of your questions and provide instructions for proper care for your  wound while it is healing:    CHECK WOUND APPEARANCE   Some swelling, redness, and pain are common with all wounds and normally will go away as the  wound heals. If swelling, redness, or pain increases or if the wound feels warm to the touch,  contact a doctor. Also contact a doctor if the wound edges reopen or separate. REPLACE BANDAGES   If your wound is bandaged, keep the bandage dry.  Replace the dressing daily until the adhesive film has fallen off or if the  bandage should become wet, unless otherwise instructed by your  physician.  When changing the dressing, do not place tape directly over the  DERMABOND adhesive film, because removing the tape later may also  remove the film. AVOID TOPICAL MEDICATIONS   Do not apply liquid or ointment medications or any other product to your wound while the  DERMABOND adhesive film is in place. These may loosen the film before your wound is healed. KEEP WOUND DRY AND PROTECTED   You may occasionally and briefly wet your wound in the shower or bath. Do not soak or scrub  your wound, do not swim, and avoid periods of heavy perspiration until the DERMABOND  adhesive has naturally fallen off. After showering or bathing, gently blot your wound dry with a  soft towel. If a protective dressing is being used, apply a fresh, dry bandage, being sure to keep  the tape off the DERMABOND adhesive film.  Apply a clean, dry bandage over the wound if necessary to protect it.  Protect your wound from injury until the skin has had sufficient time to heal.   Do not scratch, rub, or pick at the DERMABOND adhesive film. This may loosen the film before  your wound is healed.  Protect the wound from prolonged exposure to sunlight or tanning lamps while the film is in  place. If you have any questions or concerns about this product, please consult your doctor.   *[employer] 2002      Patient Education      Hydrocodone/Acetaminophen (Vicodin, Norco, Lortab) - (By mouth)   Why this medicine is used:   Treats pain. Contact a nurse or doctor right away if you have:  · Blistering, peeling, red skin rash  · Fast or slow heartbeat, shallow breathing, blue lips, fingernails, or skin  · Anxiety, restlessness, muscle spasms, twitching, seeing or hearing things that are not there  · Dark urine or pale stools, yellow skin or eyes  · Extreme weakness, sweating, seizures, cold or clammy skin  · Lightheadedness, dizziness, fainting, fever, sweating     Common side effects:  · Constipation, nausea, vomiting, loss of appetite, stomach pain  · Tiredness or sleepiness  © 2017 [employer]  Information is for End User's use only and may not be sold, redistributed or otherwise used for commercial purposes. DISCHARGE SUMMARY from Nurse    PATIENT INSTRUCTIONS:    After general anesthesia or intravenous sedation, for 24 hours or while taking prescription Narcotics:  · Limit your activities  · Do not drive and operate hazardous machinery  · Do not make important personal or business decisions  · Do  not drink alcoholic beverages  · If you have not urinated within 8 hours after discharge, please contact your surgeon on call. Report the following to your surgeon:  · Excessive pain, swelling, redness or odor of or around the surgical area  · Temperature over 100.5  · Nausea and vomiting lasting longer than 4 hours or if unable to take medications  · Any signs of decreased circulation or nerve impairment to extremity: change in color, persistent  numbness, tingling, coldness or increase pain  · Any questions    These are general instructions for a healthy lifestyle:    No smoking/ No tobacco products/ Avoid exposure to second hand smoke  Surgeon General's Warning:  Quitting smoking now greatly reduces serious risk to your health.     Obesity, smoking, and sedentary lifestyle greatly increases your risk for illness    A healthy diet, regular physical exercise & weight monitoring are important for maintaining a healthy lifestyle

## 2020-12-08 NOTE — ANESTHESIA PREPROCEDURE EVALUATION
Relevant Problems   No relevant active problems       Anesthetic History   No history of anesthetic complications            Review of Systems / Medical History  Patient summary reviewed, nursing notes reviewed and pertinent labs reviewed    Pulmonary  Within defined limits                 Neuro/Psych     seizures        Comments: Autism/aspergers Cardiovascular    Hypertension              Exercise tolerance: >4 METS     GI/Hepatic/Renal  Within defined limits              Endo/Other  Within defined limits           Other Findings              Physical Exam    Airway  Mallampati: II  TM Distance: 4 - 6 cm  Neck ROM: normal range of motion   Mouth opening: Normal     Cardiovascular  Regular rate and rhythm,  S1 and S2 normal,  no murmur, click, rub, or gallop             Dental  No notable dental hx       Pulmonary  Breath sounds clear to auscultation               Abdominal  GI exam deferred       Other Findings            Anesthetic Plan    ASA: 2  Anesthesia type: general          Induction: Intravenous  Anesthetic plan and risks discussed with: Patient and Mother

## 2020-12-08 NOTE — PERIOP NOTES
Handoff Report from Operating Room to PACU    Report received from ARTEMIO Richmond and Javed REYES regarding Jensenncervin Hsu. Surgeon(s):  MD Yohannes Eagle MD  And Procedure(s) (LRB):  INSERTION OF VAGUS NERVE STIMULATOR GENERATOR AND LEAD (N/A)  confirmed   with allergies and dressings discussed. Anesthesia type, drugs, patient history, complications, estimated blood loss, vital signs, intake and output, and last pain medication, lines, reversal medications and temperature were reviewed. 1800- Discharge instructions reviewed with patient's mother. Questions answered. PIV and telemetry removed. Patient transported to front entrance via wheelchair with all personal belongings including eye glasses where mother awaiting to pick patient up.

## 2020-12-08 NOTE — BRIEF OP NOTE
Brief Postoperative Note    Patient: Zeinab Valle  YOB: 1975  MRN: [id number]    Date of Procedure: 12/8/2020     Pre-Op Diagnosis: REFRACTORY PARTIAL ONSET SEIZURES    Post-Op Diagnosis: Same as preoperative diagnosis. Procedure(s):  INSERTION OF VAGUS NERVE STIMULATOR GENERATOR AND LEAD; ELECTRONIC TESTING AND OR    Surgeon(s):  MD rFoy Kirk MD    Surgical Assistant: Surg Asst-1: Lorie TELLEZ    Anesthesia: General     Estimated Blood Loss (mL): 15 ML    Complications: None    Specimens: * No specimens in log *     Implants:   Implant Name Type Inv. Item Serial No.  Lot No. LRB No. Used Action   Sentiva VNS therapy   [id number]  [id number] N/A 1 Implanted   LEAD NEUROSTIMULATOR L43CM DIA2MM VAGUS NRV LEANN BPLR MARKEL - [id number]  LEAD NEUROSTIMULATOR L43CM DIA2MM VAGUS NRV LEANN BPLR MARKEL [id number] LIVANOVA - FKA SORIN_WD 0000 Left 1 Implanted       Drains: * No LDAs found *    Findings: New VNS generator and lead tested. VNS programmed to lowest settings.     Electronically Signed by Aaliyah Conn MD on 12/8/2020 at 4:39 PM

## 2020-12-08 NOTE — OP NOTES
Operative Report    CPT 90031, 67249         Insertion of Vagus Nerve Stimulator (Lead and Generator); Electronic Testing and Programming        Patient:  Delphina Mcardle    Date of Surgery:  12/8/2020    Preoperative diagnosis: Refractory Seizure Disorder    Postoperative Diagnosis: Same    Neurologist:  Marielle Perry    Anesthesia:  General    Surgeon:  Ilsa Bustamante MD    Assistant:  Chao Caballero    Operative Summary:     The patient was taken to the operating room and placed on the operating table in the supine position with the neck extended and rotated to the right. The patient's left neck and chest were prepared and sterilely draped. An [place] drape was utilized. An incision was made along the left anterior axillary line about 5 cm in length. The incision was carried down to the pectoralis fascia. A pocket was created proceeding medially and superiorly. The pocket was irrigated with saline solution and covered with an antibiotic soaked sponge. Attention was turned to the left neck. An incision was made along the skin lines about  4 cm in length overlying the left sternocleidomastoid muscle. The underlying platysma muscle was divided and a pocket created under this muscle superiorly and inferiorly. The sternocleidomastoid muscle was reflected laterally to expose the internal jugular vein. The vein was reflected laterally, exposing the carotid artery, and the vagus nerve was identified. The nerve was gently exposed and freed for a 3.5 cm length. A 2 mm vagus nerve stimulator lead was brought up and the spiral arms were wrapped around the nerve. The lead was then carried inferiorly and then superiorly with a bend being created. The lead was then attached to the cervical fascia medially with two white attachment tabs and 5-0 prolene. The lead was then carried around in a loop in the sub - platysma pocket.  A tunneler was used to carry the lead down to the pocket overlying the pectoralis muscle. A vagus nerve stimulator generator was then attached to the lead with the set screw and the device was interrogated electronically with the generator outside of the patient. A lead test was performed; readings were normal.  The generator was then placed in the pocket and attached to the pectoralis fascia with a 4-0 prolene. A second interrogation and lead test were  performed, which gave normal readings. Autostimulation mode was tested. The generator was set at lowest settings and turned on. In the neck, the lead was attached to the undersurface of the platysma muscle with another white attachment tab. The platysma muscle was approximated with 3-0 chromic. The dermis was approximated with 3-0 chromic and the skin was closed with a 4-0 Monocryl intracuticular suture. The subcutaneous tissue at the pectoral site was closed with 3-0 chromic and the skin with 4-0 Monocryl intracuticular suture. Dermabond was applied to the incisions. The patient tolerated the procedure well and was taken to the Recovery Room in stable condition. Specimen - none    Drain - none    Estimated blood loss - 15 ml    Complications - none    Implants:   Implant Name Type Inv.  Item Serial No.  Lot No. LRB No. Used Action   Sentiva VNS therapy     [id number]   [id number] N/A 1 Implanted   LEAD NEUROSTIMULATOR L43CM DIA2MM VAGUS NRV LEANN BPLR MARKEL - [id number]   LEAD NEUROSTIMULATOR L43CM DIA2MM VAGUS NRV LEANN BPLR June Landers MD

## 2020-12-08 NOTE — ANESTHESIA POSTPROCEDURE EVALUATION
Post-Anesthesia Evaluation and Assessment    Patient: Jacob Gonzalez MRN: [id number]  SSN: [id number]    YOB: 1975  Age: 41 y.o. Sex: male      I have evaluated the patient and they are stable and ready for discharge from the PACU. Cardiovascular Function/Vital Signs  Visit Vitals  /65   Pulse 66   Temp 36.2 °C (97.2 °F)   Resp 14   Ht 5' 9\" (1.753 m)   Wt 77.7 kg (171 lb 4.8 oz)   SpO2 97%   BMI 25.30 kg/m²       Patient is status post General anesthesia for Procedure(s):  INSERTION OF VAGUS NERVE STIMULATOR GENERATOR AND LEAD. Nausea/Vomiting: None    Postoperative hydration reviewed and adequate. Pain:  Pain Scale 1: Numeric (0 - 10) (12/08/20 1715)  Pain Intensity 1: 0 (12/08/20 1715)   Managed    Neurological Status:   Neuro (WDL): Exceptions to WDL (12/08/20 1652)  Neuro  Neurologic State: Drowsy;Sleeping (12/08/20 1652)  Orientation Level: Appropriate for age;Oriented to person (12/08/20 1652)  Cognition: Follows commands (12/08/20 1652)  Speech: Delayed responses (12/08/20 1340)  LUE Motor Response: Purposeful;Spontaneous  (12/08/20 1652)  LLE Motor Response: Purposeful;Spontaneous  (12/08/20 1652)  RUE Motor Response: Purposeful;Spontaneous  (12/08/20 1652)  RLE Motor Response: Purposeful;Spontaneous  (12/08/20 1652)   At baseline    Mental Status, Level of Consciousness: Alert and  oriented to person, place, and time    Pulmonary Status:   O2 Device: Nasal cannula (12/08/20 1715)   Adequate oxygenation and airway patent    Complications related to anesthesia: None    Post-anesthesia assessment completed. No concerns    Signed By: Dempsey Sacks, MD     December 8, 2020              Procedure(s):  INSERTION OF VAGUS NERVE STIMULATOR GENERATOR AND LEAD.     general    <BSHSIANPOST>    INITIAL Post-op Vital signs:   Vitals Value Taken Time   /61 12/8/2020  5:30 PM   Temp 36.2 °C (97.2 °F) 12/8/2020  4:52 PM   Pulse 67 12/8/2020  5:41 PM   Resp 13 12/8/2020  5:41 PM   SpO2 93 % 12/8/2020  5:41 PM   Vitals shown include unvalidated device data.

## 2020-12-22 ENCOUNTER — OFFICE VISIT (OUTPATIENT)
Dept: NEUROLOGY | Age: 45
End: 2020-12-22
Payer: MEDICARE

## 2020-12-22 ENCOUNTER — DOCUMENTATION ONLY (OUTPATIENT)
Dept: NEUROLOGY | Age: 45
End: 2020-12-22

## 2020-12-22 VITALS
WEIGHT: 171 LBS | SYSTOLIC BLOOD PRESSURE: 118 MMHG | HEIGHT: 69 IN | OXYGEN SATURATION: 98 % | RESPIRATION RATE: 16 BRPM | DIASTOLIC BLOOD PRESSURE: 68 MMHG | BODY MASS INDEX: 25.33 KG/M2 | HEART RATE: 78 BPM

## 2020-12-22 DIAGNOSIS — G40.909 SEIZURE DISORDER (HCC): Primary | ICD-10-CM

## 2020-12-22 DIAGNOSIS — Z96.89 S/P PLACEMENT OF VNS (VAGUS NERVE STIMULATION) DEVICE: ICD-10-CM

## 2020-12-22 DIAGNOSIS — G93.0 BRAIN CYST: ICD-10-CM

## 2020-12-22 PROCEDURE — 99213 OFFICE O/P EST LOW 20 MIN: CPT | Performed by: PSYCHIATRY & NEUROLOGY

## 2020-12-22 PROCEDURE — G8752 SYS BP LESS 140: HCPCS | Performed by: PSYCHIATRY & NEUROLOGY

## 2020-12-22 PROCEDURE — 95977 ALYS CPLX CN NPGT PRGRMG: CPT | Performed by: PSYCHIATRY & NEUROLOGY

## 2020-12-22 PROCEDURE — G8419 CALC BMI OUT NRM PARAM NOF/U: HCPCS | Performed by: PSYCHIATRY & NEUROLOGY

## 2020-12-22 PROCEDURE — G8754 DIAS BP LESS 90: HCPCS | Performed by: PSYCHIATRY & NEUROLOGY

## 2020-12-22 PROCEDURE — G8427 DOCREV CUR MEDS BY ELIG CLIN: HCPCS | Performed by: PSYCHIATRY & NEUROLOGY

## 2020-12-22 PROCEDURE — G8510 SCR DEP NEG, NO PLAN REQD: HCPCS | Performed by: PSYCHIATRY & NEUROLOGY

## 2020-12-22 NOTE — PROGRESS NOTES
Ayush Blackwell is a 41 y.o. male came back for follow-up. He was recently admitted to the epilepsy monitoring unit. One of his seizures was captured and it was a typical generalized tonic-clonic seizure but there was no clear onset localization. His interictal EEG revealed independent and synchronous bifrontal sharps, right more than left. There were also sharp seen in both temporal head regions, left more than right. His cavernous angioma is in the left hippocampal region. He is now status post vagal nerve stimulator implantation. Tolerated procedure well. Came in today for programming    Currently taking Epidiolex, topiramate and Briviact    RECAP  At the last visit he reported an episode of abruptly passing out when Epidiolex. He had another couple of episodes of seizure-like activity on lower dose but none since he has been on the current dose   He had a  generalized seizure in March and then again in July. He was initially planned to start clobazam but then Epidiolex was approved by insurance and it was started instead. He has been abstaining from alcohol and is on naltrexone for alcohol dependence    He has not been employed for the past several months. 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 His typical seizures in the past have been generalized tonic-clonic type, in the setting of alcohol withdrawal.  However he has had seizures outside of alcohol use as well. 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  A follow-up MRI was recommended but he has not been able to do it yet. EXAM  Exam:  Visit Vitals  /68   Pulse 78   Resp 16   Ht 5' 9\" (1.753 m)   Wt 171 lb (77.6 kg)   SpO2 98%   BMI 25.25 kg/m²     Gen:Alert, oriented. Speaks very little.    CV: RRR  Lungs: non labored breathing  Abd: non distending  Neuro: A&O x 3, no dysarthria or aphasia  CN II-XII: PERRL, EOMI, face symmetric, tongue/palate midline  Motor: strength 5/5 all four ext  Sensory: intact to LT  Gait: normal    LABS/ IMAGING  MRI Results (most recent):  Results from Hospital Encounter encounter on 06/10/19   MRI BRAIN W WO CONT    Narrative EXAM:  MRI BRAIN W WO CONT    INDICATION:    Seizures new or progressive    COMPARISON:  January 23, 2012. CONTRAST: 17 ml Dotarem. TECHNIQUE:    Multiplanar multisequence acquisition without and with contrast of the brain. FINDINGS:  Diffuse motion artifact. Diffusion imaging does not show acute ischemic changes . Minimal nonspecific white matter disease. Ventricle size is normal and stable. No extra-axial fluid collection hemorrhage or shift. Temporal lobe no show no focal abnormality and appear symmetrical.  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 No new lesion is  seen. Incidental diffuse mucosal thickening within the a stomach air cells bilaterally      Impression  impression: No acute changes no masses. Stable findings. EEG showed occasional bifrontal sharp/spike discharges  Lab Results   Component Value Date/Time    WBC 7.6 10/26/2020 12:00 PM    HGB 13.1 10/26/2020 12:00 PM    Hematocrit (POC) 44 06/10/2019 04:29 PM    HCT 38.8 10/26/2020 12:00 PM    PLATELET 352 78/64/6898 12:00 PM    MCV 88.2 10/26/2020 12:00 PM     Lab Results   Component Value Date/Time    Sodium 142 10/26/2020 12:00 PM    Potassium 3.8 10/26/2020 12:00 PM    Chloride 113 (H) 10/26/2020 12:00 PM    CO2 23 10/26/2020 12:00 PM    Anion gap 6 10/26/2020 12:00 PM    Glucose 90 10/26/2020 12:00 PM    BUN 15 10/26/2020 12:00 PM    Creatinine 0.92 10/26/2020 12:00 PM    BUN/Creatinine ratio 16 10/26/2020 12:00 PM    GFR est AA >60 10/26/2020 12:00 PM    GFR est non-AA >60 10/26/2020 12:00 PM    Calcium 8.7 10/26/2020 12:00 PM    Bilirubin, total 0.5 10/26/2020 12:00 PM    Alk.  phosphatase 77 10/26/2020 12:00 PM    Protein, total 6.9 10/26/2020 12:00 PM    Albumin 4.0 10/26/2020 12:00 PM    Globulin 2.9 10/26/2020 12:00 PM    A-G Ratio 1.4 10/26/2020 12:00 PM    ALT (SGPT) 35 10/26/2020 12:00 PM     VNS Settings;  VNS interrogated and programmed to the following settings:    Basal output: 0.5 mA  Frequency: 20 Hz  Pulkse Width: 250 µs  On time: 30 seconds  Off time: 5 minutes    Magnet output: 0.75 mA   Mag pulse width: 500 µs  On time: 60 seconds    Autostim: On  Output: 0.625 mg  Pulse width: 250 µs  On time: 60 seconds    Tachycardia detection: Enabled  Auto stim threshold: 40%    Prone position detection: On      ASSESSMENT    ICD-10-CM ICD-9-CM    1. Seizure disorder ([facility] [address]  G40.909 345.90    2. Brain cyst  G93.0 348.0    3. S/P placement of VNS (vagus nerve stimulation) device  Z96.89 V45.89    4       Cavernous angioma      PLAN  He has idiopathic epilepsy without any clear localization   His interictal EEG shows synchronous and independent discharges in both frontal head regions, right more than left. In addition there were independent sharp wave discharges seen in both temporal regions left more than right. He continued to have breakthrough seizures despite being on 3 different anticonvulsants  He is now status post VNS implantation  Initial interrogation and programming of the device was done today to above settings  It will auto program to next step in 14 days  I will see him back in 1 month  Continue all current medications at the same dose for now    His MRI findings are chronic and stable.   Will repeat MRI next year  Follow-up in 6 months    Jolene Pineda MD

## 2020-12-22 NOTE — PROGRESS NOTES
Mr. Dione Cota presents today to follow up seizure disorder and VNS placement. Depression screening done on patient.

## 2020-12-22 NOTE — PROGRESS NOTES
Gave Briviact 100ms samples, #14 tablets, 5 boxes.  [facility], [id number], exp 09 2022

## 2020-12-28 ENCOUNTER — OFFICE VISIT (OUTPATIENT)
Dept: SURGERY | Age: 45
End: 2020-12-28
Payer: MEDICARE

## 2020-12-28 VITALS
BODY MASS INDEX: 25.43 KG/M2 | DIASTOLIC BLOOD PRESSURE: 84 MMHG | OXYGEN SATURATION: 98 % | SYSTOLIC BLOOD PRESSURE: 131 MMHG | HEART RATE: 72 BPM | WEIGHT: 172.2 LBS | TEMPERATURE: 97.2 F

## 2020-12-28 DIAGNOSIS — Z09 POSTOPERATIVE EXAMINATION: Primary | ICD-10-CM

## 2020-12-28 PROCEDURE — 99024 POSTOP FOLLOW-UP VISIT: CPT | Performed by: SURGERY

## 2020-12-28 NOTE — PROGRESS NOTES
Identified pt with two pt identifiers(name and ). Reviewed record in preparation for visit and have obtained necessary documentation. All patient medications has been reviewed. Chief Complaint   Patient presents with    Surgical Follow-up     3 week s/p Insertion of Vagus Nerve Stimulator on 2020       Health Maintenance Due   Topic    Pneumococcal 0-64 years (1 of 1 - PPSV23)       Vitals:    20 1538   BP: 131/84   Pulse: 72   Temp: 97.2 °F (36.2 °C)   TempSrc: Temporal   SpO2: 98%   Weight: 78.1 kg (172 lb 3.2 oz)   PainSc:   0 - No pain       4. Have you been to the ER, urgent care clinic since your last visit? Hospitalized since your last visit? No    5. Have you seen or consulted any other health care providers outside of the [address] [place] [address] since your last visit? Include any pap smears or colon screening. No      Patient is accompanied by spouse I have received verbal consent from William Hsu to discuss any/all medical information while they are present in the room.

## 2020-12-28 NOTE — PROGRESS NOTES
Surgery    The patient is status post placement of vagus nerve stimulator generator [facility]) and lead. The patient has no complaints. On examination the incision sites left neck and left anterior axillary line are healing nicely. Voice is normal.     The patient has seen hisneurologist to have the device turned on and programmed. The patient can see me prn. Final Diagnosis: Status post insertion of vagus nerve stimulator, post-operative visit.          Vanna Beavers MD

## 2020-12-30 DIAGNOSIS — G40.909 SEIZURE DISORDER (HCC): ICD-10-CM

## 2020-12-30 RX ORDER — TOPIRAMATE 200 MG/1
TABLET ORAL
Qty: 180 TAB | Refills: 3 | Status: SHIPPED | OUTPATIENT
Start: 2020-12-30 | End: 2021-12-08

## 2020-12-31 DIAGNOSIS — G40.909 SEIZURE DISORDER (HCC): ICD-10-CM

## 2020-12-31 NOTE — TELEPHONE ENCOUNTER
----- Message from [facility] sent at 12/31/2020 12:42 PM EST -----  Regarding: Dr. Jhoan Welch (if not patient): Carlito Choi with [address]      Relationship of caller (if not patient):  Pharmacy technician      Best contact number(s):  [phone]      Name of medication and dosage if known:  \"Briviact\", 100mg      Is patient out of this medication (yes/no): N      Pharmacy name:  [address] [place] [address] listed in chart? (yes/no): Y    Pharmacy phone number:  [phone]  or fax number [phone] or escribe      Details to clarify the request:  Last refill was on 12/21/2020.       [facility]

## 2021-01-12 DIAGNOSIS — G40.909 SEIZURE DISORDER (HCC): ICD-10-CM

## 2021-01-13 RX ORDER — CANNABIDIOL 100 MG/ML
600 SOLUTION ORAL 2 TIMES DAILY
Qty: 300 ML | Refills: 3 | Status: SHIPPED | OUTPATIENT
Start: 2021-01-13 | End: 2021-04-15

## 2021-01-14 ENCOUNTER — TELEPHONE (OUTPATIENT)
Dept: NEUROLOGY | Age: 46
End: 2021-01-14

## 2021-02-02 ENCOUNTER — OFFICE VISIT (OUTPATIENT)
Dept: NEUROLOGY | Age: 46
End: 2021-02-02
Payer: MEDICARE

## 2021-02-02 VITALS
SYSTOLIC BLOOD PRESSURE: 98 MMHG | RESPIRATION RATE: 16 BRPM | OXYGEN SATURATION: 98 % | HEIGHT: 69 IN | DIASTOLIC BLOOD PRESSURE: 60 MMHG | BODY MASS INDEX: 25.48 KG/M2 | WEIGHT: 172 LBS | HEART RATE: 93 BPM

## 2021-02-02 DIAGNOSIS — G40.909 SEIZURE DISORDER (HCC): Primary | ICD-10-CM

## 2021-02-02 DIAGNOSIS — Z96.89 S/P PLACEMENT OF VNS (VAGUS NERVE STIMULATION) DEVICE: ICD-10-CM

## 2021-02-02 DIAGNOSIS — G93.0 BRAIN CYST: ICD-10-CM

## 2021-02-02 DIAGNOSIS — G40.219 PARTIAL SYMPTOMATIC EPILEPSY WITH COMPLEX PARTIAL SEIZURES, INTRACTABLE, WITHOUT STATUS EPILEPTICUS (HCC): ICD-10-CM

## 2021-02-02 PROCEDURE — G8419 CALC BMI OUT NRM PARAM NOF/U: HCPCS | Performed by: PSYCHIATRY & NEUROLOGY

## 2021-02-02 PROCEDURE — 99213 OFFICE O/P EST LOW 20 MIN: CPT | Performed by: PSYCHIATRY & NEUROLOGY

## 2021-02-02 PROCEDURE — 95976 ALYS SMPL CN NPGT PRGRMG: CPT | Performed by: PSYCHIATRY & NEUROLOGY

## 2021-02-02 PROCEDURE — G8754 DIAS BP LESS 90: HCPCS | Performed by: PSYCHIATRY & NEUROLOGY

## 2021-02-02 PROCEDURE — G8427 DOCREV CUR MEDS BY ELIG CLIN: HCPCS | Performed by: PSYCHIATRY & NEUROLOGY

## 2021-02-02 PROCEDURE — G8510 SCR DEP NEG, NO PLAN REQD: HCPCS | Performed by: PSYCHIATRY & NEUROLOGY

## 2021-02-02 PROCEDURE — G8752 SYS BP LESS 140: HCPCS | Performed by: PSYCHIATRY & NEUROLOGY

## 2021-02-02 NOTE — PATIENT INSTRUCTIONS
[facility]   Statement to Patients  April 1, 2014      In an effort to ensure the large volume of patient prescription refills is processed in the most efficient and expeditious manner, we are asking our patients to assist us by calling your Pharmacy for all prescription refills, this will include also your  Mail Order Pharmacy. The pharmacy will contact our office electronically to continue the refill process. Please do not wait until the last minute to call your pharmacy. We need at least 48 hours (2days) to fill prescriptions. We also encourage you to call your pharmacy before going to  your prescription to make sure it is ready. With regard to controlled substance prescription refill requests (narcotic refills) that need to be picked up at our office, we ask your cooperation by providing us with at least 72 hours (3days) notice that you will need a refill. We will not refill narcotic prescription refill requests after 4:00pm on any weekday, Monday through Thursday, or after 2:00pm on Fridays, or on the weekends. We encourage everyone to explore another way of getting your prescription refill request processed using [employer], our patient web portal through our electronic medical record system. [employer] is an efficient and effective way to communicate your medication request directly to the office and  downloadable as an ama on your smart phone . [employer] also features a review functionality that allows you to view your medication list as well as leave messages for your physician. Are you ready to get connected? If so please review the attatched instructions or speak to any of our staff to get you set up right away! Thank you so much for your cooperation. Should you have any questions please contact our Practice Administrator.     The Physicians and Staff,  [address] [place] [address] Neurology Clinic

## 2021-02-02 NOTE — PROGRESS NOTES
Mr. Shreyas Ramirez presents today to follow up epilepsy. His last seizure was 1/19/21. Depression screening done on patient.

## 2021-02-02 NOTE — PROGRESS NOTES
Dez Calixto is a 41 y.o. male came back for follow-up. He is tolerating the VNS settings quite well. He may have had an unwitnessed episode on January 19 where his mother found that he was sitting somewhat twisted in his room in his chair had been pushed back. Was complaining of diarrhea and the dose of Epidiolex was reduced to 400 mg twice daily which helped. No other complaints    EMU admission captured one of his seizures and it was a typical generalized tonic-clonic seizure but there was no clear onset localization. His interictal EEG revealed independent and synchronous bifrontal sharps, right more than left. There were also sharp seen in both temporal head regions, left more than right. His cavernous angioma is in the left hippocampal region. Currently taking Epidiolex, topiramate and Briviact    RECAP  At the last visit he reported an episode of abruptly passing out when Epidiolex. He had another couple of episodes of seizure-like activity on lower dose but none since he has been on the current dose   He had a  generalized seizure in March and then again in July. He was initially planned to start clobazam but then Epidiolex was approved by insurance and it was started instead. He has been abstaining from alcohol and is on naltrexone for alcohol dependence    He has not been employed for the past several months. 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 His typical seizures in the past have been generalized tonic-clonic type, in the setting of alcohol withdrawal.  However he has had seizures outside of alcohol use as well. 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  A follow-up MRI was recommended but he has not been able to do it yet. EXAM  Exam:  Visit Vitals  BP 98/60   Pulse 93   Resp 16   Ht 5' 9\" (1.753 m)   Wt 172 lb (78 kg)   SpO2 98%   BMI 25.40 kg/m²     Gen:Alert, oriented. Speaks very little.    CV: RRR  Lungs: non labored breathing  Abd: non distending  Neuro: A&O x 3, no dysarthria or aphasia  CN II-XII: PERRL, EOMI, face symmetric, tongue/palate midline  Motor: strength 5/5 all four ext  Sensory: intact to LT  Gait: normal    LABS/ IMAGING  MRI Results (most recent):  Results from Hospital Encounter encounter on 06/10/19   MRI BRAIN W WO CONT    Narrative EXAM:  MRI BRAIN W WO CONT    INDICATION:    Seizures new or progressive    COMPARISON:  January 23, 2012. CONTRAST: 17 ml Dotarem. TECHNIQUE:    Multiplanar multisequence acquisition without and with contrast of the brain. FINDINGS:  Diffuse motion artifact. Diffusion imaging does not show acute ischemic changes . Minimal nonspecific white matter disease. Ventricle size is normal and stable. No extra-axial fluid collection hemorrhage or shift. Temporal lobe no show no focal abnormality and appear symmetrical.  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 No new lesion is  seen. Incidental diffuse mucosal thickening within the a stomach air cells bilaterally      Impression  impression: No acute changes no masses. Stable findings.        EEG showed occasional bifrontal sharp/spike discharges  Lab Results   Component Value Date/Time    WBC 7.6 10/26/2020 12:00 PM    HGB 13.1 10/26/2020 12:00 PM    Hematocrit (POC) 44 06/10/2019 04:29 PM    HCT 38.8 10/26/2020 12:00 PM    PLATELET 306 72/42/8301 12:00 PM    MCV 88.2 10/26/2020 12:00 PM     Lab Results   Component Value Date/Time    Sodium 142 10/26/2020 12:00 PM    Potassium 3.8 10/26/2020 12:00 PM    Chloride 113 (H) 10/26/2020 12:00 PM    CO2 23 10/26/2020 12:00 PM    Anion gap 6 10/26/2020 12:00 PM    Glucose 90 10/26/2020 12:00 PM    BUN 15 10/26/2020 12:00 PM    Creatinine 0.92 10/26/2020 12:00 PM    BUN/Creatinine ratio 16 10/26/2020 12:00 PM    GFR est AA >60 10/26/2020 12:00 PM    GFR est non-AA >60 10/26/2020 12:00 PM    Calcium 8.7 10/26/2020 12:00 PM    Bilirubin, total 0.5 10/26/2020 12:00 PM    Alk. phosphatase 77 10/26/2020 12:00 PM    Protein, total 6.9 10/26/2020 12:00 PM    Albumin 4.0 10/26/2020 12:00 PM    Globulin 2.9 10/26/2020 12:00 PM    A-G Ratio 1.4 10/26/2020 12:00 PM    ALT (SGPT) 35 10/26/2020 12:00 PM     VNS Settings;  VNS interrogated and programmed to the following settings:    Basal output: 1.25 mA  Frequency: 20 Hz  Pulkse Width: 250 µs  On time: 30 seconds  Off time: 5 minutes    Magnet output: 1.5 mA   Mag pulse width: 500 µs  On time: 60 seconds    Autostim: On  Output: 0.625 mg  Pulse width: 250 µs  On time: 60 seconds    Tachycardia detection: Enabled  Auto stim threshold: 40%    Prone position detection: On      ASSESSMENT    ICD-10-CM ICD-9-CM    1. Seizure disorder ([address]  G40.909 345.90    2. Brain cyst  G93.0 348.0    3. S/P placement of VNS (vagus nerve stimulation) device  Z96.89 V45.89    4       Cavernous angioma      PLAN  He has idiopathic epilepsy without any clear localization   His interictal EEG shows synchronous and independent discharges in both frontal head regions, right more than left. In addition there were independent sharp wave discharges seen in both temporal regions left more than right. He continued to have breakthrough seizures despite being on 3 different anticonvulsants  He is now status post VNS implantation  VNS was further programmed to above settings and it has been programmed to go up further in 2 weeks to current of 1.5 mA  Follow-up in 3 months  Continue all current medications including slightly reduced dose of Epidiolex at 400 mg twice daily   His MRI findings are chronic and stable.   Will repeat MRI next year  Follow-up in 6 months    Dariusz Souza MD

## 2021-02-09 ENCOUNTER — TELEPHONE (OUTPATIENT)
Dept: NEUROLOGY | Age: 46
End: 2021-02-09

## 2021-02-09 NOTE — TELEPHONE ENCOUNTER
----- Message from Vinicio Watson sent at 2/9/2021  2:08 PM EST -----  Regarding: /Refill  Medication Refill    Caller (if not patient):      Relationship of caller (if not patient):      Best contact number(s):      Name of medication and dosage if known:epiditolex      Is patient out of this medication (yes/no):yes      Pharmacy name:[facility]    Pharmacy listed in chart? (yes/no):yes   [facility] phone number : [phone]    [facility] called requesting a dose change for the medication .     Vinicio Watson

## 2021-04-06 DIAGNOSIS — G40.909 SEIZURE DISORDER (HCC): ICD-10-CM

## 2021-04-06 NOTE — TELEPHONE ENCOUNTER
Requested Prescriptions     Pending Prescriptions Disp Refills    brivaracetam (Briviact) 100 mg tablet 180 Tab 3     Sig: Take 1 Tab by mouth two (2) times a day. Max Daily Amount: 200 mg.

## 2021-04-07 NOTE — TELEPHONE ENCOUNTER
Following up on script, did inform her that Dr Thedore Gowers is out until Monday and nurse will be back tomorrow, only has a few weeks worth left but mail is moving slow and is concerned about getting it before he runs out. Please call.

## 2021-04-08 DIAGNOSIS — G40.909 SEIZURE DISORDER (HCC): ICD-10-CM

## 2021-04-09 NOTE — TELEPHONE ENCOUNTER
Refill sent to [facility] for 90 days only. Dr. Rufina Schuler can refill for additional refills at f/u appt next week.

## 2021-04-15 ENCOUNTER — DOCUMENTATION ONLY (OUTPATIENT)
Dept: NEUROLOGY | Age: 46
End: 2021-04-15

## 2021-04-15 ENCOUNTER — OFFICE VISIT (OUTPATIENT)
Dept: NEUROLOGY | Age: 46
End: 2021-04-15
Payer: MEDICARE

## 2021-04-15 VITALS
OXYGEN SATURATION: 97 % | RESPIRATION RATE: 18 BRPM | WEIGHT: 172 LBS | DIASTOLIC BLOOD PRESSURE: 80 MMHG | SYSTOLIC BLOOD PRESSURE: 118 MMHG | BODY MASS INDEX: 25.48 KG/M2 | HEART RATE: 84 BPM | HEIGHT: 69 IN

## 2021-04-15 DIAGNOSIS — G40.219 PARTIAL SYMPTOMATIC EPILEPSY WITH COMPLEX PARTIAL SEIZURES, INTRACTABLE, WITHOUT STATUS EPILEPTICUS (HCC): ICD-10-CM

## 2021-04-15 DIAGNOSIS — F32.A DEPRESSION, UNSPECIFIED DEPRESSION TYPE: ICD-10-CM

## 2021-04-15 DIAGNOSIS — G40.909 SEIZURE DISORDER (HCC): ICD-10-CM

## 2021-04-15 DIAGNOSIS — Z96.89 S/P PLACEMENT OF VNS (VAGUS NERVE STIMULATION) DEVICE: ICD-10-CM

## 2021-04-15 DIAGNOSIS — G93.0 BRAIN CYST: Primary | ICD-10-CM

## 2021-04-15 PROCEDURE — 99214 OFFICE O/P EST MOD 30 MIN: CPT | Performed by: PSYCHIATRY & NEUROLOGY

## 2021-04-15 PROCEDURE — G8754 DIAS BP LESS 90: HCPCS | Performed by: PSYCHIATRY & NEUROLOGY

## 2021-04-15 PROCEDURE — G8510 SCR DEP NEG, NO PLAN REQD: HCPCS | Performed by: PSYCHIATRY & NEUROLOGY

## 2021-04-15 PROCEDURE — 95976 ALYS SMPL CN NPGT PRGRMG: CPT | Performed by: PSYCHIATRY & NEUROLOGY

## 2021-04-15 PROCEDURE — G8752 SYS BP LESS 140: HCPCS | Performed by: PSYCHIATRY & NEUROLOGY

## 2021-04-15 PROCEDURE — G8419 CALC BMI OUT NRM PARAM NOF/U: HCPCS | Performed by: PSYCHIATRY & NEUROLOGY

## 2021-04-15 PROCEDURE — G8427 DOCREV CUR MEDS BY ELIG CLIN: HCPCS | Performed by: PSYCHIATRY & NEUROLOGY

## 2021-04-15 RX ORDER — SERTRALINE HYDROCHLORIDE 50 MG/1
50 TABLET, FILM COATED ORAL DAILY
Qty: 60 TAB | Refills: 0
Start: 2021-04-15 | End: 2021-07-14 | Stop reason: DRUGHIGH

## 2021-04-15 RX ORDER — CANNABIDIOL 100 MG/ML
400 SOLUTION ORAL 2 TIMES DAILY
Qty: 300 ML | Refills: 3
Start: 2021-04-15 | End: 2021-05-18

## 2021-04-15 NOTE — PROGRESS NOTES
Kerri Kuhn is a 40 y.o. male came back for follow-up. He has been doing well. No further seizures since last seen. Tolerating VNS settings well. His last episode was on January 19 where his mother found that he was sitting somewhat twisted in his room and his chair had been pushed back. Was complaining of diarrhea and the dose of Epidiolex was reduced to 400 mg twice daily which helped. No other complaints    EMU admission captured one of his seizures and it was a typical generalized tonic-clonic seizure but there was no clear onset localization. His interictal EEG revealed independent and synchronous bifrontal sharps, right more than left. There were also sharp seen in both temporal head regions, left more than right. His cavernous angioma is in the left hippocampal region. Currently taking Epidiolex, topiramate and Briviact  On sertraline 50 mg twice daily now    RECAP  At the last visit he reported an episode of abruptly passing out when Epidiolex. He had another couple of episodes of seizure-like activity on lower dose but none since he has been on the current dose   He had a  generalized seizure in March and then again in July. He was initially planned to start clobazam but then Epidiolex was approved by insurance and it was started instead. He has been abstaining from alcohol and is on naltrexone for alcohol dependence    He has not been employed for the past several months. 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 His typical seizures in the past have been generalized tonic-clonic type, in the setting of alcohol withdrawal.  However he has had seizures outside of alcohol use as well. 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  A follow-up MRI was recommended but he has not been able to do it yet.      EXAM  Exam:  Visit Vitals  /80   Pulse 84   Resp 18   Ht 5' 9\" (1.753 m)   Wt 172 lb (78 kg)   SpO2 97%   BMI 25.40 kg/m²     Gen:Alert, oriented. Speaks very little. CV: RRR  Lungs: non labored breathing  Abd: non distending  Neuro: A&O x 3, no dysarthria or aphasia  CN II-XII: PERRL, EOMI, face symmetric, tongue/palate midline  Motor: strength 5/5 all four ext  Sensory: intact to LT  Gait: normal    LABS/ IMAGING  MRI Results (most recent):  Results from Hospital Encounter encounter on 06/10/19   MRI BRAIN W WO CONT    Narrative EXAM:  MRI BRAIN W WO CONT    INDICATION:    Seizures new or progressive    COMPARISON:  January 23, 2012. CONTRAST: 17 ml Dotarem. TECHNIQUE:    Multiplanar multisequence acquisition without and with contrast of the brain. FINDINGS:  Diffuse motion artifact. Diffusion imaging does not show acute ischemic changes . Minimal nonspecific white matter disease. Ventricle size is normal and stable. No extra-axial fluid collection hemorrhage or shift. Temporal lobe no show no focal abnormality and appear symmetrical.  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 No new lesion is  seen. Incidental diffuse mucosal thickening within the a stomach air cells bilaterally      Impression  impression: No acute changes no masses. Stable findings.        EEG showed occasional bifrontal sharp/spike discharges  Lab Results   Component Value Date/Time    WBC 7.6 10/26/2020 12:00 PM    HGB 13.1 10/26/2020 12:00 PM    Hematocrit (POC) 44 06/10/2019 04:29 PM    HCT 38.8 10/26/2020 12:00 PM    PLATELET 431 50/24/0333 12:00 PM    MCV 88.2 10/26/2020 12:00 PM     Lab Results   Component Value Date/Time    Sodium 142 10/26/2020 12:00 PM    Potassium 3.8 10/26/2020 12:00 PM    Chloride 113 (H) 10/26/2020 12:00 PM    CO2 23 10/26/2020 12:00 PM    Anion gap 6 10/26/2020 12:00 PM    Glucose 90 10/26/2020 12:00 PM    BUN 15 10/26/2020 12:00 PM    Creatinine 0.92 10/26/2020 12:00 PM    BUN/Creatinine ratio 16 10/26/2020 12:00 PM    GFR est AA >60 10/26/2020 12:00 PM    GFR est non-AA >60 10/26/2020 12:00 PM    Calcium 8.7 10/26/2020 12:00 PM    Bilirubin, total 0.5 10/26/2020 12:00 PM    Alk. phosphatase 77 10/26/2020 12:00 PM    Protein, total 6.9 10/26/2020 12:00 PM    Albumin 4.0 10/26/2020 12:00 PM    Globulin 2.9 10/26/2020 12:00 PM    A-G Ratio 1.4 10/26/2020 12:00 PM    ALT (SGPT) 35 10/26/2020 12:00 PM     VNS Settings;  VNS interrogated and programmed to following settings:    Basal output: 1.5 mA  Frequency: 20 Hz  Pulkse Width: 250 µs  On time: 30 seconds  Off time: 5 minutes    Magnet output: 1.625 mA   Mag pulse width: 500 µs  On time: 60 seconds    Autostim: On  Output: 0.625 mg  Pulse width: 250 µs  On time: 60 seconds    Tachycardia detection: Enabled  Auto stim threshold: 40%    Prone position detection: On      ASSESSMENT    ICD-10-CM ICD-9-CM    1. Brain cyst  G93.0 348.0    2. S/P placement of VNS (vagus nerve stimulation) device  Z96.89 V45.89    3. Partial symptomatic epilepsy with complex partial seizures, intractable, without status epilepticus ([facility] [address]  G40.219 345.41    4. Seizure disorder (HCC)  G40.909 345.90 brivaracetam (Briviact) 100 mg tablet      cannabidioL (Epidiolex) 100 mg/mL soln      sertraline (ZOLOFT) 50 mg tablet   5. Depression, unspecified depression type  F32.9 311 sertraline (ZOLOFT) 50 mg tablet   4       Cavernous angioma      PLAN  He has idiopathic epilepsy without any clear localization   His interictal EEG shows synchronous and independent discharges in both frontal head regions, right more than left. In addition there were independent sharp wave discharges seen in both temporal regions left more than right. He continued to have breakthrough seizures despite being on 3 different anticonvulsants  He is now status post VNS implantation  He has not had any further seizures in almost 3+ months and is tolerating current VNS settings quite well.   Output current has been increased to 1.5 mA. Continue all current medications for now and will try to lower some of the medication in 3 months  His MRI findings are chronic and stable.   Will repeat MRI next year  Follow-up in 4 months    Geraldine Loredo MD

## 2021-04-15 NOTE — PATIENT INSTRUCTIONS
[facility]   Statement to Patients  April 1, 2014      In an effort to ensure the large volume of patient prescription refills is processed in the most efficient and expeditious manner, we are asking our patients to assist us by calling your Pharmacy for all prescription refills, this will include also your  Mail Order Pharmacy. The pharmacy will contact our office electronically to continue the refill process. Please do not wait until the last minute to call your pharmacy. We need at least 48 hours (2days) to fill prescriptions. We also encourage you to call your pharmacy before going to  your prescription to make sure it is ready. With regard to controlled substance prescription refill requests (narcotic refills) that need to be picked up at our office, we ask your cooperation by providing us with at least 72 hours (3days) notice that you will need a refill. We will not refill narcotic prescription refill requests after 4:00pm on any weekday, Monday through Thursday, or after 2:00pm on Fridays, or on the weekends. We encourage everyone to explore another way of getting your prescription refill request processed using [employer], our patient web portal through our electronic medical record system. [employer] is an efficient and effective way to communicate your medication request directly to the office and  downloadable as an ama on your smart phone . [employer] also features a review functionality that allows you to view your medication list as well as leave messages for your physician. Are you ready to get connected? If so please review the attatched instructions or speak to any of our staff to get you set up right away! Thank you so much for your cooperation. Should you have any questions please contact our Practice Administrator.     The Physicians and Staff,  [facility]

## 2021-05-11 ENCOUNTER — OFFICE VISIT (OUTPATIENT)
Dept: NEUROLOGY | Age: 46
End: 2021-05-11
Payer: MEDICAID

## 2021-05-11 VITALS
RESPIRATION RATE: 18 BRPM | WEIGHT: 172 LBS | OXYGEN SATURATION: 96 % | DIASTOLIC BLOOD PRESSURE: 80 MMHG | SYSTOLIC BLOOD PRESSURE: 110 MMHG | HEART RATE: 88 BPM | BODY MASS INDEX: 25.48 KG/M2 | HEIGHT: 69 IN

## 2021-05-11 DIAGNOSIS — F32.A DEPRESSION, UNSPECIFIED DEPRESSION TYPE: ICD-10-CM

## 2021-05-11 DIAGNOSIS — G93.0 BRAIN CYST: ICD-10-CM

## 2021-05-11 DIAGNOSIS — G40.219 PARTIAL SYMPTOMATIC EPILEPSY WITH COMPLEX PARTIAL SEIZURES, INTRACTABLE, WITHOUT STATUS EPILEPTICUS (HCC): Primary | ICD-10-CM

## 2021-05-11 DIAGNOSIS — Z96.89 S/P PLACEMENT OF VNS (VAGUS NERVE STIMULATION) DEVICE: ICD-10-CM

## 2021-05-11 PROCEDURE — G8510 SCR DEP NEG, NO PLAN REQD: HCPCS | Performed by: PSYCHIATRY & NEUROLOGY

## 2021-05-11 PROCEDURE — 95976 ALYS SMPL CN NPGT PRGRMG: CPT | Performed by: PSYCHIATRY & NEUROLOGY

## 2021-05-11 PROCEDURE — 99214 OFFICE O/P EST MOD 30 MIN: CPT | Performed by: PSYCHIATRY & NEUROLOGY

## 2021-05-11 PROCEDURE — G8754 DIAS BP LESS 90: HCPCS | Performed by: PSYCHIATRY & NEUROLOGY

## 2021-05-11 PROCEDURE — G8752 SYS BP LESS 140: HCPCS | Performed by: PSYCHIATRY & NEUROLOGY

## 2021-05-11 PROCEDURE — G8427 DOCREV CUR MEDS BY ELIG CLIN: HCPCS | Performed by: PSYCHIATRY & NEUROLOGY

## 2021-05-11 PROCEDURE — G8419 CALC BMI OUT NRM PARAM NOF/U: HCPCS | Performed by: PSYCHIATRY & NEUROLOGY

## 2021-05-11 NOTE — PATIENT INSTRUCTIONS
10 [facility]   Statement to Patients  April 1, 2014      In an effort to ensure the large volume of patient prescription refills is processed in the most efficient and expeditious manner, we are asking our patients to assist us by calling your Pharmacy for all prescription refills, this will include also your  Mail Order Pharmacy. The pharmacy will contact our office electronically to continue the refill process. Please do not wait until the last minute to call your pharmacy. We need at least 48 hours (2days) to fill prescriptions. We also encourage you to call your pharmacy before going to  your prescription to make sure it is ready. With regard to controlled substance prescription refill requests (narcotic refills) that need to be picked up at our office, we ask your cooperation by providing us with at least 72 hours (3days) notice that you will need a refill. We will not refill narcotic prescription refill requests after 4:00pm on any weekday, Monday through Thursday, or after 2:00pm on Fridays, or on the weekends. We encourage everyone to explore another way of getting your prescription refill request processed using [employer], our patient web portal through our electronic medical record system. [employer] is an efficient and effective way to communicate your medication request directly to the office and  downloadable as an ama on your smart phone . [employer] also features a review functionality that allows you to view your medication list as well as leave messages for your physician. Are you ready to get connected? If so please review the attatched instructions or speak to any of our staff to get you set up right away! Thank you so much for your cooperation. Should you have any questions please contact our Practice Administrator.     The Physicians and Staff,  [facility]

## 2021-05-11 NOTE — PROGRESS NOTES
Skye Guerrero is a 40 y.o. male came back for follow-up. He had 2 seizures recently within a 3-week time span. The first seizure was generalized tonic-clonic type and the swiping of the magnet did not abort it. However, the second seizure did not involve any convulsions and he just abruptly collapsed to the ground. The magnet seem to have prevented the convulsions from coming. No obvious triggers. He did not miss any medication dose. Tolerating VNS settings well. Was complaining of diarrhea and the dose of Epidiolex was reduced to 400 mg twice daily which helped. No other complaints    EMU admission captured one of his seizures and it was a typical generalized tonic-clonic seizure but there was no clear onset localization. His interictal EEG revealed independent and synchronous bifrontal sharps, right more than left. There were also sharp seen in both temporal head regions, left more than right. His cavernous angioma is in the left hippocampal region. Currently taking Epidiolex, topiramate and Briviact  On sertraline 50 mg twice daily now    RECAP  At the last visit he reported an episode of abruptly passing out when Epidiolex. He had another couple of episodes of seizure-like activity on lower dose but none since he has been on the current dose   He had a  generalized seizure in March and then again in July. He was initially planned to start clobazam but then Epidiolex was approved by insurance and it was started instead. He has been abstaining from alcohol and is on naltrexone for alcohol dependence    He has not been employed for the past several months. 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 His typical seizures in the past have been generalized tonic-clonic type, in the setting of alcohol withdrawal.  However he has had seizures outside of alcohol use as well.     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  A follow-up MRI was recommended but he has not been able to do it yet. EXAM  Exam:  Visit Vitals  /80   Pulse 88   Resp 18   Ht 5' 9\" (1.753 m)   Wt 172 lb (78 kg)   SpO2 96%   BMI 25.40 kg/m²     Gen:Alert, oriented. Speaks very little. CV: RRR  Lungs: non labored breathing  Abd: non distending  Neuro: A&O x 3, no dysarthria or aphasia  CN II-XII: PERRL, EOMI, face symmetric, tongue/palate midline  Motor: strength 5/5 all four ext  Sensory: intact to LT  Gait: normal    LABS/ IMAGING  MRI Results (most recent):  Results from Hospital Encounter encounter on 06/10/19   MRI BRAIN W WO CONT    Narrative EXAM:  MRI BRAIN W WO CONT    INDICATION:    Seizures new or progressive    COMPARISON:  January 23, 2012. CONTRAST: 17 ml Dotarem. TECHNIQUE:    Multiplanar multisequence acquisition without and with contrast of the brain. FINDINGS:  Diffuse motion artifact. Diffusion imaging does not show acute ischemic changes . Minimal nonspecific white matter disease. Ventricle size is normal and stable. No extra-axial fluid collection hemorrhage or shift. Temporal lobe no show no focal abnormality and appear symmetrical.  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 No new lesion is  seen. Incidental diffuse mucosal thickening within the a stomach air cells bilaterally      Impression  impression: No acute changes no masses. Stable findings.        EEG showed occasional bifrontal sharp/spike discharges  Lab Results   Component Value Date/Time    WBC 7.6 10/26/2020 12:00 PM    HGB 13.1 10/26/2020 12:00 PM    Hematocrit (POC) 44 06/10/2019 04:29 PM    HCT 38.8 10/26/2020 12:00 PM    PLATELET 096 42/99/2124 12:00 PM    MCV 88.2 10/26/2020 12:00 PM     Lab Results   Component Value Date/Time    Sodium 142 10/26/2020 12:00 PM    Potassium 3.8 10/26/2020 12:00 PM    Chloride 113 (H) 10/26/2020 12:00 PM    CO2 23 10/26/2020 12:00 PM    Anion gap 6 10/26/2020 12:00 PM    Glucose 90 10/26/2020 12:00 PM    BUN 15 10/26/2020 12:00 PM    Creatinine 0.92 10/26/2020 12:00 PM    BUN/Creatinine ratio 16 10/26/2020 12:00 PM    GFR est AA >60 10/26/2020 12:00 PM    GFR est non-AA >60 10/26/2020 12:00 PM    Calcium 8.7 10/26/2020 12:00 PM    Bilirubin, total 0.5 10/26/2020 12:00 PM    Alk. phosphatase 77 10/26/2020 12:00 PM    Protein, total 6.9 10/26/2020 12:00 PM    Albumin 4.0 10/26/2020 12:00 PM    Globulin 2.9 10/26/2020 12:00 PM    A-G Ratio 1.4 10/26/2020 12:00 PM    ALT (SGPT) 35 10/26/2020 12:00 PM     VNS Settings;  VNS interrogated and programmed to following settings:    Basal output: 1.75 mA  Frequency: 20 Hz  Pulkse Width: 250 µs  On time: 30 seconds  Off time: 3 minutes    Magnet output: 1.875 mA   Mag pulse width: 500 µs  On time: 60 seconds    Autostim: On  Output: 1.875 mg  Pulse width: 250 µs  On time: 60 seconds    Tachycardia detection: Enabled  Auto stim threshold: 40%    Prone position detection: On      ASSESSMENT    ICD-10-CM ICD-9-CM    1. Partial symptomatic epilepsy with complex partial seizures, intractable, without status epilepticus ([facility] [address]  G40.219 345.41    2. S/P placement of VNS (vagus nerve stimulation) device  Z96.89 V45.89    3. Brain cyst  G93.0 348.0    4. Depression, unspecified depression type  F32.9 311    4       Cavernous angioma      PLAN  He has idiopathic epilepsy without any clear localization   His interictal EEG shows synchronous and independent discharges in both frontal head regions, right more than left. In addition there were independent sharp wave discharges seen in both temporal regions left more than right. He continued to have breakthrough seizures despite being on 3 different anticonvulsants  He is now status post VNS implantation    He recently had  2 breakthrough seizures, 3 weeks apart  VNS settings were adjusted as above.   Output current was increased to 1.75 mA and off time was reduced to 3 minutes. He should try increasing the dose of Epidiolex to 500 mg twice a day    His MRI findings are chronic and stable.   Will repeat MRI next year  Follow-up in 4 months    Soraida Stratton MD                      He had 2 seizures recently

## 2021-05-11 NOTE — PROGRESS NOTES
Mr. Cesilia Rose presents today to follow up seizure disorder. Patient reported two seizures since last visit. Depression screening done on this patient.

## 2021-05-18 ENCOUNTER — TELEPHONE (OUTPATIENT)
Dept: NEUROLOGY | Age: 46
End: 2021-05-18

## 2021-05-18 DIAGNOSIS — G40.909 SEIZURE DISORDER (HCC): ICD-10-CM

## 2021-05-18 RX ORDER — CANNABIDIOL 100 MG/ML
500 SOLUTION ORAL 2 TIMES DAILY
Qty: 300 ML | Refills: 3 | Status: SHIPPED | OUTPATIENT
Start: 2021-05-18 | End: 2022-02-16

## 2021-05-18 NOTE — TELEPHONE ENCOUNTER
----- Message from Davidjuancampbell Bryant sent at 5/18/2021  9:40 AM EDT -----  Regarding: /Refill  Caller (if not patient): Kaye Brody     Relationship of caller (if not patient): [facility] Pharmac[facility]     Best contact number(s): [phone]    Name of medication and dosage if known: (Epidolex) 5ml 2x daily    Is patient out of this medication (yes/no): yes    Pharmacy name: [facility] pharmacy     Pharmacy listed in chart? (yes/no): yes    Pharmacy phone number: [phone], HGD:[phone]    Date of last visit: 05/11/21    Details to clarify the request: Prescription dose has changed so it needs to be re-written.

## 2021-06-01 DIAGNOSIS — Q27.30 AVM (ARTERIOVENOUS MALFORMATION): ICD-10-CM

## 2021-06-01 DIAGNOSIS — G40.909 SEIZURE DISORDER (HCC): Primary | ICD-10-CM

## 2021-06-01 DIAGNOSIS — G40.919 REFRACTORY SEIZURE (HCC): Primary | ICD-10-CM

## 2021-07-12 DIAGNOSIS — G40.919 REFRACTORY SEIZURE (HCC): ICD-10-CM

## 2021-07-12 DIAGNOSIS — Q27.30 AVM (ARTERIOVENOUS MALFORMATION): Primary | ICD-10-CM

## 2021-07-21 ENCOUNTER — HOSPITAL ENCOUNTER (OUTPATIENT)
Dept: MRI IMAGING | Age: 46
Discharge: HOME | End: 2021-07-21
Attending: PSYCHIATRY & NEUROLOGY

## 2021-07-21 ENCOUNTER — TELEPHONE (OUTPATIENT)
Dept: NEUROLOGY | Age: 46
End: 2021-07-21

## 2021-07-21 DIAGNOSIS — R56.9 SEIZURE (HCC): Primary | ICD-10-CM

## 2021-07-21 DIAGNOSIS — Q27.30 AVM (ARTERIOVENOUS MALFORMATION): ICD-10-CM

## 2021-07-21 NOTE — PROGRESS NOTES
Called MRI dept. After reviewing pt.'s chart and noting pt. Has a VNS (vagal nerve Stimulator) - inserted 12/8/2021 here at [facility]. MRI to call back to inform if procedure can be completed.

## 2021-07-21 NOTE — PROGRESS NOTES
After reviewing pt.'s chart- noted VNS- MRI states pt. Is safe on their part with having MRI after reviewing VNS pamphlet. Dmitry Dalton states she called Merlin Coin (spoke to Yary, nurse at [facility]) to inquire about nurses sedating pt. And risks factors of turning VNS off (pt.'s mother scheduled rep. From company to come and turn VNS off without MD approval for MRI with sedation at [facility]  Per Dmitry Dalton who spoke with mother after previous note). Pt.'s mother stated last p.m. with precall that pt. Couldn't do MRI previously because of the noise (informed mom that we were giving conscious sedation and pt. May be able to hear the noise - I could not guarantee) and she stated \"he can not tolerate the noise\". Yary states as nurses, she would not  Conscious sedate this pt. R/t VNS and active seizure activity and need to protect airway -she would require anesthesia to maintain airway (especially if seizure starts during MRI of Head) and more controlled procedure with pt. Not being able to hear the MRI. I spoke with manager, Jennifer Hawthorne, ERIC., here at [facility] and she voiced same concerns as Yary. Spoke with Dr. Davey Kebede and he states anesthesia needs to be involved for this pt. To have his MRI. Called pt.'s mother to inform of decision per Dr. Davey Kebede - mother very upset that he needs to be rescheduled with anesthesia - informed mother we are trying to do what is best for her son and she states \"the VNS hasn't stopped his seizures - so- on/off doesn't make a difference \". Attempted to stress to the mother the importance of protecting her sons airway for this procedure and making this successful for them. When inquired with mother about seizures, she states \"they havent stopped- like weekly\". Informed mother that we were unaware of the VNS until further investigation into pt.'s chart this morning (not noted on scheduling questions, precall last p.m. (with pt.'s mother) VNS not mentioned.   Encouraged pt.'s mother to make sure to inform any facility/scheduling of any procedures, pt.  may need, that he has a VNS implanted. apologized to pt.'s mother about her having to reschedule,  Reiterated importance of maintaining his airway and stressed importance of mentioning his VNS implant as to prevent him having to be rescheduled later. Informed mother to call Dr. Wendi Salgado office and request a new order for MRI with anesthesia. Mother states \"the rep. Is on the way to turn his VNS off\"  Informed mother she would need to contact them and inform of the reschedule r/t we didn't schedule.

## 2021-07-21 NOTE — TELEPHONE ENCOUNTER
Loren Cam calling because the patient went to have his MRI and they said he needed it with anesthesia  and not sedation. She would like a call back asap to discuss.

## 2021-07-22 ENCOUNTER — TELEPHONE (OUTPATIENT)
Dept: NEUROLOGY | Age: 46
End: 2021-07-22

## 2021-07-22 DIAGNOSIS — G40.219 PARTIAL SYMPTOMATIC EPILEPSY WITH COMPLEX PARTIAL SEIZURES, INTRACTABLE, WITHOUT STATUS EPILEPTICUS (HCC): Primary | ICD-10-CM

## 2021-07-22 NOTE — TELEPHONE ENCOUNTER
Juanita, please look at [address] [place] [address] [address] encounter from today.  Thanks

## 2021-07-27 DIAGNOSIS — Z96.89 S/P PLACEMENT OF VNS (VAGUS NERVE STIMULATION) DEVICE: ICD-10-CM

## 2021-07-27 DIAGNOSIS — G40.219 PARTIAL SYMPTOMATIC EPILEPSY WITH COMPLEX PARTIAL SEIZURES, INTRACTABLE, WITHOUT STATUS EPILEPTICUS (HCC): Primary | ICD-10-CM

## 2021-07-27 RX ORDER — CENOBAMATE 12.5-25MG
KIT ORAL
Qty: 24 TABLET | Refills: 0 | Status: SHIPPED | OUTPATIENT
Start: 2021-07-27 | End: 2021-08-03 | Stop reason: SDUPTHER

## 2021-07-30 ENCOUNTER — PATIENT OUTREACH (OUTPATIENT)
Dept: CASE MANAGEMENT | Age: 46
End: 2021-07-30

## 2021-07-30 NOTE — PROGRESS NOTES
8/2/21 I have been unable to reach this patient by phone. A message  is being sent to patient through [facility]. This episode of care is resolved at this time. No further contact is scheduled by this ACM at this time. ACM contact information was provided to patient for follow up questions or concerns. [facility]    7/30/21 Attempted to reach patient at number listed- voicemail stated it was mothers voicemail- left message.  DMB

## 2021-08-03 DIAGNOSIS — Z96.89 S/P PLACEMENT OF VNS (VAGUS NERVE STIMULATION) DEVICE: ICD-10-CM

## 2021-08-03 DIAGNOSIS — G40.219 PARTIAL SYMPTOMATIC EPILEPSY WITH COMPLEX PARTIAL SEIZURES, INTRACTABLE, WITHOUT STATUS EPILEPTICUS (HCC): ICD-10-CM

## 2021-08-03 RX ORDER — CENOBAMATE 12.5-25MG
KIT ORAL
Qty: 24 TABLET | Refills: 0 | Status: SHIPPED
Start: 2021-08-03 | End: 2021-08-16 | Stop reason: DRUGHIGH

## 2021-08-04 ENCOUNTER — TELEPHONE (OUTPATIENT)
Dept: NEUROLOGY | Age: 46
End: 2021-08-04

## 2021-08-04 NOTE — TELEPHONE ENCOUNTER
Spoke with Iva Serra, with pharmacy, he agreed to fax Rx back to Dr. Juliana Ibarra with specific titration instructions.

## 2021-08-04 NOTE — TELEPHONE ENCOUNTER
[address] calling stating prescription that was sent yesterday was wrong. They are requesting a call back to discuss.

## 2021-08-05 ENCOUNTER — TELEPHONE (OUTPATIENT)
Dept: NEUROLOGY | Age: 46
End: 2021-08-05

## 2021-08-05 NOTE — TELEPHONE ENCOUNTER
Re: Ronald Kennedy call from [employer] requesting PA update. Created CMM Key# [id number]    Awaiting update.

## 2021-08-09 ENCOUNTER — TELEPHONE (OUTPATIENT)
Dept: NEUROLOGY | Age: 46
End: 2021-08-09

## 2021-08-12 ENCOUNTER — TELEPHONE (OUTPATIENT)
Dept: NEUROLOGY | Age: 46
End: 2021-08-12

## 2021-08-12 DIAGNOSIS — G40.219 PARTIAL SYMPTOMATIC EPILEPSY WITH COMPLEX PARTIAL SEIZURES, INTRACTABLE, WITHOUT STATUS EPILEPTICUS (HCC): ICD-10-CM

## 2021-08-12 NOTE — TELEPHONE ENCOUNTER
Tonna Schaumann w/ [employer] Navigator call to get start date on patients Xcopri approval. Please call

## 2021-08-13 ENCOUNTER — ANESTHESIA (OUTPATIENT)
Dept: MRI IMAGING | Age: 46
End: 2021-08-13
Payer: MEDICARE

## 2021-08-13 ENCOUNTER — TELEPHONE (OUTPATIENT)
Dept: NEUROLOGY | Age: 46
End: 2021-08-13

## 2021-08-13 ENCOUNTER — HOSPITAL ENCOUNTER (OUTPATIENT)
Dept: MRI IMAGING | Age: 46
Discharge: HOME | End: 2021-08-13
Attending: PSYCHIATRY & NEUROLOGY
Payer: MEDICARE

## 2021-08-13 ENCOUNTER — ANESTHESIA EVENT (OUTPATIENT)
Dept: MRI IMAGING | Age: 46
End: 2021-08-13
Payer: MEDICARE

## 2021-08-13 VITALS
SYSTOLIC BLOOD PRESSURE: 108 MMHG | HEIGHT: 69 IN | WEIGHT: 180 LBS | HEART RATE: 60 BPM | BODY MASS INDEX: 26.66 KG/M2 | OXYGEN SATURATION: 98 % | DIASTOLIC BLOOD PRESSURE: 67 MMHG | RESPIRATION RATE: 16 BRPM | TEMPERATURE: 98.5 F

## 2021-08-13 DIAGNOSIS — R56.9 SEIZURE (HCC): ICD-10-CM

## 2021-08-13 DIAGNOSIS — Q27.30 AVM (ARTERIOVENOUS MALFORMATION): ICD-10-CM

## 2021-08-13 PROCEDURE — A9576 INJ PROHANCE MULTIPACK: HCPCS | Performed by: PSYCHIATRY & NEUROLOGY

## 2021-08-13 PROCEDURE — 2709999900 HC NON-CHARGEABLE SUPPLY

## 2021-08-13 PROCEDURE — 76060000033 HC ANESTHESIA 1 TO 1.5 HR

## 2021-08-13 PROCEDURE — 74011250636 HC RX REV CODE- 250/636: Performed by: PSYCHIATRY & NEUROLOGY

## 2021-08-13 PROCEDURE — 77030020143 HC AIRWY LARYN INTUB CGAS -A: Performed by: NURSE ANESTHETIST, CERTIFIED REGISTERED

## 2021-08-13 PROCEDURE — 76210000063 HC OR PH I REC FIRST 0.5 HR

## 2021-08-13 PROCEDURE — 74011636320 HC RX REV CODE- 636/320: Performed by: PSYCHIATRY & NEUROLOGY

## 2021-08-13 PROCEDURE — 70553 MRI BRAIN STEM W/O & W/DYE: CPT

## 2021-08-13 RX ORDER — SODIUM CHLORIDE 9 MG/ML
25 INJECTION, SOLUTION INTRAVENOUS CONTINUOUS
Status: DISCONTINUED | OUTPATIENT
Start: 2021-08-13 | End: 2021-08-13

## 2021-08-13 RX ORDER — LORAZEPAM 2 MG/ML
2 INJECTION INTRAMUSCULAR
Status: DISCONTINUED | OUTPATIENT
Start: 2021-08-13 | End: 2021-08-13

## 2021-08-13 RX ADMIN — GADOTERIDOL 17 ML: 279.3 INJECTION, SOLUTION INTRAVENOUS at 10:21

## 2021-08-13 RX ADMIN — SODIUM CHLORIDE 25 ML/HR: 900 INJECTION, SOLUTION INTRAVENOUS at 09:00

## 2021-08-13 NOTE — ANESTHESIA POSTPROCEDURE EVALUATION
* No procedures listed *.    general    Anesthesia Post Evaluation        Patient location during evaluation: PACU  Note status: Adequate. Level of consciousness: responsive to verbal stimuli and sleepy but conscious  Pain management: satisfactory to patient  Airway patency: patent  Anesthetic complications: no  Cardiovascular status: acceptable  Respiratory status: acceptable  Hydration status: acceptable  Comments: +Post-Anesthesia Evaluation and Assessment    Patient: Derek Conner MRN: [id number]  SSN: [id number]   YOB: 1975  Age: 40 y.o. Sex: male      Cardiovascular Function/Vital Signs    /71   Pulse 66   Temp 36.9 °C (98.5 °F)   Resp 12   Ht 5' 9\" (1.753 m)   Wt 81.6 kg (180 lb)   SpO2 97%   BMI 26.58 kg/m²     Patient is status post * No procedures listed *. Nausea/Vomiting: Controlled. Postoperative hydration reviewed and adequate. Pain:  Pain Scale 1: Numeric (0 - 10) (08/13/21 1120)  Pain Intensity 1: 0 (08/13/21 1120)   Managed. Neurological Status: At baseline. Mental Status and Level of Consciousness: Arousable. Pulmonary Status:   O2 Device: None (Room air) (08/13/21 1120)   Adequate oxygenation and airway patent. Complications related to anesthesia: None    Post-anesthesia assessment completed. No concerns. Signed By: Roxanne Jara MD    8/13/2021  Post anesthesia nausea and vomiting:  controlled      INITIAL Post-op Vital signs:   Vitals Value Taken Time   /71 08/13/21 1115   Temp 36.9 °C (98.5 °F) 08/13/21 1116   Pulse 64 08/13/21 1126   Resp 12 08/13/21 1126   SpO2 96 % 08/13/21 1126   Vitals shown include unvalidated device data.

## 2021-08-13 NOTE — ANESTHESIA PREPROCEDURE EVALUATION
Relevant Problems   NEUROLOGY   (+) Asperger syndrome   (+) Autism   (+) EtOH dependence (HCC)   (+) Generalized anxiety disorder   (+) Intractable seizures (HCC)   (+) Partial symptomatic epilepsy with complex partial seizures, intractable, without status epilepticus (HCC)   (+) Seizure disorder (HCC)      CARDIOVASCULAR   (+) Hypertension       Anesthetic History   No history of anesthetic complications            Review of Systems / Medical History  Patient summary reviewed, nursing notes reviewed and pertinent labs reviewed    Pulmonary  Within defined limits                 Neuro/Psych     seizures    Psychiatric history    Comments: Autism/aspergers  Has vagus nerve stimulator Cardiovascular    Hypertension        Dysrhythmias       Exercise tolerance: >4 METS  Comments: Bilateral carotid artery stenosis  Hx PACs     GI/Hepatic/Renal  Within defined limits              Endo/Other  Within defined limits           Other Findings   Comments: Hx EtOH dependence         Physical Exam    Airway  Mallampati: II  TM Distance: 4 - 6 cm  Neck ROM: normal range of motion   Mouth opening: Normal     Cardiovascular  Regular rate and rhythm,  S1 and S2 normal,  no murmur, click, rub, or gallop             Dental  No notable dental hx       Pulmonary  Breath sounds clear to auscultation               Abdominal  GI exam deferred       Other Findings            Anesthetic Plan    ASA: 2  Anesthesia type: general          Induction: Intravenous  Anesthetic plan and risks discussed with: Patient and Mother

## 2021-08-13 NOTE — PERIOP NOTES
TRANSFER - OUT REPORT:    Verbal report given to Kenny Rangel RN  on [facility]  being transferred to radiology (unit) for routine post - op       Report consisted of patients Situation, Background, Assessment and   Recommendations(SBAR). Information from the following report(s) SBAR, Procedure Summary, Intake/Output, MAR and Cardiac Rhythm NSR was reviewed with the receiving nurse. Opportunity for questions and clarification was provided.       Patient transported with:   Monitor  Registered Nurse

## 2021-08-13 NOTE — DISCHARGE INSTRUCTIONS
[address]  Radiology Department  [phone]      Date: 8/13/2021        MRI With Sedation Discharge Instructions      Go home and rest and restrict your activity the next 24 hours. You have been given sedating medications, so do not drive or drink alcohol today. Resume your previous diet and medications. You may return to work and resume normal activities tomorrow. Results of your MRI will be sent to your physician as soon as they become available.

## 2021-08-13 NOTE — ROUTINE PROCESS
Procedure reviewed with patient by Anesthesia MD.  Opportunity to verbalize questions and concerns. Consent obtained.

## 2021-08-13 NOTE — PERIOP NOTES
Handoff Report from Operating Room to PACU    Report received from  [facility] CRNA regarding Harpal Momin. * No surgeons listed *  And * No procedures listed *  confirmed   with allergies discussed. Anesthesia type, drugs, patient history, complications, estimated blood loss, vital signs, intake and output, and zofran & versed were reviewed.

## 2021-08-16 DIAGNOSIS — G40.219 PARTIAL SYMPTOMATIC EPILEPSY WITH COMPLEX PARTIAL SEIZURES, INTRACTABLE, WITHOUT STATUS EPILEPTICUS (HCC): ICD-10-CM

## 2021-08-16 DIAGNOSIS — Z96.89 S/P PLACEMENT OF VNS (VAGUS NERVE STIMULATION) DEVICE: ICD-10-CM

## 2021-08-16 NOTE — TELEPHONE ENCOUNTER
Pharmacist is stating patient has already had 12.5/25mg dose and they need the next dose escribed. Please send in.

## 2021-08-20 DIAGNOSIS — G40.909 SEIZURE DISORDER (HCC): ICD-10-CM

## 2021-08-24 ENCOUNTER — TELEPHONE (OUTPATIENT)
Dept: NEUROLOGY | Age: 46
End: 2021-08-24

## 2021-08-24 NOTE — TELEPHONE ENCOUNTER
----- Message from Collette Bowers sent at 8/24/2021  4:04 PM EDT -----  Regarding: Dr. Fritz Eisenmenger first and last name: Dayna Kiser      Reason for call: PA is needed for the Xcopri Medication       Callback required yes/no and why: yes      Best contact number(s): [phone]      Details to clarify the request:      Collette Bowers

## 2021-08-25 DIAGNOSIS — G40.219 PARTIAL SYMPTOMATIC EPILEPSY WITH COMPLEX PARTIAL SEIZURES, INTRACTABLE, WITHOUT STATUS EPILEPTICUS (HCC): ICD-10-CM

## 2021-08-30 ENCOUNTER — TELEPHONE (OUTPATIENT)
Dept: NEUROLOGY | Age: 46
End: 2021-08-30

## 2021-08-30 NOTE — TELEPHONE ENCOUNTER
Re: Beronica Nava PA through [facility] ROEL key# [id number], awaiting update.

## 2021-08-31 ENCOUNTER — TRANSCRIBE ORDER (OUTPATIENT)
Dept: INTERNAL MEDICINE CLINIC | Age: 46
End: 2021-08-31

## 2021-08-31 ENCOUNTER — TELEPHONE (OUTPATIENT)
Dept: NEUROLOGY | Age: 46
End: 2021-08-31

## 2021-08-31 DIAGNOSIS — G40.219 PARTIAL SYMPTOMATIC EPILEPSY WITH COMPLEX PARTIAL SEIZURES, INTRACTABLE, WITHOUT STATUS EPILEPTICUS (HCC): ICD-10-CM

## 2021-08-31 RX ORDER — CENOBAMATE 100 MG/1
100 TABLET, FILM COATED ORAL DAILY
Qty: 30 EACH | Refills: 2 | Status: SHIPPED | OUTPATIENT
Start: 2021-08-31 | End: 2021-08-31 | Stop reason: SDUPTHER

## 2021-08-31 RX ORDER — CENOBAMATE 100 MG/1
100 TABLET, FILM COATED ORAL DAILY
Qty: 30 EACH | Refills: 2 | Status: SHIPPED | OUTPATIENT
Start: 2021-08-31 | End: 2021-09-10 | Stop reason: DRUGHIGH

## 2021-08-31 NOTE — TELEPHONE ENCOUNTER
----- Message from Scott Gottlieb Page sent at 8/31/2021  4:29 PM EDT -----  Regarding: Dr. Alice Lewis (if not patient): Jeanne Booth,      Relationship of caller (if not patient): Pharmacy Tech      Best contact number(s): [phone]      Name of medication and dosage if known:  (Xcopri) 50 mg , (Xcopri) 1000 mg       Is patient out of this medication (yes/no): Yes      Pharmacy name: [address] [place] [address] listed in chart? (yes/no): Yes  Pharmacy phone number:      Details to clarify the request:       David Go

## 2021-08-31 NOTE — TELEPHONE ENCOUNTER
Dr. Courtney Huff, please print next titration dose and a separate Rx for Xcopri maintenance dose and I will fax to Specialty Pharmacy. (ZFP#[phone]).   Thanks

## 2021-09-10 DIAGNOSIS — G40.219 PARTIAL SYMPTOMATIC EPILEPSY WITH COMPLEX PARTIAL SEIZURES, INTRACTABLE, WITHOUT STATUS EPILEPTICUS (HCC): ICD-10-CM

## 2021-09-10 RX ORDER — CENOBAMATE 100 MG/1
TABLET, FILM COATED ORAL
Qty: 60 EACH | Refills: 2 | Status: SHIPPED | OUTPATIENT
Start: 2021-09-10 | End: 2021-09-17 | Stop reason: ALTCHOICE

## 2021-09-14 ENCOUNTER — TELEPHONE (OUTPATIENT)
Dept: NEUROLOGY | Age: 46
End: 2021-09-14

## 2021-09-14 NOTE — TELEPHONE ENCOUNTER
Pharmacy calling needed new script for titration 2 sent to pharmacy for patients Xcopri.      Fax: [phone]

## 2021-09-16 NOTE — TELEPHONE ENCOUNTER
Anibal Farrar is calling to find out if the patient is still on Xcopri and what pharmacy he's getting it from.  Please call

## 2021-09-17 ENCOUNTER — TELEPHONE (OUTPATIENT)
Dept: NEUROLOGY | Age: 46
End: 2021-09-17

## 2021-09-17 DIAGNOSIS — G40.219 PARTIAL SYMPTOMATIC EPILEPSY WITH COMPLEX PARTIAL SEIZURES, INTRACTABLE, WITHOUT STATUS EPILEPTICUS (HCC): Primary | ICD-10-CM

## 2021-09-17 DIAGNOSIS — G40.219 PARTIAL SYMPTOMATIC EPILEPSY WITH COMPLEX PARTIAL SEIZURES, INTRACTABLE, WITHOUT STATUS EPILEPTICUS (HCC): ICD-10-CM

## 2021-09-17 DIAGNOSIS — G40.909 SEIZURE DISORDER (HCC): ICD-10-CM

## 2021-09-17 RX ORDER — CENOBAMATE 200 MG/1
200 TABLET, FILM COATED ORAL DAILY
Qty: 30 EACH | Refills: 1 | Status: SHIPPED | OUTPATIENT
Start: 2021-09-17 | End: 2021-09-17 | Stop reason: SDUPTHER

## 2021-09-17 RX ORDER — CENOBAMATE 50MG-100MG
KIT ORAL
Qty: 28 EACH | Refills: 0 | Status: SHIPPED | OUTPATIENT
Start: 2021-09-17 | End: 2021-10-15

## 2021-09-17 RX ORDER — CENOBAMATE 150-200 MG
KIT ORAL
Qty: 28 EACH | Refills: 0 | Status: SHIPPED | OUTPATIENT
Start: 2021-09-17 | End: 2021-10-15

## 2021-09-17 RX ORDER — CENOBAMATE 150-200 MG
KIT ORAL
Qty: 28 EACH | Refills: 0 | Status: SHIPPED | OUTPATIENT
Start: 2021-09-17 | End: 2021-09-17 | Stop reason: SDUPTHER

## 2021-09-17 RX ORDER — CENOBAMATE 200 MG/1
200 TABLET, FILM COATED ORAL DAILY
Qty: 30 EACH | Refills: 1 | Status: SHIPPED | OUTPATIENT
Start: 2021-09-17 | End: 2021-12-22 | Stop reason: SDUPTHER

## 2021-09-17 NOTE — TELEPHONE ENCOUNTER
Dejan Hinojosa calling stating that the prescription was sent in wrong, needs prescription for the  2nd titration.

## 2021-09-17 NOTE — TELEPHONE ENCOUNTER
Per pharmacy, Rx needs to be on 2 separate pages. First, \"Xcopri titration pack 50/100 1 every day x28d as directed on package\". Second Rx, on separate page is whatever maintenance dose is appropriate.  Thanks  (PGV#[phone]) No

## 2021-09-17 NOTE — TELEPHONE ENCOUNTER
Requested Prescriptions     Pending Prescriptions Disp Refills    cannabidioL (Epidiolex) 100 mg/mL soln       Sig: Take 500 mg by mouth two (2) times a day.

## 2021-09-20 ENCOUNTER — TELEPHONE (OUTPATIENT)
Dept: NEUROLOGY | Age: 46
End: 2021-09-20

## 2021-09-20 NOTE — TELEPHONE ENCOUNTER
I left a message for patient's mother to call me back. Need to see if patient is still on this medication while taking Xcopri.

## 2021-09-21 ENCOUNTER — OFFICE VISIT (OUTPATIENT)
Dept: NEUROLOGY | Age: 46
End: 2021-09-21
Payer: MEDICARE

## 2021-09-21 VITALS
SYSTOLIC BLOOD PRESSURE: 118 MMHG | HEART RATE: 83 BPM | RESPIRATION RATE: 16 BRPM | WEIGHT: 180 LBS | HEIGHT: 69 IN | OXYGEN SATURATION: 98 % | BODY MASS INDEX: 26.66 KG/M2 | DIASTOLIC BLOOD PRESSURE: 78 MMHG

## 2021-09-21 DIAGNOSIS — G40.219 PARTIAL SYMPTOMATIC EPILEPSY WITH COMPLEX PARTIAL SEIZURES, INTRACTABLE, WITHOUT STATUS EPILEPTICUS (HCC): Primary | ICD-10-CM

## 2021-09-21 DIAGNOSIS — G93.0 BRAIN CYST: ICD-10-CM

## 2021-09-21 DIAGNOSIS — Z96.89 S/P PLACEMENT OF VNS (VAGUS NERVE STIMULATION) DEVICE: ICD-10-CM

## 2021-09-21 DIAGNOSIS — F32.A DEPRESSION, UNSPECIFIED DEPRESSION TYPE: ICD-10-CM

## 2021-09-21 PROCEDURE — G8427 DOCREV CUR MEDS BY ELIG CLIN: HCPCS | Performed by: PSYCHIATRY & NEUROLOGY

## 2021-09-21 PROCEDURE — G8419 CALC BMI OUT NRM PARAM NOF/U: HCPCS | Performed by: PSYCHIATRY & NEUROLOGY

## 2021-09-21 PROCEDURE — 99215 OFFICE O/P EST HI 40 MIN: CPT | Performed by: PSYCHIATRY & NEUROLOGY

## 2021-09-21 PROCEDURE — G8754 DIAS BP LESS 90: HCPCS | Performed by: PSYCHIATRY & NEUROLOGY

## 2021-09-21 PROCEDURE — G8510 SCR DEP NEG, NO PLAN REQD: HCPCS | Performed by: PSYCHIATRY & NEUROLOGY

## 2021-09-21 PROCEDURE — G8752 SYS BP LESS 140: HCPCS | Performed by: PSYCHIATRY & NEUROLOGY

## 2021-09-21 RX ORDER — CANNABIDIOL 100 MG/ML
500 SOLUTION ORAL 2 TIMES DAILY
OUTPATIENT
Start: 2021-09-21

## 2021-09-21 NOTE — PATIENT INSTRUCTIONS
[facility]   Statement to Patients  April 1, 2014      In an effort to ensure the large volume of patient prescription refills is processed in the most efficient and expeditious manner, we are asking our patients to assist us by calling your Pharmacy for all prescription refills, this will include also your  Mail Order Pharmacy. The pharmacy will contact our office electronically to continue the refill process. Please do not wait until the last minute to call your pharmacy. We need at least 48 hours (2days) to fill prescriptions. We also encourage you to call your pharmacy before going to  your prescription to make sure it is ready. With regard to controlled substance prescription refill requests (narcotic refills) that need to be picked up at our office, we ask your cooperation by providing us with at least 72 hours (3days) notice that you will need a refill. We will not refill narcotic prescription refill requests after 4:00pm on any weekday, Monday through Thursday, or after 2:00pm on Fridays, or on the weekends. We encourage everyone to explore another way of getting your prescription refill request processed using cisimple, our patient web portal through our electronic medical record system. cisimple is an efficient and effective way to communicate your medication request directly to the office and  downloadable as an ama on your smart phone . cisimple also features a review functionality that allows you to view your medication list as well as leave messages for your physician. Are you ready to get connected? If so please review the attatched instructions or speak to any of our staff to get you set up right away! Thank you so much for your cooperation. Should you have any questions please contact our Practice Administrator.     The Physicians and Staff,  [facility]

## 2021-09-21 NOTE — PROGRESS NOTES
Jason Matute is a 40 y.o. male came back for follow-up. After the last visit, he continued to have seizures and had a seizure in May, June and July. The seizures were all generalized tonic-clonic type  No obvious triggers. He did not miss any medication dose. After his seizure in July, he was started on Xcopri and has been weaning down on Epidiolex  Continues to take topiramate and Briviact  So far he has not had any seizures. Seems to be tolerating Xcopri well. EMU admission captured one of his seizures and it was a typical generalized tonic-clonic seizure but there was no clear onset localization. His interictal EEG revealed independent and synchronous bifrontal sharps, right more than left. There were also sharp seen in both temporal head regions, left more than right. His cavernous angioma is in the left hippocampal region. Currently taking Epidiolex 2 mL twice a day, topiramate 200 mg twice daily and Briviact 100 mg twice daily  On sertraline 50 mg twice daily now    Has an appointment coming up  at [facility] epilepsy clinic in the near future    RECAP  At the last visit he reported an episode of abruptly passing out when Epidiolex. He had another couple of episodes of seizure-like activity on lower dose but none since he has been on the current dose   He had a  generalized seizure in March and then again in July. He was initially planned to start clobazam but then Epidiolex was approved by insurance and it was started instead. He has been abstaining from alcohol and is on naltrexone for alcohol dependence    He has not been employed for the past several months. 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 His typical seizures in the past have been generalized tonic-clonic type, in the setting of alcohol withdrawal.  However he has had seizures outside of alcohol use as well.     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  A follow-up MRI was recommended but he has not been able to do it yet. EXAM  Exam:  Visit Vitals  /78   Pulse 83   Resp 16   Ht 5' 9\" (1.753 m)   Wt 180 lb (81.6 kg)   SpO2 98%   BMI 26.58 kg/m²     Gen:Alert, oriented. Speaks very little. CV: RRR  Lungs: non labored breathing  Abd: non distending  Neuro: A&O x 3, no dysarthria or aphasia  CN II-XII: PERRL, EOMI, face symmetric, tongue/palate midline  Motor: strength 5/5 all four ext  Sensory: intact to LT  Gait: normal    LABS/ IMAGING  MRI Results (most recent):  Results from Hospital Encounter encounter on 08/13/21    MRI BRAIN W WO CONT    Narrative  EXAM:  MRI BRAIN W WO CONT    INDICATION:    Seizures. COMPARISON:  MRI brain 6/14/2019. CONTRAST: 17 ml ProHance. TECHNIQUE:  Multiplanar multisequence acquisition without and with contrast of the brain. FINDINGS:  Stable lesion in the left posterior medial temporal lobe measuring 10 x 7 x 6  mm, which demonstrates bubbly central T2 hyperintensity with hemosiderin rim and  blooming on GRE images, consistent with a cavernous malformation. No significant  surrounding edema. There is an unchanged associated adjacent small developmental  venous anomaly. Stable small cyst in the right paramedian midbrain and the periaqueductal region  measuring 6 mm, which is near CSF intensity on all sequences. The ventricles are normal in size and position. The brain parenchyma otherwise  has normal signal characteristics. There is no acute infarct, hemorrhage,  extra-axial fluid collection, or mass effect. There is no cerebellar tonsillar  herniation. Expected arterial flow-voids are present. No evidence of abnormal  parenchymal enhancement. Mild mucosal thickening in the bilateral ethmoidal air cells and right maxillary  sinus without air-fluid level. Moderate left mastoid effusion. The orbital  contents are within normal limits.  No significant osseous or scalp lesions are  identified. Impression  1. Stable 10 mm cavernous malformation in the left posterior medial temporal  lobe with associated developmental venous anomaly. 2. Stable incidental 6 mm cyst in the right paramedian midbrain, which may  represent a perivascular space. 3. Otherwise unremarkable brain MRI. 4. Moderate left mastoid effusion. MRI images were reviewed    EEG showed occasional bifrontal sharp/spike discharges  Lab Results   Component Value Date/Time    WBC 7.6 10/26/2020 12:00 PM    HGB 13.1 10/26/2020 12:00 PM    Hematocrit (POC) 44 06/10/2019 04:29 PM    HCT 38.8 10/26/2020 12:00 PM    PLATELET 594 64/51/5093 12:00 PM    MCV 88.2 10/26/2020 12:00 PM     Lab Results   Component Value Date/Time    Sodium 142 10/26/2020 12:00 PM    Potassium 3.8 10/26/2020 12:00 PM    Chloride 113 (H) 10/26/2020 12:00 PM    CO2 23 10/26/2020 12:00 PM    Anion gap 6 10/26/2020 12:00 PM    Glucose 90 10/26/2020 12:00 PM    BUN 15 10/26/2020 12:00 PM    Creatinine 0.92 10/26/2020 12:00 PM    BUN/Creatinine ratio 16 10/26/2020 12:00 PM    GFR est AA >60 10/26/2020 12:00 PM    GFR est non-AA >60 10/26/2020 12:00 PM    Calcium 8.7 10/26/2020 12:00 PM    Bilirubin, total 0.5 10/26/2020 12:00 PM    Alk. phosphatase 77 10/26/2020 12:00 PM    Protein, total 6.9 10/26/2020 12:00 PM    Albumin 4.0 10/26/2020 12:00 PM    Globulin 2.9 10/26/2020 12:00 PM    A-G Ratio 1.4 10/26/2020 12:00 PM    ALT (SGPT) 35 10/26/2020 12:00 PM     VNS Settings;  VNS interrogated and programmed to following settings:    Basal output: 1.75 mA  Frequency: 20 Hz  Pulkse Width: 250 µs  On time: 30 seconds  Off time: 3 minutes    Magnet output: 1.875 mA   Mag pulse width: 500 µs  On time: 60 seconds    Autostim: On  Output: 1.875 mg  Pulse width: 250 µs  On time: 60 seconds    Tachycardia detection: Enabled  Auto stim threshold: 40%    Prone position detection: On      ASSESSMENT    ICD-10-CM ICD-9-CM    1.  Partial symptomatic epilepsy with complex partial seizures, intractable, without status epilepticus ([facility] [address]  G40.219 345.41 CBC WITH AUTOMATED DIFF      METABOLIC PANEL, COMPREHENSIVE      miscellaneous medical supply misc   2. S/P placement of VNS (vagus nerve stimulation) device  Z96.89 V45.89    3. Depression, unspecified depression type  F32.9 311    4. Brain cyst  G93.0 348.0    4       Cavernous angioma      PLAN  He has idiopathic epilepsy without any clear localization   His interictal EEG shows synchronous and independent discharges in both frontal head regions, right more than left. In addition there were independent sharp wave discharges seen in both temporal regions left more than right. He continued to have breakthrough seizures despite being on 3 different anticonvulsants i.e. Epidiolex, topiramate and Briviact  He was recently started on Xcopri and is now 100 mg daily. He should continue titrating it up to 200 mg daily. So far he has not had any further seizures since July  Repeat labs  Maintain same VNS settings  Wean off Epidiolex in the next 2 weeks    His MRI findings are chronic and stable.     Follow-up in 6 months or earlier if needed     Taras Dillon MD

## 2021-09-21 NOTE — PROGRESS NOTES
Mr. Daryle Legato presents today to follow up seizure disorder. Depression screening done on patient.

## 2021-11-24 ENCOUNTER — TELEPHONE (OUTPATIENT)
Dept: NEUROLOGY | Age: 46
End: 2021-11-24

## 2021-11-24 NOTE — TELEPHONE ENCOUNTER
----- Message from Celsetina Getting sent at 11/24/2021  9:55 AM EST -----  Regarding: Yehuda Castillo please give me a call.  Thanks David Machuca [phone]

## 2021-11-24 NOTE — TELEPHONE ENCOUNTER
Patient's mother stated patient may have had a seizure this past weekend. He was \"all bruised up\" and stated he went to ED. I do not see ED notes in our system. He did not share which ED he went to. She has concerns that he is not being truthful about the seizure.

## 2021-12-02 DIAGNOSIS — G40.219 PARTIAL SYMPTOMATIC EPILEPSY WITH COMPLEX PARTIAL SEIZURES, INTRACTABLE, WITHOUT STATUS EPILEPTICUS (HCC): ICD-10-CM

## 2021-12-02 DIAGNOSIS — Z96.89 S/P PLACEMENT OF VNS (VAGUS NERVE STIMULATION) DEVICE: Primary | ICD-10-CM

## 2021-12-08 DIAGNOSIS — G40.909 SEIZURE DISORDER (HCC): ICD-10-CM

## 2021-12-08 RX ORDER — TOPIRAMATE 200 MG/1
TABLET ORAL
Qty: 180 TABLET | Refills: 3 | Status: SHIPPED | OUTPATIENT
Start: 2021-12-08 | End: 2022-09-28

## 2021-12-08 RX ORDER — SERTRALINE HYDROCHLORIDE 50 MG/1
50 TABLET, FILM COATED ORAL DAILY
Qty: 90 TABLET | Refills: 3 | Status: SHIPPED | OUTPATIENT
Start: 2021-12-08 | End: 2022-09-28

## 2021-12-22 DIAGNOSIS — G40.219 PARTIAL SYMPTOMATIC EPILEPSY WITH COMPLEX PARTIAL SEIZURES, INTRACTABLE, WITHOUT STATUS EPILEPTICUS (HCC): ICD-10-CM

## 2021-12-22 RX ORDER — CENOBAMATE 200 MG/1
200 TABLET, FILM COATED ORAL DAILY
Qty: 30 EACH | Refills: 0 | Status: SHIPPED | OUTPATIENT
Start: 2021-12-22 | End: 2022-01-24

## 2022-01-24 DIAGNOSIS — G40.219 PARTIAL SYMPTOMATIC EPILEPSY WITH COMPLEX PARTIAL SEIZURES, INTRACTABLE, WITHOUT STATUS EPILEPTICUS (HCC): ICD-10-CM

## 2022-01-24 RX ORDER — CENOBAMATE 200 MG/1
TABLET, FILM COATED ORAL
Qty: 90 EACH | Refills: 1 | Status: SHIPPED | OUTPATIENT
Start: 2022-01-24 | End: 2022-07-15

## 2022-02-16 ENCOUNTER — OFFICE VISIT (OUTPATIENT)
Dept: NEUROLOGY | Age: 47
End: 2022-02-16
Payer: MEDICARE

## 2022-02-16 VITALS
OXYGEN SATURATION: 98 % | HEIGHT: 69 IN | HEART RATE: 105 BPM | BODY MASS INDEX: 26.28 KG/M2 | WEIGHT: 177.4 LBS | DIASTOLIC BLOOD PRESSURE: 70 MMHG | SYSTOLIC BLOOD PRESSURE: 120 MMHG

## 2022-02-16 DIAGNOSIS — F32.A DEPRESSION, UNSPECIFIED DEPRESSION TYPE: ICD-10-CM

## 2022-02-16 DIAGNOSIS — Z96.89 S/P PLACEMENT OF VNS (VAGUS NERVE STIMULATION) DEVICE: ICD-10-CM

## 2022-02-16 DIAGNOSIS — G40.219 PARTIAL SYMPTOMATIC EPILEPSY WITH COMPLEX PARTIAL SEIZURES, INTRACTABLE, WITHOUT STATUS EPILEPTICUS (HCC): Primary | ICD-10-CM

## 2022-02-16 PROCEDURE — 95976 ALYS SMPL CN NPGT PRGRMG: CPT | Performed by: PSYCHIATRY & NEUROLOGY

## 2022-02-16 PROCEDURE — 99214 OFFICE O/P EST MOD 30 MIN: CPT | Performed by: PSYCHIATRY & NEUROLOGY

## 2022-02-16 NOTE — PROGRESS NOTES
Sebas Cox is a 40 y.o. male came back for follow-up. He is now living by himself in a private home, owned by his godmother. States that he is staying away from alcohol. When he stayed with his parents for about 16 months, he was not drinking at all but still continued to have seizures. His seizure frequency currently seems to be about once per month. Last seizure was 2 weeks ago when he woke up with bruises on his body  Tolerating Xcopri well and he is currently taking 250 mg daily along with the Briviact 100 mg twice a day and topiramate 200 mg twice a day    EMU admission captured one of his seizures and it was a typical generalized tonic-clonic seizure but there was no clear onset localization. His interictal EEG revealed independent and synchronous bifrontal sharps, right more than left. There were also sharp seen in both temporal head regions, left more than right. His cavernous angioma is in the left hippocampal region. On sertraline for depression    Has an appointment coming up  at [address] [place] [address] for EMU study in the near future    RECAP  At the last visit he reported an episode of abruptly passing out when Epidiolex. He had another couple of episodes of seizure-like activity on lower dose but none since he has been on the current dose   He had a  generalized seizure in March and then again in July. He was initially planned to start clobazam but then Epidiolex was approved by insurance and it was started instead. He has been abstaining from alcohol and is on naltrexone for alcohol dependence    He has not been employed for the past several months. He used to work at [employer] and was driving at one point. His typical seizures in the past have been generalized tonic-clonic type, in the setting of alcohol withdrawal.  However he has had seizures outside of alcohol use as well.     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  A follow-up MRI was recommended but he has not been able to do it yet. EXAM  Exam:  Visit Vitals  /70   Pulse (!) 105   Ht 5' 9\" (1.753 m)   Wt 177 lb 6.4 oz (80.5 kg)   SpO2 98%   BMI 26.20 kg/m²     Gen:Alert, oriented. Speaks very little. CV: RRR  Lungs: non labored breathing  Abd: non distending  Neuro: A&O x 3, no dysarthria or aphasia  CN II-XII: PERRL, EOMI, face symmetric, tongue/palate midline  Motor: strength 5/5 all four ext  Sensory: intact to LT  Gait: normal    LABS/ IMAGING  MRI Results (most recent):  Results from Hospital Encounter encounter on 08/13/21    MRI BRAIN W WO CONT    Narrative  EXAM:  MRI BRAIN W WO CONT    INDICATION:    Seizures. COMPARISON:  MRI brain 6/14/2019. CONTRAST: 17 ml ProHance. TECHNIQUE:  Multiplanar multisequence acquisition without and with contrast of the brain. FINDINGS:  Stable lesion in the left posterior medial temporal lobe measuring 10 x 7 x 6  mm, which demonstrates bubbly central T2 hyperintensity with hemosiderin rim and  blooming on GRE images, consistent with a cavernous malformation. No significant  surrounding edema. There is an unchanged associated adjacent small developmental  venous anomaly. Stable small cyst in the right paramedian midbrain and the periaqueductal region  measuring 6 mm, which is near CSF intensity on all sequences. The ventricles are normal in size and position. The brain parenchyma otherwise  has normal signal characteristics. There is no acute infarct, hemorrhage,  extra-axial fluid collection, or mass effect. There is no cerebellar tonsillar  herniation. Expected arterial flow-voids are present. No evidence of abnormal  parenchymal enhancement. Mild mucosal thickening in the bilateral ethmoidal air cells and right maxillary  sinus without air-fluid level. Moderate left mastoid effusion. The orbital  contents are within normal limits. No significant osseous or scalp lesions are  identified. Impression  1.  Stable 10 mm cavernous malformation in the left posterior medial temporal  lobe with associated developmental venous anomaly. 2. Stable incidental 6 mm cyst in the right paramedian midbrain, which may  represent a perivascular space. 3. Otherwise unremarkable brain MRI. 4. Moderate left mastoid effusion. EEG showed occasional bifrontal sharp/spike discharges  Lab Results   Component Value Date/Time    WBC 7.6 10/26/2020 12:00 PM    HGB 13.1 10/26/2020 12:00 PM    Hematocrit (POC) 44 06/10/2019 04:29 PM    HCT 38.8 10/26/2020 12:00 PM    PLATELET 960 02/56/1633 12:00 PM    MCV 88.2 10/26/2020 12:00 PM     Lab Results   Component Value Date/Time    Sodium 142 10/26/2020 12:00 PM    Potassium 3.8 10/26/2020 12:00 PM    Chloride 113 (H) 10/26/2020 12:00 PM    CO2 23 10/26/2020 12:00 PM    Anion gap 6 10/26/2020 12:00 PM    Glucose 90 10/26/2020 12:00 PM    BUN 15 10/26/2020 12:00 PM    Creatinine 0.92 10/26/2020 12:00 PM    BUN/Creatinine ratio 16 10/26/2020 12:00 PM    GFR est AA >60 10/26/2020 12:00 PM    GFR est non-AA >60 10/26/2020 12:00 PM    Calcium 8.7 10/26/2020 12:00 PM    Bilirubin, total 0.5 10/26/2020 12:00 PM    Alk. phosphatase 77 10/26/2020 12:00 PM    Protein, total 6.9 10/26/2020 12:00 PM    Albumin 4.0 10/26/2020 12:00 PM    Globulin 2.9 10/26/2020 12:00 PM    A-G Ratio 1.4 10/26/2020 12:00 PM    ALT (SGPT) 35 10/26/2020 12:00 PM     VNS Settings;  VNS interrogated and programmed to following settings:    Basal output: 1.75 mA  Frequency: 20 Hz  Pulkse Width: 250 µs  On time: 30 seconds  Off time: 1.8 minutes    Magnet output: 1.875 mA   Mag pulse width: 500 µs  On time: 60 seconds    Autostim: On  Output: 1.875 mg  Pulse width: 250 µs  On time: 60 seconds    Tachycardia detection: Enabled  Auto stim threshold: 40%    Prone position detection: On      ASSESSMENT    ICD-10-CM ICD-9-CM    1.  Partial symptomatic epilepsy with complex partial seizures, intractable, without status epilepticus ([facility]  G40.219 345.41    2. S/P placement of VNS (vagus nerve stimulation) device  Z96.89 V45.89    3. Depression, unspecified depression type  F32. A 311    4       Cavernous angioma      PLAN  He has idiopathic epilepsy without any clear localization   His interictal EEG shows synchronous and independent discharges in both frontal head regions, right more than left. In addition there were independent sharp wave discharges seen in both temporal regions left more than right. He has had some improvement in his overall seizure frequency but still continues to have at least one seizure per month   Currently taking Xcopri 250 mg daily, topiramate 200 Mg twice daily and Briviact 100 mg twice daily  He is not on Epidiolex anymore as it did not help him much    VNS was reprogrammed and all settings were the same except that off time was reduced to 1.8 minutes    His MRI findings are chronic and stable.     He has of EMU study scheduled at [facility] for second opinion and to see if he would be a surgical candidate    Follow-up in 6 months or earlier if needed     Mynor Gramajo MD

## 2022-02-22 DIAGNOSIS — G40.219 PARTIAL SYMPTOMATIC EPILEPSY WITH COMPLEX PARTIAL SEIZURES, INTRACTABLE, WITHOUT STATUS EPILEPTICUS (HCC): ICD-10-CM

## 2022-02-22 DIAGNOSIS — Z96.89 S/P PLACEMENT OF VNS (VAGUS NERVE STIMULATION) DEVICE: ICD-10-CM

## 2022-02-22 RX ORDER — CENOBAMATE 50 MG/1
TABLET, FILM COATED ORAL
Qty: 90 TABLET | Refills: 1 | Status: SHIPPED | OUTPATIENT
Start: 2022-02-22 | End: 2022-08-18

## 2022-03-14 DIAGNOSIS — G40.909 SEIZURE DISORDER (HCC): ICD-10-CM

## 2022-03-16 ENCOUNTER — TELEPHONE (OUTPATIENT)
Dept: NEUROLOGY | Age: 47
End: 2022-03-16

## 2022-03-16 DIAGNOSIS — G40.909 SEIZURE DISORDER (HCC): ICD-10-CM

## 2022-03-17 DIAGNOSIS — G40.909 SEIZURE DISORDER (HCC): ICD-10-CM

## 2022-03-18 PROBLEM — G40.219 PARTIAL SYMPTOMATIC EPILEPSY WITH COMPLEX PARTIAL SEIZURES, INTRACTABLE, WITHOUT STATUS EPILEPTICUS (HCC): Status: ACTIVE | Noted: 2020-11-16

## 2022-03-18 PROBLEM — F41.1 GENERALIZED ANXIETY DISORDER: Status: ACTIVE | Noted: 2019-08-14

## 2022-03-18 PROBLEM — I65.23 BILATERAL CAROTID ARTERY STENOSIS: Status: ACTIVE | Noted: 2019-06-11

## 2022-03-19 PROBLEM — R93.0 ABNORMAL MRI OF HEAD: Status: ACTIVE | Noted: 2019-06-11

## 2022-03-19 PROBLEM — G40.919 INTRACTABLE SEIZURES (HCC): Status: ACTIVE | Noted: 2020-10-26

## 2022-03-19 PROBLEM — G93.0 BRAIN CYST: Status: ACTIVE | Noted: 2017-04-03

## 2022-03-22 ENCOUNTER — TELEPHONE (OUTPATIENT)
Dept: NEUROLOGY | Age: 47
End: 2022-03-22

## 2022-03-22 DIAGNOSIS — G40.909 SEIZURE DISORDER (HCC): ICD-10-CM

## 2022-03-22 NOTE — TELEPHONE ENCOUNTER
Re: Mariella Fus PA request through [id number] [facility] Dharmesh Hernandez and awaiting update.

## 2022-04-10 NOTE — TELEPHONE ENCOUNTER
Re; briviact    Follow up and key has been closed by [address] as approved. No effective dates listed. Will scan approval letter once rcvd. Archived key at this time.

## 2022-04-23 NOTE — TELEPHONE ENCOUNTER
I left a message for patient's mother, Fatmata Riggs, to call back and schedule a virtual visit for patient. Report to Azalea DENSON

## 2022-05-25 ENCOUNTER — TELEPHONE (OUTPATIENT)
Dept: INTERNAL MEDICINE CLINIC | Age: 47
End: 2022-05-25

## 2022-05-25 ENCOUNTER — OFFICE VISIT (OUTPATIENT)
Dept: URGENT CARE | Age: 47
End: 2022-05-25
Payer: MEDICARE

## 2022-05-25 VITALS
DIASTOLIC BLOOD PRESSURE: 81 MMHG | RESPIRATION RATE: 16 BRPM | SYSTOLIC BLOOD PRESSURE: 122 MMHG | TEMPERATURE: 98.1 F | HEART RATE: 93 BPM | OXYGEN SATURATION: 99 %

## 2022-05-25 DIAGNOSIS — N50.89 ENLARGED TESTICLE: ICD-10-CM

## 2022-05-25 DIAGNOSIS — R10.31 RIGHT INGUINAL PAIN: Primary | ICD-10-CM

## 2022-05-25 LAB
BILIRUB UR QL STRIP: NEGATIVE
GLUCOSE UR-MCNC: NEGATIVE MG/DL
KETONES P FAST UR STRIP-MCNC: NEGATIVE MG/DL
PH UR STRIP: 5.5 [PH] (ref 4.6–8)
PROT UR QL STRIP: NORMAL
SP GR UR STRIP: 1.03 (ref 1–1.03)
UA UROBILINOGEN AMB POC: NORMAL (ref 0.2–1)
URINALYSIS CLARITY POC: NORMAL
URINALYSIS COLOR POC: NORMAL
URINE BLOOD POC: NEGATIVE
URINE LEUKOCYTES POC: NEGATIVE
URINE NITRITES POC: NEGATIVE

## 2022-05-25 PROCEDURE — G8427 DOCREV CUR MEDS BY ELIG CLIN: HCPCS | Performed by: NURSE PRACTITIONER

## 2022-05-25 PROCEDURE — G8419 CALC BMI OUT NRM PARAM NOF/U: HCPCS | Performed by: NURSE PRACTITIONER

## 2022-05-25 PROCEDURE — 99202 OFFICE O/P NEW SF 15 MIN: CPT | Performed by: NURSE PRACTITIONER

## 2022-05-25 PROCEDURE — 81003 URINALYSIS AUTO W/O SCOPE: CPT | Performed by: FAMILY MEDICINE

## 2022-05-25 PROCEDURE — G8754 DIAS BP LESS 90: HCPCS | Performed by: NURSE PRACTITIONER

## 2022-05-25 PROCEDURE — G8432 DEP SCR NOT DOC, RNG: HCPCS | Performed by: NURSE PRACTITIONER

## 2022-05-25 PROCEDURE — G8752 SYS BP LESS 140: HCPCS | Performed by: NURSE PRACTITIONER

## 2022-05-25 NOTE — PATIENT INSTRUCTIONS
You need to see [facility] urology as soon as possible  Please give them a call tomorrow to schedule appointment within next 1-4 days. Go to the emergency department immediately for severe pain or worsening    [facility] - [address] [facility], [facility], [address] [facility]    Get Directions    Phone: [phone]  Fax: [phone]    Office Hours by Appointment:  [address] and Friday [facility]  [address] [place] [address],  [address] [place] [address]  Phone: [phone]  Fax: [phone]  Get Directions    Office Hours by Appointment:  Monday- Thursday 8 am - 5 pm and Friday 8 am to Noon by appointment only    Please note: Our building is in the back left corner of the [employer] Please turn into [address] from [facility] at the sign that indicates our address- [facility].

## 2022-05-25 NOTE — TELEPHONE ENCOUNTER
Patient mother, Vern Case called. [phone]. Patient have pain groin couple of weeks. Advise no appointments today. I reference nurse will call may have to go to urgent care.

## 2022-05-25 NOTE — PROGRESS NOTES
Here for 2 weeks of groin pain  Right side  No injury at onset  Pain is achy to sharp  Non radiating  Mild. Not improving at all. Never had simlar. Denies urinary burning, urgency or frequency or blood in urine  No nausea, vomiting or unintentional weight loss.            Past Medical History:   Diagnosis Date    Alcohol withdrawal ([address] 10/31/2014    Asperger syndrome 12/14/2011    Autism     dx 2010- highly functional    Complex partial seizure evolving to generalized seizure ([address] 6/11/2019    Convulsive syncope 6/11/2019    Fatigue     Ill-defined condition     Aspergers    Loss of appetite     Other ill-defined conditions(799.89)     aspergers, syncopal episodes    Psychiatric disorder     Aspergers    Seizure disorder ([address] 5/22/2012    Seizures ([address]     Seizures ([address]     Status post VNS (vagus nerve stimulator) placement 12/08/2020        Past Surgical History:   Procedure Laterality Date    HX ADENOIDECTOMY      HX GI      pyloric stenosis 1976    HX ORTHOPAEDIC  07/2020    Middle finger on left hand    HX TONSILLECTOMY      VASCULAR SURGERY PROCEDURE UNLIST  12/8/202    Insertion of Vagus Nerve Stimulator          Family History   Problem Relation Age of Onset    Diabetes Father     Heart Disease Father     Cancer Mother         Breast    Anxiety Mother     COPD Mother     Other Mother         Neuropathy    Sleep Apnea Mother     OSTEOARTHRITIS Mother     Other Brother         Pituitary tumor        Social History     Socioeconomic History    Marital status: SINGLE     Spouse name: Not on file    Number of children: Not on file    Years of education: Not on file    Highest education level: Not on file   Occupational History    Not on file   Tobacco Use    Smoking status: Never Smoker    Smokeless tobacco: Never Used   Substance and Sexual Activity    Alcohol use: Not Currently     Alcohol/week: 8.3 standard drinks     Types: 10 Cans of beer per week     Comment: Hasn't had any drinks since 6/2020    Drug use: No    Sexual activity: Not on file   Other Topics Concern    Not on file   Social History Narrative    Not on file     Social Determinants of Health     Financial Resource Strain:     Difficulty of Paying Living Expenses: Not on file   Food Insecurity:     Worried About Running Out of Food in the Last Year: Not on file    Karen of Food in the Last Year: Not on file   Transportation Needs:     Lack of Transportation (Medical): Not on file    Lack of Transportation (Non-Medical): Not on file   Physical Activity:     Days of Exercise per Week: Not on file    Minutes of Exercise per Session: Not on file   Stress:     Feeling of Stress : Not on file   Social Connections:     Frequency of Communication with Friends and Family: Not on file    Frequency of Social Gatherings with Friends and Family: Not on file    Attends Synagogue Services: Not on file    Active Member of [address] [place] [address] or Organizations: Not on file    Attends Club or Organization Meetings: Not on file    Marital Status: Not on file   Intimate Partner Violence:     Fear of Current or Ex-Partner: Not on file    Emotionally Abused: Not on file    Physically Abused: Not on file    Sexually Abused: Not on file   Housing Stability:     Unable to Pay for Housing in the Last Year: Not on file    Number of [place] in the Last Year: Not on file    Unstable Housing in the Last Year: Not on file                ALLERGIES: Aspartame    Review of Systems   Constitutional: Negative for fever. Gastrointestinal: Negative for blood in stool, diarrhea, nausea and vomiting. Endocrine: Negative for polyuria. Genitourinary: Negative for genital sores and penile discharge. Neurological: Negative for dizziness and weakness. All other systems reviewed and are negative.       Vitals:    05/25/22 1836   BP: 122/81   Pulse: 93   Resp: 16   Temp: 98.1 °F (36.7 °C)   SpO2: 99%       Physical Exam  Vitals reviewed. Constitutional:       General: He is not in acute distress. Appearance: He is not ill-appearing. HENT:      Head: Normocephalic and atraumatic. Cardiovascular:      Rate and Rhythm: Normal rate and regular rhythm. Pulmonary:      Effort: Pulmonary effort is normal.      Breath sounds: Normal breath sounds. Abdominal:      General: Abdomen is flat. Bowel sounds are normal. There is no distension. Palpations: Abdomen is soft. There is no hepatomegaly or mass. Tenderness: There is no abdominal tenderness. There is no right CVA tenderness, left CVA tenderness, guarding or rebound. Hernia: No hernia is present. Comments: No organomegaly of abdomen. Genitourinary:     Testes:         Right: Tenderness present. Testicular hydrocele not present. Cremasteric reflex is present. Epididymis:      Right: Not enlarged. No mass or tenderness. Comments: No edema of scrotum  Lymphadenopathy:      Lower Body: No right inguinal adenopathy. Neurological:      Mental Status: He is alert. Psychiatric:         Mood and Affect: Mood normal.         Behavior: Behavior normal.         Thought Content: Thought content normal.         MDM     Differential Diagnosis; Clinical Impression; Plan:       CLINICAL IMPRESSION:  (R10.31) Right inguinal pain  (primary encounter diagnosis)  (N50.89) Enlarged testicle      UA without MARION or NIT;non suggestive of UTI  On exam right testicle enlarged and firm  Consider mass or hernia. Ongoing for 2 weeks so not likely acute torsion  [facility]4 [facility] for further work up.                   Procedures

## 2022-05-26 ENCOUNTER — HOSPITAL ENCOUNTER (OUTPATIENT)
Dept: ULTRASOUND IMAGING | Age: 47
Discharge: HOME | End: 2022-05-26
Attending: UROLOGY
Payer: MEDICARE

## 2022-05-26 ENCOUNTER — TRANSCRIBE ORDER (OUTPATIENT)
Dept: SCHEDULING | Age: 47
End: 2022-05-26

## 2022-05-26 DIAGNOSIS — R10.30 GROIN PAIN: Primary | ICD-10-CM

## 2022-05-26 DIAGNOSIS — R10.30 GROIN PAIN: ICD-10-CM

## 2022-05-26 PROCEDURE — 76870 US EXAM SCROTUM: CPT

## 2022-05-31 ENCOUNTER — TRANSCRIBE ORDER (OUTPATIENT)
Dept: SCHEDULING | Age: 47
End: 2022-05-31

## 2022-05-31 ENCOUNTER — ANESTHESIA EVENT (OUTPATIENT)
Dept: SURGERY | Age: 47
End: 2022-05-31
Payer: MEDICARE

## 2022-05-31 ENCOUNTER — TELEPHONE (OUTPATIENT)
Dept: INTERNAL MEDICINE CLINIC | Age: 47
End: 2022-05-31

## 2022-05-31 ENCOUNTER — OFFICE VISIT (OUTPATIENT)
Dept: INTERNAL MEDICINE CLINIC | Age: 47
End: 2022-05-31
Payer: MEDICARE

## 2022-05-31 VITALS
SYSTOLIC BLOOD PRESSURE: 113 MMHG | DIASTOLIC BLOOD PRESSURE: 75 MMHG | BODY MASS INDEX: 25.77 KG/M2 | OXYGEN SATURATION: 97 % | RESPIRATION RATE: 19 BRPM | WEIGHT: 174 LBS | TEMPERATURE: 97.6 F | HEIGHT: 69 IN | HEART RATE: 76 BPM

## 2022-05-31 DIAGNOSIS — Z12.11 COLON CANCER SCREENING: ICD-10-CM

## 2022-05-31 DIAGNOSIS — F10.20 UNCOMPLICATED ALCOHOL DEPENDENCE (HCC): ICD-10-CM

## 2022-05-31 DIAGNOSIS — N50.89 TESTICULAR MASS: ICD-10-CM

## 2022-05-31 DIAGNOSIS — R93.0 ABNORMAL MRI OF HEAD: ICD-10-CM

## 2022-05-31 DIAGNOSIS — N50.9 DISEASE OF SEMINAL VESICLE: ICD-10-CM

## 2022-05-31 DIAGNOSIS — E78.2 MIXED HYPERLIPIDEMIA: ICD-10-CM

## 2022-05-31 DIAGNOSIS — G40.919 INTRACTABLE SEIZURES (HCC): ICD-10-CM

## 2022-05-31 DIAGNOSIS — Z00.00 MEDICARE ANNUAL WELLNESS VISIT, SUBSEQUENT: ICD-10-CM

## 2022-05-31 DIAGNOSIS — R77.2 ELEVATED ALPHA FETOPROTEIN: Primary | ICD-10-CM

## 2022-05-31 DIAGNOSIS — G40.909 SEIZURE DISORDER (HCC): Primary | ICD-10-CM

## 2022-05-31 PROCEDURE — 99213 OFFICE O/P EST LOW 20 MIN: CPT | Performed by: PHYSICIAN ASSISTANT

## 2022-05-31 PROCEDURE — G0439 PPPS, SUBSEQ VISIT: HCPCS | Performed by: PHYSICIAN ASSISTANT

## 2022-05-31 PROCEDURE — G8419 CALC BMI OUT NRM PARAM NOF/U: HCPCS | Performed by: PHYSICIAN ASSISTANT

## 2022-05-31 PROCEDURE — G8752 SYS BP LESS 140: HCPCS | Performed by: PHYSICIAN ASSISTANT

## 2022-05-31 PROCEDURE — G8427 DOCREV CUR MEDS BY ELIG CLIN: HCPCS | Performed by: PHYSICIAN ASSISTANT

## 2022-05-31 PROCEDURE — G8432 DEP SCR NOT DOC, RNG: HCPCS | Performed by: PHYSICIAN ASSISTANT

## 2022-05-31 PROCEDURE — G8754 DIAS BP LESS 90: HCPCS | Performed by: PHYSICIAN ASSISTANT

## 2022-05-31 RX ORDER — DOXYCYCLINE 100 MG/1
100 CAPSULE ORAL 2 TIMES DAILY
COMMUNITY
Start: 2022-05-26 | End: 2022-07-18

## 2022-05-31 NOTE — PROGRESS NOTES
This is the Subsequent Medicare Annual Wellness Exam, performed 12 months or more after the Initial AWV or the last Subsequent AWV    I have reviewed the patient's medical history in detail and updated the computerized patient record. Assessment/Plan   Education and counseling provided:  Are appropriate based on today's review and evaluation  Colorectal cancer screening tests  Cardiovascular screening blood test    1. Seizure disorder ([facility]  2. Uncomplicated alcohol dependence ([facility]  3. Intractable seizures ([facility]  4. Abnormal MRI of head  5. Colon cancer screening  6. Mixed hyperlipidemia  7. Medicare annual wellness visit, subsequent       Depression Risk Factor Screening     3 most recent PHQ Screens 5/31/2022   Little interest or pleasure in doing things Not at all   Feeling down, depressed, irritable, or hopeless Not at all   Total Score PHQ 2 0       Alcohol & Drug Abuse Risk Screen    Do you average more than 2 drinks per night or 14 drinks a week: Yes    On any one occasion in the past three months have you have had more than 4 drinks containing alcohol:  No          Functional Ability and Level of Safety    Hearing: Hearing is good. Activities of Daily Living: The home contains: no safety equipment. Patient needs help with:  transportation, shopping and preparing meals      Ambulation: with no difficulty     Fall Risk:  Fall Risk Assessment, last 12 mths 9/6/2019   Able to walk? Yes   Fall in past 12 months?  No      Abuse Screen:  Patient is not abused       Cognitive Screening    Has your family/caregiver stated any concerns about your memory: no     Cognitive Screening: Recently had neuropsych eval    Health Maintenance Due     Health Maintenance Due   Topic Date Due    Pneumococcal 0-64 years (1 - PCV) Never done    Colorectal Cancer Screening Combo  Never done       Patient Care Team   Patient Care Team:  Neha Medel MD as PCP - General (Internal Medicine Physician)  Neha Medel MD as PCP - [address] [place] [address] Dr Qureshi Provider  Corrina Reid MD (Neurology)  Louie Lewis MD (Neurology)    History     Patient Active Problem List   Diagnosis Code    Autism F84.0    Asperger syndrome F84.5    Seizure disorder ([address] G40.909    Hypertension I10    EtOH dependence ([address] F10.20    Advance care planning Z71.89    Brain cyst G93.0    Abnormal MRI of head R93.0    Bilateral carotid artery stenosis I65.23    Generalized anxiety disorder F41.1    Intractable seizures ([address] G40.919    Partial symptomatic epilepsy with complex partial seizures, intractable, without status epilepticus ([address] G40.219     Past Medical History:   Diagnosis Date    Alcohol withdrawal ([address] 10/31/2014    Asperger syndrome 12/14/2011    Autism     dx 2010- highly functional    Complex partial seizure evolving to generalized seizure ([address] 6/11/2019    Convulsive syncope 6/11/2019    Fatigue     Ill-defined condition     Aspergers    Loss of appetite     Other ill-defined conditions(799.89)     aspergers, syncopal episodes    Psychiatric disorder     Aspergers    Seizure disorder ([address] 5/22/2012    Seizures ([address]     Seizures (HCC)     Status post VNS (vagus nerve stimulator) placement 12/08/2020      Past Surgical History:   Procedure Laterality Date    HX ADENOIDECTOMY      HX GI      pyloric stenosis 1976    HX ORTHOPAEDIC  07/2020    Middle finger on left hand    HX TONSILLECTOMY      VASCULAR SURGERY PROCEDURE UNLIST  12/8/202    Insertion of Vagus Nerve Stimulator      Current Outpatient Medications   Medication Sig Dispense Refill    brivaracetam (Briviact) 100 mg tablet Take 1 Tablet by mouth two (2) times a day. Max Daily Amount: 200 mg. Dr. Peyton Quiroz refilling for Dr. Sung Corona, one time only. 60 Tablet 1    Xcopri 50 mg tab tablet TAKE 1 TABLET BY MOUTH DAILY. MAX DAILY AMOUNT: 50 MG 90 Tablet 1    Xcopri 200 mg tab TAKE 1 TABLET BY MOUTH DAILY.  MAX DAILY AMOUNT: 200 MG 90 Each 1    sertraline (ZOLOFT) 50 mg tablet Take 1 Tablet by mouth daily. 90 Tablet 3    topiramate (TOPAMAX) 200 mg tablet TAKE 1 TABLET TWICE DAILY 180 Tablet 3    MULTIVITAMIN PO Take 1 Tab by mouth daily.  doxycycline (VIBRAMYCIN) 100 mg capsule Take 100 mg by mouth two (2) times a day. Allergies   Allergen Reactions    Aspartame Other (comments)     Family believes it causes his seizures       Family History   Problem Relation Age of Onset    Diabetes Father     Heart Disease Father     Elevated Lipids Father     Cancer Mother         Breast    Anxiety Mother     COPD Mother     Other Mother         Neuropathy    Sleep Apnea Mother     OSTEOARTHRITIS Mother     Other Brother         Pituitary tumor     Social History     Tobacco Use    Smoking status: Never Smoker    Smokeless tobacco: Never Used   Substance Use Topics    Alcohol use: Not Currently     Alcohol/week: 10.0 standard drinks     Types: 10 Cans of beer per week     Comment: Hasn't had any drinks since 6/2020         Sathish Vela PA-C     Carla Steiner is a 56y.o. year old male seen in clinic today for   Chief Complaint   Patient presents with    Physical       he is here today to follow up for paper work for [address] see's Reg Mata MD  He has a hx of alcohol problems, seizures and Asperger syndrome. He is followed by Neurologist Dr. William Schumacher. On multiple medicines. Reported last seizure was 1.5 months ago. Reports he is currently drinking 3 beers per night. Mother notes he drinks nightly self treating anxiety. He uses Zoloft 50 mg daily for chronic anxiety as well. No new complaints. Is following with Urology at this time for scrotal swelling. Current Outpatient Medications on File Prior to Visit   Medication Sig Dispense Refill    brivaracetam (Briviact) 100 mg tablet Take 1 Tablet by mouth two (2) times a day. Max Daily Amount: 200 mg. Dr. Payal Kwon refilling for Dr. William Schumacher, one time only.  60 Tablet 1    Xcopri 50 mg tab tablet TAKE 1 TABLET BY MOUTH DAILY. MAX DAILY AMOUNT: 50 MG 90 Tablet 1    Xcopri 200 mg tab TAKE 1 TABLET BY MOUTH DAILY. MAX DAILY AMOUNT: 200 MG 90 Each 1    sertraline (ZOLOFT) 50 mg tablet Take 1 Tablet by mouth daily. 90 Tablet 3    topiramate (TOPAMAX) 200 mg tablet TAKE 1 TABLET TWICE DAILY 180 Tablet 3    MULTIVITAMIN PO Take 1 Tab by mouth daily.  doxycycline (VIBRAMYCIN) 100 mg capsule Take 100 mg by mouth two (2) times a day. No current facility-administered medications on file prior to visit.          Allergies   Allergen Reactions    Aspartame Other (comments)     Family believes it causes his seizures     Past Medical History:   Diagnosis Date    Alcohol withdrawal ([facility] 10/31/2014    Asperger syndrome 12/14/2011    Autism     dx 2010- highly functional    Complex partial seizure evolving to generalized seizure ([address] 6/11/2019    Convulsive syncope 6/11/2019    Fatigue     Ill-defined condition     Aspergers    Loss of appetite     Other ill-defined conditions(799.89)     aspergers, syncopal episodes    Psychiatric disorder     Aspergers    Seizure disorder ([address] 5/22/2012    Seizures ([address]     Seizures (HCC)     Status post VNS (vagus nerve stimulator) placement 12/08/2020      Past Surgical History:   Procedure Laterality Date    HX ADENOIDECTOMY      HX GI      pyloric stenosis 1976    HX ORTHOPAEDIC  07/2020    Middle finger on left hand    HX TONSILLECTOMY      VASCULAR SURGERY PROCEDURE UNLIST  12/8/202    Insertion of Vagus Nerve Stimulator         Family History   Problem Relation Age of Onset    Diabetes Father     Heart Disease Father     Elevated Lipids Father     Cancer Mother         Breast    Anxiety Mother     COPD Mother     Other Mother         Neuropathy    Sleep Apnea Mother     OSTEOARTHRITIS Mother     Other Brother         Pituitary tumor        Social History     Socioeconomic History    Marital status: SINGLE Spouse name: Not on file    Number of children: Not on file    Years of education: Not on file    Highest education level: Not on file   Occupational History    Not on file   Tobacco Use    Smoking status: Never Smoker    Smokeless tobacco: Never Used   Substance and Sexual Activity    Alcohol use: Not Currently     Alcohol/week: 10.0 standard drinks     Types: 10 Cans of beer per week     Comment: occasional    Drug use: No    Sexual activity: Not on file   Other Topics Concern    Not on file   Social History Narrative    Not on file     Social Determinants of Health     Financial Resource Strain:     Difficulty of Paying Living Expenses: Not on file   Food Insecurity:     Worried About Running Out of Food in the Last Year: Not on file    Karen of Food in the Last Year: Not on file   Transportation Needs:     Lack of Transportation (Medical): Not on file    Lack of Transportation (Non-Medical):  Not on file   Physical Activity:     Days of Exercise per Week: Not on file    Minutes of Exercise per Session: Not on file   Stress:     Feeling of Stress : Not on file   Social Connections:     Frequency of Communication with Friends and Family: Not on file    Frequency of Social Gatherings with Friends and Family: Not on file    Attends Alevism Services: Not on file    Active Member of [address] [place] [address] "[employer] or Organizations: Not on file    Attends Club or Organization Meetings: Not on file    Marital Status: Not on file   Intimate Partner Violence:     Fear of Current or Ex-Partner: Not on file    Emotionally Abused: Not on file    Physically Abused: Not on file    Sexually Abused: Not on file   Housing Stability:     Unable to Pay for Housing in the Last Year: Not on file    Number of [place] in the Last Year: Not on file    Unstable Housing in the Last Year: Not on file           Visit Vitals  /75 (BP 1 Location: Left upper arm, BP Patient Position: Sitting, BP Cuff Size: Adult)   Pulse 76 Temp 97.6 °F (36.4 °C) (Oral)   Resp 19   Ht 5' 9\" (1.753 m)   Wt 174 lb (78.9 kg)   SpO2 97%   BMI 25.70 kg/m²       Review of Systems   Constitutional: Negative for chills and fever. HENT: Negative. Respiratory: Negative for shortness of breath. Cardiovascular: Negative for chest pain. Gastrointestinal: Negative for abdominal pain, heartburn, nausea and vomiting. Genitourinary: Negative. Musculoskeletal: Negative. Skin: Negative for rash. Neurological: Negative for dizziness and headaches. Physical Exam  Vitals and nursing note reviewed. Constitutional:       Appearance: Normal appearance. He is normal weight. HENT:      Head: Normocephalic and atraumatic. Right Ear: Tympanic membrane, ear canal and external ear normal.      Left Ear: Tympanic membrane, ear canal and external ear normal.      Nose: Nose normal.      Mouth/Throat:      Mouth: Mucous membranes are moist.      Pharynx: Oropharynx is clear. No oropharyngeal exudate or posterior oropharyngeal erythema. Eyes:      Conjunctiva/sclera: Conjunctivae normal.      Pupils: Pupils are equal, round, and reactive to light. Neck:      Vascular: No carotid bruit. Cardiovascular:      Rate and Rhythm: Normal rate and regular rhythm. Pulses: Normal pulses. Heart sounds: Normal heart sounds. No murmur heard. Pulmonary:      Effort: Pulmonary effort is normal.      Breath sounds: Normal breath sounds. No stridor. No wheezing, rhonchi or rales. Abdominal:      General: Abdomen is flat. Bowel sounds are normal. There is no distension. Tenderness: There is no abdominal tenderness. There is no guarding. Musculoskeletal:         General: Normal range of motion. Cervical back: Normal range of motion and neck supple. No rigidity. Right lower leg: No edema. Left lower leg: No edema. Skin:     General: Skin is dry. Capillary Refill: Capillary refill takes less than 2 seconds. Neurological:      General: No focal deficit present. Mental Status: He is alert and oriented to person, place, and time. Mental status is at baseline. Cranial Nerves: No cranial nerve deficit. Sensory: No sensory deficit. Motor: No weakness. Gait: Gait normal.   Psychiatric:         Mood and Affect: Mood normal.          No results found for this or any previous visit (from the past 24 hour(s)). ASSESSMENT AND PLAN  Diagnoses and all orders for this visit:    1. Seizure disorder ([facility]    2. [facility]complicated [facility] dependence ([facility]    3. Intractable seizures ([facility]    4. Abnormal MRI of head    5. Colon cancer screening  -     OCCULT BLOOD IMMUNOASSAY,DIAGNOSTIC; Future    6. Mixed hyperlipidemia  -     LIPID PANEL; Future  -     METABOLIC PANEL, COMPREHENSIVE; Future    7. Medicare annual wellness visit, subsequent    Paperwork completed and sent home with family. Family request labs for routine lipid and BG screening. Cologuard not covered at his age for colon cancer screening. Sent home with FIT test.  Seizures managed by Neuro, none in 1.5 months. Continue current routine. EtOH above normal sensible limits, will leave for counseling with PCP       I have discussed the diagnosis with the patient and the intended plan as seen in the above orders. Patient is in agreement. The patient has received an after-visit summary and questions were answered concerning future plans. I have discussed medication side effects and warnings with the patient as well.     Rickey Nina PA-C

## 2022-05-31 NOTE — PATIENT INSTRUCTIONS
Medicare Wellness Visit, Male    The best way to live healthy is to have a lifestyle where you eat a well-balanced diet, exercise regularly, limit alcohol use, and quit all forms of tobacco/nicotine, if applicable. Regular preventive services are another way to keep healthy. Preventive services (vaccines, screening tests, monitoring & exams) can help personalize your care plan, which helps you manage your own care. Screening tests can find health problems at the earliest stages, when they are easiest to treat. [facility] follows the current, evidence-based guidelines published by the [facility] ([facility]) when recommending preventive services for our patients. Because we follow these guidelines, sometimes recommendations change over time as research supports it. (For example, a prostate screening blood test is no longer routinely recommended for men with no symptoms). Of course, you and your doctor may decide to screen more often for some diseases, based on your risk and co-morbidities (chronic disease you are already diagnosed with). Preventive services for you include:  - Medicare offers their members a free annual wellness visit, which is time for you and your primary care provider to discuss and plan for your preventive service needs. Take advantage of this benefit every year!  -All adults over age 72 should receive the recommended pneumonia vaccines. Current USPSTF guidelines recommend a series of two vaccines for the best pneumonia protection.   -All adults should have a flu vaccine yearly and tetanus vaccine every 10 years.  -All adults age 48 and older should receive the shingles vaccines (series of two vaccines).        -All adults age 38-68 who are overweight should have a diabetes screening test once every three years.   -Other screening tests & preventive services for persons with diabetes include: an eye exam to screen for diabetic retinopathy, a kidney function test, a foot exam, and stricter control over your cholesterol.   -Cardiovascular screening for adults with routine risk involves an electrocardiogram (ECG) at intervals determined by the provider.   -Colorectal cancer screening should be done for adults age 54-65 with no increased risk factors for colorectal cancer. There are a number of acceptable methods of screening for this type of cancer. Each test has its own benefits and drawbacks. Discuss with your provider what is most appropriate for you during your annual wellness visit. The different tests include: colonoscopy (considered the best screening method), a fecal occult blood test, a fecal DNA test, and sigmoidoscopy.  -All adults born between [facility] should be screened once for Hepatitis C.  -An Abdominal Aortic Aneurysm (AAA) Screening is recommended for men age 73-68 who has ever smoked in their lifetime.      Here is a list of your current Health Maintenance items (your personalized list of preventive services) with a due date:  Health Maintenance Due   Topic Date Due    Pneumococcal Vaccine (1 - PCV) Never done    Colorectal Screening  Never done

## 2022-05-31 NOTE — PROGRESS NOTES
Identified pt with two pt identifiers(name and ). Reviewed record in preparation for visit and have obtained necessary documentation. Chief Complaint   Patient presents with    Physical        Vitals:    22 1500   BP: 113/75   Pulse: 76   Resp: 19   Temp: 97.6 °F (36.4 °C)   TempSrc: Oral   SpO2: 97%   Weight: 174 lb (78.9 kg)   Height: 5' 9\" (1.753 m)   PainSc:   0 - No pain       Health Maintenance Due   Topic    Hepatitis C Screening     Pneumococcal 0-64 years (1 - PCV)    Colorectal Cancer Screening Combo     Medicare Yearly Exam        1. \"Have you been to the ER, urgent care clinic since your last visit? Hospitalized since your last visit? \" No    2. \"Have you seen or consulted any other health care providers outside of the [address] [place] [address] since your last visit? \" No     3. For patients over 45: Has the patient had a colonoscopy? No     If the patient is female:    4. For patients over 36: Has the patient had a mammogram? NA - based on age    11. For patients over 21: Has the patient had a pap smear? NA - based on age    Current Outpatient Medications   Medication Instructions    brivaracetam (BRIVIACT) 100 mg, Oral, 2 TIMES DAILY, Dr. Payal Kwon refilling for Dr. William Schumacher, one time only.  doxycycline (VIBRAMYCIN) 100 mg, Oral, 2 TIMES DAILY    MULTIVITAMIN PO 1 Tablet, Oral, DAILY    sertraline (ZOLOFT) 50 mg, Oral, DAILY    topiramate (TOPAMAX) 200 mg tablet TAKE 1 TABLET TWICE DAILY    Xcopri 200 mg tab TAKE 1 TABLET BY MOUTH DAILY. MAX DAILY AMOUNT: 200 MG    Xcopri 50 mg tab tablet TAKE 1 TABLET BY MOUTH DAILY.  MAX DAILY AMOUNT: 50 MG       Allergies   Allergen Reactions    Aspartame Other (comments)     Family believes it causes his seizures       Immunization History   Administered Date(s) Administered    COVID-19, Moderna Booster, PF, 0.25mL Dose 2021    COVID-19, Moderna, Primary or Immunocompromised Series, MRNA, PF, 100mcg/0.5mL 2021, 2021    Influenza Vaccine Smart Voicemail) PF (>6 Mo Flulaval, Fluarix, and >3 Yrs Afluria, Fluzone 81125) 12/15/2019, 10/29/2020    Influenza Vaccine Split 04/02/2012    TD Vaccine 03/02/2012    Tdap 12/13/2019       Past Medical History:   Diagnosis Date    Alcohol withdrawal ([facility] 10/31/2014    Asperger syndrome 12/14/2011    Autism     dx 2010- highly functional    Complex partial seizure evolving to generalized seizure ([facility] 6/11/2019    Convulsive syncope 6/11/2019    Fatigue     Ill-defined condition     Aspergers    Loss of appetite     Other ill-defined conditions(799.89)     aspergers, syncopal episodes    Psychiatric disorder     Aspergers    Seizure disorder ([facility] 5/22/2012    Seizures (HCC)     Seizures (HCC)     Status post VNS (vagus nerve stimulator) placement 12/08/2020

## 2022-05-31 NOTE — PERIOP NOTES
[facility]  Preoperative Instructions    Surgery Date June 1         Time of Arrival 0800    1. On the day of your surgery, please report to the Surgical Services Registration Desk and sign in at your designated time. The Surgery Center is located to the right of the Emergency Room. 2. You must have someone with you to drive you home. You should not drive a car for 24 hours following surgery. Please make arrangements for a friend or family member to stay with you for the first 24 hours after your surgery. 3. Do not have anything to eat or drink (including water, gum, mints, coffee, juice) after midnight ? May 31  . ? This may not apply to medications prescribed by your physician. ?(Please note below the special instructions with medications to take the morning of your procedure.)    4. We recommend you do not drink any alcoholic beverages for 24 hours before and after your surgery. 5. Contact your surgeons office for instructions on the following medications: non-steroidal anti-inflammatory drugs (i.e. Advil, Aleve), vitamins, and supplements. (Some surgeons will want you to stop these medications prior to surgery and others may allow you to take them)  **If you are currently taking Plavix, Coumadin, Aspirin and/or other blood-thinning agents, contact your surgeon for instructions. ** Your surgeon will partner with the physician prescribing these medications to determine if it is safe to stop or if you need to continue taking. Please do not stop taking these medications without instructions from your surgeon    6. Wear comfortable clothes. Wear glasses instead of contacts. Do not bring any money or jewelry. Please bring picture ID, insurance card, and any prearranged co-payment or hospital payment. Do not wear make-up, particularly mascara the morning of your surgery. Do not wear nail polish, particularly if you are having foot /hand surgery.   Wear your hair loose or down, no ponytails, buns, sabrina pins or clips. All body piercings must be removed. Please shower with antibacterial soap for three consecutive days before and on the morning of surgery, but do not apply any lotions, powders or deodorants after the shower on the day of surgery. Please use a fresh towels after each shower. Please sleep in clean clothes and change bed linens the night before surgery. Please do not shave for 48 hours prior to surgery. Shaving of the face is acceptable. 7. You should understand that if you do not follow these instructions your surgery may be cancelled. If your physical condition changes (I.e. fever, cold or flu) please contact your surgeon as soon as possible. 8. It is important that you be on time. If a situation occurs where you may be late, please call [phone] (OR Holding Area). 9. If you have any questions and or problems, please call [phone] (Pre-admission Testing). 10. Your surgery time may be subject to change. You will receive a phone call the evening prior if your time changes. 11.  If having outpatient surgery, you must have someone to drive you here, stay with you during the duration of your stay, and to drive you home at time of discharge. 12.  Due to current COVID restrictions, only ONE adult may accompany you the day of the procedure. We have limited seating available. If our waiting room is at capacity, your ride may be asked to remain in their vehicle. No children are allowed in the waiting room    Special Instructions: Follow your physician/surgeon instructions for holding all non-steroidal anti-inflammatory drugs/NSAIDs, blood-thinning agents, vitamins & supplements prior to surgery. TAKE ALL MEDICATIONS DAY OF SURGERY EXCEPT:  none    I understand a pre-operative phone call will be made to verify my surgery time.   In the event that I am not available, I give permission for a message to be left on my answering service and/or with another person?   yes         ___________________      __________   _________    (Signature of Patient)             (Witness)                (Date and Time)

## 2022-05-31 NOTE — TELEPHONE ENCOUNTER
Patient's mother needs to speak with a provider regarding what medications the patient can and can't take. This is in regards to an operation the patient is having in the morning.     CB: [phone]

## 2022-05-31 NOTE — H&P
Patt Bryant is a 56year old male who presents today for \"Groin pain\". He presents as a new patient. Mr. Luis Orona was referred today by a [facility], regarding groin pain. Outside records were reviewed. He reports testicular and groin pain x 7 days. Denies hematuria. Denies hx of pain. Denies injury. Denies prior  surgeries. Denies family hx of prostate cancer. UA today is clear. PAST MEDICAL HISTORY:    Allergies: No known allergies. DENIES: Latex, Shellfish, X-Ray Dye, Iodine. Medications: doxycycline hyclate 100 mg capsule (doxycycline hyclate) Take 1 capsule by mouth twice a day   Xcopri 200 mg tablet (cenobamate)   Xcopri 50 mg tablet (cenobamate)   Briviact 100 mg tablet (brivaracetam)   sertraline 50 mg tablet (sertraline)   topiramate 200 mg tablet (topiramate)   multivitamin tablet (multivitamin)     Problems: Testicular mass (NNQ-178.94) (TFP80-O39.9)    Illnesses: Stroke/Seizure. DENIES: Heart Disease, Pacemaker/Defibrillator, Lung Disease, Diabetes, High Blood Pressure, Bowel Problems, Kidney Problems, Bleeding Problems, HIV, Hepatitis, or Cancer. Surgeries: Other / Not Listed. Family History: DENIES: Prostate cancer, Kidney cancer, Kidney disease, Kidney stones. Social History: Unemployed. Single. Smoking status: never smoker. Drinks alcohol 2 to 3 times a week. System Review: Admits to: None. DENIES: Unexplained Weight Loss, Dry Eyes, Dry Mouth, Leg Swelling, Shortness of Breath, Constipation, Involuntary Urine Loss, Lower Extremity Weakness, Dry Skin, Difficulty Walking, Psychiatric Problems, Impaired Sex Drive, Easy Bleeding, Rash.      EXAMINATION: Vitals: Pulse: 81 regular BP: 117/72 Weight: 172 lbs Height: 5' 8\" BMI: 26.25 kg/m^2  Appearance: well-developed NAD Neck: supple Respiratory Effort: breathing easily Lower Extremity: no edema Abdomen/Flank: soft non-tender without masses; no CVA tenderness Liver/Spleen: no organomegaly Hernia: no ventral hernia Hemoccult: not indicated Scrotum: without lesions Testes: Right Testicle is Swollen and tender. No erethyma. No inguinal adenopathy or evidence of hernia.  Left Testicle is normal Epididymes: bilaterally no masses palpated, non-tender Penis: no plaques; no lesions Meatus: patent Lymph: no adenopathy Skin Inspection: warm and dry Orientation: oriented to person; time and place Mood/Affect: normal       URINALYSIS from Voided specimen  Urine Dip: pH: 5.0, Bld: Neg, LE: Neg, Nit: Neg, Prot: Neg, Ket: Neg, Gluc: Neg  Urine Micro not done    Prescription(s) Today: doxycycline hyclate 100 mg capsule (doxycycline hyclate) Take 1 capsule by mouth twice a day   #20 capsule x 0,  05/26/2022, Briana Larry RN, SIGNED    IMPRESSION:    1. TESTICULAR MASS (HNW94-N01.9) - solid right testicular mass on US, smaller left  Mass, afp up  Will arrange right rad orch with frozen sections, risks disussed

## 2022-06-01 ENCOUNTER — ANESTHESIA (OUTPATIENT)
Dept: SURGERY | Age: 47
End: 2022-06-01
Payer: MEDICARE

## 2022-06-01 ENCOUNTER — HOSPITAL ENCOUNTER (OUTPATIENT)
Age: 47
Setting detail: OUTPATIENT SURGERY
Discharge: HOME | End: 2022-06-01
Attending: UROLOGY | Admitting: UROLOGY
Payer: MEDICARE

## 2022-06-01 VITALS
BODY MASS INDEX: 25.01 KG/M2 | DIASTOLIC BLOOD PRESSURE: 81 MMHG | HEART RATE: 62 BPM | OXYGEN SATURATION: 95 % | TEMPERATURE: 98.5 F | RESPIRATION RATE: 16 BRPM | WEIGHT: 168.87 LBS | HEIGHT: 69 IN | SYSTOLIC BLOOD PRESSURE: 131 MMHG

## 2022-06-01 DIAGNOSIS — C62.11 MALIGNANT NEOPLASM OF DESCENDED RIGHT TESTIS (HCC): Primary | ICD-10-CM

## 2022-06-01 LAB
APPEARANCE UR: CLEAR
BACTERIA URNS QL MICRO: NEGATIVE /HPF
BILIRUB UR QL: NEGATIVE
COLOR UR: ABNORMAL
EPITH CASTS URNS QL MICRO: ABNORMAL /LPF
GLUCOSE UR STRIP.AUTO-MCNC: NEGATIVE MG/DL
HGB UR QL STRIP: NEGATIVE
HYALINE CASTS URNS QL MICRO: ABNORMAL /LPF (ref 0–2)
KETONES UR QL STRIP.AUTO: ABNORMAL MG/DL
LEUKOCYTE ESTERASE UR QL STRIP.AUTO: ABNORMAL
NITRITE UR QL STRIP.AUTO: NEGATIVE
PH UR STRIP: 7.5 [PH] (ref 5–8)
PROT UR STRIP-MCNC: NEGATIVE MG/DL
RBC #/AREA URNS HPF: ABNORMAL /HPF (ref 0–5)
SP GR UR REFRACTOMETRY: 1.02
UA: UC IF INDICATED,UAUC: ABNORMAL
UROBILINOGEN UR QL STRIP.AUTO: 1 EU/DL (ref 0.2–1)
WBC URNS QL MICRO: ABNORMAL /HPF (ref 0–4)

## 2022-06-01 PROCEDURE — 88342 IMHCHEM/IMCYTCHM 1ST ANTB: CPT

## 2022-06-01 PROCEDURE — 77030013079 HC BLNKT BAIR HGGR 3M -A: Performed by: ANESTHESIOLOGY

## 2022-06-01 PROCEDURE — 2709999900 HC NON-CHARGEABLE SUPPLY: Performed by: UROLOGY

## 2022-06-01 PROCEDURE — 76210000021 HC REC RM PH II 0.5 TO 1 HR: Performed by: UROLOGY

## 2022-06-01 PROCEDURE — 76010000153 HC OR TIME 1.5 TO 2 HR: Performed by: UROLOGY

## 2022-06-01 PROCEDURE — 77030002933 HC SUT MCRYL J&J -A: Performed by: UROLOGY

## 2022-06-01 PROCEDURE — 77030031139 HC SUT VCRL2 J&J -A: Performed by: UROLOGY

## 2022-06-01 PROCEDURE — 77030008684 HC TU ET CUF COVD -B: Performed by: ANESTHESIOLOGY

## 2022-06-01 PROCEDURE — 74011000250 HC RX REV CODE- 250: Performed by: UROLOGY

## 2022-06-01 PROCEDURE — 74011250637 HC RX REV CODE- 250/637: Performed by: UROLOGY

## 2022-06-01 PROCEDURE — 74011000250 HC RX REV CODE- 250: Performed by: NURSE ANESTHETIST, CERTIFIED REGISTERED

## 2022-06-01 PROCEDURE — 77030002996 HC SUT SLK J&J -A: Performed by: UROLOGY

## 2022-06-01 PROCEDURE — 77030019908 HC STETH ESOPH SIMS -A: Performed by: ANESTHESIOLOGY

## 2022-06-01 PROCEDURE — 77030026438 HC STYL ET INTUB CARD -A: Performed by: ANESTHESIOLOGY

## 2022-06-01 PROCEDURE — 74011250636 HC RX REV CODE- 250/636: Performed by: NURSE ANESTHETIST, CERTIFIED REGISTERED

## 2022-06-01 PROCEDURE — 88307 TISSUE EXAM BY PATHOLOGIST: CPT

## 2022-06-01 PROCEDURE — 76210000016 HC OR PH I REC 1 TO 1.5 HR: Performed by: UROLOGY

## 2022-06-01 PROCEDURE — 88305 TISSUE EXAM BY PATHOLOGIST: CPT

## 2022-06-01 PROCEDURE — 88331 PATH CONSLTJ SURG 1 BLK 1SPC: CPT

## 2022-06-01 PROCEDURE — 81001 URINALYSIS AUTO W/SCOPE: CPT

## 2022-06-01 PROCEDURE — 88341 IMHCHEM/IMCYTCHM EA ADD ANTB: CPT

## 2022-06-01 PROCEDURE — 74011250636 HC RX REV CODE- 250/636: Performed by: UROLOGY

## 2022-06-01 PROCEDURE — 77030010507 HC ADH SKN DERMBND J&J -B: Performed by: UROLOGY

## 2022-06-01 PROCEDURE — 74011250636 HC RX REV CODE- 250/636: Performed by: ANESTHESIOLOGY

## 2022-06-01 PROCEDURE — 88309 TISSUE EXAM BY PATHOLOGIST: CPT

## 2022-06-01 PROCEDURE — 76060000034 HC ANESTHESIA 1.5 TO 2 HR: Performed by: UROLOGY

## 2022-06-01 RX ORDER — DEXMEDETOMIDINE HYDROCHLORIDE 100 UG/ML
INJECTION, SOLUTION INTRAVENOUS AS NEEDED
Status: DISCONTINUED | OUTPATIENT
Start: 2022-06-01 | End: 2022-06-01 | Stop reason: HOSPADM

## 2022-06-01 RX ORDER — LIDOCAINE HYDROCHLORIDE 20 MG/ML
INJECTION, SOLUTION EPIDURAL; INFILTRATION; INTRACAUDAL; PERINEURAL AS NEEDED
Status: DISCONTINUED | OUTPATIENT
Start: 2022-06-01 | End: 2022-06-01 | Stop reason: HOSPADM

## 2022-06-01 RX ORDER — SUCCINYLCHOLINE CHLORIDE 20 MG/ML
INJECTION INTRAMUSCULAR; INTRAVENOUS AS NEEDED
Status: DISCONTINUED | OUTPATIENT
Start: 2022-06-01 | End: 2022-06-01 | Stop reason: HOSPADM

## 2022-06-01 RX ORDER — FENTANYL CITRATE 50 UG/ML
INJECTION, SOLUTION INTRAMUSCULAR; INTRAVENOUS AS NEEDED
Status: DISCONTINUED | OUTPATIENT
Start: 2022-06-01 | End: 2022-06-01 | Stop reason: HOSPADM

## 2022-06-01 RX ORDER — BUPIVACAINE HYDROCHLORIDE 2.5 MG/ML
INJECTION, SOLUTION EPIDURAL; INFILTRATION; INTRACAUDAL AS NEEDED
Status: DISCONTINUED | OUTPATIENT
Start: 2022-06-01 | End: 2022-06-01 | Stop reason: HOSPADM

## 2022-06-01 RX ORDER — NEOSTIGMINE METHYLSULFATE 1 MG/ML
INJECTION, SOLUTION INTRAVENOUS AS NEEDED
Status: DISCONTINUED | OUTPATIENT
Start: 2022-06-01 | End: 2022-06-01 | Stop reason: HOSPADM

## 2022-06-01 RX ORDER — HYDROMORPHONE HYDROCHLORIDE 2 MG/ML
INJECTION, SOLUTION INTRAMUSCULAR; INTRAVENOUS; SUBCUTANEOUS AS NEEDED
Status: DISCONTINUED | OUTPATIENT
Start: 2022-06-01 | End: 2022-06-01 | Stop reason: HOSPADM

## 2022-06-01 RX ORDER — SODIUM CHLORIDE, SODIUM LACTATE, POTASSIUM CHLORIDE, CALCIUM CHLORIDE 600; 310; 30; 20 MG/100ML; MG/100ML; MG/100ML; MG/100ML
25 INJECTION, SOLUTION INTRAVENOUS CONTINUOUS
Status: DISCONTINUED | OUTPATIENT
Start: 2022-06-01 | End: 2022-06-01 | Stop reason: HOSPADM

## 2022-06-01 RX ORDER — ONDANSETRON 2 MG/ML
INJECTION INTRAMUSCULAR; INTRAVENOUS AS NEEDED
Status: DISCONTINUED | OUTPATIENT
Start: 2022-06-01 | End: 2022-06-01 | Stop reason: HOSPADM

## 2022-06-01 RX ORDER — ROCURONIUM BROMIDE 10 MG/ML
INJECTION, SOLUTION INTRAVENOUS AS NEEDED
Status: DISCONTINUED | OUTPATIENT
Start: 2022-06-01 | End: 2022-06-01 | Stop reason: HOSPADM

## 2022-06-01 RX ORDER — HYDROCODONE BITARTRATE AND ACETAMINOPHEN 5; 325 MG/1; MG/1
1 TABLET ORAL
Qty: 18 TABLET | Refills: 0 | Status: SHIPPED | OUTPATIENT
Start: 2022-06-01 | End: 2022-06-04

## 2022-06-01 RX ORDER — GLYCOPYRROLATE 0.2 MG/ML
INJECTION INTRAMUSCULAR; INTRAVENOUS AS NEEDED
Status: DISCONTINUED | OUTPATIENT
Start: 2022-06-01 | End: 2022-06-01 | Stop reason: HOSPADM

## 2022-06-01 RX ORDER — DEXAMETHASONE SODIUM PHOSPHATE 4 MG/ML
INJECTION, SOLUTION INTRA-ARTICULAR; INTRALESIONAL; INTRAMUSCULAR; INTRAVENOUS; SOFT TISSUE AS NEEDED
Status: DISCONTINUED | OUTPATIENT
Start: 2022-06-01 | End: 2022-06-01 | Stop reason: HOSPADM

## 2022-06-01 RX ORDER — PROPOFOL 10 MG/ML
INJECTION, EMULSION INTRAVENOUS AS NEEDED
Status: DISCONTINUED | OUTPATIENT
Start: 2022-06-01 | End: 2022-06-01 | Stop reason: HOSPADM

## 2022-06-01 RX ADMIN — SUCCINYLCHOLINE CHLORIDE 120 MG: 20 INJECTION, SOLUTION INTRAMUSCULAR; INTRAVENOUS at 09:52

## 2022-06-01 RX ADMIN — SODIUM CHLORIDE, POTASSIUM CHLORIDE, SODIUM LACTATE AND CALCIUM CHLORIDE 25 ML/HR: 600; 310; 30; 20 INJECTION, SOLUTION INTRAVENOUS at 09:26

## 2022-06-01 RX ADMIN — PROPOFOL 50 MG: 10 INJECTION, EMULSION INTRAVENOUS at 10:51

## 2022-06-01 RX ADMIN — PROPOFOL 40 MG: 10 INJECTION, EMULSION INTRAVENOUS at 09:52

## 2022-06-01 RX ADMIN — GLYCOPYRROLATE 0.6 MG: 0.2 INJECTION, SOLUTION INTRAMUSCULAR; INTRAVENOUS at 11:04

## 2022-06-01 RX ADMIN — ROCURONIUM BROMIDE 5 MG: 10 INJECTION INTRAVENOUS at 09:51

## 2022-06-01 RX ADMIN — WATER 2 G: 1 INJECTION INTRAMUSCULAR; INTRAVENOUS; SUBCUTANEOUS at 10:02

## 2022-06-01 RX ADMIN — ROCURONIUM BROMIDE 15 MG: 10 INJECTION INTRAVENOUS at 10:49

## 2022-06-01 RX ADMIN — PROPOFOL 160 MG: 10 INJECTION, EMULSION INTRAVENOUS at 09:51

## 2022-06-01 RX ADMIN — FENTANYL CITRATE 50 MCG: 50 INJECTION, SOLUTION INTRAMUSCULAR; INTRAVENOUS at 09:51

## 2022-06-01 RX ADMIN — DEXMEDETOMIDINE HYDROCHLORIDE 2 MCG: 100 INJECTION, SOLUTION, CONCENTRATE INTRAVENOUS at 10:49

## 2022-06-01 RX ADMIN — HYDROMORPHONE HYDROCHLORIDE 0.2 MG: 2 INJECTION, SOLUTION INTRAMUSCULAR; INTRAVENOUS; SUBCUTANEOUS at 10:11

## 2022-06-01 RX ADMIN — DEXMEDETOMIDINE HYDROCHLORIDE 4 MCG: 100 INJECTION, SOLUTION, CONCENTRATE INTRAVENOUS at 10:14

## 2022-06-01 RX ADMIN — DEXMEDETOMIDINE HYDROCHLORIDE 4 MCG: 100 INJECTION, SOLUTION, CONCENTRATE INTRAVENOUS at 10:22

## 2022-06-01 RX ADMIN — Medication 4 MG: at 11:04

## 2022-06-01 RX ADMIN — DEXAMETHASONE SODIUM PHOSPHATE 4 MG: 4 INJECTION, SOLUTION INTRAMUSCULAR; INTRAVENOUS at 10:07

## 2022-06-01 RX ADMIN — Medication 3 AMPULE: at 09:27

## 2022-06-01 RX ADMIN — HYDROMORPHONE HYDROCHLORIDE 0.2 MG: 2 INJECTION, SOLUTION INTRAMUSCULAR; INTRAVENOUS; SUBCUTANEOUS at 10:50

## 2022-06-01 RX ADMIN — ROCURONIUM BROMIDE 20 MG: 10 INJECTION INTRAVENOUS at 10:09

## 2022-06-01 RX ADMIN — ONDANSETRON HYDROCHLORIDE 4 MG: 2 INJECTION, SOLUTION INTRAMUSCULAR; INTRAVENOUS at 11:06

## 2022-06-01 RX ADMIN — LIDOCAINE HYDROCHLORIDE 80 MG: 20 INJECTION, SOLUTION EPIDURAL; INFILTRATION; INTRACAUDAL; PERINEURAL at 09:51

## 2022-06-01 NOTE — PERIOP NOTES
87588 - PT DENIES FEVER, COLD, COUGH, SOB, N/V, DIARRHEA. .. PRE-OP TCHING DONE - PT VERBALIZES UNDERSTANDING. STRETCHER IN LOWEST POSITION, CB IN PLACE AND SR UP X2.

## 2022-06-01 NOTE — OP NOTES
[address]  OPERATIVE REPORT    Name:  Manolo Quiles  MR#:  [id number]  :  1975  ACCOUNT #:  [de-identified]  DATE OF SERVICE:  2022    PREOPERATIVE DIAGNOSES:  Right testicular mass with smaller left testicular mass. POSTOPERATIVE DIAGNOSES:  same. PROCEDURE PERFORMED:  Right radical orchiectomy with frozen section. SURGEON:  Ailin Pickens MD    ASSISTANT:  Sagar Osorio. ANESTHESIA:  General.    COMPLICATIONS:  None. SPECIMENS REMOVED:  Right testicular cord. IMPLANTS:  None. ESTIMATED BLOOD LOSS:  Minimal.    FINDINGS:  Right testicular mass. 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 PROCEDURE:  After obtaining informed consent, the patient received preoperative antibiotics, placed on the operating table in supine position. After adequate induction of general anesthetic, he was still in the supine position, and the scrotum and lower abdomen were shaved, and the penis, scrotum, and abdomen were prepped and draped in sterile fashion. A full time-out was accomplished. I infiltrated the right inguinal area with Marcaine-lidocaine mix and made an incision along the lines of Geronimo about 5 cm in length. I was able to dissect down to external oblique fascia, which was opened in its midline. The ilioinguinal nerve was identified and dissected free of harms way. The cord was then encircled using careful Kittner dissection with a Penrose which was occlusive, and then I was able to deliver the testicle from the right hemiscrotum using cautery to transect the gubernaculum. The testicle was enlarged and firm. There were nodular areas. I biopsied on these nodular areas and sent it to pathology, came back teratoma. Therefore, we completed the orchiectomy by bisecting the cord right at the internal ring and clamped it in two and transected it, and sent as specimen to pathology. The cord was then suture ligated with 0 silk suture and tied with a #1 silk suture.   I had complete hemostasis and we did leave tags just in case he needs a further retroperitoneal lymph node dissection. We copiously irrigated the incision. Hemostasis was achieved. We reapproximated the fascia using running 0 Vicryl suture paying careful attention to exclude the nerve. We then irrigated again and closed the subcutaneous tissue with running 3-0 Vicryl. The skin was closed with 4-0 Monocryl and Dermabond was applied. He tolerated the procedure well. There were no complications.         Nayla Avila MD      [id number]  D:  06/01/2022 11:25  T:  06/01/2022 18:40  JOB #:  [id number]  CC:  Sanam Branch MD       [facility]

## 2022-06-01 NOTE — PERIOP NOTES
[address] patient's mom Lia received post op update. [id number]       TRANSFER - OUT REPORT:    Verbal report given to [facility] RN on Marylene Capra  being transferred to [facility]  for routine post - op       Report consisted of patients Situation, Background, Assessment and   Recommendations(SBAR). Information from the following report(s) SBAR, OR Summary, Procedure Summary, Intake/Output, MAR and Cardiac Rhythm NSR was reviewed with the receiving nurse. Opportunity for questions and clarification was provided.       Patient transported with:   Registered Nurse

## 2022-06-01 NOTE — BRIEF OP NOTE
Brief Postoperative Note    Patient: Chet Shepard  YOB: 1975  MRN: [id number]    Date of Procedure: 6/1/2022     Pre-Op Diagnosis: BILATERAL TESTICULAR MASSES    Post-Op Diagnosis: Same as preoperative diagnosis.       Procedure(s):  RIGHT RADICAL ORCHIECTOMY WITH FROZEN SECTION    Surgeon(s):  Avtar Kc MD    Surgical Assistant: Surg Asst-1: Gabriella Main    Anesthesia: General     Estimated Blood Loss (mL): Minimal    Complications: None    Specimens:   ID Type Source Tests Collected by Time Destination   1 : testicular mass Frozen Section Testicle  Avtar Kc MD 6/1/2022 1011 Pathology   2 : right testicle Preservative Testicle  Avtar Kc MD 6/1/2022 1030 Pathology        Implants: * No implants in log *    Drains: * No LDAs found *    Findings: PATH-teratoma    Electronically Signed by Vince Cotter MD on 6/1/2022 at 11:11 AM

## 2022-06-01 NOTE — ANESTHESIA PREPROCEDURE EVALUATION
Relevant Problems   NEUROLOGY   (+) Asperger syndrome   (+) Autism   (+) Generalized anxiety disorder   (+) Intractable seizures (HCC)   (+) Partial symptomatic epilepsy with complex partial seizures, intractable, without status epilepticus (HCC)   (+) Seizure disorder (HCC)      CARDIOVASCULAR   (+) Hypertension       Anesthetic History   No history of anesthetic complications            Review of Systems / Medical History  Patient summary reviewed, nursing notes reviewed and pertinent labs reviewed    Pulmonary  Within defined limits                 Neuro/Psych     seizures    Psychiatric history    Comments: Autism/aspergers  Has vagus nerve stimulator Cardiovascular    Hypertension        Dysrhythmias       Exercise tolerance: >4 METS  Comments: Bilateral carotid artery stenosis  Hx PACs     GI/Hepatic/Renal  Within defined limits              Endo/Other        Cancer (testicular)     Other Findings   Comments: Hx EtOH dependence           Physical Exam    Airway  Mallampati: II  TM Distance: 4 - 6 cm  Neck ROM: normal range of motion   Mouth opening: Normal     Cardiovascular  Regular rate and rhythm,  S1 and S2 normal,  no murmur, click, rub, or gallop             Dental  No notable dental hx       Pulmonary  Breath sounds clear to auscultation               Abdominal  GI exam deferred       Other Findings            Anesthetic Plan    ASA: 2  Anesthesia type: general          Induction: Intravenous  Anesthetic plan and risks discussed with: Patient and Mother

## 2022-06-01 NOTE — PERIOP NOTES
Handoff Report from Operating Room to PACU    Report received from [address] [place] [address]  and Osorio Abraham CRNA regarding Chet Shepard. Surgeon(s):  Avtar Kc MD  And Procedure(s) (LRB):  RIGHT RADICAL ORCHIECTOMY WITH FROZEN SECTION (Right)  confirmed   with allergies and dressings discussed. Anesthesia type, drugs, patient history, complications, estimated blood loss, vital signs, intake and output, and last pain medication and reversal medications were reviewed.

## 2022-06-01 NOTE — ANESTHESIA POSTPROCEDURE EVALUATION
Post-Anesthesia Evaluation and Assessment    Patient: Josefina Iniguez MRN: [id number]  SSN: [id number]    YOB: 1975  Age: 56 y.o. Sex: male      I have evaluated the patient and they are stable and ready for discharge from the PACU. Cardiovascular Function/Vital Signs  Visit Vitals  /76   Pulse (!) 59   Temp 37 °C (98.6 °F)   Resp 12   Ht 5' 9\" (1.753 m)   Wt 76.6 kg (168 lb 14 oz)   SpO2 96%   BMI 24.94 kg/m²       Patient is status post General anesthesia for Procedure(s):  RIGHT RADICAL ORCHIECTOMY WITH FROZEN SECTION. Nausea/Vomiting: None    Postoperative hydration reviewed and adequate. Pain:  Pain Scale 1: Numeric (0 - 10) (06/01/22 1156)  Pain Intensity 1: 0 (06/01/22 1156)   Managed    Neurological Status:   Neuro (WDL): Within Defined Limits (06/01/22 0852)  Neuro  Neurologic State: Drowsy (06/01/22 1200)  Orientation Level: Oriented to person;Oriented to place;Oriented to situation (06/01/22 1200)  Cognition: Follows commands (06/01/22 1200)  Speech: Clear (06/01/22 1200)  LUE Motor Response: Purposeful (06/01/22 1200)  LLE Motor Response: Purposeful (06/01/22 1200)  RUE Motor Response: Purposeful (06/01/22 1200)  RLE Motor Response: Purposeful (06/01/22 1200)   At baseline    Mental Status, Level of Consciousness: Alert and  oriented to person, place, and time    Pulmonary Status:   O2 Device: None (Room air) (06/01/22 1200)   Adequate oxygenation and airway patent    Complications related to anesthesia: None    Post-anesthesia assessment completed. No concerns    Signed By: Delio Mcguire MD     June 1, 2022              Procedure(s):  RIGHT RADICAL ORCHIECTOMY WITH FROZEN SECTION. general    <BSHSIANPOST>    INITIAL Post-op Vital signs:   Vitals Value Taken Time   /70 06/01/22 1215   Temp 37 °C (98.6 °F) 06/01/22 1200   Pulse 58 06/01/22 1231   Resp 12 06/01/22 1231   SpO2 94 % 06/01/22 1231   Vitals shown include unvalidated device data.

## 2022-06-01 NOTE — DISCHARGE INSTRUCTIONS
Patient Education        Orchiectomy: What to Expect at Home  Your Recovery  Orchiectomy (say \"xd-vwt-JM-tuh-karen\") is surgery to remove one or both testicles. This is mainly done to treat testicular cancer or advanced prostate cancer. You can expect to feel better each day. But you may have some mild to moderate pain for several days after surgery. You may need pain medicine during this time. Your scrotum will be swollen after surgery. This is normal. The swelling usually goes down within 2 to 4 weeks. You should be able to do most of your normal activities after 2 to 3 weeks, except for those that require a lot of physical effort. It's important to avoid straining with bowel movements and doing heavy lifting while you recover. If both your testicles were removed, you may start to notice changes in your body several weeks after surgery due to not having male hormones. The most obvious changes may be hot flashes and sweating. You may lose your sex drive, gain weight, or not be able to get an erection. These changes can be upsetting. Talk to your doctor about treatments that might help with these. This care sheet gives you a general idea about how long it will take for you to recover. But each person recovers at a different pace. Follow the steps below to get better as quickly as possible. How can you care for yourself at home? Activity    · Rest when you feel tired. Getting enough sleep will help you recover.     · Lie down for 15 minutes several times each day for the first 2 weeks after surgery. This will help reduce the swelling of your scrotum.     · Try to walk each day. Start by walking a little more than you did the day before. Bit by bit, increase the amount you walk.  Walking boosts blood flow and helps prevent pneumonia and constipation.     · Avoid strenuous activities, such as bicycle riding, jogging, weight lifting, or aerobic exercise, for 2 to 3 weeks after surgery.     · Avoid lifting anything that would make you strain. This may include a child, heavy grocery bags and milk containers, a heavy briefcase or backpack, cat litter or dog food bags, or a vacuum .     · Do not drive for 1 to 2 weeks after surgery or until your doctor says it is okay.     · You may take showers. Pat the cut (incision) dry. Do not take a bath for the first 2 weeks, or until your doctor tells you it is okay.     · Your doctor will tell you when you can have sex again.     · Most men are able to return to work or their normal activities in about 2 to 3 weeks after surgery. Diet    · You can eat your normal diet. If your stomach is upset, try bland, low-fat foods like plain rice, broiled chicken, toast, and yogurt.     · You may notice that your bowel movements are not regular right after your surgery. This is common. Try to avoid constipation and straining with bowel movements. You may want to take a fiber supplement every day. If you have not had a bowel movement after a couple of days, ask your doctor about taking a mild laxative. Medicines    · Your doctor will tell you if and when you can restart your medicines. You will also be given instructions about taking any new medicines.     · If you take aspirin or some other blood thinner, ask your doctor if and when to start taking it again. Make sure that you understand exactly what your doctor wants you to do.     · Take pain medicines exactly as directed. ? If the doctor gave you a prescription medicine for pain, take it as prescribed. ? If you are not taking a prescription pain medicine, ask your doctor if you can take an over-the-counter medicine.     · If your doctor prescribed antibiotics, take them as directed. Do not stop taking them just because you feel better.  You need to take the full course of antibiotics.     · If you think your pain medicine is making you sick to your stomach:  ? Take your medicine after meals (unless your doctor has told you not to).  ? Ask your doctor for a different pain medicine. Incision care    · In most cases, the doctor will use stitches that dissolve on their own in 1 to 3 weeks and do not need to be removed.     · Wash the area daily with warm, soapy water, and pat it dry. Don't use hydrogen peroxide or alcohol, which can slow healing. Cover the area with a gauze bandage until it stops oozing. Change the bandage at least one time a day. Your underwear holds the bandage in place.     · Keep the area clean and dry. Ice    · Put ice or a cold pack on your scrotum for 10 to 20 minutes at a time. Try to do this every 1 to 2 hours (when you are awake) for the first 2 or 3 days after surgery. Put a thin cloth between the ice and your skin. Other instructions    · Wear snug underwear or compression shorts to support your scrotum for the first few weeks after surgery. Follow-up care is a key part of your treatment and safety. Be sure to make and go to all appointments, and call your doctor if you are having problems. It's also a good idea to know your test results and keep a list of the medicines you take. When should you call for help? Call 911 anytime you think you may need emergency care. For example, call if:    · You passed out (lost consciousness).     · You are short of breath. Call your doctor now or seek immediate medical care if:    · You have pain that does not get better after you take pain medicine.     · You have loose stitches, or your incision comes open.     · Bright red blood has soaked through the bandage over your incision.     · You cannot pass stools or gas.     · You are sick to your stomach or cannot drink fluids.     · You have signs of a blood clot in your leg (called a deep vein thrombosis), such as:  ? Pain in your calf, back of the knee, thigh, or groin. ? Redness or swelling in your leg.     · You have signs of infection, such as:  ? Increased pain, swelling, warmth, or redness.   ? Red streaks leading from the incision. ? Pus draining from the incision. ? A fever. Watch closely for any changes in your health, and be sure to contact your doctor if you have any problems. Where can you learn more? Go to http://www.gray.com/  Enter P352 in the search box to learn more about \"Orchiectomy: What to Expect at Home. \"  Current as of: September 8, 2021               Content Version: 13.2  © 2006-2022 [employer]. Care instructions adapted under license by [employer] (which disclaims liability or warranty for this information). If you have questions about a medical condition or this instruction, always ask your healthcare professional. [place] [address] any warranty or liability for your use of this information. DISCHARGE SUMMARY from Nurse    PATIENT INSTRUCTIONS:    After general anesthesia or intravenous sedation, for 24 hours or while taking prescription narcotics:  · Limit your activities  · Do not drive and operate hazardous machinery  · Do not make important personal or business decisions  · Do not drink alcoholic beverages  · If you have not urinated within 8 hours after discharge, please contact your surgeon on call. Report the following to your surgeon:  · Excessive pain, swelling, redness or odor of or around the surgical area  · Temperature over 100.5  · Nausea and vomiting lasting longer than 4 hours or if unable to take medications  · Any signs of decreased circulation or nerve impairment to extremity: change in color, persistent numbness, tingling, coldness or increase pain  · Any questions    These are general instructions for a healthy lifestyle (if applicable): No smoking/ No tobacco products/ Avoid exposure to secondhand smoke  Surgeon General's Warning:  Quitting smoking now greatly reduces serious risk to your health.     Obesity, smoking, and sedentary lifestyle greatly increases your risk for illness    A healthy diet, regular physical exercise & weight monitoring are important for maintaining a healthy lifestyle    You may be retaining fluid if you have a history of heart failure or if you experience any of the following symptoms:  Weight gain of 3 pounds or more overnight or 5 pounds in a week, increased swelling in our hands or feet or shortness of breath while lying flat in bed. Please call your doctor as soon as you notice any of these symptoms; do not wait until your next office visit. A common side effect of anesthesia following surgery is nausea and/or vomiting. In order to decrease symptoms, it is wise to avoid foods that are high in fat, greasy foods, milk products, and spicy foods for the first 24 hours. Acceptable foods for the first 24 hours following surgery include but are not limited to:    · soup  · broth  ·  toast   · crackers   · applesauce  ·  bananas   · mashed potatoes,  · soft or scrambled eggs  · oatmeal  ·  jello    It is important to eat when taking your pain medication. This will help to prevent nausea. If possible, please try to time your meals with your medications. It is very important to stay hydrated following surgery. Sip fluids frequently while awake. Avoid acidic drinks such as citrus juices and soda for 24 hours. Carbonated beverages may cause bloating and gas. Acceptable fluids include:    · water (flavor packets may add variety)  · coffee or tea (in moderation)  · Gatorade  · Parag-Aid  · apple juice  · cranberry juice    You are encouraged to cough and deep breathe every hour when awake. This will help to prevent respiratory complications following anesthesia. You may want to hug a pillow when coughing and sneezing to add additional support to the surgical area and to decrease discomfort if you had abdominal or chest surgery. If you are discharged home with support stockings, you may remove them after 24 hours.  Support stockings are used to help prevent blood clots in the legs following surgery. TO PREVENT AN INFECTION      1. [address] YOUR HANDS     To prevent infection, good handwashing is the most important thing you or your caregiver can do.  Wash your hands with soap and water or use the hand  we gave you before you touch any wounds. 2. SHOWER     Use the antibacterial soap we gave you when you take a shower.  Shower with this soap until your wounds are healed.  To reach all areas of your body, you may need someone to help you.  Dont forget to clean your belly button with every shower. 3.  USE CLEAN SHEETS     Use freshly cleaned sheets on your bed after surgery.  To keep the surgery site clean, do not allow pets to sleep with you while your wound is still healing. 4. STOP SMOKING     Stop smoking, or at least cut back on smoking     Smoking slows your healing. 5.  CONTROL YOUR BLOOD SUGAR     High blood sugars slow wound healing.  If you are diabetic, control your blood sugar levels before and after your surgery. Please take time to review all of your Home Care Instructions and Medication Information sheets provided in your discharge packet. If you have any questions, please contact your surgeon's office. Thank you. The discharge information has been reviewed with the patient and instruction recipient. The patient and instruction recipient verbalized understanding. Discharge medications reviewed with the patient and instruction recipient and appropriate educational materials and side effects teaching were provided. Patient Education      Hydrocodone/Acetaminophen (Vicodin, Norco, Lortab) - (By mouth)   Why this medicine is used:   Treats pain.   Contact a nurse or doctor right away if you have:  · Blistering, peeling, red skin rash  · Fast or slow heartbeat, shallow breathing, blue lips, fingernails, or skin  · Anxiety, restlessness, muscle spasms, twitching, seeing or hearing things that are not there  · Dark urine or pale stools, yellow skin or eyes  · Extreme weakness, sweating, seizures, cold or clammy skin  · Lightheadedness, dizziness, fainting, fever, sweating     Common side effects:  · Constipation, nausea, vomiting, loss of appetite, stomach pain  · Tiredness or sleepiness  © 2017 [facility] Information is for End User's use only and may not be sold, redistributed or otherwise used for commercial purposes.

## 2022-06-10 ENCOUNTER — TRANSCRIBE ORDER (OUTPATIENT)
Dept: SCHEDULING | Age: 47
End: 2022-06-10

## 2022-06-10 DIAGNOSIS — C62.90: Primary | ICD-10-CM

## 2022-06-13 ENCOUNTER — PATIENT MESSAGE (OUTPATIENT)
Dept: NEUROLOGY | Age: 47
End: 2022-06-13

## 2022-06-13 PROBLEM — N50.89 TESTICULAR MASS: Status: ACTIVE | Noted: 2022-06-13

## 2022-06-13 NOTE — TELEPHONE ENCOUNTER
From: Alfredo Garcia  To: Joy Jenkins MD  Sent: 6/13/2022 4:15 PM EDT  Subject: Keshia Colmenares, I know this message isn't for you but for your staff. I hope you are doing well. Irvin Saravia has now qualified for full medicaid coverage and since you are his most important doctor does your office accept the [facility] plan as secondary to his [facility]? Also, please tell Mariana Mendenhall we said heljulianna.    Thank you, Heladio Buchanan

## 2022-06-17 ENCOUNTER — HOSPITAL ENCOUNTER (OUTPATIENT)
Dept: CT IMAGING | Age: 47
Discharge: HOME | End: 2022-06-17
Attending: UROLOGY
Payer: MEDICARE

## 2022-06-17 DIAGNOSIS — C62.90: ICD-10-CM

## 2022-06-17 PROCEDURE — 74177 CT ABD & PELVIS W/CONTRAST: CPT

## 2022-06-17 PROCEDURE — 74011000636 HC RX REV CODE- 636: Performed by: UROLOGY

## 2022-06-17 PROCEDURE — 71260 CT THORAX DX C+: CPT

## 2022-06-17 RX ADMIN — IOPAMIDOL 100 ML: 755 INJECTION, SOLUTION INTRAVENOUS at 15:56

## 2022-06-18 LAB — SPECIMEN STATUS REPORT, ROLRST: NORMAL

## 2022-06-20 LAB
ALBUMIN SERPL-MCNC: 4.1 G/DL (ref 4–5)
ALBUMIN/GLOB SERPL: 2 {RATIO} (ref 1.2–2.2)
ALP SERPL-CCNC: 88 IU/L (ref 44–121)
ALT SERPL-CCNC: 19 IU/L (ref 0–44)
AST SERPL-CCNC: 35 IU/L (ref 0–40)
BILIRUB SERPL-MCNC: 0.2 MG/DL (ref 0–1.2)
BUN SERPL-MCNC: 10 MG/DL (ref 6–24)
BUN/CREAT SERPL: 11 (ref 9–20)
CALCIUM SERPL-MCNC: 8.6 MG/DL (ref 8.7–10.2)
CHLORIDE SERPL-SCNC: 98 MMOL/L (ref 96–106)
CHOLEST SERPL-MCNC: 196 MG/DL (ref 100–199)
CO2 SERPL-SCNC: 22 MMOL/L (ref 20–29)
CREAT SERPL-MCNC: 0.9 MG/DL (ref 0.76–1.27)
EGFR: 107 ML/MIN/1.73
GLOBULIN SER CALC-MCNC: 2.1 G/DL (ref 1.5–4.5)
GLUCOSE SERPL-MCNC: 92 MG/DL (ref 65–99)
HDLC SERPL-MCNC: 92 MG/DL
LDLC SERPL CALC-MCNC: 87 MG/DL (ref 0–99)
POTASSIUM SERPL-SCNC: 4.3 MMOL/L (ref 3.5–5.2)
PROT SERPL-MCNC: 6.2 G/DL (ref 6–8.5)
SODIUM SERPL-SCNC: 136 MMOL/L (ref 134–144)
TRIGL SERPL-MCNC: 100 MG/DL (ref 0–149)
VLDLC SERPL CALC-MCNC: 17 MG/DL (ref 5–40)

## 2022-06-21 ENCOUNTER — TRANSCRIBE ORDER (OUTPATIENT)
Dept: SCHEDULING | Age: 47
End: 2022-06-21

## 2022-06-28 NOTE — PROGRESS NOTES
Labs look good. Cholesterol is at goal. Liver functions and kidney functions are within normal limits. Calcium is borderline low.  Would add chewable tums few times a week for extra calcium

## 2022-06-28 NOTE — PROGRESS NOTES
Nancy Perez is a 56 y.o. male here for new patient appt for testicular carcinoma. Referred by Dr Cesilia Weiss. 6/1/22 Procedure(s):  RIGHT RADICAL ORCHIECTOMY WITH FROZEN SECTION   Pt here with parents. Pt states he has been doing well since surgery. 1. Have you been to the ER, urgent care clinic since your last visit? Hospitalized since your last visit? New Pt    2. Have you seen or consulted any other health care providers outside of the [address] [place] [address] since your last visit? Include any pap smears or colon screening.  New Pt

## 2022-06-29 ENCOUNTER — OFFICE VISIT (OUTPATIENT)
Dept: ONCOLOGY | Age: 47
End: 2022-06-29
Payer: MEDICARE

## 2022-06-29 VITALS
OXYGEN SATURATION: 97 % | BODY MASS INDEX: 25.51 KG/M2 | HEART RATE: 90 BPM | WEIGHT: 172.2 LBS | DIASTOLIC BLOOD PRESSURE: 74 MMHG | TEMPERATURE: 98.1 F | SYSTOLIC BLOOD PRESSURE: 103 MMHG | HEIGHT: 69 IN

## 2022-06-29 DIAGNOSIS — C62.11 MALIGNANT NEOPLASM OF DESCENDED RIGHT TESTIS (HCC): ICD-10-CM

## 2022-06-29 DIAGNOSIS — C62.91 GERM CELL TUMOR OF RIGHT TESTICLE (HCC): Primary | ICD-10-CM

## 2022-06-29 PROCEDURE — 99205 OFFICE O/P NEW HI 60 MIN: CPT | Performed by: INTERNAL MEDICINE

## 2022-06-29 PROCEDURE — G8752 SYS BP LESS 140: HCPCS | Performed by: INTERNAL MEDICINE

## 2022-06-29 PROCEDURE — G8427 DOCREV CUR MEDS BY ELIG CLIN: HCPCS | Performed by: INTERNAL MEDICINE

## 2022-06-29 PROCEDURE — G8432 DEP SCR NOT DOC, RNG: HCPCS | Performed by: INTERNAL MEDICINE

## 2022-06-29 PROCEDURE — G8419 CALC BMI OUT NRM PARAM NOF/U: HCPCS | Performed by: INTERNAL MEDICINE

## 2022-06-29 PROCEDURE — G8754 DIAS BP LESS 90: HCPCS | Performed by: INTERNAL MEDICINE

## 2022-06-29 RX ORDER — ONDANSETRON 4 MG/1
4 TABLET, ORALLY DISINTEGRATING ORAL
Qty: 40 TABLET | Refills: 2 | Status: SHIPPED | OUTPATIENT
Start: 2022-06-29

## 2022-06-29 RX ORDER — PROCHLORPERAZINE MALEATE 10 MG
10 TABLET ORAL
Qty: 40 TABLET | Refills: 2 | Status: SHIPPED | OUTPATIENT
Start: 2022-06-29

## 2022-06-29 RX ORDER — LIDOCAINE AND PRILOCAINE 25; 25 MG/G; MG/G
CREAM TOPICAL AS NEEDED
Qty: 30 G | Refills: 2 | Status: SHIPPED | OUTPATIENT
Start: 2022-06-29

## 2022-06-29 NOTE — PROGRESS NOTES
[address] [facility] [address], [address] [facility], [address] [place] [address] Ne, 200 [facility]  [phone]       Oncology Note        Patient: Ronald Moy MRN: [id number]  SSN: [id number]    YOB: 1975  Age: 56 y.o. Sex: male      Subjective:      Ronald Moy is a 56 y.o. 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 He experienced pain in the right groin approximately 4-6 weeks ago. He underwent USG and was noted to have enlarged right testis. He then underwent a right radical orchiectomy on 06/02/2022. The pathology revealed mixed germ cell tumor with 5% seminoma, 40% yolk sac tumor and teratoma with immature elements 55%. AFP is elevated at 35 and quant beta-HcG is normal and LDH is normal. CT shows enlarge mediastinal nodes and b/l lung nodules. He comes in to discuss the next steps. He suffers with Asperger's syndrome and is accompanied his mother and father.         Review of Systems:    Constitutional: negative  Eyes: negative  Ears, Nose, Mouth, Throat, and Face: negative  Respiratory: negative  Cardiovascular: negative  Gastrointestinal: negative  Genitourinary:negative  Integument/Breast: negative  Hematologic/Lymphatic: negative  Musculoskeletal:negative  Neurological: negative    Past Medical History:   Diagnosis Date    Alcohol withdrawal ([facility] 10/31/2014    Asperger syndrome 12/14/2011    Autism     dx 2010- highly functional    Cancer ([facility]     testicular    Convulsive syncope 6/11/2019    Fatigue     Loss of appetite     Other ill-defined conditions(799.89)      syncopal episodes    Seizure disorder ([address] 5/22/2012    Status post VNS (vagus nerve stimulator) placement 12/08/2020     Past Surgical History:   Procedure Laterality Date    HX ADENOIDECTOMY      HX GI      pyloric stenosis 1975    HX ORCHIECTOMY  06/01/2022    Right radical orchiectomy with frozen section    HX ORTHOPAEDIC  07/2020    Middle finger on left hand    HX TONSILLECTOMY      VASCULAR SURGERY PROCEDURE UNLIST  12/8/202    Insertion of Vagus Nerve Stimulator       Family History   Problem Relation Age of Onset    Diabetes Father     Heart Disease Father     Elevated Lipids Father     Cancer Mother         Breast    Anxiety Mother     COPD Mother     Other Mother         Neuropathy    Sleep Apnea Mother     OSTEOARTHRITIS Mother     Other Brother         Pituitary tumor     Social History     Tobacco Use    Smoking status: Never Smoker    Smokeless tobacco: Never Used   Substance Use Topics    Alcohol use: Yes     Alcohol/week: 10.0 standard drinks     Types: 10 Cans of beer per week     Comment: most nights      Prior to Admission medications    Medication Sig Start Date End Date Taking? Authorizing Provider   brivaracetam (Briviact) 100 mg tablet Take 1 Tablet by mouth two (2) times a day. Max Daily Amount: 200 mg. Dr. Lynn Shown refilling for Dr. Antwan Soliz, one time only. 3/22/22  Yes Fco Dawson MD   Xcopri 50 mg tab tablet TAKE 1 TABLET BY MOUTH DAILY. MAX DAILY AMOUNT: 50 MG 2/22/22  Yes Fco Dawson MD   Xcopri 200 mg tab TAKE 1 TABLET BY MOUTH DAILY. MAX DAILY AMOUNT: 200 MG 1/24/22  Yes Fco Dawson MD   sertraline (ZOLOFT) 50 mg tablet Take 1 Tablet by mouth daily. 12/8/21  Yes Fco Dawson MD   topiramate (TOPAMAX) 200 mg tablet TAKE 1 TABLET TWICE DAILY 12/8/21  Yes Fco Dawson MD   MULTIVITAMIN PO Take 1 Tab by mouth daily. Yes Provider, Historical   doxycycline (VIBRAMYCIN) 100 mg capsule Take 100 mg by mouth two (2) times a day.   Patient not taking: Reported on 6/29/2022 5/26/22   Provider, Historical              Allergies   Allergen Reactions    Aspartame Other (comments)     Family believes it causes his seizures           Objective:     Vitals:    06/29/22 1325   BP: 103/74   Pulse: 90   Temp: 98.1 °F (36.7 °C)   TempSrc: Temporal   SpO2: 97% Weight: 172 lb 3.2 oz (78.1 kg)   Height: 5' 9\" (1.753 m)            Physical Exam:  GENERAL: alert, cooperative, no distress, appears stated age  EYE: conjunctivae/corneas clear. PERRL, EOM's intact  LYMPHATIC: Cervical, supraclavicular, and axillary nodes normal.   THROAT & NECK: normal and no erythema or exudates noted. LUNG: clear to auscultation bilaterally  HEART: regular rate and rhythm, S1, S2 normal, no murmur, click, rub or gallop  ABDOMEN: soft, non-tender. Bowel sounds normal. No masses,  no organomegaly  EXTREMITIES:  extremities normal, atraumatic, no cyanosis or edema  SKIN: Normal.  NEUROLOGIC: AOx3. Gait normal. Reflexes and motor strength normal and symmetric. Cranial nerves 2-12 and sensation grossly intact. CT Results (most recent):  Results from Hospital Encounter encounter on 06/17/22    CT ABD PELV W CONT    Narrative  Clinical history: Testicular cancer  INDICATION:   Testicular cancer  COMPARISON:  2011  CONTRAST: 100ml Isovue-370  TECHNIQUE:  Following the uneventful intravenous administration of Isovue-370, thin axial  images were obtained through the chest, abdomen and pelvis. Coronal and sagittal  reconstructions were generated. The patient  was  administered oral contrast material as well. CT dose reduction was achieved through use of a standardized protocol tailored  for this examination and automatic exposure control for dose modulation. Adaptive statistical iterative reconstruction (ASIR) was utilized. FINDINGS:    SUPRACLAVICULAR REGION: Major cervical vasculature within normal limits. No  supraclavicular adenopathy. VASCULATURE:  No aortic aneurysm or dissection. No proximal pulmonary embolism is identified. MEDIASTINUM: There is mediastinal lymphadenopathy. 301-25 19 x 26 mm 301-22  pretracheal 16 x 12 mm. No esophageal mass. No endotracheal or endobronchial  mass. PLEURA/LUNGS[de-identified] There is significant chronic elevation of the right  hemidiaphragm.  There are tiny pulmonary nodules present 301-19 right lung 4 mm.  301-28 subpleural on the right 3 mm. L1-11 3 mm left upper lobe. SOFT TISSUE/ AXILLA:  No mass or lymphadenopathy. LIVER: No mass lesion There is no intrahepatic duct dilatation. There is no  hepatic parenchymal mass. Hepatic enhancement pattern is within normal limits. Portal vein is patent. GALLBLADDER:  No dilatation or wall thickening. SPLEEN/PANCREAS: No mass. There is no pancreatic duct dilatation. There is no  evidence of splenomegaly. ADRENALS/KIDNEYS: No mass. There is no hydronephrosis. There is no renal mass. There is no perinephric mass. STOMACH: Large hernia which contains a significant portion of the stomach,  transverse colon and even portions of the duodenum. COLON AND SMALL BOWEL: Fecal stasis. There is no free intraperitoneal air. There  is no evidence of incarceration or obstruction. No mesenteric adenopathy. PERITONEUM: Unremarkable. APPENDIX: Unremarkable. BLADDER/REPRODUCTIVE ORGANS: Orchiectomy. Right-sided. There is minimal fluid in  the inguinal canal on the right. There is no inguinal adenopathy. There is no  deep pelvic adenopathy. RETROPERITONEUM: Unremarkable. The abdominal aorta is normal in caliber. No  aneurysm. No retroperitoneal adenopathy. OSSEOUS STRUCTURES: Scattered sclerotic foci are unchanged. There is no new  lytic lesion. Well marginated lesion in the left femur. Thoracic hemangiomas. Impression  Interval mediastinal adenopathy and tiny pulmonary nodules concerning for  possible malignant recurrence. The pulmonary nodules are not amenable to  percutaneous sampling. Please see above for additional nonemergent incidental findings. Imaging findings consistent with overall metastatic disease burden. Assessment:     1.  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 Thus I recommend administering 3 cycles of BEP as adjuvant treatment. I counseled the patient regarding the chemotherapy. Discussions included side-effect, toxicity, benefit and risks of chemotherapy. H understood the expected side-effect which includes alopecia, nausea, peripheral neuropathy, neutropenic fever, anemia, need for transfusion among other things. After weighing the benefit and risks, He agreed to proceed with chemotherapy         Plan:       > Port a cath placement  > PFT with DLCO and ABG  > Labs  > Start systemic chemotherapy      Signed By: Jake Fonseca MD     June 29, 2022         CC. Kylie Lawton MD  CC.  Iva Schuster MD

## 2022-06-29 NOTE — LETTER
6/30/2022    Patient: Nancy Perez   YOB: 1975   Date of Visit: 6/29/2022     Trina Pham MD  [facility] [address]  V[address] [facility] [address]    Dear Trina Pham MD,      Thank you for referring Mr. Sandie Acosta to [address] [place] [address] for evaluation. My notes for this consultation are attached. If you have questions, please do not hesitate to call me. I look forward to following your patient along with you.       Sincerely,    Lia Braun MD

## 2022-06-29 NOTE — PATIENT INSTRUCTIONS
Please call [phone] schedule port and lung function test.  Call us at [phone] when this has been scheduled so we can work on a chemo start date. On the day of your chemo you will report to the infusion center, [facility] [address], the nurses will access your port and draw labs, they will then send you over to our office, [facility] [address]. We will look at the labs, discuss any symptoms/ potential side effects, and answer any questions. We will then send you back to the infusion center to complete your infusion. Please take all of your medications as prescribed and eat breakfast prior to your arrival.    Namita Ruiz is not provided, but there is limited snacks/drinks available for the patient only. Please be mindful that due to QJKJC-28 the visitor policy is subject to change at any time. Please call the infusion center at [phone] to inquire on visitor policy. You do not need a  to your infusions but it is best to have someone on standby in case you are feeling too ill to drive. We sent over anti nausea medication, two different ones, to your pharmacy for you to  to have on hand in case you are to get nauseated while at home after your treatments. If one is too expensive then just  the less expensive. We send two as we do not know which insurances pay for what and if both not expensive it is good to get both that way if one isn't working you could try the other drug. We also sent over a numbing cream to your pharmacy. This will be something you use 30 min prior to infusion time to your port site and cover with plastic wrap. This will help numb the port before the nurse accesses your port in the infusion center for blood work.

## 2022-06-30 ENCOUNTER — DOCUMENTATION ONLY (OUTPATIENT)
Dept: ONCOLOGY | Age: 47
End: 2022-06-30

## 2022-06-30 NOTE — PROGRESS NOTES
Oncology Social Work  Psychosocial Assessment    Reason for Assessment:      [] Social Work Referral [x] Initial Assessment [x] New Diagnosis [] Other    [facility] [address] 2022   Patientdemarcus [employer] is: -   Confirm Advance Directive None   Patient Would Like to Complete Advance Directive No       Sources of Information:    [x]Patient  []Family  []Staff  []Medical Record    Mental Status:    [x]Alert  []Lethargic  []Unresponsive   [] Unable to assess   Oriented to:  [x]Person  [x]Place  [x]Time  [x]Situation      Relationship Status:  []Single  []  []Significant Other/Life Partner  []  []  []    Living Circumstances:  [x]Lives Alone  []Family/Significant Other in Household  []Roommates  []Children in the Home  []Paid Caregivers  []Assisted Living Facility/Group [address]  []Homeless  []Incarcerated  []Environmental/Care Concerns  []Other:    Employment Status:  []Employed Full-time []Employed Part-time []Homemaker  [] Retired [] Short-Term Disability [] Long-Term Disability  [] Unemployed   [] [place]   [] SSDI (not sure if ssi or ssdi)  [] Self-Employment    Barriers to Learning:    []Language  []Developmental  []Cognitive  []Altered Mental Status  []Visual/Hearing Impairment  []Unable to Read/Write  []Motivational  [x] Challenges Understanding Medical Jargon [x]No Barriers Identified      Financial/Legal Concerns:    []Uninsured  [x]Limited Income/Resources  []Non-Citizen  []Food Insecurity []No Concerns Identified   []Other:    Episcopalian/Spiritual/Existential:  Does patient have any spiritual or Caodaism beliefs? [x] Yes [] No  Is the patient involved in a spiritual, julee or Caodaism community?  [] Yes [x] No  Patient expressing spiritual/existential angst? [] Yes [x] No  Notes:    Support System:    Identified Support Person/Group:  [x]Strong  []Fair  []Limited    Coping with Illness:   [x]  Coping Well  [] Challenges Coping with Serious Illness [] Situational Depression [] Situational Anxiety [] Anticipatory Grief  [] Recent Loss [] Caregiver [place]            Narrative:    Met with alejo and his father, Idalmis Almonte and mother Kennedi Roca of 50 years,   to introduce social work navigator role and supports. Pt lives independently in [place]. 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 PT has Aspergers. Pt dx is in testicular cancer with lymph nodes in lung but is curable. Pt has a brother who works for the [employer]. Pt has [employer] and Medicaid. Plan:  1. Introduce self and role of the  in the [employer]. 2. Informed the patient of the [facility] and available resources there. 3. Continue to meet with the patient when he returns to the clinic for ongoing assessment of the patient's adjustment to his diagnosis and treatment. 4.   Ongoing psychosocial support as desired by patient    Referral/Handouts:      Complementary therapies referral      Galina Saenz.  Ankit Rose, JING/NAHUM  Supervisee in Social Work

## 2022-07-06 PROBLEM — C62.90 TESTICULAR CARCINOMA (HCC): Status: ACTIVE | Noted: 2022-07-06

## 2022-07-07 ENCOUNTER — HOSPITAL ENCOUNTER (OUTPATIENT)
Dept: PULMONOLOGY | Age: 47
Discharge: HOME | End: 2022-07-07
Attending: INTERNAL MEDICINE
Payer: MEDICARE

## 2022-07-07 DIAGNOSIS — C62.11 MALIGNANT NEOPLASM OF DESCENDED RIGHT TESTIS (HCC): ICD-10-CM

## 2022-07-07 PROCEDURE — 94726 PLETHYSMOGRAPHY LUNG VOLUMES: CPT

## 2022-07-07 PROCEDURE — 94375 RESPIRATORY FLOW VOLUME LOOP: CPT

## 2022-07-07 NOTE — PROGRESS NOTES
Patient could not follow instructions to compete PFT to meet criteria. Tried multiple attempts and was unsuccessful.

## 2022-07-08 ENCOUNTER — HOSPITAL ENCOUNTER (OUTPATIENT)
Dept: INTERVENTIONAL RADIOLOGY/VASCULAR | Age: 47
Discharge: HOME | End: 2022-07-08
Attending: INTERNAL MEDICINE
Payer: MEDICARE

## 2022-07-08 VITALS
TEMPERATURE: 98.6 F | SYSTOLIC BLOOD PRESSURE: 112 MMHG | HEART RATE: 58 BPM | RESPIRATION RATE: 18 BRPM | DIASTOLIC BLOOD PRESSURE: 56 MMHG | OXYGEN SATURATION: 98 %

## 2022-07-08 DIAGNOSIS — C62.11 MALIGNANT NEOPLASM OF DESCENDED RIGHT TESTIS (HCC): ICD-10-CM

## 2022-07-08 PROCEDURE — C1892 INTRO/SHEATH,FIXED,PEEL-AWAY: HCPCS

## 2022-07-08 PROCEDURE — 77030031139 HC SUT VCRL2 J&J -A

## 2022-07-08 PROCEDURE — 74011250636 HC RX REV CODE- 250/636: Performed by: STUDENT IN AN ORGANIZED HEALTH CARE EDUCATION/TRAINING PROGRAM

## 2022-07-08 PROCEDURE — 77030010507 HC ADH SKN DERMBND J&J -B

## 2022-07-08 PROCEDURE — 2709999900 HC NON-CHARGEABLE SUPPLY

## 2022-07-08 PROCEDURE — 74011000250 HC RX REV CODE- 250: Performed by: INTERNAL MEDICINE

## 2022-07-08 PROCEDURE — 74011000250 HC RX REV CODE- 250: Performed by: STUDENT IN AN ORGANIZED HEALTH CARE EDUCATION/TRAINING PROGRAM

## 2022-07-08 PROCEDURE — C1788 PORT, INDWELLING, IMP: HCPCS

## 2022-07-08 PROCEDURE — 74011250636 HC RX REV CODE- 250/636: Performed by: INTERNAL MEDICINE

## 2022-07-08 PROCEDURE — 36561 INSERT TUNNELED CV CATH: CPT

## 2022-07-08 RX ORDER — LIDOCAINE HYDROCHLORIDE AND EPINEPHRINE 10; 10 MG/ML; UG/ML
20 INJECTION, SOLUTION INFILTRATION; PERINEURAL ONCE
Status: COMPLETED | OUTPATIENT
Start: 2022-07-08 | End: 2022-07-08

## 2022-07-08 RX ORDER — HEPARIN SODIUM 200 [USP'U]/100ML
400 INJECTION, SOLUTION INTRAVENOUS ONCE
Status: COMPLETED | OUTPATIENT
Start: 2022-07-08 | End: 2022-07-08

## 2022-07-08 RX ORDER — LIDOCAINE HYDROCHLORIDE 20 MG/ML
10 INJECTION, SOLUTION INFILTRATION; PERINEURAL ONCE
Status: COMPLETED | OUTPATIENT
Start: 2022-07-08 | End: 2022-07-08

## 2022-07-08 RX ORDER — LIDOCAINE HYDROCHLORIDE 20 MG/ML
10 INJECTION, SOLUTION INFILTRATION; PERINEURAL ONCE
Status: DISPENSED | OUTPATIENT
Start: 2022-07-08 | End: 2022-07-08

## 2022-07-08 RX ORDER — SODIUM CHLORIDE 9 MG/ML
25 INJECTION, SOLUTION INTRAVENOUS CONTINUOUS
Status: DISCONTINUED | OUTPATIENT
Start: 2022-07-08 | End: 2022-07-08

## 2022-07-08 RX ORDER — MIDAZOLAM HYDROCHLORIDE 1 MG/ML
1-5 INJECTION, SOLUTION INTRAMUSCULAR; INTRAVENOUS
Status: DISCONTINUED | OUTPATIENT
Start: 2022-07-08 | End: 2022-07-08

## 2022-07-08 RX ORDER — FENTANYL CITRATE 50 UG/ML
100 INJECTION, SOLUTION INTRAMUSCULAR; INTRAVENOUS
Status: DISCONTINUED | OUTPATIENT
Start: 2022-07-08 | End: 2022-07-08

## 2022-07-08 RX ORDER — HEPARIN 100 UNIT/ML
300 SYRINGE INTRAVENOUS ONCE
Status: COMPLETED | OUTPATIENT
Start: 2022-07-08 | End: 2022-07-08

## 2022-07-08 RX ADMIN — FENTANYL CITRATE 50 MCG: 50 INJECTION, SOLUTION INTRAMUSCULAR; INTRAVENOUS at 12:17

## 2022-07-08 RX ADMIN — HEPARIN SODIUM IN SODIUM CHLORIDE 60 ML: 200 INJECTION INTRAVENOUS at 12:44

## 2022-07-08 RX ADMIN — LIDOCAINE HYDROCHLORIDE AND EPINEPHRINE 6 ML: 10; 10 INJECTION, SOLUTION INFILTRATION; PERINEURAL at 12:44

## 2022-07-08 RX ADMIN — WATER 2 G: 1 INJECTION INTRAMUSCULAR; INTRAVENOUS; SUBCUTANEOUS at 11:48

## 2022-07-08 RX ADMIN — LIDOCAINE HYDROCHLORIDE 4 ML: 20 INJECTION, SOLUTION INFILTRATION; PERINEURAL at 12:44

## 2022-07-08 RX ADMIN — FENTANYL CITRATE 50 MCG: 50 INJECTION, SOLUTION INTRAMUSCULAR; INTRAVENOUS at 12:47

## 2022-07-08 RX ADMIN — SODIUM CHLORIDE 25 ML/HR: 9 INJECTION, SOLUTION INTRAVENOUS at 11:48

## 2022-07-08 RX ADMIN — SODIUM CHLORIDE, PRESERVATIVE FREE 500 UNITS: 5 INJECTION INTRAVENOUS at 12:44

## 2022-07-08 RX ADMIN — MIDAZOLAM 2 MG: 1 INJECTION INTRAMUSCULAR; INTRAVENOUS at 12:17

## 2022-07-08 NOTE — H&P
Interventional and Vascular Radiology History and Physical    Patient: Reyes Shelling 56 y.o. male       Chief Complaint: Port placement     History of Present Illness: Mixed germ cell tumor right testis, needs port for chemotherapy. History:    Past Medical History:   Diagnosis Date    Alcohol withdrawal ([facility] 10/31/2014    Asperger syndrome 12/14/2011    Autism     dx 2010- highly functional    Cancer ([facility]     testicular    Convulsive syncope 6/11/2019    Fatigue     Loss of appetite     Other ill-defined conditions(799.89)      syncopal episodes    Seizure disorder ([facility] 5/22/2012    Status post VNS (vagus nerve stimulator) placement 12/08/2020     Family History   Problem Relation Age of Onset    Diabetes Father     Heart Disease Father     Elevated Lipids Father     Cancer Mother         Breast    Anxiety Mother     COPD Mother     Other Mother         Neuropathy    Sleep Apnea Mother     OSTEOARTHRITIS Mother     Other Brother         Pituitary tumor     Social History     Socioeconomic History    Marital status: SINGLE     Spouse name: Not on file    Number of children: Not on file    Years of education: Not on file    Highest education level: Not on file   Occupational History    Not on file   Tobacco Use    Smoking status: Never Smoker    Smokeless tobacco: Never Used   Substance and Sexual Activity    Alcohol use: Yes     Alcohol/week: 10.0 standard drinks     Types: 10 Cans of beer per week     Comment: most nights    Drug use: No    Sexual activity: Not on file   Other Topics Concern    Not on file   Social History Narrative    Not on file     Social Determinants of Health     Financial Resource Strain:     Difficulty of Paying Living Expenses: Not on file   Food Insecurity:     Worried About Running Out of Food in the Last Year: Not on file    Karen of Food in the Last Year: Not on file   Transportation Needs:     Lack of Transportation (Medical):  Not on file    Lack of Transportation (Non-Medical): Not on file   Physical Activity:     Days of Exercise per Week: Not on file    Minutes of Exercise per Session: Not on file   Stress:     Feeling of Stress : Not on file   Social Connections:     Frequency of Communication with Friends and Family: Not on file    Frequency of Social Gatherings with Friends and Family: Not on file    Attends Tenriism Services: Not on file    Active Member of [address] [place] [address] or Organizations: Not on file    Attends Club or Organization Meetings: Not on file    Marital Status: Not on file   Intimate Partner Violence:     Fear of Current or Ex-Partner: Not on file    Emotionally Abused: Not on file    Physically Abused: Not on file    Sexually Abused: Not on file   Housing Stability:     Unable to Pay for Housing in the Last Year: Not on file    Number of [place] in the Last Year: Not on file    Unstable Housing in the Last Year: Not on file       Allergies: Allergies   Allergen Reactions    Aspartame Other (comments)     Family believes it causes his seizures       Current Medications:  Current Outpatient Medications   Medication Sig    ondansetron (ZOFRAN ODT) 4 mg disintegrating tablet Take 1 Tablet by mouth every eight (8) hours as needed for Nausea or Vomiting.  prochlorperazine (Compazine) 10 mg tablet Take 1 Tablet by mouth every six (6) hours as needed for Nausea or Vomiting.  lidocaine-prilocaine (EMLA) topical cream Apply  to affected area as needed for Pain. Apply generous amount to port site 30 min prior to infusions and cover with plastic wrap    doxycycline (VIBRAMYCIN) 100 mg capsule Take 100 mg by mouth two (2) times a day.  brivaracetam (Briviact) 100 mg tablet Take 1 Tablet by mouth two (2) times a day. Max Daily Amount: 200 mg. Dr. Trung Frank refilling for Dr. Jordan Conner, one time only.  Xcopri 50 mg tab tablet TAKE 1 TABLET BY MOUTH DAILY.  MAX DAILY AMOUNT: 50 MG    Xcopri 200 mg tab TAKE 1 TABLET BY MOUTH DAILY. MAX DAILY AMOUNT: 200 MG    sertraline (ZOLOFT) 50 mg tablet Take 1 Tablet by mouth daily.  topiramate (TOPAMAX) 200 mg tablet TAKE 1 TABLET TWICE DAILY    MULTIVITAMIN PO Take 1 Tab by mouth daily. Current Facility-Administered Medications   Medication Dose Route Frequency    fentaNYL citrate (PF) injection 100 mcg  100 mcg IntraVENous Multiple    0.9% sodium chloride infusion  25 mL/hr IntraVENous CONTINUOUS    midazolam (VERSED) injection 1-5 mg  1-5 mg IntraVENous Multiple    lidocaine-EPINEPHrine (XYLOCAINE) 1 %-1:100,000 injection 200 mg  20 mL IntraDERMal ONCE    heparin (porcine) pf 300 Units  300 Units InterCATHeter ONCE    heparinized saline 2 units/mL infusion 800 Units  400 mL Irrigation ONCE    lidocaine (XYLOCAINE) 20 mg/mL (2 %) injection 200 mg  10 mL SubCUTAneous ONCE    lidocaine (XYLOCAINE) 20 mg/mL (2 %) injection 200 mg  10 mL SubCUTAneous ONCE        Physical Exam:  Blood pressure 99/66, pulse (!) 58, temperature 98.6 °F (37 °C), resp. rate 20, SpO2 99 %. No acute distress  Good peripheral perfusion  Nonlabored respirations  Abdomen nondistended    Alerts:    Hospital Problems  Date Reviewed: 5/31/2022    None          Laboratory:    No results for input(s): HGB, HCT, WBC, PLT, INR, BUN, CREA, K, CRCLT, HGBEXT, HCTEXT, PLTEXT, INREXT in the last 72 hours. No lab exists for component: PTT, PT      Plan of Care/Planned Procedure:  Risks, benefits, and alternatives reviewed with patient and he agrees to proceed with the procedure. Conscious sedation will be performed with IV fentanyl and versed.        Will Claire MD

## 2022-07-08 NOTE — ROUTINE PROCESS
Procedure reviewed with patient by Dr. Marciano Edward. Opportunity to verbalize questions and concerns. Consent obtained.

## 2022-07-08 NOTE — DISCHARGE INSTRUCTIONS
[address]  Angiography Department      Radiologist:    Dr. Geo Cordova     Date:   7/8/2022      Portacat Discharge Instructions      Watch for signs of infection:    1. Redness,   2. Fever, chills,   3. Increased pain, and/or drainage from the site. If this occurs, call your physician at once. If you have an appointment with a provider or at the infusion center in the upcoming week,  they may check your site and change the dressing for you. Keep your dressing clean and dry. Leave the dressing in place for 3 days. Continue your previous diet and restart your regularly prescribed medications. You may take Tylenol, as directed on the label, for pain if needed. Because you received sedation, you are not to drive or sign any legal documents for the next 24 hours. Do not lift anything heavier than 5 pounds with the affected arm and avoid pushing and pulling movements for several days. If you have any questions or concerns, please call our radiology department at [phone].

## 2022-07-13 ENCOUNTER — TELEPHONE (OUTPATIENT)
Dept: ONCOLOGY | Age: 47
End: 2022-07-13

## 2022-07-13 NOTE — TELEPHONE ENCOUNTER
Pt's mom called and left v/m on nurse line about her son's first chemo [address]. Pt's mother stated that she had question about how long his first [address] may be? and wanted clarity on some of the different med's that was in the chemo would like a return call back.

## 2022-07-14 NOTE — PROGRESS NOTES
Spoke with mom over the phone about Sharmin Valle and questions she had surrounding his upcoming treatment. Reviewed potential side effects and emphasized the importance of hydration. She and her  plan to stay with Sharmin Valle during his treatment and was asking what all can she bring. Told mom to bring personal items to keep busy such as a tablet with headphones as well as a pillow and blanket if Sharmin Valle wants to sleep. Patient does have a history of seizures and wants us to make sure we are aware. Thanked me over the phone for the information and will see us Monday.

## 2022-07-14 NOTE — PROGRESS NOTES
Samantha Valadez is a 56 y.o. male here for follow up for testicular carcinoma. Treatment today:  Cycle 1 Day 1 Cisplatin + Bleomycin + Etoposide  Pt received port 7/8/22. Pt doing well. No concerns brought up. Here with mom. 1. Have you been to the ER, urgent care clinic since your last visit? Hospitalized since your last visit? No     2. Have you seen or consulted any other health care providers outside of the [address] [place] [address] since your last visit? Include any pap smears or colon screening.   no

## 2022-07-15 DIAGNOSIS — G40.219 PARTIAL SYMPTOMATIC EPILEPSY WITH COMPLEX PARTIAL SEIZURES, INTRACTABLE, WITHOUT STATUS EPILEPTICUS (HCC): ICD-10-CM

## 2022-07-15 RX ORDER — CENOBAMATE 200 MG/1
TABLET, FILM COATED ORAL
Qty: 30 EACH | Refills: 3 | Status: SHIPPED | OUTPATIENT
Start: 2022-07-15

## 2022-07-16 RX ORDER — ONDANSETRON 2 MG/ML
8 INJECTION INTRAMUSCULAR; INTRAVENOUS AS NEEDED
Status: CANCELLED | OUTPATIENT
Start: 2022-07-20

## 2022-07-16 RX ORDER — ONDANSETRON 2 MG/ML
8 INJECTION INTRAMUSCULAR; INTRAVENOUS AS NEEDED
Status: CANCELLED | OUTPATIENT
Start: 2022-07-19

## 2022-07-16 RX ORDER — HYDROCORTISONE SODIUM SUCCINATE 100 MG/2ML
100 INJECTION, POWDER, FOR SOLUTION INTRAMUSCULAR; INTRAVENOUS AS NEEDED
Status: CANCELLED | OUTPATIENT
Start: 2022-07-22

## 2022-07-16 RX ORDER — HYDROCORTISONE SODIUM SUCCINATE 100 MG/2ML
100 INJECTION, POWDER, FOR SOLUTION INTRAMUSCULAR; INTRAVENOUS AS NEEDED
Status: CANCELLED | OUTPATIENT
Start: 2022-08-01

## 2022-07-16 RX ORDER — EPINEPHRINE 1 MG/ML
0.3 INJECTION, SOLUTION, CONCENTRATE INTRAVENOUS AS NEEDED
Status: CANCELLED | OUTPATIENT
Start: 2022-07-25

## 2022-07-16 RX ORDER — DIPHENHYDRAMINE HYDROCHLORIDE 50 MG/ML
25 INJECTION, SOLUTION INTRAMUSCULAR; INTRAVENOUS ONCE
Status: CANCELLED
Start: 2022-08-01 | End: 2022-08-01

## 2022-07-16 RX ORDER — HEPARIN 100 UNIT/ML
500 SYRINGE INTRAVENOUS AS NEEDED
Status: CANCELLED
Start: 2022-07-19

## 2022-07-16 RX ORDER — ACETAMINOPHEN 325 MG/1
650 TABLET ORAL ONCE
Status: CANCELLED
Start: 2022-07-18 | End: 2022-07-18

## 2022-07-16 RX ORDER — SODIUM CHLORIDE 9 MG/ML
5-250 INJECTION, SOLUTION INTRAVENOUS AS NEEDED
Status: CANCELLED | OUTPATIENT
Start: 2022-07-18 | End: 2022-07-18

## 2022-07-16 RX ORDER — DIPHENHYDRAMINE HYDROCHLORIDE 50 MG/ML
25 INJECTION, SOLUTION INTRAMUSCULAR; INTRAVENOUS AS NEEDED
Status: CANCELLED
Start: 2022-07-22

## 2022-07-16 RX ORDER — ACETAMINOPHEN 325 MG/1
650 TABLET ORAL AS NEEDED
Status: CANCELLED
Start: 2022-07-22

## 2022-07-16 RX ORDER — DIPHENHYDRAMINE HYDROCHLORIDE 50 MG/ML
25 INJECTION, SOLUTION INTRAMUSCULAR; INTRAVENOUS AS NEEDED
Status: CANCELLED
Start: 2022-07-20

## 2022-07-16 RX ORDER — SODIUM CHLORIDE 9 MG/ML
5-250 INJECTION, SOLUTION INTRAVENOUS AS NEEDED
Status: CANCELLED | OUTPATIENT
Start: 2022-07-20 | End: 2022-07-20

## 2022-07-16 RX ORDER — ACETAMINOPHEN 325 MG/1
650 TABLET ORAL AS NEEDED
Status: CANCELLED
Start: 2022-08-01

## 2022-07-16 RX ORDER — ACETAMINOPHEN 325 MG/1
650 TABLET ORAL ONCE
Status: CANCELLED
Start: 2022-07-25 | End: 2022-07-25

## 2022-07-16 RX ORDER — DIPHENHYDRAMINE HYDROCHLORIDE 50 MG/ML
25 INJECTION, SOLUTION INTRAMUSCULAR; INTRAVENOUS AS NEEDED
Status: CANCELLED
Start: 2022-07-21

## 2022-07-16 RX ORDER — DIPHENHYDRAMINE HYDROCHLORIDE 50 MG/ML
50 INJECTION, SOLUTION INTRAMUSCULAR; INTRAVENOUS AS NEEDED
Status: CANCELLED
Start: 2022-07-21

## 2022-07-16 RX ORDER — ALBUTEROL SULFATE 0.83 MG/ML
2.5 SOLUTION RESPIRATORY (INHALATION) AS NEEDED
Status: CANCELLED
Start: 2022-07-19

## 2022-07-16 RX ORDER — EPINEPHRINE 1 MG/ML
0.3 INJECTION, SOLUTION, CONCENTRATE INTRAVENOUS AS NEEDED
Status: CANCELLED | OUTPATIENT
Start: 2022-07-20

## 2022-07-16 RX ORDER — EPINEPHRINE 1 MG/ML
0.3 INJECTION, SOLUTION, CONCENTRATE INTRAVENOUS AS NEEDED
Status: CANCELLED | OUTPATIENT
Start: 2022-07-18

## 2022-07-16 RX ORDER — HYDROCORTISONE SODIUM SUCCINATE 100 MG/2ML
100 INJECTION, POWDER, FOR SOLUTION INTRAMUSCULAR; INTRAVENOUS AS NEEDED
Status: CANCELLED | OUTPATIENT
Start: 2022-07-21

## 2022-07-16 RX ORDER — ACETAMINOPHEN 325 MG/1
650 TABLET ORAL AS NEEDED
Status: CANCELLED
Start: 2022-07-18

## 2022-07-16 RX ORDER — HEPARIN 100 UNIT/ML
500 SYRINGE INTRAVENOUS AS NEEDED
Status: CANCELLED
Start: 2022-07-18

## 2022-07-16 RX ORDER — ALBUTEROL SULFATE 0.83 MG/ML
2.5 SOLUTION RESPIRATORY (INHALATION) AS NEEDED
Status: CANCELLED
Start: 2022-07-25

## 2022-07-16 RX ORDER — HYDROCORTISONE SODIUM SUCCINATE 100 MG/2ML
100 INJECTION, POWDER, FOR SOLUTION INTRAMUSCULAR; INTRAVENOUS AS NEEDED
Status: CANCELLED | OUTPATIENT
Start: 2022-07-19

## 2022-07-16 RX ORDER — DIPHENHYDRAMINE HYDROCHLORIDE 50 MG/ML
50 INJECTION, SOLUTION INTRAMUSCULAR; INTRAVENOUS AS NEEDED
Status: CANCELLED
Start: 2022-07-25

## 2022-07-16 RX ORDER — HYDROCORTISONE SODIUM SUCCINATE 100 MG/2ML
100 INJECTION, POWDER, FOR SOLUTION INTRAMUSCULAR; INTRAVENOUS AS NEEDED
Status: CANCELLED | OUTPATIENT
Start: 2022-07-18

## 2022-07-16 RX ORDER — ACETAMINOPHEN 325 MG/1
650 TABLET ORAL AS NEEDED
Status: CANCELLED
Start: 2022-07-20

## 2022-07-16 RX ORDER — DIPHENHYDRAMINE HYDROCHLORIDE 50 MG/ML
25 INJECTION, SOLUTION INTRAMUSCULAR; INTRAVENOUS ONCE
Status: CANCELLED
Start: 2022-07-25 | End: 2022-07-25

## 2022-07-16 RX ORDER — SODIUM CHLORIDE 9 MG/ML
5-40 INJECTION INTRAMUSCULAR; INTRAVENOUS; SUBCUTANEOUS AS NEEDED
Status: CANCELLED | OUTPATIENT
Start: 2022-08-01

## 2022-07-16 RX ORDER — ONDANSETRON 2 MG/ML
8 INJECTION INTRAMUSCULAR; INTRAVENOUS AS NEEDED
Status: CANCELLED | OUTPATIENT
Start: 2022-07-18

## 2022-07-16 RX ORDER — SODIUM CHLORIDE 9 MG/ML
5-40 INJECTION INTRAMUSCULAR; INTRAVENOUS; SUBCUTANEOUS AS NEEDED
Status: CANCELLED | OUTPATIENT
Start: 2022-07-25

## 2022-07-16 RX ORDER — DIPHENHYDRAMINE HYDROCHLORIDE 50 MG/ML
50 INJECTION, SOLUTION INTRAMUSCULAR; INTRAVENOUS AS NEEDED
Status: CANCELLED
Start: 2022-07-22

## 2022-07-16 RX ORDER — SODIUM CHLORIDE 9 MG/ML
5-40 INJECTION INTRAMUSCULAR; INTRAVENOUS; SUBCUTANEOUS AS NEEDED
Status: CANCELLED | OUTPATIENT
Start: 2022-07-20

## 2022-07-16 RX ORDER — ACETAMINOPHEN 325 MG/1
650 TABLET ORAL AS NEEDED
Status: CANCELLED
Start: 2022-07-21

## 2022-07-16 RX ORDER — DIPHENHYDRAMINE HYDROCHLORIDE 50 MG/ML
50 INJECTION, SOLUTION INTRAMUSCULAR; INTRAVENOUS AS NEEDED
Status: CANCELLED
Start: 2022-07-20

## 2022-07-16 RX ORDER — HEPARIN 100 UNIT/ML
500 SYRINGE INTRAVENOUS AS NEEDED
Status: CANCELLED
Start: 2022-07-20

## 2022-07-16 RX ORDER — HEPARIN 100 UNIT/ML
500 SYRINGE INTRAVENOUS AS NEEDED
Status: CANCELLED
Start: 2022-07-21

## 2022-07-16 RX ORDER — PALONOSETRON 0.05 MG/ML
0.25 INJECTION, SOLUTION INTRAVENOUS ONCE
Status: CANCELLED | OUTPATIENT
Start: 2022-07-18 | End: 2022-07-18

## 2022-07-16 RX ORDER — SODIUM CHLORIDE 0.9 % (FLUSH) 0.9 %
5-40 SYRINGE (ML) INJECTION AS NEEDED
Status: CANCELLED | OUTPATIENT
Start: 2022-07-18

## 2022-07-16 RX ORDER — DIPHENHYDRAMINE HYDROCHLORIDE 50 MG/ML
25 INJECTION, SOLUTION INTRAMUSCULAR; INTRAVENOUS ONCE
Status: CANCELLED
Start: 2022-07-18 | End: 2022-07-18

## 2022-07-16 RX ORDER — HEPARIN 100 UNIT/ML
500 SYRINGE INTRAVENOUS AS NEEDED
Status: CANCELLED
Start: 2022-08-01

## 2022-07-16 RX ORDER — DIPHENHYDRAMINE HYDROCHLORIDE 50 MG/ML
50 INJECTION, SOLUTION INTRAMUSCULAR; INTRAVENOUS AS NEEDED
Status: CANCELLED
Start: 2022-07-18

## 2022-07-16 RX ORDER — DIPHENHYDRAMINE HYDROCHLORIDE 50 MG/ML
50 INJECTION, SOLUTION INTRAMUSCULAR; INTRAVENOUS AS NEEDED
Status: CANCELLED
Start: 2022-08-01

## 2022-07-16 RX ORDER — SODIUM CHLORIDE 9 MG/ML
5-40 INJECTION INTRAMUSCULAR; INTRAVENOUS; SUBCUTANEOUS AS NEEDED
Status: CANCELLED | OUTPATIENT
Start: 2022-07-18

## 2022-07-16 RX ORDER — EPINEPHRINE 1 MG/ML
0.3 INJECTION, SOLUTION, CONCENTRATE INTRAVENOUS AS NEEDED
Status: CANCELLED | OUTPATIENT
Start: 2022-07-19

## 2022-07-16 RX ORDER — SODIUM CHLORIDE 0.9 % (FLUSH) 0.9 %
5-40 SYRINGE (ML) INJECTION AS NEEDED
Status: CANCELLED | OUTPATIENT
Start: 2022-07-19 | End: 2022-07-20

## 2022-07-16 RX ORDER — ACETAMINOPHEN 325 MG/1
650 TABLET ORAL AS NEEDED
Status: CANCELLED
Start: 2022-07-25

## 2022-07-16 RX ORDER — ONDANSETRON 2 MG/ML
8 INJECTION INTRAMUSCULAR; INTRAVENOUS AS NEEDED
Status: CANCELLED | OUTPATIENT
Start: 2022-08-01

## 2022-07-16 RX ORDER — ALBUTEROL SULFATE 0.83 MG/ML
2.5 SOLUTION RESPIRATORY (INHALATION) AS NEEDED
Status: CANCELLED
Start: 2022-07-20

## 2022-07-16 RX ORDER — SODIUM CHLORIDE 0.9 % (FLUSH) 0.9 %
5-40 SYRINGE (ML) INJECTION AS NEEDED
Status: CANCELLED | OUTPATIENT
Start: 2022-07-21 | End: 2022-07-21

## 2022-07-16 RX ORDER — SODIUM CHLORIDE 9 MG/ML
5-250 INJECTION, SOLUTION INTRAVENOUS AS NEEDED
Status: CANCELLED | OUTPATIENT
Start: 2022-07-19 | End: 2022-07-20

## 2022-07-16 RX ORDER — SODIUM CHLORIDE 9 MG/ML
5-40 INJECTION INTRAMUSCULAR; INTRAVENOUS; SUBCUTANEOUS AS NEEDED
Status: CANCELLED | OUTPATIENT
Start: 2022-07-19

## 2022-07-16 RX ORDER — SODIUM CHLORIDE 9 MG/ML
5-250 INJECTION, SOLUTION INTRAVENOUS AS NEEDED
Status: CANCELLED | OUTPATIENT
Start: 2022-07-21 | End: 2022-07-21

## 2022-07-16 RX ORDER — SODIUM CHLORIDE 9 MG/ML
5-250 INJECTION, SOLUTION INTRAVENOUS AS NEEDED
Status: CANCELLED | OUTPATIENT
Start: 2022-07-25

## 2022-07-16 RX ORDER — ACETAMINOPHEN 325 MG/1
650 TABLET ORAL AS NEEDED
Status: CANCELLED
Start: 2022-07-19

## 2022-07-16 RX ORDER — SODIUM CHLORIDE 9 MG/ML
5-250 INJECTION, SOLUTION INTRAVENOUS AS NEEDED
Status: CANCELLED | OUTPATIENT
Start: 2022-08-01

## 2022-07-16 RX ORDER — DIPHENHYDRAMINE HYDROCHLORIDE 50 MG/ML
25 INJECTION, SOLUTION INTRAMUSCULAR; INTRAVENOUS AS NEEDED
Status: CANCELLED
Start: 2022-08-01

## 2022-07-16 RX ORDER — ONDANSETRON 2 MG/ML
8 INJECTION INTRAMUSCULAR; INTRAVENOUS AS NEEDED
Status: CANCELLED | OUTPATIENT
Start: 2022-07-25

## 2022-07-16 RX ORDER — SODIUM CHLORIDE 0.9 % (FLUSH) 0.9 %
5-40 SYRINGE (ML) INJECTION AS NEEDED
Status: CANCELLED | OUTPATIENT
Start: 2022-07-20 | End: 2022-07-20

## 2022-07-16 RX ORDER — ONDANSETRON 2 MG/ML
8 INJECTION INTRAMUSCULAR; INTRAVENOUS AS NEEDED
Status: CANCELLED | OUTPATIENT
Start: 2022-07-21

## 2022-07-16 RX ORDER — EPINEPHRINE 1 MG/ML
0.3 INJECTION, SOLUTION, CONCENTRATE INTRAVENOUS AS NEEDED
Status: CANCELLED | OUTPATIENT
Start: 2022-07-21

## 2022-07-16 RX ORDER — ALBUTEROL SULFATE 0.83 MG/ML
2.5 SOLUTION RESPIRATORY (INHALATION) AS NEEDED
Status: CANCELLED
Start: 2022-07-21

## 2022-07-16 RX ORDER — PALONOSETRON 0.05 MG/ML
0.25 INJECTION, SOLUTION INTRAVENOUS ONCE
Status: CANCELLED | OUTPATIENT
Start: 2022-07-21 | End: 2022-07-21

## 2022-07-16 RX ORDER — HEPARIN 100 UNIT/ML
500 SYRINGE INTRAVENOUS AS NEEDED
Status: CANCELLED
Start: 2022-07-25

## 2022-07-16 RX ORDER — HEPARIN 100 UNIT/ML
500 SYRINGE INTRAVENOUS AS NEEDED
Status: CANCELLED
Start: 2022-07-22

## 2022-07-16 RX ORDER — DIPHENHYDRAMINE HYDROCHLORIDE 50 MG/ML
25 INJECTION, SOLUTION INTRAMUSCULAR; INTRAVENOUS AS NEEDED
Status: CANCELLED
Start: 2022-07-19

## 2022-07-16 RX ORDER — HYDROCORTISONE SODIUM SUCCINATE 100 MG/2ML
100 INJECTION, POWDER, FOR SOLUTION INTRAMUSCULAR; INTRAVENOUS AS NEEDED
Status: CANCELLED | OUTPATIENT
Start: 2022-07-25

## 2022-07-16 RX ORDER — ALBUTEROL SULFATE 0.83 MG/ML
2.5 SOLUTION RESPIRATORY (INHALATION) AS NEEDED
Status: CANCELLED
Start: 2022-07-18

## 2022-07-16 RX ORDER — SODIUM CHLORIDE 9 MG/ML
5-250 INJECTION, SOLUTION INTRAVENOUS AS NEEDED
Status: CANCELLED | OUTPATIENT
Start: 2022-07-18

## 2022-07-16 RX ORDER — DIPHENHYDRAMINE HYDROCHLORIDE 50 MG/ML
50 INJECTION, SOLUTION INTRAMUSCULAR; INTRAVENOUS AS NEEDED
Status: CANCELLED
Start: 2022-07-19

## 2022-07-16 RX ORDER — DIPHENHYDRAMINE HYDROCHLORIDE 50 MG/ML
25 INJECTION, SOLUTION INTRAMUSCULAR; INTRAVENOUS AS NEEDED
Status: CANCELLED
Start: 2022-07-18

## 2022-07-16 RX ORDER — SODIUM CHLORIDE 9 MG/ML
5-250 INJECTION, SOLUTION INTRAVENOUS AS NEEDED
Status: CANCELLED | OUTPATIENT
Start: 2022-07-22 | End: 2022-07-22

## 2022-07-16 RX ORDER — EPINEPHRINE 1 MG/ML
0.3 INJECTION, SOLUTION, CONCENTRATE INTRAVENOUS AS NEEDED
Status: CANCELLED | OUTPATIENT
Start: 2022-08-01

## 2022-07-16 RX ORDER — DIPHENHYDRAMINE HYDROCHLORIDE 50 MG/ML
25 INJECTION, SOLUTION INTRAMUSCULAR; INTRAVENOUS AS NEEDED
Status: CANCELLED
Start: 2022-07-25

## 2022-07-16 RX ORDER — EPINEPHRINE 1 MG/ML
0.3 INJECTION, SOLUTION, CONCENTRATE INTRAVENOUS AS NEEDED
Status: CANCELLED | OUTPATIENT
Start: 2022-07-22

## 2022-07-16 RX ORDER — SODIUM CHLORIDE 9 MG/ML
5-40 INJECTION INTRAMUSCULAR; INTRAVENOUS; SUBCUTANEOUS AS NEEDED
Status: CANCELLED | OUTPATIENT
Start: 2022-07-22

## 2022-07-16 RX ORDER — SODIUM CHLORIDE 0.9 % (FLUSH) 0.9 %
5-40 SYRINGE (ML) INJECTION AS NEEDED
Status: CANCELLED | OUTPATIENT
Start: 2022-08-01

## 2022-07-16 RX ORDER — SODIUM CHLORIDE 0.9 % (FLUSH) 0.9 %
5-40 SYRINGE (ML) INJECTION AS NEEDED
Status: CANCELLED | OUTPATIENT
Start: 2022-07-22 | End: 2022-07-22

## 2022-07-16 RX ORDER — HYDROCORTISONE SODIUM SUCCINATE 100 MG/2ML
100 INJECTION, POWDER, FOR SOLUTION INTRAMUSCULAR; INTRAVENOUS AS NEEDED
Status: CANCELLED | OUTPATIENT
Start: 2022-07-20

## 2022-07-16 RX ORDER — SODIUM CHLORIDE 9 MG/ML
5-40 INJECTION INTRAMUSCULAR; INTRAVENOUS; SUBCUTANEOUS AS NEEDED
Status: CANCELLED | OUTPATIENT
Start: 2022-07-21

## 2022-07-16 RX ORDER — SODIUM CHLORIDE 0.9 % (FLUSH) 0.9 %
5-40 SYRINGE (ML) INJECTION AS NEEDED
Status: CANCELLED | OUTPATIENT
Start: 2022-07-25

## 2022-07-16 RX ORDER — ONDANSETRON 2 MG/ML
8 INJECTION INTRAMUSCULAR; INTRAVENOUS AS NEEDED
Status: CANCELLED | OUTPATIENT
Start: 2022-07-22

## 2022-07-16 RX ORDER — ACETAMINOPHEN 325 MG/1
650 TABLET ORAL ONCE
Status: CANCELLED
Start: 2022-08-01 | End: 2022-08-01

## 2022-07-16 RX ORDER — ALBUTEROL SULFATE 0.83 MG/ML
2.5 SOLUTION RESPIRATORY (INHALATION) AS NEEDED
Status: CANCELLED
Start: 2022-07-22

## 2022-07-16 RX ORDER — ALBUTEROL SULFATE 0.83 MG/ML
2.5 SOLUTION RESPIRATORY (INHALATION) AS NEEDED
Status: CANCELLED
Start: 2022-08-01

## 2022-07-18 ENCOUNTER — OFFICE VISIT (OUTPATIENT)
Dept: ONCOLOGY | Age: 47
End: 2022-07-18
Payer: MEDICARE

## 2022-07-18 ENCOUNTER — HOSPITAL ENCOUNTER (OUTPATIENT)
Dept: INFUSION THERAPY | Age: 47
Discharge: HOME | End: 2022-07-18
Payer: MEDICARE

## 2022-07-18 VITALS
RESPIRATION RATE: 18 BRPM | OXYGEN SATURATION: 99 % | HEIGHT: 69 IN | DIASTOLIC BLOOD PRESSURE: 73 MMHG | HEART RATE: 68 BPM | WEIGHT: 175.27 LBS | TEMPERATURE: 98.2 F | BODY MASS INDEX: 25.96 KG/M2 | SYSTOLIC BLOOD PRESSURE: 112 MMHG

## 2022-07-18 VITALS
BODY MASS INDEX: 25.95 KG/M2 | WEIGHT: 175.2 LBS | RESPIRATION RATE: 18 BRPM | SYSTOLIC BLOOD PRESSURE: 106 MMHG | OXYGEN SATURATION: 99 % | DIASTOLIC BLOOD PRESSURE: 67 MMHG | TEMPERATURE: 98.2 F | HEART RATE: 58 BPM | HEIGHT: 69 IN

## 2022-07-18 DIAGNOSIS — N50.89 TESTICULAR MASS: Primary | ICD-10-CM

## 2022-07-18 DIAGNOSIS — C62.91 CARCINOMA OF RIGHT TESTIS (HCC): ICD-10-CM

## 2022-07-18 DIAGNOSIS — Z51.11 ENCOUNTER FOR ANTINEOPLASTIC CHEMOTHERAPY: ICD-10-CM

## 2022-07-18 DIAGNOSIS — C62.91 GERM CELL TUMOR OF RIGHT TESTICLE (HCC): Primary | ICD-10-CM

## 2022-07-18 LAB
ALBUMIN SERPL-MCNC: 3.5 G/DL (ref 3.5–5)
ALBUMIN/GLOB SERPL: 1.2 {RATIO} (ref 1.1–2.2)
ALP SERPL-CCNC: 97 U/L (ref 45–117)
ALT SERPL-CCNC: 28 U/L (ref 12–78)
ANION GAP SERPL CALC-SCNC: 6 MMOL/L (ref 5–15)
AST SERPL-CCNC: 24 U/L (ref 15–37)
BASOPHILS # BLD: 0.1 K/UL (ref 0–0.1)
BASOPHILS NFR BLD: 1 % (ref 0–1)
BILIRUB SERPL-MCNC: 0.4 MG/DL (ref 0.2–1)
BUN SERPL-MCNC: 17 MG/DL (ref 6–20)
BUN/CREAT SERPL: 23 (ref 12–20)
CALCIUM SERPL-MCNC: 8.7 MG/DL (ref 8.5–10.1)
CHLORIDE SERPL-SCNC: 106 MMOL/L (ref 97–108)
CO2 SERPL-SCNC: 27 MMOL/L (ref 21–32)
CREAT SERPL-MCNC: 0.75 MG/DL (ref 0.7–1.3)
DIFFERENTIAL METHOD BLD: ABNORMAL
EOSINOPHIL # BLD: 0.2 K/UL (ref 0–0.4)
EOSINOPHIL NFR BLD: 4 % (ref 0–7)
ERYTHROCYTE [DISTWIDTH] IN BLOOD BY AUTOMATED COUNT: 14.7 % (ref 11.5–14.5)
GLOBULIN SER CALC-MCNC: 3 G/DL (ref 2–4)
GLUCOSE SERPL-MCNC: 84 MG/DL (ref 65–100)
HAV IGM SER QL: NONREACTIVE
HBV CORE IGM SER QL: NONREACTIVE
HBV SURFACE AG SER QL: <0.1 INDEX
HBV SURFACE AG SER QL: NEGATIVE
HCT VFR BLD AUTO: 39.9 % (ref 36.6–50.3)
HCV AB SERPL QL IA: NONREACTIVE
HGB BLD-MCNC: 12.7 G/DL (ref 12.1–17)
IMM GRANULOCYTES # BLD AUTO: 0 K/UL (ref 0–0.04)
IMM GRANULOCYTES NFR BLD AUTO: 0 % (ref 0–0.5)
LYMPHOCYTES # BLD: 1.5 K/UL (ref 0.8–3.5)
LYMPHOCYTES NFR BLD: 29 % (ref 12–49)
MAGNESIUM SERPL-MCNC: 2.2 MG/DL (ref 1.6–2.4)
MCH RBC QN AUTO: 26.6 PG (ref 26–34)
MCHC RBC AUTO-ENTMCNC: 31.8 G/DL (ref 30–36.5)
MCV RBC AUTO: 83.6 FL (ref 80–99)
MONOCYTES # BLD: 0.6 K/UL (ref 0–1)
MONOCYTES NFR BLD: 11 % (ref 5–13)
NEUTS SEG # BLD: 2.9 K/UL (ref 1.8–8)
NEUTS SEG NFR BLD: 55 % (ref 32–75)
NRBC # BLD: 0 K/UL (ref 0–0.01)
NRBC BLD-RTO: 0 PER 100 WBC
PLATELET # BLD AUTO: 207 K/UL (ref 150–400)
PMV BLD AUTO: 9.5 FL (ref 8.9–12.9)
POTASSIUM SERPL-SCNC: 4.3 MMOL/L (ref 3.5–5.1)
PROT SERPL-MCNC: 6.5 G/DL (ref 6.4–8.2)
RBC # BLD AUTO: 4.77 M/UL (ref 4.1–5.7)
SODIUM SERPL-SCNC: 139 MMOL/L (ref 136–145)
SP1: NORMAL
SP2: NORMAL
SP3: NORMAL
WBC # BLD AUTO: 5.3 K/UL (ref 4.1–11.1)

## 2022-07-18 PROCEDURE — G8752 SYS BP LESS 140: HCPCS | Performed by: INTERNAL MEDICINE

## 2022-07-18 PROCEDURE — 96417 CHEMO IV INFUS EACH ADDL SEQ: CPT

## 2022-07-18 PROCEDURE — G8754 DIAS BP LESS 90: HCPCS | Performed by: INTERNAL MEDICINE

## 2022-07-18 PROCEDURE — 74011250636 HC RX REV CODE- 250/636: Performed by: INTERNAL MEDICINE

## 2022-07-18 PROCEDURE — 74011000258 HC RX REV CODE- 258: Performed by: INTERNAL MEDICINE

## 2022-07-18 PROCEDURE — 83735 ASSAY OF MAGNESIUM: CPT

## 2022-07-18 PROCEDURE — 96375 TX/PRO/DX INJ NEW DRUG ADDON: CPT

## 2022-07-18 PROCEDURE — 82107 ALPHA-FETOPROTEIN L3: CPT

## 2022-07-18 PROCEDURE — 74011000250 HC RX REV CODE- 250: Performed by: INTERNAL MEDICINE

## 2022-07-18 PROCEDURE — 80053 COMPREHEN METABOLIC PANEL: CPT

## 2022-07-18 PROCEDURE — 96367 TX/PROPH/DG ADDL SEQ IV INF: CPT

## 2022-07-18 PROCEDURE — 99215 OFFICE O/P EST HI 40 MIN: CPT | Performed by: INTERNAL MEDICINE

## 2022-07-18 PROCEDURE — G8432 DEP SCR NOT DOC, RNG: HCPCS | Performed by: INTERNAL MEDICINE

## 2022-07-18 PROCEDURE — 96413 CHEMO IV INFUSION 1 HR: CPT

## 2022-07-18 PROCEDURE — G8419 CALC BMI OUT NRM PARAM NOF/U: HCPCS | Performed by: INTERNAL MEDICINE

## 2022-07-18 PROCEDURE — 36415 COLL VENOUS BLD VENIPUNCTURE: CPT

## 2022-07-18 PROCEDURE — 80074 ACUTE HEPATITIS PANEL: CPT

## 2022-07-18 PROCEDURE — 85025 COMPLETE CBC W/AUTO DIFF WBC: CPT

## 2022-07-18 PROCEDURE — 77030012965 HC NDL HUBR BBMI -A

## 2022-07-18 PROCEDURE — G8427 DOCREV CUR MEDS BY ELIG CLIN: HCPCS | Performed by: INTERNAL MEDICINE

## 2022-07-18 RX ORDER — HEPARIN 100 UNIT/ML
500 SYRINGE INTRAVENOUS AS NEEDED
Status: ACTIVE | OUTPATIENT
Start: 2022-07-18 | End: 2022-07-18

## 2022-07-18 RX ORDER — SODIUM CHLORIDE 9 MG/ML
5-250 INJECTION, SOLUTION INTRAVENOUS AS NEEDED
Status: DISPENSED | OUTPATIENT
Start: 2022-07-18 | End: 2022-07-18

## 2022-07-18 RX ORDER — PALONOSETRON 0.05 MG/ML
0.25 INJECTION, SOLUTION INTRAVENOUS ONCE
Status: COMPLETED | OUTPATIENT
Start: 2022-07-18 | End: 2022-07-18

## 2022-07-18 RX ORDER — SODIUM CHLORIDE 0.9 % (FLUSH) 0.9 %
5-40 SYRINGE (ML) INJECTION AS NEEDED
Status: DISPENSED | OUTPATIENT
Start: 2022-07-18 | End: 2022-07-18

## 2022-07-18 RX ORDER — SODIUM CHLORIDE 9 MG/ML
5-40 INJECTION INTRAMUSCULAR; INTRAVENOUS; SUBCUTANEOUS AS NEEDED
Status: ACTIVE | OUTPATIENT
Start: 2022-07-18 | End: 2022-07-18

## 2022-07-18 RX ADMIN — CISPLATIN 39 MG: 1 INJECTION INTRAVENOUS at 12:50

## 2022-07-18 RX ADMIN — PALONOSETRON 0.25 MG: 0.05 INJECTION, SOLUTION INTRAVENOUS at 11:39

## 2022-07-18 RX ADMIN — SODIUM CHLORIDE, PRESERVATIVE FREE 10 ML: 5 INJECTION INTRAVENOUS at 08:15

## 2022-07-18 RX ADMIN — ETOPOSIDE 195 MG: 20 INJECTION INTRAVENOUS at 11:45

## 2022-07-18 RX ADMIN — SODIUM CHLORIDE, PRESERVATIVE FREE 10 ML: 5 INJECTION INTRAVENOUS at 13:55

## 2022-07-18 RX ADMIN — SODIUM CHLORIDE 150 MG: 9 INJECTION, SOLUTION INTRAVENOUS at 11:17

## 2022-07-18 RX ADMIN — SODIUM CHLORIDE 25 ML/HR: 9 INJECTION, SOLUTION INTRAVENOUS at 10:01

## 2022-07-18 RX ADMIN — DEXAMETHASONE SODIUM PHOSPHATE 12 MG: 4 INJECTION, SOLUTION INTRAMUSCULAR; INTRAVENOUS at 11:17

## 2022-07-18 RX ADMIN — MAGNESIUM SULFATE HEPTAHYDRATE: 500 INJECTION, SOLUTION INTRAMUSCULAR; INTRAVENOUS at 10:01

## 2022-07-18 RX ADMIN — Medication 300 UNITS: at 13:55

## 2022-07-18 NOTE — LETTER
7/18/2022    Patient: Nancy Perez   YOB: 1975   Date of Visit: 7/18/2022     Trina Pham MD  [facility] [address]  [address] [place]    Dear Trina Pham MD,      Thank you for referring Mr. Sandie Acosta to [address] [place] [address] for evaluation. My notes for this consultation are attached. If you have questions, please do not hesitate to call me. I look forward to following your patient along with you.       Sincerely,    Lia Braun MD

## 2022-07-18 NOTE — PROGRESS NOTES
[address] [facility] [address], [address] [facility], [address] [place] [address]  [phone]       Oncology Note        Patient: Juliana Lazaro MRN: [id number]  SSN: [id number]    YOB: 1975  Age: 56 y.o. Sex: male          Diagnosis:     1. 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 Starting EP cycle 1 day 1     Subjective:      Juliana Lazaro is a 56 y.o. 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 He experienced pain in the right groin approximately 4-6 weeks ago. He underwent USG and was noted to have enlarged right testis. He then underwent a right radical orchiectomy on 06/02/2022. The pathology revealed mixed germ cell tumor with 5% seminoma, 40% yolk sac tumor and teratoma with immature elements 55%. AFP is elevated at 35 and quant beta-HcG is normal and LDH is normal. CT shows enlarge mediastinal nodes and b/l lung nodules. He comes in to discuss the next steps. He suffers with Asperger's syndrome and is accompanied his mother. Review of Systems:    Constitutional: negative  Eyes: negative  Ears, Nose, Mouth, Throat, and Face: negative  Respiratory: negative  Cardiovascular: negative  Gastrointestinal: negative  Genitourinary:negative  Integument/Breast: negative  Hematologic/Lymphatic: negative  Musculoskeletal:negative  Neurological: negative    Review of systems was reviewed and updated as needed on 07/18/22.       Past Medical History:   Diagnosis Date    Alcohol withdrawal ([facility] [address] 10/31/2014    Asperger syndrome 12/14/2011    Autism     dx 2010- highly functional    Cancer ([facility] [address]     testicular    Convulsive syncope 6/11/2019    Fatigue     Loss of appetite     Other ill-defined conditions(799.89)      syncopal episodes    Seizure disorder ([facility] [address] 5/22/2012    Status post VNS (vagus nerve stimulator) placement 12/08/2020     Past Surgical History:   Procedure Laterality Date    HX ADENOIDECTOMY      HX GI      pyloric stenosis 1976    HX ORCHIECTOMY  06/01/2022    Right radical orchiectomy with frozen section    HX ORTHOPAEDIC  07/2020    Middle finger on left hand    HX TONSILLECTOMY      IR INSERT TUNL CVC W PORT OVER 5 YEARS  7/8/2022    VASCULAR SURGERY PROCEDURE UNLIST  12/8/202    Insertion of Vagus Nerve Stimulator       Family History   Problem Relation Age of Onset    Diabetes Father     Heart Disease Father     Elevated Lipids Father     Cancer Mother         Breast    Anxiety Mother     COPD Mother     Other Mother         Neuropathy    Sleep Apnea Mother     OSTEOARTHRITIS Mother     Other Brother         Pituitary tumor     Social History     Tobacco Use    Smoking status: Never Smoker    Smokeless tobacco: Never Used   Substance Use Topics    Alcohol use: Yes     Alcohol/week: 10.0 standard drinks     Types: 10 Cans of beer per week     Comment: most nights      Prior to Admission medications    Medication Sig Start Date End Date Taking? Authorizing Provider   Xcopri 200 mg tab TAKE 1 TABLET BY MOUTH DAILY. MAX DAILY AMOUNT: 200 MG 7/15/22  Yes Zina Lundy MD   ondansetron (ZOFRAN ODT) 4 mg disintegrating tablet Take 1 Tablet by mouth every eight (8) hours as needed for Nausea or Vomiting. 6/29/22  Yes Anil Morales MD   prochlorperazine (Compazine) 10 mg tablet Take 1 Tablet by mouth every six (6) hours as needed for Nausea or Vomiting. 6/29/22  Yes Anil Morales MD   lidocaine-prilocaine (EMLA) topical cream Apply  to affected area as needed for Pain.  Apply generous amount to port site 30 min prior to infusions and cover with plastic wrap 6/29/22  Yes Anil Morales MD   brivaracetam (Briviact) 100 mg tablet Take 1 Tablet by mouth two (2) times a day. Max Daily Amount: 200 mg. Dr. Pruett Public refilling for Dr. Sky Simpson, one time only. 3/22/22  Yes Emelia Olea MD   Xcopri 50 mg tab tablet TAKE 1 TABLET BY MOUTH DAILY. MAX DAILY AMOUNT: 50 MG 2/22/22  Yes Emelia Olea MD   sertraline (ZOLOFT) 50 mg tablet Take 1 Tablet by mouth daily. 12/8/21  Yes Emelia Olea MD   topiramate (TOPAMAX) 200 mg tablet TAKE 1 TABLET TWICE DAILY 12/8/21  Yes Emelia Olea MD   MULTIVITAMIN PO Take 1 Tab by mouth daily. Yes Provider, Historical   doxycycline (VIBRAMYCIN) 100 mg capsule Take 100 mg by mouth two (2) times a day. 5/26/22   Provider, Historical              Allergies   Allergen Reactions    Aspartame Other (comments)     Family believes it causes his seizures           Objective:     Vitals:    07/18/22 0846   BP: 112/73   Pulse: 68   Resp: 18   Temp: 98.2 °F (36.8 °C)   SpO2: 99%   Weight: 175 lb 4.3 oz (79.5 kg)   Height: 5' 9\" (1.753 m)      Pain Scale: 0 - No pain/10      Physical Exam:  GENERAL: alert, cooperative, no distress, appears stated age  EYE: conjunctivae/corneas clear. LYMPHATIC: Cervical, supraclavicular, and axillary nodes normal.   THROAT & NECK: normal and no erythema or exudates noted. LUNG: clear to auscultation bilaterally  HEART: regular rate and rhythm  ABDOMEN: soft, non-tender. Bowel sounds normal. No masses,  no organomegaly  EXTREMITIES:  extremities normal, atraumatic, no cyanosis or edema  SKIN: Normal.  NEUROLOGIC: AOx3. Gait normal.      CT Results (most recent):  Results from Hospital Encounter encounter on 06/17/22    CT ABD PELV W CONT    Narrative  Clinical history: Testicular cancer  INDICATION:   Testicular cancer  COMPARISON:  2011  CONTRAST: 100ml Isovue-370  TECHNIQUE:  Following the uneventful intravenous administration of Isovue-370, thin axial  images were obtained through the chest, abdomen and pelvis. Coronal and sagittal  reconstructions were generated.   The patient  was  administered oral contrast material as well. CT dose reduction was achieved through use of a standardized protocol tailored  for this examination and automatic exposure control for dose modulation. Adaptive statistical iterative reconstruction (ASIR) was utilized. FINDINGS:    SUPRACLAVICULAR REGION: Major cervical vasculature within normal limits. No  supraclavicular adenopathy. VASCULATURE:  No aortic aneurysm or dissection. No proximal pulmonary embolism is identified. MEDIASTINUM: There is mediastinal lymphadenopathy. 301-25 19 x 26 mm 301-22  pretracheal 16 x 12 mm. No esophageal mass. No endotracheal or endobronchial  mass. PLEURA/LUNGS[de-identified] There is significant chronic elevation of the right  hemidiaphragm. There are tiny pulmonary nodules present 301-19 right lung 4 mm.  301-28 subpleural on the right 3 mm. L1-11 3 mm left upper lobe. SOFT TISSUE/ AXILLA:  No mass or lymphadenopathy. LIVER: No mass lesion There is no intrahepatic duct dilatation. There is no  hepatic parenchymal mass. Hepatic enhancement pattern is within normal limits. Portal vein is patent. GALLBLADDER:  No dilatation or wall thickening. SPLEEN/PANCREAS: No mass. There is no pancreatic duct dilatation. There is no  evidence of splenomegaly. ADRENALS/KIDNEYS: No mass. There is no hydronephrosis. There is no renal mass. There is no perinephric mass. STOMACH: Large hernia which contains a significant portion of the stomach,  transverse colon and even portions of the duodenum. COLON AND SMALL BOWEL: Fecal stasis. There is no free intraperitoneal air. There  is no evidence of incarceration or obstruction. No mesenteric adenopathy. PERITONEUM: Unremarkable. APPENDIX: Unremarkable. BLADDER/REPRODUCTIVE ORGANS: Orchiectomy. Right-sided. There is minimal fluid in  the inguinal canal on the right. There is no inguinal adenopathy. There is no  deep pelvic adenopathy. RETROPERITONEUM: Unremarkable. The abdominal aorta is normal in caliber. No  aneurysm. No retroperitoneal adenopathy. OSSEOUS STRUCTURES: Scattered sclerotic foci are unchanged. There is no new  lytic lesion. Well marginated lesion in the left femur. Thoracic hemangiomas. Impression  Interval mediastinal adenopathy and tiny pulmonary nodules concerning for  possible malignant recurrence. The pulmonary nodules are not amenable to  percutaneous sampling. Please see above for additional nonemergent incidental findings. Imaging findings consistent with overall metastatic disease burden. Lab Results   Component Value Date/Time    WBC 5.3 07/18/2022 08:07 AM    HGB 12.7 07/18/2022 08:07 AM    Hematocrit (POC) 44 06/10/2019 04:29 PM    HCT 39.9 07/18/2022 08:07 AM    PLATELET 171 66/36/1141 08:07 AM    MCV 83.6 07/18/2022 08:07 AM         Lab Results   Component Value Date/Time    Sodium 139 07/18/2022 08:07 AM    Potassium 4.3 07/18/2022 08:07 AM    Chloride 106 07/18/2022 08:07 AM    CO2 27 07/18/2022 08:07 AM    Anion gap 6 07/18/2022 08:07 AM    Glucose 84 07/18/2022 08:07 AM    BUN 17 07/18/2022 08:07 AM    Creatinine 0.75 07/18/2022 08:07 AM    BUN/Creatinine ratio 23 (H) 07/18/2022 08:07 AM    GFR est AA >60 07/18/2022 08:07 AM    GFR est non-AA >60 07/18/2022 08:07 AM    Calcium 8.7 07/18/2022 08:07 AM    Bilirubin, total 0.4 07/18/2022 08:07 AM    Alk. phosphatase 97 07/18/2022 08:07 AM    Protein, total 6.5 07/18/2022 08:07 AM    Albumin 3.5 07/18/2022 08:07 AM    Globulin 3.0 07/18/2022 08:07 AM    A-G Ratio 1.2 07/18/2022 08:07 AM    ALT (SGPT) 28 07/18/2022 08:07 AM    AST (SGOT) 24 07/18/2022 08:07 AM           Assessment:     1.  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 Thus I recommend administering 3 cycles of BEP as adjuvant treatment. Patient was unable to complete Pulmonary Function testing as he could not follow instructions  Thus I changed his treatment to EP X 4    EP cycle 1 day 1  I explained to the patient the usual side effects and ways to manage it. He vocalized understanding. Blood counts are acceptable. Results reviewed with the patient. Plan:     1. Starting EP cycle 1 day 1   2. Return in 3 weeks. Signed By: Anitra Buchanan NP     July 18, 2022         I personally saw and evaluated the patient and performed the key components of medical decision making with Chaparrita Gibson NP. I reviewed and verified the above documentation and modified it as needed. Mr. Em Barlow is a gentleman with Stage IIIA testicular carcinoma. He is receiving systemic chemotherapy and will continue today. I obtained history, performed physical exam, reviewed labs and imaging and communicated the treatment plan to the patient. Signed by: Sekou Winter MD                     July 18, 2022      CC. Elliott Maier MD  CC.  Toby Blanco MD

## 2022-07-18 NOTE — PROGRESS NOTES
Pt arrived to [facility] ambulatory in no acute distress at 0800 for BEP C1D1. Assessment unremarkable except pt was sleepy. Pt unable to complete PFT, so Bleo was dropped from the regimen. R chest port accessed without issue and positive blood return noted.  Labs obtained, AFP, Hepatitis Acute, CBC, CMP, Mg.    Patient denied having any symptoms of COVID-19, i.e. SOB, coughing, fever, or generally not feeling well. Also denies having been exposed to COVID-19 recently or having had any recent contact with family/friend that has a pending COVID test.    Patient Vitals for the past 24 hrs:   Temp Pulse Resp BP SpO2   07/18/22 1351 -- (!) 58 18 106/67 --   07/18/22 0804 98.2 °F (36.8 °C) 68 18 112/73 99 %     Recent Results (from the past 12 hour(s))   HEPATITIS PANEL, ACUTE    Collection Time: 07/18/22  8:07 AM   Result Value Ref Range    Hepatitis A, IgM NONREACTIVE NR      __          Hepatitis B surface Ag <0.10 Index    Hep B surface Ag Interp. Negative NEG      __          Hepatitis B core, IgM NONREACTIVE NR      __          Hep C virus Ab Interp. NONREACTIVE NR     CBC WITH AUTOMATED DIFF    Collection Time: 07/18/22  8:07 AM   Result Value Ref Range    WBC 5.3 4.1 - 11.1 K/uL    RBC 4.77 4.10 - 5.70 M/uL    HGB 12.7 12.1 - 17.0 g/dL    HCT 39.9 36.6 - 50.3 %    MCV 83.6 80.0 - 99.0 FL    MCH 26.6 26.0 - 34.0 PG    MCHC 31.8 30.0 - 36.5 g/dL    RDW 14.7 (H) 11.5 - 14.5 %    PLATELET 656 486 - 724 K/uL    MPV 9.5 8.9 - 12.9 FL    NRBC 0.0 0  WBC    ABSOLUTE NRBC 0.00 0.00 - 0.01 K/uL    NEUTROPHILS 55 32 - 75 %    LYMPHOCYTES 29 12 - 49 %    MONOCYTES 11 5 - 13 %    EOSINOPHILS 4 0 - 7 %    BASOPHILS 1 0 - 1 %    IMMATURE GRANULOCYTES 0 0.0 - 0.5 %    ABS. NEUTROPHILS 2.9 1.8 - 8.0 K/UL    ABS. LYMPHOCYTES 1.5 0.8 - 3.5 K/UL    ABS. MONOCYTES 0.6 0.0 - 1.0 K/UL    ABS. EOSINOPHILS 0.2 0.0 - 0.4 K/UL    ABS. BASOPHILS 0.1 0.0 - 0.1 K/UL    ABS. IMM.  GRANS. 0.0 0.00 - 0.04 K/UL    DF AUTOMATED METABOLIC PANEL, COMPREHENSIVE    Collection Time: 07/18/22  8:07 AM   Result Value Ref Range    Sodium 139 136 - 145 mmol/L    Potassium 4.3 3.5 - 5.1 mmol/L    Chloride 106 97 - 108 mmol/L    CO2 27 21 - 32 mmol/L    Anion gap 6 5 - 15 mmol/L    Glucose 84 65 - 100 mg/dL    BUN 17 6 - 20 MG/DL    Creatinine 0.75 0.70 - 1.30 MG/DL    BUN/Creatinine ratio 23 (H) 12 - 20      GFR est AA >60 >60 ml/min/1.73m2    GFR est non-AA >60 >60 ml/min/1.73m2    Calcium 8.7 8.5 - 10.1 MG/DL    Bilirubin, total 0.4 0.2 - 1.0 MG/DL    ALT (SGPT) 28 12 - 78 U/L    AST (SGOT) 24 15 - 37 U/L    Alk.  phosphatase 97 45 - 117 U/L    Protein, total 6.5 6.4 - 8.2 g/dL    Albumin 3.5 3.5 - 5.0 g/dL    Globulin 3.0 2.0 - 4.0 g/dL    A-G Ratio 1.2 1.1 - 2.2     MAGNESIUM    Collection Time: 07/18/22  8:07 AM   Result Value Ref Range    Magnesium 2.2 1.6 - 2.4 mg/dL       The following medications administered:  Medications Administered     0.9% sodium chloride 1,000 mL with potassium chloride 10 mEq, magnesium sulfate 2 g infusion     Admin Date  07/18/2022 Action  New Bag Dose   Rate  1,000 mL/hr Route  IntraVENous Administered By  Anay Amanda RN          0.9% sodium chloride infusion     Admin Date  07/18/2022 Action  New Bag Dose  25 mL/hr Rate  25 mL/hr Route  IntraVENous Administered By  Anay Amanda RN          CISplatin (PLATINOL) 39 mg in 0.9% sodium chloride 500 mL, overfill volume 50 mL chemo infusion     Admin Date  07/18/2022 Action  New Bag Dose  39 mg Rate  589 mL/hr Route  IntraVENous Administered By  Anay Amanda RN          dexamethasone (DECADRON) 12 mg in 0.9% sodium chloride 50 mL IVPB     Admin Date  07/18/2022 Action  New Bag Dose  12 mg Route  IntraVENous Administered By  Anay Amanda RN          etoposide (VEPESID) 195 mg in 0.9% sodium chloride 500 mL, overfill volume 50 mL chemo infusion     Admin Date  07/18/2022 Action  New Bag Dose  195 mg Rate  559.8 mL/hr Route  IntraVENous Administered By  Jaya Choi RN          fosaprepitant (EMEND) 150 mg in 0.9% sodium chloride 150 mL IVPB     Admin Date  07/18/2022 Action  New Bag Dose  150 mg Rate  450 mL/hr Route  IntraVENous Administered By  Jaya Choi RN          heparin (porcine) pf 500 Units     Admin Date  07/18/2022 Action  Given Dose  300 Units Route  InterCATHeter Administered By  Jaya Choi RN          palonosetron HCl (ALOXI) injection 0.25 mg     Admin Date  07/18/2022 Action  Given Dose  0.25 mg Route  IntraVENous Administered By  Jaya Choi RN          sodium chloride (NS) flush 5-40 mL     Admin Date  07/18/2022 Action  Given Dose  10 mL Route  IntraVENous Administered By  Jaya Choi RN           Admin Date  07/18/2022 Action  Given Dose  10 mL Route  IntraVENous Administered By  Jaya Choi RN                Pt tolerated treatment well. Pt and mother educated about chemotherapy. Discharge instructions reviewed, handout given. Port flushed per policy and de-accessed, 2x2 and tape placed.  Pt discharged ambulatory in no acute distress at 1355, accompanied by mother. Next appointment 7/19/22 at 1300.

## 2022-07-19 ENCOUNTER — HOSPITAL ENCOUNTER (OUTPATIENT)
Dept: INFUSION THERAPY | Age: 47
Discharge: HOME | End: 2022-07-19
Payer: MEDICARE

## 2022-07-19 VITALS
SYSTOLIC BLOOD PRESSURE: 116 MMHG | HEART RATE: 62 BPM | TEMPERATURE: 97.9 F | BODY MASS INDEX: 26.36 KG/M2 | HEIGHT: 69 IN | RESPIRATION RATE: 16 BRPM | DIASTOLIC BLOOD PRESSURE: 70 MMHG | WEIGHT: 178 LBS

## 2022-07-19 DIAGNOSIS — N50.89 TESTICULAR MASS: Primary | ICD-10-CM

## 2022-07-19 PROCEDURE — 77030012965 HC NDL HUBR BBMI -A

## 2022-07-19 PROCEDURE — 74011000258 HC RX REV CODE- 258: Performed by: INTERNAL MEDICINE

## 2022-07-19 PROCEDURE — 96413 CHEMO IV INFUSION 1 HR: CPT

## 2022-07-19 PROCEDURE — 74011000250 HC RX REV CODE- 250: Performed by: INTERNAL MEDICINE

## 2022-07-19 PROCEDURE — 96367 TX/PROPH/DG ADDL SEQ IV INF: CPT

## 2022-07-19 PROCEDURE — 96375 TX/PRO/DX INJ NEW DRUG ADDON: CPT

## 2022-07-19 PROCEDURE — 74011250636 HC RX REV CODE- 250/636: Performed by: INTERNAL MEDICINE

## 2022-07-19 PROCEDURE — 96417 CHEMO IV INFUS EACH ADDL SEQ: CPT

## 2022-07-19 RX ORDER — HEPARIN 100 UNIT/ML
500 SYRINGE INTRAVENOUS AS NEEDED
Status: DISCONTINUED | OUTPATIENT
Start: 2022-07-19 | End: 2022-07-20 | Stop reason: HOSPADM

## 2022-07-19 RX ORDER — SODIUM CHLORIDE 9 MG/ML
5-250 INJECTION, SOLUTION INTRAVENOUS AS NEEDED
Status: DISCONTINUED | OUTPATIENT
Start: 2022-07-19 | End: 2022-07-20 | Stop reason: HOSPADM

## 2022-07-19 RX ORDER — SODIUM CHLORIDE 0.9 % (FLUSH) 0.9 %
5-40 SYRINGE (ML) INJECTION AS NEEDED
Status: DISCONTINUED | OUTPATIENT
Start: 2022-07-19 | End: 2022-07-20 | Stop reason: HOSPADM

## 2022-07-19 RX ORDER — SODIUM CHLORIDE 9 MG/ML
5-40 INJECTION INTRAMUSCULAR; INTRAVENOUS; SUBCUTANEOUS AS NEEDED
Status: DISCONTINUED | OUTPATIENT
Start: 2022-07-19 | End: 2022-07-20 | Stop reason: HOSPADM

## 2022-07-19 RX ADMIN — SODIUM CHLORIDE, PRESERVATIVE FREE 10 ML: 5 INJECTION INTRAVENOUS at 13:15

## 2022-07-19 RX ADMIN — SODIUM CHLORIDE 25 ML/HR: 9 INJECTION, SOLUTION INTRAVENOUS at 13:15

## 2022-07-19 RX ADMIN — DEXAMETHASONE SODIUM PHOSPHATE 12 MG: 4 INJECTION, SOLUTION INTRAMUSCULAR; INTRAVENOUS at 14:30

## 2022-07-19 RX ADMIN — SODIUM CHLORIDE, PRESERVATIVE FREE 10 ML: 5 INJECTION INTRAVENOUS at 17:30

## 2022-07-19 RX ADMIN — HEPARIN 500 UNITS: 100 SYRINGE at 17:30

## 2022-07-19 RX ADMIN — CISPLATIN 39 MG: 1 INJECTION INTRAVENOUS at 16:20

## 2022-07-19 RX ADMIN — MAGNESIUM SULFATE HEPTAHYDRATE: 500 INJECTION, SOLUTION INTRAMUSCULAR; INTRAVENOUS at 13:25

## 2022-07-19 RX ADMIN — ETOPOSIDE 195 MG: 20 INJECTION INTRAVENOUS at 15:00

## 2022-07-19 NOTE — PROGRESS NOTES
Outpatient Infusion Center - Chemotherapy Progress Note    8831- Pt admit to [facility] for C1D2 in stable condition. Assessment unchanged. Patient denied having any symptoms of COVID-19, i.e. SOB, coughing, fever, or generally not feeling well. Also denies having been exposed to COVID-19 recently or having had any recent contact with family/friend that has a pending COVID test.     R chest port accessed with positive blood return.      Chemotherapy Flowsheet 7/19/2022   Cycle C1D2   Date 7/19/2022   Drug / Regimen Etoposide/Cisplatin   Pre Hydration given   Pre Meds given   Notes given      Patient Vitals for the past 12 hrs:   Temp Pulse Resp BP   07/19/22 1730 -- 62 16 116/70   07/19/22 1317 97.9 °F (36.6 °C) 74 16 110/66      Medications:  Medications Administered     0.9% sodium chloride 1,000 mL with potassium chloride 10 mEq, magnesium sulfate 2 g infusion     Admin Date  07/19/2022 Action  New Bag Dose   Rate  1,000 mL/hr Route  IntraVENous Administered By  Tammie Angeles RN          0.9% sodium chloride infusion     Admin Date  07/19/2022 Action  New Bag Dose  25 mL/hr Rate  25 mL/hr Route  IntraVENous Administered By  Tammie Angeles RN          0.9% sodium chloride injection 5-40 mL     Admin Date  07/19/2022 Action  Given Dose  10 mL Route  IntraVENous Administered By  Tammie Angeles RN          CISplatin (PLATINOL) 39 mg in 0.9% sodium chloride 500 mL, overfill volume 50 mL chemo infusion     Admin Date  07/19/2022 Action  New Bag Dose  39 mg Rate  589 mL/hr Route  IntraVENous Administered By  Tammie Angeles RN          dexamethasone (DECADRON) 12 mg in 0.9% sodium chloride 50 mL IVPB     Admin Date  07/19/2022 Action  New Bag Dose  12 mg Route  IntraVENous Administered By  Tammie Angeles RN          etoposide (VEPESID) 195 mg in 0.9% sodium chloride 500 mL, overfill volume 50 mL chemo infusion     Admin Date  07/19/2022 Action  New Bag Dose  195 mg Rate  559.8 mL/hr Route  IntraVENous Administered By  Sun Mars RN          heparin (porcine) pf 500 Units     Admin Date  07/19/2022 Action  Given Dose  500 Units Route  InterCATHeter Administered By  Stephanie Atkins RN          sodium chloride (NS) flush 5-40 mL     Admin Date  07/19/2022 Action  Given Dose  10 mL Route  IntraVENous Administered By  Stephanie Atkins RN               Pt tolerated treatment well. Port maintained positive blood return throughout treatment, flushed with positive blood return at conclusion, and de-accessed. Two nurses verified prior to administering:  Drug name  Drug dose  Infusion volume or drug volume when prepared in a syringe  Rate of administration  Route of administration  Expiration dates and/or times  Appearance and physical integrity of the drugs  Rate set on infusion pump, when used  Sequencing of drug administration      1730- D/c home in no distress.  Pt aware of next [facility] appointment scheduled for:    Future Appointments   Date Time Provider Stephany Blum   7/20/2022 11:00  [facility]   7/21/2022 11:30  [address] [place] [address] REG   7/22/2022 11:00 AM LINK MED2 [place] [address] REG   8/8/2022 10:00  [address] [place] [address] REG   8/8/2022 11:15 AM Demar Renteria MD ONC BS AMB   8/9/2022  1:00  [address] [place] [address] REG   8/10/2022 11:00 AM LINK MED7 [place] [address] REG   8/11/2022 11:00 AM LINK MED2 [place] [address] REG   8/12/2022  1:00 PM LINK MED5 [place] [address] REG   8/23/2022  3:40 PM Tish Mc MD NEU BS AMB   8/29/2022 11:30 AM LINK MED1 [address] REG   8/30/2022  1:00 PM LINK MED3 [address] [facility] REG   8/31/2022 11:00 AM LINK MED1 [address] [facility] REG   9/1/2022  1:00 PM LINK MED1 [address] [facility] REG   9/2/2022  1:00 PM LINK MED3 [id number] [address]

## 2022-07-20 ENCOUNTER — HOSPITAL ENCOUNTER (OUTPATIENT)
Dept: INFUSION THERAPY | Age: 47
Discharge: HOME | End: 2022-07-20
Payer: MEDICARE

## 2022-07-20 VITALS
SYSTOLIC BLOOD PRESSURE: 121 MMHG | WEIGHT: 181.7 LBS | RESPIRATION RATE: 18 BRPM | HEART RATE: 75 BPM | DIASTOLIC BLOOD PRESSURE: 79 MMHG | BODY MASS INDEX: 26.83 KG/M2 | TEMPERATURE: 98.2 F

## 2022-07-20 DIAGNOSIS — N50.89 TESTICULAR MASS: Primary | ICD-10-CM

## 2022-07-20 PROCEDURE — 96367 TX/PROPH/DG ADDL SEQ IV INF: CPT

## 2022-07-20 PROCEDURE — 96375 TX/PRO/DX INJ NEW DRUG ADDON: CPT

## 2022-07-20 PROCEDURE — 96417 CHEMO IV INFUS EACH ADDL SEQ: CPT

## 2022-07-20 PROCEDURE — 77030012965 HC NDL HUBR BBMI -A

## 2022-07-20 PROCEDURE — 74011250636 HC RX REV CODE- 250/636: Performed by: INTERNAL MEDICINE

## 2022-07-20 PROCEDURE — 74011000258 HC RX REV CODE- 258: Performed by: INTERNAL MEDICINE

## 2022-07-20 PROCEDURE — 96413 CHEMO IV INFUSION 1 HR: CPT

## 2022-07-20 PROCEDURE — 74011000250 HC RX REV CODE- 250: Performed by: INTERNAL MEDICINE

## 2022-07-20 RX ORDER — SODIUM CHLORIDE 9 MG/ML
5-250 INJECTION, SOLUTION INTRAVENOUS AS NEEDED
Status: DISPENSED | OUTPATIENT
Start: 2022-07-20 | End: 2022-07-20

## 2022-07-20 RX ORDER — HEPARIN 100 UNIT/ML
500 SYRINGE INTRAVENOUS AS NEEDED
Status: ACTIVE | OUTPATIENT
Start: 2022-07-20 | End: 2022-07-20

## 2022-07-20 RX ORDER — SODIUM CHLORIDE 0.9 % (FLUSH) 0.9 %
5-40 SYRINGE (ML) INJECTION AS NEEDED
Status: DISPENSED | OUTPATIENT
Start: 2022-07-20 | End: 2022-07-20

## 2022-07-20 RX ADMIN — CISPLATIN 39 MG: 1 INJECTION INTRAVENOUS at 14:06

## 2022-07-20 RX ADMIN — DEXAMETHASONE SODIUM PHOSPHATE 12 MG: 4 INJECTION, SOLUTION INTRAMUSCULAR; INTRAVENOUS at 12:26

## 2022-07-20 RX ADMIN — SODIUM CHLORIDE, PRESERVATIVE FREE 10 ML: 5 INJECTION INTRAVENOUS at 15:10

## 2022-07-20 RX ADMIN — Medication 300 UNITS: at 15:10

## 2022-07-20 RX ADMIN — SODIUM CHLORIDE 25 ML/HR: 9 INJECTION, SOLUTION INTRAVENOUS at 11:21

## 2022-07-20 RX ADMIN — ETOPOSIDE 195 MG: 20 INJECTION INTRAVENOUS at 12:47

## 2022-07-20 RX ADMIN — MAGNESIUM SULFATE HEPTAHYDRATE: 500 INJECTION, SOLUTION INTRAMUSCULAR; INTRAVENOUS at 11:25

## 2022-07-20 NOTE — PROGRESS NOTES
Outpatient Infusion Center - Chemotherapy Progress Note    4650- Pt admit to [facility] for Etoposide/Cisplatin in stable condition. Assessment completed, no changes reported. Right chest port accessed with positive blood return.      Chemotherapy Flowsheet 7/20/2022   Cycle C1D3   Date 7/20/2022   Drug / Regimen Etop/Cisplatin   Pre Hydration given   Pre Meds given   Notes given        Patient Vitals for the past 12 hrs:   Temp Pulse Resp BP   07/20/22 1511 -- (P) 68 -- (P) 132/88   07/20/22 1112 98.2 °F (36.8 °C) 75 18 121/79        Two nurses verified prior to administering:  Drug name  Drug dose  Infusion volume or drug volume when prepared in a syringe  Rate of administration  Route of administration  Expiration dates and/or times  Appearance and physical integrity of the drugs  Rate set on infusion pump, when used  Sequencing of drug administration     Medications:  Medications Administered       0.9% sodium chloride 1,000 mL with potassium chloride 10 mEq, magnesium sulfate 2 g infusion       Admin Date  07/20/2022 Action  New Bag Dose   Rate  1,000 mL/hr Route  IntraVENous Administered By  Kendy Quintero RN              0.9% sodium chloride infusion       Admin Date  07/20/2022 Action  New Bag Dose  25 mL/hr Rate  25 mL/hr Route  IntraVENous Administered By  Kendy Quintero RN              CISplatin (PLATINOL) 39 mg in 0.9% sodium chloride 500 mL, overfill volume 50 mL chemo infusion       Admin Date  07/20/2022 Action  New Bag Dose  39 mg Rate  589 mL/hr Route  IntraVENous Administered By  Kendy Quintero RN              dexamethasone (DECADRON) 12 mg in 0.9% sodium chloride 50 mL IVPB       Admin Date  07/20/2022 Action  New Bag Dose  12 mg Route  IntraVENous Administered By  Kendy Quintero RN              etoposide (VEPESID) 195 mg in 0.9% sodium chloride 500 mL, overfill volume 50 mL chemo infusion       Admin Date  07/20/2022 Action  New Bag Dose  195 mg Rate  559.8 mL/hr Route  IntraVENous Administered By  Kenny Short RN              heparin (porcine) pf 500 Units       Admin Date  07/20/2022 Action  Given Dose  300 Units Route  InterCATHeter Administered By  Kojo Phelps RN              sodium chloride (NS) flush 5-40 mL       Admin Date  07/20/2022 Action  Given Dose  10 mL Route  IntraVENous Administered By  Kojo Phelps RN                     Pt tolerated treatment well. Port maintained positive blood return throughout treatment, flushed with positive blood return at conclusion, and de-accessed. 1510- D/c home in no distress.  Pt aware of next [facility] appointment scheduled for:    Future Appointments   Date Time Provider Stephany Viktoria   7/21/2022 11:30 AM [address]   7/22/2022 11:00 AM LINK MED2 [address] REG   8/8/2022 10:00 AM [address] REG   8/8/2022 11:15 AM Anil Morales MD ONC BS AMB   8/9/2022  1:00 PM [address] REG   8/10/2022 11:00 AM LINK MED7 [place] [address] REG   8/11/2022 11:00 AM LINK MED2 [place] [address] REG   8/12/2022  1:00 PM LINK MED5 [place] [address] REG   8/23/2022  3:40 PM Zina Lundy MD NEU BS AMB   8/29/2022 11:30 AM LINK MED1 [facility] REG   8/30/2022  1:00 PM LINK MED3 [facility] REG   8/31/2022 11:00 AM LINK MED1 [facility] REG   9/1/2022  1:00 PM LINK MED1 [facility] REG   9/2/2022  1:00 PM LINK MED3 [id number] [address]

## 2022-07-21 ENCOUNTER — HOSPITAL ENCOUNTER (OUTPATIENT)
Dept: INFUSION THERAPY | Age: 47
Discharge: HOME | End: 2022-07-21
Payer: MEDICARE

## 2022-07-21 VITALS
OXYGEN SATURATION: 93 % | WEIGHT: 180.8 LBS | DIASTOLIC BLOOD PRESSURE: 81 MMHG | TEMPERATURE: 98.3 F | SYSTOLIC BLOOD PRESSURE: 123 MMHG | HEIGHT: 69 IN | RESPIRATION RATE: 18 BRPM | BODY MASS INDEX: 26.78 KG/M2 | HEART RATE: 67 BPM

## 2022-07-21 DIAGNOSIS — N50.89 TESTICULAR MASS: Primary | ICD-10-CM

## 2022-07-21 LAB
AFP L3 MFR SERPL: ABNORMAL % (ref 0–9.9)
AFP SERPL-MCNC: 258.6 NG/ML (ref 0–6.9)

## 2022-07-21 PROCEDURE — 96417 CHEMO IV INFUS EACH ADDL SEQ: CPT

## 2022-07-21 PROCEDURE — 96413 CHEMO IV INFUSION 1 HR: CPT

## 2022-07-21 PROCEDURE — 96367 TX/PROPH/DG ADDL SEQ IV INF: CPT

## 2022-07-21 PROCEDURE — 74011250636 HC RX REV CODE- 250/636: Performed by: INTERNAL MEDICINE

## 2022-07-21 PROCEDURE — 77030012965 HC NDL HUBR BBMI -A

## 2022-07-21 PROCEDURE — 74011000258 HC RX REV CODE- 258: Performed by: INTERNAL MEDICINE

## 2022-07-21 PROCEDURE — 96375 TX/PRO/DX INJ NEW DRUG ADDON: CPT

## 2022-07-21 PROCEDURE — 74011000250 HC RX REV CODE- 250: Performed by: INTERNAL MEDICINE

## 2022-07-21 RX ORDER — SODIUM CHLORIDE 9 MG/ML
5-250 INJECTION, SOLUTION INTRAVENOUS AS NEEDED
Status: DISPENSED | OUTPATIENT
Start: 2022-07-21 | End: 2022-07-21

## 2022-07-21 RX ORDER — PALONOSETRON 0.05 MG/ML
0.25 INJECTION, SOLUTION INTRAVENOUS ONCE
Status: COMPLETED | OUTPATIENT
Start: 2022-07-21 | End: 2022-07-21

## 2022-07-21 RX ORDER — SODIUM CHLORIDE 9 MG/ML
5-40 INJECTION INTRAMUSCULAR; INTRAVENOUS; SUBCUTANEOUS AS NEEDED
Status: ACTIVE | OUTPATIENT
Start: 2022-07-21 | End: 2022-07-21

## 2022-07-21 RX ORDER — SODIUM CHLORIDE 0.9 % (FLUSH) 0.9 %
5-40 SYRINGE (ML) INJECTION AS NEEDED
Status: DISPENSED | OUTPATIENT
Start: 2022-07-21 | End: 2022-07-21

## 2022-07-21 RX ORDER — HEPARIN 100 UNIT/ML
500 SYRINGE INTRAVENOUS AS NEEDED
Status: ACTIVE | OUTPATIENT
Start: 2022-07-21 | End: 2022-07-21

## 2022-07-21 RX ADMIN — SODIUM CHLORIDE, PRESERVATIVE FREE 10 ML: 5 INJECTION INTRAVENOUS at 11:49

## 2022-07-21 RX ADMIN — DEXAMETHASONE SODIUM PHOSPHATE 12 MG: 4 INJECTION, SOLUTION INTRAMUSCULAR; INTRAVENOUS at 13:05

## 2022-07-21 RX ADMIN — PALONOSETRON 0.25 MG: 0.05 INJECTION, SOLUTION INTRAVENOUS at 13:00

## 2022-07-21 RX ADMIN — SODIUM CHLORIDE 25 ML/HR: 9 INJECTION, SOLUTION INTRAVENOUS at 11:50

## 2022-07-21 RX ADMIN — MAGNESIUM SULFATE HEPTAHYDRATE: 500 INJECTION, SOLUTION INTRAMUSCULAR; INTRAVENOUS at 11:50

## 2022-07-21 RX ADMIN — ETOPOSIDE 195 MG: 20 INJECTION INTRAVENOUS at 13:25

## 2022-07-21 RX ADMIN — CISPLATIN 39 MG: 1 INJECTION INTRAVENOUS at 14:45

## 2022-07-21 RX ADMIN — Medication 300 UNITS: at 15:52

## 2022-07-21 RX ADMIN — SODIUM CHLORIDE, PRESERVATIVE FREE 10 ML: 5 INJECTION INTRAVENOUS at 15:51

## 2022-07-21 NOTE — PROGRESS NOTES
[facility] Progress Note    Date: 2022    Name: Chuck Guajardo    MRN: [id number]         : 1975    Mr. Adriane Barber arrived (ambulation) at [phone] and in no distress for cycle 1 day 4 of Etoposide/Cisplatin regimen. Re-sssessment was completed, no acute issues at this time, no new complaints voiced. Right chest port accessed without difficulty with 0.75 inch sweet needle; + blood return noted, labs drawn and sent. Mr. Miles's vitals were reviewed. Patient Vitals for the past 12 hrs:   Temp Pulse Resp BP SpO2   22 1550 -- 67 18 123/81 93 %   22 1141 98.3 °F (36.8 °C) 71 16 121/82 98 %     No labs ordered. Pre-medications  were administered as ordered and chemotherapy was initiated.      CHEMO ADMINISTRATION CHECKLIST:  Two nurses verified prior to administering  Drug name  Drug dose  Infusion volume or drug volume when prepared in a syringe  Rate of administration  Route of administration  Expiration dates and/or times  Appearance and physical integrity of the drugs  Rate set on infusion pump, when used  Sequencing of drug administration    Medication:  Medications Administered       0.9% sodium chloride 1,000 mL with potassium chloride 10 mEq, magnesium sulfate 2 g infusion       Admin Date  2022 Action  New Bag Dose   Rate  1,000 mL/hr Route  IntraVENous Administered By  Kenrick CHARLTON              0.9% sodium chloride infusion       Admin Date  2022 Action  New Bag Dose  25 mL/hr Rate  25 mL/hr Route  IntraVENous Administered By  Kenrick CHARLTON              0.9% sodium chloride injection 5-40 mL       Admin Date  2022 Action  Given Dose  10 mL Route  IntraVENous Administered By  Kenrick CHARLTON              CISplatin (PLATINOL) 39 mg in 0.9% sodium chloride 500 mL, overfill volume 50 mL chemo infusion       Admin Date  2022 Action  New Bag Dose  39 mg Rate  589 mL/hr Route  IntraVENous Administered By  Kenrick CHARLTON              dexamethasone (DECADRON) 12 mg in 0.9% sodium chloride 50 mL IVPB       Admin Date  07/21/2022 Action  New Bag Dose  12 mg Route  IntraVENous Administered By  Siena Larson              etoposide (VEPESID) 195 mg in 0.9% sodium chloride 500 mL, overfill volume 50 mL chemo infusion       Admin Date  07/21/2022 Action  New Bag Dose  195 mg Rate  559.8 mL/hr Route  IntraVENous Administered By  Ellen Sol R              heparin (porcine) pf 500 Units       Admin Date  07/21/2022 Action  Given Dose  300 Units Route  InterCATHeter Administered By  Ellen Hart R              palonosetron HCl (ALOXI) injection 0.25 mg       Admin Date  07/21/2022 Action  Given Dose  0.25 mg Route  IntraVENous Administered By  Ellen Sol R              sodium chloride (NS) flush 5-40 mL       Admin Date  07/21/2022 Action  Given Dose  10 mL Route  IntraVENous Administered By  Ellen Hart R                    Patient port flushed; heparinized; and de-accessed per protocol. Mr. Jc Andrew tolerated treatment well and was discharged from [place] [address] in stable condition at 1555. He is aware of when to return for his next appointment.     Future Appointments   Date Time Provider Stephany Meii   7/22/2022 11:00  [facility] [address] REG   8/8/2022 10:00  [facility] [address] REG   8/8/2022 11:15 AM Justin Spear MD ONC BS AMB   8/9/2022  1:00 PM [facility] [address] REG   8/10/2022 11:00 AM [place] MED7 [place] [address] REG   8/11/2022 11:00 AM LINK MED2 [place] [address] REG   8/12/2022  1:00 PM LINK MED5 [place] [address] REG   8/23/2022  3:40 PM Radha Tenorio MD [facility] BS AMB   8/29/2022 11:30 AM LINK MED1 [address] [facility] [address] REG   8/30/2022  1:00 PM LINK MED3 300 [facility] REG   8/31/2022 11:00 AM LINK MED1 300 [facility] REG   9/1/2022  1:00 PM LINK MED1 300 [facility] REG   9/2/2022  1:00 PM KENDAL B [facility] MED3 [address] [facility]  July 21, 2022

## 2022-07-22 ENCOUNTER — HOSPITAL ENCOUNTER (OUTPATIENT)
Dept: INFUSION THERAPY | Age: 47
Discharge: HOME | End: 2022-07-22
Payer: MEDICARE

## 2022-07-22 VITALS
DIASTOLIC BLOOD PRESSURE: 76 MMHG | SYSTOLIC BLOOD PRESSURE: 124 MMHG | HEART RATE: 67 BPM | RESPIRATION RATE: 18 BRPM | OXYGEN SATURATION: 100 % | TEMPERATURE: 98.4 F

## 2022-07-22 DIAGNOSIS — N50.89 TESTICULAR MASS: Primary | ICD-10-CM

## 2022-07-22 PROCEDURE — 96367 TX/PROPH/DG ADDL SEQ IV INF: CPT

## 2022-07-22 PROCEDURE — 74011250636 HC RX REV CODE- 250/636: Performed by: INTERNAL MEDICINE

## 2022-07-22 PROCEDURE — 96375 TX/PRO/DX INJ NEW DRUG ADDON: CPT

## 2022-07-22 PROCEDURE — 96413 CHEMO IV INFUSION 1 HR: CPT

## 2022-07-22 PROCEDURE — 77030012965 HC NDL HUBR BBMI -A

## 2022-07-22 PROCEDURE — 74011000258 HC RX REV CODE- 258: Performed by: INTERNAL MEDICINE

## 2022-07-22 PROCEDURE — 96417 CHEMO IV INFUS EACH ADDL SEQ: CPT

## 2022-07-22 PROCEDURE — 74011000250 HC RX REV CODE- 250: Performed by: INTERNAL MEDICINE

## 2022-07-22 RX ORDER — SODIUM CHLORIDE 9 MG/ML
5-250 INJECTION, SOLUTION INTRAVENOUS AS NEEDED
Status: DISPENSED | OUTPATIENT
Start: 2022-07-22 | End: 2022-07-22

## 2022-07-22 RX ORDER — SODIUM CHLORIDE 0.9 % (FLUSH) 0.9 %
5-40 SYRINGE (ML) INJECTION AS NEEDED
Status: DISPENSED | OUTPATIENT
Start: 2022-07-22 | End: 2022-07-22

## 2022-07-22 RX ORDER — HEPARIN 100 UNIT/ML
500 SYRINGE INTRAVENOUS AS NEEDED
Status: ACTIVE | OUTPATIENT
Start: 2022-07-22 | End: 2022-07-22

## 2022-07-22 RX ORDER — SODIUM CHLORIDE 9 MG/ML
5-40 INJECTION INTRAMUSCULAR; INTRAVENOUS; SUBCUTANEOUS AS NEEDED
Status: ACTIVE | OUTPATIENT
Start: 2022-07-22 | End: 2022-07-22

## 2022-07-22 RX ADMIN — MAGNESIUM SULFATE HEPTAHYDRATE: 500 INJECTION, SOLUTION INTRAMUSCULAR; INTRAVENOUS at 11:13

## 2022-07-22 RX ADMIN — DEXAMETHASONE SODIUM PHOSPHATE 12 MG: 4 INJECTION, SOLUTION INTRAMUSCULAR; INTRAVENOUS at 12:13

## 2022-07-22 RX ADMIN — Medication 300 UNITS: at 14:50

## 2022-07-22 RX ADMIN — SODIUM CHLORIDE, PRESERVATIVE FREE 10 ML: 5 INJECTION INTRAVENOUS at 14:50

## 2022-07-22 RX ADMIN — SODIUM CHLORIDE 25 ML/HR: 9 INJECTION, SOLUTION INTRAVENOUS at 11:12

## 2022-07-22 RX ADMIN — CISPLATIN 39 MG: 1 INJECTION INTRAVENOUS at 13:48

## 2022-07-22 RX ADMIN — ETOPOSIDE 195 MG: 20 INJECTION INTRAVENOUS at 12:32

## 2022-07-22 RX ADMIN — SODIUM CHLORIDE, PRESERVATIVE FREE 10 ML: 5 INJECTION INTRAVENOUS at 11:12

## 2022-07-22 NOTE — PROGRESS NOTES
Pt arrived to [facility] ambulatory in no acute distress at 1105 for Etoposide/Cisplatin C1D5. Assessment unremarkable except. R chest port accessed without issue and positive blood return noted. Patient denied having any symptoms of COVID-19, i.e. SOB, coughing, fever, or generally not feeling well.   Also denies having been exposed to COVID-19 recently or having had any recent contact with family/friend that has a pending COVID test.    Patient Vitals for the past 12 hrs:   Temp Pulse Resp BP SpO2   07/22/22 1447 -- 67 18 124/76 --   07/22/22 1106 98.4 °F (36.9 °C) 74 18 115/77 100 %        The following medications administered:  Medications Administered       0.9% sodium chloride 1,000 mL with potassium chloride 10 mEq, magnesium sulfate 2 g infusion       Admin Date  07/22/2022 Action  New Bag Dose   Rate  1,000 mL/hr Route  IntraVENous Administered By  Marisel Stearns RN              0.9% sodium chloride infusion       Admin Date  07/22/2022 Action  New Bag Dose  25 mL/hr Rate  25 mL/hr Route  IntraVENous Administered By  Marisel Stearns RN              CISplatin (PLATINOL) 39 mg in 0.9% sodium chloride 500 mL, overfill volume 50 mL chemo infusion       Admin Date  07/22/2022 Action  New Bag Dose  39 mg Rate  589 mL/hr Route  IntraVENous Administered By  Marisel Stearns RN              dexamethasone (DECADRON) 12 mg in 0.9% sodium chloride 50 mL IVPB       Admin Date  07/22/2022 Action  New Bag Dose  12 mg Route  IntraVENous Administered By  Dong MONTILLA              etoposide (VEPESID) 195 mg in 0.9% sodium chloride 500 mL, overfill volume 50 mL chemo infusion       Admin Date  07/22/2022 Action  New Bag Dose  195 mg Rate  559.8 mL/hr Route  IntraVENous Administered By  Marisel Stearns RN              heparin (porcine) pf 500 Units       Admin Date  07/22/2022 Action  Given Dose  300 Units Route  InterCATHeter Administered By  Marisel Stearns RN              sodium chloride (NS) flush 5-40 mL Admin Date  07/22/2022 Action  Given Dose  10 mL Route  IntraVENous Administered By  Nisreen Delgadillo RN               Admin Date  07/22/2022 Action  Given Dose  10 mL Route  IntraVENous Administered By  Nisreen Delgadillo RN                    Pt tolerated treatment well. Port flushed per policy and de-accessed, 2x2 and tape placed. Pt discharged ambulatory in no acute distress at 1450, accompanied by mother. Next appointment 8/8/22 at 1000.

## 2022-07-25 ENCOUNTER — HOSPITAL ENCOUNTER (OUTPATIENT)
Dept: INFUSION THERAPY | Age: 47
End: 2022-07-25
Payer: MEDICARE

## 2022-08-01 ENCOUNTER — APPOINTMENT (OUTPATIENT)
Dept: INFUSION THERAPY | Age: 47
End: 2022-08-01
Payer: MEDICARE

## 2022-08-01 RX ORDER — SODIUM CHLORIDE 9 MG/ML
5-250 INJECTION, SOLUTION INTRAVENOUS AS NEEDED
Status: CANCELLED | OUTPATIENT
Start: 2022-08-09

## 2022-08-01 RX ORDER — EPINEPHRINE 1 MG/ML
0.3 INJECTION, SOLUTION, CONCENTRATE INTRAVENOUS AS NEEDED
Status: CANCELLED | OUTPATIENT
Start: 2022-08-11

## 2022-08-01 RX ORDER — EPINEPHRINE 1 MG/ML
0.3 INJECTION, SOLUTION, CONCENTRATE INTRAVENOUS AS NEEDED
Status: CANCELLED | OUTPATIENT
Start: 2022-08-09

## 2022-08-01 RX ORDER — ACETAMINOPHEN 325 MG/1
650 TABLET ORAL AS NEEDED
Status: CANCELLED
Start: 2022-08-10

## 2022-08-01 RX ORDER — SODIUM CHLORIDE 0.9 % (FLUSH) 0.9 %
5-40 SYRINGE (ML) INJECTION AS NEEDED
Status: CANCELLED | OUTPATIENT
Start: 2022-08-11

## 2022-08-01 RX ORDER — ONDANSETRON 2 MG/ML
8 INJECTION INTRAMUSCULAR; INTRAVENOUS AS NEEDED
Status: CANCELLED | OUTPATIENT
Start: 2022-08-11

## 2022-08-01 RX ORDER — SODIUM CHLORIDE 0.9 % (FLUSH) 0.9 %
5-40 SYRINGE (ML) INJECTION AS NEEDED
Status: CANCELLED | OUTPATIENT
Start: 2022-08-12

## 2022-08-01 RX ORDER — ALBUTEROL SULFATE 0.83 MG/ML
2.5 SOLUTION RESPIRATORY (INHALATION) AS NEEDED
Status: CANCELLED
Start: 2022-08-08

## 2022-08-01 RX ORDER — HYDROCORTISONE SODIUM SUCCINATE 100 MG/2ML
100 INJECTION, POWDER, FOR SOLUTION INTRAMUSCULAR; INTRAVENOUS AS NEEDED
Status: CANCELLED | OUTPATIENT
Start: 2022-08-12

## 2022-08-01 RX ORDER — SODIUM CHLORIDE 0.9 % (FLUSH) 0.9 %
5-40 SYRINGE (ML) INJECTION AS NEEDED
Status: CANCELLED | OUTPATIENT
Start: 2022-08-10

## 2022-08-01 RX ORDER — ACETAMINOPHEN 325 MG/1
650 TABLET ORAL AS NEEDED
Status: CANCELLED
Start: 2022-08-09

## 2022-08-01 RX ORDER — ONDANSETRON 2 MG/ML
8 INJECTION INTRAMUSCULAR; INTRAVENOUS AS NEEDED
Status: CANCELLED | OUTPATIENT
Start: 2022-08-08

## 2022-08-01 RX ORDER — SODIUM CHLORIDE 9 MG/ML
5-40 INJECTION INTRAMUSCULAR; INTRAVENOUS; SUBCUTANEOUS AS NEEDED
Status: CANCELLED | OUTPATIENT
Start: 2022-08-09

## 2022-08-01 RX ORDER — HEPARIN 100 UNIT/ML
500 SYRINGE INTRAVENOUS AS NEEDED
Status: CANCELLED
Start: 2022-08-10

## 2022-08-01 RX ORDER — DIPHENHYDRAMINE HYDROCHLORIDE 50 MG/ML
25 INJECTION, SOLUTION INTRAMUSCULAR; INTRAVENOUS AS NEEDED
Status: CANCELLED
Start: 2022-08-08

## 2022-08-01 RX ORDER — SODIUM CHLORIDE 9 MG/ML
5-250 INJECTION, SOLUTION INTRAVENOUS AS NEEDED
Status: CANCELLED | OUTPATIENT
Start: 2022-08-11

## 2022-08-01 RX ORDER — ALBUTEROL SULFATE 0.83 MG/ML
2.5 SOLUTION RESPIRATORY (INHALATION) AS NEEDED
Status: CANCELLED
Start: 2022-08-10

## 2022-08-01 RX ORDER — SODIUM CHLORIDE 9 MG/ML
5-250 INJECTION, SOLUTION INTRAVENOUS AS NEEDED
Status: CANCELLED | OUTPATIENT
Start: 2022-08-10

## 2022-08-01 RX ORDER — SODIUM CHLORIDE 9 MG/ML
5-40 INJECTION INTRAMUSCULAR; INTRAVENOUS; SUBCUTANEOUS AS NEEDED
Status: CANCELLED | OUTPATIENT
Start: 2022-08-10

## 2022-08-01 RX ORDER — HYDROCORTISONE SODIUM SUCCINATE 100 MG/2ML
100 INJECTION, POWDER, FOR SOLUTION INTRAMUSCULAR; INTRAVENOUS AS NEEDED
Status: CANCELLED | OUTPATIENT
Start: 2022-08-08

## 2022-08-01 RX ORDER — DIPHENHYDRAMINE HYDROCHLORIDE 50 MG/ML
50 INJECTION, SOLUTION INTRAMUSCULAR; INTRAVENOUS AS NEEDED
Status: CANCELLED
Start: 2022-08-09

## 2022-08-01 RX ORDER — DIPHENHYDRAMINE HYDROCHLORIDE 50 MG/ML
25 INJECTION, SOLUTION INTRAMUSCULAR; INTRAVENOUS AS NEEDED
Status: CANCELLED
Start: 2022-08-12

## 2022-08-01 RX ORDER — HEPARIN 100 UNIT/ML
500 SYRINGE INTRAVENOUS AS NEEDED
Status: CANCELLED
Start: 2022-08-11

## 2022-08-01 RX ORDER — DIPHENHYDRAMINE HYDROCHLORIDE 50 MG/ML
50 INJECTION, SOLUTION INTRAMUSCULAR; INTRAVENOUS AS NEEDED
Status: CANCELLED
Start: 2022-08-08

## 2022-08-01 RX ORDER — DIPHENHYDRAMINE HYDROCHLORIDE 50 MG/ML
25 INJECTION, SOLUTION INTRAMUSCULAR; INTRAVENOUS AS NEEDED
Status: CANCELLED
Start: 2022-08-09

## 2022-08-01 RX ORDER — ONDANSETRON 2 MG/ML
8 INJECTION INTRAMUSCULAR; INTRAVENOUS AS NEEDED
Status: CANCELLED | OUTPATIENT
Start: 2022-08-09

## 2022-08-01 RX ORDER — ACETAMINOPHEN 325 MG/1
650 TABLET ORAL AS NEEDED
Status: CANCELLED
Start: 2022-08-11

## 2022-08-01 RX ORDER — SODIUM CHLORIDE 9 MG/ML
5-40 INJECTION INTRAMUSCULAR; INTRAVENOUS; SUBCUTANEOUS AS NEEDED
Status: CANCELLED | OUTPATIENT
Start: 2022-08-11

## 2022-08-01 RX ORDER — DIPHENHYDRAMINE HYDROCHLORIDE 50 MG/ML
25 INJECTION, SOLUTION INTRAMUSCULAR; INTRAVENOUS AS NEEDED
Status: CANCELLED
Start: 2022-08-10

## 2022-08-01 RX ORDER — DIPHENHYDRAMINE HYDROCHLORIDE 50 MG/ML
50 INJECTION, SOLUTION INTRAMUSCULAR; INTRAVENOUS AS NEEDED
Status: CANCELLED
Start: 2022-08-11

## 2022-08-01 RX ORDER — ALBUTEROL SULFATE 0.83 MG/ML
2.5 SOLUTION RESPIRATORY (INHALATION) AS NEEDED
Status: CANCELLED
Start: 2022-08-11

## 2022-08-01 RX ORDER — ACETAMINOPHEN 325 MG/1
650 TABLET ORAL AS NEEDED
Status: CANCELLED
Start: 2022-08-12

## 2022-08-01 RX ORDER — SODIUM CHLORIDE 9 MG/ML
5-40 INJECTION INTRAMUSCULAR; INTRAVENOUS; SUBCUTANEOUS AS NEEDED
Status: CANCELLED | OUTPATIENT
Start: 2022-08-12

## 2022-08-01 RX ORDER — DIPHENHYDRAMINE HYDROCHLORIDE 50 MG/ML
50 INJECTION, SOLUTION INTRAMUSCULAR; INTRAVENOUS AS NEEDED
Status: CANCELLED
Start: 2022-08-12

## 2022-08-01 RX ORDER — HYDROCORTISONE SODIUM SUCCINATE 100 MG/2ML
100 INJECTION, POWDER, FOR SOLUTION INTRAMUSCULAR; INTRAVENOUS AS NEEDED
Status: CANCELLED | OUTPATIENT
Start: 2022-08-10

## 2022-08-01 RX ORDER — HYDROCORTISONE SODIUM SUCCINATE 100 MG/2ML
100 INJECTION, POWDER, FOR SOLUTION INTRAMUSCULAR; INTRAVENOUS AS NEEDED
Status: CANCELLED | OUTPATIENT
Start: 2022-08-11

## 2022-08-01 RX ORDER — ONDANSETRON 2 MG/ML
8 INJECTION INTRAMUSCULAR; INTRAVENOUS AS NEEDED
Status: CANCELLED | OUTPATIENT
Start: 2022-08-10

## 2022-08-01 RX ORDER — EPINEPHRINE 1 MG/ML
0.3 INJECTION, SOLUTION, CONCENTRATE INTRAVENOUS AS NEEDED
Status: CANCELLED | OUTPATIENT
Start: 2022-08-12

## 2022-08-01 RX ORDER — PALONOSETRON 0.05 MG/ML
0.25 INJECTION, SOLUTION INTRAVENOUS ONCE
Status: CANCELLED | OUTPATIENT
Start: 2022-08-11 | End: 2022-08-11

## 2022-08-01 RX ORDER — ONDANSETRON 2 MG/ML
8 INJECTION INTRAMUSCULAR; INTRAVENOUS AS NEEDED
Status: CANCELLED | OUTPATIENT
Start: 2022-08-12

## 2022-08-01 RX ORDER — DIPHENHYDRAMINE HYDROCHLORIDE 50 MG/ML
50 INJECTION, SOLUTION INTRAMUSCULAR; INTRAVENOUS AS NEEDED
Status: CANCELLED
Start: 2022-08-10

## 2022-08-01 RX ORDER — ALBUTEROL SULFATE 0.83 MG/ML
2.5 SOLUTION RESPIRATORY (INHALATION) AS NEEDED
Status: CANCELLED
Start: 2022-08-12

## 2022-08-01 RX ORDER — DIPHENHYDRAMINE HYDROCHLORIDE 50 MG/ML
25 INJECTION, SOLUTION INTRAMUSCULAR; INTRAVENOUS AS NEEDED
Status: CANCELLED
Start: 2022-08-11

## 2022-08-01 RX ORDER — HEPARIN 100 UNIT/ML
500 SYRINGE INTRAVENOUS AS NEEDED
Status: CANCELLED
Start: 2022-08-09

## 2022-08-01 RX ORDER — HYDROCORTISONE SODIUM SUCCINATE 100 MG/2ML
100 INJECTION, POWDER, FOR SOLUTION INTRAMUSCULAR; INTRAVENOUS AS NEEDED
Status: CANCELLED | OUTPATIENT
Start: 2022-08-09

## 2022-08-01 RX ORDER — SODIUM CHLORIDE 9 MG/ML
5-250 INJECTION, SOLUTION INTRAVENOUS AS NEEDED
Status: CANCELLED | OUTPATIENT
Start: 2022-08-12

## 2022-08-01 RX ORDER — SODIUM CHLORIDE 0.9 % (FLUSH) 0.9 %
5-40 SYRINGE (ML) INJECTION AS NEEDED
Status: CANCELLED | OUTPATIENT
Start: 2022-08-09

## 2022-08-01 RX ORDER — ACETAMINOPHEN 325 MG/1
650 TABLET ORAL AS NEEDED
Status: CANCELLED
Start: 2022-08-08

## 2022-08-01 RX ORDER — HEPARIN 100 UNIT/ML
500 SYRINGE INTRAVENOUS AS NEEDED
Status: CANCELLED
Start: 2022-08-12

## 2022-08-01 RX ORDER — ALBUTEROL SULFATE 0.83 MG/ML
2.5 SOLUTION RESPIRATORY (INHALATION) AS NEEDED
Status: CANCELLED
Start: 2022-08-09

## 2022-08-01 RX ORDER — EPINEPHRINE 1 MG/ML
0.3 INJECTION, SOLUTION, CONCENTRATE INTRAVENOUS AS NEEDED
Status: CANCELLED | OUTPATIENT
Start: 2022-08-10

## 2022-08-01 RX ORDER — EPINEPHRINE 1 MG/ML
0.3 INJECTION, SOLUTION, CONCENTRATE INTRAVENOUS AS NEEDED
Status: CANCELLED | OUTPATIENT
Start: 2022-08-08

## 2022-08-04 NOTE — PROGRESS NOTES
Cadence Mckeon is a 56 y.o. male here for follow up for testicular carcinoma. Treatment today:  Cycle 2 Day 1 Cisplatin + Etoposide  Pt is down 6 lbs since last visit. Pt is having minor diarrhea and fatigue. Otherwise doing ok. He is starting to loose his hair. 1. Have you been to the ER, urgent care clinic since your last visit? Hospitalized since your last visit? no    2. Have you seen or consulted any other health care providers outside of the [address] [place] [address] since your last visit? Include any pap smears or colon screening.   no

## 2022-08-08 ENCOUNTER — HOSPITAL ENCOUNTER (OUTPATIENT)
Dept: INFUSION THERAPY | Age: 47
Discharge: HOME | End: 2022-08-08
Payer: MEDICARE

## 2022-08-08 ENCOUNTER — OFFICE VISIT (OUTPATIENT)
Dept: ONCOLOGY | Age: 47
End: 2022-08-08
Payer: MEDICARE

## 2022-08-08 VITALS
HEIGHT: 69 IN | OXYGEN SATURATION: 100 % | WEIGHT: 174.1 LBS | SYSTOLIC BLOOD PRESSURE: 116 MMHG | DIASTOLIC BLOOD PRESSURE: 85 MMHG | RESPIRATION RATE: 18 BRPM | BODY MASS INDEX: 25.79 KG/M2 | TEMPERATURE: 98.3 F | HEART RATE: 68 BPM

## 2022-08-08 VITALS
BODY MASS INDEX: 25.8 KG/M2 | TEMPERATURE: 98.3 F | RESPIRATION RATE: 18 BRPM | HEART RATE: 74 BPM | DIASTOLIC BLOOD PRESSURE: 81 MMHG | OXYGEN SATURATION: 100 % | WEIGHT: 174.16 LBS | HEIGHT: 69 IN | SYSTOLIC BLOOD PRESSURE: 112 MMHG

## 2022-08-08 DIAGNOSIS — C62.91 GERM CELL TUMOR OF RIGHT TESTICLE (HCC): Primary | ICD-10-CM

## 2022-08-08 DIAGNOSIS — Z51.11 ENCOUNTER FOR ANTINEOPLASTIC CHEMOTHERAPY: ICD-10-CM

## 2022-08-08 DIAGNOSIS — N50.89 TESTICULAR MASS: Primary | ICD-10-CM

## 2022-08-08 LAB
ALBUMIN SERPL-MCNC: 3.5 G/DL (ref 3.5–5)
ALBUMIN/GLOB SERPL: 1.2 {RATIO} (ref 1.1–2.2)
ALP SERPL-CCNC: 102 U/L (ref 45–117)
ALT SERPL-CCNC: 26 U/L (ref 12–78)
ANION GAP SERPL CALC-SCNC: 9 MMOL/L (ref 5–15)
AST SERPL-CCNC: 17 U/L (ref 15–37)
BASOPHILS # BLD: 0 K/UL (ref 0–0.1)
BASOPHILS NFR BLD: 1 % (ref 0–1)
BILIRUB SERPL-MCNC: 0.2 MG/DL (ref 0.2–1)
BUN SERPL-MCNC: 22 MG/DL (ref 6–20)
BUN/CREAT SERPL: 27 (ref 12–20)
CALCIUM SERPL-MCNC: 8.5 MG/DL (ref 8.5–10.1)
CHLORIDE SERPL-SCNC: 108 MMOL/L (ref 97–108)
CO2 SERPL-SCNC: 24 MMOL/L (ref 21–32)
CREAT SERPL-MCNC: 0.82 MG/DL (ref 0.7–1.3)
DIFFERENTIAL METHOD BLD: ABNORMAL
EOSINOPHIL # BLD: 0 K/UL (ref 0–0.4)
EOSINOPHIL NFR BLD: 3 % (ref 0–7)
ERYTHROCYTE [DISTWIDTH] IN BLOOD BY AUTOMATED COUNT: 15.6 % (ref 11.5–14.5)
GLOBULIN SER CALC-MCNC: 2.9 G/DL (ref 2–4)
GLUCOSE SERPL-MCNC: 114 MG/DL (ref 65–100)
HCT VFR BLD AUTO: 35.5 % (ref 36.6–50.3)
HGB BLD-MCNC: 11.6 G/DL (ref 12.1–17)
IMM GRANULOCYTES # BLD AUTO: 0 K/UL (ref 0–0.04)
IMM GRANULOCYTES NFR BLD AUTO: 0 % (ref 0–0.5)
LYMPHOCYTES # BLD: 1 K/UL (ref 0.8–3.5)
LYMPHOCYTES NFR BLD: 58 % (ref 12–49)
MAGNESIUM SERPL-MCNC: 2.3 MG/DL (ref 1.6–2.4)
MCH RBC QN AUTO: 27 PG (ref 26–34)
MCHC RBC AUTO-ENTMCNC: 32.7 G/DL (ref 30–36.5)
MCV RBC AUTO: 82.6 FL (ref 80–99)
MONOCYTES # BLD: 0.2 K/UL (ref 0–1)
MONOCYTES NFR BLD: 15 % (ref 5–13)
NEUTS SEG # BLD: 0.4 K/UL (ref 1.8–8)
NEUTS SEG NFR BLD: 23 % (ref 32–75)
NRBC # BLD: 0 K/UL (ref 0–0.01)
NRBC BLD-RTO: 0 PER 100 WBC
PATH REV BLD -IMP: NORMAL
PLATELET # BLD AUTO: 89 K/UL (ref 150–400)
PMV BLD AUTO: 9.2 FL (ref 8.9–12.9)
POTASSIUM SERPL-SCNC: 4.1 MMOL/L (ref 3.5–5.1)
PROT SERPL-MCNC: 6.4 G/DL (ref 6.4–8.2)
RBC # BLD AUTO: 4.3 M/UL (ref 4.1–5.7)
RBC MORPH BLD: ABNORMAL
SODIUM SERPL-SCNC: 141 MMOL/L (ref 136–145)
WBC # BLD AUTO: 1.6 K/UL (ref 4.1–11.1)
WBC MORPH BLD: ABNORMAL

## 2022-08-08 PROCEDURE — 3074F SYST BP LT 130 MM HG: CPT | Performed by: INTERNAL MEDICINE

## 2022-08-08 PROCEDURE — 80053 COMPREHEN METABOLIC PANEL: CPT

## 2022-08-08 PROCEDURE — 99215 OFFICE O/P EST HI 40 MIN: CPT | Performed by: INTERNAL MEDICINE

## 2022-08-08 PROCEDURE — 85025 COMPLETE CBC W/AUTO DIFF WBC: CPT

## 2022-08-08 PROCEDURE — 96375 TX/PRO/DX INJ NEW DRUG ADDON: CPT

## 2022-08-08 PROCEDURE — 77030012965 HC NDL HUBR BBMI -A

## 2022-08-08 PROCEDURE — 74011000258 HC RX REV CODE- 258: Performed by: INTERNAL MEDICINE

## 2022-08-08 PROCEDURE — G8427 DOCREV CUR MEDS BY ELIG CLIN: HCPCS | Performed by: INTERNAL MEDICINE

## 2022-08-08 PROCEDURE — 3078F DIAST BP <80 MM HG: CPT | Performed by: INTERNAL MEDICINE

## 2022-08-08 PROCEDURE — 96413 CHEMO IV INFUSION 1 HR: CPT

## 2022-08-08 PROCEDURE — 74011250636 HC RX REV CODE- 250/636: Performed by: INTERNAL MEDICINE

## 2022-08-08 PROCEDURE — 36415 COLL VENOUS BLD VENIPUNCTURE: CPT

## 2022-08-08 PROCEDURE — 83735 ASSAY OF MAGNESIUM: CPT

## 2022-08-08 PROCEDURE — 96367 TX/PROPH/DG ADDL SEQ IV INF: CPT

## 2022-08-08 PROCEDURE — 74011000250 HC RX REV CODE- 250: Performed by: INTERNAL MEDICINE

## 2022-08-08 PROCEDURE — 96417 CHEMO IV INFUS EACH ADDL SEQ: CPT

## 2022-08-08 PROCEDURE — G8432 DEP SCR NOT DOC, RNG: HCPCS | Performed by: INTERNAL MEDICINE

## 2022-08-08 PROCEDURE — G8419 CALC BMI OUT NRM PARAM NOF/U: HCPCS | Performed by: INTERNAL MEDICINE

## 2022-08-08 RX ORDER — PALONOSETRON 0.05 MG/ML
0.25 INJECTION, SOLUTION INTRAVENOUS ONCE
Status: COMPLETED | OUTPATIENT
Start: 2022-08-08 | End: 2022-08-08

## 2022-08-08 RX ORDER — SODIUM CHLORIDE 9 MG/ML
5-40 INJECTION INTRAMUSCULAR; INTRAVENOUS; SUBCUTANEOUS AS NEEDED
Status: ACTIVE | OUTPATIENT
Start: 2022-08-08 | End: 2022-08-08

## 2022-08-08 RX ORDER — HEPARIN 100 UNIT/ML
500 SYRINGE INTRAVENOUS AS NEEDED
Status: ACTIVE | OUTPATIENT
Start: 2022-08-08 | End: 2022-08-08

## 2022-08-08 RX ORDER — SODIUM CHLORIDE 9 MG/ML
5-250 INJECTION, SOLUTION INTRAVENOUS AS NEEDED
Status: DISPENSED | OUTPATIENT
Start: 2022-08-08 | End: 2022-08-08

## 2022-08-08 RX ORDER — SODIUM CHLORIDE 0.9 % (FLUSH) 0.9 %
5-40 SYRINGE (ML) INJECTION AS NEEDED
Status: DISPENSED | OUTPATIENT
Start: 2022-08-08 | End: 2022-08-08

## 2022-08-08 RX ADMIN — MAGNESIUM SULFATE HEPTAHYDRATE: 500 INJECTION, SOLUTION INTRAMUSCULAR; INTRAVENOUS at 11:41

## 2022-08-08 RX ADMIN — SODIUM CHLORIDE 39 MG: 9 INJECTION, SOLUTION INTRAVENOUS at 14:16

## 2022-08-08 RX ADMIN — SODIUM CHLORIDE 150 MG: 9 INJECTION, SOLUTION INTRAVENOUS at 12:51

## 2022-08-08 RX ADMIN — SODIUM CHLORIDE 25 ML/HR: 9 INJECTION, SOLUTION INTRAVENOUS at 11:40

## 2022-08-08 RX ADMIN — PALONOSETRON 0.25 MG: 0.05 INJECTION, SOLUTION INTRAVENOUS at 12:49

## 2022-08-08 RX ADMIN — HEPARIN 500 UNITS: 100 SYRINGE at 15:22

## 2022-08-08 RX ADMIN — SODIUM CHLORIDE, PRESERVATIVE FREE 10 ML: 5 INJECTION INTRAVENOUS at 15:22

## 2022-08-08 RX ADMIN — ETOPOSIDE 195 MG: 20 INJECTION INTRAVENOUS at 13:12

## 2022-08-08 RX ADMIN — SODIUM CHLORIDE, PRESERVATIVE FREE 10 ML: 5 INJECTION INTRAVENOUS at 10:23

## 2022-08-08 RX ADMIN — SODIUM CHLORIDE 12 MG: 9 INJECTION, SOLUTION INTRAVENOUS at 12:51

## 2022-08-08 NOTE — LETTER
1/24/2023    Patient: Juliana Lazaro   YOB: 1975   Date of Visit: 8/8/2022     Christine Mulligan MD  [address] [place] [address]    Dear Christine Mulligan MD,      Thank you for referring Mr. Bryan Malik to [address] [place] [address] for evaluation. My notes for this consultation are attached. If you have questions, please do not hesitate to call me. I look forward to following your patient along with you.       Sincerely,    Nimo Maldonado MD

## 2022-08-08 NOTE — PROGRESS NOTES
[address] [facility] [address], [address] [facility], [address] [place] [address]  [phone]       Oncology Note      Patient: Elsa Cotto MRN: [id number]  SSN: [id number]    YOB: 1975  Age: 56 y.o. Sex: male          Diagnosis:     1. 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 He experienced pain in the right groin approximately 4-6 weeks ago. He underwent USG and was noted to have enlarged right testis. He then underwent a right radical orchiectomy on 06/02/2022. The pathology revealed mixed germ cell tumor with 5% seminoma, 40% yolk sac tumor and teratoma with immature elements 55%. AFP is elevated at 35 and quant beta-HcG is normal and LDH is normal. CT shows enlarge mediastinal nodes and b/l lung nodules. He suffers with Asperger's syndrome and is accompanied by his mother. He is tolerating treatment well. He had several days of diarrhea following treatment but otherwise has no complaints. He reports eating well and remaining active despite losing weight. Review of Systems:    Constitutional: negative  Eyes: negative  Ears, Nose, Mouth, Throat, and Face: negative  Respiratory: negative  Cardiovascular: negative  Gastrointestinal: diarrhea  Genitourinary:negative  Integument/Breast: negative  Hematologic/Lymphatic: negative  Musculoskeletal:negative  Neurological: negative    Review of systems was reviewed and updated as needed on 08/08/22.       Past Medical History:   Diagnosis Date    Alcohol withdrawal ([facility] [address] 10/31/2014    Asperger syndrome 12/14/2011    Autism dx 2010- highly functional    Cancer ([facility] [address]     testicular    Convulsive syncope 6/11/2019    Fatigue     Loss of appetite     Other ill-defined conditions(799.89)      syncopal episodes    Seizure disorder ([facility] [address] 5/22/2012    Status post VNS (vagus nerve stimulator) placement 12/08/2020     Past Surgical History:   Procedure Laterality Date    HX ADENOIDECTOMY      HX GI      pyloric stenosis 1975    HX ORCHIECTOMY  06/01/2022    Right radical orchiectomy with frozen section    HX ORTHOPAEDIC  07/2020    Middle finger on left hand    HX TONSILLECTOMY      IR INSERT TUNL CVC W PORT OVER 5 YEARS  7/8/2022    VASCULAR SURGERY PROCEDURE UNLIST  12/8/202    Insertion of Vagus Nerve Stimulator       Family History   Problem Relation Age of Onset    Diabetes Father     Heart Disease Father     Elevated Lipids Father     Cancer Mother         Breast    Anxiety Mother     COPD Mother     Other Mother         Neuropathy    Sleep Apnea Mother     OSTEOARTHRITIS Mother     Other Brother         Pituitary tumor     Social History     Tobacco Use    Smoking status: Never    Smokeless tobacco: Never   Substance Use Topics    Alcohol use: Yes     Alcohol/week: 10.0 standard drinks     Types: 10 Cans of beer per week     Comment: most nights      Prior to Admission medications    Medication Sig Start Date End Date Taking? Authorizing Provider   Xcopri 200 mg tab TAKE 1 TABLET BY MOUTH DAILY. MAX DAILY AMOUNT: 200 MG 7/15/22  Yes Radha Tenorio MD   ondansetron (ZOFRAN ODT) 4 mg disintegrating tablet Take 1 Tablet by mouth every eight (8) hours as needed for Nausea or Vomiting. 6/29/22  Yes Justin Spear MD   prochlorperazine (Compazine) 10 mg tablet Take 1 Tablet by mouth every six (6) hours as needed for Nausea or Vomiting. 6/29/22  Yes Justin Spear MD   lidocaine-prilocaine (EMLA) topical cream Apply  to affected area as needed for Pain.  Apply generous amount to port site 30 min prior to infusions and cover with plastic wrap 6/29/22  Yes Jamar Grande MD   brivaracetam (Briviact) 100 mg tablet Take 1 Tablet by mouth two (2) times a day. Max Daily Amount: 200 mg. Dr. Jeramy Olivo refilling for Dr. Janett Arevalo, one time only. 3/22/22  Yes Homero Story MD   Xcopri 50 mg tab tablet TAKE 1 TABLET BY MOUTH DAILY. MAX DAILY AMOUNT: 50 MG 2/22/22  Yes Homero Story MD   sertraline (ZOLOFT) 50 mg tablet Take 1 Tablet by mouth daily. 12/8/21  Yes Homero Story MD   topiramate (TOPAMAX) 200 mg tablet TAKE 1 TABLET TWICE DAILY 12/8/21  Yes Homero Story MD   MULTIVITAMIN PO Take 1 Tab by mouth daily. Yes Provider, Historical        Allergies   Allergen Reactions    Aspartame Other (comments)     Family believes it causes his seizures           Objective:     Vitals:    08/08/22 1055   BP: 112/81   Pulse: 74   Resp: 18   Temp: 98.3 °F (36.8 °C)   SpO2: 100%   Weight: 174 lb 2.6 oz (79 kg)   Height: 5' 9\" (1.753 m)      Pain Scale: 0 - No pain/10      Physical Exam:  GENERAL: alert, cooperative, no distress, appears stated age  EYE: conjunctivae/corneas clear. LYMPHATIC: Cervical, supraclavicular, and axillary nodes normal.   THROAT & NECK: normal and no erythema or exudates noted. LUNG: clear to auscultation bilaterally  HEART: regular rate and rhythm  ABDOMEN: soft, non-tender. EXTREMITIES:  extremities normal, atraumatic, no cyanosis or edema  SKIN: Normal.  NEUROLOGIC: AOx3. Gait normal.    The above physical exam was reviewed and updated as needed on 08/08/22. CT Results (most recent):  Results from Hospital Encounter encounter on 06/17/22    CT ABD PELV W CONT    Narrative  Clinical history: Testicular cancer  INDICATION:   Testicular cancer  COMPARISON:  2011  CONTRAST: 100ml Isovue-370  TECHNIQUE:  Following the uneventful intravenous administration of Isovue-370, thin axial  images were obtained through the chest, abdomen and pelvis. Coronal and sagittal  reconstructions were generated.   The patient was  administered oral contrast material as well. CT dose reduction was achieved through use of a standardized protocol tailored  for this examination and automatic exposure control for dose modulation. Adaptive statistical iterative reconstruction (ASIR) was utilized. FINDINGS:    SUPRACLAVICULAR REGION: Major cervical vasculature within normal limits. No  supraclavicular adenopathy. VASCULATURE:  No aortic aneurysm or dissection. No proximal pulmonary embolism is identified. MEDIASTINUM: There is mediastinal lymphadenopathy. 301-25 19 x 26 mm 301-22  pretracheal 16 x 12 mm. No esophageal mass. No endotracheal or endobronchial  mass. PLEURA/LUNGS[de-identified] There is significant chronic elevation of the right  hemidiaphragm. There are tiny pulmonary nodules present 301-19 right lung 4 mm.  301-28 subpleural on the right 3 mm. L1-11 3 mm left upper lobe. SOFT TISSUE/ AXILLA:  No mass or lymphadenopathy. LIVER: No mass lesion There is no intrahepatic duct dilatation. There is no  hepatic parenchymal mass. Hepatic enhancement pattern is within normal limits. Portal vein is patent. GALLBLADDER:  No dilatation or wall thickening. SPLEEN/PANCREAS: No mass. There is no pancreatic duct dilatation. There is no  evidence of splenomegaly. ADRENALS/KIDNEYS: No mass. There is no hydronephrosis. There is no renal mass. There is no perinephric mass. STOMACH: Large hernia which contains a significant portion of the stomach,  transverse colon and even portions of the duodenum. COLON AND SMALL BOWEL: Fecal stasis. There is no free intraperitoneal air. There  is no evidence of incarceration or obstruction. No mesenteric adenopathy. PERITONEUM: Unremarkable. APPENDIX: Unremarkable. BLADDER/REPRODUCTIVE ORGANS: Orchiectomy. Right-sided. There is minimal fluid in  the inguinal canal on the right. There is no inguinal adenopathy. There is no  deep pelvic adenopathy. RETROPERITONEUM: Unremarkable.  The abdominal aorta is normal in caliber. No  aneurysm. No retroperitoneal adenopathy. OSSEOUS STRUCTURES: Scattered sclerotic foci are unchanged. There is no new  lytic lesion. Well marginated lesion in the left femur. Thoracic hemangiomas. Impression  Interval mediastinal adenopathy and tiny pulmonary nodules concerning for  possible malignant recurrence. The pulmonary nodules are not amenable to  percutaneous sampling. Please see above for additional nonemergent incidental findings. Imaging findings consistent with overall metastatic disease burden. Lab Results   Component Value Date/Time    WBC 1.6 (L) 08/08/2022 10:19 AM    HGB 11.6 (L) 08/08/2022 10:19 AM    Hematocrit (POC) 44 06/10/2019 04:29 PM    HCT 35.5 (L) 08/08/2022 10:19 AM    PLATELET 89 (L) 44/15/0447 10:19 AM    MCV 82.6 08/08/2022 10:19 AM         Lab Results   Component Value Date/Time    Sodium 141 08/08/2022 10:19 AM    Potassium 4.1 08/08/2022 10:19 AM    Chloride 108 08/08/2022 10:19 AM    CO2 24 08/08/2022 10:19 AM    Anion gap 9 08/08/2022 10:19 AM    Glucose 114 (H) 08/08/2022 10:19 AM    BUN 22 (H) 08/08/2022 10:19 AM    Creatinine 0.82 08/08/2022 10:19 AM    BUN/Creatinine ratio 27 (H) 08/08/2022 10:19 AM    GFR est AA >60 08/08/2022 10:19 AM    GFR est non-AA >60 08/08/2022 10:19 AM    Calcium 8.5 08/08/2022 10:19 AM    Bilirubin, total 0.2 08/08/2022 10:19 AM    Alk. phosphatase 102 08/08/2022 10:19 AM    Protein, total 6.4 08/08/2022 10:19 AM    Albumin 3.5 08/08/2022 10:19 AM    Globulin 2.9 08/08/2022 10:19 AM    A-G Ratio 1.2 08/08/2022 10:19 AM    ALT (SGPT) 26 08/08/2022 10:19 AM    AST (SGOT) 17 08/08/2022 10:19 AM           Assessment:     1.  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 Thus I recommend administering 3 cycles of BEP as adjuvant treatment. Patient was unable to complete Pulmonary Function testing as he could not follow instructions  Thus I changed his treatment to EP X 4    Receiving adjuvant chemotherapy  EP - cycle 2 day 1    Tolerating treatment   + mild diarrhea  A detailed system by system evaluation of side effect was performed to assess chemotherapy related toxicity. Blood counts are acceptable. Results reviewed with the patient. Plan:     > Continue adjuvant chemotherapy   > Imodium as needed  > Follow-up in 3 weeks      Signed By: Blake Resendiz NP     August 8, 2022          CC. Trina Soares MD  CC.  Juel Jeans, MD

## 2022-08-08 NOTE — PROGRESS NOTES
Pt arrived to [facility] ambulatory in no acute distress at 1015 for Etoposide/Cisplatin C2D1. Assessment unremarkable except diarrhea following the first cycle. R chest port accessed without issue and positive blood return noted. Labs obtained, CBC, CMP, Mg.    Patient denied having any symptoms of COVID-19, i.e. SOB, coughing, fever, or generally not feeling well. Also denies having been exposed to COVID-19 recently or having had any recent contact with family/friend that has a pending COVID test.    Visit Vitals  /81 (BP 1 Location: Left upper arm, BP Patient Position: Sitting)   Pulse 74   Temp 98.3 °F (36.8 °C)   Resp 18   Ht 5' 9\" (1.753 m)   Wt 79 kg (174 lb 1.6 oz)   SpO2 100%   BMI 25.71 kg/m²     Recent Results (from the past 12 hour(s))   CBC WITH AUTOMATED DIFF    Collection Time: 08/08/22 10:19 AM   Result Value Ref Range    WBC 1.6 (L) 4.1 - 11.1 K/uL    RBC 4.30 4. 10 - 5.70 M/uL    HGB 11.6 (L) 12.1 - 17.0 g/dL    HCT 35.5 (L) 36.6 - 50.3 %    MCV 82.6 80.0 - 99.0 FL    MCH 27.0 26.0 - 34.0 PG    MCHC 32.7 30.0 - 36.5 g/dL    RDW 15.6 (H) 11.5 - 14.5 %    PLATELET 89 (L) 854 - 400 K/uL    MPV 9.2 8.9 - 12.9 FL    NRBC 0.0 0  WBC    ABSOLUTE NRBC 0.00 0.00 - 0.01 K/uL    NEUTROPHILS 23 (L) 32 - 75 %    LYMPHOCYTES 58 (H) 12 - 49 %    MONOCYTES 15 (H) 5 - 13 %    EOSINOPHILS 3 0 - 7 %    BASOPHILS 1 0 - 1 %    IMMATURE GRANULOCYTES 0 0.0 - 0.5 %    ABS. NEUTROPHILS 0.4 (L) 1.8 - 8.0 K/UL    ABS. LYMPHOCYTES 1.0 0.8 - 3.5 K/UL    ABS. MONOCYTES 0.2 0.0 - 1.0 K/UL    ABS. EOSINOPHILS 0.0 0.0 - 0.4 K/UL    ABS. BASOPHILS 0.0 0.0 - 0.1 K/UL    ABS. IMM.  GRANS. 0.0 0.00 - 0.04 K/UL    DF MANUAL      RBC COMMENTS NORMOCYTIC, NORMOCHROMIC      WBC COMMENTS FEW     METABOLIC PANEL, COMPREHENSIVE    Collection Time: 08/08/22 10:19 AM   Result Value Ref Range    Sodium 141 136 - 145 mmol/L    Potassium 4.1 3.5 - 5.1 mmol/L    Chloride 108 97 - 108 mmol/L    CO2 24 21 - 32 mmol/L    Anion gap 9 5 - 15 mmol/L    Glucose 114 (H) 65 - 100 mg/dL    BUN 22 (H) 6 - 20 MG/DL    Creatinine 0.82 0.70 - 1.30 MG/DL    BUN/Creatinine ratio 27 (H) 12 - 20      GFR est AA >60 >60 ml/min/1.73m2    GFR est non-AA >60 >60 ml/min/1.73m2    Calcium 8.5 8.5 - 10.1 MG/DL    Bilirubin, total 0.2 0.2 - 1.0 MG/DL    ALT (SGPT) 26 12 - 78 U/L    AST (SGOT) 17 15 - 37 U/L    Alk. phosphatase 102 45 - 117 U/L    Protein, total 6.4 6.4 - 8.2 g/dL    Albumin 3.5 3.5 - 5.0 g/dL    Globulin 2.9 2.0 - 4.0 g/dL    A-G Ratio 1.2 1.1 - 2.2     MAGNESIUM    Collection Time: 08/08/22 10:19 AM   Result Value Ref Range    Magnesium 2.3 1.6 - 2.4 mg/dL      Ok to treat for ANC 0.4 obtained from IVONNE De Leon NP.     The following medications administered:  Medications Administered       0.9% sodium chloride 1,000 mL with potassium chloride 10 mEq, magnesium sulfate 2 g infusion       Admin Date  08/08/2022 Action  New Bag Dose   Rate  1,000 mL/hr Route  IntraVENous Administered By  Sally Boykin RN              0.9% sodium chloride infusion       Admin Date  08/08/2022 Action  New Bag Dose  25 mL/hr Rate  25 mL/hr Route  IntraVENous Administered By  Sally Boykin RN              CISplatin (PLATINOL) 39 mg in 0.9% sodium chloride 500 mL, overfill volume 50 mL chemo infusion       Admin Date  08/08/2022 Action  New Bag Dose  39 mg Rate  589 mL/hr Route  IntraVENous Administered By  Sally Boykin RN              dexamethasone (DECADRON) 12 mg in 0.9% sodium chloride 50 mL IVPB       Admin Date  08/08/2022 Action  New Bag Dose  12 mg Route  IntraVENous Administered By  Sally Boykin RN              etoposide (VEPESID) 195 mg in 0.9% sodium chloride 500 mL, overfill volume 50 mL chemo infusion       Admin Date  08/08/2022 Action  New Bag Dose  195 mg Rate  559.8 mL/hr Route  IntraVENous Administered By  Sally Boykin RN              fosaprepitant (EMEND) 150 mg in 0.9% sodium chloride 150 mL IVPB       Admin Date  08/08/2022 Action  New Bag Dose  150 mg Rate  450 mL/hr Route  IntraVENous Administered By  Mary Whitehead RN              heparin (porcine) pf 500 Units       Admin Date  08/08/2022 Action  Given Dose  500 Units Route  InterCATHeter Administered By  Mary Whitehead RN              palonosetron HCl (ALOXI) injection 0.25 mg       Admin Date  08/08/2022 Action  Given Dose  0.25 mg Route  IntraVENous Administered By  Mary Whitehead RN              sodium chloride (NS) flush 5-40 mL       Admin Date  08/08/2022 Action  Given Dose  10 mL Route  IntraVENous Administered By  Mary Whitehead RN               Admin Date  08/08/2022 Action  Given Dose  10 mL Route  IntraVENous Administered By  Mary Whitehead RN                  Visit Vitals  /85   Pulse 68   Temp 98.3 °F (36.8 °C)   Resp 18   Ht 5' 9\" (1.753 m)   Wt 79 kg (174 lb 1.6 oz)   SpO2 100%   BMI 25.71 kg/m²       Pt tolerated treatment well. Port flushed per policy and de-accessed, 2x2 and tape placed. Pt discharged ambulatory in no acute distress at 1525, accompanied by mother. Next appointment 8/9/22 at 1300.

## 2022-08-09 ENCOUNTER — HOSPITAL ENCOUNTER (OUTPATIENT)
Dept: INFUSION THERAPY | Age: 47
Discharge: HOME | End: 2022-08-09
Payer: MEDICARE

## 2022-08-09 VITALS
TEMPERATURE: 98.2 F | HEIGHT: 69 IN | DIASTOLIC BLOOD PRESSURE: 74 MMHG | SYSTOLIC BLOOD PRESSURE: 115 MMHG | HEART RATE: 67 BPM | WEIGHT: 176.3 LBS | RESPIRATION RATE: 18 BRPM | BODY MASS INDEX: 26.11 KG/M2

## 2022-08-09 DIAGNOSIS — N50.89 TESTICULAR MASS: Primary | ICD-10-CM

## 2022-08-09 PROCEDURE — 74011250636 HC RX REV CODE- 250/636: Performed by: INTERNAL MEDICINE

## 2022-08-09 PROCEDURE — 96361 HYDRATE IV INFUSION ADD-ON: CPT

## 2022-08-09 PROCEDURE — 74011000258 HC RX REV CODE- 258: Performed by: INTERNAL MEDICINE

## 2022-08-09 PROCEDURE — 96417 CHEMO IV INFUS EACH ADDL SEQ: CPT

## 2022-08-09 PROCEDURE — 77030012965 HC NDL HUBR BBMI -A

## 2022-08-09 PROCEDURE — 96413 CHEMO IV INFUSION 1 HR: CPT

## 2022-08-09 PROCEDURE — 96375 TX/PRO/DX INJ NEW DRUG ADDON: CPT

## 2022-08-09 PROCEDURE — 74011000250 HC RX REV CODE- 250: Performed by: INTERNAL MEDICINE

## 2022-08-09 RX ORDER — HEPARIN 100 UNIT/ML
500 SYRINGE INTRAVENOUS AS NEEDED
Status: DISCONTINUED | OUTPATIENT
Start: 2022-08-09 | End: 2022-08-10 | Stop reason: HOSPADM

## 2022-08-09 RX ORDER — SODIUM CHLORIDE 9 MG/ML
5-250 INJECTION, SOLUTION INTRAVENOUS AS NEEDED
Status: DISCONTINUED | OUTPATIENT
Start: 2022-08-09 | End: 2022-08-10 | Stop reason: HOSPADM

## 2022-08-09 RX ORDER — SODIUM CHLORIDE 0.9 % (FLUSH) 0.9 %
5-40 SYRINGE (ML) INJECTION AS NEEDED
Status: DISCONTINUED | OUTPATIENT
Start: 2022-08-09 | End: 2022-08-10 | Stop reason: HOSPADM

## 2022-08-09 RX ADMIN — ETOPOSIDE 195 MG: 20 INJECTION INTRAVENOUS at 14:55

## 2022-08-09 RX ADMIN — CISPLATIN 39 MG: 1 INJECTION INTRAVENOUS at 16:00

## 2022-08-09 RX ADMIN — SODIUM CHLORIDE, PRESERVATIVE FREE 10 ML: 5 INJECTION INTRAVENOUS at 17:05

## 2022-08-09 RX ADMIN — HEPARIN 500 UNITS: 100 SYRINGE at 17:05

## 2022-08-09 RX ADMIN — SODIUM CHLORIDE 25 ML/HR: 9 INJECTION, SOLUTION INTRAVENOUS at 13:17

## 2022-08-09 RX ADMIN — SODIUM CHLORIDE 12 MG: 9 INJECTION, SOLUTION INTRAVENOUS at 14:30

## 2022-08-09 RX ADMIN — MAGNESIUM SULFATE HEPTAHYDRATE: 500 INJECTION, SOLUTION INTRAMUSCULAR; INTRAVENOUS at 13:18

## 2022-08-09 NOTE — PROGRESS NOTES
[facility] - Chemotherapy Progress Note    1300- Pt admit to [facility] for Etoposide/Cisplatin in stable condition. Assessment completed. Patient has no new complaints. Right chest port accessed with positive blood return.      Chemotherapy Flowsheet 8/9/2022   Cycle C2D2   Date 8/9/2022   Drug / Regimen Etop/Cis   Pre Hydration given   Pre Meds given   Notes chemo given        Patient Vitals for the past 12 hrs:   Temp Pulse Resp BP   08/09/22 1705 -- 67 -- 115/74   08/09/22 1304 98.2 °F (36.8 °C) 86 18 104/64        1455:  Chemotherapy treatment started    Two nurses verified prior to administering:  Drug name  Drug dose  Infusion volume or drug volume when prepared in a syringe  Rate of administration  Route of administration  Expiration dates and/or times  Appearance and physical integrity of the drugs  Rate set on infusion pump, when used  Sequencing of drug administration     Medications:  Medications Administered       0.9% sodium chloride 1,000 mL with potassium chloride 10 mEq, magnesium sulfate 2 g infusion       Admin Date  08/09/2022 Action  New Bag Dose   Rate  1,000 mL/hr Route  IntraVENous Administered By  Kojo Phelps RN              0.9% sodium chloride infusion       Admin Date  08/09/2022 Action  New Bag Dose  25 mL/hr Rate  25 mL/hr Route  IntraVENous Administered By  Kojo Phelps RN              CISplatin (PLATINOL) 39 mg in 0.9% sodium chloride 500 mL, overfill volume 50 mL chemo infusion       Admin Date  08/09/2022 Action  New Bag Dose  39 mg Rate  589 mL/hr Route  IntraVENous Administered By  Kojo Phelps RN              dexamethasone (DECADRON) 12 mg in 0.9% sodium chloride 50 mL IVPB       Admin Date  08/09/2022 Action  New Bag Dose  12 mg Route  IntraVENous Administered By  Kojo Phelps RN              etoposide (VEPESID) 195 mg in 0.9% sodium chloride 500 mL, overfill volume 50 mL chemo infusion       Admin Date  08/09/2022 Action  New Bag Dose  195 mg Rate  559.8 mL/hr Route  IntraVENous Administered By  Christiano Calabrese RN              heparin (porcine) pf 500 Units       Admin Date  08/09/2022 Action  Given Dose  500 Units Route  InterCATHeter Administered By  Christiano Calabrese RN              sodium chloride (NS) flush 5-40 mL       Admin Date  08/09/2022 Action  Given Dose  10 mL Route  IntraVENous Administered By  Christiano Calabrese RN                       Pt tolerated treatment well. Port maintained positive blood return throughout treatment, flushed with positive blood return at conclusion, and de-accessed. 1715- D/c home in no distress.  Pt aware of next [facility] appointment scheduled for:    Future Appointments   Date Time Provider Stephany Viktoria   8/10/2022 11:00  [facility],[address]   8/11/2022 11:00 AM LINK MED2 [place] [address] REG   8/12/2022  1:00  [address] [place] [address] REG   8/23/2022  3:40 PM MD SALENA Cardona BS AMB   8/29/2022 11:30 AM LINK MED1 [address] [facility] REG   8/30/2022  1:00 PM LINK MED3 [address] [facility] REG   8/31/2022 11:00 AM LINK MED1 [address] [facility] REG   9/1/2022  1:00 PM LINK MED1 [address] [facility] REG   9/2/2022  1:00 PM LINK MED3 [address] [facility] REG   9/19/2022 11:00  [address] [place] [address] REG   9/20/2022  1:00 PM LINK MED2 [place] [address] REG   9/21/2022  1:00 PM LINK MED1 [address] [facility] REG   9/22/2022  1:00 PM LINK MED1 [address] [facility] REG   9/23/2022 11:00 AM LINK MED3 [id number] [address]

## 2022-08-10 ENCOUNTER — HOSPITAL ENCOUNTER (OUTPATIENT)
Dept: INFUSION THERAPY | Age: 47
Discharge: HOME | End: 2022-08-10
Payer: MEDICARE

## 2022-08-10 VITALS
RESPIRATION RATE: 18 BRPM | SYSTOLIC BLOOD PRESSURE: 115 MMHG | HEART RATE: 61 BPM | TEMPERATURE: 97.9 F | DIASTOLIC BLOOD PRESSURE: 72 MMHG | OXYGEN SATURATION: 97 % | HEIGHT: 69 IN | BODY MASS INDEX: 26.64 KG/M2 | WEIGHT: 179.9 LBS

## 2022-08-10 DIAGNOSIS — N50.89 TESTICULAR MASS: Primary | ICD-10-CM

## 2022-08-10 PROCEDURE — 96417 CHEMO IV INFUS EACH ADDL SEQ: CPT

## 2022-08-10 PROCEDURE — 77030012965 HC NDL HUBR BBMI -A

## 2022-08-10 PROCEDURE — 74011000250 HC RX REV CODE- 250: Performed by: INTERNAL MEDICINE

## 2022-08-10 PROCEDURE — 96413 CHEMO IV INFUSION 1 HR: CPT

## 2022-08-10 PROCEDURE — 74011250636 HC RX REV CODE- 250/636: Performed by: INTERNAL MEDICINE

## 2022-08-10 PROCEDURE — 96375 TX/PRO/DX INJ NEW DRUG ADDON: CPT

## 2022-08-10 PROCEDURE — 74011000258 HC RX REV CODE- 258: Performed by: INTERNAL MEDICINE

## 2022-08-10 PROCEDURE — 96367 TX/PROPH/DG ADDL SEQ IV INF: CPT

## 2022-08-10 RX ORDER — SODIUM CHLORIDE 9 MG/ML
5-250 INJECTION, SOLUTION INTRAVENOUS AS NEEDED
Status: DISPENSED | OUTPATIENT
Start: 2022-08-10 | End: 2022-08-10

## 2022-08-10 RX ORDER — SODIUM CHLORIDE 0.9 % (FLUSH) 0.9 %
5-40 SYRINGE (ML) INJECTION AS NEEDED
Status: DISPENSED | OUTPATIENT
Start: 2022-08-10 | End: 2022-08-10

## 2022-08-10 RX ORDER — HEPARIN 100 UNIT/ML
500 SYRINGE INTRAVENOUS AS NEEDED
Status: ACTIVE | OUTPATIENT
Start: 2022-08-10 | End: 2022-08-10

## 2022-08-10 RX ORDER — SODIUM CHLORIDE 9 MG/ML
5-40 INJECTION INTRAMUSCULAR; INTRAVENOUS; SUBCUTANEOUS AS NEEDED
Status: ACTIVE | OUTPATIENT
Start: 2022-08-10 | End: 2022-08-10

## 2022-08-10 RX ADMIN — HEPARIN 500 UNITS: 100 SYRINGE at 12:58

## 2022-08-10 RX ADMIN — SODIUM CHLORIDE 25 ML/HR: 9 INJECTION, SOLUTION INTRAVENOUS at 12:29

## 2022-08-10 RX ADMIN — SODIUM CHLORIDE 39 MG: 9 INJECTION, SOLUTION INTRAVENOUS at 15:03

## 2022-08-10 RX ADMIN — HEPARIN 500 UNITS: 100 SYRINGE at 16:03

## 2022-08-10 RX ADMIN — SODIUM CHLORIDE 12 MG: 9 INJECTION, SOLUTION INTRAVENOUS at 12:35

## 2022-08-10 RX ADMIN — MAGNESIUM SULFATE HEPTAHYDRATE: 500 INJECTION, SOLUTION INTRAMUSCULAR; INTRAVENOUS at 11:29

## 2022-08-10 RX ADMIN — SODIUM CHLORIDE, PRESERVATIVE FREE 40 ML: 5 INJECTION INTRAVENOUS at 12:58

## 2022-08-10 RX ADMIN — SODIUM CHLORIDE, PRESERVATIVE FREE 40 ML: 5 INJECTION INTRAVENOUS at 16:03

## 2022-08-10 RX ADMIN — ETOPOSIDE 195 MG: 20 INJECTION INTRAVENOUS at 13:37

## 2022-08-10 NOTE — PROGRESS NOTES
Outpatient Infusion Center - Chemotherapy Progress Note    Pt admit to [facility] for C2D3 Etop/Cis in stable condition. Assessment completed. Patient denied having any symptoms of COVID-19, i.e. SOB, coughing, fever, or generally not feeling well. Also denies having been exposed to COVID-19 recently or having had any recent contact with family/friend that has a pending COVID test.     R chest port accessed with 0.75\" Lorraine Vazquez w/o issue, with positive blood return.        Chemotherapy Flowsheet 8/10/2022   Cycle C2D3   Date 8/10/2022   Drug / Regimen Etoposide/Cis   Pre Hydration given   Pre Meds given   Notes given        Patient Vitals for the past 12 hrs:   Temp Pulse Resp BP SpO2   08/10/22 1602 -- 61 -- 115/72 97 %   08/10/22 1118 97.9 °F (36.6 °C) 75 18 103/67 100 %        Medications:  Medications Administered       0.9% sodium chloride 1,000 mL with potassium chloride 10 mEq, magnesium sulfate 2 g infusion       Admin Date  08/10/2022 Action  New Bag Dose   Rate  1,000 mL/hr Route  IntraVENous Administered By  Miachel Morita              0.9% sodium chloride infusion       Admin Date  08/10/2022 Action  New Bag Dose  25 mL/hr Rate  25 mL/hr Route  IntraVENous Administered By  Miachel Morita              0.9% sodium chloride injection 5-40 mL       Admin Date  08/10/2022 Action  Given Dose  40 mL Route  IntraVENous Administered By  Jake Schools Date  08/10/2022 Action  Given Dose  40 mL Route  IntraVENous Administered By  Miachel Morita              CISplatin (PLATINOL) 39 mg in 0.9% sodium chloride 500 mL, overfill volume 50 mL chemo infusion       Admin Date  08/10/2022 Action  New Bag Dose  39 mg Rate  589 mL/hr Route  IntraVENous Administered By  Miachel Morita              dexamethasone (DECADRON) 12 mg in 0.9% sodium chloride 50 mL IVPB       Admin Date  08/10/2022 Action  New Bag Dose  12 mg Route  IntraVENous Administered By  Bere Landeros RN              etoposide (VEPESID) 195 mg in 0.9% sodium chloride 500 mL, overfill volume 50 mL chemo infusion       Admin Date  08/10/2022 Action  New Bag Dose  195 mg Rate  559.8 mL/hr Route  IntraVENous Administered By  Alvejt Chopra              heparin (porcine) pf 500 Units       Admin Date  08/10/2022 Action  Given Dose  500 Units Route  InterCATHeter Administered By  Rockney Pretty Date  08/10/2022 Action  Given Dose  500 Units Route  InterCATHeter Administered By  Alveta Goon                     Pt tolerated treatment well. Port maintained positive blood return throughout treatment, flushed with positive blood return at conclusion, and de-accessed. D/c home in no distress. Pt aware of next [facility] appointment scheduled for: 8/11/22 at 1100.     Future Appointments   Date Time Provider Stephany Blum   8/11/2022 11:00  [facility] [address] REG   8/12/2022  1:00  [facility] [address] REG   8/23/2022  3:40 PM Fco Dawson MD NEU BS AMB   8/29/2022 11:30 AM LINK MED1 [address] [facility] REG   8/30/2022  1:00 PM LINK MED3 [address] [facility] REG   8/31/2022 11:00 AM LINK MED1 [address] [facility] REG   9/1/2022  1:00 PM LINK MED1 [address] [facility] REG   9/2/2022  1:00 PM LINK MED3 [address] [facility] REG   9/19/2022 11:00  [facility] [address] REG   9/20/2022  1:00 PM LINK MED2 [place] [address] REG   9/21/2022  1:00 PM LINK MED1 [address] [facility] REG   9/22/2022  1:00 PM LINK MED1 [address] [facility] REG   9/23/2022 11:00 AM LINK MED3 [id number]

## 2022-08-11 ENCOUNTER — HOSPITAL ENCOUNTER (OUTPATIENT)
Dept: INFUSION THERAPY | Age: 47
Discharge: HOME | End: 2022-08-11
Payer: MEDICARE

## 2022-08-11 VITALS
RESPIRATION RATE: 18 BRPM | SYSTOLIC BLOOD PRESSURE: 124 MMHG | OXYGEN SATURATION: 100 % | WEIGHT: 179.4 LBS | TEMPERATURE: 97.7 F | BODY MASS INDEX: 26.57 KG/M2 | HEART RATE: 66 BPM | DIASTOLIC BLOOD PRESSURE: 78 MMHG | HEIGHT: 69 IN

## 2022-08-11 DIAGNOSIS — N50.89 TESTICULAR MASS: Primary | ICD-10-CM

## 2022-08-11 PROCEDURE — 96375 TX/PRO/DX INJ NEW DRUG ADDON: CPT

## 2022-08-11 PROCEDURE — 74011000258 HC RX REV CODE- 258: Performed by: INTERNAL MEDICINE

## 2022-08-11 PROCEDURE — 74011250636 HC RX REV CODE- 250/636: Performed by: INTERNAL MEDICINE

## 2022-08-11 PROCEDURE — 96417 CHEMO IV INFUS EACH ADDL SEQ: CPT

## 2022-08-11 PROCEDURE — 96413 CHEMO IV INFUSION 1 HR: CPT

## 2022-08-11 PROCEDURE — 96367 TX/PROPH/DG ADDL SEQ IV INF: CPT

## 2022-08-11 PROCEDURE — 74011000250 HC RX REV CODE- 250: Performed by: INTERNAL MEDICINE

## 2022-08-11 PROCEDURE — 77030012965 HC NDL HUBR BBMI -A

## 2022-08-11 RX ORDER — HEPARIN 100 UNIT/ML
500 SYRINGE INTRAVENOUS AS NEEDED
Status: ACTIVE | OUTPATIENT
Start: 2022-08-11 | End: 2022-08-11

## 2022-08-11 RX ORDER — SODIUM CHLORIDE 0.9 % (FLUSH) 0.9 %
5-40 SYRINGE (ML) INJECTION AS NEEDED
Status: DISPENSED | OUTPATIENT
Start: 2022-08-11 | End: 2022-08-11

## 2022-08-11 RX ORDER — PALONOSETRON 0.05 MG/ML
0.25 INJECTION, SOLUTION INTRAVENOUS ONCE
Status: COMPLETED | OUTPATIENT
Start: 2022-08-11 | End: 2022-08-11

## 2022-08-11 RX ORDER — SODIUM CHLORIDE 9 MG/ML
5-40 INJECTION INTRAMUSCULAR; INTRAVENOUS; SUBCUTANEOUS AS NEEDED
Status: ACTIVE | OUTPATIENT
Start: 2022-08-11 | End: 2022-08-11

## 2022-08-11 RX ORDER — SODIUM CHLORIDE 9 MG/ML
5-250 INJECTION, SOLUTION INTRAVENOUS AS NEEDED
Status: DISPENSED | OUTPATIENT
Start: 2022-08-11 | End: 2022-08-11

## 2022-08-11 RX ADMIN — CISPLATIN 39 MG: 1 INJECTION INTRAVENOUS at 14:04

## 2022-08-11 RX ADMIN — SODIUM CHLORIDE 12 MG: 9 INJECTION, SOLUTION INTRAVENOUS at 12:28

## 2022-08-11 RX ADMIN — SODIUM CHLORIDE, PRESERVATIVE FREE 10 ML: 5 INJECTION INTRAVENOUS at 15:07

## 2022-08-11 RX ADMIN — SODIUM CHLORIDE, PRESERVATIVE FREE 10 ML: 5 INJECTION INTRAVENOUS at 11:10

## 2022-08-11 RX ADMIN — ETOPOSIDE 195 MG: 20 INJECTION INTRAVENOUS at 12:50

## 2022-08-11 RX ADMIN — SODIUM CHLORIDE 25 ML/HR: 9 INJECTION, SOLUTION INTRAVENOUS at 12:22

## 2022-08-11 RX ADMIN — HEPARIN 500 UNITS: 100 SYRINGE at 15:07

## 2022-08-11 RX ADMIN — PALONOSETRON 0.25 MG: 0.05 INJECTION, SOLUTION INTRAVENOUS at 12:24

## 2022-08-11 RX ADMIN — MAGNESIUM SULFATE HEPTAHYDRATE: 500 INJECTION, SOLUTION INTRAMUSCULAR; INTRAVENOUS at 11:15

## 2022-08-11 NOTE — PROGRESS NOTES
[facility] Visit Note    Pt arrived to [facility] ambulatory in no acute distress at 1105 for Etoposide/Cisplatin C2D4. Assessment unremarkable, no new concerns voiced. R chest port accessed without issue and positive blood return noted. Patient denied having any symptoms of COVID-19, i.e. SOB, coughing, fever, or generally not feeling well. Also denies having been exposed to COVID-19 recently or having had any recent contact with family/friend that has a pending COVID test.     Visit Vitals  /68 (BP 1 Location: Left upper arm, BP Patient Position: Sitting)   Pulse 73   Temp 97.7 °F (36.5 °C)   Resp 18   Ht 5' 9\" (1.753 m)   Wt 81.4 kg (179 lb 6.4 oz)   SpO2 100%   BMI 26.49 kg/m²        Two nurses verified prior to administering:  Drug name  Drug dose  Infusion volume or drug volume when prepared in a syringe  Rate of administration  Route of administration  Expiration dates and/or times  Appearance and physical integrity of the drugs  Rate set on infusion pump, when used  Sequencing of drug administration     The following medications administered:  NS 1 L with KCL 10 mEq + Magnesium Sulfate 2 grams IV over 1 hour  NS @ KVO  Aloxi 0.25 mg IVP  Decadron 12 mg IV over 15 minutes  Etoposide 195 mg IV over 1 hour  Cisplatin 39 mg IV over 1 hour    Visit Vitals  /78 (BP 1 Location: Left upper arm, BP Patient Position: Supine)   Pulse 66   Temp 97.7 °F (36.5 °C)   Resp 18   Ht 5' 9\" (1.753 m)   Wt 81.4 kg (179 lb 6.4 oz)   SpO2 100%   BMI 26.49 kg/m²        Pt tolerated treatment well. No adverse reaction noted. Port flushed per policy and needle removed, 2x2 and paper tape placed. Pt discharged ambulatory in no acute distress at 1510, accompanied by mother. Next appointment 8/12/22 @ 1300.

## 2022-08-12 ENCOUNTER — HOSPITAL ENCOUNTER (OUTPATIENT)
Dept: INFUSION THERAPY | Age: 47
Discharge: HOME | End: 2022-08-12
Payer: MEDICARE

## 2022-08-12 VITALS
SYSTOLIC BLOOD PRESSURE: 116 MMHG | HEART RATE: 72 BPM | OXYGEN SATURATION: 100 % | DIASTOLIC BLOOD PRESSURE: 88 MMHG | RESPIRATION RATE: 18 BRPM | TEMPERATURE: 97.6 F

## 2022-08-12 DIAGNOSIS — N50.89 TESTICULAR MASS: Primary | ICD-10-CM

## 2022-08-12 PROCEDURE — 74011000250 HC RX REV CODE- 250: Performed by: INTERNAL MEDICINE

## 2022-08-12 PROCEDURE — 74011250636 HC RX REV CODE- 250/636: Performed by: INTERNAL MEDICINE

## 2022-08-12 PROCEDURE — 77030012965 HC NDL HUBR BBMI -A

## 2022-08-12 PROCEDURE — 96417 CHEMO IV INFUS EACH ADDL SEQ: CPT

## 2022-08-12 PROCEDURE — 96367 TX/PROPH/DG ADDL SEQ IV INF: CPT

## 2022-08-12 PROCEDURE — 74011000258 HC RX REV CODE- 258: Performed by: INTERNAL MEDICINE

## 2022-08-12 PROCEDURE — 96413 CHEMO IV INFUSION 1 HR: CPT

## 2022-08-12 PROCEDURE — 96375 TX/PRO/DX INJ NEW DRUG ADDON: CPT

## 2022-08-12 RX ORDER — SODIUM CHLORIDE 9 MG/ML
5-250 INJECTION, SOLUTION INTRAVENOUS AS NEEDED
Status: DISCONTINUED | OUTPATIENT
Start: 2022-08-12 | End: 2022-08-13 | Stop reason: HOSPADM

## 2022-08-12 RX ORDER — SODIUM CHLORIDE 0.9 % (FLUSH) 0.9 %
5-40 SYRINGE (ML) INJECTION AS NEEDED
Status: DISCONTINUED | OUTPATIENT
Start: 2022-08-12 | End: 2022-08-13 | Stop reason: HOSPADM

## 2022-08-12 RX ORDER — HEPARIN 100 UNIT/ML
500 SYRINGE INTRAVENOUS AS NEEDED
Status: DISCONTINUED | OUTPATIENT
Start: 2022-08-12 | End: 2022-08-13 | Stop reason: HOSPADM

## 2022-08-12 RX ADMIN — SODIUM CHLORIDE 25 ML/HR: 9 INJECTION, SOLUTION INTRAVENOUS at 13:14

## 2022-08-12 RX ADMIN — SODIUM CHLORIDE: 9 INJECTION, SOLUTION INTRAVENOUS at 13:14

## 2022-08-12 RX ADMIN — ETOPOSIDE 195 MG: 20 INJECTION INTRAVENOUS at 14:41

## 2022-08-12 RX ADMIN — SODIUM CHLORIDE, PRESERVATIVE FREE 10 ML: 5 INJECTION INTRAVENOUS at 16:54

## 2022-08-12 RX ADMIN — HEPARIN 500 UNITS: 100 SYRINGE at 16:54

## 2022-08-12 RX ADMIN — DEXAMETHASONE SODIUM PHOSPHATE 12 MG: 4 INJECTION, SOLUTION INTRAMUSCULAR; INTRAVENOUS at 14:20

## 2022-08-12 RX ADMIN — SODIUM CHLORIDE, PRESERVATIVE FREE 10 ML: 5 INJECTION INTRAVENOUS at 13:14

## 2022-08-12 RX ADMIN — CISPLATIN 39 MG: 1 INJECTION INTRAVENOUS at 15:46

## 2022-08-12 NOTE — PROGRESS NOTES
Pt arrived to [facility] ambulatory in no acute distress at 1305 for Etoposide/Cisplatin C2D5. Assessment unremarkable. R chest port accessed without issue and positive blood return noted. Patient denied having any symptoms of COVID-19, i.e. SOB, coughing, fever, or generally not feeling well.   Also denies having been exposed to COVID-19 recently or having had any recent contact with family/friend that has a pending COVID test.    Visit Vitals  /76 (BP 1 Location: Left arm, BP Patient Position: Sitting)   Pulse 88   Temp 97.6 °F (36.4 °C)   Resp 18   SpO2 100%       The following medications administered:  Medications Administered       0.9% sodium chloride 1,000 mL with potassium chloride 10 mEq, magnesium sulfate 2 g infusion       Admin Date  08/12/2022 Action  New Bag Dose   Rate  1,000 mL/hr Route  IntraVENous Administered By  Karen Burt RN              0.9% sodium chloride infusion       Admin Date  08/12/2022 Action  New Bag Dose  25 mL/hr Rate  25 mL/hr Route  IntraVENous Administered By  Karen Burt RN              CISplatin (PLATINOL) 39 mg in 0.9% sodium chloride 500 mL, overfill volume 50 mL chemo infusion       Admin Date  08/12/2022 Action  New Bag Dose  39 mg Rate  589 mL/hr Route  IntraVENous Administered By  Karen Burt RN              dexamethasone (DECADRON) 12 mg in 0.9% sodium chloride 50 mL IVPB       Admin Date  08/12/2022 Action  New Bag Dose  12 mg Route  IntraVENous Administered By  Savita Vidales              etoposide (VEPESID) 195 mg in 0.9% sodium chloride 500 mL, overfill volume 50 mL chemo infusion       Admin Date  08/12/2022 Action  New Bag Dose  195 mg Rate  559.8 mL/hr Route  IntraVENous Administered By  Karen Burt RN              heparin (porcine) pf 500 Units       Admin Date  08/12/2022 Action  Given Dose  500 Units Route  InterCATHeter Administered By  Karen Burt RN              sodium chloride (NS) flush 5-40 mL       Admin Date  08/12/2022 Action  Given Dose  10 mL Route  IntraVENous Administered By  Iraj Palacios, RN               Admin Date  08/12/2022 Action  Given Dose  10 mL Route  IntraVENous Administered By  Iraj Palacios, RN                  Visit Vitals  /88   Pulse 72   Temp 97.6 °F (36.4 °C)   Resp 18   SpO2 100%        Pt tolerated treatment well. Port flushed per policy and de-accessed, 2x2 and tape placed. Pt discharged ambulatory in no acute distress at 1655, accompanied by mother. Next appointment 8/29/22 at 1100.

## 2022-08-15 ENCOUNTER — APPOINTMENT (OUTPATIENT)
Dept: INFUSION THERAPY | Age: 47
End: 2022-08-15
Payer: MEDICARE

## 2022-08-18 DIAGNOSIS — Z96.89 S/P PLACEMENT OF VNS (VAGUS NERVE STIMULATION) DEVICE: ICD-10-CM

## 2022-08-18 DIAGNOSIS — G40.219 PARTIAL SYMPTOMATIC EPILEPSY WITH COMPLEX PARTIAL SEIZURES, INTRACTABLE, WITHOUT STATUS EPILEPTICUS (HCC): ICD-10-CM

## 2022-08-18 RX ORDER — CENOBAMATE 50 MG/1
TABLET, FILM COATED ORAL
Qty: 90 TABLET | Refills: 1 | Status: SHIPPED | OUTPATIENT
Start: 2022-08-18

## 2022-08-19 DIAGNOSIS — G40.909 SEIZURE DISORDER (HCC): ICD-10-CM

## 2022-08-19 RX ORDER — ALBUTEROL SULFATE 0.83 MG/ML
2.5 SOLUTION RESPIRATORY (INHALATION) AS NEEDED
Status: CANCELLED
Start: 2022-09-02

## 2022-08-19 RX ORDER — SODIUM CHLORIDE 0.9 % (FLUSH) 0.9 %
5-40 SYRINGE (ML) INJECTION AS NEEDED
Status: CANCELLED | OUTPATIENT
Start: 2022-09-01

## 2022-08-19 RX ORDER — EPINEPHRINE 1 MG/ML
0.3 INJECTION, SOLUTION, CONCENTRATE INTRAVENOUS AS NEEDED
Status: CANCELLED | OUTPATIENT
Start: 2022-08-30

## 2022-08-19 RX ORDER — DIPHENHYDRAMINE HYDROCHLORIDE 50 MG/ML
50 INJECTION, SOLUTION INTRAMUSCULAR; INTRAVENOUS AS NEEDED
Status: CANCELLED
Start: 2022-09-01

## 2022-08-19 RX ORDER — HEPARIN 100 UNIT/ML
500 SYRINGE INTRAVENOUS AS NEEDED
Status: CANCELLED
Start: 2022-08-30

## 2022-08-19 RX ORDER — DIPHENHYDRAMINE HYDROCHLORIDE 50 MG/ML
50 INJECTION, SOLUTION INTRAMUSCULAR; INTRAVENOUS AS NEEDED
Status: CANCELLED
Start: 2022-09-02

## 2022-08-19 RX ORDER — HYDROCORTISONE SODIUM SUCCINATE 100 MG/2ML
100 INJECTION, POWDER, FOR SOLUTION INTRAMUSCULAR; INTRAVENOUS AS NEEDED
Status: CANCELLED | OUTPATIENT
Start: 2022-08-30

## 2022-08-19 RX ORDER — EPINEPHRINE 1 MG/ML
0.3 INJECTION, SOLUTION, CONCENTRATE INTRAVENOUS AS NEEDED
Status: CANCELLED | OUTPATIENT
Start: 2022-09-01

## 2022-08-19 RX ORDER — ACETAMINOPHEN 325 MG/1
650 TABLET ORAL AS NEEDED
Status: CANCELLED
Start: 2022-08-31

## 2022-08-19 RX ORDER — PALONOSETRON 0.05 MG/ML
0.25 INJECTION, SOLUTION INTRAVENOUS ONCE
Status: CANCELLED | OUTPATIENT
Start: 2022-09-01 | End: 2022-09-01

## 2022-08-19 RX ORDER — HYDROCORTISONE SODIUM SUCCINATE 100 MG/2ML
100 INJECTION, POWDER, FOR SOLUTION INTRAMUSCULAR; INTRAVENOUS AS NEEDED
Status: CANCELLED | OUTPATIENT
Start: 2022-08-31

## 2022-08-19 RX ORDER — ONDANSETRON 2 MG/ML
8 INJECTION INTRAMUSCULAR; INTRAVENOUS AS NEEDED
Status: CANCELLED | OUTPATIENT
Start: 2022-09-01

## 2022-08-19 RX ORDER — SODIUM CHLORIDE 9 MG/ML
5-250 INJECTION, SOLUTION INTRAVENOUS AS NEEDED
Status: CANCELLED | OUTPATIENT
Start: 2022-08-31

## 2022-08-19 RX ORDER — EPINEPHRINE 1 MG/ML
0.3 INJECTION, SOLUTION, CONCENTRATE INTRAVENOUS AS NEEDED
Status: CANCELLED | OUTPATIENT
Start: 2022-08-31

## 2022-08-19 RX ORDER — HEPARIN 100 UNIT/ML
500 SYRINGE INTRAVENOUS AS NEEDED
Status: CANCELLED
Start: 2022-08-31

## 2022-08-19 RX ORDER — DIPHENHYDRAMINE HYDROCHLORIDE 50 MG/ML
25 INJECTION, SOLUTION INTRAMUSCULAR; INTRAVENOUS AS NEEDED
Status: CANCELLED
Start: 2022-09-01

## 2022-08-19 RX ORDER — SODIUM CHLORIDE 9 MG/ML
5-250 INJECTION, SOLUTION INTRAVENOUS AS NEEDED
Status: CANCELLED | OUTPATIENT
Start: 2022-09-02

## 2022-08-19 RX ORDER — SODIUM CHLORIDE 9 MG/ML
5-250 INJECTION, SOLUTION INTRAVENOUS AS NEEDED
Status: CANCELLED | OUTPATIENT
Start: 2022-09-01

## 2022-08-19 RX ORDER — ACETAMINOPHEN 325 MG/1
650 TABLET ORAL AS NEEDED
Status: CANCELLED
Start: 2022-08-29

## 2022-08-19 RX ORDER — HYDROCORTISONE SODIUM SUCCINATE 100 MG/2ML
100 INJECTION, POWDER, FOR SOLUTION INTRAMUSCULAR; INTRAVENOUS AS NEEDED
Status: CANCELLED | OUTPATIENT
Start: 2022-09-02

## 2022-08-19 RX ORDER — DIPHENHYDRAMINE HYDROCHLORIDE 50 MG/ML
25 INJECTION, SOLUTION INTRAMUSCULAR; INTRAVENOUS AS NEEDED
Status: CANCELLED
Start: 2022-08-30

## 2022-08-19 RX ORDER — DIPHENHYDRAMINE HYDROCHLORIDE 50 MG/ML
50 INJECTION, SOLUTION INTRAMUSCULAR; INTRAVENOUS AS NEEDED
Status: CANCELLED
Start: 2022-08-31

## 2022-08-19 RX ORDER — DIPHENHYDRAMINE HYDROCHLORIDE 50 MG/ML
25 INJECTION, SOLUTION INTRAMUSCULAR; INTRAVENOUS AS NEEDED
Status: CANCELLED
Start: 2022-09-02

## 2022-08-19 RX ORDER — ONDANSETRON 2 MG/ML
8 INJECTION INTRAMUSCULAR; INTRAVENOUS AS NEEDED
Status: CANCELLED | OUTPATIENT
Start: 2022-09-02

## 2022-08-19 RX ORDER — SODIUM CHLORIDE 0.9 % (FLUSH) 0.9 %
5-40 SYRINGE (ML) INJECTION AS NEEDED
Status: CANCELLED | OUTPATIENT
Start: 2022-09-02

## 2022-08-19 RX ORDER — EPINEPHRINE 1 MG/ML
0.3 INJECTION, SOLUTION, CONCENTRATE INTRAVENOUS AS NEEDED
Status: CANCELLED | OUTPATIENT
Start: 2022-09-02

## 2022-08-19 RX ORDER — SODIUM CHLORIDE 0.9 % (FLUSH) 0.9 %
5-40 SYRINGE (ML) INJECTION AS NEEDED
Status: CANCELLED | OUTPATIENT
Start: 2022-08-31

## 2022-08-19 RX ORDER — ALBUTEROL SULFATE 0.83 MG/ML
2.5 SOLUTION RESPIRATORY (INHALATION) AS NEEDED
Status: CANCELLED
Start: 2022-08-31

## 2022-08-19 RX ORDER — SODIUM CHLORIDE 0.9 % (FLUSH) 0.9 %
5-40 SYRINGE (ML) INJECTION AS NEEDED
Status: CANCELLED | OUTPATIENT
Start: 2022-08-30

## 2022-08-19 RX ORDER — ONDANSETRON 2 MG/ML
8 INJECTION INTRAMUSCULAR; INTRAVENOUS AS NEEDED
Status: CANCELLED | OUTPATIENT
Start: 2022-08-30

## 2022-08-19 RX ORDER — SODIUM CHLORIDE 9 MG/ML
5-250 INJECTION, SOLUTION INTRAVENOUS AS NEEDED
Status: CANCELLED | OUTPATIENT
Start: 2022-08-30

## 2022-08-19 RX ORDER — DIPHENHYDRAMINE HYDROCHLORIDE 50 MG/ML
25 INJECTION, SOLUTION INTRAMUSCULAR; INTRAVENOUS AS NEEDED
Status: CANCELLED
Start: 2022-08-31

## 2022-08-19 RX ORDER — DIPHENHYDRAMINE HYDROCHLORIDE 50 MG/ML
25 INJECTION, SOLUTION INTRAMUSCULAR; INTRAVENOUS AS NEEDED
Status: CANCELLED
Start: 2022-08-29

## 2022-08-19 RX ORDER — SODIUM CHLORIDE 9 MG/ML
5-40 INJECTION INTRAMUSCULAR; INTRAVENOUS; SUBCUTANEOUS AS NEEDED
Status: CANCELLED | OUTPATIENT
Start: 2022-08-30

## 2022-08-19 RX ORDER — DIPHENHYDRAMINE HYDROCHLORIDE 50 MG/ML
50 INJECTION, SOLUTION INTRAMUSCULAR; INTRAVENOUS AS NEEDED
Status: CANCELLED
Start: 2022-08-30

## 2022-08-19 RX ORDER — SODIUM CHLORIDE 9 MG/ML
5-40 INJECTION INTRAMUSCULAR; INTRAVENOUS; SUBCUTANEOUS AS NEEDED
Status: CANCELLED | OUTPATIENT
Start: 2022-08-31

## 2022-08-19 RX ORDER — HEPARIN 100 UNIT/ML
500 SYRINGE INTRAVENOUS AS NEEDED
Status: CANCELLED
Start: 2022-09-01

## 2022-08-19 RX ORDER — HYDROCORTISONE SODIUM SUCCINATE 100 MG/2ML
100 INJECTION, POWDER, FOR SOLUTION INTRAMUSCULAR; INTRAVENOUS AS NEEDED
Status: CANCELLED | OUTPATIENT
Start: 2022-09-01

## 2022-08-19 RX ORDER — EPINEPHRINE 1 MG/ML
0.3 INJECTION, SOLUTION, CONCENTRATE INTRAVENOUS AS NEEDED
Status: CANCELLED | OUTPATIENT
Start: 2022-08-29

## 2022-08-19 RX ORDER — ONDANSETRON 2 MG/ML
8 INJECTION INTRAMUSCULAR; INTRAVENOUS AS NEEDED
Status: CANCELLED | OUTPATIENT
Start: 2022-08-31

## 2022-08-19 RX ORDER — SODIUM CHLORIDE 9 MG/ML
5-40 INJECTION INTRAMUSCULAR; INTRAVENOUS; SUBCUTANEOUS AS NEEDED
Status: CANCELLED | OUTPATIENT
Start: 2022-09-01

## 2022-08-19 RX ORDER — ALBUTEROL SULFATE 0.83 MG/ML
2.5 SOLUTION RESPIRATORY (INHALATION) AS NEEDED
Status: CANCELLED
Start: 2022-09-01

## 2022-08-19 RX ORDER — HEPARIN 100 UNIT/ML
500 SYRINGE INTRAVENOUS AS NEEDED
Status: CANCELLED
Start: 2022-09-02

## 2022-08-19 RX ORDER — ONDANSETRON 2 MG/ML
8 INJECTION INTRAMUSCULAR; INTRAVENOUS AS NEEDED
Status: CANCELLED | OUTPATIENT
Start: 2022-08-29

## 2022-08-19 RX ORDER — ALBUTEROL SULFATE 0.83 MG/ML
2.5 SOLUTION RESPIRATORY (INHALATION) AS NEEDED
Status: CANCELLED
Start: 2022-08-29

## 2022-08-19 RX ORDER — ACETAMINOPHEN 325 MG/1
650 TABLET ORAL AS NEEDED
Status: CANCELLED
Start: 2022-08-30

## 2022-08-19 RX ORDER — HYDROCORTISONE SODIUM SUCCINATE 100 MG/2ML
100 INJECTION, POWDER, FOR SOLUTION INTRAMUSCULAR; INTRAVENOUS AS NEEDED
Status: CANCELLED | OUTPATIENT
Start: 2022-08-29

## 2022-08-19 RX ORDER — ACETAMINOPHEN 325 MG/1
650 TABLET ORAL AS NEEDED
Status: CANCELLED
Start: 2022-09-02

## 2022-08-19 RX ORDER — ALBUTEROL SULFATE 0.83 MG/ML
2.5 SOLUTION RESPIRATORY (INHALATION) AS NEEDED
Status: CANCELLED
Start: 2022-08-30

## 2022-08-19 RX ORDER — ACETAMINOPHEN 325 MG/1
650 TABLET ORAL AS NEEDED
Status: CANCELLED
Start: 2022-09-01

## 2022-08-19 RX ORDER — DIPHENHYDRAMINE HYDROCHLORIDE 50 MG/ML
50 INJECTION, SOLUTION INTRAMUSCULAR; INTRAVENOUS AS NEEDED
Status: CANCELLED
Start: 2022-08-29

## 2022-08-19 RX ORDER — SODIUM CHLORIDE 9 MG/ML
5-40 INJECTION INTRAMUSCULAR; INTRAVENOUS; SUBCUTANEOUS AS NEEDED
Status: CANCELLED | OUTPATIENT
Start: 2022-09-02

## 2022-08-21 ENCOUNTER — APPOINTMENT (OUTPATIENT)
Dept: GENERAL RADIOLOGY | Age: 47
DRG: 871 | End: 2022-08-21
Attending: EMERGENCY MEDICINE
Payer: MEDICARE

## 2022-08-21 ENCOUNTER — HOSPITAL ENCOUNTER (INPATIENT)
Age: 47
LOS: 2 days | Discharge: HOME | DRG: 871 | End: 2022-08-24
Attending: EMERGENCY MEDICINE | Admitting: STUDENT IN AN ORGANIZED HEALTH CARE EDUCATION/TRAINING PROGRAM
Payer: MEDICARE

## 2022-08-21 ENCOUNTER — TELEPHONE (OUTPATIENT)
Dept: ONCOLOGY | Age: 47
End: 2022-08-21

## 2022-08-21 DIAGNOSIS — T45.1X5A CHEMOTHERAPY-INDUCED NEUTROPENIA (HCC): ICD-10-CM

## 2022-08-21 DIAGNOSIS — C62.90 MALIGNANT NEOPLASM OF TESTICLE, UNSPECIFIED LATERALITY, UNSPECIFIED WHETHER DESCENDED OR UNDESCENDED (HCC): ICD-10-CM

## 2022-08-21 DIAGNOSIS — R50.81 NEUTROPENIC FEVER (HCC): ICD-10-CM

## 2022-08-21 DIAGNOSIS — A41.9 SEPSIS, DUE TO UNSPECIFIED ORGANISM, UNSPECIFIED WHETHER ACUTE ORGAN DYSFUNCTION PRESENT (HCC): Primary | ICD-10-CM

## 2022-08-21 DIAGNOSIS — D70.9 NEUTROPENIC FEVER (HCC): ICD-10-CM

## 2022-08-21 DIAGNOSIS — J18.9 PNEUMONIA OF RIGHT LOWER LOBE DUE TO INFECTIOUS ORGANISM: ICD-10-CM

## 2022-08-21 DIAGNOSIS — D70.1 CHEMOTHERAPY-INDUCED NEUTROPENIA (HCC): ICD-10-CM

## 2022-08-21 LAB
ALBUMIN SERPL-MCNC: 3.5 G/DL (ref 3.5–5)
ALBUMIN/GLOB SERPL: 0.8 {RATIO} (ref 1.1–2.2)
ALP SERPL-CCNC: 95 U/L (ref 45–117)
ALT SERPL-CCNC: 47 U/L (ref 12–78)
ANION GAP SERPL CALC-SCNC: 9 MMOL/L (ref 5–15)
AST SERPL-CCNC: 40 U/L (ref 15–37)
BILIRUB SERPL-MCNC: 0.2 MG/DL (ref 0.2–1)
BUN SERPL-MCNC: 17 MG/DL (ref 6–20)
BUN/CREAT SERPL: 17 (ref 12–20)
CALCIUM SERPL-MCNC: 9.1 MG/DL (ref 8.5–10.1)
CHLORIDE SERPL-SCNC: 104 MMOL/L (ref 97–108)
CO2 SERPL-SCNC: 22 MMOL/L (ref 21–32)
COMMENT, HOLDF: NORMAL
CREAT SERPL-MCNC: 0.99 MG/DL (ref 0.7–1.3)
GLOBULIN SER CALC-MCNC: 4.6 G/DL (ref 2–4)
GLUCOSE SERPL-MCNC: 134 MG/DL (ref 65–100)
LACTATE BLD-SCNC: 2.47 MMOL/L (ref 0.4–2)
POTASSIUM SERPL-SCNC: 3.7 MMOL/L (ref 3.5–5.1)
PROT SERPL-MCNC: 8.1 G/DL (ref 6.4–8.2)
SAMPLES BEING HELD,HOLD: NORMAL
SODIUM SERPL-SCNC: 135 MMOL/L (ref 136–145)
TROPONIN-HIGH SENSITIVITY: <4 NG/L (ref 0–76)

## 2022-08-21 PROCEDURE — 74011250636 HC RX REV CODE- 250/636: Performed by: EMERGENCY MEDICINE

## 2022-08-21 PROCEDURE — 85025 COMPLETE CBC W/AUTO DIFF WBC: CPT

## 2022-08-21 PROCEDURE — 84484 ASSAY OF TROPONIN QUANT: CPT

## 2022-08-21 PROCEDURE — 74011250637 HC RX REV CODE- 250/637: Performed by: EMERGENCY MEDICINE

## 2022-08-21 PROCEDURE — 99285 EMERGENCY DEPT VISIT HI MDM: CPT

## 2022-08-21 PROCEDURE — 74011000258 HC RX REV CODE- 258: Performed by: EMERGENCY MEDICINE

## 2022-08-21 PROCEDURE — 80053 COMPREHEN METABOLIC PANEL: CPT

## 2022-08-21 PROCEDURE — 96365 THER/PROPH/DIAG IV INF INIT: CPT

## 2022-08-21 PROCEDURE — 36415 COLL VENOUS BLD VENIPUNCTURE: CPT

## 2022-08-21 PROCEDURE — 71045 X-RAY EXAM CHEST 1 VIEW: CPT

## 2022-08-21 PROCEDURE — 87040 BLOOD CULTURE FOR BACTERIA: CPT

## 2022-08-21 PROCEDURE — 83605 ASSAY OF LACTIC ACID: CPT

## 2022-08-21 PROCEDURE — 87635 SARS-COV-2 COVID-19 AMP PRB: CPT

## 2022-08-21 RX ORDER — ACETAMINOPHEN 500 MG
1000 TABLET ORAL ONCE
Status: COMPLETED | OUTPATIENT
Start: 2022-08-21 | End: 2022-08-21

## 2022-08-21 RX ORDER — LEVOFLOXACIN 5 MG/ML
750 INJECTION, SOLUTION INTRAVENOUS EVERY 24 HOURS
Status: DISCONTINUED | OUTPATIENT
Start: 2022-08-21 | End: 2022-08-22

## 2022-08-21 RX ORDER — VANCOMYCIN 2 GRAM/500 ML IN 0.9 % SODIUM CHLORIDE INTRAVENOUS
2000 ONCE
Status: COMPLETED | OUTPATIENT
Start: 2022-08-21 | End: 2022-08-22

## 2022-08-21 RX ADMIN — SODIUM CHLORIDE 1000 ML: 9 INJECTION, SOLUTION INTRAVENOUS at 23:11

## 2022-08-21 RX ADMIN — ACETAMINOPHEN 1000 MG: 500 TABLET ORAL at 23:11

## 2022-08-21 RX ADMIN — LEVOFLOXACIN 750 MG: 5 INJECTION, SOLUTION INTRAVENOUS at 23:52

## 2022-08-21 RX ADMIN — CEFEPIME 2 G: 2 INJECTION, POWDER, FOR SOLUTION INTRAVENOUS at 23:16

## 2022-08-22 ENCOUNTER — APPOINTMENT (OUTPATIENT)
Dept: CT IMAGING | Age: 47
DRG: 871 | End: 2022-08-22
Attending: EMERGENCY MEDICINE
Payer: MEDICARE

## 2022-08-22 ENCOUNTER — APPOINTMENT (OUTPATIENT)
Dept: INFUSION THERAPY | Age: 47
End: 2022-08-22
Payer: MEDICARE

## 2022-08-22 PROBLEM — I26.99 PULMONARY EMBOLI (HCC): Status: ACTIVE | Noted: 2022-08-22

## 2022-08-22 LAB
ANION GAP SERPL CALC-SCNC: 7 MMOL/L (ref 5–15)
APPEARANCE UR: CLEAR
ATRIAL RATE: 90 BPM
BACTERIA URNS QL MICRO: NEGATIVE /HPF
BASOPHILS # BLD: 0 K/UL (ref 0–0.1)
BASOPHILS NFR BLD: 1 % (ref 0–1)
BILIRUB UR QL: NEGATIVE
BUN SERPL-MCNC: 17 MG/DL (ref 6–20)
BUN/CREAT SERPL: 25 (ref 12–20)
CALCIUM SERPL-MCNC: 8.1 MG/DL (ref 8.5–10.1)
CALCULATED P AXIS, ECG09: 52 DEGREES
CALCULATED R AXIS, ECG10: 42 DEGREES
CALCULATED T AXIS, ECG11: 40 DEGREES
CHLORIDE SERPL-SCNC: 107 MMOL/L (ref 97–108)
CO2 SERPL-SCNC: 20 MMOL/L (ref 21–32)
COLOR UR: ABNORMAL
COVID-19 RAPID TEST, COVR: NOT DETECTED
CREAT SERPL-MCNC: 0.68 MG/DL (ref 0.7–1.3)
DIAGNOSIS, 93000: NORMAL
DIFFERENTIAL METHOD BLD: ABNORMAL
EOSINOPHIL # BLD: 0 K/UL (ref 0–0.4)
EOSINOPHIL NFR BLD: 0 % (ref 0–7)
EPITH CASTS URNS QL MICRO: ABNORMAL /LPF
ERYTHROCYTE [DISTWIDTH] IN BLOOD BY AUTOMATED COUNT: 15.6 % (ref 11.5–14.5)
GLUCOSE SERPL-MCNC: 109 MG/DL (ref 65–100)
GLUCOSE UR STRIP.AUTO-MCNC: NEGATIVE MG/DL
HCT VFR BLD AUTO: 33.8 % (ref 36.6–50.3)
HGB BLD-MCNC: 11.5 G/DL (ref 12.1–17)
HGB UR QL STRIP: NEGATIVE
HYALINE CASTS URNS QL MICRO: ABNORMAL /LPF (ref 0–2)
IMM GRANULOCYTES # BLD AUTO: 0 K/UL (ref 0–0.04)
IMM GRANULOCYTES NFR BLD AUTO: 0 % (ref 0–0.5)
KETONES UR QL STRIP.AUTO: NEGATIVE MG/DL
LACTATE SERPL-SCNC: 0.8 MMOL/L (ref 0.4–2)
LEUKOCYTE ESTERASE UR QL STRIP.AUTO: NEGATIVE
LYMPHOCYTES # BLD: 0.6 K/UL (ref 0.8–3.5)
LYMPHOCYTES NFR BLD: 35 % (ref 12–49)
MCH RBC QN AUTO: 28.3 PG (ref 26–34)
MCHC RBC AUTO-ENTMCNC: 34 G/DL (ref 30–36.5)
MCV RBC AUTO: 83.3 FL (ref 80–99)
MONOCYTES # BLD: 0.2 K/UL (ref 0–1)
MONOCYTES NFR BLD: 12 % (ref 5–13)
NEUTS SEG # BLD: 0.9 K/UL (ref 1.8–8)
NEUTS SEG NFR BLD: 52 % (ref 32–75)
NITRITE UR QL STRIP.AUTO: NEGATIVE
NRBC # BLD: 0 K/UL (ref 0–0.01)
NRBC BLD-RTO: 0 PER 100 WBC
P-R INTERVAL, ECG05: 138 MS
PH UR STRIP: 6 [PH] (ref 5–8)
PLATELET # BLD AUTO: 138 K/UL (ref 150–400)
PMV BLD AUTO: 9.1 FL (ref 8.9–12.9)
POTASSIUM SERPL-SCNC: 4.2 MMOL/L (ref 3.5–5.1)
PROCALCITONIN SERPL-MCNC: <0.05 NG/ML
PROT UR STRIP-MCNC: ABNORMAL MG/DL
Q-T INTERVAL, ECG07: 348 MS
QRS DURATION, ECG06: 90 MS
QTC CALCULATION (BEZET), ECG08: 425 MS
RBC # BLD AUTO: 4.06 M/UL (ref 4.1–5.7)
RBC #/AREA URNS HPF: ABNORMAL /HPF (ref 0–5)
RBC MORPH BLD: ABNORMAL
SODIUM SERPL-SCNC: 134 MMOL/L (ref 136–145)
SOURCE, COVRS: NORMAL
SP GR UR REFRACTOMETRY: 1.01 (ref 1–1.03)
UA: UC IF INDICATED,UAUC: ABNORMAL
UROBILINOGEN UR QL STRIP.AUTO: 1 EU/DL (ref 0.2–1)
VENTRICULAR RATE, ECG03: 90 BPM
WBC # BLD AUTO: 1.7 K/UL (ref 4.1–11.1)
WBC URNS QL MICRO: ABNORMAL /HPF (ref 0–4)

## 2022-08-22 PROCEDURE — 87899 AGENT NOS ASSAY W/OPTIC: CPT

## 2022-08-22 PROCEDURE — 74011000636 HC RX REV CODE- 636: Performed by: EMERGENCY MEDICINE

## 2022-08-22 PROCEDURE — 99223 1ST HOSP IP/OBS HIGH 75: CPT | Performed by: INTERNAL MEDICINE

## 2022-08-22 PROCEDURE — 74011000258 HC RX REV CODE- 258: Performed by: STUDENT IN AN ORGANIZED HEALTH CARE EDUCATION/TRAINING PROGRAM

## 2022-08-22 PROCEDURE — 74011000250 HC RX REV CODE- 250: Performed by: STUDENT IN AN ORGANIZED HEALTH CARE EDUCATION/TRAINING PROGRAM

## 2022-08-22 PROCEDURE — 36415 COLL VENOUS BLD VENIPUNCTURE: CPT

## 2022-08-22 PROCEDURE — 84145 PROCALCITONIN (PCT): CPT

## 2022-08-22 PROCEDURE — 71275 CT ANGIOGRAPHY CHEST: CPT

## 2022-08-22 PROCEDURE — 81001 URINALYSIS AUTO W/SCOPE: CPT

## 2022-08-22 PROCEDURE — 74011250636 HC RX REV CODE- 250/636: Performed by: STUDENT IN AN ORGANIZED HEALTH CARE EDUCATION/TRAINING PROGRAM

## 2022-08-22 PROCEDURE — 93005 ELECTROCARDIOGRAM TRACING: CPT

## 2022-08-22 PROCEDURE — 80048 BASIC METABOLIC PNL TOTAL CA: CPT

## 2022-08-22 PROCEDURE — 74011250636 HC RX REV CODE- 250/636: Performed by: EMERGENCY MEDICINE

## 2022-08-22 PROCEDURE — 74011250637 HC RX REV CODE- 250/637: Performed by: STUDENT IN AN ORGANIZED HEALTH CARE EDUCATION/TRAINING PROGRAM

## 2022-08-22 PROCEDURE — 65270000046 HC RM TELEMETRY

## 2022-08-22 PROCEDURE — 83605 ASSAY OF LACTIC ACID: CPT

## 2022-08-22 RX ORDER — ENOXAPARIN SODIUM 100 MG/ML
1 INJECTION SUBCUTANEOUS EVERY 12 HOURS
Status: DISCONTINUED | OUTPATIENT
Start: 2022-08-22 | End: 2022-08-24

## 2022-08-22 RX ORDER — ACETAMINOPHEN 325 MG/1
650 TABLET ORAL
Status: DISCONTINUED | OUTPATIENT
Start: 2022-08-22 | End: 2022-08-24 | Stop reason: HOSPADM

## 2022-08-22 RX ORDER — SODIUM CHLORIDE 0.9 % (FLUSH) 0.9 %
5-40 SYRINGE (ML) INJECTION EVERY 8 HOURS
Status: DISCONTINUED | OUTPATIENT
Start: 2022-08-22 | End: 2022-08-24 | Stop reason: HOSPADM

## 2022-08-22 RX ORDER — POLYETHYLENE GLYCOL 3350 17 G/17G
17 POWDER, FOR SOLUTION ORAL DAILY PRN
Status: DISCONTINUED | OUTPATIENT
Start: 2022-08-22 | End: 2022-08-24 | Stop reason: HOSPADM

## 2022-08-22 RX ORDER — TOPIRAMATE 100 MG/1
200 TABLET, FILM COATED ORAL 2 TIMES DAILY
Status: DISCONTINUED | OUTPATIENT
Start: 2022-08-22 | End: 2022-08-24 | Stop reason: HOSPADM

## 2022-08-22 RX ORDER — ONDANSETRON 4 MG/1
4 TABLET, ORALLY DISINTEGRATING ORAL
Status: DISCONTINUED | OUTPATIENT
Start: 2022-08-22 | End: 2022-08-24 | Stop reason: HOSPADM

## 2022-08-22 RX ORDER — ONDANSETRON 2 MG/ML
4 INJECTION INTRAMUSCULAR; INTRAVENOUS
Status: DISCONTINUED | OUTPATIENT
Start: 2022-08-22 | End: 2022-08-24 | Stop reason: HOSPADM

## 2022-08-22 RX ORDER — METRONIDAZOLE 500 MG/100ML
500 INJECTION, SOLUTION INTRAVENOUS EVERY 8 HOURS
Status: DISCONTINUED | OUTPATIENT
Start: 2022-08-22 | End: 2022-08-22 | Stop reason: DRUGHIGH

## 2022-08-22 RX ORDER — METRONIDAZOLE 500 MG/100ML
500 INJECTION, SOLUTION INTRAVENOUS EVERY 12 HOURS
Status: DISCONTINUED | OUTPATIENT
Start: 2022-08-22 | End: 2022-08-24

## 2022-08-22 RX ORDER — SODIUM CHLORIDE 0.9 % (FLUSH) 0.9 %
5-40 SYRINGE (ML) INJECTION AS NEEDED
Status: DISCONTINUED | OUTPATIENT
Start: 2022-08-22 | End: 2022-08-24 | Stop reason: HOSPADM

## 2022-08-22 RX ORDER — ACETAMINOPHEN 650 MG/1
650 SUPPOSITORY RECTAL
Status: DISCONTINUED | OUTPATIENT
Start: 2022-08-22 | End: 2022-08-24 | Stop reason: HOSPADM

## 2022-08-22 RX ORDER — SERTRALINE HYDROCHLORIDE 50 MG/1
50 TABLET, FILM COATED ORAL DAILY
Status: DISCONTINUED | OUTPATIENT
Start: 2022-08-22 | End: 2022-08-24 | Stop reason: HOSPADM

## 2022-08-22 RX ORDER — PROCHLORPERAZINE MALEATE 10 MG
10 TABLET ORAL
Status: DISCONTINUED | OUTPATIENT
Start: 2022-08-22 | End: 2022-08-24 | Stop reason: HOSPADM

## 2022-08-22 RX ADMIN — ACETAMINOPHEN 650 MG: 325 TABLET ORAL at 23:15

## 2022-08-22 RX ADMIN — SODIUM CHLORIDE, PRESERVATIVE FREE 10 ML: 5 INJECTION INTRAVENOUS at 23:18

## 2022-08-22 RX ADMIN — TOPIRAMATE 200 MG: 100 TABLET, FILM COATED ORAL at 23:09

## 2022-08-22 RX ADMIN — BRIVARACETAM 100 MG: 50 TABLET, FILM COATED ORAL at 10:27

## 2022-08-22 RX ADMIN — CEFEPIME 2 G: 2 INJECTION, POWDER, FOR SOLUTION INTRAVENOUS at 10:28

## 2022-08-22 RX ADMIN — ENOXAPARIN SODIUM 80 MG: 100 INJECTION SUBCUTANEOUS at 02:53

## 2022-08-22 RX ADMIN — SERTRALINE 50 MG: 50 TABLET, FILM COATED ORAL at 10:27

## 2022-08-22 RX ADMIN — TOPIRAMATE 200 MG: 100 TABLET, FILM COATED ORAL at 10:27

## 2022-08-22 RX ADMIN — VANCOMYCIN HYDROCHLORIDE 2000 MG: 10 INJECTION, POWDER, LYOPHILIZED, FOR SOLUTION INTRAVENOUS at 01:41

## 2022-08-22 RX ADMIN — CEFEPIME 2 G: 2 INJECTION, POWDER, FOR SOLUTION INTRAVENOUS at 23:15

## 2022-08-22 RX ADMIN — ACETAMINOPHEN 650 MG: 325 TABLET ORAL at 10:26

## 2022-08-22 RX ADMIN — ENOXAPARIN SODIUM 80 MG: 100 INJECTION SUBCUTANEOUS at 19:57

## 2022-08-22 RX ADMIN — BRIVARACETAM 100 MG: 50 TABLET, FILM COATED ORAL at 23:08

## 2022-08-22 RX ADMIN — METRONIDAZOLE 500 MG: 500 INJECTION, SOLUTION INTRAVENOUS at 04:40

## 2022-08-22 RX ADMIN — IOPAMIDOL 100 ML: 755 INJECTION, SOLUTION INTRAVENOUS at 00:09

## 2022-08-22 NOTE — ED NOTES
Bedside shift change report given to [address] (oncoming nurse) by Rodolfo Mancera (offgoing nurse). Report included the following information SBAR, ED Summary, Intake/Output, MAR and Recent Results.

## 2022-08-22 NOTE — PROGRESS NOTES
Hospitalist Progress Note    NAME: Carly Solis   :  1975   MRN:  [id number]     Admit date: 2022    Today's date: 22    PCP: Agustín Rose MD      Anticipated discharge date:     Barriers:      Assessment / Plan:    Severe sepsis POA temp 103.2, , RR 24, WBC 1.6, lactate 2.47  Neutropenic fever  POA  Acute pulmonary embolism of right middle lobe POA  Right middle lobe collapse with right lower lobe consolidation concerning for pneumonia  Oncology consulted, spoke with Ronnie Cruz 400, now rising  Blood cultures pending  Sputum culture pending  Vancomycin and cefepime, stop the flagyl for now  Incentive spirometry  Pain well controlled with acetaminophen  Therapeutic Lovenox, transition to NOAC at discharge, d/w oncology  Serial labs  Change to oral antibiotics pending the [facility] results and course of [address]  Spoke with father at bedside    Stage III testicular cancer currently undergoing chemotherapy (cisplatin and etoposide)  Oncology following     High functioning autism  Patient with good social support and living independently at home     Seizure disorder on multiple antiepileptics with implanted vagal nerve stimulator  Continue PTA antiepileptics: (Doses verified by parent)  Briviact  Cenobamate  Topiramate     Essential hypertension  Hold PTA antihypertensives in setting of low/normal blood pressures and acute infection  PRN hydralazine     Alcohol dependence  Patient with no history of withdrawal symptoms and has tolerated cessation well in the past  Will monitor and initiate CIWA protocol if indicated     Overweight POA Body mass index is 25.55 kg/m².        Code Status: Full  Surrogate Decision Maker:   Tylor Dietrich Mother [phone]       [address] [facility] Parent [phone]          DVT Prophylaxis: Therapeutic Lovenox  GI Prophylaxis: not indicated  Diet: Regular       Subjective:     Chief Complaint / Reason for Physician Visit  \"My right mid back is hurting\". Discussed with RN events overnight. Seen with Father and Shay Quintanilla at bedside  Increasing cough x days  Chemo \"wore him out\", poor PO intake and diarrhea  No N/V, CP or abdominal pain  No HA or SOB    Review of Systems:  Symptom Y/N Comments  Symptom Y/N Comments   Fever/Chills y   Chest Pain n    Poor Appetite    Edema     Cough y   Abdominal Pain n    Sputum    Joint Pain     SOB/EISENBERG n   Headache     Nausea/vomit n   Tolerating PT/OT     Diarrhea y   Tolerating Diet y    Constipation    Other       Could NOT obtain due to:      Objective:     VITALS:   Last 24hrs VS reviewed since prior progress note. Most recent are:  Patient Vitals for the past 24 hrs:   Temp Pulse Resp BP SpO2   08/22/22 0700 -- 85 27 123/66 98 %   08/22/22 0600 -- 83 25 121/77 98 %   08/22/22 0530 -- 85 22 115/74 98 %   08/22/22 0500 -- 84 22 127/72 94 %   08/22/22 0430 -- -- -- (!) 129/95 --   08/22/22 0300 -- 78 22 111/80 99 %   08/22/22 0245 98.9 °F (37.2 °C) 77 -- 112/62 100 %   08/22/22 0030 -- -- -- 113/71 98 %   08/22/22 0000 (!) 101.7 °F (38.7 °C) 97 25 112/69 96 %   08/21/22 2300 -- (!) 115 29 117/77 97 %   08/21/22 2256 (!) 103.2 °F (39.6 °C) -- -- -- --   08/21/22 2222 99.2 °F (37.3 °C) (!) 118 16 133/80 98 %     No intake or output data in the 24 hours ending 08/22/22 0834     Wt Readings from Last 12 Encounters:   08/21/22 78.5 kg (173 lb)   08/11/22 81.4 kg (179 lb 6.4 oz)   08/10/22 81.6 kg (179 lb 14.4 oz)   08/09/22 80 kg (176 lb 4.8 oz)   08/08/22 79 kg (174 lb 1.6 oz)   08/08/22 79 kg (174 lb 2.6 oz)   07/21/22 82 kg (180 lb 12.8 oz)   07/20/22 82.4 kg (181 lb 11.2 oz)   07/19/22 80.7 kg (178 lb)   07/18/22 79.5 kg (175 lb 3.2 oz)   07/18/22 79.5 kg (175 lb 4.3 oz)   06/29/22 78.1 kg (172 lb 3.2 oz)       PHYSICAL EXAM:    I had a face to face encounter and independently examined this patient on 08/22/22 as outlined below:    General: WD, WN. Alert, cooperative, no acute distress    EENT:  PERRL. Anicteric sclerae. MMM  Neck:  No meningismus, no thyromegaly  Resp:  Decreased BS at bases bilaterally, no wheezing No accessory muscle use  CV:  Regular  rhythm,  No edema  GI:  Soft, Non distended, Non tender. +Bowel sounds, no rebound  Neurologic:  Alert, normal speech, non focal motor exam  Psych:   Flat affect Not anxious nor agitated  Skin:  No rashes. No jaundice    Reviewed most current lab test results and cultures  YES  Reviewed most current radiology test results   YES  Review and summation of old records today    NO  Reviewed patient's current orders and MAR    YES  PMH/SH reviewed - no change compared to H&P  ________________________________________________________________________  Care Plan discussed with:    Comments   Patient x    Family  x Father   RN x    Care Manager     Consultant  x Oncology                     Multidiciplinary team rounds were held today with , nursing, pharmacist and clinical coordinator. Patient's plan of care was discussed; medications were reviewed and discharge planning was addressed. ________________________________________________________________________      Comments   >50% of visit spent in counseling and coordination of care     ________________________________________________________________________  Bradly Zamorano MD     Procedures: see electronic medical records for all procedures/Xrays and details which were not copied into this note but were reviewed prior to creation of Plan. LABS:  I reviewed today's most current labs and imaging studies.   Pertinent labs include:  Recent Labs     08/21/22  8565   WBC 1.7*   HGB 11.5*   HCT 33.8*   *     Recent Labs     08/22/22  0418 08/21/22  2245   * 135*   K 4.2 3.7    104   CO2 20* 22   * 134*   BUN 17 17   CREA 0.68* 0.99   CA 8.1* 9.1   ALB  --  3.5   TBILI  --  0.2   ALT  --  47

## 2022-08-22 NOTE — H&P
This note is compiled to communicate with medical care team. It may contain sensitive and protected information. It is not intended to serve as a source of communication with patients/families/friends; that communication occurs at the bedside or via alternative direct communications means (secure messaging, letters, video/telephone, etc.). Hospitalist Admission Note    NAME: Maria Esther Garay   :  1975   MRN:  [id number]     Date/Time:  2022 12:50 AM    Patient PCP: Gaudencio Quintanilla MD  ______________________________________________________________________  Given the patient's current clinical presentation, I have a high level of concern for decompensation if discharged from the emergency department. Complex decision making was performed, which includes reviewing the patient's available past medical records, laboratory results, and x-ray films. My assessment of this patient's clinical condition and my plan of care is as follows. Subjective:   CHIEF COMPLAINT: Neutropenic fever    HISTORY OF PRESENT ILLNESS:     Froy Clarke is a 56 y.o.  male with pertinent medical history as outlined below who presents with complaints of poor p.o. intake, fatigue, back pain, nonproductive cough, and fever of 24-48 hours duration. 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 Patient experienced progressive symptoms and this evening his parents checked his temperature and found to have temperature of 100.9 °F.  They called the oncologist on-call who instructed them to perform a home COVID test and recheck the temperature if it was uptrending he directed them to report sent to the emergency department. Home COVID test was negative, but temperature 1 hour later was 101.2 °F prompting presentation to the emergency department.   On exam patient answering with 1-2 words answers and with difficulty expressing himself so majority of history is provided by mother and father at bedside. They also report history of seizure disorder currently well controlled on regimen with last seizure being 4 months ago. They also report he consumes 3-4 alcoholic beverages per night to help him sleep, but has never had problems stopping drinking. In the ED, patient febrile to 103.2 °F with tachycardia to 118 bpm blood pressures around 100s/70s. Breath sounds diminished on right side prompting CTA chest which demonstrated acute pulmonary emboli to right middle lobe. Unchanged mediastinal and right hilar lymphadenopathy. Small right pleural effusion. Right middle lobe collapse. Right lower lobe consolidation. Labs notable for undetectable high-sensitivity troponin, sodium 135, potassium 3.7, BUN 17, creatinine 0.99, WBC 1.7, hemoglobin 11.5 (MCV 83.3), platelets 740. We were asked to admit for work up and evaluation of the above problems.      Past Medical History:   Diagnosis Date    Alcohol withdrawal ([facility] [address] 10/31/2014    Asperger syndrome 12/14/2011    Autism     dx 2010- highly functional    Cancer ([facility] [address]     testicular    Convulsive syncope 6/11/2019    Fatigue     Loss of appetite     Other ill-defined conditions(799.89)      syncopal episodes    Seizure disorder ([facility] [address] 5/22/2012    Status post VNS (vagus nerve stimulator) placement 12/08/2020        Past Surgical History:   Procedure Laterality Date    HX ADENOIDECTOMY      HX GI      pyloric stenosis 1976    HX ORCHIECTOMY  06/01/2022    Right radical orchiectomy with frozen section    HX ORTHOPAEDIC  07/2020    Middle finger on left hand    HX TONSILLECTOMY      IR INSERT TUNL CVC W PORT OVER 5 YEARS  7/8/2022    VASCULAR SURGERY PROCEDURE UNLIST  12/8/202    Insertion of Vagus Nerve Stimulator        Social History     Tobacco Use    Smoking status: Never    Smokeless tobacco: Never   Substance Use Topics    Alcohol use: Yes     Alcohol/week: 10.0 standard drinks     Types: 10 Cans of beer per week     Comment: most nights        Family History   Problem Relation Age of Onset    Diabetes Father     Heart Disease Father     Elevated Lipids Father     Cancer Mother         Breast    Anxiety Mother     COPD Mother     Other Mother         Neuropathy    Sleep Apnea Mother     OSTEOARTHRITIS Mother     Other Brother         Pituitary tumor       Allergies   Allergen Reactions    Aspartame Other (comments)     Family believes it causes his seizures        Prior to Admission medications    Medication Sig Start Date End Date Taking? Authorizing Provider   brivaracetam (Briviact) 100 mg tablet Take 1 Tablet by mouth two (2) times a day. Max Daily Amount: 200 mg. 8/19/22   Keith Snider MD   Xcopri 50 mg tab tablet TAKE 1 TABLET BY MOUTH DAILY. MAX DAILY AMOUNT: 50 MG 8/18/22   Paulette Butt MD   Xcopri 200 mg tab TAKE 1 TABLET BY MOUTH DAILY. MAX DAILY AMOUNT: 200 MG 7/15/22   Keith Snider MD   ondansetron (ZOFRAN ODT) 4 mg disintegrating tablet Take 1 Tablet by mouth every eight (8) hours as needed for Nausea or Vomiting. 6/29/22   Bigg Duran MD   prochlorperazine (Compazine) 10 mg tablet Take 1 Tablet by mouth every six (6) hours as needed for Nausea or Vomiting. 6/29/22   Bigg Duran MD   lidocaine-prilocaine (EMLA) topical cream Apply  to affected area as needed for Pain. Apply generous amount to port site 30 min prior to infusions and cover with plastic wrap 6/29/22   Bigg Duran MD   sertraline (ZOLOFT) 50 mg tablet Take 1 Tablet by mouth daily. 12/8/21   Keith Snider MD   topiramate (TOPAMAX) 200 mg tablet TAKE 1 TABLET TWICE DAILY 12/8/21   Keith Snider MD   MULTIVITAMIN PO Take 1 Tab by mouth daily. Provider, Historical       REVIEW OF SYSTEMS:     I am not able to complete the review of systems because:    The patient is intubated and sedated    The patient has altered mental status due to his acute medical problems    The patient has baseline aphasia from prior stroke(s)    The patient has baseline dementia and is not reliable historian    The patient is in acute medical distress and unable to provide information   x Patient with high functioning autism and difficulty expressing himself -currently reporting no complaints (\"I do not think so\")       Objective:   VITALS:    Visit Vitals  /71   Pulse 97   Temp (!) 101.7 °F (38.7 °C)   Resp 25   Ht 5' 9\" (1.753 m)   Wt 78.5 kg (173 lb)   SpO2 98%   BMI 25.55 kg/m²       PHYSICAL EXAM:    General:   Alert, cooperative, no distress, chronically ill-appearing middle-aged  male        HEENT:  Atraumatic, anicteric sclerae, pink conjunctivae, mucosa moist, dentition fair     Neck:  Supple, symmetrical, trachea midline, no abnormalities on palpation     Lungs:   Breath sounds diminished-absent on the right lower and middle listening posts symmetric expansion. Normal respiratory effort. Chest wall:  No tenderness. No Accessory muscle use. Right-sided port without erythema or fluctuance. Left-sided vagal nerve nerve stimulator palpated without overlying erythema or fluctuance. Cardiovascular:   RRR, No murmur/rub/gallop. No JVD. Radial/AT/DP pulse 2+     GI/:   Soft, non-tender. Not distended. Bowel sounds normal. No HSM     Extremities No edema. No cyanosis. Capillary refill normal     Skin: No significant lesions noted. Not Jaundiced. No rashes      Neurologic: PERRL. EOMI. No facial asymmetry. No aphasia or slurred speech. Moves all extremities. Sensation to light touch grossly intact BUE/BLE. No overt focal deficits appreciated     Psych:  Alert and oriented X 4. Normal affect.  Good insight     Other:   N/A         LAB DATA REVIEWED:    Recent Results (from the past 24 hour(s))   POC LACTIC ACID    Collection Time: 08/21/22 10:42 PM   Result Value Ref Range    Lactic Acid (POC) 2.47 (HH) 0.40 - 2.00 mmol/L   CBC WITH AUTOMATED DIFF    Collection Time: 08/21/22 10:45 PM   Result Value Ref Range    WBC 1.7 (L) 4.1 - 11.1 K/uL    RBC 4.06 (L) 4.10 - 5.70 M/uL    HGB 11.5 (L) 12.1 - 17.0 g/dL    HCT 33.8 (L) 36.6 - 50.3 %    MCV 83.3 80.0 - 99.0 FL    MCH 28.3 26.0 - 34.0 PG    MCHC 34.0 30.0 - 36.5 g/dL    RDW 15.6 (H) 11.5 - 14.5 %    PLATELET 065 (L) 506 - 400 K/uL    MPV 9.1 8.9 - 12.9 FL    NRBC 0.0 0  WBC    ABSOLUTE NRBC 0.00 0.00 - 0.01 K/uL    NEUTROPHILS 52 32 - 75 %    LYMPHOCYTES 35 12 - 49 %    MONOCYTES 12 5 - 13 %    EOSINOPHILS 0 0 - 7 %    BASOPHILS 1 0 - 1 %    IMMATURE GRANULOCYTES 0 0.0 - 0.5 %    ABS. NEUTROPHILS 0.9 (L) 1.8 - 8.0 K/UL    ABS. LYMPHOCYTES 0.6 (L) 0.8 - 3.5 K/UL    ABS. MONOCYTES 0.2 0.0 - 1.0 K/UL    ABS. EOSINOPHILS 0.0 0.0 - 0.4 K/UL    ABS. BASOPHILS 0.0 0.0 - 0.1 K/UL    ABS. IMM. GRANS. 0.0 0.00 - 0.04 K/UL    DF SMEAR SCANNED      RBC COMMENTS ANISOCYTOSIS  1+       METABOLIC PANEL, COMPREHENSIVE    Collection Time: 08/21/22 10:45 PM   Result Value Ref Range    Sodium 135 (L) 136 - 145 mmol/L    Potassium 3.7 3.5 - 5.1 mmol/L    Chloride 104 97 - 108 mmol/L    CO2 22 21 - 32 mmol/L    Anion gap 9 5 - 15 mmol/L    Glucose 134 (H) 65 - 100 mg/dL    BUN 17 6 - 20 MG/DL    Creatinine 0.99 0.70 - 1.30 MG/DL    BUN/Creatinine ratio 17 12 - 20      GFR est AA >60 >60 ml/min/1.73m2    GFR est non-AA >60 >60 ml/min/1.73m2    Calcium 9.1 8.5 - 10.1 MG/DL    Bilirubin, total 0.2 0.2 - 1.0 MG/DL    ALT (SGPT) 47 12 - 78 U/L    AST (SGOT) 40 (H) 15 - 37 U/L    Alk.  phosphatase 95 45 - 117 U/L    Protein, total 8.1 6.4 - 8.2 g/dL    Albumin 3.5 3.5 - 5.0 g/dL    Globulin 4.6 (H) 2.0 - 4.0 g/dL    A-G Ratio 0.8 (L) 1.1 - 2.2     TROPONIN-HIGH SENSITIVITY    Collection Time: 08/21/22 10:45 PM   Result Value Ref Range    Troponin-High Sensitivity <4 0 - 76 ng/L   SAMPLES BEING HELD    Collection Time: 08/21/22 10:45 PM   Result Value Ref Range    SAMPLES BEING HELD [facility] COMMENT        Add-on orders for these samples will be processed based on acceptable specimen integrity and analyte stability, which may vary by analyte. COVID-19 RAPID TEST    Collection Time: 22 11:44 PM   Result Value Ref Range    Specimen source Nasopharyngeal      COVID-19 rapid test Not detected NOTD     EKG, 12 LEAD, INITIAL    Collection Time: 22 12:10 AM   Result Value Ref Range    Ventricular Rate 90 BPM    Atrial Rate 90 BPM    P-R Interval 138 ms    QRS Duration 90 ms    Q-T Interval 348 ms    QTC Calculation (Bezet) 425 ms    Calculated P Axis 52 degrees    Calculated R Axis 42 degrees    Calculated T Axis 40 degrees    Diagnosis       Normal sinus rhythm  Normal ECG  When compared with ECG of 16-OCT-2019 11:44,  premature atrial complexes are no longer present         IMAGIN view chest:  2 bilateral airspace disease may represent pneumonia. Radiographic  follow-up is recommended to assure complete resolution. CTA chest:  Acute pulmonary embolus in the right middle lobe. Unchanged mediastinal and right hilar lymphadenopathy. Small right pleural effusion. Right middle lobe collapse. Right lower lobe consolidation. Unchanged small bilateral pulmonary nodules.     EKG: NSR, rate 90, QTc 425  ______________________________________________________________________    Assessment / Plan:  Acute pulmonary embolism of right middle lobe  Right middle lobe collapse with right lower lobe consolidation concerning for pneumonia  Neutropenic fever ()  Stage III testicular cancer currently undergoing chemotherapy (cisplatin and etoposide)  High functioning autism  Seizure disorder on multiple antiepileptics with implanted vagal nerve stimulator  Essential hypertension  Alcohol dependence    PLAN:    Acute pulmonary embolism of right middle lobe  Right middle lobe collapse with right lower lobe consolidation concerning for pneumonia  Neutropenic fever ([address])  Admit to telemetry monitoring  Oncology consulted, greatly appreciate their expertise  [address] greater than 500 and likely rising based on date of last chemo administration  Obtain blood cultures including culture from port  Continue vancomycin and cefepime  Discontinue Levaquin  Start Flagyl for anaerobic coverage  Culture sputum  Obtain MRSA nares-if negative can discontinue vancomycin if patient has defervesced  Urine strep pneumo antigen  Incentive spirometry  Pain well controlled with acetaminophen  Therapeutic Lovenox-defer oral AC selection to oncology  Could consider bilateral venous duplex to evaluate extent of clot burden, but patient will require therapeutic anticoagulation regardless of result impact on care plan is negligible-we will not pursue at this time    Stage III testicular cancer currently undergoing chemotherapy (cisplatin and etoposide)  Oncology consulted    High functioning autism  Patient with good social support and living independently at home    Seizure disorder on multiple antiepileptics with implanted vagal nerve stimulator  Continue PTA antiepileptics: (Doses verified by parent)  -Briviact  -Cenobamate  -Topiramate    Essential hypertension  Hold PTA antihypertensives in setting of low/normal blood pressures and acute infection    Alcohol dependence  Patient with no history of withdrawal symptoms and has tolerated cessation well in the past  Will monitor and initiate CIWA protocol if indicated      Code Status: Full  Surrogate Decision Maker:   Tylor Dietrich Mother [phone]     Raudel Fernandez Parent [phone]         DVT Prophylaxis: Therapeutic Lovenox  GI Prophylaxis: not indicated  Diet: Regular       Care Plan discussed with:    Comments   Patient x    Family      RN     Care Manager                    Consultant:      _______________________________________________________________________  Prior Level of Function: Independent    Expected  Disposition:   Home with Family    HH/PT/OT/RN x SNF/LTC    AZAEL    ________________________________________________________________________  TOTAL TIME:  75 Minutes      Comments    x Reviewed previous records   >50% of visit spent in counseling and coordination of care x Discussion with patient and/or family and questions answered       ________________________________________________________________________  Signed: Lois Mac DO  8/22/2022 12:50 AM    Please note that this dictation was completed in part with Dragon dictation software. Occasionally unanticipated grammatical, syntax, homophones, and other interpretive errors are inadvertently transcribed by the computer software. Please excuse and disregard any errors that have escaped final proofreading. If in doubt, please do not hesitate to reach out to myself or the assigned hospitalist via [facility] paging system for clarification.

## 2022-08-22 NOTE — CONSULTS
[address] [facility] [address], [address] [facility], [address] [place] [address], [address] [place] [address]  [phone]       Oncology Consult Note        Patient: Balta Arango MRN: [id number]  SSN: [id number]    YOB: 1975  Age: 56 y.o. Sex: male          Diagnosis:     1. 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 Starting EP cycle 1 day 1     Subjective:      Balta Arango is a 56 y.o. 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 He experienced pain in the right groin approximately 4-6 weeks ago. He underwent USG and was noted to have enlarged right testis. He then underwent a right radical orchiectomy on 06/02/2022. The pathology revealed mixed germ cell tumor with 5% seminoma, 40% yolk sac tumor and teratoma with immature elements 55%. AFP is elevated at 35 and quant beta-HcG is normal and LDH is normal. CT shows enlarge mediastinal nodes and b/l lung nodules. He comes in to discuss the next steps. He suffers with Asperger's syndrome. Interval History:     8/22/2022 Patient seen at bedside in ED with mother and father. They brought him to the ED when he spiked a fever at home. Father reports patient has been sleeping a lot and not moving around as much since he started treatment. He was found to have PE on CTA.      Review of Systems:    Constitutional: fatigue, depression, insomnia   Eyes: negative  Ears, Nose, Mouth, Throat, and Face: negative  Respiratory: negative  Cardiovascular: negative  Gastrointestinal: negative  Genitourinary:negative  Integument/Breast: negative  Hematologic/Lymphatic: negative  Musculoskeletal:negative  Neurological: negative    Review of systems was reviewed and updated as needed on 08/22/22. Past Medical History:   Diagnosis Date    Alcohol withdrawal ([facility] [address] 10/31/2014    Asperger syndrome 12/14/2011    Autism     dx 2010- highly functional    Cancer ([facility] [address]     testicular    Convulsive syncope 6/11/2019    Fatigue     Loss of appetite     Other ill-defined conditions(799.89)      syncopal episodes    Seizure disorder ([facility] [address] 5/22/2012    Status post VNS (vagus nerve stimulator) placement 12/08/2020     Past Surgical History:   Procedure Laterality Date    HX ADENOIDECTOMY      HX GI      pyloric stenosis 1975    HX ORCHIECTOMY  06/01/2022    Right radical orchiectomy with frozen section    HX ORTHOPAEDIC  07/2020    Middle finger on left hand    HX TONSILLECTOMY      IR INSERT TUNL CVC W PORT OVER 5 YEARS  7/8/2022    VASCULAR SURGERY PROCEDURE UNLIST  12/8/202    Insertion of Vagus Nerve Stimulator       Family History   Problem Relation Age of Onset    Diabetes Father     Heart Disease Father     Elevated Lipids Father     Cancer Mother         Breast    Anxiety Mother     COPD Mother     Other Mother         Neuropathy    Sleep Apnea Mother     OSTEOARTHRITIS Mother     Other Brother         Pituitary tumor     Social History     Tobacco Use    Smoking status: Never    Smokeless tobacco: Never   Substance Use Topics    Alcohol use: Yes     Alcohol/week: 10.0 standard drinks     Types: 10 Cans of beer per week     Comment: most nights      Prior to Admission medications    Medication Sig Start Date End Date Taking? Authorizing Provider   brivaracetam (Briviact) 100 mg tablet Take 1 Tablet by mouth two (2) times a day. Max Daily Amount: 200 mg. 8/19/22  Yes Gweneth Hodgkins, MD   Xcopri 50 mg tab tablet TAKE 1 TABLET BY MOUTH DAILY. MAX DAILY AMOUNT: 50 MG 8/18/22  Yes Gweneth Hodgkins, MD   Xcopri 200 mg tab TAKE 1 TABLET BY MOUTH DAILY.  MAX DAILY AMOUNT: 200 MG 7/15/22  Yes Gweneth Hodgkins, MD   ondansetron (ZOFRAN ODT) 4 mg disintegrating tablet Take 1 Tablet by mouth every eight (8) hours as needed for Nausea or Vomiting. 6/29/22  Yes Tom Schultz MD   prochlorperazine (Compazine) 10 mg tablet Take 1 Tablet by mouth every six (6) hours as needed for Nausea or Vomiting. 6/29/22  Yes Tom Schultz MD   lidocaine-prilocaine (EMLA) topical cream Apply  to affected area as needed for Pain. Apply generous amount to port site 30 min prior to infusions and cover with plastic wrap 6/29/22  Yes Tom Schultz MD   sertraline (ZOLOFT) 50 mg tablet Take 1 Tablet by mouth daily. 12/8/21  Yes Jenelle Marcos MD   topiramate (TOPAMAX) 200 mg tablet TAKE 1 TABLET TWICE DAILY 12/8/21  Yes Jenelle Marcos MD   MULTIVITAMIN PO Take 1 Tab by mouth daily. Yes Provider, Historical              Allergies   Allergen Reactions    Aspartame Other (comments)     Family believes it causes his seizures           Objective:     Vitals:    08/22/22 0500 08/22/22 0530 08/22/22 0600 08/22/22 0700   BP: 127/72 115/74 121/77 123/66   Pulse: 84 85 83 85   Resp: 22 22 25 27   Temp:       SpO2: 94% 98% 98% 98%   Weight:       Height:          Pain Scale: /10      Physical Exam:  GENERAL: alert, cooperative, no distress, appears stated age  EYE: conjunctivae/corneas clear. LYMPHATIC: Cervical, supraclavicular, and axillary nodes normal.   THROAT & NECK: normal and no erythema or exudates noted. LUNG: clear to auscultation bilaterally  HEART: regular rate and rhythm  ABDOMEN: soft, non-tender. Bowel sounds normal. No masses,  no organomegaly  EXTREMITIES:  extremities normal, atraumatic, no cyanosis or edema  SKIN: Normal.  NEUROLOGIC: AOx3. CT Results (most recent):  Results from Hospital Encounter encounter on 08/21/22    CTA CHEST W OR W WO CONT    Narrative  INDICATION: Fever. History of testicular cancer. Upper back pain. COMPARISON:June 17, 2022    TECHNIQUE:  Routine noncontrast imaging the chest was performed for localization purposes.   Then, following the uneventful intravenous administration of 100 cc [id number],  thin helical axial images were obtained through the chest. 3D image  postprocessing was performed. CT dose reduction was achieved through use of a  standardized protocol tailored for this examination and automatic exposure  control for dose modulation. FINDINGS:    CHEST WALL: No chest wall mass or axillary lymphadenopathy. Right internal  jugular Port-A-Cath terminates in the right atrium. THYROID: No nodule. MEDIASTINUM: Enlarged mediastinal lymph nodes measure up to 14 mm in short axis  diameter, similar to the prior study. MELA: Unchanged low-grade right hilar lymphadenopathy. THORACIC AORTA: No dissection or aneurysm. PULMONARY ARTERIES: Main pulmonary artery is normal in caliber. There is acute  thrombus in the right middle lobe pulmonary arteries. TRACHEA/BRONCHI: Patent. ESOPHAGUS: No wall thickening or dilatation. HEART: Normal in size. PLEURA: Small right pleural effusion. LUNGS: Right middle lobe collapse. Right lower lobe consolidation. Numerous  small bilateral pulmonary nodules measuring up to 4 mm are not significantly  changed. INCIDENTALLY IMAGED UPPER ABDOMEN: Unchanged eventration of the right  hemidiaphragm. BONES: Unchanged sclerotic bone lesions. ADDITIONAL COMMENTS: N/A    Impression  Acute pulmonary embolus in the right middle lobe. Unchanged mediastinal and  right hilar lymphadenopathy. Small right pleural effusion. Right middle lobe  collapse. Right lower lobe consolidation. Unchanged small bilateral pulmonary  nodules. The findings were called to Dr. Keven Cruz on 8/22/2022 at 1:08 AM by Dr. Camelia Ansari.     [id number]    Lab Results   Component Value Date/Time    WBC 1.7 (L) 08/21/2022 10:45 PM    HGB 11.5 (L) 08/21/2022 10:45 PM    Hematocrit (POC) 44 06/10/2019 04:29 PM    HCT 33.8 (L) 08/21/2022 10:45 PM    PLATELET 969 (L) 16/69/9499 10:45 PM    MCV 83.3 08/21/2022 10:45 PM         Lab Results   Component Value Date/Time    Sodium 134 (L) 08/22/2022 04:18 AM    Potassium 4.2 08/22/2022 04:18 AM    Chloride 107 08/22/2022 04:18 AM    CO2 20 (L) 08/22/2022 04:18 AM    Anion gap 7 08/22/2022 04:18 AM    Glucose 109 (H) 08/22/2022 04:18 AM    BUN 17 08/22/2022 04:18 AM    Creatinine 0.68 (L) 08/22/2022 04:18 AM    BUN/Creatinine ratio 25 (H) 08/22/2022 04:18 AM    GFR est AA >60 08/22/2022 04:18 AM    GFR est non-AA >60 08/22/2022 04:18 AM    Calcium 8.1 (L) 08/22/2022 04:18 AM    Bilirubin, total 0.2 08/21/2022 10:45 PM    Alk. phosphatase 95 08/21/2022 10:45 PM    Protein, total 8.1 08/21/2022 10:45 PM    Albumin 3.5 08/21/2022 10:45 PM    Globulin 4.6 (H) 08/21/2022 10:45 PM    A-G Ratio 0.8 (L) 08/21/2022 10:45 PM    ALT (SGPT) 47 08/21/2022 10:45 PM    AST (SGOT) 40 (H) 08/21/2022 10:45 PM           Assessment:     1. 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 Thus I recommend administering 3 cycles of BEP as adjuvant treatment. Patient was unable to complete Pulmonary Function testing as he could not follow instructions  Thus I changed his treatment to EP X 4    2. Neutropenic Fever   R/t recent chemotherapy   On broad spectrum antibiotics   ANC trending up     3. Provoked Acute Pulmonary Embolism r/t Immobility and Malignancy   CTA - Acute pulmonary embolus in the right middle lobe. Unchanged mediastinal and right hilar lymphadenopathy. Small right pleural effusion. Right middle lobe collapse. Right lower lobe consolidation. Unchanged small bilateral pulmonary nodules.     Father reports patient has been sleeping a lot and not as mobile since starting treatment. 4. Alcohol Abuse r/t Insomnia and Depression  Reports drinking 3-4 beers a night to help with insomnia  Also reports he is depressed as he is still single at age 56  On Zoloft as OP    Plan:     > Agree with broad spectrum antibiotics pending infection work up. Pending results, transition home with oral antibiotics in next 24-36 hours. > ANC starting trend up, no need for granix   > Agree with lovenox and transition to [address] for discharge   > Discussed significant alcohol use for insomnia and patient reports depression. Patient agreed alcohol intake is unhealthy. Willing to see Palliative as OP to discuss sleep aid and depression. Will arrange OP Palliative referral.      Discussed with Dr. De Leon Ala    Will continue to follow as IP and arrange appropriate OP follow up. Signed by: Caitlin Martines NP                     August 22, 2022      CC. Michoacano Foster MD  CC.  Marii Calixto MD

## 2022-08-22 NOTE — ED PROVIDER NOTES
EMERGENCY DEPARTMENT HISTORY AND PHYSICAL EXAM      Date: 8/21/2022  Patient Name: Ralph Rosas    History of Presenting Illness     Chief Complaint   Patient presents with    Fever     Patient stated that he started spiking a fever today and it was 100.9 and again later it was 101.2 orally. Family gave him 162mg ASA for the fever and discomfort he has in his back. He currently has testicular cancer and is undergoing chemotherapy infusions through a port in his right chest. Last chemo infusion was on the 12th. Patient has had a cough and with the new fever, oncologist referred patient to the ED for evaluation. History Provided By: Patient, Patient's Father, and Patient's Mother    HPI: Ralph Rosas, 56 y.o. male with PMHx significant for Asperger's syndrome, seizure disorder, hiatal hernia, and testicular cancer, currently on chemotherapy (cisplatin and etoposide-last dose was August 12) presents to the ED with decreased appetite, right upper back pain, and fever. Patient is followed by Dr. Zoltan Schroeder for oncology. Yesterday, his parents noted that he was not eating as much. He had a cough yesterday that seemed fairly violent. Today he started complaining of right upper back pain and father felt him and he felt very warm. Temp at 6 PM was 100.9. At 7 PM it was 101.2. They called on-call oncologist and were directed to come to the ED. Rapid COVID test at home was negative. Patient has had poor p.o. intake all day today, but his cough was improved. Parents gave 2 baby aspirin before bringing patient to the ED. No other meds at home. PCP: Max Jessica MD    No current facility-administered medications on file prior to encounter. Current Outpatient Medications on File Prior to Encounter   Medication Sig Dispense Refill    brivaracetam (Briviact) 100 mg tablet Take 1 Tablet by mouth two (2) times a day.  Max Daily Amount: 200 mg. 180 Tablet 1    Xcopri 50 mg tab tablet TAKE 1 TABLET BY MOUTH DAILY. MAX DAILY AMOUNT: 50 MG 90 Tablet 1    Xcopri 200 mg tab TAKE 1 TABLET BY MOUTH DAILY. MAX DAILY AMOUNT: 200 MG 30 Each 3    ondansetron (ZOFRAN ODT) 4 mg disintegrating tablet Take 1 Tablet by mouth every eight (8) hours as needed for Nausea or Vomiting. 40 Tablet 2    prochlorperazine (Compazine) 10 mg tablet Take 1 Tablet by mouth every six (6) hours as needed for Nausea or Vomiting. 40 Tablet 2    lidocaine-prilocaine (EMLA) topical cream Apply  to affected area as needed for Pain. Apply generous amount to port site 30 min prior to infusions and cover with plastic wrap 30 g 2    sertraline (ZOLOFT) 50 mg tablet Take 1 Tablet by mouth daily. 90 Tablet 3    topiramate (TOPAMAX) 200 mg tablet TAKE 1 TABLET TWICE DAILY 180 Tablet 3    MULTIVITAMIN PO Take 1 Tab by mouth daily.          Past History     Past Medical History:  Past Medical History:   Diagnosis Date    Alcohol withdrawal ([facility] [address] 10/31/2014    Asperger syndrome 12/14/2011    Autism     dx 2010- highly functional    Cancer ([facility] [address]     testicular    Convulsive syncope 6/11/2019    Fatigue     Loss of appetite     Other ill-defined conditions(799.89)      syncopal episodes    Seizure disorder ([facility] [address] 5/22/2012    Status post VNS (vagus nerve stimulator) placement 12/08/2020       Past Surgical History:  Past Surgical History:   Procedure Laterality Date    HX ADENOIDECTOMY      HX GI      pyloric stenosis 1976    HX ORCHIECTOMY  06/01/2022    Right radical orchiectomy with frozen section    HX ORTHOPAEDIC  07/2020    Middle finger on left hand    HX TONSILLECTOMY      IR INSERT TUNL CVC W PORT OVER 5 YEARS  7/8/2022    VASCULAR SURGERY PROCEDURE UNLIST  12/8/202    Insertion of Vagus Nerve Stimulator        Family History:  Family History   Problem Relation Age of Onset    Diabetes Father     Heart Disease Father     Elevated Lipids Father     Cancer Mother         Breast    Anxiety Mother     COPD Mother    [facility] Other Mother Neuropathy    Sleep Apnea Mother     OSTEOARTHRITIS Mother     Other Brother         Pituitary tumor       Social History:  Social History     Tobacco Use    Smoking status: Never    Smokeless tobacco: Never   Substance Use Topics    Alcohol use: Yes     Alcohol/week: 10.0 standard drinks     Types: 10 Cans of beer per week     Comment: most nights    Drug use: No       Allergies: Allergies   Allergen Reactions    Aspartame Other (comments)     Family believes it causes his seizures         Review of Systems   Review of Systems   Constitutional:  Positive for appetite change, chills, fatigue and fever. HENT:  Negative for congestion and sore throat. Respiratory:  Positive for cough. Negative for chest tightness and shortness of breath. Cardiovascular:  Positive for chest pain. Negative for palpitations. Gastrointestinal:  Negative for abdominal pain, diarrhea, nausea and vomiting. Genitourinary:  Positive for flank pain. Negative for dysuria and hematuria. Musculoskeletal:  Positive for back pain. Negative for myalgias and neck pain. Skin:  Negative for rash and wound. Neurological:  Negative for syncope and light-headedness. Psychiatric/Behavioral:  Negative for confusion. The patient is nervous/anxious. All other systems reviewed and are negative.       Physical Exam   General appearance - well nourished, well appearing, and in no distress  Eyes - pupils equal and reactive, extraocular eye movements intact  ENT - mucous membranes moist, pharynx normal without lesions  Neck - supple, no significant adenopathy; non-tender to palpation  Chest -decreased breath sounds in right base i; non-tender to palpation, port in right upper chest, no erythema or warmth overlying port  Heart -tachycardic, regular rhythm, S1 and S2 normal, no murmurs noted  Abdomen - soft, nontender, nondistended, no masses or organomegaly  Musculoskeletal - no joint tenderness, deformity or swelling; normal ROM  Extremities - peripheral pulses normal, no pedal edema  Skin - normal coloration and turgor, no rashes  Neurological - alert, oriented x3, normal speech, no focal findings or movement disorder noted    Diagnostic Study Results     Labs -     Recent Results (from the past 24 hour(s))   POC LACTIC ACID    Collection Time: 08/21/22 10:42 PM   Result Value Ref Range    Lactic Acid (POC) 2.47 (HH) 0.40 - 2.00 mmol/L   CBC WITH AUTOMATED DIFF    Collection Time: 08/21/22 10:45 PM   Result Value Ref Range    WBC 1.7 (L) 4.1 - 11.1 K/uL    RBC 4.06 (L) 4.10 - 5.70 M/uL    HGB 11.5 (L) 12.1 - 17.0 g/dL    HCT 33.8 (L) 36.6 - 50.3 %    MCV 83.3 80.0 - 99.0 FL    MCH 28.3 26.0 - 34.0 PG    MCHC 34.0 30.0 - 36.5 g/dL    RDW 15.6 (H) 11.5 - 14.5 %    PLATELET 157 (L) 385 - 400 K/uL    MPV 9.1 8.9 - 12.9 FL    NRBC 0.0 0  WBC    ABSOLUTE NRBC 0.00 0.00 - 0.01 K/uL    NEUTROPHILS 52 32 - 75 %    LYMPHOCYTES 35 12 - 49 %    MONOCYTES 12 5 - 13 %    EOSINOPHILS 0 0 - 7 %    BASOPHILS 1 0 - 1 %    IMMATURE GRANULOCYTES 0 0.0 - 0.5 %    ABS. NEUTROPHILS 0.9 (L) 1.8 - 8.0 K/UL    ABS. LYMPHOCYTES 0.6 (L) 0.8 - 3.5 K/UL    ABS. MONOCYTES 0.2 0.0 - 1.0 K/UL    ABS. EOSINOPHILS 0.0 0.0 - 0.4 K/UL    ABS. BASOPHILS 0.0 0.0 - 0.1 K/UL    ABS. IMM. GRANS. 0.0 0.00 - 0.04 K/UL    DF SMEAR SCANNED      RBC COMMENTS ANISOCYTOSIS  1+       METABOLIC PANEL, COMPREHENSIVE    Collection Time: 08/21/22 10:45 PM   Result Value Ref Range    Sodium 135 (L) 136 - 145 mmol/L    Potassium 3.7 3.5 - 5.1 mmol/L    Chloride 104 97 - 108 mmol/L    CO2 22 21 - 32 mmol/L    Anion gap 9 5 - 15 mmol/L    Glucose 134 (H) 65 - 100 mg/dL    BUN 17 6 - 20 MG/DL    Creatinine 0.99 0.70 - 1.30 MG/DL    BUN/Creatinine ratio 17 12 - 20      GFR est AA >60 >60 ml/min/1.73m2    GFR est non-AA >60 >60 ml/min/1.73m2    Calcium 9.1 8.5 - 10.1 MG/DL    Bilirubin, total 0.2 0.2 - 1.0 MG/DL    ALT (SGPT) 47 12 - 78 U/L    AST (SGOT) 40 (H) 15 - 37 U/L    Alk. phosphatase 95 45 - 117 U/L    Protein, total 8.1 6.4 - 8.2 g/dL    Albumin 3.5 3.5 - 5.0 g/dL    Globulin 4.6 (H) 2.0 - 4.0 g/dL    A-G Ratio 0.8 (L) 1.1 - 2.2     TROPONIN-HIGH SENSITIVITY    Collection Time: 08/21/22 10:45 PM   Result Value Ref Range    Troponin-High Sensitivity <4 0 - 76 ng/L   SAMPLES BEING HELD    Collection Time: 08/21/22 10:45 PM   Result Value Ref Range    SAMPLES BEING HELD RD SST BL     COMMENT        Add-on orders for these samples will be processed based on acceptable specimen integrity and analyte stability, which may vary by analyte.    COVID-19 RAPID TEST    Collection Time: 08/21/22 11:44 PM   Result Value Ref Range    Specimen source Nasopharyngeal      COVID-19 rapid test Not detected NOTD     EKG, 12 LEAD, INITIAL    Collection Time: 08/22/22 12:10 AM   Result Value Ref Range    Ventricular Rate 90 BPM    Atrial Rate 90 BPM    P-R Interval 138 ms    QRS Duration 90 ms    Q-T Interval 348 ms    QTC Calculation (Bezet) 425 ms    Calculated P Axis 52 degrees    Calculated R Axis 42 degrees    Calculated T Axis 40 degrees    Diagnosis       Normal sinus rhythm  Normal ECG  When compared with ECG of 16-OCT-2019 11:44,  premature atrial complexes are no longer present  Confirmed by Erika Esptiia ([id number]) on 8/22/2022 3:22:00 PM     PROCALCITONIN    Collection Time: 08/22/22  2:25 AM   Result Value Ref Range    Procalcitonin <0.05 ng/mL   LACTIC ACID    Collection Time: 08/22/22  2:25 AM   Result Value Ref Range    Lactic acid 0.8 0.4 - 2.0 MMOL/L   URINALYSIS W/ REFLEX CULTURE    Collection Time: 08/22/22  2:51 AM    Specimen: Urine   Result Value Ref Range    Color DARK YELLOW      Appearance CLEAR CLEAR      Specific gravity 1.010 1.003 - 1.030      pH (UA) 6.0 5.0 - 8.0      Protein TRACE (A) NEG mg/dL    Glucose Negative NEG mg/dL    Ketone Negative NEG mg/dL    Bilirubin Negative NEG      Blood Negative NEG      Urobilinogen 1.0 0.2 - 1.0 EU/dL    Nitrites Negative NEG Leukocyte Esterase Negative NEG      UA:UC IF INDICATED CULTURE NOT INDICATED BY UA RESULT CNI      WBC 0-4 0 - 4 /hpf    RBC 0-5 0 - 5 /hpf    Epithelial cells FEW FEW /lpf    Bacteria Negative NEG /hpf    Hyaline cast 0-2 0 - 2 /lpf   METABOLIC PANEL, BASIC    Collection Time: 08/22/22  4:18 AM   Result Value Ref Range    Sodium 134 (L) 136 - 145 mmol/L    Potassium 4.2 3.5 - 5.1 mmol/L    Chloride 107 97 - 108 mmol/L    CO2 20 (L) 21 - 32 mmol/L    Anion gap 7 5 - 15 mmol/L    Glucose 109 (H) 65 - 100 mg/dL    BUN 17 6 - 20 MG/DL    Creatinine 0.68 (L) 0.70 - 1.30 MG/DL    BUN/Creatinine ratio 25 (H) 12 - 20      GFR est AA >60 >60 ml/min/1.73m2    GFR est non-AA >60 >60 ml/min/1.73m2    Calcium 8.1 (L) 8.5 - 10.1 MG/DL       Radiologic Studies -   XR CHEST PORT   Final Result   2 bilateral airspace disease may represent pneumonia. Radiographic   follow-up is recommended to assure complete resolution. CTA CHEST W OR W WO CONT    (Results Pending)     CT Results  (Last 48 hours)      None          CXR Results  (Last 48 hours)                 08/21/22 2323  XR CHEST PORT Final result    Impression:  2 bilateral airspace disease may represent pneumonia. Radiographic   follow-up is recommended to assure complete resolution. Narrative:  INDICATION: Cough and fever       COMPARISON: December 4, 2020       FINDINGS: AP portable imaging of the chest performed at 11:07 PM demonstrates a   stable cardiomediastinal silhouette. Right internal jugular Port-A-Cath has its   tip overlying the right atrium. Lung volumes are diminished. There is new patchy   bilateral airspace disease. .                     Medical Decision Making   I am the first provider for this patient. I reviewed the vital signs, available nursing notes, past medical history, past surgical history, family history and social history. Vital Signs-Reviewed the patient's vital signs.   Patient Vitals for the past 12 hrs:   Temp Pulse Resp BP SpO2   08/21/22 2256 (!) 103.2 °F (39.6 °C) -- -- -- --   08/21/22 2222 99.2 °F (37.3 °C) (!) 118 16 133/80 98 %       EKG: NSR, 90 bpm, normal axis, normal IN, QRS, QTC intervals, no ischemic changes    Records Reviewed: Nursing Notes and Old Medical Records    Provider Notes (Medical Decision Making):   DDx: neurtropenic fever, pneumonia, uti, bacteremia, viral syndrome, COVID  We will check CBC, CMP, lactate, paired cultures, UA, chest x-ray, COVID swab. Will treat with IV fluids and broad-spectrum antibiotics. ED Course:   Initial assessment performed. The patients presenting problems have been discussed, and they are in agreement with the care plan formulated and outlined with them. I have encouraged them to ask questions as they arise throughout their visit. Progress Notes:  Labs and imaging reviewed. Patient is neutropenic with white count of 1.7, 52% neutrophils with ANC of approximately 850. UA is unremarkable. Chest x-ray is markedly abnormal.  CT of chest obtained which shows intrathoracic stomach with right middle lobe collapse and right lower lobe consolidation and acute PE in right middle lobe. Case discussed with Dr. Sergey Corona (hospitalist) who will see and admit patient. He will start anticoagulation for PE which was reported on CT after patient was admitted. Disposition:  Admit to hospitalist        Diagnosis     Clinical Impression:   1. Sepsis, due to unspecified organism, unspecified whether acute organ dysfunction present ([address]    2. Chemotherapy-induced neutropenia (HCC)    3. Pneumonia of right lower lobe due to infectious organism    4.  Malignant neoplasm of testicle, unspecified laterality, unspecified whether descended or undescended ([address]

## 2022-08-22 NOTE — PROGRESS NOTES
Pharmacy Antimicrobial Kinetic Dosing    Indication for Antimicrobials: Sepsis, Cancer patient undergoing chemo     Current Regimen of Each Antimicrobial:  Cefepime 2 gm IV q 8h  (Start Date ; Day # 1)  Vancomycin 2000 mg IV x1 then pharmacy to dose    (Start Date ; Day # 1)    Goal Level: AUC: 400-600 mg/hr/Liter/day    Date Dose & Interval Measured (mcg/mL) Predicted AUC/JENNY                       Date & time of next level:  24-48hrs    Dosing calculator used: [employer] calculator    Significant Positive Cultures:       Conditions for Dosing Consideration: None    Labs:  Recent Labs     22  2245   CREA 0.99   BUN 17     Recent Labs     22  2245   WBC 1.7*     Temp (24hrs), Av.4 °F (38.6 °C), Min:99.2 °F (37.3 °C), Max:103.2 °F (39.6 °C)        Creatinine Clearance (mL/min):   CrCl (Ideal Body Weight): 93.2   If actual weight < IBW: CrCl (Actual Body Weight) 103.5    Impression/Plan:   Will continue with Vancomycin 1000 mg IV q 12h for AUC estimated of ~   500  Daily BMP per Vancomycin dosing protocol   Antimicrobial stop date: To be determined     Pharmacy will follow daily and adjust medications as appropriate for renal function and/or serum levels.     Thank you,  Lizett Smith, [facility]

## 2022-08-22 NOTE — TELEPHONE ENCOUNTER
[address] at [place]  [phone]    Patient's mother called this evening to report the patient had a fever of 100.9. He is feeling fatigued, poor appetite, mild cough. No other symptoms of infection. I advised that they perform a home COVID19 test.  If positive, we can consider treatment with Paxlovid. If negative, then I advised them to recheck his temperate about 1-2 hours after his last check. If fevers have resolved, then we can monitor tonight and reassess when the office opens up tomorrow. If persistent, then I would be concerned for neutropenic fever and he should go to the ED for urgent evaluation to include CBC with diff, blood cultures, CXR, UA, and a dose of IV abx such as Cepfepime.

## 2022-08-23 LAB
ANION GAP SERPL CALC-SCNC: 7 MMOL/L (ref 5–15)
BACTERIA SPEC CULT: NORMAL
BACTERIA SPEC CULT: NORMAL
BASOPHILS # BLD: 0 K/UL (ref 0–0.1)
BASOPHILS NFR BLD: 0 % (ref 0–1)
BUN SERPL-MCNC: 11 MG/DL (ref 6–20)
BUN/CREAT SERPL: 14 (ref 12–20)
CALCIUM SERPL-MCNC: 9.4 MG/DL (ref 8.5–10.1)
CHLORIDE SERPL-SCNC: 107 MMOL/L (ref 97–108)
CO2 SERPL-SCNC: 23 MMOL/L (ref 21–32)
CREAT SERPL-MCNC: 0.81 MG/DL (ref 0.7–1.3)
DIFFERENTIAL METHOD BLD: ABNORMAL
EOSINOPHIL # BLD: 0 K/UL (ref 0–0.4)
EOSINOPHIL NFR BLD: 0 % (ref 0–7)
ERYTHROCYTE [DISTWIDTH] IN BLOOD BY AUTOMATED COUNT: 16.2 % (ref 11.5–14.5)
GLUCOSE SERPL-MCNC: 107 MG/DL (ref 65–100)
HCT VFR BLD AUTO: 30.1 % (ref 36.6–50.3)
HGB BLD-MCNC: 9.7 G/DL (ref 12.1–17)
IMM GRANULOCYTES # BLD AUTO: 0 K/UL (ref 0–0.04)
IMM GRANULOCYTES NFR BLD AUTO: 1 % (ref 0–0.5)
LYMPHOCYTES # BLD: 1.1 K/UL (ref 0.8–3.5)
LYMPHOCYTES NFR BLD: 45 % (ref 12–49)
MCH RBC QN AUTO: 27.4 PG (ref 26–34)
MCHC RBC AUTO-ENTMCNC: 32.2 G/DL (ref 30–36.5)
MCV RBC AUTO: 85 FL (ref 80–99)
MONOCYTES # BLD: 0.4 K/UL (ref 0–1)
MONOCYTES NFR BLD: 17 % (ref 5–13)
NEUTS SEG # BLD: 0.9 K/UL (ref 1.8–8)
NEUTS SEG NFR BLD: 36 % (ref 32–75)
NRBC # BLD: 0 K/UL (ref 0–0.01)
NRBC BLD-RTO: 0 PER 100 WBC
PLATELET # BLD AUTO: 105 K/UL (ref 150–400)
PMV BLD AUTO: 8.6 FL (ref 8.9–12.9)
POTASSIUM SERPL-SCNC: 3.9 MMOL/L (ref 3.5–5.1)
RBC # BLD AUTO: 3.54 M/UL (ref 4.1–5.7)
SERVICE CMNT-IMP: NORMAL
SODIUM SERPL-SCNC: 137 MMOL/L (ref 136–145)
WBC # BLD AUTO: 2.5 K/UL (ref 4.1–11.1)

## 2022-08-23 PROCEDURE — 94760 N-INVAS EAR/PLS OXIMETRY 1: CPT

## 2022-08-23 PROCEDURE — 85025 COMPLETE CBC W/AUTO DIFF WBC: CPT

## 2022-08-23 PROCEDURE — 74011250636 HC RX REV CODE- 250/636: Performed by: STUDENT IN AN ORGANIZED HEALTH CARE EDUCATION/TRAINING PROGRAM

## 2022-08-23 PROCEDURE — 74011000250 HC RX REV CODE- 250: Performed by: STUDENT IN AN ORGANIZED HEALTH CARE EDUCATION/TRAINING PROGRAM

## 2022-08-23 PROCEDURE — 74011250637 HC RX REV CODE- 250/637: Performed by: STUDENT IN AN ORGANIZED HEALTH CARE EDUCATION/TRAINING PROGRAM

## 2022-08-23 PROCEDURE — 36415 COLL VENOUS BLD VENIPUNCTURE: CPT

## 2022-08-23 PROCEDURE — 74011000258 HC RX REV CODE- 258: Performed by: STUDENT IN AN ORGANIZED HEALTH CARE EDUCATION/TRAINING PROGRAM

## 2022-08-23 PROCEDURE — 80048 BASIC METABOLIC PNL TOTAL CA: CPT

## 2022-08-23 PROCEDURE — 65270000046 HC RM TELEMETRY

## 2022-08-23 RX ADMIN — TOPIRAMATE 200 MG: 100 TABLET, FILM COATED ORAL at 23:10

## 2022-08-23 RX ADMIN — CEFEPIME 2 G: 2 INJECTION, POWDER, FOR SOLUTION INTRAVENOUS at 09:01

## 2022-08-23 RX ADMIN — ACETAMINOPHEN 650 MG: 325 TABLET ORAL at 13:47

## 2022-08-23 RX ADMIN — SODIUM CHLORIDE, PRESERVATIVE FREE 10 ML: 5 INJECTION INTRAVENOUS at 23:13

## 2022-08-23 RX ADMIN — CEFEPIME 2 G: 2 INJECTION, POWDER, FOR SOLUTION INTRAVENOUS at 20:20

## 2022-08-23 RX ADMIN — BRIVARACETAM 100 MG: 50 TABLET, FILM COATED ORAL at 23:11

## 2022-08-23 RX ADMIN — TOPIRAMATE 200 MG: 100 TABLET, FILM COATED ORAL at 10:46

## 2022-08-23 RX ADMIN — ENOXAPARIN SODIUM 80 MG: 100 INJECTION SUBCUTANEOUS at 20:14

## 2022-08-23 RX ADMIN — BRIVARACETAM 100 MG: 50 TABLET, FILM COATED ORAL at 10:46

## 2022-08-23 RX ADMIN — ACETAMINOPHEN 650 MG: 325 TABLET ORAL at 20:14

## 2022-08-23 RX ADMIN — SERTRALINE 50 MG: 50 TABLET, FILM COATED ORAL at 09:02

## 2022-08-23 RX ADMIN — VANCOMYCIN HYDROCHLORIDE 1000 MG: 1 INJECTION, POWDER, LYOPHILIZED, FOR SOLUTION INTRAVENOUS at 13:51

## 2022-08-23 RX ADMIN — METRONIDAZOLE 500 MG: 500 INJECTION, SOLUTION INTRAVENOUS at 20:35

## 2022-08-23 RX ADMIN — METRONIDAZOLE 500 MG: 500 INJECTION, SOLUTION INTRAVENOUS at 04:18

## 2022-08-23 RX ADMIN — ENOXAPARIN SODIUM 80 MG: 100 INJECTION SUBCUTANEOUS at 09:02

## 2022-08-23 RX ADMIN — VANCOMYCIN HYDROCHLORIDE 1000 MG: 1 INJECTION, POWDER, LYOPHILIZED, FOR SOLUTION INTRAVENOUS at 03:11

## 2022-08-23 NOTE — PROGRESS NOTES
CM staffed case with clinical team, regarding pts d/c.  CM informed that pt is being monitored for Neutropenic fever at this time. CM informed that pt will return home with eliquis and prescription was sent to pts pharmacy. CM will contact pharm to verify prescription cost.    CM spoke with pharmacist at [facility], and it was reported that pt will have $0.00 copay for prescription. CM will inform clinical team and pt of the following. CM will continue to follow.     JING Guzman, [address] [place] [address]

## 2022-08-23 NOTE — PROGRESS NOTES
Hospitalist Progress Note    NAME: Elsa Cotto   :  1975   MRN:  [id number]     Admit date: 2022    Today's date: 22    PCP: Lucia Dillon MD      Anticipated discharge date:     Barriers:  still febrile and neutropenic    Assessment / Plan:    Severe sepsis POA temp 103.2, , RR 24, WBC 1.6, lactate 2.47  Neutropenic fever  POA  Acute pulmonary embolism of right middle lobe POA  Right middle lobe collapse with right lower lobe consolidation concerning for pneumonia  Receiving chemotherapy for testicular cancer  Oncology following  , now rising to 900  Still with fevers overnight to 101.3  Blood cultures negative  Sputum culture not sent  Vancomycin and cefepime IV, day 2  Incentive spirometry  Right posterior chest pain well controlled with acetaminophen  Therapeutic Lovenox, transition to NOAC at discharge, d/w oncology   Check pricing and coverage for eliquis  Serial labs  Change to oral antibiotics once fever and neutropenia resolved  Spoke with mother at bedside    Stage III testicular cancer currently undergoing chemotherapy (cisplatin and etoposide)  Oncology following     High functioning autism  Patient with good social support and living independently at home     Seizure disorder on multiple antiepileptics with implanted vagal nerve stimulator  Continue PTA antiepileptics  Briviact  Cenobamate  Topiramate     Essential hypertension  Hold PTA antihypertensives in setting of low/normal blood pressures and acute infection  PRN hydralazine     Alcohol dependence  Patient with no history of withdrawal symptoms and has tolerated cessation well in the past  Will monitor and initiate CIWA protocol if indicated     Overweight POA Body mass index is 25.55 kg/m².        Code Status: Full  Surrogate Decision Maker:   Jessica Sylvester Mother [phone] [address] [facility] [place] Parent [phone]          DVT Prophylaxis: Therapeutic Lovenox  GI Prophylaxis: not indicated  Diet: Regular       Subjective:     Chief Complaint / Reason for Physician Visit  \"My right mid back is still sore\". Discussed with RN events overnight. Seen with mother at bedside  Still with fevers top 101.3, ANC remains 900  Mild cough  Right upper flank mild discomfort, not pleuritic  No N/V, CP or abdominal pain, diarrhea  No HA or SOB    Review of Systems:  Symptom Y/N Comments  Symptom Y/N Comments   Fever/Chills y   Chest Pain y    Poor Appetite    Edema     Cough y   Abdominal Pain n    Sputum    Joint Pain     SOB/EISENBERG n   Headache     Nausea/vomit n   Tolerating PT/OT     Diarrhea n   Tolerating Diet y    Constipation    Other       Could NOT obtain due to:      Objective:     VITALS:   Last 24hrs VS reviewed since prior progress note. Most recent are:  Patient Vitals for the past 24 hrs:   Temp Pulse Resp BP SpO2   08/23/22 0410 99.1 °F (37.3 °C) 86 16 110/61 96 %   08/23/22 0019 98.1 °F (36.7 °C) 93 18 109/72 97 %   08/22/22 2327 (!) 101.3 °F (38.5 °C) -- -- -- --   08/22/22 1953 99.5 °F (37.5 °C) 99 18 128/66 95 %   08/22/22 1138 98.7 °F (37.1 °C) -- -- -- --   08/22/22 1115 -- 90 24 117/76 97 %   08/22/22 1030 -- 91 22 117/66 99 %   08/22/22 0930 -- 94 26 114/76 94 %   08/22/22 0830 -- 87 23 118/72 97 %       No intake or output data in the 24 hours ending 08/23/22 0806     Wt Readings from Last 12 Encounters:   08/21/22 78.5 kg (173 lb)   08/11/22 81.4 kg (179 lb 6.4 oz)   08/10/22 81.6 kg (179 lb 14.4 oz)   08/09/22 80 kg (176 lb 4.8 oz)   08/08/22 79 kg (174 lb 1.6 oz)   08/08/22 79 kg (174 lb 2.6 oz)   07/21/22 82 kg (180 lb 12.8 oz)   07/20/22 82.4 kg (181 lb 11.2 oz)   07/19/22 80.7 kg (178 lb)   07/18/22 79.5 kg (175 lb 3.2 oz)   07/18/22 79.5 kg (175 lb 4.3 oz)   06/29/22 78.1 kg (172 lb 3.2 oz)       PHYSICAL EXAM:    I had a face to face encounter and independently examined this patient on 08/23/22 as outlined below:    General: WD, WN.  Alert, cooperative, no acute distress    EENT:  PERRL. Anicteric sclerae. MMM  Neck:  No meningismus, no thyromegaly  Resp:  Decreased BS at bases bilaterally, no wheezing No accessory muscle use  CV:  Regular  rhythm,  No edema  GI:  Soft, Non distended, Non tender. +Bowel sounds, no rebound  Neurologic:  Alert, normal speech, non focal motor exam  Psych:   Flat affect Not anxious nor agitated  Skin:  No rashes. No jaundice    Reviewed most current lab test results and cultures  YES  Reviewed most current radiology test results   YES  Review and summation of old records today    NO  Reviewed patient's current orders and MAR    YES  PMH/SH reviewed - no change compared to H&P  ________________________________________________________________________  Care Plan discussed with:    Comments   Patient x    Family  x mother   RN x    Care Manager     Consultant                        Multidiciplinary team rounds were held today with , nursing, pharmacist and clinical coordinator. Patient's plan of care was discussed; medications were reviewed and discharge planning was addressed. ________________________________________________________________________      Comments   >50% of visit spent in counseling and coordination of care     ________________________________________________________________________  Aleksandra Rodriguez MD     Procedures: see electronic medical records for all procedures/Xrays and details which were not copied into this note but were reviewed prior to creation of Plan. LABS:  I reviewed today's most current labs and imaging studies.   Pertinent labs include:  Recent Labs     08/23/22  0439 08/21/22  2245   WBC 2.5* 1.7*   HGB 9.7* 11.5*   HCT 30.1* 33.8*   * 138*       Recent Labs     08/23/22  0439 08/22/22  0418 08/21/22  2245    134* 135*   K 3.9 4.2 3.7    107 104   CO2 23 20* 22   * 109* 134*   BUN 11 17 17   CREA 0.81 0.68* 0.99   CA 9.4 8.1* 9.1   ALB  --   --  3.5 TBILI  --   --  0.2   ALT  --   --  47

## 2022-08-23 NOTE — PROGRESS NOTES
[address], [address] [facility], [address] [place] [address]  [phone]       Oncology Progress Note        Patient: Marielena Jane MRN: [id number]  SSN: [id number]    YOB: 1975  Age: 56 y.o. Sex: male          Diagnosis:     1. 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 Starting EP cycle 1 day 1     Subjective:      Marielena Jane is a 56 y.o. 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 He experienced pain in the right groin approximately 4-6 weeks ago. He underwent USG and was noted to have enlarged right testis. He then underwent a right radical orchiectomy on 06/02/2022. The pathology revealed mixed germ cell tumor with 5% seminoma, 40% yolk sac tumor and teratoma with immature elements 55%. AFP is elevated at 35 and quant beta-HcG is normal and LDH is normal. CT shows enlarge mediastinal nodes and b/l lung nodules. He comes in to discuss the next steps. He suffers with Asperger's syndrome. Interval History:     8/22/2022 Patient seen at bedside in ED with mother and father. They brought him to the ED when he spiked a fever at home. Father reports patient has been sleeping a lot and not moving around as much since he started treatment. He was found to have PE on CTA chest.      8/23/2022 Patient seen at bedside with mother. He was resting quietly. No new concerns/questions.      Review of Systems:    Constitutional: fatigue, depression, insomnia   Eyes: negative  Ears, Nose, Mouth, Throat, and Face: negative  Respiratory: negative  Cardiovascular: negative  Gastrointestinal: negative  Genitourinary:negative  Integument/Breast: negative  Hematologic/Lymphatic: negative  Musculoskeletal:negative  Neurological: negative    Review of systems was reviewed and updated as needed on 08/23/22. Past Medical History:   Diagnosis Date    Alcohol withdrawal ([facility] [address] 10/31/2014    Asperger syndrome 12/14/2011    Autism     dx 2010- highly functional    Cancer ([facility] [address]     testicular    Convulsive syncope 6/11/2019    Fatigue     Loss of appetite     Other ill-defined conditions(799.89)      syncopal episodes    Seizure disorder ([facility] [address] 5/22/2012    Status post VNS (vagus nerve stimulator) placement 12/08/2020     Past Surgical History:   Procedure Laterality Date    HX ADENOIDECTOMY      HX GI      pyloric stenosis 1975    HX ORCHIECTOMY  06/01/2022    Right radical orchiectomy with frozen section    HX ORTHOPAEDIC  07/2020    Middle finger on left hand    HX TONSILLECTOMY      IR INSERT TUNL CVC W PORT OVER 5 YEARS  7/8/2022    VASCULAR SURGERY PROCEDURE UNLIST  12/8/202    Insertion of Vagus Nerve Stimulator       Family History   Problem Relation Age of Onset    Diabetes Father     Heart Disease Father     Elevated Lipids Father     Cancer Mother         Breast    Anxiety Mother     COPD Mother     Other Mother         Neuropathy    Sleep Apnea Mother     OSTEOARTHRITIS Mother     Other Brother         Pituitary tumor     Social History     Tobacco Use    Smoking status: Never    Smokeless tobacco: Never   Substance Use Topics    Alcohol use: Yes     Alcohol/week: 10.0 standard drinks     Types: 10 Cans of beer per week     Comment: most nights      Prior to Admission medications    Medication Sig Start Date End Date Taking? Authorizing Provider   apixaban (Eliquis) 5 mg tablet 10 mg (two tabs) twice per day for 7 days, then 5 mg (one tab) twice per day till stopped  Indications: a clot in the lung 8/23/22  Yes Domingo Avelar MD   brivaracetam (Briviact) 100 mg tablet Take 1 Tablet by mouth two (2) times a day.  Max Daily Amount: 200 mg. 8/19/22  Yes Mickey Liu MD Xcopri 50 mg tab tablet TAKE 1 TABLET BY MOUTH DAILY. MAX DAILY AMOUNT: 50 MG 8/18/22  Yes Eusebia Ng MD   Xcopri 200 mg tab TAKE 1 TABLET BY MOUTH DAILY. MAX DAILY AMOUNT: 200 MG 7/15/22  Yes Eusebia Ng MD   ondansetron (ZOFRAN ODT) 4 mg disintegrating tablet Take 1 Tablet by mouth every eight (8) hours as needed for Nausea or Vomiting. 6/29/22  Yes Shanda Flores MD   prochlorperazine (Compazine) 10 mg tablet Take 1 Tablet by mouth every six (6) hours as needed for Nausea or Vomiting. 6/29/22  Yes Shanda Flores MD   lidocaine-prilocaine (EMLA) topical cream Apply  to affected area as needed for Pain. Apply generous amount to port site 30 min prior to infusions and cover with plastic wrap 6/29/22  Yes Shanda Flores MD   sertraline (ZOLOFT) 50 mg tablet Take 1 Tablet by mouth daily. 12/8/21  Yes Eusebia Ng MD   topiramate (TOPAMAX) 200 mg tablet TAKE 1 TABLET TWICE DAILY 12/8/21  Yes Eusebia Ng MD   MULTIVITAMIN PO Take 1 Tab by mouth daily. Yes Provider, Historical              Allergies   Allergen Reactions    Aspartame Other (comments)     Family believes it causes his seizures           Objective:     Vitals:    08/22/22 2327 08/23/22 0019 08/23/22 0410 08/23/22 0815   BP:  109/72 110/61 111/73   Pulse:  93 86 64   Resp:  18 16 18   Temp: (!) 101.3 °F (38.5 °C) 98.1 °F (36.7 °C) 99.1 °F (37.3 °C) 98.9 °F (37.2 °C)   SpO2:  97% 96% 95%   Weight:       Height:          Pain Scale: /10      Physical Exam:  GENERAL: alert, cooperative, no distress, appears stated age  EYE: conjunctivae/corneas clear. LYMPHATIC: Cervical, supraclavicular, and axillary nodes normal.   THROAT & NECK: normal and no erythema or exudates noted. LUNG: clear to auscultation bilaterally  HEART: regular rate and rhythm  ABDOMEN: soft, non-tender.  Bowel sounds normal. No masses,  no organomegaly  EXTREMITIES:  extremities normal, atraumatic, no cyanosis or edema  SKIN: Normal.  NEUROLOGIC: AOx3.       CT Results (most recent):  Results from Hospital Encounter encounter on 08/21/22    CTA CHEST W OR W WO CONT    Narrative  INDICATION: Fever. History of testicular cancer. Upper back pain. COMPARISON:June 17, 2022    TECHNIQUE:  Routine noncontrast imaging the chest was performed for localization purposes. Then, following the uneventful intravenous administration of 100 cc [id number],  thin helical axial images were obtained through the chest. 3D image  postprocessing was performed. CT dose reduction was achieved through use of a  standardized protocol tailored for this examination and automatic exposure  control for dose modulation. FINDINGS:    CHEST WALL: No chest wall mass or axillary lymphadenopathy. Right internal  jugular Port-A-Cath terminates in the right atrium. THYROID: No nodule. MEDIASTINUM: Enlarged mediastinal lymph nodes measure up to 14 mm in short axis  diameter, similar to the prior study. MELA: Unchanged low-grade right hilar lymphadenopathy. THORACIC AORTA: No dissection or aneurysm. PULMONARY ARTERIES: Main pulmonary artery is normal in caliber. There is acute  thrombus in the right middle lobe pulmonary arteries. TRACHEA/BRONCHI: Patent. ESOPHAGUS: No wall thickening or dilatation. HEART: Normal in size. PLEURA: Small right pleural effusion. LUNGS: Right middle lobe collapse. Right lower lobe consolidation. Numerous  small bilateral pulmonary nodules measuring up to 4 mm are not significantly  changed. INCIDENTALLY IMAGED UPPER ABDOMEN: Unchanged eventration of the right  hemidiaphragm. BONES: Unchanged sclerotic bone lesions. ADDITIONAL COMMENTS: N/A    Impression  Acute pulmonary embolus in the right middle lobe. Unchanged mediastinal and  right hilar lymphadenopathy. Small right pleural effusion. Right middle lobe  collapse. Right lower lobe consolidation. Unchanged small bilateral pulmonary  nodules.     The findings were called to Dr. Drake Cornelius on 8/22/2022 at 1:08 AM by Dr. Proctor Sep. [phone]    Lab Results   Component Value Date/Time    WBC 2.5 (L) 08/23/2022 04:39 AM    HGB 9.7 (L) 08/23/2022 04:39 AM    Hematocrit (POC) 44 06/10/2019 04:29 PM    HCT 30.1 (L) 08/23/2022 04:39 AM    PLATELET 472 (L) 61/39/0362 04:39 AM    MCV 85.0 08/23/2022 04:39 AM         Lab Results   Component Value Date/Time    Sodium 137 08/23/2022 04:39 AM    Potassium 3.9 08/23/2022 04:39 AM    Chloride 107 08/23/2022 04:39 AM    CO2 23 08/23/2022 04:39 AM    Anion gap 7 08/23/2022 04:39 AM    Glucose 107 (H) 08/23/2022 04:39 AM    BUN 11 08/23/2022 04:39 AM    Creatinine 0.81 08/23/2022 04:39 AM    BUN/Creatinine ratio 14 08/23/2022 04:39 AM    GFR est AA >60 08/23/2022 04:39 AM    GFR est non-AA >60 08/23/2022 04:39 AM    Calcium 9.4 08/23/2022 04:39 AM    Bilirubin, total 0.2 08/21/2022 10:45 PM    Alk. phosphatase 95 08/21/2022 10:45 PM    Protein, total 8.1 08/21/2022 10:45 PM    Albumin 3.5 08/21/2022 10:45 PM    Globulin 4.6 (H) 08/21/2022 10:45 PM    A-G Ratio 0.8 (L) 08/21/2022 10:45 PM    ALT (SGPT) 47 08/21/2022 10:45 PM    AST (SGOT) 40 (H) 08/21/2022 10:45 PM           Assessment:     1. 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 Thus I recommend administering 3 cycles of BEP as adjuvant treatment. Patient was unable to complete Pulmonary Function testing as he could not follow instructions  Thus I changed his treatment to EP X 4    2. Neutropenic Fever   R/t recent chemotherapy   On broad spectrum antibiotics   ANC trending up     3.  Provoked Acute Pulmonary Embolism r/t Immobility and Malignancy   CTA - Acute pulmonary embolus in the right middle lobe. Unchanged mediastinal and right hilar lymphadenopathy. Small right pleural effusion. Right middle lobe collapse. Right lower lobe consolidation. Unchanged small bilateral pulmonary nodules. Father reports patient has been sleeping a lot and not as mobile since starting treatment. 4. Alcohol Abuse r/t Insomnia and Depression  Reports drinking 3-4 beers a night to help with insomnia  Also reports he is depressed as he is still single at age 56  On Zoloft as OP    Plan:     > Agree with broad spectrum antibiotics pending infection work up. Pending results, transition home with oral antibiotics. > ANC trending up, no need for granix   > Agree with lovenox and transition to [address] for discharge   > Checking to see if eliquis is affordable, can give coupon and work on coverage as OP if needed   > Discussed significant alcohol use for insomnia and patient reports depression. Patient agreed alcohol intake is unhealthy. Willing to see Palliative as OP to discuss sleep aid and depression. Will arrange OP Palliative referral.      Will continue to follow as IP and arrange appropriate OP follow up. Signed by: Susu Torres NP                     August 23, 2022      CC. Jocelyn Neal MD  CC.  Kimberley Angeles MD

## 2022-08-24 VITALS
OXYGEN SATURATION: 97 % | HEART RATE: 90 BPM | SYSTOLIC BLOOD PRESSURE: 101 MMHG | BODY MASS INDEX: 25.62 KG/M2 | DIASTOLIC BLOOD PRESSURE: 61 MMHG | TEMPERATURE: 97.7 F | RESPIRATION RATE: 17 BRPM | WEIGHT: 173 LBS | HEIGHT: 69 IN

## 2022-08-24 DIAGNOSIS — C62.91 CARCINOMA OF RIGHT TESTIS (HCC): Primary | ICD-10-CM

## 2022-08-24 LAB
ANION GAP SERPL CALC-SCNC: 7 MMOL/L (ref 5–15)
BASOPHILS # BLD: 0 K/UL (ref 0–0.1)
BASOPHILS NFR BLD: 0 % (ref 0–1)
BUN SERPL-MCNC: 15 MG/DL (ref 6–20)
BUN/CREAT SERPL: 26 (ref 12–20)
CALCIUM SERPL-MCNC: 8.5 MG/DL (ref 8.5–10.1)
CHLORIDE SERPL-SCNC: 109 MMOL/L (ref 97–108)
CO2 SERPL-SCNC: 22 MMOL/L (ref 21–32)
CREAT SERPL-MCNC: 0.57 MG/DL (ref 0.7–1.3)
DATE LAST DOSE: NORMAL
DIFFERENTIAL METHOD BLD: ABNORMAL
EOSINOPHIL # BLD: 0 K/UL (ref 0–0.4)
EOSINOPHIL NFR BLD: 0 % (ref 0–7)
ERYTHROCYTE [DISTWIDTH] IN BLOOD BY AUTOMATED COUNT: 16.4 % (ref 11.5–14.5)
FLUID CULTURE, SPNG2: NORMAL
GLUCOSE SERPL-MCNC: 106 MG/DL (ref 65–100)
HCT VFR BLD AUTO: 22.9 % (ref 36.6–50.3)
HGB BLD-MCNC: 7.6 G/DL (ref 12.1–17)
IMM GRANULOCYTES # BLD AUTO: 0 K/UL (ref 0–0.04)
IMM GRANULOCYTES NFR BLD AUTO: 0 % (ref 0–0.5)
LYMPHOCYTES # BLD: 0.7 K/UL (ref 0.8–3.5)
LYMPHOCYTES NFR BLD: 53 % (ref 12–49)
MCH RBC QN AUTO: 28 PG (ref 26–34)
MCHC RBC AUTO-ENTMCNC: 33.2 G/DL (ref 30–36.5)
MCV RBC AUTO: 84.5 FL (ref 80–99)
MONOCYTES # BLD: 0.2 K/UL (ref 0–1)
MONOCYTES NFR BLD: 12 % (ref 5–13)
NEUTS SEG # BLD: 0.5 K/UL (ref 1.8–8)
NEUTS SEG NFR BLD: 35 % (ref 32–75)
NRBC # BLD: 0 K/UL (ref 0–0.01)
NRBC BLD-RTO: 0 PER 100 WBC
ORGANISM ID, SPNG3: NORMAL
PLATELET # BLD AUTO: 87 K/UL (ref 150–400)
PLEASE NOTE, SPNG4: NORMAL
PMV BLD AUTO: 9 FL (ref 8.9–12.9)
POTASSIUM SERPL-SCNC: 3.9 MMOL/L (ref 3.5–5.1)
RBC # BLD AUTO: 2.71 M/UL (ref 4.1–5.7)
RBC MORPH BLD: ABNORMAL
REPORTED DOSE,DOSE: NORMAL UNITS
REPORTED DOSE/TIME,TMG: NORMAL
S PNEUM AG SPEC QL LA: NEGATIVE
SODIUM SERPL-SCNC: 138 MMOL/L (ref 136–145)
SPECIMEN SOURCE: NORMAL
SPECIMEN, SPNG1: NORMAL
VANCOMYCIN TROUGH SERPL-MCNC: 6 UG/ML (ref 5–10)
WBC # BLD AUTO: 1.4 K/UL (ref 4.1–11.1)

## 2022-08-24 PROCEDURE — 80202 ASSAY OF VANCOMYCIN: CPT

## 2022-08-24 PROCEDURE — 74011250637 HC RX REV CODE- 250/637: Performed by: NURSE PRACTITIONER

## 2022-08-24 PROCEDURE — 74011250637 HC RX REV CODE- 250/637: Performed by: STUDENT IN AN ORGANIZED HEALTH CARE EDUCATION/TRAINING PROGRAM

## 2022-08-24 PROCEDURE — 94760 N-INVAS EAR/PLS OXIMETRY 1: CPT

## 2022-08-24 PROCEDURE — 36415 COLL VENOUS BLD VENIPUNCTURE: CPT

## 2022-08-24 PROCEDURE — 85025 COMPLETE CBC W/AUTO DIFF WBC: CPT

## 2022-08-24 PROCEDURE — 74011000258 HC RX REV CODE- 258: Performed by: STUDENT IN AN ORGANIZED HEALTH CARE EDUCATION/TRAINING PROGRAM

## 2022-08-24 PROCEDURE — 74011250636 HC RX REV CODE- 250/636: Performed by: STUDENT IN AN ORGANIZED HEALTH CARE EDUCATION/TRAINING PROGRAM

## 2022-08-24 PROCEDURE — 80048 BASIC METABOLIC PNL TOTAL CA: CPT

## 2022-08-24 PROCEDURE — 74011000250 HC RX REV CODE- 250: Performed by: STUDENT IN AN ORGANIZED HEALTH CARE EDUCATION/TRAINING PROGRAM

## 2022-08-24 PROCEDURE — 99232 SBSQ HOSP IP/OBS MODERATE 35: CPT | Performed by: INTERNAL MEDICINE

## 2022-08-24 RX ORDER — AMOXICILLIN AND CLAVULANATE POTASSIUM 875; 125 MG/1; MG/1
1 TABLET, FILM COATED ORAL 2 TIMES DAILY
Qty: 10 TABLET | Refills: 0 | Status: SHIPPED | OUTPATIENT
Start: 2022-08-24 | End: 2022-08-29

## 2022-08-24 RX ORDER — ACETAMINOPHEN 325 MG/1
650 TABLET ORAL
Qty: 180 TABLET | Refills: 0 | Status: SHIPPED
Start: 2022-08-24 | End: 2022-09-23

## 2022-08-24 RX ORDER — AMOXICILLIN AND CLAVULANATE POTASSIUM 875; 125 MG/1; MG/1
1 TABLET, FILM COATED ORAL EVERY 12 HOURS
Status: DISCONTINUED | OUTPATIENT
Start: 2022-08-24 | End: 2022-08-24 | Stop reason: HOSPADM

## 2022-08-24 RX ADMIN — ENOXAPARIN SODIUM 80 MG: 100 INJECTION SUBCUTANEOUS at 09:06

## 2022-08-24 RX ADMIN — SERTRALINE 50 MG: 50 TABLET, FILM COATED ORAL at 09:06

## 2022-08-24 RX ADMIN — BRIVARACETAM 100 MG: 50 TABLET, FILM COATED ORAL at 09:08

## 2022-08-24 RX ADMIN — CEFEPIME 2 G: 2 INJECTION, POWDER, FOR SOLUTION INTRAVENOUS at 06:40

## 2022-08-24 RX ADMIN — SODIUM CHLORIDE, PRESERVATIVE FREE 10 ML: 5 INJECTION INTRAVENOUS at 06:42

## 2022-08-24 RX ADMIN — TOPIRAMATE 200 MG: 100 TABLET, FILM COATED ORAL at 09:08

## 2022-08-24 RX ADMIN — METRONIDAZOLE 500 MG: 500 INJECTION, SOLUTION INTRAVENOUS at 09:06

## 2022-08-24 RX ADMIN — AMOXICILLIN AND CLAVULANATE POTASSIUM 1 TABLET: 875; 125 TABLET, FILM COATED ORAL at 12:05

## 2022-08-24 RX ADMIN — VANCOMYCIN HYDROCHLORIDE 1000 MG: 1 INJECTION, POWDER, LYOPHILIZED, FOR SOLUTION INTRAVENOUS at 04:02

## 2022-08-24 NOTE — PROGRESS NOTES
Pharmacy Antimicrobial Kinetic Dosing    Indication for Antimicrobials: Sepsis, Cancer patient undergoing chemo     Current Regimen of Each Antimicrobial:  Cefepime 2 gm IV q 8h (Start Date ; Day 3)  Vancomycin 1000 mg IV q8h (Start Date ; Day 3)  Metronidazole 500 mg IV q12h (Start Date ; Day 3)    Goal Level: AUC: 400-600 mg/hr/Liter/day    Date Dose & Interval Measured (mcg/mL) Predicted AUC/JENNY    1000 mg q12h 6 314                 Date & time of next level:      Dosing calculator used: Integrity Tracking calculator    Significant Positive Cultures:       Conditions for Dosing Consideration: None    Labs:  Recent Labs     22  0351 22  0439 22  0418 22  0225   CREA 0.57* 0.81 0.68*  --    BUN 15 11 17  --    PCT  --   --   --  <0.05     Recent Labs     22  0351 22  0439 22  2245   WBC 1.4* 2.5* 1.7*     Temp (24hrs), Av.9 °F (37.2 °C), Min:97.9 °F (36.6 °C), Max:99.7 °F (37.6 °C)        Creatinine Clearance (mL/min):   CrCl (Ideal Body Weight): 131.9   If actual weight < IBW: CrCl (Actual Body Weight) 146.4    Impression/Plan:   Vancomycin trough of 6 is subtherapeutic. Increase dose to vancomycin 1000 mg q8h for projected AUC of 471 and trough of 14  Vancomycin trough  with Am labs  Daily BMP per Vancomycin dosing protocol   Antimicrobial stop date: To be determined     Pharmacy will follow daily and adjust medications as appropriate for renal function and/or serum levels.     Thank you,  Ilana Metzger, PHARMMERCEDES    Vancomycin Dosing Document    Documents located on pharmacy Teams site: Clinical Practice -> Antimicrobial Stewardship -> Antibiotics_Vancomycin     Aminoglycoside Dosing Document    Documents located on pharmacy Teams site: Clinical Practice -> Antimicrobial Stewardship -> Antibiotics_Aminoglycosides

## 2022-08-24 NOTE — PROGRESS NOTES
Balta Arango is a 56 y.o. male here for follow up for for testicular carcinoma. Treatment today:  Cycle 3 Day 1 Cisplatin + Etoposide  Pt states he has been doing ok since hospital visit. He is fatigued. Level 3 back pain. He get nosebleeds. 1. Have you been to the ER, urgent care clinic since your last visit? Hospitalized since your last visit? 8/21/22 - 8/24/22 PE/pneumonia     2. Have you seen or consulted any other health care providers outside of the [address] [place] [address] since your last visit? Include any pap smears or colon screening.    no

## 2022-08-24 NOTE — PROGRESS NOTES
CM aware that pt will d/c on today and transition home with family support. Pt will return home on eliquis medication, that covered 100%  cost.  Pt aware. Pt family will assist with transport home on today (currently by bedside). CM completed the needs of the pt at this time.     JING New, [address] [place] [address]

## 2022-08-24 NOTE — PROGRESS NOTES
Attempted to schedule hospital follow up PCP appointment. Unable to reach anyone, unable to leave voicemail. Pending patient discharge.  Kranthi Ellison, Care Management Assistant

## 2022-08-24 NOTE — PROGRESS NOTES
End of Shift Note    Bedside shift change report given to William Morris RN (oncoming nurse) by Linda Rodríguez RN (offgoing nurse). Report included the following information SBAR, Kardex, Intake/Output, and MAR    Shift worked:  [id number]     Shift summary and any significant changes:     Patient complained of mild back pain when he moves, provided 1 dose PRN tylenol. All scheduled meds and frequent rounding provided. Family at bedside. Concerns for physician to address:       Zone phone for oncoming shift:          Activity:  Activity Level: Up with Assistance  Number times ambulated in hallways past shift: 0  Number of times OOB to chair past shift: 0    Cardiac:   Cardiac Monitoring: Yes      Cardiac Rhythm: Sinus Rhythm    Access:  Current line(s): PIV     Genitourinary:   Urinary status: voiding    Respiratory:   O2 Device: None (Room air)  Chronic home O2 use?: NO  Incentive spirometer at bedside: NO       GI:  Last Bowel Movement Date: 08/23/22  Current diet:  ADULT DIET Regular  ADULT ORAL NUTRITION SUPPLEMENT Breakfast, Lunch, Dinner; Standard High Calorie/High Protein  Passing flatus: YES  Tolerating current diet: YES       Pain Management:   Patient states pain is manageable on current regimen: YES    Skin:  Elpidio Score: 21  Interventions: increase time out of bed, PT/OT consult, and nutritional support     Patient Safety:  Fall Score:  Total Score: 1  Interventions: gripper socks, pt to call before getting OOB, and stay with me (per policy)       Length of Stay:  Expected LOS: 4d 19h  Actual LOS: [address], RN

## 2022-08-24 NOTE — DISCHARGE SUMMARY
Hospitalist Discharge Summary     Patient ID:    Ele Garcia  [id number]  83 y.o.  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 Lucho Morrison. 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 T2a N0 M1a (Stage IIIA). Right radical orchiectomy on 6/1/22. CT 6/17/22: enlarged mediastinal nodes and b/l lung nodules. 6/29/22: Plan is for 6 cycles of chemotherapy (BEP)             Testicular mass ICD-10-CM: N50.89  ICD-9-CM: 608.89  6/13/2022 - Present    Overview Signed 6/13/2022 12:06 PM by MD Dr. Evi Morrison. S/p right radical orchiectomy with frozen section on 6/1/22. Partial symptomatic epilepsy with complex partial seizures, intractable, without status epilepticus ([facility] ICD-10-CM: G40.219  ICD-9-CM: 345.41  11/16/2020 - Present        Intractable seizures ([facility] ICD-10-CM: K26.366  ICD-9-CM: 345.91  10/26/2020 - Present        Generalized anxiety disorder ICD-10-CM: F41.1  ICD-9-CM: 300.02  8/14/2019 - Present        Abnormal MRI of head ICD-10-CM: R93.0  ICD-9-CM: 793.0  6/11/2019 - Present        Bilateral carotid artery stenosis ICD-10-CM: I65.23  ICD-9-CM: 433.10, 433.30  6/11/2019 - Present        Brain cyst ICD-10-CM: G93.0  ICD-9-CM: 348.0  4/3/2017 - Present        Advance care planning ICD-10-CM: Z71.89  ICD-9-CM: V65.49  4/25/2016 - Present    Overview Signed 4/25/2016  4:50 PM by David Gaona MD     4/25/16: Discussed with patient and his mother. They were given a booklet and form.                EtOH dependence ([facility] ICD-10-CM: F10.20  ICD-9-CM: 303.90  10/31/2014 - Present        Hypertension ICD-10-CM: I10  ICD-9-CM: 401.9  9/12/2014 - Present        Seizure disorder ([facility] ICD-10-CM: G40.909  ICD-9-CM: 345.90  5/22/2012 - Present        Asperger syndrome ICD-10-CM: F84.5  ICD-9-CM: 299.80  12/14/2011 - Present        Autism ICD-10-CM: F84.0  ICD-9-CM: 299.00  Unknown - Present        RESOLVED: Seizures ([facility] ICD-10-CM: R56.9  ICD-9-CM: 780.39  12/13/2019 - 12/15/2019        RESOLVED: Altered mental status, unspecified ICD-10-CM: R41.82  ICD-9-CM: 780.97  6/11/2019 - 9/18/2020        RESOLVED: Complex partial seizure evolving to generalized seizure ([facility] ICD-10-CM: G40.209  ICD-9-CM: 345.40  6/11/2019 - 9/18/2020        RESOLVED: Convulsive syncope ICD-10-CM: R55  ICD-9-CM: 780.2, 780.39  6/11/2019 - 9/18/2020        RESOLVED: Alcohol withdrawal ([facility] ICD-10-CM: P77.050  ICD-9-CM: 291.81  10/31/2014 - 9/18/2020       22    Final Diagnoses: Active Problems:    Pulmonary emboli ([facility] (8/22/2022)      Reason for Hospitalization:  Neutropenia fever, poor PO intake, cough    Hospital Course:   PT admitted for fever of 101.3, CT chest showing acute pulmonary embolism of the right middle lobe, with right lower lobe consolidation suggestive of pneumonia, started on IV antibiotics, and therapeutic lovenox. Blood cultures have been negative, and no further fevers reported since admission. Pt will start on apixaban 10 mg bid x 7 days, and then 5 mg apixaban bid for at least 90 days. Pt will need to repeat chest CT to re-evaluate pulmonary embolus. Pt to follow up with oncology on 8/29 to continue outpatient chemotherapy. Pt will continue with oral augmentin to treat pneumonia for 5 more days. Pt is stable for discharge home today with family, and is in agreement with plan.      Discharge Medications:   Current Discharge Medication List        START taking these medications    Details   !! apixaban (ELIQUIS) 5 mg tablet Take 2 Tablets by mouth every twelve (12) hours for 7 days. Indications: blood clot in a deep vein of the extremities, a clot in the lung  Qty: 28 Tablet, Refills: 0  Start date: 8/24/2022, End date: 8/31/2022      !! apixaban (ELIQUIS) 5 mg tablet Take 1 Tablet by mouth every twelve (12) hours for 90 days. Indications: blood clot in a deep vein of the extremities, a clot in the lung  Qty: 180 Tablet, Refills: 0  Start date: 9/1/2022, End date: 11/30/2022      amoxicillin-clavulanate (AUGMENTIN) 875-125 mg per tablet Take 1 Tablet by mouth two (2) times a day for 5 days. Qty: 10 Tablet, Refills: 0  Start date: 8/24/2022, End date: 8/29/2022      acetaminophen (TYLENOL) 325 mg tablet Take 2 Tablets by mouth every four (4) hours as needed for Fever or Pain for up to 30 days. Qty: 180 Tablet, Refills: 0  Start date: 8/24/2022, End date: 9/23/2022       !! - Potential duplicate medications found. Please discuss with provider. CONTINUE these medications which have NOT CHANGED    Details   brivaracetam (Briviact) 100 mg tablet Take 1 Tablet by mouth two (2) times a day. Max Daily Amount: 200 mg. Qty: 180 Tablet, Refills: 1    Associated Diagnoses: Seizure disorder ([facility]      ! ! Xcopri 50 mg tab tablet TAKE 1 TABLET BY MOUTH DAILY. MAX DAILY AMOUNT: 50 MG  Qty: 90 Tablet, Refills: 1    Associated Diagnoses: S/P placement of VNS (vagus nerve stimulation) device; Partial symptomatic epilepsy with complex partial seizures, intractable, without status epilepticus ([facility] [facility]      ! ! Xcopri 200 mg tab TAKE 1 TABLET BY MOUTH DAILY. MAX DAILY AMOUNT: 200 MG  Qty: 30 Each, Refills: 3    Associated Diagnoses: Partial symptomatic epilepsy with complex partial seizures, intractable, without status epilepticus (HCC)      ondansetron (ZOFRAN ODT) 4 mg disintegrating tablet Take 1 Tablet by mouth every eight (8) hours as needed for Nausea or Vomiting.   Qty: 40 Tablet, Refills: 2    Associated Diagnoses: Malignant neoplasm of descended right testis ([address] prochlorperazine (Compazine) 10 mg tablet Take 1 Tablet by mouth every six (6) hours as needed for Nausea or Vomiting. Qty: 40 Tablet, Refills: 2    Associated Diagnoses: Malignant neoplasm of descended right testis (HCC)      lidocaine-prilocaine (EMLA) topical cream Apply  to affected area as needed for Pain. Apply generous amount to port site 30 min prior to infusions and cover with plastic wrap  Qty: 30 g, Refills: 2    Associated Diagnoses: Malignant neoplasm of descended right testis (HCC)      sertraline (ZOLOFT) 50 mg tablet Take 1 Tablet by mouth daily. Qty: 90 Tablet, Refills: 3      topiramate (TOPAMAX) 200 mg tablet TAKE 1 TABLET TWICE DAILY  Qty: 180 Tablet, Refills: 3    Associated Diagnoses: Seizure disorder (HCC)      MULTIVITAMIN PO Take 1 Tab by mouth daily. !! - Potential duplicate medications found. Please discuss with provider. Follow up Care:    1. Pamela Eduardo MD in 3 weeks. Follow-up Information       Follow up With Specialties Details Why Carolann Leon MD Internal Medicine Physician Follow up in 3 week(s)  [address] [place] [address]  [phone]      Karena Mathew MD Hematology and Oncology, Hematology Physician, Oncology, Internal Medicine Physician Follow up on 8/29/2022  [place] [address] [address]  [facility]. [phone]                * Follow-up Care/Patient Instructions: Activity: Activity as tolerated  Diet: Regular Diet  Wound Care: None needed      Condition at Discharge:  Stable  __________________________________________________________________    Disposition  Home or Self Care  ____________________________________________________________________    Code Status:  Full Code  ___________________________________________________________________    Discharge Exam:  Patient seen and examined by me on discharge day.   Physical Exam  Constitutional:       General: He is not in acute distress. Cardiovascular:      Rate and Rhythm: Normal rate and regular rhythm. Pulses: Normal pulses. Heart sounds: Normal heart sounds. Pulmonary:      Effort: Pulmonary effort is normal.      Breath sounds: Normal breath sounds. Abdominal:      General: Bowel sounds are normal.      Palpations: Abdomen is soft. Musculoskeletal:         General: Normal range of motion. Skin:     General: Skin is warm and dry. Neurological:      Mental Status: He is oriented to person, place, and time. Psychiatric:         Mood and Affect: Mood normal.         Behavior: Behavior normal.        CONSULTATIONS: Hematology/Oncology    Significant Diagnostic Studies:   Recent Results (from the past 24 hour(s))   CBC WITH AUTOMATED DIFF    Collection Time: 08/24/22  3:51 AM   Result Value Ref Range    WBC 1.4 (L) 4.1 - 11.1 K/uL    RBC 2.71 (L) 4.10 - 5.70 M/uL    HGB 7.6 (L) 12.1 - 17.0 g/dL    HCT 22.9 (L) 36.6 - 50.3 %    MCV 84.5 80.0 - 99.0 FL    MCH 28.0 26.0 - 34.0 PG    MCHC 33.2 30.0 - 36.5 g/dL    RDW 16.4 (H) 11.5 - 14.5 %    PLATELET 87 (L) 045 - 400 K/uL    MPV 9.0 8.9 - 12.9 FL    NRBC 0.0 0  WBC    ABSOLUTE NRBC 0.00 0.00 - 0.01 K/uL    NEUTROPHILS 35 32 - 75 %    LYMPHOCYTES 53 (H) 12 - 49 %    MONOCYTES 12 5 - 13 %    EOSINOPHILS 0 0 - 7 %    BASOPHILS 0 0 - 1 %    IMMATURE GRANULOCYTES 0 0.0 - 0.5 %    ABS. NEUTROPHILS 0.5 (L) 1.8 - 8.0 K/UL    ABS. LYMPHOCYTES 0.7 (L) 0.8 - 3.5 K/UL    ABS. MONOCYTES 0.2 0.0 - 1.0 K/UL    ABS. EOSINOPHILS 0.0 0.0 - 0.4 K/UL    ABS. BASOPHILS 0.0 0.0 - 0.1 K/UL    ABS. IMM.  GRANS. 0.0 0.00 - 0.04 K/UL    DF MANUAL      RBC COMMENTS ANISOCYTOSIS  1+       METABOLIC PANEL, BASIC    Collection Time: 08/24/22  3:51 AM   Result Value Ref Range    Sodium 138 136 - 145 mmol/L    Potassium 3.9 3.5 - 5.1 mmol/L    Chloride 109 (H) 97 - 108 mmol/L    CO2 22 21 - 32 mmol/L    Anion gap 7 5 - 15 mmol/L    Glucose 106 (H) 65 - 100 mg/dL    BUN 15 6 - 20 MG/DL Creatinine 0.57 (L) 0.70 - 1.30 MG/DL    BUN/Creatinine ratio 26 (H) 12 - 20      GFR est AA >60 >60 ml/min/1.73m2    GFR est non-AA >60 >60 ml/min/1.73m2    Calcium 8.5 8.5 - 10.1 MG/DL   VANCOMYCIN, TROUGH    Collection Time: 08/24/22  3:51 AM   Result Value Ref Range    Vancomycin,trough 6.0 5.0 - 10.0 ug/mL    Reported dose date NOT PROVIDED      Reported dose time: NOT PROVIDED      Reported dose: NOT PROVIDED UNITS     CTA CHEST W OR W WO CONT   Final Result   Acute pulmonary embolus in the right middle lobe. Unchanged mediastinal and   right hilar lymphadenopathy. Small right pleural effusion. Right middle lobe   collapse. Right lower lobe consolidation. Unchanged small bilateral pulmonary   nodules. The findings were called to Dr. Reynolds Standing on 8/22/2022 at 1:08 AM by Dr. Martinez Beard. [phone]         XR CHEST PORT   Final Result   2 bilateral airspace disease may represent pneumonia. Radiographic   follow-up is recommended to assure complete resolution. Discharge: time spent 35 minutes in discharge  Education and counseling.      Signed:  Stuart Norton NP  8/24/2022  11:24 AM

## 2022-08-24 NOTE — PROGRESS NOTES
Bedside shift change report given to Sangeeta Henriquez RN by Tong Mcmanus. Report included the following information SBAR and Cardiac Rhythm NSR .     1215 I have reviewed discharge instructions, follow up appts and medications with the patient and parent. The patient and parent verbalized understanding. PIV x2 removed without difficulty. Patient's parents at bedside, providing transportation home. Home medications given from pyxis.

## 2022-08-25 ENCOUNTER — PATIENT OUTREACH (OUTPATIENT)
Dept: CASE MANAGEMENT | Age: 47
End: 2022-08-25

## 2022-08-25 ENCOUNTER — TELEPHONE (OUTPATIENT)
Dept: PALLATIVE CARE | Age: 47
End: 2022-08-25

## 2022-08-25 NOTE — TELEPHONE ENCOUNTER
Palliative Medicine  Nursing Note  [phone]  Fax [phone]      Telephone Call  Patient Name: Ralph Rosas  YOB: 1975    8/25/2022         Advance Care Planning 8/9/2022   Patient's Healthcare Decision Maker is: Legal Next of Kin   Confirm Advance Directive Yes, not on file   Patient Would Like to Complete Advance Directive -       Received outpatient Palliative Medicine referral from Dr. Zoltan Schroeder to see patient for symptom management and supportive care. Chart  reviewed. Khanh Brown, 56 y.o.male with Testicular carcinoma. Pt's most recent office visit with Oncology was 8/8/22. See Office Visit note for complete HPI, treatment history, and plan.      ACP: on file                                                                                                                                  Nurse called pt to introduced Palliative Medicine - LVM for return call to schedule appointment     Erlinda Morales RN  Palliative Medicine

## 2022-08-25 NOTE — PROGRESS NOTES
Care Transitions Initial Call    Call within 2 business days of discharge: Yes     Patient: Moses Fleischer Patient : 1975 MRN: [id number]    Last Discharge  Street       Date Complaint Diagnosis Description Type Department Provider    22 Fever Sepsis, due to unspecified organism, unspecified whether acute organ dysfunction present ([facility] . .. ED to Hosp-Admission (Discharged) (ADMIT) María Silver MD; O... Was this an external facility discharge? No     Challenges to be reviewed by the provider   Additional needs identified to be addressed with provider: no       Method of communication with provider : none    Discussed COVID-19 related testing which was available at this time. Test results were negative. Patient informed of results, if available? no     Advance Care Planning:   Does patient have an Advance Directive: not on file. Inpatient Readmission Risk score: Unplanned Readmit Risk Score: 13.7    Was this a readmission? no   Patient stated reason for the admission: fever    Patients top risk factors for readmission: medical condition-sepsis    Interventions to address risk factors: Scheduled appointment with Jonathan Garcia and reviewed discharge summary and/or continuity of care documents, and Assessment and support for treatment adherence and medication management-augmentin x 5 days, Eliquis     Care Transition Nurse (CTN) contacted the parent by telephone to perform post hospital discharge assessment. Verified name and  with parent as identifiers. Provided introduction to self, and explanation of the CTN role. CTN reviewed discharge instructions, medical action plan and red flags with parent who verbalized understanding. Were discharge instructions available to patient? yes.  Reviewed appropriate site of care based on symptoms and resources available to patient including: PCP, Specialist, Urgent Care Clinics, and Condition related references. Parent given an opportunity to ask questions and does not have any further questions or concerns at this time. The parent agrees to contact the PCP office for questions related to their healthcare. Medication reconciliation was performed with parent, who verbalizes understanding of administration of home medications. Advised obtaining a 90-day supply of all daily and as-needed medications. Referral to Pharm D needed: no     Home Health/Outpatient orders at discharge: none    Durable Medical Equipment ordered at discharge: None      Was patient discharged with a pulse oximeter? no    Discussed follow-up appointments. If no appointment was previously scheduled, appointment scheduling offered:  na . Is follow up appointment scheduled within 7 days of discharge? yes. [facility] follow up appointment(s):   Future Appointments   Date Time Provider Stephany Blum   8/29/2022 11:00 AM [facility] MED1 [id number] [address]    8/29/2022 11:15 AM Baudilio Melara MD ONCMR BS AMB   8/30/2022  1:00 PM LINK MED3 [address] [facility]   8/31/2022 11:00 AM LINK MED1 [facility]   9/1/2022  1:00 PM LINK MED1 [facility]   9/2/2022  1:00 PM LINK MED3 [facility]   9/19/2022 11:00 AM LINK MED4 [place] [address] REG   9/19/2022 11:30 AM Baudilio Melara MD ONCMR BS AMB   9/20/2022  1:00  [facility] [address] REG   9/21/2022  1:00 PM LINK MED1 [address] [facility] REG   9/22/2022  1:00 PM [facility] MED1 [address] [facility] REG   9/23/2022 11:00 AM LINK MED3 [address] [facility] for follow-up call in 5-7 days based on severity of symptoms and risk factors. Plan for next call: symptom management-bleeding,SOB, fever, follow up appointment-hem/onc, and medication management-augmentin completed  CTN provided contact information for future needs. Goals Addressed                   This Visit's Progress     Prevent complications post hospitalization.         8/25/22  Activity- activity intolerance/energy conservation/frequent rest, exercise to increase mobility and prevent falls. Medication compliance-Eliquis, augementin x 5 days  Attend KRISHAN appt-hem/onc 8/29/22  Using teach back, reviewed red flags. Patient will monitor/report red flags- fever/chills, difficulty breathing, low blood pressure, fast heart rate, and mental confusion, N/V-dehydration.  EVY

## 2022-08-26 LAB
BACTERIA SPEC CULT: NORMAL
SERVICE CMNT-IMP: NORMAL

## 2022-08-29 ENCOUNTER — HOSPITAL ENCOUNTER (OUTPATIENT)
Dept: INFUSION THERAPY | Age: 47
Discharge: HOME | End: 2022-08-29
Payer: MEDICARE

## 2022-08-29 ENCOUNTER — OFFICE VISIT (OUTPATIENT)
Dept: ONCOLOGY | Age: 47
End: 2022-08-29

## 2022-08-29 VITALS
DIASTOLIC BLOOD PRESSURE: 69 MMHG | WEIGHT: 169.53 LBS | HEIGHT: 69 IN | BODY MASS INDEX: 25.11 KG/M2 | TEMPERATURE: 98.1 F | SYSTOLIC BLOOD PRESSURE: 106 MMHG | HEART RATE: 96 BPM | RESPIRATION RATE: 18 BRPM | OXYGEN SATURATION: 100 %

## 2022-08-29 VITALS
TEMPERATURE: 98.1 F | DIASTOLIC BLOOD PRESSURE: 71 MMHG | HEIGHT: 69 IN | WEIGHT: 169.6 LBS | BODY MASS INDEX: 25.12 KG/M2 | HEART RATE: 85 BPM | SYSTOLIC BLOOD PRESSURE: 113 MMHG | OXYGEN SATURATION: 97 % | RESPIRATION RATE: 18 BRPM

## 2022-08-29 DIAGNOSIS — C62.91 CARCINOMA OF RIGHT TESTIS (HCC): Primary | ICD-10-CM

## 2022-08-29 DIAGNOSIS — D70.1 CHEMOTHERAPY INDUCED NEUTROPENIA (HCC): ICD-10-CM

## 2022-08-29 DIAGNOSIS — T45.1X5A CHEMOTHERAPY INDUCED NEUTROPENIA (HCC): ICD-10-CM

## 2022-08-29 DIAGNOSIS — Z51.11 ENCOUNTER FOR ANTINEOPLASTIC CHEMOTHERAPY: ICD-10-CM

## 2022-08-29 DIAGNOSIS — N50.89 TESTICULAR MASS: Primary | ICD-10-CM

## 2022-08-29 LAB
ALBUMIN SERPL-MCNC: 2.6 G/DL (ref 3.5–5)
ALBUMIN/GLOB SERPL: 0.6 {RATIO} (ref 1.1–2.2)
ALP SERPL-CCNC: 94 U/L (ref 45–117)
ALT SERPL-CCNC: 86 U/L (ref 12–78)
ANION GAP SERPL CALC-SCNC: 8 MMOL/L (ref 5–15)
AST SERPL-CCNC: 42 U/L (ref 15–37)
BASOPHILS # BLD: 0 K/UL (ref 0–0.1)
BASOPHILS NFR BLD: 0 % (ref 0–1)
BILIRUB SERPL-MCNC: 0.1 MG/DL (ref 0.2–1)
BUN SERPL-MCNC: 17 MG/DL (ref 6–20)
BUN/CREAT SERPL: 20 (ref 12–20)
CALCIUM SERPL-MCNC: 8.8 MG/DL (ref 8.5–10.1)
CHLORIDE SERPL-SCNC: 109 MMOL/L (ref 97–108)
CO2 SERPL-SCNC: 21 MMOL/L (ref 21–32)
CREAT SERPL-MCNC: 0.83 MG/DL (ref 0.7–1.3)
DIFFERENTIAL METHOD BLD: ABNORMAL
EOSINOPHIL # BLD: 0.1 K/UL (ref 0–0.4)
EOSINOPHIL NFR BLD: 3 % (ref 0–7)
ERYTHROCYTE [DISTWIDTH] IN BLOOD BY AUTOMATED COUNT: 16.8 % (ref 11.5–14.5)
GLOBULIN SER CALC-MCNC: 4.4 G/DL (ref 2–4)
GLUCOSE SERPL-MCNC: 158 MG/DL (ref 65–100)
HCT VFR BLD AUTO: 25.7 % (ref 36.6–50.3)
HGB BLD-MCNC: 8.4 G/DL (ref 12.1–17)
IMM GRANULOCYTES # BLD AUTO: 0 K/UL (ref 0–0.04)
IMM GRANULOCYTES NFR BLD AUTO: 0 % (ref 0–0.5)
LYMPHOCYTES # BLD: 1.1 K/UL (ref 0.8–3.5)
LYMPHOCYTES NFR BLD: 39 % (ref 12–49)
MAGNESIUM SERPL-MCNC: 2.2 MG/DL (ref 1.6–2.4)
MCH RBC QN AUTO: 28.1 PG (ref 26–34)
MCHC RBC AUTO-ENTMCNC: 32.7 G/DL (ref 30–36.5)
MCV RBC AUTO: 86 FL (ref 80–99)
METAMYELOCYTES NFR BLD MANUAL: 1 %
MONOCYTES # BLD: 0.1 K/UL (ref 0–1)
MONOCYTES NFR BLD: 3 % (ref 5–13)
NEUTS BAND NFR BLD MANUAL: 2 %
NEUTS SEG # BLD: 1.5 K/UL (ref 1.8–8)
NEUTS SEG NFR BLD: 52 % (ref 32–75)
NRBC # BLD: 0 K/UL (ref 0–0.01)
NRBC BLD-RTO: 0 PER 100 WBC
PLATELET # BLD AUTO: 256 K/UL (ref 150–400)
PMV BLD AUTO: 9.6 FL (ref 8.9–12.9)
POTASSIUM SERPL-SCNC: 3.6 MMOL/L (ref 3.5–5.1)
PROT SERPL-MCNC: 7 G/DL (ref 6.4–8.2)
RBC # BLD AUTO: 2.99 M/UL (ref 4.1–5.7)
RBC MORPH BLD: ABNORMAL
RBC MORPH BLD: ABNORMAL
SODIUM SERPL-SCNC: 138 MMOL/L (ref 136–145)
WBC # BLD AUTO: 2.7 K/UL (ref 4.1–11.1)

## 2022-08-29 PROCEDURE — G8427 DOCREV CUR MEDS BY ELIG CLIN: HCPCS | Performed by: INTERNAL MEDICINE

## 2022-08-29 PROCEDURE — 1111F DSCHRG MED/CURRENT MED MERGE: CPT | Performed by: INTERNAL MEDICINE

## 2022-08-29 PROCEDURE — 74011250636 HC RX REV CODE- 250/636: Performed by: INTERNAL MEDICINE

## 2022-08-29 PROCEDURE — 85025 COMPLETE CBC W/AUTO DIFF WBC: CPT

## 2022-08-29 PROCEDURE — G8419 CALC BMI OUT NRM PARAM NOF/U: HCPCS | Performed by: INTERNAL MEDICINE

## 2022-08-29 PROCEDURE — 96417 CHEMO IV INFUS EACH ADDL SEQ: CPT

## 2022-08-29 PROCEDURE — 77030012965 HC NDL HUBR BBMI -A

## 2022-08-29 PROCEDURE — 96413 CHEMO IV INFUSION 1 HR: CPT

## 2022-08-29 PROCEDURE — 83735 ASSAY OF MAGNESIUM: CPT

## 2022-08-29 PROCEDURE — 80053 COMPREHEN METABOLIC PANEL: CPT

## 2022-08-29 PROCEDURE — 74011000250 HC RX REV CODE- 250: Performed by: INTERNAL MEDICINE

## 2022-08-29 PROCEDURE — G8754 DIAS BP LESS 90: HCPCS | Performed by: INTERNAL MEDICINE

## 2022-08-29 PROCEDURE — 99215 OFFICE O/P EST HI 40 MIN: CPT | Performed by: INTERNAL MEDICINE

## 2022-08-29 PROCEDURE — 36415 COLL VENOUS BLD VENIPUNCTURE: CPT

## 2022-08-29 PROCEDURE — 96375 TX/PRO/DX INJ NEW DRUG ADDON: CPT

## 2022-08-29 PROCEDURE — 82107 ALPHA-FETOPROTEIN L3: CPT

## 2022-08-29 PROCEDURE — 96367 TX/PROPH/DG ADDL SEQ IV INF: CPT

## 2022-08-29 PROCEDURE — G8432 DEP SCR NOT DOC, RNG: HCPCS | Performed by: INTERNAL MEDICINE

## 2022-08-29 PROCEDURE — 74011000258 HC RX REV CODE- 258: Performed by: INTERNAL MEDICINE

## 2022-08-29 PROCEDURE — G8752 SYS BP LESS 140: HCPCS | Performed by: INTERNAL MEDICINE

## 2022-08-29 RX ORDER — SODIUM CHLORIDE 9 MG/ML
5-250 INJECTION, SOLUTION INTRAVENOUS AS NEEDED
Status: DISPENSED | OUTPATIENT
Start: 2022-08-29 | End: 2022-08-29

## 2022-08-29 RX ORDER — HEPARIN 100 UNIT/ML
500 SYRINGE INTRAVENOUS AS NEEDED
Status: ACTIVE | OUTPATIENT
Start: 2022-08-29 | End: 2022-08-29

## 2022-08-29 RX ORDER — SODIUM CHLORIDE 9 MG/ML
5-250 INJECTION, SOLUTION INTRAVENOUS AS NEEDED
Status: DISCONTINUED | OUTPATIENT
Start: 2022-08-29 | End: 2022-08-30 | Stop reason: HOSPADM

## 2022-08-29 RX ORDER — SODIUM CHLORIDE 9 MG/ML
5-40 INJECTION INTRAMUSCULAR; INTRAVENOUS; SUBCUTANEOUS AS NEEDED
Status: ACTIVE | OUTPATIENT
Start: 2022-08-29 | End: 2022-08-29

## 2022-08-29 RX ORDER — PALONOSETRON 0.05 MG/ML
0.25 INJECTION, SOLUTION INTRAVENOUS ONCE
Status: COMPLETED | OUTPATIENT
Start: 2022-08-29 | End: 2022-08-29

## 2022-08-29 RX ORDER — SODIUM CHLORIDE 0.9 % (FLUSH) 0.9 %
5-40 SYRINGE (ML) INJECTION AS NEEDED
Status: DISPENSED | OUTPATIENT
Start: 2022-08-29 | End: 2022-08-29

## 2022-08-29 RX ADMIN — SODIUM CHLORIDE, PRESERVATIVE FREE 40 ML: 5 INJECTION INTRAVENOUS at 17:16

## 2022-08-29 RX ADMIN — SODIUM CHLORIDE 25 ML/HR: 9 INJECTION, SOLUTION INTRAVENOUS at 13:28

## 2022-08-29 RX ADMIN — SODIUM CHLORIDE 150 MG: 9 INJECTION, SOLUTION INTRAVENOUS at 14:38

## 2022-08-29 RX ADMIN — ETOPOSIDE 146.2 MG: 20 INJECTION INTRAVENOUS at 15:09

## 2022-08-29 RX ADMIN — DEXAMETHASONE SODIUM PHOSPHATE 12 MG: 4 INJECTION, SOLUTION INTRAMUSCULAR; INTRAVENOUS at 14:38

## 2022-08-29 RX ADMIN — CISPLATIN 29.3 MG: 1 INJECTION INTRAVENOUS at 16:15

## 2022-08-29 RX ADMIN — HEPARIN SODIUM (PORCINE) LOCK FLUSH IV SOLN 100 UNIT/ML 500 UNITS: 100 SOLUTION at 17:16

## 2022-08-29 RX ADMIN — MAGNESIUM SULFATE HEPTAHYDRATE: 500 INJECTION, SOLUTION INTRAMUSCULAR; INTRAVENOUS at 13:27

## 2022-08-29 RX ADMIN — PALONOSETRON 0.25 MG: 0.05 INJECTION, SOLUTION INTRAVENOUS at 15:02

## 2022-08-29 NOTE — PROGRESS NOTES
Outpatient Infusion Center - Chemotherapy Progress Note    Pt admit to [facility] for C3D1 Etoposide/Cisplatin in stable condition. Assessment completed. Patient denied having any symptoms of COVID-19, i.e. SOB, coughing, fever, or generally not feeling well. Also denies having been exposed to COVID-19 recently or having had any recent contact with family/friend that has a pending COVID test.     R chest port accessed with 0.75\" Clifm Easter w/o issue, with positive blood return. Labs drawn per order and sent. Line flushed, clamped, Curos Cap applied to end clave. Chemotherapy Flowsheet 8/29/2022   Cycle C3D1   Date 8/29/2022   Drug / Regimen Etop/Cis   Pre Hydration given   Pre Meds given   Notes given        Patient Vitals for the past 12 hrs:   Temp Pulse Resp BP SpO2   08/29/22 1731 -- 85 -- 113/71 97 %   08/29/22 1119 98.1 °F (36.7 °C) 96 18 106/69 100 %        Recent Results (from the past 12 hour(s))   CBC WITH AUTOMATED DIFF    Collection Time: 08/29/22 11:29 AM   Result Value Ref Range    WBC 2.7 (L) 4.1 - 11.1 K/uL    RBC 2.99 (L) 4.10 - 5.70 M/uL    HGB 8.4 (L) 12.1 - 17.0 g/dL    HCT 25.7 (L) 36.6 - 50.3 %    MCV 86.0 80.0 - 99.0 FL    MCH 28.1 26.0 - 34.0 PG    MCHC 32.7 30.0 - 36.5 g/dL    RDW 16.8 (H) 11.5 - 14.5 %    PLATELET 425 203 - 192 K/uL    MPV 9.6 8.9 - 12.9 FL    NRBC 0.0 0  WBC    ABSOLUTE NRBC 0.00 0.00 - 0.01 K/uL    NEUTROPHILS 52 32 - 75 %    BAND NEUTROPHILS 2 %    LYMPHOCYTES 39 12 - 49 %    MONOCYTES 3 (L) 5 - 13 %    EOSINOPHILS 3 0 - 7 %    BASOPHILS 0 0 - 1 %    METAMYELOCYTES 1 %    IMMATURE GRANULOCYTES 0 0.0 - 0.5 %    ABS. NEUTROPHILS 1.5 (L) 1.8 - 8.0 K/UL    ABS. LYMPHOCYTES 1.1 0.8 - 3.5 K/UL    ABS. MONOCYTES 0.1 0.0 - 1.0 K/UL    ABS. EOSINOPHILS 0.1 0.0 - 0.4 K/UL    ABS. BASOPHILS 0.0 0.0 - 0.1 K/UL    ABS. IMM.  GRANS. 0.0 0.00 - 0.04 K/UL    DF MANUAL      RBC COMMENTS ANISOCYTOSIS  1+        RBC COMMENTS POLYCHROMASIA  PRESENT       METABOLIC PANEL, COMPREHENSIVE Collection Time: 08/29/22 11:29 AM   Result Value Ref Range    Sodium 138 136 - 145 mmol/L    Potassium 3.6 3.5 - 5.1 mmol/L    Chloride 109 (H) 97 - 108 mmol/L    CO2 21 21 - 32 mmol/L    Anion gap 8 5 - 15 mmol/L    Glucose 158 (H) 65 - 100 mg/dL    BUN 17 6 - 20 MG/DL    Creatinine 0.83 0.70 - 1.30 MG/DL    BUN/Creatinine ratio 20 12 - 20      GFR est AA >60 >60 ml/min/1.73m2    GFR est non-AA >60 >60 ml/min/1.73m2    Calcium 8.8 8.5 - 10.1 MG/DL    Bilirubin, total 0.1 (L) 0.2 - 1.0 MG/DL    ALT (SGPT) 86 (H) 12 - 78 U/L    AST (SGOT) 42 (H) 15 - 37 U/L    Alk.  phosphatase 94 45 - 117 U/L    Protein, total 7.0 6.4 - 8.2 g/dL    Albumin 2.6 (L) 3.5 - 5.0 g/dL    Globulin 4.4 (H) 2.0 - 4.0 g/dL    A-G Ratio 0.6 (L) 1.1 - 2.2     MAGNESIUM    Collection Time: 08/29/22 11:29 AM   Result Value Ref Range    Magnesium 2.2 1.6 - 2.4 mg/dL         Medications:  Medications Administered       0.9% sodium chloride 1,000 mL with potassium chloride 10 mEq, magnesium sulfate 2 g infusion       Admin Date  08/29/2022 Action  New Bag Dose   Rate  1,000 mL/hr Route  IntraVENous Administered By  Valentin Gola              0.9% sodium chloride infusion       Admin Date  08/29/2022 Action  New Bag Dose  25 mL/hr Rate  25 mL/hr Route  IntraVENous Administered By  Valentin Gola              0.9% sodium chloride injection 5-40 mL       Admin Date  08/29/2022 Action  Given Dose  40 mL Route  IntraVENous Administered By  Valentin Core Competencea              CISplatin (PLATINOL) 29.3 mg in 0.9% sodium chloride 500 mL, overfill volume 50 mL chemo infusion       Admin Date  08/29/2022 Action  New Bag Dose  29.3 mg Rate  579.3 mL/hr Route  IntraVENous Administered By  Valentin Core Competencea              dexamethasone (DECADRON) 12 mg in 0.9% sodium chloride 50 mL IVPB       Admin Date  08/29/2022 Action  New Bag Dose  12 mg Route  IntraVENous Administered By  Gina Nance RN              etoposide (VEPESID) 146.2 mg in 0.9% sodium chloride 500 mL, overfill volume 50 mL chemo infusion       Admin Date  08/29/2022 Action  New Bag Dose  146.2 mg Rate  557.3 mL/hr Route  IntraVENous Administered By  Anay Luna              fosaprepitant (EMEND) 150 mg in 0.9% sodium chloride 150 mL IVPB       Admin Date  08/29/2022 Action  New Bag Dose  150 mg Rate  450 mL/hr Route  IntraVENous Administered By  Rashmi Yung RN              heparin (porcine) pf 500 Units       Admin Date  08/29/2022 Action  Given Dose  500 Units Route  InterCATHeter Administered By  Anay Luna              palonosetron HCl (ALOXI) injection 0.25 mg       Admin Date  08/29/2022 Action  Given Dose  0.25 mg Route  IntraVENous Administered By  Anay Luna                     Pt tolerated treatment well. Port maintained positive blood return throughout treatment, flushed with positive blood return at conclusion, and de-accessed. D/c home in no distress. Pt aware of next [facility] appointment scheduled for: 8/30/22 at 1300.      Future Appointments   Date Time Provider Stephany Blum   8/30/2022  1:00 PM [facility] MED3 [address] [facility]   8/31/2022 11:00 AM LINK MED1 [address] [facility]   9/1/2022  1:00 PM [facility] MED1 [address] [facility]   9/2/2022  1:00 PM LINK MED3 [address] [facility] REG   9/19/2022 11:00 AM LINK MED4 [place] [address] REG   9/19/2022 11:30 AM Shanda Flores MD ONCMR BS AMB   9/20/2022  1:00  [facility] [address] REG   9/21/2022  1:00 PM LINK MED1 [address] [facility] REG   9/22/2022  1:00 PM LINK MED1 [address] [facility]   9/23/2022 11:00 AM LINK MED3 [id number] [address]

## 2022-08-29 NOTE — LETTER
8/29/2022    Patient: Marilyn Hermosillo   YOB: 1975   Date of Visit: 8/29/2022     Brynn Brown MD  [address] [address] [address] [facility] [address]  [facility]    Dear Brynn Brown MD,      Thank you for referring Mr. Baca Found to [address] [place] [address] for evaluation. My notes for this consultation are attached. If you have questions, please do not hesitate to call me. I look forward to following your patient along with you.       Sincerely,    Joana Dandy, MD

## 2022-08-29 NOTE — PROGRESS NOTES
[address] [facility] [address], [address] [facility], [address] [place] [address]  [phone]       Oncology Progress Note        Patient: Samantha Valadez MRN: [id number]  SSN: [id number]    YOB: 1975  Age: 56 y.o. Sex: male          Diagnosis:     1. 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 Receiving EP cycle 3 day 1     Subjective:      Samantha Valadez is a 56 y.o. 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 He experienced pain in the right groin approximately 4-6 weeks ago. He underwent USG and was noted to have enlarged right testis. He then underwent a right radical orchiectomy on 06/02/2022. The pathology revealed mixed germ cell tumor with 5% seminoma, 40% yolk sac tumor and teratoma with immature elements 55%. AFP is elevated at 35 and quant beta-HcG is normal and LDH is normal. CT shows enlarge mediastinal nodes and b/l lung nodules. He comes in to discuss the next steps. He suffers with Asperger's syndrome. Mr. Christina Walker was recently hospitalized with PNA and PE. He is currently on Eliquis. He feels fatigued and has a decreased appetite. Review of Systems:    Constitutional: fatigue, depression, insomnia   Eyes: negative  Ears, Nose, Mouth, Throat, and Face: negative  Respiratory: negative  Cardiovascular: negative  Gastrointestinal: negative  Genitourinary:negative  Integument/Breast: negative  Hematologic/Lymphatic: negative  Musculoskeletal:negative  Neurological: negative    Review of systems was reviewed and updated as needed on 08/29/22.       Past Medical History:   Diagnosis Date    [facility] 10/31/2014    Asperger syndrome 12/14/2011    Autism     dx 2010- highly functional    Cancer ([facility]     testicular    Convulsive syncope 6/11/2019    Fatigue     Loss of appetite     Other ill-defined conditions(799.89)      syncopal episodes    Seizure disorder ([facility] 5/22/2012    Status post VNS (vagus nerve stimulator) placement 12/08/2020     Past Surgical History:   Procedure Laterality Date    HX ADENOIDECTOMY      HX GI      pyloric stenosis 1975    HX ORCHIECTOMY  06/01/2022    Right radical orchiectomy with frozen section    HX ORTHOPAEDIC  07/2020    Middle finger on left hand    HX TONSILLECTOMY      IR INSERT TUNL CVC W PORT OVER 5 YEARS  7/8/2022    VASCULAR SURGERY PROCEDURE UNLIST  12/8/202    Insertion of Vagus Nerve Stimulator       Family History   Problem Relation Age of Onset    Diabetes Father     Heart Disease Father     Elevated Lipids Father     Cancer Mother         Breast    Anxiety Mother     COPD Mother     Other Mother         Neuropathy    Sleep Apnea Mother     OSTEOARTHRITIS Mother     Other Brother         Pituitary tumor     Social History     Tobacco Use    Smoking status: Never    Smokeless tobacco: Never   Substance Use Topics    Alcohol use: Yes     Alcohol/week: 10.0 standard drinks     Types: 10 Cans of beer per week     Comment: most nights      Prior to Admission medications    Medication Sig Start Date End Date Taking? Authorizing Provider   apixaban (ELIQUIS) 5 mg tablet Take 2 Tablets by mouth every twelve (12) hours for 7 days. Indications: blood clot in a deep vein of the extremities, a clot in the lung 8/24/22 8/31/22 Yes Kvng Miramontes NP   apixaban (ELIQUIS) 5 mg tablet Take 1 Tablet by mouth every twelve (12) hours for 90 days. Indications: blood clot in a deep vein of the extremities, a clot in the lung 9/1/22 11/30/22 Yes Kvng Miramontes NP   acetaminophen (TYLENOL) 325 mg tablet Take 2 Tablets by mouth every four (4) hours as needed for Fever or Pain for up to 30 days.  8/24/22 9/23/22 Yes Zarefoss, Glynda Schaumann, NP   brivaracetam (Briviact) 100 mg tablet Take 1 Tablet by mouth two (2) times a day. Max Daily Amount: 200 mg. 8/19/22  Yes Tree Alvarez MD   Xcopri 50 mg tab tablet TAKE 1 TABLET BY MOUTH DAILY. MAX DAILY AMOUNT: 50 MG 8/18/22  Yes Tree Alvarez MD   Xcopri 200 mg tab TAKE 1 TABLET BY MOUTH DAILY. MAX DAILY AMOUNT: 200 MG 7/15/22  Yes Tree Alvarez MD   ondansetron (ZOFRAN ODT) 4 mg disintegrating tablet Take 1 Tablet by mouth every eight (8) hours as needed for Nausea or Vomiting. 6/29/22  Yes Alba Barlow MD   prochlorperazine (Compazine) 10 mg tablet Take 1 Tablet by mouth every six (6) hours as needed for Nausea or Vomiting. 6/29/22  Yes Alba Barlow MD   lidocaine-prilocaine (EMLA) topical cream Apply  to affected area as needed for Pain. Apply generous amount to port site 30 min prior to infusions and cover with plastic wrap 6/29/22  Yes Alba Barlow MD   sertraline (ZOLOFT) 50 mg tablet Take 1 Tablet by mouth daily. 12/8/21  Yes Tree Alvarez MD   topiramate (TOPAMAX) 200 mg tablet TAKE 1 TABLET TWICE DAILY 12/8/21  Yes Tree Alvarez MD   MULTIVITAMIN PO Take 1 Tab by mouth daily. Yes Provider, Historical   amoxicillin-clavulanate (AUGMENTIN) 875-125 mg per tablet Take 1 Tablet by mouth two (2) times a day for 5 days. Patient not taking: Reported on 8/29/2022 8/24/22 8/29/22  Kvng Miramontes NP              Allergies   Allergen Reactions    Aspartame Other (comments)     Family believes it causes his seizures           Objective:     Vitals:    08/29/22 1211   BP: 106/69   Pulse: 96   Resp: 18   Temp: 98.1 °F (36.7 °C)   SpO2: 100%   Weight: 169 lb 8.5 oz (76.9 kg)   Height: 5' 9\" (1.753 m)        Pain Scale: 0 - No pain/10      Physical Exam:  GENERAL: alert, cooperative, no distress, appears stated age  EYE: conjunctivae/corneas clear. LYMPHATIC: Cervical, supraclavicular, and axillary nodes normal.   THROAT & NECK: normal and no erythema or exudates noted.    LUNG: clear to auscultation bilaterally  HEART: regular rate and rhythm  ABDOMEN: soft, non-tender. Bowel sounds normal. No masses,  no organomegaly  EXTREMITIES:  extremities normal, atraumatic, no cyanosis or edema  SKIN: Normal.  NEUROLOGIC: AOx3. CT Results (most recent):  Results from Hospital Encounter encounter on 08/21/22    CTA CHEST W OR W WO CONT    Narrative  INDICATION: Fever. History of testicular cancer. Upper back pain. COMPARISON:June 17, 2022    TECHNIQUE:  Routine noncontrast imaging the chest was performed for localization purposes. Then, following the uneventful intravenous administration of 100 cc [id number],  thin helical axial images were obtained through the chest. 3D image  postprocessing was performed. CT dose reduction was achieved through use of a  standardized protocol tailored for this examination and automatic exposure  control for dose modulation. FINDINGS:    CHEST WALL: No chest wall mass or axillary lymphadenopathy. Right internal  jugular Port-A-Cath terminates in the right atrium. THYROID: No nodule. MEDIASTINUM: Enlarged mediastinal lymph nodes measure up to 14 mm in short axis  diameter, similar to the prior study. MELA: Unchanged low-grade right hilar lymphadenopathy. THORACIC AORTA: No dissection or aneurysm. PULMONARY ARTERIES: Main pulmonary artery is normal in caliber. There is acute  thrombus in the right middle lobe pulmonary arteries. TRACHEA/BRONCHI: Patent. ESOPHAGUS: No wall thickening or dilatation. HEART: Normal in size. PLEURA: Small right pleural effusion. LUNGS: Right middle lobe collapse. Right lower lobe consolidation. Numerous  small bilateral pulmonary nodules measuring up to 4 mm are not significantly  changed. INCIDENTALLY IMAGED UPPER ABDOMEN: Unchanged eventration of the right  hemidiaphragm. BONES: Unchanged sclerotic bone lesions. ADDITIONAL COMMENTS: N/A    Impression  Acute pulmonary embolus in the right middle lobe.  Unchanged mediastinal and  right hilar lymphadenopathy. Small right pleural effusion. Right middle lobe  collapse. Right lower lobe consolidation. Unchanged small bilateral pulmonary  nodules. The findings were called to Dr. Cathleen Smalls on 8/22/2022 at 1:08 AM by Dr. Nikki Dooley. [phone]    Lab Results   Component Value Date/Time    WBC 2.7 (L) 08/29/2022 11:29 AM    HGB 8.4 (L) 08/29/2022 11:29 AM    Hematocrit (POC) 44 06/10/2019 04:29 PM    HCT 25.7 (L) 08/29/2022 11:29 AM    PLATELET 123 15/83/1788 11:29 AM    MCV 86.0 08/29/2022 11:29 AM         Lab Results   Component Value Date/Time    Sodium 138 08/29/2022 11:29 AM    Potassium 3.6 08/29/2022 11:29 AM    Chloride 109 (H) 08/29/2022 11:29 AM    CO2 21 08/29/2022 11:29 AM    Anion gap 8 08/29/2022 11:29 AM    Glucose 158 (H) 08/29/2022 11:29 AM    BUN 17 08/29/2022 11:29 AM    Creatinine 0.83 08/29/2022 11:29 AM    BUN/Creatinine ratio 20 08/29/2022 11:29 AM    GFR est AA >60 08/29/2022 11:29 AM    GFR est non-AA >60 08/29/2022 11:29 AM    Calcium 8.8 08/29/2022 11:29 AM    Bilirubin, total 0.1 (L) 08/29/2022 11:29 AM    Alk. phosphatase 94 08/29/2022 11:29 AM    Protein, total 7.0 08/29/2022 11:29 AM    Albumin 2.6 (L) 08/29/2022 11:29 AM    Globulin 4.4 (H) 08/29/2022 11:29 AM    A-G Ratio 0.6 (L) 08/29/2022 11:29 AM    ALT (SGPT) 86 (H) 08/29/2022 11:29 AM    AST (SGOT) 42 (H) 08/29/2022 11:29 AM           Assessment:     1. 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 Thus I recommend administering 3 cycles of BEP as adjuvant treatment.      Patient was unable to complete Pulmonary Function testing as he could not follow instructions  Thus I changed his treatment to EP X 4    Receiving adjuvant chemotherapy  EP cycle 3 day 1     Tolerating treatment   Due to recent chemotherapy induced neutropenic fever, I have reduced the dose of Cis/Etoposide  A detailed system by system evaluation of side effect was performed to assess chemotherapy related toxicity. Blood counts are acceptable. Results reviewed with the patient. 2. Provoked Acute Pulmonary Embolism r/t Immobility and Malignancy   CTA - Acute pulmonary embolus in the right middle lobe. Provoked by malignancy and low amobility  At least 6 months of systemic anticoagulation. 3. Alcohol Abuse r/t Insomnia and Depression  I have asked him to stop using alcohol      Plan:     1. Continue with treatment - dose reduce Cisplatin and Etoposide  2. Continue with Eliquis      Signed by: Lian Conway NP                     August 29, 2022      I personally saw and evaluated the patient and performed the key components of medical decision making with Jessica Shirley NP. I reviewed and verified the above documentation and modified it as needed. Mr. Krystle Mayer is a gentleman with germ cell tumor of the testes. He is receiving systemic chemotherapy after radical orchiectomy and will continue today. I have reduced the dose of systemic chemotherapy due to recent episode of neutropenic fever. I obtained history, performed physical exam, reviewed labs and imaging and communicated the treatment plan to the patient. Signed by: Ernesto Richards MD                     August 29, 2022      CC. Sebas Gutierrez MD  CC.  Frank Stone MD

## 2022-08-30 ENCOUNTER — HOSPITAL ENCOUNTER (OUTPATIENT)
Dept: INFUSION THERAPY | Age: 47
Discharge: HOME | End: 2022-08-30
Payer: MEDICARE

## 2022-08-30 VITALS
BODY MASS INDEX: 25.71 KG/M2 | TEMPERATURE: 97.8 F | SYSTOLIC BLOOD PRESSURE: 116 MMHG | HEART RATE: 67 BPM | HEIGHT: 69 IN | RESPIRATION RATE: 16 BRPM | WEIGHT: 173.6 LBS | OXYGEN SATURATION: 99 % | DIASTOLIC BLOOD PRESSURE: 70 MMHG

## 2022-08-30 DIAGNOSIS — N50.89 TESTICULAR MASS: Primary | ICD-10-CM

## 2022-08-30 PROCEDURE — 74011250636 HC RX REV CODE- 250/636: Performed by: INTERNAL MEDICINE

## 2022-08-30 PROCEDURE — 77030012965 HC NDL HUBR BBMI -A

## 2022-08-30 PROCEDURE — 96375 TX/PRO/DX INJ NEW DRUG ADDON: CPT

## 2022-08-30 PROCEDURE — 96415 CHEMO IV INFUSION ADDL HR: CPT

## 2022-08-30 PROCEDURE — 96413 CHEMO IV INFUSION 1 HR: CPT

## 2022-08-30 PROCEDURE — 74011000258 HC RX REV CODE- 258: Performed by: INTERNAL MEDICINE

## 2022-08-30 PROCEDURE — 74011000250 HC RX REV CODE- 250: Performed by: INTERNAL MEDICINE

## 2022-08-30 PROCEDURE — 96361 HYDRATE IV INFUSION ADD-ON: CPT

## 2022-08-30 RX ORDER — SODIUM CHLORIDE 9 MG/ML
5-250 INJECTION, SOLUTION INTRAVENOUS AS NEEDED
Status: DISCONTINUED | OUTPATIENT
Start: 2022-08-30 | End: 2022-08-31 | Stop reason: HOSPADM

## 2022-08-30 RX ORDER — HEPARIN 100 UNIT/ML
500 SYRINGE INTRAVENOUS AS NEEDED
Status: DISCONTINUED | OUTPATIENT
Start: 2022-08-30 | End: 2022-08-31 | Stop reason: HOSPADM

## 2022-08-30 RX ORDER — SODIUM CHLORIDE 0.9 % (FLUSH) 0.9 %
5-40 SYRINGE (ML) INJECTION AS NEEDED
Status: DISCONTINUED | OUTPATIENT
Start: 2022-08-30 | End: 2022-08-31 | Stop reason: HOSPADM

## 2022-08-30 RX ADMIN — DEXAMETHASONE SODIUM PHOSPHATE 12 MG: 4 INJECTION, SOLUTION INTRAMUSCULAR; INTRAVENOUS at 14:30

## 2022-08-30 RX ADMIN — SODIUM CHLORIDE, PRESERVATIVE FREE 10 ML: 5 INJECTION INTRAVENOUS at 17:10

## 2022-08-30 RX ADMIN — SODIUM CHLORIDE 25 ML/HR: 9 INJECTION, SOLUTION INTRAVENOUS at 13:15

## 2022-08-30 RX ADMIN — CISPLATIN 29.3 MG: 1 INJECTION INTRAVENOUS at 16:00

## 2022-08-30 RX ADMIN — MAGNESIUM SULFATE HEPTAHYDRATE: 500 INJECTION, SOLUTION INTRAMUSCULAR; INTRAVENOUS at 13:22

## 2022-08-30 RX ADMIN — ETOPOSIDE 146.2 MG: 20 INJECTION INTRAVENOUS at 14:50

## 2022-08-30 RX ADMIN — HEPARIN 500 UNITS: 100 SYRINGE at 17:10

## 2022-08-30 NOTE — PROGRESS NOTES
Outpatient Infusion Center - Chemotherapy Progress Note    8032- Pt admit to [facility] for Etoposide/Cisplatin in stable condition. Assessment completed. Patient at baseline status. Right chest port accessed with positive blood return. Labs drawn per order and sent. Line flushed, clamped, Curos Cap applied to end clave.     Chemotherapy Flowsheet 8/30/2022   Cycle C3D2   Date 8/30/2022   Drug / Regimen Etop/Cis   Pre Hydration given   Pre Meds given   Notes chemo given        Patient Vitals for the past 12 hrs:   Temp Pulse Resp BP SpO2   08/30/22 1310 97.8 °F (36.6 °C) 86 18 102/60 99 %        Chemotherapy started:  Two nurses verified prior to administering:  Drug name  Drug dose  Infusion volume or drug volume when prepared in a syringe  Rate of administration  Route of administration  Expiration dates and/or times  Appearance and physical integrity of the drugs  Rate set on infusion pump, when used  Sequencing of drug administration     Medications:  Medications Administered       0.9% sodium chloride 1,000 mL with potassium chloride 10 mEq, magnesium sulfate 2 g infusion       Admin Date  08/30/2022 Action  New Bag Dose   Rate  1,000 mL/hr Route  IntraVENous Administered By  Christiano Calabrese RN              0.9% sodium chloride infusion       Admin Date  08/30/2022 Action  New Bag Dose  25 mL/hr Rate  25 mL/hr Route  IntraVENous Administered By  Christiano Calabrese RN              CISplatin (PLATINOL) 29.3 mg in 0.9% sodium chloride 500 mL, overfill volume 50 mL chemo infusion       Admin Date  08/30/2022 Action  New Bag Dose  29.3 mg Rate  579.3 mL/hr Route  IntraVENous Administered By  Christiano Calabrese RN              dexamethasone (DECADRON) 12 mg in 0.9% sodium chloride 50 mL IVPB       Admin Date  08/30/2022 Action  New Bag Dose  12 mg Route  IntraVENous Administered By  Oc Pierce              etoposide (VEPESID) 146.2 mg in 0.9% sodium chloride 500 mL, overfill volume 50 mL chemo infusion       Admin Date  08/30/2022 Action  New Bag Dose  146.2 mg Rate  557.3 mL/hr Route  IntraVENous Administered By  Emilia Butt RN              heparin (porcine) pf 500 Units       Admin Date  08/30/2022 Action  Given Dose  500 Units Route  InterCATHeter Administered By  Emilia Butt RN              sodium chloride (NS) flush 5-40 mL       Admin Date  08/30/2022 Action  Given Dose  10 mL Route  IntraVENous Administered By  Emilia Butt RN                       Pt tolerated treatment well. Port maintained positive blood return throughout treatment, flushed with positive blood return at conclusion, and de-accessed. 1715- D/c home in no distress.  Pt aware of next [facility] appointment scheduled for:    Future Appointments   Date Time Provider Stephany Blum   8/31/2022 11:00 AM [facility] MED1 [address] [facility] REG   9/1/2022  1:00 PM LINK MED1 [id number] Margarito Roberts    9/2/2022  1:00 PM LINK MED3 [id number] Margarito Roberts    9/19/2022 11:00 AM LINK MED4 [place] [address] REG   9/19/2022 11:30 AM Laurie Garg MD ONCMR BS AMB   9/20/2022  1:00 PM LINK MED2 [place] [address] REG   9/21/2022  1:00 PM LINK MED1 [address] [facility] REG   9/22/2022  1:00 PM LINK MED1 [address] [facility] REG   9/23/2022 11:00 AM LINK MED3 [id number] Margarito Roberts

## 2022-08-31 ENCOUNTER — TELEPHONE (OUTPATIENT)
Dept: NEUROLOGY | Age: 47
End: 2022-08-31

## 2022-08-31 ENCOUNTER — HOSPITAL ENCOUNTER (OUTPATIENT)
Dept: INFUSION THERAPY | Age: 47
Discharge: HOME | End: 2022-08-31
Payer: MEDICARE

## 2022-08-31 VITALS
OXYGEN SATURATION: 100 % | DIASTOLIC BLOOD PRESSURE: 77 MMHG | HEART RATE: 68 BPM | TEMPERATURE: 97.6 F | RESPIRATION RATE: 16 BRPM | SYSTOLIC BLOOD PRESSURE: 118 MMHG

## 2022-08-31 DIAGNOSIS — N50.89 TESTICULAR MASS: Primary | ICD-10-CM

## 2022-08-31 LAB
AFP L3 MFR SERPL: 80.6 % (ref 0–9.9)
AFP SERPL-MCNC: 14.1 NG/ML (ref 0–6.9)

## 2022-08-31 PROCEDURE — 74011000250 HC RX REV CODE- 250: Performed by: INTERNAL MEDICINE

## 2022-08-31 PROCEDURE — 74011250636 HC RX REV CODE- 250/636: Performed by: INTERNAL MEDICINE

## 2022-08-31 PROCEDURE — 96366 THER/PROPH/DIAG IV INF ADDON: CPT

## 2022-08-31 PROCEDURE — 96417 CHEMO IV INFUS EACH ADDL SEQ: CPT

## 2022-08-31 PROCEDURE — 96413 CHEMO IV INFUSION 1 HR: CPT

## 2022-08-31 PROCEDURE — 96375 TX/PRO/DX INJ NEW DRUG ADDON: CPT

## 2022-08-31 PROCEDURE — 74011000258 HC RX REV CODE- 258: Performed by: INTERNAL MEDICINE

## 2022-08-31 PROCEDURE — 77030012965 HC NDL HUBR BBMI -A

## 2022-08-31 RX ORDER — HEPARIN 100 UNIT/ML
500 SYRINGE INTRAVENOUS AS NEEDED
Status: ACTIVE | OUTPATIENT
Start: 2022-08-31 | End: 2022-08-31

## 2022-08-31 RX ORDER — SODIUM CHLORIDE 0.9 % (FLUSH) 0.9 %
5-40 SYRINGE (ML) INJECTION AS NEEDED
Status: DISPENSED | OUTPATIENT
Start: 2022-08-31 | End: 2022-08-31

## 2022-08-31 RX ORDER — SODIUM CHLORIDE 9 MG/ML
5-40 INJECTION INTRAMUSCULAR; INTRAVENOUS; SUBCUTANEOUS AS NEEDED
Status: ACTIVE | OUTPATIENT
Start: 2022-08-31 | End: 2022-08-31

## 2022-08-31 RX ORDER — SODIUM CHLORIDE 9 MG/ML
5-250 INJECTION, SOLUTION INTRAVENOUS AS NEEDED
Status: DISPENSED | OUTPATIENT
Start: 2022-08-31 | End: 2022-08-31

## 2022-08-31 RX ADMIN — DEXAMETHASONE SODIUM PHOSPHATE 12 MG: 4 INJECTION, SOLUTION INTRAMUSCULAR; INTRAVENOUS at 12:25

## 2022-08-31 RX ADMIN — SODIUM CHLORIDE 25 ML/HR: 9 INJECTION, SOLUTION INTRAVENOUS at 10:22

## 2022-08-31 RX ADMIN — SODIUM CHLORIDE, PRESERVATIVE FREE 10 ML: 5 INJECTION INTRAVENOUS at 11:08

## 2022-08-31 RX ADMIN — CISPLATIN 29.3 MG: 1 INJECTION INTRAVENOUS at 14:18

## 2022-08-31 RX ADMIN — MAGNESIUM SULFATE HEPTAHYDRATE: 500 INJECTION, SOLUTION INTRAMUSCULAR; INTRAVENOUS at 11:11

## 2022-08-31 RX ADMIN — SODIUM CHLORIDE, PRESERVATIVE FREE 10 ML: 5 INJECTION INTRAVENOUS at 15:24

## 2022-08-31 RX ADMIN — HEPARIN SODIUM (PORCINE) LOCK FLUSH IV SOLN 100 UNIT/ML 500 UNITS: 100 SOLUTION at 15:24

## 2022-08-31 RX ADMIN — ETOPOSIDE 146.2 MG: 20 INJECTION INTRAVENOUS at 12:47

## 2022-08-31 NOTE — PROGRESS NOTES
hospitals Progress Note    Date: 2022    Name: Donavan Cerda    MRN: [id number]         : 1975    Mr. Barbi Wilkerson arrived ambulatory at 1100 and in no distress for cycle 3 day 3 of Etoposide/Cisplatin regimen. Assessment was completed, no acute issues at this time, no new complaints voiced. Covid Screening      1. Do you have any symptoms of COVID-19? SOB, coughing, fever, or generally not feeling well ? no  2. Have you been exposed to COVID-19 recently? no  3. Have you had any recent contact with family/friend that has a pending COVID test? no    Venous Access Device r chest piort 22 (Active)   Central Line Being Utilized Yes 22 1100   Criteria for Appropriate Use Irritant/vesicant medication 22 1100   Site Assessment Clean, dry, & intact 22 1100   Date of Last Dressing Change 22 1100   Dressing Status New 22 1100   Dressing Type Transparent 22 1100   Action Taken Blood drawn 22 1100   Date Accessed (Medial Site) 22 1100   Access Time (Medial Site) 1115 22 1100   Access Needle Size (Site #1) 20 G 22 1100   Access Needle Length (Medial Site) 0.75 inches 22 1100   Positive Blood Return (Medial Site) Yes 22 1100   Action Taken (Medial Site) Blood drawn;Flushed 22 1100   Alcohol Cap Used Yes 22 1100    + blood return noted, labs drawn and sent. Proceeded to appt with Dr. Mr. North Torres were reviewed. Patient Vitals for the past 12 hrs:   Temp Pulse Resp BP SpO2   22 1521 -- 68 16 118/77 --   22 1107 97.6 °F (36.4 °C) 69 16 98/63 100 %         Lab results were obtained and reviewed. Pre-medications  were administered as ordered and chemotherapy was initiated.      CHEMO ADMINISTRATION CHECKLIST:  Two nurses verified prior to administering  Drug name  Drug dose  Infusion volume or drug volume when prepared in a syringe  Rate of administration  Route of administration  Expiration dates and/or times  Appearance and physical integrity of the drugs  Rate set on infusion pump, when used  Sequencing of drug administration        Medication:  Medications Administered       0.9% sodium chloride 1,000 mL with potassium chloride 10 mEq, magnesium sulfate 2 g infusion       Admin Date  08/31/2022 Action  New Bag Dose   Rate  1,000 mL/hr Route  IntraVENous Administered By  Areli MONTILLA              0.9% sodium chloride infusion       Admin Date  08/31/2022 Action  New Bag Dose  25 mL/hr Rate  25 mL/hr Route  IntraVENous Administered By  Areli MONTILLA              0.9% sodium chloride injection 5-40 mL       Admin Date  08/31/2022 Action  Given Dose  10 mL Route  IntraVENous Administered By  Areli MONTILLA              CISplatin (PLATINOL) 29.3 mg in 0.9% sodium chloride 500 mL, overfill volume 50 mL chemo infusion       Admin Date  08/31/2022 Action  New Bag Dose  29.3 mg Rate  579.3 mL/hr Route  IntraVENous Administered By  Areli MONTILLA              dexamethasone (DECADRON) 12 mg in 0.9% sodium chloride 50 mL IVPB       Admin Date  08/31/2022 Action  New Bag Dose  12 mg Route  IntraVENous Administered By  Areli MONTILLA              etoposide (VEPESID) 146.2 mg in 0.9% sodium chloride 500 mL, overfill volume 50 mL chemo infusion       Admin Date  08/31/2022 Action  New Bag Dose  146.2 mg Rate  557.3 mL/hr Route  IntraVENous Administered By  Areli MONTILLA              heparin (porcine) pf 500 Units       Admin Date  08/31/2022 Action  Given Dose  500 Units Route  InterCATHeter Administered By  Areli Silverio A              sodium chloride (NS) flush 5-40 mL       Admin Date  08/31/2022 Action  Given Dose  10 mL Route  IntraVENous Administered By  Areli Silverio A                    Patient port flushed; heparinized; and de-accessed per protocol.     Mr. Elliot Ventura tolerated treatment well and was discharged from [place] [address] in stable condition at 1530. He is aware of when to return for his next appointment.     Future Appointments   Date Time Provider Stephany Blum   9/1/2022  1:00 PM [facility] MED1 [address] [facility] [address] REG   9/2/2022  1:00 PM LINK MED3 [address] [facility] REG   9/19/2022 11:00 AM LINK MED4 [place] [address] REG   9/19/2022 11:30 AM Laisha Doyle MD ONCMR BS AMB   9/20/2022  1:00 PM LINK MED2 [place] [address] REG   9/21/2022  1:00 PM LINK MED1 [address] [facility] [address] REG   9/22/2022  1:00 PM LINK MED1 [address] [facility] REG   9/23/2022 11:00 AM LINK MED3 [address] [facility] REG       Lilly Coley  August 31, 2022

## 2022-08-31 NOTE — TELEPHONE ENCOUNTER
[facility], Key# [id number], submitted and awaiting update.

## 2022-09-01 ENCOUNTER — HOSPITAL ENCOUNTER (OUTPATIENT)
Dept: INFUSION THERAPY | Age: 47
Discharge: HOME | End: 2022-09-01
Payer: MEDICARE

## 2022-09-01 VITALS
SYSTOLIC BLOOD PRESSURE: 124 MMHG | OXYGEN SATURATION: 100 % | HEIGHT: 69 IN | RESPIRATION RATE: 16 BRPM | TEMPERATURE: 97.9 F | HEART RATE: 73 BPM | BODY MASS INDEX: 25.64 KG/M2 | DIASTOLIC BLOOD PRESSURE: 77 MMHG

## 2022-09-01 DIAGNOSIS — N50.89 TESTICULAR MASS: Primary | ICD-10-CM

## 2022-09-01 PROCEDURE — 74011000250 HC RX REV CODE- 250: Performed by: INTERNAL MEDICINE

## 2022-09-01 PROCEDURE — 77030012965 HC NDL HUBR BBMI -A

## 2022-09-01 PROCEDURE — 96413 CHEMO IV INFUSION 1 HR: CPT

## 2022-09-01 PROCEDURE — 74011250636 HC RX REV CODE- 250/636: Performed by: INTERNAL MEDICINE

## 2022-09-01 PROCEDURE — 96417 CHEMO IV INFUS EACH ADDL SEQ: CPT

## 2022-09-01 PROCEDURE — 96375 TX/PRO/DX INJ NEW DRUG ADDON: CPT

## 2022-09-01 PROCEDURE — 96367 TX/PROPH/DG ADDL SEQ IV INF: CPT

## 2022-09-01 PROCEDURE — 74011000258 HC RX REV CODE- 258: Performed by: INTERNAL MEDICINE

## 2022-09-01 RX ORDER — SODIUM CHLORIDE 0.9 % (FLUSH) 0.9 %
5-40 SYRINGE (ML) INJECTION AS NEEDED
Status: DISCONTINUED | OUTPATIENT
Start: 2022-09-01 | End: 2022-09-02 | Stop reason: HOSPADM

## 2022-09-01 RX ORDER — PALONOSETRON 0.05 MG/ML
0.25 INJECTION, SOLUTION INTRAVENOUS ONCE
Status: COMPLETED | OUTPATIENT
Start: 2022-09-01 | End: 2022-09-01

## 2022-09-01 RX ORDER — SODIUM CHLORIDE 9 MG/ML
5-250 INJECTION, SOLUTION INTRAVENOUS AS NEEDED
Status: DISCONTINUED | OUTPATIENT
Start: 2022-09-01 | End: 2022-09-02 | Stop reason: HOSPADM

## 2022-09-01 RX ORDER — HEPARIN 100 UNIT/ML
500 SYRINGE INTRAVENOUS AS NEEDED
Status: DISCONTINUED | OUTPATIENT
Start: 2022-09-01 | End: 2022-09-02 | Stop reason: HOSPADM

## 2022-09-01 RX ADMIN — SODIUM CHLORIDE, PRESERVATIVE FREE 10 ML: 5 INJECTION INTRAVENOUS at 13:20

## 2022-09-01 RX ADMIN — SODIUM CHLORIDE 25 ML/HR: 9 INJECTION, SOLUTION INTRAVENOUS at 14:35

## 2022-09-01 RX ADMIN — SODIUM CHLORIDE, PRESERVATIVE FREE 10 ML: 5 INJECTION INTRAVENOUS at 17:41

## 2022-09-01 RX ADMIN — MAGNESIUM SULFATE HEPTAHYDRATE: 500 INJECTION, SOLUTION INTRAMUSCULAR; INTRAVENOUS at 13:23

## 2022-09-01 RX ADMIN — PALONOSETRON 0.25 MG: 0.05 INJECTION, SOLUTION INTRAVENOUS at 14:47

## 2022-09-01 RX ADMIN — DEXAMETHASONE SODIUM PHOSPHATE 12 MG: 4 INJECTION, SOLUTION INTRAMUSCULAR; INTRAVENOUS at 14:50

## 2022-09-01 RX ADMIN — HEPARIN SODIUM (PORCINE) LOCK FLUSH IV SOLN 100 UNIT/ML 500 UNITS: 100 SOLUTION at 17:43

## 2022-09-01 RX ADMIN — ETOPOSIDE 146.2 MG: 20 INJECTION INTRAVENOUS at 15:15

## 2022-09-01 RX ADMIN — CISPLATIN 29.3 MG: 1 INJECTION INTRAVENOUS at 16:35

## 2022-09-01 NOTE — PROGRESS NOTES
[facility] Progress Note    Date: 2022    Name: Elsa Cotto    MRN: [id number]         : 1975    Mr. Rosie Pate arrived (ambulation) at [phone] and in no distress for cycle 3 day 4 of Etoposide/Cisplatin regimen. Re-assessment was completed, no acute issues at this time, no new complaints voiced. Right chest port accessed without difficulty with 0.75 inch sweet needle; + blood return noted, labs drawn and sent. Mr. Miles's vitals were reviewed. Patient Vitals for the past 12 hrs:   Temp Pulse Resp BP SpO2   22 1741 -- 73 16 124/77 100 %   22 1315 97.9 °F (36.6 °C) 72 18 101/60 100 %     Pre-medications  were administered as ordered and chemotherapy was initiated.      CHEMO ADMINISTRATION CHECKLIST:  Two nurses verified prior to administering  Drug name  Drug dose  Infusion volume or drug volume when prepared in a syringe  Rate of administration  Route of administration  Expiration dates and/or times  Appearance and physical integrity of the drugs  Rate set on infusion pump, when used  Sequencing of drug administration    Medication:  Medications Administered       0.9% sodium chloride 1,000 mL with potassium chloride 10 mEq, magnesium sulfate 2 g infusion       Admin Date  2022 Action  New Bag Dose   Rate  1,000 mL/hr Route  IntraVENous Administered By  Fatou Angel R              0.9% sodium chloride infusion       Admin Date  2022 Action  New Bag Dose  25 mL/hr Rate  25 mL/hr Route  IntraVENous Administered By  Fatou Angel R              CISplatin (PLATINOL) 29.3 mg in 0.9% sodium chloride 500 mL, overfill volume 50 mL chemo infusion       Admin Date  2022 Action  New Bag Dose  29.3 mg Rate  579.3 mL/hr Route  IntraVENous Administered By  Fatou CHARLTON              dexamethasone (DECADRON) 12 mg in 0.9% sodium chloride 50 mL IVPB       Admin Date  2022 Action  New Bag Dose  12 mg Route  IntraVENous Administered By  Ford Hardy etoposide (VEPESID) 146.2 mg in 0.9% sodium chloride 500 mL, overfill volume 50 mL chemo infusion       Admin Date  09/01/2022 Action  New Bag Dose  146.2 mg Rate  557.3 mL/hr Route  IntraVENous Administered By  Judy [facility] R              heparin (porcine) pf 500 Units       Admin Date  09/01/2022 Action  Given Dose  500 Units Route  InterCATHeter Administered By  Judy [facility] R              palonosetron HCl (ALOXI) injection 0.25 mg       Admin Date  09/01/2022 Action  Given Dose  0.25 mg Route  IntraVENous Administered By  Judy [facility] R              sodium chloride (NS) flush 5-40 mL       Admin Date  09/01/2022 Action  Given Dose  10 mL Route  IntraVENous Administered By  Hayden Graham Date  09/01/2022 Action  Given Dose  10 mL Route  IntraVENous Administered By  [facility] R             Patient port flushed; heparinized; and de-accessed per protocol. Mr. Krystle Mayer tolerated treatment well and was discharged from [place] [address] in stable condition at [phone]. He is aware of when to return for his next appointment.     Future Appointments   Date Time Provider Stephany Blum   9/2/2022  1:00 PM [facility] MED3 [address] [facility] [address] REG   9/19/2022 11:00 AM LINK MED4 [place] [address] REG   9/19/2022 11:30 AM Anil Morales MD ONC BS AMB   9/20/2022  1:00 PM LINK MED2 [place] [address] REG   9/21/2022  1:00 PM LINK MED1 [address] REG   9/22/2022  1:00 PM LINK MED1 [address] REG   9/23/2022 11:00 AM [facility] MED3 [address] [facility] [address] REG       [facility]  September 1, 2022

## 2022-09-02 ENCOUNTER — HOSPITAL ENCOUNTER (OUTPATIENT)
Dept: INFUSION THERAPY | Age: 47
Discharge: HOME | End: 2022-09-02
Payer: MEDICARE

## 2022-09-02 VITALS
OXYGEN SATURATION: 98 % | HEIGHT: 69 IN | TEMPERATURE: 97.8 F | SYSTOLIC BLOOD PRESSURE: 114 MMHG | RESPIRATION RATE: 16 BRPM | BODY MASS INDEX: 25.95 KG/M2 | HEART RATE: 68 BPM | WEIGHT: 175.2 LBS | DIASTOLIC BLOOD PRESSURE: 70 MMHG

## 2022-09-02 DIAGNOSIS — N50.89 TESTICULAR MASS: Primary | ICD-10-CM

## 2022-09-02 PROCEDURE — 77030012965 HC NDL HUBR BBMI -A

## 2022-09-02 PROCEDURE — 96367 TX/PROPH/DG ADDL SEQ IV INF: CPT

## 2022-09-02 PROCEDURE — 96417 CHEMO IV INFUS EACH ADDL SEQ: CPT

## 2022-09-02 PROCEDURE — 74011250636 HC RX REV CODE- 250/636: Performed by: INTERNAL MEDICINE

## 2022-09-02 PROCEDURE — 96413 CHEMO IV INFUSION 1 HR: CPT

## 2022-09-02 PROCEDURE — 96375 TX/PRO/DX INJ NEW DRUG ADDON: CPT

## 2022-09-02 PROCEDURE — 74011000250 HC RX REV CODE- 250: Performed by: INTERNAL MEDICINE

## 2022-09-02 PROCEDURE — 74011000258 HC RX REV CODE- 258: Performed by: INTERNAL MEDICINE

## 2022-09-02 RX ORDER — SODIUM CHLORIDE 9 MG/ML
5-40 INJECTION INTRAMUSCULAR; INTRAVENOUS; SUBCUTANEOUS AS NEEDED
Status: DISCONTINUED | OUTPATIENT
Start: 2022-09-02 | End: 2022-09-03 | Stop reason: HOSPADM

## 2022-09-02 RX ORDER — SODIUM CHLORIDE 9 MG/ML
5-250 INJECTION, SOLUTION INTRAVENOUS AS NEEDED
Status: DISCONTINUED | OUTPATIENT
Start: 2022-09-02 | End: 2022-09-03 | Stop reason: HOSPADM

## 2022-09-02 RX ORDER — SODIUM CHLORIDE 0.9 % (FLUSH) 0.9 %
5-40 SYRINGE (ML) INJECTION AS NEEDED
Status: DISCONTINUED | OUTPATIENT
Start: 2022-09-02 | End: 2022-09-03 | Stop reason: HOSPADM

## 2022-09-02 RX ORDER — HEPARIN 100 UNIT/ML
500 SYRINGE INTRAVENOUS AS NEEDED
Status: DISCONTINUED | OUTPATIENT
Start: 2022-09-02 | End: 2022-09-03 | Stop reason: HOSPADM

## 2022-09-02 RX ADMIN — SODIUM CHLORIDE 25 ML/HR: 9 INJECTION, SOLUTION INTRAVENOUS at 14:20

## 2022-09-02 RX ADMIN — DEXAMETHASONE SODIUM PHOSPHATE 12 MG: 4 INJECTION, SOLUTION INTRAMUSCULAR; INTRAVENOUS at 14:20

## 2022-09-02 RX ADMIN — HEPARIN SODIUM (PORCINE) LOCK FLUSH IV SOLN 100 UNIT/ML 500 UNITS: 100 SOLUTION at 17:02

## 2022-09-02 RX ADMIN — CISPLATIN 29.3 MG: 1 INJECTION INTRAVENOUS at 16:00

## 2022-09-02 RX ADMIN — SODIUM CHLORIDE 10 ML: 9 INJECTION, SOLUTION INTRAMUSCULAR; INTRAVENOUS; SUBCUTANEOUS at 13:10

## 2022-09-02 RX ADMIN — MAGNESIUM SULFATE HEPTAHYDRATE: 500 INJECTION, SOLUTION INTRAMUSCULAR; INTRAVENOUS at 13:18

## 2022-09-02 RX ADMIN — ETOPOSIDE 146.2 MG: 20 INJECTION INTRAVENOUS at 14:44

## 2022-09-02 RX ADMIN — SODIUM CHLORIDE, PRESERVATIVE FREE 10 ML: 5 INJECTION INTRAVENOUS at 17:01

## 2022-09-02 NOTE — PROGRESS NOTES
8000 [facility] Visit Note    Pt arrived to [facility] ambulatory in no acute distress at 1310 for Etoposide/Cisplatin C3D5. Assessment unremarkable, no new concerns voiced. R chest port accessed without issue and positive blood return noted. Patient denied having any symptoms of COVID-19, i.e. SOB, coughing, fever, or generally not feeling well. Also denies having been exposed to COVID-19 recently or having had any recent contact with family/friend that has a pending COVID test.     Visit Vitals  BP 96/62 (BP 1 Location: Right upper arm, BP Patient Position: Supine)   Pulse 91   Temp 97.8 °F (36.6 °C)   Resp 16   Ht 5' 9\" (1.753 m)   Wt 79.5 kg (175 lb 3.2 oz)   SpO2 98%   BMI 25.87 kg/m²      Two nurses verified prior to administering:  Drug name  Drug dose  Infusion volume or drug volume when prepared in a syringe  Rate of administration  Route of administration  Expiration dates and/or times  Appearance and physical integrity of the drugs  Rate set on infusion pump, when used  Sequencing of drug administration     The following medications administered:  NS 1L with KCL 10 mEq + Magnesium Sulfate 2 g IV over 1 hour  NS @ KVO  Decadron 12 mg IV over 15 minutes  Etoposide 146.2 mg IV over 1 hour  Cisplatin 29.3 mg IV over 1 hour     Visit Vitals  /70 (BP 1 Location: Right upper arm, BP Patient Position: Supine)   Pulse 68   Temp 97.8 °F (36.6 °C)   Resp 16   Ht 5' 9\" (1.753 m)   Wt 79.5 kg (175 lb 3.2 oz)   SpO2 98%   BMI 25.87 kg/m²      Pt tolerated treatment well. No adverse reaction noted. Port flushed per policy and needle removed, 2x2 and paper tape placed. Pt discharged ambulatory in no acute distress at 1705, accompanied by mother. Next appointment 9/19/22 @ 1100.

## 2022-09-07 ENCOUNTER — PATIENT OUTREACH (OUTPATIENT)
Dept: CASE MANAGEMENT | Age: 47
End: 2022-09-07

## 2022-09-07 NOTE — PROGRESS NOTES
Goals Addressed                   This Visit's Progress     Prevent complications post hospitalization. 8/25/22  Activity- activity intolerance/energy conservation/frequent rest, exercise to increase mobility and prevent falls. Medication compliance-Eliquis, augementin x 5 days  Attend KRISHAN appt-hem/onc 8/29/22  Using teach back, reviewed red flags. Patient will monitor/report red flags- fever/chills, difficulty breathing, low blood pressure, fast heart rate, and mental confusion, N/V-dehydration. EVY  9/7/22 Patient mother called on home number on file. Message left on voicemail with contact information to return call to this writer. EVY

## 2022-09-08 DIAGNOSIS — G40.909 SEIZURE DISORDER (HCC): ICD-10-CM

## 2022-09-08 NOTE — TELEPHONE ENCOUNTER
Re: briviact    [facility] [place][note 2] [place] approval letter, scanned to chart. Per letter Yassine Case is approved through 12/31/22 but only at a 30 day limit. Sent update to provider for new script to be sent.

## 2022-09-09 RX ORDER — ONDANSETRON 2 MG/ML
8 INJECTION INTRAMUSCULAR; INTRAVENOUS AS NEEDED
Status: CANCELLED | OUTPATIENT
Start: 2022-09-23

## 2022-09-09 RX ORDER — SODIUM CHLORIDE 9 MG/ML
5-250 INJECTION, SOLUTION INTRAVENOUS AS NEEDED
Status: CANCELLED | OUTPATIENT
Start: 2022-09-21

## 2022-09-09 RX ORDER — HEPARIN 100 UNIT/ML
500 SYRINGE INTRAVENOUS AS NEEDED
Status: CANCELLED
Start: 2022-09-22

## 2022-09-09 RX ORDER — ALBUTEROL SULFATE 0.83 MG/ML
2.5 SOLUTION RESPIRATORY (INHALATION) AS NEEDED
Status: CANCELLED
Start: 2022-09-22

## 2022-09-09 RX ORDER — ACETAMINOPHEN 325 MG/1
650 TABLET ORAL AS NEEDED
Status: CANCELLED
Start: 2022-09-22

## 2022-09-09 RX ORDER — HYDROCORTISONE SODIUM SUCCINATE 100 MG/2ML
100 INJECTION, POWDER, FOR SOLUTION INTRAMUSCULAR; INTRAVENOUS AS NEEDED
Status: CANCELLED | OUTPATIENT
Start: 2022-09-23

## 2022-09-09 RX ORDER — EPINEPHRINE 1 MG/ML
0.3 INJECTION, SOLUTION, CONCENTRATE INTRAVENOUS AS NEEDED
Status: CANCELLED | OUTPATIENT
Start: 2022-09-20

## 2022-09-09 RX ORDER — SODIUM CHLORIDE 0.9 % (FLUSH) 0.9 %
5-40 SYRINGE (ML) INJECTION AS NEEDED
Status: CANCELLED | OUTPATIENT
Start: 2022-09-21

## 2022-09-09 RX ORDER — ALBUTEROL SULFATE 0.83 MG/ML
2.5 SOLUTION RESPIRATORY (INHALATION) AS NEEDED
Status: CANCELLED
Start: 2022-09-21

## 2022-09-09 RX ORDER — PALONOSETRON 0.05 MG/ML
0.25 INJECTION, SOLUTION INTRAVENOUS ONCE
Status: CANCELLED | OUTPATIENT
Start: 2022-09-22 | End: 2022-09-22

## 2022-09-09 RX ORDER — DIPHENHYDRAMINE HYDROCHLORIDE 50 MG/ML
50 INJECTION, SOLUTION INTRAMUSCULAR; INTRAVENOUS AS NEEDED
Status: CANCELLED
Start: 2022-09-19

## 2022-09-09 RX ORDER — DIPHENHYDRAMINE HYDROCHLORIDE 50 MG/ML
50 INJECTION, SOLUTION INTRAMUSCULAR; INTRAVENOUS AS NEEDED
Status: CANCELLED
Start: 2022-09-21

## 2022-09-09 RX ORDER — DIPHENHYDRAMINE HYDROCHLORIDE 50 MG/ML
25 INJECTION, SOLUTION INTRAMUSCULAR; INTRAVENOUS AS NEEDED
Status: CANCELLED
Start: 2022-09-21

## 2022-09-09 RX ORDER — DIPHENHYDRAMINE HYDROCHLORIDE 50 MG/ML
50 INJECTION, SOLUTION INTRAMUSCULAR; INTRAVENOUS AS NEEDED
Status: CANCELLED
Start: 2022-09-22

## 2022-09-09 RX ORDER — DIPHENHYDRAMINE HYDROCHLORIDE 50 MG/ML
25 INJECTION, SOLUTION INTRAMUSCULAR; INTRAVENOUS AS NEEDED
Status: CANCELLED
Start: 2022-09-22

## 2022-09-09 RX ORDER — SODIUM CHLORIDE 0.9 % (FLUSH) 0.9 %
5-40 SYRINGE (ML) INJECTION AS NEEDED
Status: CANCELLED | OUTPATIENT
Start: 2022-09-23

## 2022-09-09 RX ORDER — SODIUM CHLORIDE 9 MG/ML
5-250 INJECTION, SOLUTION INTRAVENOUS AS NEEDED
Status: CANCELLED | OUTPATIENT
Start: 2022-09-23

## 2022-09-09 RX ORDER — EPINEPHRINE 1 MG/ML
0.3 INJECTION, SOLUTION, CONCENTRATE INTRAVENOUS AS NEEDED
Status: CANCELLED | OUTPATIENT
Start: 2022-09-19

## 2022-09-09 RX ORDER — SODIUM CHLORIDE 9 MG/ML
5-250 INJECTION, SOLUTION INTRAVENOUS AS NEEDED
Status: CANCELLED | OUTPATIENT
Start: 2022-09-20

## 2022-09-09 RX ORDER — HYDROCORTISONE SODIUM SUCCINATE 100 MG/2ML
100 INJECTION, POWDER, FOR SOLUTION INTRAMUSCULAR; INTRAVENOUS AS NEEDED
Status: CANCELLED | OUTPATIENT
Start: 2022-09-20

## 2022-09-09 RX ORDER — HYDROCORTISONE SODIUM SUCCINATE 100 MG/2ML
100 INJECTION, POWDER, FOR SOLUTION INTRAMUSCULAR; INTRAVENOUS AS NEEDED
Status: CANCELLED | OUTPATIENT
Start: 2022-09-22

## 2022-09-09 RX ORDER — DIPHENHYDRAMINE HYDROCHLORIDE 50 MG/ML
50 INJECTION, SOLUTION INTRAMUSCULAR; INTRAVENOUS AS NEEDED
Status: CANCELLED
Start: 2022-09-20

## 2022-09-09 RX ORDER — ONDANSETRON 2 MG/ML
8 INJECTION INTRAMUSCULAR; INTRAVENOUS AS NEEDED
Status: CANCELLED | OUTPATIENT
Start: 2022-09-20

## 2022-09-09 RX ORDER — SODIUM CHLORIDE 9 MG/ML
5-250 INJECTION, SOLUTION INTRAVENOUS AS NEEDED
Status: CANCELLED | OUTPATIENT
Start: 2022-09-22

## 2022-09-09 RX ORDER — ONDANSETRON 2 MG/ML
8 INJECTION INTRAMUSCULAR; INTRAVENOUS AS NEEDED
Status: CANCELLED | OUTPATIENT
Start: 2022-09-21

## 2022-09-09 RX ORDER — HEPARIN 100 UNIT/ML
500 SYRINGE INTRAVENOUS AS NEEDED
Status: CANCELLED
Start: 2022-09-23

## 2022-09-09 RX ORDER — DIPHENHYDRAMINE HYDROCHLORIDE 50 MG/ML
25 INJECTION, SOLUTION INTRAMUSCULAR; INTRAVENOUS AS NEEDED
Status: CANCELLED
Start: 2022-09-23

## 2022-09-09 RX ORDER — ACETAMINOPHEN 325 MG/1
650 TABLET ORAL AS NEEDED
Status: CANCELLED
Start: 2022-09-20

## 2022-09-09 RX ORDER — EPINEPHRINE 1 MG/ML
0.3 INJECTION, SOLUTION, CONCENTRATE INTRAVENOUS AS NEEDED
Status: CANCELLED | OUTPATIENT
Start: 2022-09-22

## 2022-09-09 RX ORDER — ACETAMINOPHEN 325 MG/1
650 TABLET ORAL AS NEEDED
Status: CANCELLED
Start: 2022-09-23

## 2022-09-09 RX ORDER — ACETAMINOPHEN 325 MG/1
650 TABLET ORAL AS NEEDED
Status: CANCELLED
Start: 2022-09-19

## 2022-09-09 RX ORDER — ONDANSETRON 2 MG/ML
8 INJECTION INTRAMUSCULAR; INTRAVENOUS AS NEEDED
Status: CANCELLED | OUTPATIENT
Start: 2022-09-19

## 2022-09-09 RX ORDER — HYDROCORTISONE SODIUM SUCCINATE 100 MG/2ML
100 INJECTION, POWDER, FOR SOLUTION INTRAMUSCULAR; INTRAVENOUS AS NEEDED
Status: CANCELLED | OUTPATIENT
Start: 2022-09-19

## 2022-09-09 RX ORDER — SODIUM CHLORIDE 9 MG/ML
5-40 INJECTION INTRAMUSCULAR; INTRAVENOUS; SUBCUTANEOUS AS NEEDED
Status: CANCELLED | OUTPATIENT
Start: 2022-09-23

## 2022-09-09 RX ORDER — SODIUM CHLORIDE 9 MG/ML
5-40 INJECTION INTRAMUSCULAR; INTRAVENOUS; SUBCUTANEOUS AS NEEDED
Status: CANCELLED | OUTPATIENT
Start: 2022-09-22

## 2022-09-09 RX ORDER — HYDROCORTISONE SODIUM SUCCINATE 100 MG/2ML
100 INJECTION, POWDER, FOR SOLUTION INTRAMUSCULAR; INTRAVENOUS AS NEEDED
Status: CANCELLED | OUTPATIENT
Start: 2022-09-21

## 2022-09-09 RX ORDER — ALBUTEROL SULFATE 0.83 MG/ML
2.5 SOLUTION RESPIRATORY (INHALATION) AS NEEDED
Status: CANCELLED
Start: 2022-09-19

## 2022-09-09 RX ORDER — DIPHENHYDRAMINE HYDROCHLORIDE 50 MG/ML
25 INJECTION, SOLUTION INTRAMUSCULAR; INTRAVENOUS AS NEEDED
Status: CANCELLED
Start: 2022-09-20

## 2022-09-09 RX ORDER — SODIUM CHLORIDE 9 MG/ML
5-40 INJECTION INTRAMUSCULAR; INTRAVENOUS; SUBCUTANEOUS AS NEEDED
Status: CANCELLED | OUTPATIENT
Start: 2022-09-20

## 2022-09-09 RX ORDER — ALBUTEROL SULFATE 0.83 MG/ML
2.5 SOLUTION RESPIRATORY (INHALATION) AS NEEDED
Status: CANCELLED
Start: 2022-09-23

## 2022-09-09 RX ORDER — HEPARIN 100 UNIT/ML
500 SYRINGE INTRAVENOUS AS NEEDED
Status: CANCELLED
Start: 2022-09-20

## 2022-09-09 RX ORDER — ACETAMINOPHEN 325 MG/1
650 TABLET ORAL AS NEEDED
Status: CANCELLED
Start: 2022-09-21

## 2022-09-09 RX ORDER — EPINEPHRINE 1 MG/ML
0.3 INJECTION, SOLUTION, CONCENTRATE INTRAVENOUS AS NEEDED
Status: CANCELLED | OUTPATIENT
Start: 2022-09-23

## 2022-09-09 RX ORDER — DIPHENHYDRAMINE HYDROCHLORIDE 50 MG/ML
50 INJECTION, SOLUTION INTRAMUSCULAR; INTRAVENOUS AS NEEDED
Status: CANCELLED
Start: 2022-09-23

## 2022-09-09 RX ORDER — ALBUTEROL SULFATE 0.83 MG/ML
2.5 SOLUTION RESPIRATORY (INHALATION) AS NEEDED
Status: CANCELLED
Start: 2022-09-20

## 2022-09-09 RX ORDER — SODIUM CHLORIDE 0.9 % (FLUSH) 0.9 %
5-40 SYRINGE (ML) INJECTION AS NEEDED
Status: CANCELLED | OUTPATIENT
Start: 2022-09-22

## 2022-09-09 RX ORDER — EPINEPHRINE 1 MG/ML
0.3 INJECTION, SOLUTION, CONCENTRATE INTRAVENOUS AS NEEDED
Status: CANCELLED | OUTPATIENT
Start: 2022-09-21

## 2022-09-09 RX ORDER — DIPHENHYDRAMINE HYDROCHLORIDE 50 MG/ML
25 INJECTION, SOLUTION INTRAMUSCULAR; INTRAVENOUS AS NEEDED
Status: CANCELLED
Start: 2022-09-19

## 2022-09-09 RX ORDER — SODIUM CHLORIDE 9 MG/ML
5-40 INJECTION INTRAMUSCULAR; INTRAVENOUS; SUBCUTANEOUS AS NEEDED
Status: CANCELLED | OUTPATIENT
Start: 2022-09-21

## 2022-09-09 RX ORDER — SODIUM CHLORIDE 0.9 % (FLUSH) 0.9 %
5-40 SYRINGE (ML) INJECTION AS NEEDED
Status: CANCELLED | OUTPATIENT
Start: 2022-09-20

## 2022-09-09 RX ORDER — ONDANSETRON 2 MG/ML
8 INJECTION INTRAMUSCULAR; INTRAVENOUS AS NEEDED
Status: CANCELLED | OUTPATIENT
Start: 2022-09-22

## 2022-09-09 RX ORDER — HEPARIN 100 UNIT/ML
500 SYRINGE INTRAVENOUS AS NEEDED
Status: CANCELLED
Start: 2022-09-21

## 2022-09-15 NOTE — PROGRESS NOTES
Kal Dumas is a 56 y.o. male here for follow up for testicular carcinoma. Treatment today:  Cycle 4 Day 1 Cisplatin + Etoposide  Pt states he is doing well. No concerns brought up. 1. Have you been to the ER, urgent care clinic since your last visit? Hospitalized since your last visit? no    2. Have you seen or consulted any other health care providers outside of the [address] [place] [address] since your last visit? Include any pap smears or colon screening.   no

## 2022-09-16 ENCOUNTER — PATIENT OUTREACH (OUTPATIENT)
Dept: CASE MANAGEMENT | Age: 47
End: 2022-09-16

## 2022-09-16 NOTE — PROGRESS NOTES
Goals Addressed                   This Visit's Progress     Prevent complications post hospitalization. 8/25/22  Activity- activity intolerance/energy conservation/frequent rest, exercise to increase mobility and prevent falls. Medication compliance-Eliquis, augementin x 5 days  Attend KRISHAN appt-hem/onc 8/29/22  Using teach back, reviewed red flags. Patient will monitor/report red flags- fever/chills, difficulty breathing, low blood pressure, fast heart rate, and mental confusion, N/V-dehydration. EVY  9/7/22 Patient mother called on home number on file. Message left on voicemail with contact information to return call to this writer. EVY  9/16/22 Patient mother called on home number on file. Message left on voicemail with contact information to return call to this writer. EVY

## 2022-09-19 ENCOUNTER — OFFICE VISIT (OUTPATIENT)
Dept: ONCOLOGY | Age: 47
End: 2022-09-19

## 2022-09-19 ENCOUNTER — HOSPITAL ENCOUNTER (OUTPATIENT)
Dept: INFUSION THERAPY | Age: 47
Discharge: HOME | End: 2022-09-19
Payer: MEDICARE

## 2022-09-19 VITALS
HEIGHT: 69 IN | SYSTOLIC BLOOD PRESSURE: 110 MMHG | RESPIRATION RATE: 16 BRPM | HEART RATE: 61 BPM | TEMPERATURE: 98.4 F | BODY MASS INDEX: 25.25 KG/M2 | DIASTOLIC BLOOD PRESSURE: 70 MMHG | OXYGEN SATURATION: 100 % | WEIGHT: 170.5 LBS

## 2022-09-19 VITALS
OXYGEN SATURATION: 100 % | BODY MASS INDEX: 25.24 KG/M2 | DIASTOLIC BLOOD PRESSURE: 67 MMHG | RESPIRATION RATE: 16 BRPM | TEMPERATURE: 98.4 F | WEIGHT: 170.42 LBS | SYSTOLIC BLOOD PRESSURE: 106 MMHG | HEART RATE: 79 BPM | HEIGHT: 69 IN

## 2022-09-19 DIAGNOSIS — Z51.11 ENCOUNTER FOR ANTINEOPLASTIC CHEMOTHERAPY: ICD-10-CM

## 2022-09-19 DIAGNOSIS — C62.91 CARCINOMA OF RIGHT TESTIS (HCC): ICD-10-CM

## 2022-09-19 DIAGNOSIS — N50.89 TESTICULAR MASS: Primary | ICD-10-CM

## 2022-09-19 DIAGNOSIS — C62.91 GERM CELL TUMOR OF RIGHT TESTICLE (HCC): Primary | ICD-10-CM

## 2022-09-19 LAB
ALBUMIN SERPL-MCNC: 3.3 G/DL (ref 3.5–5)
ALBUMIN/GLOB SERPL: 1 {RATIO} (ref 1.1–2.2)
ALP SERPL-CCNC: 108 U/L (ref 45–117)
ALT SERPL-CCNC: 28 U/L (ref 12–78)
ANION GAP SERPL CALC-SCNC: 6 MMOL/L (ref 5–15)
AST SERPL-CCNC: 19 U/L (ref 15–37)
BASOPHILS # BLD: 0 K/UL (ref 0–0.1)
BASOPHILS NFR BLD: 1 % (ref 0–1)
BILIRUB SERPL-MCNC: 0.2 MG/DL (ref 0.2–1)
BUN SERPL-MCNC: 27 MG/DL (ref 6–20)
BUN/CREAT SERPL: 33 (ref 12–20)
CALCIUM SERPL-MCNC: 8.8 MG/DL (ref 8.5–10.1)
CHLORIDE SERPL-SCNC: 109 MMOL/L (ref 97–108)
CO2 SERPL-SCNC: 24 MMOL/L (ref 21–32)
CREAT SERPL-MCNC: 0.81 MG/DL (ref 0.7–1.3)
DIFFERENTIAL METHOD BLD: ABNORMAL
EOSINOPHIL # BLD: 0.1 K/UL (ref 0–0.4)
EOSINOPHIL NFR BLD: 3 % (ref 0–7)
ERYTHROCYTE [DISTWIDTH] IN BLOOD BY AUTOMATED COUNT: 23.4 % (ref 11.5–14.5)
GLOBULIN SER CALC-MCNC: 3.4 G/DL (ref 2–4)
GLUCOSE SERPL-MCNC: 108 MG/DL (ref 65–100)
HCT VFR BLD AUTO: 30.9 % (ref 36.6–50.3)
HGB BLD-MCNC: 10 G/DL (ref 12.1–17)
IMM GRANULOCYTES # BLD AUTO: 0 K/UL (ref 0–0.04)
IMM GRANULOCYTES NFR BLD AUTO: 0 % (ref 0–0.5)
LYMPHOCYTES # BLD: 1 K/UL (ref 0.8–3.5)
LYMPHOCYTES NFR BLD: 35 % (ref 12–49)
MAGNESIUM SERPL-MCNC: 2.1 MG/DL (ref 1.6–2.4)
MCH RBC QN AUTO: 29.8 PG (ref 26–34)
MCHC RBC AUTO-ENTMCNC: 32.4 G/DL (ref 30–36.5)
MCV RBC AUTO: 92 FL (ref 80–99)
MONOCYTES # BLD: 0.4 K/UL (ref 0–1)
MONOCYTES NFR BLD: 14 % (ref 5–13)
NEUTS SEG # BLD: 1.3 K/UL (ref 1.8–8)
NEUTS SEG NFR BLD: 47 % (ref 32–75)
NRBC # BLD: 0 K/UL (ref 0–0.01)
NRBC BLD-RTO: 0 PER 100 WBC
PLATELET # BLD AUTO: 164 K/UL (ref 150–400)
PMV BLD AUTO: 9.9 FL (ref 8.9–12.9)
POTASSIUM SERPL-SCNC: 4.2 MMOL/L (ref 3.5–5.1)
PROT SERPL-MCNC: 6.7 G/DL (ref 6.4–8.2)
RBC # BLD AUTO: 3.36 M/UL (ref 4.1–5.7)
RBC MORPH BLD: ABNORMAL
SODIUM SERPL-SCNC: 139 MMOL/L (ref 136–145)
WBC # BLD AUTO: 2.8 K/UL (ref 4.1–11.1)

## 2022-09-19 PROCEDURE — 85025 COMPLETE CBC W/AUTO DIFF WBC: CPT

## 2022-09-19 PROCEDURE — 77030012965 HC NDL HUBR BBMI -A

## 2022-09-19 PROCEDURE — 74011000250 HC RX REV CODE- 250: Performed by: INTERNAL MEDICINE

## 2022-09-19 PROCEDURE — 96417 CHEMO IV INFUS EACH ADDL SEQ: CPT

## 2022-09-19 PROCEDURE — 96367 TX/PROPH/DG ADDL SEQ IV INF: CPT

## 2022-09-19 PROCEDURE — 74011250636 HC RX REV CODE- 250/636: Performed by: INTERNAL MEDICINE

## 2022-09-19 PROCEDURE — 83735 ASSAY OF MAGNESIUM: CPT

## 2022-09-19 PROCEDURE — 1111F DSCHRG MED/CURRENT MED MERGE: CPT | Performed by: INTERNAL MEDICINE

## 2022-09-19 PROCEDURE — G8419 CALC BMI OUT NRM PARAM NOF/U: HCPCS | Performed by: INTERNAL MEDICINE

## 2022-09-19 PROCEDURE — G8427 DOCREV CUR MEDS BY ELIG CLIN: HCPCS | Performed by: INTERNAL MEDICINE

## 2022-09-19 PROCEDURE — G8432 DEP SCR NOT DOC, RNG: HCPCS | Performed by: INTERNAL MEDICINE

## 2022-09-19 PROCEDURE — 96375 TX/PRO/DX INJ NEW DRUG ADDON: CPT

## 2022-09-19 PROCEDURE — G8754 DIAS BP LESS 90: HCPCS | Performed by: INTERNAL MEDICINE

## 2022-09-19 PROCEDURE — 74011000258 HC RX REV CODE- 258: Performed by: INTERNAL MEDICINE

## 2022-09-19 PROCEDURE — 96413 CHEMO IV INFUSION 1 HR: CPT

## 2022-09-19 PROCEDURE — 82105 ALPHA-FETOPROTEIN SERUM: CPT

## 2022-09-19 PROCEDURE — G8752 SYS BP LESS 140: HCPCS | Performed by: INTERNAL MEDICINE

## 2022-09-19 PROCEDURE — 80053 COMPREHEN METABOLIC PANEL: CPT

## 2022-09-19 PROCEDURE — 36415 COLL VENOUS BLD VENIPUNCTURE: CPT

## 2022-09-19 PROCEDURE — 99215 OFFICE O/P EST HI 40 MIN: CPT | Performed by: INTERNAL MEDICINE

## 2022-09-19 RX ORDER — HEPARIN 100 UNIT/ML
500 SYRINGE INTRAVENOUS AS NEEDED
Status: ACTIVE | OUTPATIENT
Start: 2022-09-19 | End: 2022-09-19

## 2022-09-19 RX ORDER — SODIUM CHLORIDE 9 MG/ML
5-250 INJECTION, SOLUTION INTRAVENOUS AS NEEDED
Status: DISPENSED | OUTPATIENT
Start: 2022-09-19 | End: 2022-09-19

## 2022-09-19 RX ORDER — SODIUM CHLORIDE 0.9 % (FLUSH) 0.9 %
5-40 SYRINGE (ML) INJECTION AS NEEDED
Status: DISPENSED | OUTPATIENT
Start: 2022-09-19 | End: 2022-09-19

## 2022-09-19 RX ORDER — PALONOSETRON 0.05 MG/ML
0.25 INJECTION, SOLUTION INTRAVENOUS ONCE
Status: COMPLETED | OUTPATIENT
Start: 2022-09-19 | End: 2022-09-19

## 2022-09-19 RX ORDER — SODIUM CHLORIDE 9 MG/ML
5-40 INJECTION INTRAMUSCULAR; INTRAVENOUS; SUBCUTANEOUS AS NEEDED
Status: ACTIVE | OUTPATIENT
Start: 2022-09-19 | End: 2022-09-19

## 2022-09-19 RX ORDER — SODIUM CHLORIDE 9 MG/ML
5-250 INJECTION, SOLUTION INTRAVENOUS AS NEEDED
Status: DISCONTINUED | OUTPATIENT
Start: 2022-09-19 | End: 2022-09-20 | Stop reason: HOSPADM

## 2022-09-19 RX ADMIN — CISPLATIN 29.3 MG: 1 INJECTION INTRAVENOUS at 16:40

## 2022-09-19 RX ADMIN — SODIUM CHLORIDE 150 MG: 9 INJECTION, SOLUTION INTRAVENOUS at 14:50

## 2022-09-19 RX ADMIN — SODIUM CHLORIDE 25 ML/HR: 9 INJECTION, SOLUTION INTRAVENOUS at 14:44

## 2022-09-19 RX ADMIN — SODIUM CHLORIDE, PRESERVATIVE FREE 10 ML: 5 INJECTION INTRAVENOUS at 11:15

## 2022-09-19 RX ADMIN — MAGNESIUM SULFATE HEPTAHYDRATE: 500 INJECTION, SOLUTION INTRAMUSCULAR; INTRAVENOUS at 13:41

## 2022-09-19 RX ADMIN — SODIUM CHLORIDE, PRESERVATIVE FREE 10 ML: 5 INJECTION INTRAVENOUS at 17:45

## 2022-09-19 RX ADMIN — PALONOSETRON HYDROCHLORIDE 0.25 MG: 0.25 INJECTION, SOLUTION INTRAVENOUS at 14:44

## 2022-09-19 RX ADMIN — HEPARIN 500 UNITS: 100 SYRINGE at 17:45

## 2022-09-19 RX ADMIN — DEXAMETHASONE SODIUM PHOSPHATE 12 MG: 4 INJECTION, SOLUTION INTRAMUSCULAR; INTRAVENOUS at 14:50

## 2022-09-19 RX ADMIN — ETOPOSIDE 146.2 MG: 20 INJECTION INTRAVENOUS at 15:23

## 2022-09-19 NOTE — PROGRESS NOTES
[address] [facility] [address], [address] [facility], [address] [place] [address]  [phone]       Oncology Progress Note        Patient: Hesham Hatfield MRN: [id number]  SSN: [id number]    YOB: 1975  Age: 56 y.o. Sex: male          Diagnosis:     1. 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 Receiving EP cycle 4 day 1     Subjective:      Hesham Hatfield is a 56 y.o. 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 He experienced pain in the right groin approximately 4-6 weeks ago. He underwent USG and was noted to have enlarged right testis. He then underwent a right radical orchiectomy on 06/02/2022. The pathology revealed mixed germ cell tumor with 5% seminoma, 40% yolk sac tumor and teratoma with immature elements 55%. AFP is elevated at 35 and quant beta-HcG is normal and LDH is normal. CT shows enlarge mediastinal nodes and b/l lung nodules. He comes in to discuss the next steps. He suffers with Asperger's syndrome. Mr. Jose Le was recently hospitalized with PNA and PE. He is currently on Eliquis. He feels fatigued and has a decreased appetite. Review of Systems:    Constitutional: fatigue, depression, insomnia   Eyes: negative  Ears, Nose, Mouth, Throat, and Face: negative  Respiratory: negative  Cardiovascular: negative  Gastrointestinal: negative  Genitourinary:negative  Integument/Breast: negative  Hematologic/Lymphatic: negative  Musculoskeletal:negative  Neurological: negative    Review of systems was reviewed and updated as needed on 09/19/22.       Past Medical History:   Diagnosis Date    Alcohol withdrawal ([facility] [address] 10/31/2014    Asperger syndrome 12/14/2011    Autism     dx 2010- highly functional    Cancer ([facility] [address]     testicular    Convulsive syncope 6/11/2019    Fatigue     Loss of appetite     Other ill-defined conditions(799.89)      syncopal episodes    Seizure disorder ([facility] [address] 5/22/2012    Status post VNS (vagus nerve stimulator) placement 12/08/2020     Past Surgical History:   Procedure Laterality Date    HX ADENOIDECTOMY      HX GI      pyloric stenosis 1975    HX ORCHIECTOMY  06/01/2022    Right radical orchiectomy with frozen section    HX ORTHOPAEDIC  07/2020    Middle finger on left hand    HX TONSILLECTOMY      IR INSERT TUNL CVC W PORT OVER 5 YEARS  7/8/2022    VASCULAR SURGERY PROCEDURE UNLIST  12/8/202    Insertion of Vagus Nerve Stimulator       Family History   Problem Relation Age of Onset    Diabetes Father     Heart Disease Father     Elevated Lipids Father     Cancer Mother         Breast    Anxiety Mother     COPD Mother     Other Mother         Neuropathy    Sleep Apnea Mother     OSTEOARTHRITIS Mother     Other Brother         Pituitary tumor     Social History     Tobacco Use    Smoking status: Never    Smokeless tobacco: Never   Substance Use Topics    Alcohol use: Yes     Alcohol/week: 10.0 standard drinks     Types: 10 Cans of beer per week     Comment: most nights      Prior to Admission medications    Medication Sig Start Date End Date Taking? Authorizing Provider   brivaracetam (Briviact) 100 mg tablet Take 1 Tablet by mouth two (2) times a day. Max Daily Amount: 200 mg. 9/8/22  Yes George Richardson MD   apixaban (ELIQUIS) 5 mg tablet Take 1 Tablet by mouth every twelve (12) hours for 90 days. Indications: blood clot in a deep vein of the extremities, a clot in the lung 9/1/22 11/30/22 Yes Kvng Miramontes NP   acetaminophen (TYLENOL) 325 mg tablet Take 2 Tablets by mouth every four (4) hours as needed for Fever or Pain for up to 30 days. 8/24/22 9/23/22 Yes Kvng Miramontes NP   Xcopri 50 mg tab tablet TAKE 1 TABLET BY MOUTH DAILY.  MAX DAILY AMOUNT: 50 MG 8/18/22 Yes Moses Nuno MD   Xcopri 200 mg tab TAKE 1 TABLET BY MOUTH DAILY. MAX DAILY AMOUNT: 200 MG 7/15/22  Yes Moses Nuno MD   ondansetron (ZOFRAN ODT) 4 mg disintegrating tablet Take 1 Tablet by mouth every eight (8) hours as needed for Nausea or Vomiting. 6/29/22  Yes Parker Ramirez MD   prochlorperazine (Compazine) 10 mg tablet Take 1 Tablet by mouth every six (6) hours as needed for Nausea or Vomiting. 6/29/22  Yes Parker Ramirez MD   lidocaine-prilocaine (EMLA) topical cream Apply  to affected area as needed for Pain. Apply generous amount to port site 30 min prior to infusions and cover with plastic wrap 6/29/22  Yes Parker Ramirez MD   sertraline (ZOLOFT) 50 mg tablet Take 1 Tablet by mouth daily. 12/8/21  Yes Moses Nuno MD   topiramate (TOPAMAX) 200 mg tablet TAKE 1 TABLET TWICE DAILY 12/8/21  Yes Moses Nuno MD   MULTIVITAMIN PO Take 1 Tab by mouth daily. Yes Provider, Historical              Allergies   Allergen Reactions    Aspartame Other (comments)     Family believes it causes his seizures           Objective:     Vitals:    09/19/22 1131   BP: 106/67   Pulse: 79   Resp: 16   Temp: 98.4 °F (36.9 °C)   SpO2: 100%   Weight: 170 lb 6.7 oz (77.3 kg)   Height: 5' 9\" (1.753 m)          Pain Scale: 0 - No pain/10      Physical Exam:  GENERAL: alert, cooperative, no distress, appears stated age  EYE: conjunctivae/corneas clear. LYMPHATIC: Cervical, supraclavicular, and axillary nodes normal.   THROAT & NECK: normal and no erythema or exudates noted. LUNG: clear to auscultation bilaterally  HEART: regular rate and rhythm  ABDOMEN: soft, non-tender. Bowel sounds normal. No masses,  no organomegaly  EXTREMITIES:  extremities normal, atraumatic, no cyanosis or edema  SKIN: Normal.  NEUROLOGIC: AOx3. CT Results (most recent):  Results from Hospital Encounter encounter on 08/21/22    CTA CHEST W OR W WO CONT    Narrative  INDICATION: Fever.  History of testicular cancer. Upper back pain. COMPARISON:June 17, 2022    TECHNIQUE:  Routine noncontrast imaging the chest was performed for localization purposes. Then, following the uneventful intravenous administration of 100 cc [id number],  thin helical axial images were obtained through the chest. 3D image  postprocessing was performed. CT dose reduction was achieved through use of a  standardized protocol tailored for this examination and automatic exposure  control for dose modulation. FINDINGS:    CHEST WALL: No chest wall mass or axillary lymphadenopathy. Right internal  jugular Port-A-Cath terminates in the right atrium. THYROID: No nodule. MEDIASTINUM: Enlarged mediastinal lymph nodes measure up to 14 mm in short axis  diameter, similar to the prior study. MELA: Unchanged low-grade right hilar lymphadenopathy. THORACIC AORTA: No dissection or aneurysm. PULMONARY ARTERIES: Main pulmonary artery is normal in caliber. There is acute  thrombus in the right middle lobe pulmonary arteries. TRACHEA/BRONCHI: Patent. ESOPHAGUS: No wall thickening or dilatation. HEART: Normal in size. PLEURA: Small right pleural effusion. LUNGS: Right middle lobe collapse. Right lower lobe consolidation. Numerous  small bilateral pulmonary nodules measuring up to 4 mm are not significantly  changed. INCIDENTALLY IMAGED UPPER ABDOMEN: Unchanged eventration of the right  hemidiaphragm. BONES: Unchanged sclerotic bone lesions. ADDITIONAL COMMENTS: N/A    Impression  Acute pulmonary embolus in the right middle lobe. Unchanged mediastinal and  right hilar lymphadenopathy. Small right pleural effusion. Right middle lobe  collapse. Right lower lobe consolidation. Unchanged small bilateral pulmonary  nodules. The findings were called to Dr. Alexandra Ayala on 8/22/2022 at 1:08 AM by Dr. Tessa Rivers.     789    Lab Results   Component Value Date/Time    WBC 2.8 (L) 09/19/2022 11:10 AM    HGB 10.0 (L) 09/19/2022 11:10 AM    Hematocrit (POC) 44 06/10/2019 04:29 PM    HCT 30.9 (L) 09/19/2022 11:10 AM    PLATELET 812 01/90/0899 11:10 AM    MCV 92.0 09/19/2022 11:10 AM         Lab Results   Component Value Date/Time    Sodium 138 08/29/2022 11:29 AM    Potassium 3.6 08/29/2022 11:29 AM    Chloride 109 (H) 08/29/2022 11:29 AM    CO2 21 08/29/2022 11:29 AM    Anion gap 8 08/29/2022 11:29 AM    Glucose 158 (H) 08/29/2022 11:29 AM    BUN 17 08/29/2022 11:29 AM    Creatinine 0.83 08/29/2022 11:29 AM    BUN/Creatinine ratio 20 08/29/2022 11:29 AM    GFR est AA >60 08/29/2022 11:29 AM    GFR est non-AA >60 08/29/2022 11:29 AM    Calcium 8.8 08/29/2022 11:29 AM    Bilirubin, total 0.1 (L) 08/29/2022 11:29 AM    Alk. phosphatase 94 08/29/2022 11:29 AM    Protein, total 7.0 08/29/2022 11:29 AM    Albumin 2.6 (L) 08/29/2022 11:29 AM    Globulin 4.4 (H) 08/29/2022 11:29 AM    A-G Ratio 0.6 (L) 08/29/2022 11:29 AM    ALT (SGPT) 86 (H) 08/29/2022 11:29 AM    AST (SGOT) 42 (H) 08/29/2022 11:29 AM           Assessment:     1. 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 Thus I recommend administering 3 cycles of BEP as adjuvant treatment.      Patient was unable to complete Pulmonary Function testing as he could not follow instructions  Thus I changed his treatment to EP X 4    Receiving adjuvant chemotherapy  EP cycle 4 day 1     Tolerating treatment   Due to recent chemotherapy induced neutropenic fever, I have reduced the dose of Cis/Etoposide  A detailed system by system evaluation of side effect was performed to assess chemotherapy related toxicity. Blood counts are acceptable. Results reviewed with the patient. 2. Provoked Acute Pulmonary Embolism r/t Immobility and Malignancy   CTA - Acute pulmonary embolus in the right middle lobe. Provoked by malignancy and low amobility  At least 6 months of systemic anticoagulation. 3. Alcohol Abuse r/t Insomnia and Depression  I have asked him to stop using alcohol      Plan:     1. Continue with treatment - dose reduce Cisplatin and Etoposide  2. Continue with Eliquis  3. Repeat scans in 3 months  4. Port flush in 3 months  5. Follow up in 3 months after labs and CT scans      Signed by: Carlo Aguiar NP                     September 19, 2022      I personally saw and evaluated the patient and performed the key components of medical decision making with Olam Gottron, NP. I reviewed and verified the above documentation and modified it as needed. Mr. Tino Urrutia is a young man with Asperger's syndrome. He is receiving systemic therapy and will continue today. I obtained history, performed physical exam, reviewed labs and imaging and communicated the treatment plan to the patient. Signed by: Russel Dawson MD                     September 19, 2022      CC. Kati Mittal MD  CC.  Lynda Hernandez MD

## 2022-09-19 NOTE — LETTER
9/19/2022    Patient: Chuck Guajardo   YOB: 1975   Date of Visit: 9/19/2022     Iva Schuster MD  [address] [place] [address]  [address] [place]    Dear Iva Schuster MD,      Thank you for referring Mr. Bhavana Morales to [address] [place] [address] for evaluation. My notes for this consultation are attached. If you have questions, please do not hesitate to call me. I look forward to following your patient along with you.       Sincerely,    Jake Fonseca MD

## 2022-09-20 ENCOUNTER — HOSPITAL ENCOUNTER (OUTPATIENT)
Dept: INFUSION THERAPY | Age: 47
Discharge: HOME | End: 2022-09-20
Payer: MEDICARE

## 2022-09-20 VITALS
OXYGEN SATURATION: 100 % | RESPIRATION RATE: 16 BRPM | WEIGHT: 170.5 LBS | TEMPERATURE: 97.7 F | BODY MASS INDEX: 25.25 KG/M2 | DIASTOLIC BLOOD PRESSURE: 77 MMHG | SYSTOLIC BLOOD PRESSURE: 116 MMHG | HEART RATE: 59 BPM | HEIGHT: 69 IN

## 2022-09-20 DIAGNOSIS — N50.89 TESTICULAR MASS: Primary | ICD-10-CM

## 2022-09-20 LAB — AFP-TM SERPL-MCNC: 11.3 NG/ML (ref 0–6.9)

## 2022-09-20 PROCEDURE — 74011000250 HC RX REV CODE- 250: Performed by: INTERNAL MEDICINE

## 2022-09-20 PROCEDURE — 74011250636 HC RX REV CODE- 250/636: Performed by: INTERNAL MEDICINE

## 2022-09-20 PROCEDURE — 74011000258 HC RX REV CODE- 258: Performed by: INTERNAL MEDICINE

## 2022-09-20 PROCEDURE — 96375 TX/PRO/DX INJ NEW DRUG ADDON: CPT

## 2022-09-20 PROCEDURE — 77030012965 HC NDL HUBR BBMI -A

## 2022-09-20 PROCEDURE — 96417 CHEMO IV INFUS EACH ADDL SEQ: CPT

## 2022-09-20 PROCEDURE — 96367 TX/PROPH/DG ADDL SEQ IV INF: CPT

## 2022-09-20 PROCEDURE — 96413 CHEMO IV INFUSION 1 HR: CPT

## 2022-09-20 RX ORDER — HEPARIN 100 UNIT/ML
500 SYRINGE INTRAVENOUS AS NEEDED
Status: DISCONTINUED | OUTPATIENT
Start: 2022-09-20 | End: 2022-09-21 | Stop reason: HOSPADM

## 2022-09-20 RX ORDER — SODIUM CHLORIDE 9 MG/ML
5-250 INJECTION, SOLUTION INTRAVENOUS AS NEEDED
Status: DISCONTINUED | OUTPATIENT
Start: 2022-09-20 | End: 2022-09-21 | Stop reason: HOSPADM

## 2022-09-20 RX ORDER — SODIUM CHLORIDE 0.9 % (FLUSH) 0.9 %
5-40 SYRINGE (ML) INJECTION AS NEEDED
Status: DISCONTINUED | OUTPATIENT
Start: 2022-09-20 | End: 2022-09-21 | Stop reason: HOSPADM

## 2022-09-20 RX ORDER — SODIUM CHLORIDE 9 MG/ML
5-40 INJECTION INTRAMUSCULAR; INTRAVENOUS; SUBCUTANEOUS AS NEEDED
Status: DISCONTINUED | OUTPATIENT
Start: 2022-09-20 | End: 2022-09-21 | Stop reason: HOSPADM

## 2022-09-20 RX ADMIN — HEPARIN 500 UNITS: 100 SYRINGE at 17:10

## 2022-09-20 RX ADMIN — SODIUM CHLORIDE 25 ML/HR: 9 INJECTION, SOLUTION INTRAVENOUS at 14:28

## 2022-09-20 RX ADMIN — MAGNESIUM SULFATE HEPTAHYDRATE: 500 INJECTION, SOLUTION INTRAMUSCULAR; INTRAVENOUS at 13:25

## 2022-09-20 RX ADMIN — ETOPOSIDE 146.2 MG: 20 INJECTION INTRAVENOUS at 14:55

## 2022-09-20 RX ADMIN — DEXAMETHASONE SODIUM PHOSPHATE 12 MG: 4 INJECTION, SOLUTION INTRAMUSCULAR; INTRAVENOUS at 14:30

## 2022-09-20 RX ADMIN — SODIUM CHLORIDE, PRESERVATIVE FREE 10 ML: 5 INJECTION INTRAVENOUS at 13:25

## 2022-09-20 RX ADMIN — SODIUM CHLORIDE, PRESERVATIVE FREE 10 ML: 5 INJECTION INTRAVENOUS at 17:10

## 2022-09-20 RX ADMIN — CISPLATIN 29.3 MG: 1 INJECTION INTRAVENOUS at 16:04

## 2022-09-20 NOTE — PROGRESS NOTES
8000 [facility] Visit Note    4252- Pt arrived to [facility] ambulatory in no acute distress for C4D2 Cisplatin/Etoposide. Assessment unchanged. R chest port accessed without issue and positive blood return noted.      Two nurses verified prior to administering:  Drug name  Drug dose  Infusion volume or drug volume when prepared in a syringe  Rate of administration  Route of administration  Expiration dates and/or times  Appearance and physical integrity of the drugs  Rate set on infusion pump, when used  Sequencing of drug administration     The following medications administered:  Medications Administered       0.9% sodium chloride 1,000 mL with potassium chloride 10 mEq, magnesium sulfate 2 g infusion       Admin Date  09/20/2022 Action  New Bag Dose   Rate  1,000 mL/hr Route  IntraVENous Administered By  Narciso Jackson RN              0.9% sodium chloride infusion       Admin Date  09/20/2022 Action  New Bag Dose  25 mL/hr Rate  25 mL/hr Route  IntraVENous Administered By  Narciso Jackson RN              CISplatin (PLATINOL) 29.3 mg in 0.9% sodium chloride 500 mL, overfill volume 50 mL chemo infusion       Admin Date  09/20/2022 Action  New Bag Dose  29.3 mg Rate  579.3 mL/hr Route  IntraVENous Administered By  Narciso Jackson RN              dexamethasone (DECADRON) 12 mg in 0.9% sodium chloride 50 mL IVPB       Admin Date  09/20/2022 Action  New Bag Dose  12 mg Route  IntraVENous Administered By  Narciso Jackson RN              etoposide (VEPESID) 146.2 mg in 0.9% sodium chloride 500 mL, overfill volume 50 mL chemo infusion       Admin Date  09/20/2022 Action  New Bag Dose  146.2 mg Rate  557.3 mL/hr Route  IntraVENous Administered By  Narciso Jackson RN              heparin (porcine) pf 500 Units       Admin Date  09/20/2022 Action  Given Dose  500 Units Route  InterCATHeter Administered By  Narciso Jackson RN              sodium chloride (NS) flush 5-40 mL       Admin Date  09/20/2022 Action  Given Dose  10 mL Route  IntraVENous Administered By  Oskar Mc RN               Admin Date  09/20/2022 Action  Given Dose  10 mL Route  IntraVENous Administered By  Oskar Mc RN                  Patient Vitals for the past 12 hrs:   Temp Pulse Resp BP SpO2   09/20/22 1703 -- (!) 59 16 116/77 --   09/20/22 1319 97.7 °F (36.5 °C) 70 16 97/62 100 %     1710- Pt tolerated treatment well, no adverse reactions noted. Port flushed per policy and de-accessed, 2x2 and tape placed. Pt discharged ambulatory in no acute distress, accompanied by mother. Next appointment 9/21/22.

## 2022-09-21 ENCOUNTER — HOSPITAL ENCOUNTER (OUTPATIENT)
Dept: INFUSION THERAPY | Age: 47
Discharge: HOME | End: 2022-09-21
Payer: MEDICARE

## 2022-09-21 VITALS
OXYGEN SATURATION: 95 % | HEART RATE: 65 BPM | BODY MASS INDEX: 26.19 KG/M2 | SYSTOLIC BLOOD PRESSURE: 118 MMHG | RESPIRATION RATE: 14 BRPM | DIASTOLIC BLOOD PRESSURE: 78 MMHG | TEMPERATURE: 97.9 F | HEIGHT: 69 IN | WEIGHT: 176.8 LBS

## 2022-09-21 DIAGNOSIS — N50.89 TESTICULAR MASS: Primary | ICD-10-CM

## 2022-09-21 PROCEDURE — 96417 CHEMO IV INFUS EACH ADDL SEQ: CPT

## 2022-09-21 PROCEDURE — 74011250637 HC RX REV CODE- 250/637: Performed by: NURSE PRACTITIONER

## 2022-09-21 PROCEDURE — 74011000250 HC RX REV CODE- 250: Performed by: INTERNAL MEDICINE

## 2022-09-21 PROCEDURE — 74011250636 HC RX REV CODE- 250/636: Performed by: INTERNAL MEDICINE

## 2022-09-21 PROCEDURE — 96413 CHEMO IV INFUSION 1 HR: CPT

## 2022-09-21 PROCEDURE — 77030012965 HC NDL HUBR BBMI -A

## 2022-09-21 PROCEDURE — 96361 HYDRATE IV INFUSION ADD-ON: CPT

## 2022-09-21 PROCEDURE — 74011000258 HC RX REV CODE- 258: Performed by: INTERNAL MEDICINE

## 2022-09-21 PROCEDURE — 96375 TX/PRO/DX INJ NEW DRUG ADDON: CPT

## 2022-09-21 RX ORDER — SODIUM CHLORIDE 9 MG/ML
5-250 INJECTION, SOLUTION INTRAVENOUS AS NEEDED
Status: DISCONTINUED | OUTPATIENT
Start: 2022-09-21 | End: 2022-09-22 | Stop reason: HOSPADM

## 2022-09-21 RX ORDER — SODIUM CHLORIDE 0.9 % (FLUSH) 0.9 %
5-40 SYRINGE (ML) INJECTION AS NEEDED
Status: DISCONTINUED | OUTPATIENT
Start: 2022-09-21 | End: 2022-09-22 | Stop reason: HOSPADM

## 2022-09-21 RX ORDER — LOPERAMIDE HYDROCHLORIDE 2 MG/1
4 CAPSULE ORAL ONCE
Status: COMPLETED | OUTPATIENT
Start: 2022-09-21 | End: 2022-09-21

## 2022-09-21 RX ORDER — HEPARIN 100 UNIT/ML
500 SYRINGE INTRAVENOUS AS NEEDED
Status: DISCONTINUED | OUTPATIENT
Start: 2022-09-21 | End: 2022-09-22 | Stop reason: HOSPADM

## 2022-09-21 RX ORDER — SODIUM CHLORIDE 9 MG/ML
5-40 INJECTION INTRAMUSCULAR; INTRAVENOUS; SUBCUTANEOUS AS NEEDED
Status: DISCONTINUED | OUTPATIENT
Start: 2022-09-21 | End: 2022-09-22 | Stop reason: HOSPADM

## 2022-09-21 RX ADMIN — Medication 500 UNITS: at 17:22

## 2022-09-21 RX ADMIN — ETOPOSIDE 146.2 MG: 20 INJECTION INTRAVENOUS at 15:10

## 2022-09-21 RX ADMIN — LOPERAMIDE HYDROCHLORIDE 4 MG: 2 CAPSULE ORAL at 14:43

## 2022-09-21 RX ADMIN — SODIUM CHLORIDE, PRESERVATIVE FREE 10 ML: 5 INJECTION INTRAVENOUS at 17:21

## 2022-09-21 RX ADMIN — DEXAMETHASONE SODIUM PHOSPHATE 12 MG: 4 INJECTION, SOLUTION INTRAMUSCULAR; INTRAVENOUS at 14:45

## 2022-09-21 RX ADMIN — CISPLATIN 29.3 MG: 1 INJECTION INTRAVENOUS at 16:15

## 2022-09-21 RX ADMIN — MAGNESIUM SULFATE HEPTAHYDRATE: 500 INJECTION, SOLUTION INTRAMUSCULAR; INTRAVENOUS at 13:38

## 2022-09-21 RX ADMIN — SODIUM CHLORIDE 25 ML/HR: 9 INJECTION, SOLUTION INTRAVENOUS at 13:30

## 2022-09-21 NOTE — PROGRESS NOTES
Outpatient [facility]fusion [facility] - Chemotherapy Progress Note    1320- Pt admit to [facility] for Etop/Cisplatin in stable condition. Assessment completed. Patient reporting diarrhea x 3 today, reported to MD and Imodium for [facility] ordered and given. Right chest port accessed with positive blood return. Labs drawn per order and sent. Line flushed, clamped, Curos Cap applied to end clave.     Chemotherapy Flowsheet 9/21/2022   Cycle C4D3   Date 9/21/2022   Drug / Regimen Etop/Cisplatin   Pre Hydration given   Pre Meds given   Notes chemo given        Patient Vitals for the past 12 hrs:   Temp Pulse Resp BP   09/21/22 1322 97.9 °F (36.6 °C) 70 18 100/66      Chemotherapy started:  Two nurses verified prior to administering:  Drug name  Drug dose  Infusion volume or drug volume when prepared in a syringe  Rate of administration  Route of administration  Expiration dates and/or times  Appearance and physical integrity of the drugs  Rate set on infusion pump, when used  Sequencing of drug administration     Medications:  Medications Administered       0.9% sodium chloride 1,000 mL with potassium chloride 10 mEq, magnesium sulfate 2 g infusion       Admin Date  09/21/2022 Action  New Bag Dose   Rate  1,000 mL/hr Route  IntraVENous Administered By  Sandra Gonzalez RN              0.9% sodium chloride infusion       Admin Date  09/21/2022 Action  New Bag Dose  25 mL/hr Rate  25 mL/hr Route  IntraVENous Administered By  Sandra Gonzalez RN              CISplatin (PLATINOL) 29.3 mg in 0.9% sodium chloride 500 mL, overfill volume 50 mL chemo infusion       Admin Date  09/21/2022 Action  New Bag Dose  29.3 mg Rate  579.3 mL/hr Route  IntraVENous Administered By  Sandra Gonzalez RN              dexamethasone (DECADRON) 12 mg in 0.9% sodium chloride 50 mL IVPB       Admin Date  09/21/2022 Action  New Bag Dose  12 mg Route  IntraVENous Administered By  Sandra Gonzalez RN              etoposide (VEPESID) 146.2 mg in 0.9% sodium chloride 500 mL, overfill volume 50 mL chemo infusion       Admin Date  09/21/2022 Action  New Bag Dose  146.2 mg Rate  557.3 mL/hr Route  IntraVENous Administered By  Kahti Medel RN              loperamide (IMODIUM) capsule 4 mg       Admin Date  09/21/2022 Action  Given Dose  4 mg Route  Oral Administered By  Kathi Medel RN                         Pt tolerated treatment well. Port maintained positive blood return throughout treatment, flushed with positive blood return at conclusion, and de-accessed. 1730- D/c home in no distress.  Pt aware of next [facility] appointment scheduled for:    Future Appointments   Date Time Provider Stephany Blum   9/22/2022  1:00 PM [address] [place] [address] [address] [facility] [address] REG   9/23/2022 11:00 AM [place] [address] [address] [facility] REG   10/3/2022 11:30 AM [facility]   12/29/2022 11:00 AM Riri Pelayo MD [facility] BS AMB

## 2022-09-22 ENCOUNTER — HOSPITAL ENCOUNTER (OUTPATIENT)
Dept: INFUSION THERAPY | Age: 47
Discharge: HOME | End: 2022-09-22
Payer: MEDICARE

## 2022-09-22 VITALS
SYSTOLIC BLOOD PRESSURE: 116 MMHG | RESPIRATION RATE: 18 BRPM | OXYGEN SATURATION: 100 % | DIASTOLIC BLOOD PRESSURE: 74 MMHG | TEMPERATURE: 98 F | HEART RATE: 69 BPM

## 2022-09-22 DIAGNOSIS — N50.89 TESTICULAR MASS: Primary | ICD-10-CM

## 2022-09-22 PROCEDURE — 74011250636 HC RX REV CODE- 250/636: Performed by: INTERNAL MEDICINE

## 2022-09-22 PROCEDURE — 96417 CHEMO IV INFUS EACH ADDL SEQ: CPT

## 2022-09-22 PROCEDURE — 96413 CHEMO IV INFUSION 1 HR: CPT

## 2022-09-22 PROCEDURE — 96367 TX/PROPH/DG ADDL SEQ IV INF: CPT

## 2022-09-22 PROCEDURE — 77030012965 HC NDL HUBR BBMI -A

## 2022-09-22 PROCEDURE — 74011000258 HC RX REV CODE- 258: Performed by: INTERNAL MEDICINE

## 2022-09-22 PROCEDURE — 74011000250 HC RX REV CODE- 250: Performed by: INTERNAL MEDICINE

## 2022-09-22 PROCEDURE — 96375 TX/PRO/DX INJ NEW DRUG ADDON: CPT

## 2022-09-22 RX ORDER — SODIUM CHLORIDE 0.9 % (FLUSH) 0.9 %
5-40 SYRINGE (ML) INJECTION AS NEEDED
Status: DISCONTINUED | OUTPATIENT
Start: 2022-09-22 | End: 2022-09-23 | Stop reason: HOSPADM

## 2022-09-22 RX ORDER — HEPARIN 100 UNIT/ML
500 SYRINGE INTRAVENOUS AS NEEDED
Status: DISCONTINUED | OUTPATIENT
Start: 2022-09-22 | End: 2022-09-23 | Stop reason: HOSPADM

## 2022-09-22 RX ORDER — SODIUM CHLORIDE 9 MG/ML
5-250 INJECTION, SOLUTION INTRAVENOUS AS NEEDED
Status: DISCONTINUED | OUTPATIENT
Start: 2022-09-22 | End: 2022-09-23 | Stop reason: HOSPADM

## 2022-09-22 RX ORDER — PALONOSETRON 0.05 MG/ML
0.25 INJECTION, SOLUTION INTRAVENOUS ONCE
Status: COMPLETED | OUTPATIENT
Start: 2022-09-22 | End: 2022-09-22

## 2022-09-22 RX ADMIN — CISPLATIN 29.3 MG: 1 INJECTION INTRAVENOUS at 15:55

## 2022-09-22 RX ADMIN — PALONOSETRON HYDROCHLORIDE 0.25 MG: 0.25 INJECTION, SOLUTION INTRAVENOUS at 14:23

## 2022-09-22 RX ADMIN — SODIUM CHLORIDE 25 ML/HR: 9 INJECTION, SOLUTION INTRAVENOUS at 13:09

## 2022-09-22 RX ADMIN — ETOPOSIDE 146.2 MG: 20 INJECTION INTRAVENOUS at 14:46

## 2022-09-22 RX ADMIN — Medication 500 UNITS: at 17:03

## 2022-09-22 RX ADMIN — DEXAMETHASONE SODIUM PHOSPHATE 12 MG: 4 INJECTION, SOLUTION INTRAMUSCULAR; INTRAVENOUS at 14:25

## 2022-09-22 RX ADMIN — SODIUM CHLORIDE, PRESERVATIVE FREE 10 ML: 5 INJECTION INTRAVENOUS at 13:10

## 2022-09-22 RX ADMIN — MAGNESIUM SULFATE HEPTAHYDRATE: 500 INJECTION, SOLUTION INTRAMUSCULAR; INTRAVENOUS at 13:10

## 2022-09-22 RX ADMIN — SODIUM CHLORIDE, PRESERVATIVE FREE 10 ML: 5 INJECTION INTRAVENOUS at 17:01

## 2022-09-22 NOTE — PROGRESS NOTES
Pt arrived to [facility] ambulatory in no acute distress at 1305 for Etoposide/Cisplatin C4D4. Assessment unremarkable. R chest port accessed without issue and positive blood return noted.     Visit Vitals  /70 (BP 1 Location: Left upper arm, BP Patient Position: Sitting)   Pulse 71   Temp 98 °F (36.7 °C)   Resp 18   SpO2 100%       The following medications administered:  Medications Administered       0.9% sodium chloride 1,000 mL with potassium chloride 10 mEq, magnesium sulfate 2 g infusion       Admin Date  09/22/2022 Action  New Bag Dose   Rate  1,000 mL/hr Route  IntraVENous Administered By  Sally Boykin RN              0.9% sodium chloride infusion       Admin Date  09/22/2022 Action  New Bag Dose  25 mL/hr Rate  25 mL/hr Route  IntraVENous Administered By  Sally Boykin RN              CISplatin (PLATINOL) 29.3 mg in 0.9% sodium chloride 500 mL, overfill volume 50 mL chemo infusion       Admin Date  09/22/2022 Action  New Bag Dose  29.3 mg Rate  579.3 mL/hr Route  IntraVENous Administered By  Sally Boykin RN              dexamethasone (DECADRON) 12 mg in 0.9% sodium chloride 50 mL IVPB       Admin Date  09/22/2022 Action  New Bag Dose  12 mg Route  IntraVENous Administered By  Max Collet, RN              etoposide (VEPESID) 146.2 mg in 0.9% sodium chloride 500 mL, overfill volume 50 mL chemo infusion       Admin Date  09/22/2022 Action  New Bag Dose  146.2 mg Rate  557.3 mL/hr Route  IntraVENous Administered By  Sally Boykin RN              heparin (porcine) pf 500 Units       Admin Date  09/22/2022 Action  Given Dose  500 Units Route  IntraVENous Administered By  Sally Boykin RN              palonosetron HCl (ALOXI) injection 0.25 mg       Admin Date  09/22/2022 Action  Given Dose  0.25 mg Route  IntraVENous Administered By  Max Collet, RN              sodium chloride (NS) flush 5-40 mL       Admin Date  09/22/2022 Action  Given Dose  10 mL Route  IntraVENous Administered By  David Jay RN               Admin Date  09/22/2022 Action  Given Dose  10 mL Route  IntraVENous Administered By  David Jay RN                  Visit Vitals  /74   Pulse 69   Temp 98 °F (36.7 °C)   Resp 18   SpO2 100%        Pt tolerated treatment well. Port flushed per policy and de-accessed, 2x2 and tape placed. Pt discharged ambulatory in no acute distress at 1705, accompanied by mother. Next appointment 9/23/22 at 1100.

## 2022-09-23 ENCOUNTER — HOSPITAL ENCOUNTER (OUTPATIENT)
Dept: INFUSION THERAPY | Age: 47
Discharge: HOME | End: 2022-09-23
Payer: MEDICARE

## 2022-09-23 VITALS
HEIGHT: 69 IN | WEIGHT: 177.6 LBS | DIASTOLIC BLOOD PRESSURE: 71 MMHG | BODY MASS INDEX: 26.31 KG/M2 | HEART RATE: 66 BPM | RESPIRATION RATE: 18 BRPM | TEMPERATURE: 98 F | SYSTOLIC BLOOD PRESSURE: 123 MMHG | OXYGEN SATURATION: 98 %

## 2022-09-23 DIAGNOSIS — N50.89 TESTICULAR MASS: Primary | ICD-10-CM

## 2022-09-23 PROCEDURE — 96413 CHEMO IV INFUSION 1 HR: CPT

## 2022-09-23 PROCEDURE — 96375 TX/PRO/DX INJ NEW DRUG ADDON: CPT

## 2022-09-23 PROCEDURE — 77030012965 HC NDL HUBR BBMI -A

## 2022-09-23 PROCEDURE — 74011000250 HC RX REV CODE- 250: Performed by: INTERNAL MEDICINE

## 2022-09-23 PROCEDURE — 96417 CHEMO IV INFUS EACH ADDL SEQ: CPT

## 2022-09-23 PROCEDURE — 74011250636 HC RX REV CODE- 250/636: Performed by: INTERNAL MEDICINE

## 2022-09-23 PROCEDURE — 74011000258 HC RX REV CODE- 258: Performed by: INTERNAL MEDICINE

## 2022-09-23 PROCEDURE — 96367 TX/PROPH/DG ADDL SEQ IV INF: CPT

## 2022-09-23 RX ORDER — SODIUM CHLORIDE 0.9 % (FLUSH) 0.9 %
5-40 SYRINGE (ML) INJECTION AS NEEDED
Status: DISPENSED | OUTPATIENT
Start: 2022-09-23 | End: 2022-09-23

## 2022-09-23 RX ORDER — SODIUM CHLORIDE 9 MG/ML
5-40 INJECTION INTRAMUSCULAR; INTRAVENOUS; SUBCUTANEOUS AS NEEDED
Status: ACTIVE | OUTPATIENT
Start: 2022-09-23 | End: 2022-09-23

## 2022-09-23 RX ORDER — HEPARIN 100 UNIT/ML
500 SYRINGE INTRAVENOUS AS NEEDED
Status: ACTIVE | OUTPATIENT
Start: 2022-09-23 | End: 2022-09-23

## 2022-09-23 RX ORDER — SODIUM CHLORIDE 9 MG/ML
5-250 INJECTION, SOLUTION INTRAVENOUS AS NEEDED
Status: DISPENSED | OUTPATIENT
Start: 2022-09-23 | End: 2022-09-23

## 2022-09-23 RX ADMIN — SODIUM CHLORIDE, PRESERVATIVE FREE 40 ML: 5 INJECTION INTRAVENOUS at 15:40

## 2022-09-23 RX ADMIN — ETOPOSIDE 146.2 MG: 20 INJECTION INTRAVENOUS at 13:06

## 2022-09-23 RX ADMIN — CISPLATIN 29.3 MG: 1 INJECTION INTRAVENOUS at 14:30

## 2022-09-23 RX ADMIN — DEXAMETHASONE SODIUM PHOSPHATE 12 MG: 4 INJECTION, SOLUTION INTRAMUSCULAR; INTRAVENOUS at 12:45

## 2022-09-23 RX ADMIN — HEPARIN 500 UNITS: 100 SYRINGE at 15:40

## 2022-09-23 RX ADMIN — SODIUM CHLORIDE 25 ML/HR: 9 INJECTION, SOLUTION INTRAVENOUS at 11:35

## 2022-09-23 RX ADMIN — POTASSIUM CHLORIDE: 2 INJECTION, SOLUTION, CONCENTRATE INTRAVENOUS at 11:35

## 2022-09-23 NOTE — PROGRESS NOTES
Outpatient [facility]fusion Center - Chemotherapy Progress Note    Pt admit to [facility] for C4D5 Etoposide/Cisplatin in stable condition. Assessment completed. Patient denied having any symptoms of COVID-19, i.e. SOB, coughing, fever, or generally not feeling well. Also denies having been exposed to COVID-19 recently or having had any recent contact with family/friend that has a pending COVID test.     R chest port accessed with 0.75\" Lambert Ba w/o issue, with positive blood return.      Chemotherapy Flowsheet 9/23/2022   Cycle C4D5   Date 9/23/2022   Drug / Regimen Etop/Cis   Pre Hydration given   Pre Meds given   Notes given      Patient Vitals for the past 12 hrs:   Temp Pulse Resp BP SpO2   09/23/22 1531 -- 66 18 123/71 98 %   09/23/22 1122 98 °F (36.7 °C) 79 18 111/72 100 %      Medications:  Medications Administered       0.9% sodium chloride 1,000 mL with potassium chloride 10 mEq, magnesium sulfate 2 g infusion       Admin Date  09/23/2022 Action  New Bag Dose   Rate  1,000 mL/hr Route  IntraVENous Administered By  Lue Heidy              0.9% sodium chloride infusion       Admin Date  09/23/2022 Action  New Bag Dose  25 mL/hr Rate  25 mL/hr Route  IntraVENous Administered By  Lue Heidy              0.9% sodium chloride injection 5-40 mL       Admin Date  09/23/2022 Action  Given Dose  40 mL Route  IntraVENous Administered By  Lue Heidy              CISplatin (PLATINOL) 29.3 mg in 0.9% sodium chloride 500 mL, overfill volume 50 mL chemo infusion       Admin Date  09/23/2022 Action  New Bag Dose  29.3 mg Rate  579.3 mL/hr Route  IntraVENous Administered By  Lue Heidy              dexamethasone (DECADRON) 12 mg in 0.9% sodium chloride 50 mL IVPB       Admin Date  09/23/2022 Action  New Bag Dose  12 mg Route  IntraVENous Administered By  Lue Heidy              etoposide (VEPESID) 146.2 mg in 0.9% sodium chloride 500 mL, overfill volume 50 mL chemo infusion       Admin Date  09/23/2022 Action  New Bag Dose  146.2 mg Rate  557.3 mL/hr Route  IntraVENous Administered By  Anthony Moody              heparin (porcine) pf 500 Units       Admin Date  09/23/2022 Action  Given Dose  500 Units Route  InterCATHeter Administered By  Anthony Moody                      Pt tolerated treatment well. Port maintained positive blood return throughout treatment, flushed with positive blood return at conclusion, and de-accessed. D/c home in no distress. Pt completed scheduled infusions; no further treatment scheduled at this time.      Future Appointments   Date Time Provider Stephany Blum   10/3/2022 11:30 AM [facility]   12/29/2022 11:00 AM Jamar Grande MD [facility]AYNE BS AMB

## 2022-09-27 ENCOUNTER — PATIENT OUTREACH (OUTPATIENT)
Dept: CASE MANAGEMENT | Age: 47
End: 2022-09-27

## 2022-09-27 NOTE — PROGRESS NOTES
Patient has graduated from the Transitions of Care Coordination  program on 9/27/22. Patient/family has the ability to self-manage at this time Care management goals have been completed. Patient was not referred to the [facility] team for further management. Goals Addressed                   This Visit's Progress     COMPLETED: Prevent complications post hospitalization. 8/25/22  Activity- activity intolerance/energy conservation/frequent rest, exercise to increase mobility and prevent falls. Medication compliance-Eliquis, augementin x 5 days  Attend KRISHAN appt-hem/onc 8/29/22  Using teach back, reviewed red flags. Patient will monitor/report red flags- fever/chills, difficulty breathing, low blood pressure, fast heart rate, and mental confusion, N/V-dehydration. \[facility]\""  9/7/22 Patient mother called on home number on file. Message left on voicemail with contact information to return call to this writer. \[facility]\""  9/16/22 Patient mother called on home number on file. Message left on voicemail with contact information to return call to this writer. \[facility]\""  9/27/22 Per review of chart patient attended follow up appointments. Patient has had no ED or hospital admissions 30 days post discharge. Goal complete. \[facility]\""               Patient has Care Transition Nurse's contact information for any further questions, concerns, or needs.   Patients upcoming visits:    Future Appointments   Date Time Provider Stephany Blum   10/3/2022 11:30 AM Jairo Jarrell MD [facility] BS AMB   12/29/2022 11:00 AM Jamar Grande MD [facility]/JAYNE BS AMB

## 2022-09-28 DIAGNOSIS — G40.909 SEIZURE DISORDER (HCC): ICD-10-CM

## 2022-09-28 RX ORDER — SERTRALINE HYDROCHLORIDE 50 MG/1
TABLET, FILM COATED ORAL
Qty: 90 TABLET | Refills: 3 | Status: SHIPPED | OUTPATIENT
Start: 2022-09-28

## 2022-09-28 RX ORDER — TOPIRAMATE 200 MG/1
TABLET ORAL
Qty: 180 TABLET | Refills: 3 | Status: SHIPPED | OUTPATIENT
Start: 2022-09-28

## 2022-09-30 ENCOUNTER — TELEPHONE (OUTPATIENT)
Dept: PALLATIVE CARE | Age: 47
End: 2022-09-30

## 2022-09-30 NOTE — TELEPHONE ENCOUNTER
Called and LVM for patient's mom, Belkis Romero to return call to confirm upcoming appt with Dr. Yeimi Velazquez on Monday, 10/3/22 .

## 2022-11-18 DIAGNOSIS — G40.219 PARTIAL SYMPTOMATIC EPILEPSY WITH COMPLEX PARTIAL SEIZURES, INTRACTABLE, WITHOUT STATUS EPILEPTICUS (HCC): ICD-10-CM

## 2022-11-21 RX ORDER — CENOBAMATE 200 MG/1
TABLET, FILM COATED ORAL
Qty: 30 EACH | Refills: 3 | Status: SHIPPED | OUTPATIENT
Start: 2022-11-21

## 2022-11-30 DIAGNOSIS — I26.99 PULMONARY EMBOLISM, OTHER, UNSPECIFIED CHRONICITY, UNSPECIFIED WHETHER ACUTE COR PULMONALE PRESENT (HCC): Primary | ICD-10-CM

## 2022-11-30 NOTE — TELEPHONE ENCOUNTER
Requested Prescriptions     Pending Prescriptions Disp Refills    apixaban (ELIQUIS) 5 mg tablet 180 Tablet 0     Sig: Take 1 Tablet by mouth every twelve (12) hours for 90 days.  Indications: blood clot in a deep vein of the extremities, a clot in the lung     Approved per VORB from [facility]

## 2022-12-19 ENCOUNTER — HOSPITAL ENCOUNTER (OUTPATIENT)
Dept: CT IMAGING | Age: 47
Discharge: HOME | End: 2022-12-19
Attending: INTERNAL MEDICINE
Payer: MEDICARE

## 2022-12-19 DIAGNOSIS — C62.91 GERM CELL TUMOR OF RIGHT TESTICLE (HCC): ICD-10-CM

## 2022-12-19 PROCEDURE — 74011000636 HC RX REV CODE- 636: Performed by: INTERNAL MEDICINE

## 2022-12-19 PROCEDURE — 74011000250 HC RX REV CODE- 250: Performed by: INTERNAL MEDICINE

## 2022-12-19 PROCEDURE — 71260 CT THORAX DX C+: CPT

## 2022-12-19 PROCEDURE — 74177 CT ABD & PELVIS W/CONTRAST: CPT

## 2022-12-19 RX ORDER — BARIUM SULFATE 20 MG/ML
900 SUSPENSION ORAL
Status: COMPLETED | OUTPATIENT
Start: 2022-12-19 | End: 2022-12-19

## 2022-12-19 RX ADMIN — BARIUM SULFATE 450 ML: 21 SUSPENSION ORAL at 15:34

## 2022-12-19 RX ADMIN — IOPAMIDOL 100 ML: 755 INJECTION, SOLUTION INTRAVENOUS at 15:33

## 2022-12-23 NOTE — PROGRESS NOTES
Tanya Alonzo is a 56 y.o. 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 Pt doing well. He may have had a fall and glasses cut his nose. Some bruising around eye. Pt unsure. Mom says he was like that when they picked him up and they state he could have had a seizure. 1. Have you been to the ER, urgent care clinic since your last visit? Hospitalized since your last visit? no    2. Have you seen or consulted any other health care providers outside of the [address] [place] [address] since your last visit? Include any pap smears or colon screening.   no

## 2022-12-27 ENCOUNTER — TELEPHONE (OUTPATIENT)
Dept: ONCOLOGY | Age: 47
End: 2022-12-27

## 2022-12-27 NOTE — TELEPHONE ENCOUNTER
Patients mother Unruly Solis called & left a marissa Solis asking if pt needs labs or other test done before apt scheduled on Thursday. Please call Unruly Solis @ [phone] or send message on Kiddy.

## 2022-12-28 RX ORDER — SODIUM CHLORIDE 9 MG/ML
5-40 INJECTION INTRAMUSCULAR; INTRAVENOUS; SUBCUTANEOUS AS NEEDED
Status: CANCELLED | OUTPATIENT
Start: 2022-12-29

## 2022-12-28 RX ORDER — SODIUM CHLORIDE 0.9 % (FLUSH) 0.9 %
5-40 SYRINGE (ML) INJECTION AS NEEDED
Status: CANCELLED | OUTPATIENT
Start: 2022-12-29

## 2022-12-28 RX ORDER — SODIUM CHLORIDE 9 MG/ML
5-250 INJECTION, SOLUTION INTRAVENOUS AS NEEDED
Status: CANCELLED | OUTPATIENT
Start: 2022-12-29

## 2022-12-28 RX ORDER — HEPARIN 100 UNIT/ML
500 SYRINGE INTRAVENOUS AS NEEDED
Status: CANCELLED
Start: 2022-12-29

## 2022-12-28 NOTE — TELEPHONE ENCOUNTER
Will send msg via [employer]. He is on for labs tomorrow and appt with us. Li please change pt office appt to 1030.      AFP CBC AND CMP MG

## 2022-12-29 ENCOUNTER — OFFICE VISIT (OUTPATIENT)
Dept: ONCOLOGY | Age: 47
End: 2022-12-29
Payer: MEDICARE

## 2022-12-29 ENCOUNTER — HOSPITAL ENCOUNTER (OUTPATIENT)
Dept: INFUSION THERAPY | Age: 47
Discharge: HOME | End: 2022-12-29
Payer: MEDICAID

## 2022-12-29 VITALS
DIASTOLIC BLOOD PRESSURE: 87 MMHG | TEMPERATURE: 98.4 F | HEART RATE: 89 BPM | WEIGHT: 173.9 LBS | OXYGEN SATURATION: 100 % | BODY MASS INDEX: 25.68 KG/M2 | RESPIRATION RATE: 16 BRPM | SYSTOLIC BLOOD PRESSURE: 131 MMHG

## 2022-12-29 VITALS
HEART RATE: 89 BPM | DIASTOLIC BLOOD PRESSURE: 87 MMHG | RESPIRATION RATE: 16 BRPM | OXYGEN SATURATION: 100 % | WEIGHT: 173.94 LBS | TEMPERATURE: 98.4 F | HEIGHT: 69 IN | SYSTOLIC BLOOD PRESSURE: 131 MMHG | BODY MASS INDEX: 25.76 KG/M2

## 2022-12-29 DIAGNOSIS — C62.91 GERM CELL TUMOR OF RIGHT TESTICLE (HCC): Primary | ICD-10-CM

## 2022-12-29 DIAGNOSIS — N50.89 MASS OF LEFT TESTIS: ICD-10-CM

## 2022-12-29 DIAGNOSIS — I26.99 PULMONARY EMBOLISM, OTHER, UNSPECIFIED CHRONICITY, UNSPECIFIED WHETHER ACUTE COR PULMONALE PRESENT (HCC): ICD-10-CM

## 2022-12-29 DIAGNOSIS — C62.91 CARCINOMA OF RIGHT TESTIS (HCC): Primary | ICD-10-CM

## 2022-12-29 LAB
ALBUMIN SERPL-MCNC: 3.7 G/DL (ref 3.5–5)
ALBUMIN/GLOB SERPL: 1.3 {RATIO} (ref 1.1–2.2)
ALP SERPL-CCNC: 83 U/L (ref 45–117)
ALT SERPL-CCNC: 20 U/L (ref 12–78)
ANION GAP SERPL CALC-SCNC: 7 MMOL/L (ref 5–15)
AST SERPL-CCNC: 14 U/L (ref 15–37)
BASOPHILS # BLD: 0 K/UL (ref 0–0.1)
BASOPHILS NFR BLD: 1 % (ref 0–1)
BILIRUB SERPL-MCNC: 0.3 MG/DL (ref 0.2–1)
BUN SERPL-MCNC: 18 MG/DL (ref 6–20)
BUN/CREAT SERPL: 23 (ref 12–20)
CALCIUM SERPL-MCNC: 8.9 MG/DL (ref 8.5–10.1)
CHLORIDE SERPL-SCNC: 108 MMOL/L (ref 97–108)
CO2 SERPL-SCNC: 24 MMOL/L (ref 21–32)
CREAT SERPL-MCNC: 0.79 MG/DL (ref 0.7–1.3)
DIFFERENTIAL METHOD BLD: ABNORMAL
EOSINOPHIL # BLD: 0 K/UL (ref 0–0.4)
EOSINOPHIL NFR BLD: 1 % (ref 0–7)
ERYTHROCYTE [DISTWIDTH] IN BLOOD BY AUTOMATED COUNT: 12.4 % (ref 11.5–14.5)
GLOBULIN SER CALC-MCNC: 2.8 G/DL (ref 2–4)
GLUCOSE SERPL-MCNC: 101 MG/DL (ref 65–100)
HCT VFR BLD AUTO: 39.1 % (ref 36.6–50.3)
HGB BLD-MCNC: 12.6 G/DL (ref 12.1–17)
IMM GRANULOCYTES # BLD AUTO: 0 K/UL (ref 0–0.04)
IMM GRANULOCYTES NFR BLD AUTO: 0 % (ref 0–0.5)
LYMPHOCYTES # BLD: 0.9 K/UL (ref 0.8–3.5)
LYMPHOCYTES NFR BLD: 15 % (ref 12–49)
MAGNESIUM SERPL-MCNC: 2.1 MG/DL (ref 1.6–2.4)
MCH RBC QN AUTO: 29 PG (ref 26–34)
MCHC RBC AUTO-ENTMCNC: 32.2 G/DL (ref 30–36.5)
MCV RBC AUTO: 90.1 FL (ref 80–99)
MONOCYTES # BLD: 0.6 K/UL (ref 0–1)
MONOCYTES NFR BLD: 9 % (ref 5–13)
NEUTS SEG # BLD: 4.8 K/UL (ref 1.8–8)
NEUTS SEG NFR BLD: 74 % (ref 32–75)
NRBC # BLD: 0 K/UL (ref 0–0.01)
NRBC BLD-RTO: 0 PER 100 WBC
PLATELET # BLD AUTO: 187 K/UL (ref 150–400)
PMV BLD AUTO: 8.7 FL (ref 8.9–12.9)
POTASSIUM SERPL-SCNC: 4.1 MMOL/L (ref 3.5–5.1)
PROT SERPL-MCNC: 6.5 G/DL (ref 6.4–8.2)
RBC # BLD AUTO: 4.34 M/UL (ref 4.1–5.7)
SODIUM SERPL-SCNC: 139 MMOL/L (ref 136–145)
WBC # BLD AUTO: 6.4 K/UL (ref 4.1–11.1)

## 2022-12-29 PROCEDURE — 36591 DRAW BLOOD OFF VENOUS DEVICE: CPT

## 2022-12-29 PROCEDURE — 82105 ALPHA-FETOPROTEIN SERUM: CPT

## 2022-12-29 PROCEDURE — 80053 COMPREHEN METABOLIC PANEL: CPT

## 2022-12-29 PROCEDURE — 3074F SYST BP LT 130 MM HG: CPT | Performed by: INTERNAL MEDICINE

## 2022-12-29 PROCEDURE — 3078F DIAST BP <80 MM HG: CPT | Performed by: INTERNAL MEDICINE

## 2022-12-29 PROCEDURE — 85025 COMPLETE CBC W/AUTO DIFF WBC: CPT

## 2022-12-29 PROCEDURE — G8419 CALC BMI OUT NRM PARAM NOF/U: HCPCS | Performed by: INTERNAL MEDICINE

## 2022-12-29 PROCEDURE — 74011000250 HC RX REV CODE- 250: Performed by: INTERNAL MEDICINE

## 2022-12-29 PROCEDURE — G8432 DEP SCR NOT DOC, RNG: HCPCS | Performed by: INTERNAL MEDICINE

## 2022-12-29 PROCEDURE — 74011250636 HC RX REV CODE- 250/636: Performed by: INTERNAL MEDICINE

## 2022-12-29 PROCEDURE — 83735 ASSAY OF MAGNESIUM: CPT

## 2022-12-29 PROCEDURE — 77030012965 HC NDL HUBR BBMI -A

## 2022-12-29 PROCEDURE — 99214 OFFICE O/P EST MOD 30 MIN: CPT | Performed by: INTERNAL MEDICINE

## 2022-12-29 PROCEDURE — G8427 DOCREV CUR MEDS BY ELIG CLIN: HCPCS | Performed by: INTERNAL MEDICINE

## 2022-12-29 RX ORDER — SODIUM CHLORIDE 9 MG/ML
5-250 INJECTION, SOLUTION INTRAVENOUS AS NEEDED
Status: DISCONTINUED | OUTPATIENT
Start: 2022-12-29 | End: 2022-12-30 | Stop reason: HOSPADM

## 2022-12-29 RX ORDER — SODIUM CHLORIDE 9 MG/ML
5-40 INJECTION INTRAMUSCULAR; INTRAVENOUS; SUBCUTANEOUS AS NEEDED
Status: DISCONTINUED | OUTPATIENT
Start: 2022-12-29 | End: 2022-12-30 | Stop reason: HOSPADM

## 2022-12-29 RX ORDER — HEPARIN 100 UNIT/ML
500 SYRINGE INTRAVENOUS AS NEEDED
Status: DISCONTINUED | OUTPATIENT
Start: 2022-12-29 | End: 2022-12-30 | Stop reason: HOSPADM

## 2022-12-29 RX ORDER — SODIUM CHLORIDE 0.9 % (FLUSH) 0.9 %
5-40 SYRINGE (ML) INJECTION AS NEEDED
Status: DISCONTINUED | OUTPATIENT
Start: 2022-12-29 | End: 2022-12-30 | Stop reason: HOSPADM

## 2022-12-29 RX ADMIN — HEPARIN SODIUM (PORCINE) LOCK FLUSH IV SOLN 100 UNIT/ML 500 UNITS: 100 SOLUTION at 15:24

## 2022-12-29 RX ADMIN — SODIUM CHLORIDE, PRESERVATIVE FREE 20 ML: 5 INJECTION INTRAVENOUS at 15:24

## 2022-12-29 NOTE — PROGRESS NOTES
[address] [facility] [address] [facility], [address] [place] [address]  [phone]       Oncology Progress Note        Patient: Bhavik Cordova MRN: [id number]  SSN: [id number]    YOB: 1975  Age: 56 y.o. Sex: male          Diagnosis:     1. 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 Received systemic therapy  EP 4 cycles    Subjective:      Bhavik Cordova is a 56 y.o. 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 He experienced pain in the right groin approximately 4-6 weeks ago. He underwent USG and was noted to have enlarged right testis. He then underwent a right radical orchiectomy on 06/02/2022. The pathology revealed mixed germ cell tumor with 5% seminoma, 40% yolk sac tumor and teratoma with immature elements 55%. AFP is elevated at 35 and quant beta-HcG is normal and LDH is normal. CT shows enlarge mediastinal nodes and b/l lung nodules. He suffers with Asperger's syndrome. He received systemic chemotherapy. Mr. Xiao Gonzales suffered with acute PE. He is currently on Eliquis. He feels fatigued and has a decreased appetite. He has a contusion on the bridge of the nose and black eye on the left side. Review of Systems:    Constitutional: fatigue, depression, insomnia   Eyes: negative  Ears, Nose, Mouth, Throat, and Face: negative  Respiratory: negative  Cardiovascular: negative  Gastrointestinal: negative  Genitourinary:negative  Integument/Breast: negative  Hematologic/Lymphatic: negative  Musculoskeletal:negative  Neurological: negative    Review of systems was reviewed and updated as needed on 12/29/22.       Past Medical History:   Diagnosis Date    Alcohol withdrawal ([facility] 10/31/2014 Asperger syndrome 12/14/2011    Autism     dx 2010- highly functional    Cancer ([facility]     testicular    Convulsive syncope 6/11/2019    Fatigue     Loss of appetite     Other ill-defined conditions(799.89)      syncopal episodes    Seizure disorder ([facility] [address] [address] 5/22/2012    Status post VNS (vagus nerve stimulator) placement 12/08/2020     Past Surgical History:   Procedure Laterality Date    HX ADENOIDECTOMY      HX GI      pyloric stenosis 1975    HX ORCHIECTOMY  06/01/2022    Right radical orchiectomy with frozen section    HX ORTHOPAEDIC  07/2020    Middle finger on left hand    HX TONSILLECTOMY      IR INSERT TUNL CVC W PORT OVER 5 YEARS  7/8/2022    VASCULAR SURGERY PROCEDURE UNLIST  12/8/202    Insertion of Vagus Nerve Stimulator       Family History   Problem Relation Age of Onset    Diabetes Father     Heart Disease Father     Elevated Lipids Father     Cancer Mother         Breast    Anxiety Mother     COPD Mother     Other Mother         Neuropathy    Sleep Apnea Mother     OSTEOARTHRITIS Mother     Other Brother         Pituitary tumor     Social History     Tobacco Use    Smoking status: Never    Smokeless tobacco: Never   Substance Use Topics    Alcohol use: Yes     Alcohol/week: 10.0 standard drinks     Types: 10 Cans of beer per week     Comment: most nights      Prior to Admission medications    Medication Sig Start Date End Date Taking? Authorizing Provider   apixaban (ELIQUIS) 5 mg tablet Take 1 Tablet by mouth every twelve (12) hours for 90 days. Indications: blood clot in a deep vein of the extremities, a clot in the lung 11/30/22 2/28/23 Yes Nida Hathaway MD   Xcopri 200 mg tab TAKE 1 TABLET BY MOUTH DAILY.  MAX DAILY AMOUNT: 200 MG 11/21/22  Yes Randall Burleson MD   sertraline (ZOLOFT) 50 mg tablet TAKE 1 TABLET EVERY DAY 9/28/22  Yes Randall Burleson MD   topiramate (TOPAMAX) 200 mg tablet TAKE 1 TABLET TWICE DAILY 9/28/22  Yes Randall Burleson MD   brivaracetam (Briviact) 100 mg tablet Take 1 Tablet by mouth two (2) times a day. Max Daily Amount: 200 mg. 9/8/22  Yes Mercy Resendiz MD   Xcopri 50 mg tab tablet TAKE 1 TABLET BY MOUTH DAILY. MAX DAILY AMOUNT: 50 MG 8/18/22  Yes Mercy Resendiz MD   ondansetron (ZOFRAN ODT) 4 mg disintegrating tablet Take 1 Tablet by mouth every eight (8) hours as needed for Nausea or Vomiting. 6/29/22  Yes Keven Ruth MD   prochlorperazine (Compazine) 10 mg tablet Take 1 Tablet by mouth every six (6) hours as needed for Nausea or Vomiting. 6/29/22  Yes Keven Ruth MD   lidocaine-prilocaine (EMLA) topical cream Apply  to affected area as needed for Pain. Apply generous amount to port site 30 min prior to infusions and cover with plastic wrap 6/29/22  Yes Keven Ruth MD   MULTIVITAMIN PO Take 1 Tab by mouth daily. Yes Provider, Historical              Allergies   Allergen Reactions    Aspartame Other (comments)     Family believes it causes his seizures           Objective:     Vitals:    12/29/22 1537   BP: 131/87   Pulse: 89   Resp: 16   Temp: 98.4 °F (36.9 °C)   SpO2: 100%   Weight: 173 lb 15.1 oz (78.9 kg)   Height: 5' 9\" (1.753 m)          Pain Scale: 0 - No pain/10      Physical Exam:  GENERAL: alert, cooperative, no distress, appears stated age  EYE: left eye has bleeding around it and in the the subconjunctival space  LYMPHATIC: Cervical, supraclavicular, and axillary nodes normal.   THROAT & NECK: normal and no erythema or exudates noted. LUNG: clear to auscultation bilaterally  HEART: regular rate and rhythm  ABDOMEN: soft, non-tender. Bowel sounds normal. No masses,  no organomegaly  EXTREMITIES:  extremities normal, atraumatic, no cyanosis or edema  SKIN: Normal.  NEUROLOGIC: AOx3.        CT Results (most recent):  Results from Hospital Encounter encounter on 12/19/22    CT ABD PELV W CONT    Narrative  EXAM: CT ABD PELV W CONT, CT CHEST W CONT    INDICATION: Right testicular germ cell tumor treated with right orchiectomy in  June, 2022. Evaluate for metastatic disease. COMPARISON: CT chest on 8/22/2022. CT chest, abdomen, and pelvis on 6/17/2022. TECHNIQUE: Helical CT of the chest, abdomen, and pelvis following the uneventful  intravenous administration of 100 mL Isovue-370. Oral contrast was utilized. Coronal and sagittal reformats were generated. CT dose reduction was achieved  through use of a standardized protocol tailored for this examination and  automatic exposure control for dose modulation. FINDINGS:  Right port and catheter are unchanged and in good position. Battery pack in the  left chest wall is unchanged. Lead extends into the left cervical soft tissues. THYROID: No nodule. MEDIASTINUM: Right paratracheal lymphadenopathy measures 1.9 x 1.4 cm, unchanged  since August. AP window 2.7 x 2.2 cm lymphadenopathy may be slightly increased. Subcarinal lymph node measures 1.3 cm in short axis, unchanged. No new  lymphadenopathy or necrotic lymphadenopathy. MELA: Left hilar lymphadenopathy is decreased and measures up to 1 cm in short  axis. Right hilar lymphadenopathy is unchanged and measures 1.3 cm in short  axis. THORACIC AORTA: Within normal limits. Pulsation artifact. MAIN PULMONARY ARTERY: Normal caliber, no central embolism. HEART: Normal size, no effusion. No calcific atherosclerosis of the coronary  arteries. ESOPHAGUS: No mural thickening. Right hilar hernia contains nondistended  stomach, small bowel, and transverse colon. TRACHEA/BRONCHI: Patent. PLEURA: No effusion or pneumothorax. LUNGS: Bilateral subcentimeter numerous nodules are unchanged in size. No new  nodule. No suspicious mass. Right lower lobe partial compressive atelectasis is  unchanged. No pneumonia or fibrosis. Liver: No mass or biliary dilatation. Biliary tree: The gallbladder is within normal limits. The CBD is not dilated. Spleen: Within normal limits. Pancreas: No mass or inflammation.  Extends into the hiatal hernia. Adrenals: Unremarkable. Kidneys: No mass or hydronephrosis. Stomach: Nondistended organoaxial volvulus into the large hiatal hernia. Small bowel: No dilatation or wall thickening. Colon: No obstruction. Transverse colon is partially located in the large hiatal  hernia in the thorax. Appendix: Small versus resected. Peritoneum: No ascites or pneumoperitoneum. Retroperitoneum: No lymphadenopathy or aortic aneurysm. Reproductive organs: Prostate gland is not enlarged. New left scrotal mass is  partially calcified and measures 6.7 x 6.5 x 6.9 cm. Left hydrocele is moderate  and increased. Urinary bladder: No mass or calculus. Bones: No destructive bone lesion. Abdominal wall: No mass or hernia. Additional comments: N/A    Impression  1. Mixed response to therapy. Slight increase in size of the mediastinal AP  window lymphadenopathy. Other lymphadenopathy is unchanged or decreased. 2. 6.9 cm partially calcified left scrotal mass is new since June. Differential  diagnosis is new carcinoma versus new but chronic torsion. 3. Unchanged innumerable bilateral subcentimeter pulmonary nodules. 4. No evidence of metastatic disease in the abdomen or pelvis. 5. Unchanged large hiatal hernia and nonobstructed organoaxial gastric volvulus. Recommendation: Scrotal ultrasound in the coming days or weeks. CT chest in 3  months. EPIC email sent to Dr. Alba Juarez at the time of the dictation.     Lab Results   Component Value Date/Time    WBC 6.4 12/29/2022 03:11 PM    HGB 12.6 12/29/2022 03:11 PM    Hematocrit (POC) 44 06/10/2019 04:29 PM    HCT 39.1 12/29/2022 03:11 PM    PLATELET 091 58/63/7379 03:11 PM    MCV 90.1 12/29/2022 03:11 PM         Lab Results   Component Value Date/Time    Sodium 139 09/19/2022 11:10 AM    Potassium 4.2 09/19/2022 11:10 AM    Chloride 109 (H) 09/19/2022 11:10 AM    CO2 24 09/19/2022 11:10 AM    Anion gap 6 09/19/2022 11:10 AM    Glucose 108 (H) 09/19/2022 11:10 AM    BUN 27 (H) 09/19/2022 11:10 AM    Creatinine 0.81 09/19/2022 11:10 AM    BUN/Creatinine ratio 33 (H) 09/19/2022 11:10 AM    GFR est AA >60 09/19/2022 11:10 AM    GFR est non-AA >60 09/19/2022 11:10 AM    Calcium 8.8 09/19/2022 11:10 AM    Bilirubin, total 0.2 09/19/2022 11:10 AM    Alk. phosphatase 108 09/19/2022 11:10 AM    Protein, total 6.7 09/19/2022 11:10 AM    Albumin 3.3 (L) 09/19/2022 11:10 AM    Globulin 3.4 09/19/2022 11:10 AM    A-G Ratio 1.0 (L) 09/19/2022 11:10 AM    ALT (SGPT) 28 09/19/2022 11:10 AM    AST (SGOT) 19 09/19/2022 11:10 AM           Assessment:     1. 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 Thus I recommend administering 3 cycles of BEP as adjuvant treatment. Patient was unable to complete Pulmonary Function testing as he could not follow instructions  Thus I changed his treatment to EP X 4    Received adjuvant chemotherapy  EP 4 cycles  CT shows stable changes in the mediastinal nodes and lung nodules  Left testicle looks abnormal - refer back to Dr. Isela Pearl      2. Provoked Acute Pulmonary Embolism r/t Immobility and Malignancy   CTA - Acute pulmonary embolus in the right middle lobe. Provoked by malignancy and low mobility  At least 6 months of systemic anticoagulation. On Eliquis      Plan:       1. CT in 3 months  2. Continue with Eliquis  3. Repeat scans in 3 months  4. Refer to Dr. Isela Pearl for left testicular mass      Signed by: Cooper Watts MD                     December 29, 2022      CC. Zane Hong MD  CC.  Alvina Han MD

## 2022-12-29 NOTE — PROGRESS NOTES
[facility] Lab Note    1510 Pt arrived at [facility] ambulatory and in no distress for labs via port. Port accessed per protocol. Labs drawn. Port flushed and de-accessed. Patient denied having any symptoms of COVID-19, i.e. SOB, coughing, fever, or generally not feeling well. Also denies having been exposed to COVID-19 recently or having had any recent contact with family/friend that has a pending COVID test.     Visit Vitals  /87   Pulse 89   Temp 98.4 °F (36.9 °C)   Resp 16   Wt 78.9 kg (173 lb 14.4 oz)   SpO2 100%   BMI 25.68 kg/m²     See [facility] for pended labs    Medications received:  Medications Administered       0.9% sodium chloride injection 5-40 mL       Admin Date  12/29/2022 Action  Given Dose  20 mL Route  IntraVENous Administered By  Regino Koo RN              heparin (porcine) pf 500 Units       Admin Date  12/29/2022 Action  Given Dose  500 Units Route  InterCATHeter Administered By  Regino Koo RN                      [id number] Tolerated treatment well, no adverse reaction noted. D/Cd from [facility] ambulatory and in no distress accompanied by mother. No further appointments scheduled at this time.

## 2022-12-29 NOTE — LETTER
12/29/2022    Patient: Nelly Panda   YOB: 1975   Date of Visit: 12/29/2022     Neal Garcia MD  [address] [place] [address]  [facility] [place]    Dear Neal Garcia MD,      Thank you for referring Mr. Phyllis Babin to [address] [place] [address] for evaluation. My notes for this consultation are attached. If you have questions, please do not hesitate to call me. I look forward to following your patient along with you.       Sincerely,    Jourdan Blas MD

## 2022-12-30 LAB — AFP-TM SERPL-MCNC: 9.6 NG/ML (ref 0–6.9)

## 2023-01-05 DIAGNOSIS — C62.91 CARCINOMA OF RIGHT TESTIS (HCC): Primary | ICD-10-CM

## 2023-01-09 ENCOUNTER — TRANSCRIBE ORDER (OUTPATIENT)
Dept: SCHEDULING | Age: 48
End: 2023-01-09

## 2023-01-09 ENCOUNTER — HOSPITAL ENCOUNTER (OUTPATIENT)
Dept: ULTRASOUND IMAGING | Age: 48
Discharge: HOME | End: 2023-01-09
Attending: UROLOGY
Payer: MEDICARE

## 2023-01-09 DIAGNOSIS — C62.90: Primary | ICD-10-CM

## 2023-01-09 DIAGNOSIS — C62.90 TESTICULAR CANCER (HCC): Primary | ICD-10-CM

## 2023-01-09 DIAGNOSIS — C62.90: ICD-10-CM

## 2023-01-09 PROCEDURE — 76870 US EXAM SCROTUM: CPT

## 2023-01-11 ENCOUNTER — TELEPHONE (OUTPATIENT)
Dept: ONCOLOGY | Age: 48
End: 2023-01-11

## 2023-01-11 DIAGNOSIS — I26.99 PULMONARY EMBOLISM, OTHER, UNSPECIFIED CHRONICITY, UNSPECIFIED WHETHER ACUTE COR PULMONALE PRESENT (HCC): Primary | ICD-10-CM

## 2023-01-11 RX ORDER — ENOXAPARIN SODIUM 100 MG/ML
80 INJECTION SUBCUTANEOUS EVERY 12 HOURS
Qty: 10 EACH | Refills: 0 | Status: SHIPPED | OUTPATIENT
Start: 2023-01-11

## 2023-01-11 NOTE — TELEPHONE ENCOUNTER
Samantha Tucker called & left a vm stating that she spoke to Dr Christopher Calderón. Pts apt is scheduled on 1/24/2023. Samantha Tucker is following up with office to discuss when pt should stop taking Eliquis before surgery. Please call Samantha Tucker @ [phone].

## 2023-01-11 NOTE — TELEPHONE ENCOUNTER
Returned call to pt mother. HIPAA verified by two patient identifiers. Approved per VORB from [address] [place] [address]  Requested Prescriptions     Pending Prescriptions Disp Refills    enoxaparin (LOVENOX) 80 mg/0.8 mL injection 10 Each 0     Si mg by SubCUTAneous route every twelve (12) hours.

## 2023-01-19 ENCOUNTER — HOSPITAL ENCOUNTER (OUTPATIENT)
Dept: PREADMISSION TESTING | Age: 48
End: 2023-01-19
Payer: MEDICARE

## 2023-01-19 VITALS
RESPIRATION RATE: 16 BRPM | HEART RATE: 75 BPM | HEIGHT: 69 IN | TEMPERATURE: 98.2 F | WEIGHT: 174.16 LBS | OXYGEN SATURATION: 100 % | BODY MASS INDEX: 25.8 KG/M2 | SYSTOLIC BLOOD PRESSURE: 99 MMHG | DIASTOLIC BLOOD PRESSURE: 74 MMHG

## 2023-01-19 LAB
APPEARANCE UR: CLEAR
BACTERIA URNS QL MICRO: NEGATIVE /HPF
BILIRUB UR QL: NEGATIVE
COLOR UR: ABNORMAL
EPITH CASTS URNS QL MICRO: ABNORMAL /LPF
GLUCOSE UR STRIP.AUTO-MCNC: NEGATIVE MG/DL
HGB UR QL STRIP: NEGATIVE
HYALINE CASTS URNS QL MICRO: ABNORMAL /LPF (ref 0–2)
KETONES UR QL STRIP.AUTO: ABNORMAL MG/DL
LEUKOCYTE ESTERASE UR QL STRIP.AUTO: NEGATIVE
NITRITE UR QL STRIP.AUTO: NEGATIVE
PH UR STRIP: 5.5 (ref 5–8)
PROT UR STRIP-MCNC: NEGATIVE MG/DL
RBC #/AREA URNS HPF: ABNORMAL /HPF (ref 0–5)
SP GR UR REFRACTOMETRY: 1.02
UA: UC IF INDICATED,UAUC: ABNORMAL
UROBILINOGEN UR QL STRIP.AUTO: 0.2 EU/DL (ref 0.2–1)
WBC URNS QL MICRO: ABNORMAL /HPF (ref 0–4)

## 2023-01-19 PROCEDURE — 81001 URINALYSIS AUTO W/SCOPE: CPT

## 2023-01-19 RX ORDER — SODIUM CHLORIDE, SODIUM LACTATE, POTASSIUM CHLORIDE, CALCIUM CHLORIDE 600; 310; 30; 20 MG/100ML; MG/100ML; MG/100ML; MG/100ML
25 INJECTION, SOLUTION INTRAVENOUS CONTINUOUS
Status: CANCELLED | OUTPATIENT
Start: 2023-01-24

## 2023-01-19 NOTE — PERIOP NOTES
Patient signs own consent, A&Ox4 person, place, time, event. Mother has advanced directives and is POA.

## 2023-01-19 NOTE — PERIOP NOTES
[facility]  Preoperative Instructions    Surgery Date 1/24/2023          Time of Arrival to be called  Contact Lia/marce [phone]    1. On the day of your surgery, please report to the Surgical Services Registration Desk and sign in at your designated time. The Surgery Center is located to the right of the Emergency Room. 2. You must have someone with you to drive you home. You should not drive a car for 24 hours following surgery. Please make arrangements for a friend or family member to stay with you for the first 24 hours after your surgery. 3. Do not have anything to eat or drink (including water, gum, mints, coffee, juice) after midnight 1/23/2023. ? This may not apply to medications prescribed by your physician. ?(Please note below the special instructions with medications to take the morning of your procedure.)    4. We recommend you do not drink any alcoholic beverages for 24 hours before and after your surgery. 5. Contact your surgeons office for instructions on the following medications: non-steroidal anti-inflammatory drugs (i.e. Advil, Aleve), vitamins, and supplements. (Some surgeons will want you to stop these medications prior to surgery and others may allow you to take them)  **If you are currently taking Plavix, Coumadin, Aspirin and/or other blood-thinning agents, contact your surgeon for instructions. ** Your surgeon will partner with the physician prescribing these medications to determine if it is safe to stop or if you need to continue taking. Please do not stop taking these medications without instructions from your surgeon    6. Wear comfortable clothes. Wear glasses instead of contacts. Do not bring any money or jewelry. Please bring picture ID, insurance card, and any prearranged co-payment or hospital payment. Do not wear make-up, particularly mascara the morning of your surgery.   Do not wear nail polish, particularly if you are having foot /hand surgery. Wear your hair loose or down, no ponytails, buns, sabrina pins or clips. All body piercings must be removed. Please shower with antibacterial soap for three consecutive days before and on the morning of surgery, but do not apply any lotions, powders or deodorants after the shower on the day of surgery. Please use a fresh towels after each shower. Please sleep in clean clothes and change bed linens the night before surgery. Please do not shave for 48 hours prior to surgery. Shaving of the face is acceptable. 7. You should understand that if you do not follow these instructions your surgery may be cancelled. If your physical condition changes (I.e. fever, cold or flu) please contact your surgeon as soon as possible. 8. It is important that you be on time. If a situation occurs where you may be late, please call [phone] (OR Holding Area). 9. If you have any questions and or problems, please call [phone] (Pre-admission Testing). 10. Your surgery time may be subject to change. You will receive a phone call the evening prior if your time changes. 11.  If having outpatient surgery, you must have someone to drive you here, stay with you during the duration of your stay, and to drive you home at time of discharge. Start Lovenox 1/20/2023 to bridge Eliquis, then hold Lovenox night before and morning of surgery per Dr. Archie Hodges. TAKE ALL MEDICATIONS DAY OF SURGERY EXCEPT:  vitamins/supplements      I understand a pre-operative phone call will be made to verify my surgery time. In the event that I am not available, I give permission for a message to be left on my answering service and/or with another person?   yes         ___________________      __________   1/19/2023 @ 8635    (Signature of Patient)             (Witness)                (Date and Time)

## 2023-01-24 ENCOUNTER — HOSPITAL ENCOUNTER (OUTPATIENT)
Age: 48
Setting detail: OUTPATIENT SURGERY
Discharge: HOME | End: 2023-01-24
Attending: UROLOGY | Admitting: UROLOGY
Payer: MEDICARE

## 2023-01-24 ENCOUNTER — ANESTHESIA EVENT (OUTPATIENT)
Dept: SURGERY | Age: 48
End: 2023-01-24
Payer: MEDICARE

## 2023-01-24 ENCOUNTER — ANESTHESIA (OUTPATIENT)
Dept: SURGERY | Age: 48
End: 2023-01-24
Payer: MEDICARE

## 2023-01-24 VITALS
DIASTOLIC BLOOD PRESSURE: 70 MMHG | OXYGEN SATURATION: 95 % | HEIGHT: 69 IN | RESPIRATION RATE: 14 BRPM | WEIGHT: 169.31 LBS | HEART RATE: 64 BPM | TEMPERATURE: 97.9 F | SYSTOLIC BLOOD PRESSURE: 105 MMHG | BODY MASS INDEX: 25.08 KG/M2

## 2023-01-24 DIAGNOSIS — C62.92 CARCINOMA OF LEFT TESTIS (HCC): Primary | ICD-10-CM

## 2023-01-24 PROCEDURE — 74011250636 HC RX REV CODE- 250/636: Performed by: NURSE ANESTHETIST, CERTIFIED REGISTERED

## 2023-01-24 PROCEDURE — 74011000250 HC RX REV CODE- 250: Performed by: UROLOGY

## 2023-01-24 PROCEDURE — 88342 IMHCHEM/IMCYTCHM 1ST ANTB: CPT

## 2023-01-24 PROCEDURE — 88302 TISSUE EXAM BY PATHOLOGIST: CPT

## 2023-01-24 PROCEDURE — 77030006689 HC BLD OPHTH BVR BD -A: Performed by: UROLOGY

## 2023-01-24 PROCEDURE — 77030013079 HC BLNKT BAIR HGGR 3M -A: Performed by: ANESTHESIOLOGY

## 2023-01-24 PROCEDURE — 77030010507 HC ADH SKN DERMBND J&J -B: Performed by: UROLOGY

## 2023-01-24 PROCEDURE — 77030031139 HC SUT VCRL2 J&J -A: Performed by: UROLOGY

## 2023-01-24 PROCEDURE — 76210000006 HC OR PH I REC 0.5 TO 1 HR: Performed by: UROLOGY

## 2023-01-24 PROCEDURE — 74011250636 HC RX REV CODE- 250/636: Performed by: UROLOGY

## 2023-01-24 PROCEDURE — 88309 TISSUE EXAM BY PATHOLOGIST: CPT

## 2023-01-24 PROCEDURE — 76060000033 HC ANESTHESIA 1 TO 1.5 HR: Performed by: UROLOGY

## 2023-01-24 PROCEDURE — 76010000149 HC OR TIME 1 TO 1.5 HR: Performed by: UROLOGY

## 2023-01-24 PROCEDURE — 77030026438 HC STYL ET INTUB CARD -A: Performed by: ANESTHESIOLOGY

## 2023-01-24 PROCEDURE — 77030002996 HC SUT SLK J&J -A: Performed by: UROLOGY

## 2023-01-24 PROCEDURE — 74011250637 HC RX REV CODE- 250/637: Performed by: UROLOGY

## 2023-01-24 PROCEDURE — 77030008684 HC TU ET CUF COVD -B: Performed by: ANESTHESIOLOGY

## 2023-01-24 PROCEDURE — 2709999900 HC NON-CHARGEABLE SUPPLY: Performed by: UROLOGY

## 2023-01-24 PROCEDURE — 88341 IMHCHEM/IMCYTCHM EA ADD ANTB: CPT

## 2023-01-24 PROCEDURE — 74011000250 HC RX REV CODE- 250: Performed by: NURSE ANESTHETIST, CERTIFIED REGISTERED

## 2023-01-24 PROCEDURE — 74011250636 HC RX REV CODE- 250/636: Performed by: STUDENT IN AN ORGANIZED HEALTH CARE EDUCATION/TRAINING PROGRAM

## 2023-01-24 PROCEDURE — 77030011265 HC ELECTRD BLD HEX COVD -A: Performed by: UROLOGY

## 2023-01-24 PROCEDURE — 77030040503 HC DRN WND PENRS MDII -A: Performed by: UROLOGY

## 2023-01-24 PROCEDURE — 76210000020 HC REC RM PH II FIRST 0.5 HR: Performed by: UROLOGY

## 2023-01-24 RX ORDER — ONDANSETRON 2 MG/ML
4 INJECTION INTRAMUSCULAR; INTRAVENOUS AS NEEDED
Status: DISCONTINUED | OUTPATIENT
Start: 2023-01-24 | End: 2023-01-24 | Stop reason: HOSPADM

## 2023-01-24 RX ORDER — PROPOFOL 10 MG/ML
INJECTION, EMULSION INTRAVENOUS AS NEEDED
Status: DISCONTINUED | OUTPATIENT
Start: 2023-01-24 | End: 2023-01-24 | Stop reason: HOSPADM

## 2023-01-24 RX ORDER — KETOROLAC TROMETHAMINE 30 MG/ML
INJECTION, SOLUTION INTRAMUSCULAR; INTRAVENOUS AS NEEDED
Status: DISCONTINUED | OUTPATIENT
Start: 2023-01-24 | End: 2023-01-24 | Stop reason: HOSPADM

## 2023-01-24 RX ORDER — SUCCINYLCHOLINE CHLORIDE 20 MG/ML
INJECTION INTRAMUSCULAR; INTRAVENOUS AS NEEDED
Status: DISCONTINUED | OUTPATIENT
Start: 2023-01-24 | End: 2023-01-24 | Stop reason: HOSPADM

## 2023-01-24 RX ORDER — MIDAZOLAM HYDROCHLORIDE 1 MG/ML
INJECTION, SOLUTION INTRAMUSCULAR; INTRAVENOUS AS NEEDED
Status: DISCONTINUED | OUTPATIENT
Start: 2023-01-24 | End: 2023-01-24 | Stop reason: HOSPADM

## 2023-01-24 RX ORDER — DEXAMETHASONE SODIUM PHOSPHATE 4 MG/ML
INJECTION, SOLUTION INTRA-ARTICULAR; INTRALESIONAL; INTRAMUSCULAR; INTRAVENOUS; SOFT TISSUE AS NEEDED
Status: DISCONTINUED | OUTPATIENT
Start: 2023-01-24 | End: 2023-01-24 | Stop reason: HOSPADM

## 2023-01-24 RX ORDER — PHENYLEPHRINE HCL IN 0.9% NACL 0.4MG/10ML
SYRINGE (ML) INTRAVENOUS AS NEEDED
Status: DISCONTINUED | OUTPATIENT
Start: 2023-01-24 | End: 2023-01-24 | Stop reason: HOSPADM

## 2023-01-24 RX ORDER — HYDROMORPHONE HYDROCHLORIDE 1 MG/ML
0.2 INJECTION, SOLUTION INTRAMUSCULAR; INTRAVENOUS; SUBCUTANEOUS
Status: DISCONTINUED | OUTPATIENT
Start: 2023-01-24 | End: 2023-01-24 | Stop reason: HOSPADM

## 2023-01-24 RX ORDER — SODIUM CHLORIDE 0.9 % (FLUSH) 0.9 %
5-40 SYRINGE (ML) INJECTION AS NEEDED
Status: DISCONTINUED | OUTPATIENT
Start: 2023-01-24 | End: 2023-01-24 | Stop reason: HOSPADM

## 2023-01-24 RX ORDER — SODIUM CHLORIDE, SODIUM LACTATE, POTASSIUM CHLORIDE, CALCIUM CHLORIDE 600; 310; 30; 20 MG/100ML; MG/100ML; MG/100ML; MG/100ML
25 INJECTION, SOLUTION INTRAVENOUS CONTINUOUS
Status: DISCONTINUED | OUTPATIENT
Start: 2023-01-24 | End: 2023-01-24 | Stop reason: HOSPADM

## 2023-01-24 RX ORDER — FENTANYL CITRATE 50 UG/ML
INJECTION, SOLUTION INTRAMUSCULAR; INTRAVENOUS AS NEEDED
Status: DISCONTINUED | OUTPATIENT
Start: 2023-01-24 | End: 2023-01-24 | Stop reason: HOSPADM

## 2023-01-24 RX ORDER — HYDROCODONE BITARTRATE AND ACETAMINOPHEN 5; 325 MG/1; MG/1
1 TABLET ORAL
Qty: 20 TABLET | Refills: 0 | Status: SHIPPED | OUTPATIENT
Start: 2023-01-24 | End: 2023-01-27

## 2023-01-24 RX ORDER — SODIUM CHLORIDE 0.9 % (FLUSH) 0.9 %
5-40 SYRINGE (ML) INJECTION EVERY 8 HOURS
Status: DISCONTINUED | OUTPATIENT
Start: 2023-01-24 | End: 2023-01-24 | Stop reason: HOSPADM

## 2023-01-24 RX ORDER — BUPIVACAINE HYDROCHLORIDE 2.5 MG/ML
INJECTION, SOLUTION EPIDURAL; INFILTRATION; INTRACAUDAL AS NEEDED
Status: DISCONTINUED | OUTPATIENT
Start: 2023-01-24 | End: 2023-01-24 | Stop reason: HOSPADM

## 2023-01-24 RX ORDER — GLYCOPYRROLATE 0.2 MG/ML
INJECTION INTRAMUSCULAR; INTRAVENOUS AS NEEDED
Status: DISCONTINUED | OUTPATIENT
Start: 2023-01-24 | End: 2023-01-24 | Stop reason: HOSPADM

## 2023-01-24 RX ORDER — ROCURONIUM BROMIDE 10 MG/ML
INJECTION, SOLUTION INTRAVENOUS AS NEEDED
Status: DISCONTINUED | OUTPATIENT
Start: 2023-01-24 | End: 2023-01-24 | Stop reason: HOSPADM

## 2023-01-24 RX ORDER — NEOSTIGMINE METHYLSULFATE 1 MG/ML
INJECTION, SOLUTION INTRAVENOUS AS NEEDED
Status: DISCONTINUED | OUTPATIENT
Start: 2023-01-24 | End: 2023-01-24 | Stop reason: HOSPADM

## 2023-01-24 RX ORDER — ONDANSETRON 2 MG/ML
INJECTION INTRAMUSCULAR; INTRAVENOUS AS NEEDED
Status: DISCONTINUED | OUTPATIENT
Start: 2023-01-24 | End: 2023-01-24 | Stop reason: HOSPADM

## 2023-01-24 RX ORDER — FENTANYL CITRATE 50 UG/ML
25 INJECTION, SOLUTION INTRAMUSCULAR; INTRAVENOUS
Status: DISCONTINUED | OUTPATIENT
Start: 2023-01-24 | End: 2023-01-24 | Stop reason: HOSPADM

## 2023-01-24 RX ORDER — DEXMEDETOMIDINE HYDROCHLORIDE 100 UG/ML
INJECTION, SOLUTION INTRAVENOUS AS NEEDED
Status: DISCONTINUED | OUTPATIENT
Start: 2023-01-24 | End: 2023-01-24 | Stop reason: HOSPADM

## 2023-01-24 RX ADMIN — Medication 3 MG: at 14:29

## 2023-01-24 RX ADMIN — Medication 3 AMPULE: at 13:29

## 2023-01-24 RX ADMIN — KETOROLAC TROMETHAMINE 15 MG: 30 INJECTION, SOLUTION INTRAMUSCULAR; INTRAVENOUS at 14:32

## 2023-01-24 RX ADMIN — DEXMEDETOMIDINE HYDROCHLORIDE 4 MCG: 100 INJECTION, SOLUTION, CONCENTRATE INTRAVENOUS at 14:32

## 2023-01-24 RX ADMIN — SUCCINYLCHOLINE CHLORIDE 180 MG: 20 INJECTION, SOLUTION INTRAMUSCULAR; INTRAVENOUS at 13:40

## 2023-01-24 RX ADMIN — ROCURONIUM BROMIDE 20 MG: 10 INJECTION INTRAVENOUS at 13:58

## 2023-01-24 RX ADMIN — FENTANYL CITRATE 50 MCG: 50 INJECTION, SOLUTION INTRAMUSCULAR; INTRAVENOUS at 14:03

## 2023-01-24 RX ADMIN — PROPOFOL 150 MG: 10 INJECTION, EMULSION INTRAVENOUS at 13:40

## 2023-01-24 RX ADMIN — FENTANYL CITRATE 50 MCG: 50 INJECTION, SOLUTION INTRAMUSCULAR; INTRAVENOUS at 13:40

## 2023-01-24 RX ADMIN — GLYCOPYRROLATE 0.4 MG: 0.2 INJECTION, SOLUTION INTRAMUSCULAR; INTRAVENOUS at 14:29

## 2023-01-24 RX ADMIN — DEXMEDETOMIDINE HYDROCHLORIDE 8 MCG: 100 INJECTION, SOLUTION, CONCENTRATE INTRAVENOUS at 14:16

## 2023-01-24 RX ADMIN — MIDAZOLAM HYDROCHLORIDE 2 MG: 1 INJECTION, SOLUTION INTRAMUSCULAR; INTRAVENOUS at 13:40

## 2023-01-24 RX ADMIN — SODIUM CHLORIDE, POTASSIUM CHLORIDE, SODIUM LACTATE AND CALCIUM CHLORIDE 25 ML/HR: 600; 310; 30; 20 INJECTION, SOLUTION INTRAVENOUS at 13:29

## 2023-01-24 RX ADMIN — ONDANSETRON HYDROCHLORIDE 4 MG: 2 INJECTION, SOLUTION INTRAMUSCULAR; INTRAVENOUS at 14:20

## 2023-01-24 RX ADMIN — DEXAMETHASONE SODIUM PHOSPHATE 4 MG: 4 INJECTION, SOLUTION INTRAMUSCULAR; INTRAVENOUS at 14:00

## 2023-01-24 RX ADMIN — Medication 80 MCG: at 13:54

## 2023-01-24 RX ADMIN — WATER 2 G: 1 INJECTION INTRAMUSCULAR; INTRAVENOUS; SUBCUTANEOUS at 13:50

## 2023-01-24 NOTE — H&P
Sanders Holstein is a 56year old male who presents today for \"testicle mass f/u\". He returns for follow-up. 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 Underwent a right radical orchiectomy June 1, 2022. 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 Alpha-fetoprotein the time was 25. Completed BEP x4 cycles with Dr. Yolanda Callahan. Had a complication of a PE and currently is on Eliquis. Recent AFP 12/29/2022 is 9.6. CT scan 12/19/2022:      1. Mixed response to therapy. Slight increase in size of the mediastinal AP  window lymphadenopathy. Other lymphadenopathy is unchanged or decreased. 2. 6.9 cm partially calcified left scrotal mass is new since June. Differential  diagnosis is new carcinoma versus new but chronic torsion. 3. Unchanged innumerable bilateral subcentimeter pulmonary nodules. 4. No evidence of metastatic disease in the abdomen or pelvis. 5. Unchanged large hiatal hernia and nonobstructed organoaxial gastric volvulus. Recommendation: Scrotal ultrasound in the coming days or weeks. CT chest in 3  months. He reports no unexpected bone pain or weight loss. PAST MEDICAL HISTORY:    Allergies: No known allergies. DENIES: Latex, Shellfish, X-Ray Dye, Iodine. Medications: Xcopri 200 mg tablet (cenobamate)   Xcopri 50 mg tablet (cenobamate)   Briviact 100 mg tablet (brivaracetam)   sertraline 50 mg tablet (sertraline)   topiramate 200 mg tablet (topiramate)   multivitamin tablet (multivitamin)     Problems: Testicular cancer (ICD-186.9) (LXD46-G84.90)  Elevated alpha fetoprotein (ICD-796.4) (RBR12-K18.5)  Testicular mass (ICD-608.89) (HRQ76-N66.9)    Illnesses: Stroke/Seizure. DENIES: Heart Disease, Pacemaker/Defibrillator, Lung Disease, Diabetes, High Blood Pressure, Bowel Problems, Kidney Problems, Bleeding Problems, HIV, Hepatitis, or Cancer. Surgeries: Other / Not Listed.        Family History: DENIES: Prostate cancer, Kidney cancer, Kidney disease, Kidney stones. Social History: Unemployed. Other. Smoking status: never smoker. Drinks alcohol 2 to 3 times a week. System Review: Admits to: None. DENIES: Unexplained Weight Loss, Dry Eyes, Dry Mouth, Leg Swelling, Shortness of Breath, Constipation, Involuntary Urine Loss, Lower Extremity Weakness, Dry Skin, Difficulty Walking, Psychiatric Problems, Impaired Sex Drive, Easy Bleeding, Rash. EXAMINATION: Vitals: Pulse: 84 BP: 118/77 Weight: 171 lbs Height: 5' 8\" BMI: 26.09 kg/m^2  Appearance: well-developed NAD Neck: supple Respiratory Effort: breathing easily Scrotum: Large firm left testicular mass, status post right orchiectomy Orientation: oriented to person; time and place Mood/Affect: normal       URINALYSIS    IMPRESSION:    1. TESTICULAR CANCER (DPA60-R74.90) - Deteriorated    2. TESTICULAR MASS (TNE11-F81.9) - Deteriorated    PLAN: Discussed left radical orchiectomy. They understand he will need testosterone placement therapy and will no longer be able to father children. In his mother and father discussed this and that is not of a concern. We will schedule him Monday for a testicular ultrasound as well as get him cleared for the operation to come off his anticoagulant. We will plan to do this at [facility]. Risks, alternatives as well as benefits of a radical orchiectomy including the risk of bleeding and infection were discussed.    Transcribed by Speech to Text Technology  [employer]  [facility] [facility]

## 2023-01-24 NOTE — BRIEF OP NOTE
Brief Postoperative Note    Patient: Mark Paiz  YOB: 1975  MRN: [id number]    Date of Procedure: 1/24/2023     Pre-Op Diagnosis: LEFT TESTICULAR MASS    Post-Op Diagnosis: Same as preoperative diagnosis.       Procedure(s):  LEFT RADICAL ORCHIECTOMY    Surgeon(s):  Angely Farnsworth MD    Surgical Assistant: Surg Asst-1: Mercedes Captain    Anesthesia: General     Estimated Blood Loss (mL): less than 50     Complications: None    Specimens:   ID Type Source Tests Collected by Time Destination   1 : Left testicle  Preservative Testicle  Angely Farnsworth MD 1/24/2023 1416 Pathology        Implants: * No implants in log *    Drains: * No LDAs found *    Findings: left firm testicular mass replacing the entire testicle    Electronically Signed by Sandy Ellison MD on 1/24/2023 at 3:23 PM

## 2023-01-24 NOTE — ANESTHESIA POSTPROCEDURE EVALUATION
Procedure(s):  LEFT RADICAL ORCHIECTOMY. general    Anesthesia Post Evaluation        Patient location during evaluation: PACU  Note status: Adequate. Level of consciousness: responsive to verbal stimuli and sleepy but conscious  Pain management: satisfactory to patient  Airway patency: patent  Anesthetic complications: no  Cardiovascular status: acceptable  Respiratory status: acceptable  Hydration status: acceptable  Comments: +Post-Anesthesia Evaluation and Assessment    Patient: Mark Paiz MRN: [id number]  SSN: [id number]   YOB: 1975  Age: 56 y.o. Sex: male      Cardiovascular Function/Vital Signs    /72   Pulse 79   Temp 36.8 °C (98.2 °F)   Resp 19   Ht 5' 9\" (1.753 m)   Wt 76.8 kg (169 lb 5 oz)   SpO2 98%   BMI 25.00 kg/m²     Patient is status post Procedure(s):  LEFT RADICAL ORCHIECTOMY. Nausea/Vomiting: Controlled. Postoperative hydration reviewed and adequate. Pain:  Pain Scale 1: Visual (01/24/23 1449)  Pain Intensity 1: 0 (01/24/23 1449)   Managed. Neurological Status:   Neuro (WDL): Exceptions to WDL (01/24/23 1449)   At baseline. Mental Status and Level of Consciousness: Arousable. Pulmonary Status:   O2 Device: Nasal cannula (01/24/23 1505)   Adequate oxygenation and airway patent. Complications related to anesthesia: None    Post-anesthesia assessment completed. No concerns. Signed By: Clinton Diaz MD    1/24/2023  Post anesthesia nausea and vomiting:  controlled      INITIAL Post-op Vital signs:   Vitals Value Taken Time   /68 01/24/23 1530   Temp 36.8 °C (98.2 °F) 01/24/23 1449   Pulse 64 01/24/23 1545   Resp 14 01/24/23 1545   SpO2 95 % 01/24/23 1545   Vitals shown include unvalidated device data.

## 2023-01-24 NOTE — PERIOP NOTES
0950- Discharge instructions given and reviewed with patients mother. Questions answered. PIV and telemetry removed. Assisted patient to get dressed. Patient transferred to surgical entrance where family awaiting to pick patient up.

## 2023-01-24 NOTE — DISCHARGE INSTRUCTIONS
Orchiectomy: What to Expect at [address] in 3 days  Ice to area x 2 days  Orchiectomy (say \"sj-iso-DA-rema-karen\") is surgery to remove one or both testicles. This is mainly done to treat testicular cancer or advanced prostate cancer. You can expect to feel better each day. But you may have some mild to moderate pain for several days after surgery. You may need pain medicine during this time. Your scrotum will be swollen after surgery. This is normal. The swelling usually goes down within 2 to 4 weeks. You should be able to do most of your normal activities after 2 to 3 weeks, except for those that require a lot of physical effort. It's important to avoid straining with bowel movements and doing heavy lifting while you recover. If both your testicles were removed, you may start to notice changes in your body several weeks after surgery due to not having male hormones. The most obvious changes may be hot flashes and sweating. You may lose your sex drive, gain weight, or not be able to get an erection. These changes can be upsetting. Talk to your doctor about treatments that might help with these. This care sheet gives you a general idea about how long it will take for you to recover. But each person recovers at a different pace. Follow the steps below to get better as quickly as possible. How can you care for yourself at home? Activity    Rest when you feel tired. Getting enough sleep will help you recover. Lie down for 15 minutes several times each day for the first 2 weeks after surgery. This will help reduce the swelling of your scrotum. Try to walk each day. Start by walking a little more than you did the day before. Bit by bit, increase the amount you walk. Walking boosts blood flow and helps prevent pneumonia and constipation.      Avoid strenuous activities, such as bicycle riding, jogging, weight lifting, or aerobic exercise, for 2 to 3 weeks after surgery. Avoid lifting anything that would make you strain. This may include a child, heavy grocery bags and milk containers, a heavy briefcase or backpack, cat litter or dog food bags, or a vacuum . Do not drive for 1 to 2 weeks after surgery or until your doctor says it is okay. You may take showers. Pat the cut (incision) dry. Do not take a bath for the first 2 weeks, or until your doctor tells you it is okay. Your doctor will tell you when you can have sex again. Most men are able to return to work or their normal activities in about 2 to 3 weeks after surgery. Diet    You can eat your normal diet. If your stomach is upset, try bland, low-fat foods like plain rice, broiled chicken, toast, and yogurt. You may notice that your bowel movements are not regular right after your surgery. This is common. Try to avoid constipation and straining with bowel movements. You may want to take a fiber supplement every day. If you have not had a bowel movement after a couple of days, ask your doctor about taking a mild laxative. Medicines    Your doctor will tell you if and when you can restart your medicines. You will also be given instructions about taking any new medicines. If you stopped taking aspirin or some other blood thinner, your doctor will tell you when to start taking it again. Take pain medicines exactly as directed. If the doctor gave you a prescription medicine for pain, take it as prescribed. If you are not taking a prescription pain medicine, ask your doctor if you can take an over-the-counter medicine. If your doctor prescribed antibiotics, take them as directed. Do not stop taking them just because you feel better. You need to take the full course of antibiotics. If you think your pain medicine is making you sick to your stomach: Take your medicine after meals (unless your doctor has told you not to).   Ask your doctor for a different pain medicine. Incision care    In most cases, the doctor will use stitches that dissolve on their own in 1 to 3 weeks and do not need to be removed. Wash the area daily with warm, soapy water, and pat it dry. Don't use hydrogen peroxide or alcohol, which can slow healing. Cover the area with a gauze bandage until it stops oozing. Change the bandage at least one time a day. Your underwear holds the bandage in place. Keep the area clean and dry. Ice    Put ice or a cold pack on your scrotum for 10 to 20 minutes at a time. Try to do this every 1 to 2 hours (when you are awake) for the first 2 or 3 days after surgery. Put a thin cloth between the ice and your skin. Other instructions    Wear snug underwear or compression shorts to support your scrotum for the first few weeks after surgery. Follow-up care is a key part of your treatment and safety. Be sure to make and go to all appointments, and call your doctor if you are having problems. It's also a good idea to know your test results and keep a list of the medicines you take. When should you call for help? Call 911 anytime you think you may need emergency care. For example, call if:    You passed out (lost consciousness). You are short of breath. Call your doctor now or seek immediate medical care if:    You have pain that does not get better after you take pain medicine. You have loose stitches, or your incision comes open. Bright red blood has soaked through the bandage over your incision. You cannot pass stools or gas. You are sick to your stomach or cannot drink fluids. You have signs of a blood clot in your leg (called a deep vein thrombosis), such as:  Pain in your calf, back of the knee, thigh, or groin. Redness or swelling in your leg. You have signs of infection, such as: Increased pain, swelling, warmth, or redness. Red streaks leading from the incision. Pus draining from the incision. A fever.    Watch closely for any changes in your health, and be sure to contact your doctor if you have any problems. Where can you learn more? Go to http://www.gray.com/  Enter P352 in the search box to learn more about \"Orchiectomy: What to Expect at Home. \"  Current as of: June 16, 2022               Content Version: 13.4  © 2006-2022 [employer]. Care instructions adapted under license by [employer] (which disclaims liability or warranty for this information). If you have questions about a medical condition or this instruction, always ask your healthcare professional. [place] [address] any warranty or liability for your use of this information. DISCHARGE SUMMARY from Nurse    PATIENT INSTRUCTIONS:    After general anesthesia or intravenous sedation, for 24 hours or while taking prescription narcotics:    Have someone responsible help you with your care  Limit your activities  Do not drive and operate hazardous machinery  Do not make important personal, legal or business decisions  Do not drink alcoholic beverages  If you have not urinated within 8 hours after discharge, please contact your surgeon on call  Resume your medications unless otherwise instructed    From general anesthesia, intravenous sedation, or while taking prescription narcotics, you may experience:    Drowsiness, dizziness, sleepiness, or confusion  Difficulty remembering or delayed reaction times  Difficulty with your balance, especially while walking, move slowly and carefully, do not make sudden position changes  Difficulty focusing or blurred vision    You may not be aware of slight changes in your behavior and/or your reaction time because of the medication used during and after your procedure.     Report the following to your surgeon:  Excessive pain, swelling, redness or odor of or around the surgical area  Temperature over 100.5  Nausea and vomiting lasting longer than 4 hours or if unable to take medications  Any signs of decreased circulation or nerve impairment to extremity: change in color, persistent numbness, tingling, coldness or increase pain  Any questions or concerns         IF YOU REPORT TO AN EMERGENCY ROOM, DOCTOR'S OFFICE OR HOSPITAL WITHIN 24 HOURS AFTER YOUR PROCEDURE, BRING THIS SHEET AND YOUR AFTER VISIT SUMMARY WITH YOU AND GIVE IT TO THE PHYSICIAN OR NURSE ATTENDING YOU. These are general instructions for a healthy lifestyle (if applicable): No smoking/ No tobacco products/ Avoid exposure to secondhand smoke  Surgeon General's Warning:  Quitting smoking now greatly reduces serious risk to your health. Obesity, smoking, and sedentary lifestyle greatly increases your risk for illness    A healthy diet, regular physical exercise & weight monitoring are important for maintaining a healthy lifestyle    You may be retaining fluid if you have a history of heart failure or if you experience any of the following symptoms:  Weight gain of 3 pounds or more overnight or 5 pounds in a week, increased swelling in our hands or feet or shortness of breath while lying flat in bed. Please call your doctor as soon as you notice any of these symptoms; do not wait until your next office visit. A common side effect of anesthesia following surgery is nausea and/or vomiting. In order to decrease symptoms, it is wise to avoid foods that are high in fat, greasy foods, milk products, and spicy foods for the first 24 hours. Acceptable foods for the first 24 hours following surgery include but are not limited to:    soup  broth  toast   crackers   applesauce  bananas   mashed potatoes,  soft or scrambled eggs  oatmeal  jello    It is important to eat when taking your pain medication. This will help to prevent nausea. If possible, please try to time your meals with your medications. It is very important to stay hydrated following surgery. Sip fluids frequently while awake.  Avoid acidic drinks such as citrus juices and soda for 24 hours. Carbonated beverages may cause bloating and gas. Acceptable fluids include:    water (flavor packets may add variety)  coffee or tea (in moderation)  Gatorade  Parag-Aid  apple juice  cranberry juice    You are encouraged to cough and deep breathe every hour when awake. This will help to prevent respiratory complications following anesthesia. You may want to hug a pillow when coughing and sneezing to add additional support to the surgical area and to decrease discomfort if you had abdominal or chest surgery. If you are discharged home with support stockings, you may remove them after 24 hours. Support stockings are used to help prevent blood clots in the legs following surgery. TO PREVENT AN INFECTION      [address] YOUR HANDS    To prevent infection, good handwashing is the most important thing you or your caregiver can do. Wash your hands with soap and water or use the hand  we gave you before you touch any wounds. SHOWER    Use the antibacterial soap we gave you when you take a shower. Shower with this soap until your wounds are healed. To reach all areas of your body, you may need someone to help you. Dont forget to clean your belly button with every shower. USE CLEAN SHEETS    Use freshly cleaned sheets on your bed after surgery. To keep the surgery site clean, do not allow pets to sleep with you while your wound is still healing. STOP SMOKING    Stop smoking, or at least cut back on smoking    Smoking slows your healing. CONTROL YOUR BLOOD SUGAR    High blood sugars slow wound healing. If you are diabetic, control your blood sugar levels before and after your surgery. Please take time to review all of your Home Care Instructions and Medication Information sheets provided in your discharge packet. If you have any questions, please contact your surgeon's office. Thank you.     The discharge information has been reviewed with the patient and instruction recipient. The patient and instruction recipient verbalized understanding. Discharge medications reviewed with the patient and instruction recipient and appropriate educational materials and side effects teaching were provided. Please provide this summary of care documentation to your next provider. How to Care for Your Wound After Its Treated With  DERMABOND* Topical Skin Adhesive  DERMABOND* Topical Skin Adhesive (2-octyl cyanoacrylate) is a sterile, liquid skin adhesive  that holds wound edges together. The film will usually remain in place for 5 to 10 days, then  naturally fall off your skin. The following will answer some of your questions and provide instructions for proper care for your  wound while it is healing:    CHECK WOUND APPEARANCE   Some swelling, redness, and pain are common with all wounds and normally will go away as the  wound heals. If swelling, redness, or pain increases or if the wound feels warm to the touch,  contact a doctor. Also contact a doctor if the wound edges reopen or separate. REPLACE BANDAGES   If your wound is bandaged, keep the bandage dry. Replace the dressing daily until the adhesive film has fallen off or if the  bandage should become wet, unless otherwise instructed by your  physician. When changing the dressing, do not place tape directly over the  DERMABOND adhesive film, because removing the tape later may also  remove the film. AVOID TOPICAL MEDICATIONS   Do not apply liquid or ointment medications or any other product to your wound while the  DERMABOND adhesive film is in place. These may loosen the film before your wound is healed. KEEP WOUND DRY AND PROTECTED   You may occasionally and briefly wet your wound in the shower or bath. Do not soak or scrub  your wound, do not swim, and avoid periods of heavy perspiration until the DERMABOND  adhesive has naturally fallen off.  After showering or bathing, gently blot your wound dry with a  soft towel. If a protective dressing is being used, apply a fresh, dry bandage, being sure to keep  the tape off the DERMABOND adhesive film. Apply a clean, dry bandage over the wound if necessary to protect it. Protect your wound from injury until the skin has had sufficient time to heal.   Do not scratch, rub, or pick at the DERMABOND adhesive film. This may loosen the film before  your wound is healed. Protect the wound from prolonged exposure to sunlight or tanning lamps while the film is in  place. If you have any questions or concerns about this product, please consult your doctor. *Trademark ©[employer] 2002      Narcotic-Analgesic/Acetaminophen (Percocet, [facility], Lorcet HD, Lortab 10/325) - (By mouth)   Why this medicine is used:   Relieves pain. Contact a nurse or doctor right away if you have:  Extreme weakness, shallow breathing, slow heartbeat  Severe confusion, lightheadedness, dizziness, fainting  Yellow skin or eyes, dark urine or pale stools  Severe constipation, severe stomach pain, nausea, vomiting, loss of appetite  Sweating or cold, clammy skin     Common side effects:  Mild constipation, nausea, vomiting  Sleepiness, tiredness  Itching, rash  © 2017 [facility] Information is for End User's use only and may not be sold, redistributed or otherwise used for commercial purposes.

## 2023-01-24 NOTE — ANESTHESIA PREPROCEDURE EVALUATION
Relevant Problems   NEUROLOGY   (+) Asperger syndrome   (+) Autism   (+) Generalized anxiety disorder   (+) Intractable seizures (HCC)   (+) Partial symptomatic epilepsy with complex partial seizures, intractable, without status epilepticus (HCC)   (+) Seizure disorder (HCC)      CARDIOVASCULAR   (+) Hypertension      PERSONAL HX & FAMILY HX OF CANCER   (+) Testicular carcinoma (HCC)       Anesthetic History   No history of anesthetic complications            Review of Systems / Medical History  Patient summary reviewed, nursing notes reviewed and pertinent labs reviewed    Pulmonary  Within defined limits              Comments: Hx of pulmonary embolus, denies shortness of breath. Neuro/Psych     seizures: well controlled    Psychiatric history    Comments: Autism/aspergers  Has vagus nerve stimulator Cardiovascular    Hypertension: well controlled        Dysrhythmias       Exercise tolerance: >4 METS  Comments: Bilateral carotid artery stenosis  Hx PACs     GI/Hepatic/Renal  Within defined limits              Endo/Other        Cancer (testicular)     Other Findings   Comments:     CTA 2022  Acute pulmonary embolus in the right middle lobe. Unchanged mediastinal and  right hilar lymphadenopathy. Small right pleural effusion. Right middle lobe  collapse. Right lower lobe consolidation. Unchanged small bilateral pulmonary  nodules.          Physical Exam    Airway  Mallampati: II  TM Distance: 4 - 6 cm  Neck ROM: normal range of motion   Mouth opening: Normal     Cardiovascular  Regular rate and rhythm,  S1 and S2 normal,  no murmur, click, rub, or gallop             Dental  No notable dental hx       Pulmonary  Breath sounds clear to auscultation               Abdominal  GI exam deferred       Other Findings            Anesthetic Plan    ASA: 2  Anesthesia type: general          Induction: Intravenous  Anesthetic plan and risks discussed with: Patient and Mother      Hx of PE, was on eliquis, bridged to lovenox and last took yesterday.  Pt has a hx of seizures, has vagal nerve stimulator and took topiramate this AM.

## 2023-01-24 NOTE — PROGRESS NOTES
TRANSFER - IN REPORT:    Verbal report received from [address] [place] [address] on Balta Amen  being received from ortho for routine post - op      Report consisted of patients Situation, Background, Assessment and   Recommendations(SBAR). Information from the following report(s) SBAR, Kardex, OR Summary, and MAR was reviewed with the receiving nurse. Opportunity for questions and clarification was provided. Assessment completed upon patients arrival to unit and care assumed.

## 2023-01-25 DIAGNOSIS — I26.99 PULMONARY EMBOLISM, OTHER, UNSPECIFIED CHRONICITY, UNSPECIFIED WHETHER ACUTE COR PULMONALE PRESENT (HCC): ICD-10-CM

## 2023-01-25 RX ORDER — ENOXAPARIN SODIUM 100 MG/ML
80 INJECTION SUBCUTANEOUS EVERY 12 HOURS
Qty: 6.4 ML | Refills: 0 | Status: SHIPPED | OUTPATIENT
Start: 2023-01-25 | End: 2023-01-29

## 2023-01-25 NOTE — OP NOTES
[address]  OPERATIVE REPORT    Name:  Dennis De Leon  MR#:  [id number]  :  1975  ACCOUNT #:  [de-identified]  DATE OF SERVICE:  2023      PREOPERATIVE DIAGNOSIS:  Left testicular mass. POSTOPERATIVE DIAGNOSIS:  Left testicular mass. PROCEDURE PERFORMED:  Left radical orchiectomy. SURGEON:  Scarlett Goncalves MD    ASSISTANT:  Surgical tech. ANESTHESIA:  General.    COMPLICATIONS:  None. SPECIMENS REMOVED:  Left testicle and cord. IMPLANTS:  none. ESTIMATED BLOOD LOSS:  Less than 20 mL. FINDINGS:  Large firm testicular mass essentially replacing the entire testicle, fluid surrounding the hydrocele. PROCEDURE:  After obtaining informed consent, the patient received preoperative antibiotics, and was placed on the operating table in supine position. He had been on Lovenox for DVT prophylaxis, and SCDs and TEDs were placed in lower extremities. After adequate induction of general anesthetic, the lower abdomen was shaved, and the abdomen, penis, and scrotum were prepped and draped in sterile fashion. A full time-out was accomplished. I infiltrated the left inguinal area with Marcaine-lidocaine mix and made a 5-cm incision along the lines of Geronimo. I dissected down through the Camper's and Adela's fascia with cautery and then opened the external oblique fascia exposing the cord, I was able to sweep the ilioinguinal nerve away safely. I was then able to encircle the cord with a quarter-inch Penrose and secured tightly to interrupt blood flow to the  testicle. I was then able to deliver the testicle into the incision. There was a hydrocele which I opened and drained and then I examined the testicle and its architecture appeared to be completely replaced by a very hard mass; therefore, it was not salvageable. At the internal ring, I was able to split the cord, clamped it twice and then transected and sent to pathology.   I then suture ligated both cord stumps with 0 suture tie as well as #1 free tie. Hemostasis was achieved. I then copiously irrigated the entire area with antibiotic irrigation. The fascia was reapproximated using running 0 Vicryl suture. Careful attention was taken not to include the ileal nerve in the closure and the subcutaneous tissue was closed with running 3-0 Vicryl and the skin closed with 4-0 Monocryl subcuticular and Dermabond was applied. He tolerated the procedure well. There were no complications.         MD MARKOS Hollis/[id number]  D:  01/24/2023 14:52  T:  01/24/2023 21:58  JOB #:  [id number]  CC:  Melinda Rene MD       [facility]

## 2023-01-25 NOTE — TELEPHONE ENCOUNTER
Pt mom called to clarify how long he should be on lovenox due to [address] said he needs to continue it. Case D/W NP. Continue it for 5 days to prevent a seroma forming. He has 2 lovenox syringes left today. Approved per KIRA from [facility]. Requested Prescriptions     Pending Prescriptions Disp Refills    enoxaparin (LOVENOX) 80 mg/0.8 mL injection 6.4 mL 0     Si mg by SubCUTAneous route every twelve (12) hours for 4 days.

## 2023-02-17 DIAGNOSIS — G40.219 PARTIAL SYMPTOMATIC EPILEPSY WITH COMPLEX PARTIAL SEIZURES, INTRACTABLE, WITHOUT STATUS EPILEPTICUS (HCC): ICD-10-CM

## 2023-02-17 DIAGNOSIS — Z96.89 S/P PLACEMENT OF VNS (VAGUS NERVE STIMULATION) DEVICE: ICD-10-CM

## 2023-02-17 RX ORDER — CENOBAMATE 50 MG/1
TABLET, FILM COATED ORAL
Qty: 90 TABLET | Refills: 1 | Status: SHIPPED | OUTPATIENT
Start: 2023-02-17

## 2023-02-27 DIAGNOSIS — G40.909 SEIZURE DISORDER (HCC): ICD-10-CM

## 2023-02-27 NOTE — TELEPHONE ENCOUNTER
Responding to [employer] message. Patient needs a refill on Briviact 100mg.      Last office visit: 02/16/22  Last refill: 09/08/22  Next office visit: 10/10/23

## 2023-03-06 DIAGNOSIS — I26.99 PULMONARY EMBOLISM, OTHER, UNSPECIFIED CHRONICITY, UNSPECIFIED WHETHER ACUTE COR PULMONALE PRESENT (HCC): Primary | ICD-10-CM

## 2023-03-06 NOTE — TELEPHONE ENCOUNTER
Approved per VORB from [facility]    Requested Prescriptions     Pending Prescriptions Disp Refills    apixaban (ELIQUIS) 5 mg tablet 60 Tablet 3     Sig: Take 1 Tablet by mouth two (2) times a day.

## 2023-03-23 DIAGNOSIS — G40.219 PARTIAL SYMPTOMATIC EPILEPSY WITH COMPLEX PARTIAL SEIZURES, INTRACTABLE, WITHOUT STATUS EPILEPTICUS (HCC): ICD-10-CM

## 2023-03-24 RX ORDER — CENOBAMATE 200 MG/1
TABLET, FILM COATED ORAL
Qty: 30 EACH | Refills: 1 | Status: SHIPPED | OUTPATIENT
Start: 2023-03-24

## 2023-03-27 ENCOUNTER — HOSPITAL ENCOUNTER (OUTPATIENT)
Dept: CT IMAGING | Age: 48
Discharge: HOME | End: 2023-03-27
Attending: INTERNAL MEDICINE
Payer: MEDICARE

## 2023-03-27 DIAGNOSIS — C62.91 CARCINOMA OF RIGHT TESTIS (HCC): ICD-10-CM

## 2023-03-27 PROCEDURE — 71260 CT THORAX DX C+: CPT

## 2023-03-27 PROCEDURE — 74177 CT ABD & PELVIS W/CONTRAST: CPT

## 2023-03-27 PROCEDURE — 74011000636 HC RX REV CODE- 636: Performed by: INTERNAL MEDICINE

## 2023-03-27 RX ADMIN — IOMEPROL INJECTION 100 ML: 714 INJECTION, SOLUTION INTRAVASCULAR at 13:08

## 2023-03-28 NOTE — PROGRESS NOTES
Swetha Lara is a 53 y.o. male here for 4 month follow up for testicular carcinoma. 12/29/22  Pt doing well. He may have had a fall and glasses cut his nose. Some bruising around eye. Pt unsure. Mom says he was like that when they picked him up and they state he could have had a seizure. 3/30/23  1/24/23 PROCEDURE PERFORMED:  Left radical orchiectomy  Pt doing well. No concerns brought up. He has had 2 testosterone injections so far. Will get another in 10 weeks. 1. Have you been to the ER, urgent care clinic since your last visit? Hospitalized since your last visit?  no    2. Have you seen or consulted any other health care providers outside of the [address] [place] [address] since your last visit? Include any pap smears or colon screening.  Dr Joe Olvera

## 2023-03-30 ENCOUNTER — HOSPITAL ENCOUNTER (OUTPATIENT)
Dept: INFUSION THERAPY | Age: 48
Discharge: HOME | End: 2023-03-30
Payer: MEDICARE

## 2023-03-30 ENCOUNTER — OFFICE VISIT (OUTPATIENT)
Dept: ONCOLOGY | Age: 48
End: 2023-03-30
Payer: MEDICARE

## 2023-03-30 VITALS
TEMPERATURE: 98.8 F | RESPIRATION RATE: 16 BRPM | OXYGEN SATURATION: 99 % | SYSTOLIC BLOOD PRESSURE: 128 MMHG | DIASTOLIC BLOOD PRESSURE: 88 MMHG | HEART RATE: 70 BPM

## 2023-03-30 VITALS
OXYGEN SATURATION: 100 % | BODY MASS INDEX: 25.39 KG/M2 | HEART RATE: 85 BPM | SYSTOLIC BLOOD PRESSURE: 123 MMHG | HEIGHT: 69 IN | DIASTOLIC BLOOD PRESSURE: 81 MMHG | TEMPERATURE: 98.2 F | WEIGHT: 171.4 LBS

## 2023-03-30 DIAGNOSIS — C62.91 CARCINOMA OF RIGHT TESTIS (HCC): Primary | ICD-10-CM

## 2023-03-30 LAB
ALBUMIN SERPL-MCNC: 3.7 G/DL (ref 3.5–5)
ALBUMIN/GLOB SERPL: 1.1 (ref 1.1–2.2)
ALP SERPL-CCNC: 82 U/L (ref 45–117)
ALT SERPL-CCNC: 29 U/L (ref 12–78)
ANION GAP SERPL CALC-SCNC: 5 MMOL/L (ref 5–15)
AST SERPL-CCNC: 19 U/L (ref 15–37)
BASOPHILS # BLD: 0 K/UL (ref 0–0.1)
BASOPHILS NFR BLD: 1 % (ref 0–1)
BILIRUB SERPL-MCNC: 0.7 MG/DL (ref 0.2–1)
BUN SERPL-MCNC: 18 MG/DL (ref 6–20)
BUN/CREAT SERPL: 21 (ref 12–20)
CALCIUM SERPL-MCNC: 9 MG/DL (ref 8.5–10.1)
CHLORIDE SERPL-SCNC: 112 MMOL/L (ref 97–108)
CO2 SERPL-SCNC: 23 MMOL/L (ref 21–32)
CREAT SERPL-MCNC: 0.87 MG/DL (ref 0.7–1.3)
DIFFERENTIAL METHOD BLD: ABNORMAL
EOSINOPHIL # BLD: 0.1 K/UL (ref 0–0.4)
EOSINOPHIL NFR BLD: 1 % (ref 0–7)
ERYTHROCYTE [DISTWIDTH] IN BLOOD BY AUTOMATED COUNT: 17.2 % (ref 11.5–14.5)
GLOBULIN SER CALC-MCNC: 3.3 G/DL (ref 2–4)
GLUCOSE SERPL-MCNC: 105 MG/DL (ref 65–100)
HCT VFR BLD AUTO: 42.3 % (ref 36.6–50.3)
HGB BLD-MCNC: 13.9 G/DL (ref 12.1–17)
IMM GRANULOCYTES # BLD AUTO: 0 K/UL (ref 0–0.04)
IMM GRANULOCYTES NFR BLD AUTO: 0 % (ref 0–0.5)
LYMPHOCYTES # BLD: 1.2 K/UL (ref 0.8–3.5)
LYMPHOCYTES NFR BLD: 28 % (ref 12–49)
MCH RBC QN AUTO: 29.4 PG (ref 26–34)
MCHC RBC AUTO-ENTMCNC: 32.9 G/DL (ref 30–36.5)
MCV RBC AUTO: 89.4 FL (ref 80–99)
MONOCYTES # BLD: 0.5 K/UL (ref 0–1)
MONOCYTES NFR BLD: 11 % (ref 5–13)
NEUTS SEG # BLD: 2.6 K/UL (ref 1.8–8)
NEUTS SEG NFR BLD: 59 % (ref 32–75)
NRBC # BLD: 0 K/UL (ref 0–0.01)
NRBC BLD-RTO: 0 PER 100 WBC
PLATELET # BLD AUTO: 188 K/UL (ref 150–400)
PMV BLD AUTO: 8.5 FL (ref 8.9–12.9)
POTASSIUM SERPL-SCNC: 4.3 MMOL/L (ref 3.5–5.1)
PROT SERPL-MCNC: 7 G/DL (ref 6.4–8.2)
RBC # BLD AUTO: 4.73 M/UL (ref 4.1–5.7)
SODIUM SERPL-SCNC: 140 MMOL/L (ref 136–145)
WBC # BLD AUTO: 4.3 K/UL (ref 4.1–11.1)

## 2023-03-30 PROCEDURE — 80053 COMPREHEN METABOLIC PANEL: CPT

## 2023-03-30 PROCEDURE — 82105 ALPHA-FETOPROTEIN SERUM: CPT

## 2023-03-30 PROCEDURE — G8427 DOCREV CUR MEDS BY ELIG CLIN: HCPCS | Performed by: INTERNAL MEDICINE

## 2023-03-30 PROCEDURE — 3074F SYST BP LT 130 MM HG: CPT | Performed by: INTERNAL MEDICINE

## 2023-03-30 PROCEDURE — 74011250636 HC RX REV CODE- 250/636: Performed by: INTERNAL MEDICINE

## 2023-03-30 PROCEDURE — 77030012965 HC NDL HUBR BBMI -A

## 2023-03-30 PROCEDURE — 36591 DRAW BLOOD OFF VENOUS DEVICE: CPT

## 2023-03-30 PROCEDURE — 74011000250 HC RX REV CODE- 250: Performed by: INTERNAL MEDICINE

## 2023-03-30 PROCEDURE — G8432 DEP SCR NOT DOC, RNG: HCPCS | Performed by: INTERNAL MEDICINE

## 2023-03-30 PROCEDURE — G8419 CALC BMI OUT NRM PARAM NOF/U: HCPCS | Performed by: INTERNAL MEDICINE

## 2023-03-30 PROCEDURE — 84403 ASSAY OF TOTAL TESTOSTERONE: CPT

## 2023-03-30 PROCEDURE — 85025 COMPLETE CBC W/AUTO DIFF WBC: CPT

## 2023-03-30 PROCEDURE — 3079F DIAST BP 80-89 MM HG: CPT | Performed by: INTERNAL MEDICINE

## 2023-03-30 PROCEDURE — 99214 OFFICE O/P EST MOD 30 MIN: CPT | Performed by: INTERNAL MEDICINE

## 2023-03-30 RX ORDER — SODIUM CHLORIDE 9 MG/ML
5-250 INJECTION, SOLUTION INTRAVENOUS AS NEEDED
Status: DISCONTINUED | OUTPATIENT
Start: 2023-03-30 | End: 2023-03-31 | Stop reason: HOSPADM

## 2023-03-30 RX ORDER — SODIUM CHLORIDE 9 MG/ML
5-40 INJECTION INTRAVENOUS AS NEEDED
Status: DISCONTINUED | OUTPATIENT
Start: 2023-03-30 | End: 2023-03-31 | Stop reason: HOSPADM

## 2023-03-30 RX ORDER — HEPARIN 100 UNIT/ML
500 SYRINGE INTRAVENOUS AS NEEDED
Status: DISCONTINUED | OUTPATIENT
Start: 2023-03-30 | End: 2023-03-31 | Stop reason: HOSPADM

## 2023-03-30 RX ORDER — SODIUM CHLORIDE 0.9 % (FLUSH) 0.9 %
5-40 SYRINGE (ML) INJECTION AS NEEDED
Status: DISCONTINUED | OUTPATIENT
Start: 2023-03-30 | End: 2023-03-31 | Stop reason: HOSPADM

## 2023-03-30 RX ADMIN — HEPARIN 500 UNITS: 100 SYRINGE at 16:15

## 2023-03-30 RX ADMIN — SODIUM CHLORIDE, PRESERVATIVE FREE 10 ML: 5 INJECTION INTRAVENOUS at 16:15

## 2023-03-30 NOTE — LETTER
3/30/2023    Patient: Radha Kang   YOB: 1975   Date of Visit: 3/30/2023     Jenny Aguedlo MD  [address] [place] [address]  [place] [place]    Dear Jenny Agudelo MD,      Thank you for referring Mr. Sonia Peguero to [address] [place] [address] for evaluation. My notes for this consultation are attached. If you have questions, please do not hesitate to call me. I look forward to following your patient along with you.       Sincerely,    Mani Verdugo MD

## 2023-03-30 NOTE — PROGRESS NOTES
[facility] Visit Note    Add on appointment    3212 Pt arrived at [facility] ambulatory and in no distress for port flush and labs. No new complaints voiced. Port accessed per protocol with positive blood return. Labs drawn. Port flushed and de-accessed. See connect care for pended labs. .      Patient Vitals for the past 12 hrs:   Temp Pulse Resp BP SpO2   03/30/23 1607 98.8 °F (37.1 °C) 70 16 128/88 99 %          Medications received:  Medications Administered       0.9% sodium chloride injection 5-40 mL       Admin Date  03/30/2023 Action  Given Dose  10 mL Route  IntraVENous Administered By  Richie Collier RN              heparin (porcine) pf 500 Units       Admin Date  03/30/2023 Action  Given Dose  500 Units Route  InterCATHeter Administered By  Richie Collier, ERIC                    7962 Tolerated treatment well, no adverse reaction noted. D/Cd from [facility] ambulatory and in no distress accompanied by mother. No further appointments scheduled at this time.

## 2023-03-30 NOTE — PROGRESS NOTES
[address] [facility] [address], [address] [facility], [address] [place] [address]  [phone]       Oncology Progress Note        Patient: Paul Blanco MRN: [id number]  SSN: [id number]    YOB: 1975  Age: 53 y.o. Sex: male          Diagnosis:     1. 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 Received systemic therapy  EP 4 cycles    Subjective:      Paul Blanco is a 53 y.o. 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 He experienced pain in the right groin approximately 4-6 weeks ago. He underwent USG and was noted to have enlarged right testis. He then underwent a right radical orchiectomy on 06/02/2022. The pathology revealed mixed germ cell tumor with 5% seminoma, 40% yolk sac tumor and teratoma with immature elements 55%. AFP is elevated at 35 and quant beta-HcG is normal and LDH is normal. CT shows enlarge mediastinal nodes and b/l lung nodules. He suffers with Asperger's syndrome. He received systemic chemotherapy. 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 Review of Systems:    Constitutional: fatigue, depression, insomnia   Eyes: negative  Ears, Nose, Mouth, Throat, and Face: negative  Respiratory: negative  Cardiovascular: negative  Gastrointestinal: negative  Genitourinary:negative  Integument/Breast: negative  Hematologic/Lymphatic: negative  Musculoskeletal:negative  Neurological: negative    Review of systems was reviewed and updated as needed on 03/30/23.       Past Medical History:   Diagnosis Date    Alcohol use     Alcohol withdrawal ([address] 10/31/2014    Asperger syndrome 12/14/2011    Autism     dx 2010- highly functional    Cancer ([address] testicular    Convulsive syncope 06/11/2019    most recent Jan 2023    Fatigue     History of pulmonary embolus (PE)     Hx antineoplastic chemo     Dr. Annemarie Griffin    Loss of appetite     Other ill-defined conditions(799.89)      syncopal episodes    Pneumonia 06/2022    Seizure disorder ([facility] 05/22/2012    Status post VNS (vagus nerve stimulator) placement 12/08/2020    with magnent left chest    Thromboembolus ([address] 06/2022    lung     Past Surgical History:   Procedure Laterality Date    HX ADENOIDECTOMY      HX GI  1975    pyloric stenosis 1976    HX ORCHIECTOMY  06/01/2022    Right radical orchiectomy with frozen section    HX ORTHOPAEDIC  07/2020    Middle finger on left hand    HX TONSILLECTOMY      IR INSERT TUNL CVC W PORT OVER 5 YEARS  07/08/2022    right chest    RI UNLISTED PROCEDURE VASCULAR SURGERY  12/8/202    Insertion of Vagus Nerve Stimulator left chest      Family History   Problem Relation Age of Onset    Cancer Mother         Breast    Anxiety Mother     COPD Mother     Other Mother         Neuropathy    Sleep Apnea Mother     OSTEOARTHRITIS Mother     Hypertension Father     Diabetes Father     Heart Disease Father     Elevated Lipids Father     Other Brother         Pituitary tumor     Social History     Tobacco Use    Smoking status: Never    Smokeless tobacco: Never   Substance Use Topics    Alcohol use: Yes     Alcohol/week: 28.0 standard drinks     Types: 28 Cans of beer per week     Comment: 4 per night      Prior to Admission medications    Medication Sig Start Date End Date Taking? Authorizing Provider   TESTOSTERONE IM by IntraMUSCular route. Yes Provider, Historical   Xcopri 200 mg tab TAKE 1 TABLET BY MOUTH DAILY. MAX DAILY AMOUNT: 200 MG 3/24/23  Yes Tootie Moralez MD   apixaban (ELIQUIS) 5 mg tablet Take 1 Tablet by mouth two (2) times a day. 3/6/23  Yes Anurag Sheehan MD   brivaracetam (Briviact) 100 mg tablet Take 1 Tablet by mouth two (2) times a day.  Max Daily Amount: 200 mg. 2/28/23  Yes Meri St MD   Xcopri 50 mg tab tablet TAKE 1 TABLET BY MOUTH DAILY. MAX DAILY AMOUNT: 50 MG 2/17/23  Yes Meri St MD   sertraline (ZOLOFT) 50 mg tablet TAKE 1 TABLET EVERY DAY 9/28/22  Yes Meri St MD   topiramate (TOPAMAX) 200 mg tablet TAKE 1 TABLET TWICE DAILY 9/28/22  Yes Meri St MD   ondansetron (ZOFRAN ODT) 4 mg disintegrating tablet Take 1 Tablet by mouth every eight (8) hours as needed for Nausea or Vomiting. 6/29/22  Yes Sara Mejia MD   prochlorperazine (Compazine) 10 mg tablet Take 1 Tablet by mouth every six (6) hours as needed for Nausea or Vomiting. 6/29/22  Yes Sara Mejia MD   lidocaine-prilocaine (EMLA) topical cream Apply  to affected area as needed for Pain. Apply generous amount to port site 30 min prior to infusions and cover with plastic wrap 6/29/22  Yes Sara Mejia MD   MULTIVITAMIN PO Take 1 Tab by mouth daily. Yes Provider, Historical              Allergies   Allergen Reactions    Aspartame Other (comments)     Family believes it causes his seizures           Objective:     Vitals:    03/30/23 1446   BP: 123/81   Pulse: 85   Temp: 98.2 °F (36.8 °C)   TempSrc: Temporal   SpO2: 100%   Weight: 171 lb 6.4 oz (77.7 kg)   Height: 5' 9\" (1.753 m)          Pain Scale: 0 - No pain/10      Physical Exam:  GENERAL: alert, cooperative, no distress, appears stated age  EYE: left eye has bleeding around it and in the the subconjunctival space  LYMPHATIC: Cervical, supraclavicular, and axillary nodes normal.   THROAT & NECK: normal and no erythema or exudates noted. LUNG: clear to auscultation bilaterally  HEART: regular rate and rhythm  ABDOMEN: soft, non-tender. Bowel sounds normal. No masses,  no organomegaly  EXTREMITIES:  extremities normal, atraumatic, no cyanosis or edema  SKIN: Normal.  NEUROLOGIC: AOx3.        CT Results (most recent):  Results from Hospital Encounter encounter on 03/27/23    CT CHEST W CONT    Narrative  EXAM: CT CHEST W CONT, CT ABD PELV W CONT    INDICATION: Malignant neoplasm of right testis    COMPARISON: December 2022    IV CONTRAST: 100 mL of Isovue-370. ORAL CONTRAST: None    TECHNIQUE:  Following the uneventful intravenous administration of contrast, thin axial  images were obtained through the chest, abdomen and pelvis. Coronal and sagittal  reformats were generated. CT dose reduction was achieved through use of a  standardized protocol tailored for this examination and automatic exposure  control for dose modulation. FINDINGS:    CHEST WALL: Right chest wall port catheter terminates in the right atrium. Left  chest middle nerve stimulator. THYROID: No nodule. MEDIASTINUM: Enlarged mediastinal lymph nodes the largest at the left lower  paratracheal station measuring 15 mm, stable. MELA: Enlarged bilateral hilar lymph nodes, stable, measuring up to 13 mm on the  right and 10 mm on the left. THORACIC AORTA: No dissection or aneurysm. MAIN PULMONARY ARTERY: Normal in caliber. TRACHEA/BRONCHI: Patent. ESOPHAGUS: There is a large hiatal hernia with translocation of right upper  quadrant abdominal organs into the chest cavity, including the stomach, proximal  transverse colon, and a portion of the pancreas. HEART: Normal in size. Coronary artery calcium: absent  PLEURA: No effusion or pneumothorax. LUNGS: Several scattered pulmonary nodules bilaterally with a  subpleural/peripheral fissural predilection, stable. Nodules measure 2 to 4 mm  in diameter. LIVER: No mass. BILIARY TREE: Gallbladder is within normal limits. CBD is not dilated. SPLEEN: within normal limits. PANCREAS: No mass or ductal dilatation. ADRENALS: Unremarkable. KIDNEYS: No mass, calculus, or hydronephrosis. STOMACH: Unremarkable. SMALL BOWEL: No dilatation or wall thickening. COLON: No dilatation or wall thickening. APPENDIX: Not seen  PERITONEUM: No ascites or pneumoperitoneum.   RETROPERITONEUM: No lymphadenopathy or aortic aneurysm. REPRODUCTIVE ORGANS: Status post bilateral orchiectomy. Prostate gland is  unremarkable. URINARY BLADDER: No mass or calculus. BONES: Stable peripherally sclerotic centrally lucent lesion in the left lateral  ninth rib, sclerotic lesion in the left lateral seventh rib, lucent lesion in  the left femoral neck, and small sclerotic lesions in the right lateral mid  ribs. ABDOMINAL WALL: No mass or hernia. ADDITIONAL COMMENTS: N/A    Impression  1. Status post bilateral orchiectomy. 2.  Stable disease with regards to mediastinal and hilar lymphadenopathy and  several small lung nodules. 3.  Sclerotic and mixed sclerotic and lytic lesions as noted above are stable. 4.  No evidence of metastatic disease in the abdomen or pelvis. 5.  Large right paracentral hiatal hernia. Lab Results   Component Value Date/Time    WBC 6.4 12/29/2022 03:11 PM    HGB 12.6 12/29/2022 03:11 PM    Hematocrit (POC) 44 06/10/2019 04:29 PM    HCT 39.1 12/29/2022 03:11 PM    PLATELET 766 39/12/3115 03:11 PM    MCV 90.1 12/29/2022 03:11 PM         Lab Results   Component Value Date/Time    Sodium 139 12/29/2022 03:11 PM    Potassium 4.1 12/29/2022 03:11 PM    Chloride 108 12/29/2022 03:11 PM    CO2 24 12/29/2022 03:11 PM    Anion gap 7 12/29/2022 03:11 PM    Glucose 101 (H) 12/29/2022 03:11 PM    BUN 18 12/29/2022 03:11 PM    Creatinine 0.79 12/29/2022 03:11 PM    BUN/Creatinine ratio 23 (H) 12/29/2022 03:11 PM    GFR est AA >60 09/19/2022 11:10 AM    GFR est non-AA >60 09/19/2022 11:10 AM    Calcium 8.9 12/29/2022 03:11 PM    Bilirubin, total 0.3 12/29/2022 03:11 PM    Alk. phosphatase 83 12/29/2022 03:11 PM    Protein, total 6.5 12/29/2022 03:11 PM    Albumin 3.7 12/29/2022 03:11 PM    Globulin 2.8 12/29/2022 03:11 PM    A-G Ratio 1.3 12/29/2022 03:11 PM    ALT (SGPT) 20 12/29/2022 03:11 PM    AST (SGOT) 14 (L) 12/29/2022 03:11 PM           Assessment:     1.  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 Thus I recommend administering 3 cycles of BEP as adjuvant treatment. Patient was unable to complete Pulmonary Function testing as he could not follow instructions  Thus I changed his treatment to EP X 4    Received adjuvant chemotherapy  EP 4 cycles  CT shows stable changes in the mediastinal nodes and lung nodules  S/P left orchiectomy 01.2023    Due to residual disease, I recommended review the case with Dr. Michelle Hutson at [facility].       2. Provoked Acute Pulmonary Embolism r/t Immobility and Malignancy   CTA - Acute pulmonary embolus in the right middle lobe. Provoked by malignancy and low mobility  At least 6 months of systemic anticoagulation. On Eliquis      Plan:       1. AFP level  2. Review the case with Dr. Michelle Hutson at [facility]. Signed by: Slick De Leon MD                     March 30, 2023      CC. Abhishek Robles MD  CC.  Mary Craig MD

## 2023-03-31 ENCOUNTER — TELEPHONE (OUTPATIENT)
Dept: ONCOLOGY | Age: 48
End: 2023-03-31

## 2023-03-31 LAB
AFP-TM SERPL-MCNC: 5.3 NG/ML (ref 0–6.9)
TESTOST SERPL-MCNC: 389 NG/DL (ref 264–916)

## 2023-03-31 NOTE — TELEPHONE ENCOUNTER
Called and spoke with patient's mother. Informed her about my communication with Dr. Danuta Jain @ [facility] will undergo a bronch and EBUS by Dr. Susi Reynolds. An appointment has been made to see Dr. Susi Reynolds already. They were appreciative of the information.

## 2023-04-04 ENCOUNTER — TELEPHONE (OUTPATIENT)
Dept: NEUROLOGY | Age: 48
End: 2023-04-04

## 2023-04-13 ENCOUNTER — HOSPITAL ENCOUNTER (OUTPATIENT)
Age: 48
Setting detail: OUTPATIENT SURGERY
Discharge: HOME | End: 2023-04-13
Attending: INTERNAL MEDICINE | Admitting: INTERNAL MEDICINE
Payer: MEDICARE

## 2023-04-13 ENCOUNTER — APPOINTMENT (OUTPATIENT)
Dept: GENERAL RADIOLOGY | Age: 48
End: 2023-04-13
Attending: INTERNAL MEDICINE
Payer: MEDICARE

## 2023-04-13 VITALS
HEIGHT: 69 IN | OXYGEN SATURATION: 97 % | DIASTOLIC BLOOD PRESSURE: 73 MMHG | SYSTOLIC BLOOD PRESSURE: 128 MMHG | RESPIRATION RATE: 17 BRPM | BODY MASS INDEX: 25.33 KG/M2 | TEMPERATURE: 97.8 F | WEIGHT: 171 LBS | HEART RATE: 90 BPM

## 2023-04-13 LAB
APTT PPP: 23.2 SEC (ref 22.1–31)
BASOPHILS # BLD: 0 K/UL (ref 0–0.1)
BASOPHILS NFR BLD: 0 % (ref 0–1)
DIFFERENTIAL METHOD BLD: ABNORMAL
EOSINOPHIL # BLD: 0 K/UL (ref 0–0.4)
EOSINOPHIL NFR BLD: 0 % (ref 0–7)
ERYTHROCYTE [DISTWIDTH] IN BLOOD BY AUTOMATED COUNT: 15.9 % (ref 11.5–14.5)
HCT VFR BLD AUTO: 41.6 % (ref 36.6–50.3)
HGB BLD-MCNC: 13.2 G/DL (ref 12.1–17)
IMM GRANULOCYTES # BLD AUTO: 0.1 K/UL (ref 0–0.04)
IMM GRANULOCYTES NFR BLD AUTO: 1 % (ref 0–0.5)
INR PPP: 1 (ref 0.9–1.1)
LYMPHOCYTES # BLD: 0.9 K/UL (ref 0.8–3.5)
LYMPHOCYTES NFR BLD: 11 % (ref 12–49)
MCH RBC QN AUTO: 29.1 PG (ref 26–34)
MCHC RBC AUTO-ENTMCNC: 31.7 G/DL (ref 30–36.5)
MCV RBC AUTO: 91.6 FL (ref 80–99)
MONOCYTES # BLD: 0.3 K/UL (ref 0–1)
MONOCYTES NFR BLD: 4 % (ref 5–13)
NEUTS SEG # BLD: 6.8 K/UL (ref 1.8–8)
NEUTS SEG NFR BLD: 84 % (ref 32–75)
NRBC # BLD: 0 K/UL (ref 0–0.01)
NRBC BLD-RTO: 0 PER 100 WBC
PLATELET # BLD AUTO: 134 K/UL (ref 150–400)
PMV BLD AUTO: 8.6 FL (ref 8.9–12.9)
PROTHROMBIN TIME: 10.9 SEC (ref 9–11.1)
RBC # BLD AUTO: 4.54 M/UL (ref 4.1–5.7)
THERAPEUTIC RANGE,PTTT: NORMAL SECS (ref 58–77)
WBC # BLD AUTO: 8 K/UL (ref 4.1–11.1)

## 2023-04-13 PROCEDURE — 88172 CYTP DX EVAL FNA 1ST EA SITE: CPT

## 2023-04-13 PROCEDURE — 76060000033 HC ANESTHESIA 1 TO 1.5 HR: Performed by: INTERNAL MEDICINE

## 2023-04-13 PROCEDURE — 87176 TISSUE HOMOGENIZATION CULTR: CPT

## 2023-04-13 PROCEDURE — 87102 FUNGUS ISOLATION CULTURE: CPT

## 2023-04-13 PROCEDURE — 76040000008: Performed by: INTERNAL MEDICINE

## 2023-04-13 PROCEDURE — 93005 ELECTROCARDIOGRAM TRACING: CPT

## 2023-04-13 PROCEDURE — 87116 MYCOBACTERIA CULTURE: CPT

## 2023-04-13 PROCEDURE — 88305 TISSUE EXAM BY PATHOLOGIST: CPT

## 2023-04-13 PROCEDURE — 87205 SMEAR GRAM STAIN: CPT

## 2023-04-13 PROCEDURE — 85025 COMPLETE CBC W/AUTO DIFF WBC: CPT

## 2023-04-13 PROCEDURE — 77030003406 HC NDL ASPIR BIOP OCOA -C: Performed by: INTERNAL MEDICINE

## 2023-04-13 PROCEDURE — 77030012699 HC VLV SUC CNTRL OCOA -A: Performed by: INTERNAL MEDICINE

## 2023-04-13 PROCEDURE — 2709999900 HC NON-CHARGEABLE SUPPLY: Performed by: INTERNAL MEDICINE

## 2023-04-13 PROCEDURE — 36415 COLL VENOUS BLD VENIPUNCTURE: CPT

## 2023-04-13 PROCEDURE — 71045 X-RAY EXAM CHEST 1 VIEW: CPT

## 2023-04-13 PROCEDURE — 85610 PROTHROMBIN TIME: CPT

## 2023-04-13 PROCEDURE — 88312 SPECIAL STAINS GROUP 1: CPT

## 2023-04-13 PROCEDURE — 88173 CYTOPATH EVAL FNA REPORT: CPT

## 2023-04-13 PROCEDURE — 85730 THROMBOPLASTIN TIME PARTIAL: CPT

## 2023-04-13 RX ORDER — SODIUM CHLORIDE 0.9 % (FLUSH) 0.9 %
5-40 SYRINGE (ML) INJECTION EVERY 8 HOURS
Status: CANCELLED | OUTPATIENT
Start: 2023-04-13

## 2023-04-13 RX ORDER — SODIUM CHLORIDE 0.9 % (FLUSH) 0.9 %
5-40 SYRINGE (ML) INJECTION AS NEEDED
Status: CANCELLED | OUTPATIENT
Start: 2023-04-13

## 2023-04-13 NOTE — PROCEDURES
Pre--anesthesia assessment was performed. History and physical on the chart. Informed consent for bronchoscopy with general anesthesia was obtained from the patient. The proposed procedure and possible alternatives including the option of no procedure were discussed. The benefits of the proposed procedure and its alternatives were also discussed. The nature and probability of risks of the proposed procedure and its alternatives were discussed. After our discussion, the patient gave informed consent to undergo the procedure and wished to proceed. A time out was performed prior to the start of procedure and the procedure was verified in the presence of bronchoscopist, RN and anesthesiologist. ASA assessment documented by anesthesiologist. Patients heart rate, BP, respiratory rate and oxygen saturations were monitored during the procedure. RSI was performed and patient was intubated by anesthesiologist.     Assistant: Lesli Hicks, Endo nurse. Inspection Bronchoscopy: After patient was adequately sedated the diagnostic bronchoscope was introduced through the ETT and advanced to the zuelma. The left and right tracheobronchial tree was examined upto sub segmental level. No endobronchial lesion. EBUS and station 7, left and right hilar LN: The EBUS was inserted and advanced to the lower part of trachea. 4 R LN identified. 6 passes with 25G and 21G needle taken. CLINT: lymph node sample. No malignant cells seen. One granulomas seen. Sample taken for tissue culture for fungal, bacterial and mycobacterium. The EBUS was rotated to 9 o Clock and 4L Ln identified. 4 passes of 21G needle taken. CLINT:  lymph node sample. No malignant cells seen. The EBUS was advanced in the [facility] all the way down to MARILEE/LLL bifurcation and left hilar LN was identified. 25G x 2. CLINT: not available. Patient tolerated the procedure well. No immediate complication.      Sample collected:  -EBUS 4R LN TBNA 25G x 2. 21G x 4.  -EBUS 4L LN TBNA 21G x 4.  -EBUS left hilar LN TBNA 21G x 2.  -Tissue culture from 4R LN. EBL: 10 ml. Complication:  None.

## 2023-04-13 NOTE — H&P
PCCM:  =====  H and P reviewed. Patient was examined. No changes. Procedure discussed and patient is agreeable. Last dose of Eliquis 48 hours back.

## 2023-04-13 NOTE — PROGRESS NOTES
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 Keep your medication in the bottles provided by the pharmacist and keep a list of the medication names, dosages, and times to be taken in your wallet. Do not take other medications without consulting your doctor. DIET: Regular diet. ACTIVITY: Limited for first 24 hours. After 24 hours as tolerated. Do not make any financial decision or operate a machinery in first 24 hours. ADDITIONAL INFORMATION: If you experience any of the following symptoms then please call your primary care physician or return to the emergency room if you cannot get hold of your doctor: Fever, chills, nausea, vomiting, diarrhea, change in mentation, falling, bleeding, shortness of breath. SPECIAL INSTRUCTIONS:    FOLLOW UP CARE:  Dr. Clara Mariano you are to call and set up an appointment to see them in 2 weeks. Information obtained by :  I understand that if any problems occur once I am at home I am to contact my physician.     I understand and acknowledge receipt of the instructions indicated above.                                                                                                                                           Physician's or R.N.'s Signature                                                                  Date/Time                                                                                                                                              Patient or Representative Signature                                                          Date/Time

## 2023-04-13 NOTE — PERIOP NOTES

## 2023-04-13 NOTE — PROGRESS NOTES
Patient developed missed beats. 12 lead ekg  Patient asymptomatic. Maintaining bp. HR 70's. D/w Dr Shante Redman. EKG shows PAC and some blocked pac. Likely anesthesia effect. Recommended watch it and does not need to be admitted.      =====================================    7 pm - no complaints. Hgb > 13. EKG - NSR  Ok to d/c home.

## 2023-04-14 LAB
ATRIAL RATE: 147 BPM
ATRIAL RATE: 63 BPM
CALCULATED P AXIS, ECG09: 61 DEGREES
CALCULATED P AXIS, ECG09: 61 DEGREES
CALCULATED R AXIS, ECG10: 44 DEGREES
CALCULATED R AXIS, ECG10: 56 DEGREES
CALCULATED T AXIS, ECG11: 45 DEGREES
CALCULATED T AXIS, ECG11: 46 DEGREES
DIAGNOSIS, 93000: NORMAL
DIAGNOSIS, 93000: NORMAL
P-R INTERVAL, ECG05: 136 MS
Q-T INTERVAL, ECG07: 390 MS
Q-T INTERVAL, ECG07: 412 MS
QRS DURATION, ECG06: 84 MS
QRS DURATION, ECG06: 86 MS
QTC CALCULATION (BEZET), ECG08: 421 MS
QTC CALCULATION (BEZET), ECG08: 477 MS
VENTRICULAR RATE, ECG03: 63 BPM
VENTRICULAR RATE, ECG03: 90 BPM

## 2023-04-16 LAB
ACID FAST STN SPEC: NEGATIVE
MYCOBACTERIUM SPEC QL CULT: NORMAL
SPECIMEN PREPARATION: NORMAL
SPECIMEN SOURCE: NORMAL

## 2023-04-17 LAB
BACTERIA SPEC CULT: NORMAL
BACTERIA SPEC CULT: NORMAL
GRAM STN SPEC: NORMAL
GRAM STN SPEC: NORMAL
SERVICE CMNT-IMP: NORMAL
SERVICE CMNT-IMP: NORMAL

## 2023-04-24 LAB
BACTERIA SPEC CULT: NORMAL
SERVICE CMNT-IMP: NORMAL

## 2023-05-01 LAB
BACTERIA SPEC CULT: NORMAL
SERVICE CMNT-IMP: NORMAL

## 2023-05-08 LAB
BACTERIA SPEC CULT: NORMAL
SERVICE CMNT-IMP: NORMAL

## 2023-05-15 LAB
BACTERIA SPEC CULT: NORMAL
SERVICE CMNT-IMP: NORMAL

## 2023-05-26 DIAGNOSIS — G40.219 PARTIAL SYMPTOMATIC EPILEPSY WITH COMPLEX PARTIAL SEIZURES, INTRACTABLE, WITHOUT STATUS EPILEPTICUS (HCC): Primary | ICD-10-CM

## 2023-05-26 RX ORDER — CENOBAMATE 200 MG/1
TABLET, FILM COATED ORAL
COMMUNITY
Start: 2023-03-24 | End: 2023-05-26

## 2023-05-26 RX ORDER — CENOBAMATE 50 MG/1
TABLET, FILM COATED ORAL
COMMUNITY
Start: 2023-04-28 | End: 2023-05-26

## 2023-05-26 RX ORDER — CENOBAMATE 200 MG/1
200 TABLET, FILM COATED ORAL DAILY
Qty: 30 TABLET | Refills: 1 | Status: SHIPPED | OUTPATIENT
Start: 2023-05-26

## 2023-05-26 RX ORDER — PROCHLORPERAZINE MALEATE 10 MG
10 TABLET ORAL EVERY 6 HOURS PRN
COMMUNITY
Start: 2022-06-29

## 2023-05-26 RX ORDER — LIDOCAINE AND PRILOCAINE 25; 25 MG/G; MG/G
CREAM TOPICAL PRN
COMMUNITY
Start: 2022-06-29

## 2023-05-26 RX ORDER — CENOBAMATE 200 MG/1
TABLET, FILM COATED ORAL
COMMUNITY
Start: 2023-04-28 | End: 2023-05-26 | Stop reason: SDUPTHER

## 2023-05-26 RX ORDER — BRIVARACETAM 100 MG/1
TABLET, FILM COATED ORAL
COMMUNITY
Start: 2023-05-13

## 2023-05-26 RX ORDER — ONDANSETRON 4 MG/1
4 TABLET, ORALLY DISINTEGRATING ORAL EVERY 8 HOURS PRN
COMMUNITY
Start: 2022-06-29

## 2023-05-26 RX ORDER — CENOBAMATE 50 MG/1
TABLET, FILM COATED ORAL
COMMUNITY
Start: 2023-02-17 | End: 2023-05-26

## 2023-05-26 NOTE — TELEPHONE ENCOUNTER
Received [employer] message stating, \"Good morning Dr. [employer], I hope all is well with you. [address] Robles reached out to your office 2 days ago in regards to refilling [employer], he is getting very low and I don't want him to run out. Could you please contact them today so they can ship it out to us on Tuesday? Thank you so much, Nancy Kohler . \"     States she is out of just the 200mg dose.     LOV: 22  Last refill for 200m23 with 1 refill  Next follow up: 10/10/23

## 2023-05-28 LAB
ACID FAST STN SPEC: NEGATIVE
MYCOBACTERIUM SPEC QL CULT: NEGATIVE
SPECIMEN PREPARATION: NORMAL
SPECIMEN SOURCE: NORMAL

## 2023-07-21 ENCOUNTER — OFFICE VISIT (OUTPATIENT)
Age: 48
End: 2023-07-21
Payer: MEDICARE

## 2023-07-21 VITALS
HEIGHT: 69 IN | DIASTOLIC BLOOD PRESSURE: 69 MMHG | TEMPERATURE: 98 F | SYSTOLIC BLOOD PRESSURE: 109 MMHG | BODY MASS INDEX: 25.56 KG/M2 | WEIGHT: 172.6 LBS | OXYGEN SATURATION: 96 % | HEART RATE: 85 BPM

## 2023-07-21 DIAGNOSIS — C62.91 MALIGNANT NEOPLASM OF RIGHT TESTIS, UNSPECIFIED WHETHER DESCENDED OR UNDESCENDED (HCC): ICD-10-CM

## 2023-07-21 DIAGNOSIS — C62.92 CARCINOMA OF LEFT TESTIS (HCC): Primary | ICD-10-CM

## 2023-07-21 PROCEDURE — 99213 OFFICE O/P EST LOW 20 MIN: CPT | Performed by: INTERNAL MEDICINE

## 2023-07-21 PROCEDURE — 3078F DIAST BP <80 MM HG: CPT | Performed by: INTERNAL MEDICINE

## 2023-07-21 PROCEDURE — G8427 DOCREV CUR MEDS BY ELIG CLIN: HCPCS | Performed by: INTERNAL MEDICINE

## 2023-07-21 PROCEDURE — G8419 CALC BMI OUT NRM PARAM NOF/U: HCPCS | Performed by: INTERNAL MEDICINE

## 2023-07-21 PROCEDURE — 3074F SYST BP LT 130 MM HG: CPT | Performed by: INTERNAL MEDICINE

## 2023-07-21 PROCEDURE — 4004F PT TOBACCO SCREEN RCVD TLK: CPT | Performed by: INTERNAL MEDICINE

## 2023-07-21 NOTE — PROGRESS NOTES
Shikha Davalos is a 53 y.o. 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 Pt states he has been well. No concerns brought up. Asking if he can get port out. Denies pain. 1. Have you been to the ER, urgent care clinic since your last visit? Hospitalized since your last visit? 2. Have you seen or consulted any other health care providers outside of the [address] [place] [address] Avenue since your last visit? Include any pap smears or colon screening.
URMILA FROM DR Shreyas Dela Cruz CT CHEST ABD PELV W CONTR IN September. AND CBC WITH DIFF,CMP, AFP TUMOR MARKER
03/27/23      CT CHEST W CONT      Narrative   EXAM: CT CHEST W CONT, CT ABD PELV W CONT      INDICATION: Malignant neoplasm of right testis      COMPARISON: December 2022      IV CONTRAST: 100 mL of Isovue-370. ORAL CONTRAST: None      TECHNIQUE:   Following the uneventful intravenous administration of contrast, thin axial   images were obtained through the chest, abdomen and pelvis. Coronal and sagittal   reformats were generated. CT dose reduction was achieved through use of a   standardized protocol tailored for this examination and automatic exposure   control for dose modulation. FINDINGS:      CHEST WALL: Right chest wall port catheter terminates in the right atrium. Left   chest middle nerve stimulator. THYROID: No nodule. MEDIASTINUM: Enlarged mediastinal lymph nodes the largest at the left lower   paratracheal station measuring 15 mm, stable. LAY: Enlarged bilateral hilar lymph nodes, stable, measuring up to 13 mm on the   right and 10 mm on the left. THORACIC AORTA: No dissection or aneurysm. MAIN PULMONARY ARTERY: Normal in caliber. TRACHEA/BRONCHI: Patent. ESOPHAGUS: There is a large hiatal hernia with translocation of right upper   quadrant abdominal organs into the chest cavity, including the stomach, proximal   transverse colon, and a portion of the pancreas. HEART: Normal in size. Coronary artery calcium: absent   PLEURA: No effusion or pneumothorax. LUNGS: Several scattered pulmonary nodules bilaterally with a   subpleural/peripheral fissural predilection, stable. Nodules measure 2 to 4 mm   in diameter. LIVER: No mass. BILIARY TREE: Gallbladder is within normal limits. CBD is not dilated. SPLEEN: within normal limits. PANCREAS: No mass or ductal dilatation. ADRENALS: Unremarkable. KIDNEYS: No mass, calculus, or hydronephrosis. STOMACH: Unremarkable. SMALL BOWEL: No dilatation or wall thickening. COLON: No dilatation or wall thickening.    APPENDIX:

## 2023-07-24 DIAGNOSIS — G40.219 PARTIAL SYMPTOMATIC EPILEPSY WITH COMPLEX PARTIAL SEIZURES, INTRACTABLE, WITHOUT STATUS EPILEPTICUS (HCC): ICD-10-CM

## 2023-07-24 RX ORDER — CENOBAMATE 200 MG/1
TABLET, FILM COATED ORAL
Qty: 30 TABLET | Refills: 2 | OUTPATIENT
Start: 2023-07-24

## 2023-07-24 RX ORDER — CENOBAMATE 200 MG/1
TABLET, FILM COATED ORAL
Qty: 30 TABLET | OUTPATIENT
Start: 2023-07-24

## 2023-07-25 ENCOUNTER — TELEPHONE (OUTPATIENT)
Age: 48
End: 2023-07-25

## 2023-07-25 NOTE — TELEPHONE ENCOUNTER
Called patient's mother. Informed her that the provider sent the script in yesterday. She stated that pharmacy denied it. It may need a new prior authorization. Will send to PA specialist as a high priority.

## 2023-07-25 NOTE — TELEPHONE ENCOUNTER
Patient's mother requesting refill. Julia Heading   [phone]      Sorry for this triplicate message,she insisted

## 2023-07-26 ENCOUNTER — PATIENT MESSAGE (OUTPATIENT)
Age: 48
End: 2023-07-26

## 2023-07-26 DIAGNOSIS — G40.219 PARTIAL SYMPTOMATIC EPILEPSY WITH COMPLEX PARTIAL SEIZURES, INTRACTABLE, WITHOUT STATUS EPILEPTICUS (HCC): ICD-10-CM

## 2023-07-27 ENCOUNTER — TELEPHONE (OUTPATIENT)
Age: 48
End: 2023-07-27

## 2023-07-27 DIAGNOSIS — G40.219 PARTIAL SYMPTOMATIC EPILEPSY WITH COMPLEX PARTIAL SEIZURES, INTRACTABLE, WITHOUT STATUS EPILEPTICUS (HCC): ICD-10-CM

## 2023-07-27 RX ORDER — CENOBAMATE 200 MG/1
TABLET, FILM COATED ORAL
Qty: 30 TABLET | OUTPATIENT
Start: 2023-07-27

## 2023-07-27 RX ORDER — CENOBAMATE 200 MG/1
TABLET, FILM COATED ORAL
Qty: 30 TABLET | Refills: 2 | Status: CANCELLED | OUTPATIENT
Start: 2023-07-27

## 2023-07-27 NOTE — TELEPHONE ENCOUNTER
Received message from [place] specialist stating the xcopri 200mg did not need a PA. She called the [facility] pharmacy and pharmacy stated they need a script but we have sent over the script on 07/24/23. [employer] asked if it can be sent over again.

## 2023-07-27 NOTE — TELEPHONE ENCOUNTER
From: Izzy Begum  To: Dr. Ama Chang: 7/26/2023 4:51 PM EDT  Subject: Urgent Xcopri refill needed 200 mg    Hi Dr. Jimbo Hartman will be out of the 200 mg Xcopri on Tuesday. [address] [place] [address] is waiting for your office to authorize and would like to be able to ship it out on Friday so that we receive it on Saturday. Could you please see what you can do to expedite this so that he doesn't run out?  Thank you so much for your help,  Clifton Enriquez

## 2023-07-28 ENCOUNTER — TELEPHONE (OUTPATIENT)
Age: 48
End: 2023-07-28

## 2023-07-28 NOTE — TELEPHONE ENCOUNTER
Re: Pankaj Hortonk teams message from nurse for PA, created case in [address], submitted and awaiting outcome.

## 2023-07-28 NOTE — TELEPHONE ENCOUNTER
Re: Keturah RUEDA approval shahrzad, effective 01/01/23-12/31/23 auth# [id number], sent update to nurse. Sent fax to pharmacy through right fax.

## 2023-08-04 LAB
AFP-TM SERPL-MCNC: 4.7 NG/ML (ref 0–6.9)
ALBUMIN SERPL-MCNC: 4.5 G/DL (ref 4.1–5.1)
ALBUMIN/GLOB SERPL: 2.1 {RATIO} (ref 1.2–2.2)
ALP SERPL-CCNC: 82 IU/L (ref 44–121)
ALT SERPL-CCNC: 13 IU/L (ref 0–44)
AST SERPL-CCNC: 18 IU/L (ref 0–40)
BASOPHILS # BLD AUTO: 0 X10E3/UL (ref 0–0.2)
BASOPHILS NFR BLD AUTO: 1 %
BILIRUB SERPL-MCNC: 0.3 MG/DL (ref 0–1.2)
BUN SERPL-MCNC: 17 MG/DL (ref 6–24)
BUN/CREAT SERPL: 14 (ref 9–20)
CALCIUM SERPL-MCNC: 9.6 MG/DL (ref 8.7–10.2)
CHLORIDE SERPL-SCNC: 99 MMOL/L (ref 96–106)
CO2 SERPL-SCNC: 25 MMOL/L (ref 20–29)
CREAT SERPL-MCNC: 1.22 MG/DL (ref 0.76–1.27)
EGFRCR SERPLBLD CKD-EPI 2021: 74 ML/MIN/1.73
EOSINOPHIL # BLD AUTO: 0 X10E3/UL (ref 0–0.4)
EOSINOPHIL NFR BLD AUTO: 1 %
ERYTHROCYTE [DISTWIDTH] IN BLOOD BY AUTOMATED COUNT: 15.8 % (ref 11.6–15.4)
GLOBULIN SER CALC-MCNC: 2.1 G/DL (ref 1.5–4.5)
GLUCOSE SERPL-MCNC: 81 MG/DL (ref 70–99)
HCT VFR BLD AUTO: 44.1 % (ref 37.5–51)
HGB BLD-MCNC: 14.8 G/DL (ref 13–17.7)
IMM GRANULOCYTES # BLD AUTO: 0 X10E3/UL (ref 0–0.1)
IMM GRANULOCYTES NFR BLD AUTO: 0 %
LYMPHOCYTES # BLD AUTO: 1.4 X10E3/UL (ref 0.7–3.1)
LYMPHOCYTES NFR BLD AUTO: 30 %
MCH RBC QN AUTO: 29.5 PG (ref 26.6–33)
MCHC RBC AUTO-ENTMCNC: 33.6 G/DL (ref 31.5–35.7)
MCV RBC AUTO: 88 FL (ref 79–97)
MONOCYTES # BLD AUTO: 0.6 X10E3/UL (ref 0.1–0.9)
MONOCYTES NFR BLD AUTO: 12 %
NEUTROPHILS # BLD AUTO: 2.6 X10E3/UL (ref 1.4–7)
NEUTROPHILS NFR BLD AUTO: 56 %
PLATELET # BLD AUTO: 162 X10E3/UL (ref 150–450)
POTASSIUM SERPL-SCNC: 4.6 MMOL/L (ref 3.5–5.2)
PROT SERPL-MCNC: 6.6 G/DL (ref 6–8.5)
RBC # BLD AUTO: 5.02 X10E6/UL (ref 4.14–5.8)
SODIUM SERPL-SCNC: 137 MMOL/L (ref 134–144)
SPECIMEN STATUS REPORT: NORMAL
WBC # BLD AUTO: 4.7 X10E3/UL (ref 3.4–10.8)

## 2023-08-18 DIAGNOSIS — G40.909 SEIZURE DISORDER (HCC): Primary | ICD-10-CM

## 2023-08-18 NOTE — TELEPHONE ENCOUNTER
Patient sent a message asking for a refill on briviact 100mg     LOV: 10/10/22  Last refill: 02/28/23 with 5 refills as a 30 day supply  Next visit: 10/10/23

## 2023-08-21 DIAGNOSIS — G40.219 PARTIAL SYMPTOMATIC EPILEPSY WITH COMPLEX PARTIAL SEIZURES, INTRACTABLE, WITHOUT STATUS EPILEPTICUS (HCC): Primary | ICD-10-CM

## 2023-08-21 DIAGNOSIS — C62.91 MALIGNANT NEOPLASM OF RIGHT TESTIS, UNSPECIFIED WHETHER DESCENDED OR UNDESCENDED (HCC): Primary | ICD-10-CM

## 2023-08-21 NOTE — TELEPHONE ENCOUNTER
Received [employer] message requesting refill on xcopri 50mg.      LOV: 02/16/22  Last refill: 02/17/23 with 1 refill as a 90 day supply  Next visit: 10/10/23

## 2023-08-22 RX ORDER — CENOBAMATE 50 MG/1
50 TABLET, FILM COATED ORAL DAILY
Qty: 30 TABLET | Refills: 2 | Status: SHIPPED | OUTPATIENT
Start: 2023-08-22

## 2023-09-07 NOTE — TELEPHONE ENCOUNTER
Pt's mother calling, stating her and Jazzy Burrell talked on Friday about a letter. She would like a call back before the letter is written please. 1.7

## 2023-09-08 ENCOUNTER — HOSPITAL ENCOUNTER (OUTPATIENT)
Facility: HOSPITAL | Age: 48
Discharge: HOME | End: 2023-09-08
Attending: INTERNAL MEDICINE | Admitting: STUDENT IN AN ORGANIZED HEALTH CARE EDUCATION/TRAINING PROGRAM
Payer: MEDICARE

## 2023-09-08 VITALS
WEIGHT: 172 LBS | HEIGHT: 69 IN | HEART RATE: 69 BPM | RESPIRATION RATE: 14 BRPM | OXYGEN SATURATION: 98 % | DIASTOLIC BLOOD PRESSURE: 80 MMHG | SYSTOLIC BLOOD PRESSURE: 121 MMHG | BODY MASS INDEX: 25.48 KG/M2 | TEMPERATURE: 97.9 F

## 2023-09-08 DIAGNOSIS — C62.91 MALIGNANT NEOPLASM OF RIGHT TESTIS, UNSPECIFIED WHETHER DESCENDED OR UNDESCENDED (HCC): ICD-10-CM

## 2023-09-08 PROCEDURE — 2500000003 HC RX 250 WO HCPCS: Performed by: RADIOLOGY

## 2023-09-08 PROCEDURE — 36590 REMOVAL TUNNELED CV CATH: CPT

## 2023-09-08 RX ORDER — LIDOCAINE HYDROCHLORIDE AND EPINEPHRINE BITARTRATE 20; .01 MG/ML; MG/ML
INJECTION, SOLUTION SUBCUTANEOUS PRN
Status: COMPLETED | OUTPATIENT
Start: 2023-09-08 | End: 2023-09-08

## 2023-09-08 RX ORDER — LIDOCAINE HYDROCHLORIDE 10 MG/ML
INJECTION, SOLUTION EPIDURAL; INFILTRATION; INTRACAUDAL; PERINEURAL PRN
Status: COMPLETED | OUTPATIENT
Start: 2023-09-08 | End: 2023-09-08

## 2023-09-08 RX ADMIN — LIDOCAINE HYDROCHLORIDE AND EPINEPHRINE 6 ML: 20; 10 INJECTION, SOLUTION INFILTRATION; PERINEURAL at 13:36

## 2023-09-08 ASSESSMENT — PULMONARY FUNCTION TESTS
PIF_VALUE: 0
PIF_VALUE: 0

## 2023-09-08 NOTE — PROGRESS NOTES
Pt arrives ambulatory to angio department accompanied by mother for port removal procedure. All assessments completed and consent was reviewed. Education given was regarding procedure, no sedation, post-procedure care and  management/follow-up. Opportunity for questions was provided and all questions and concerns were addressed.

## 2023-09-08 NOTE — PROGRESS NOTES
Pt tolerated procedure well. Site clean dry and intact. Pt given discharge instructions along with mother. Pt walked out with mother.

## 2023-09-21 ENCOUNTER — HOSPITAL ENCOUNTER (OUTPATIENT)
Facility: HOSPITAL | Age: 48
Discharge: HOME | End: 2023-09-21
Attending: INTERNAL MEDICINE
Payer: MEDICARE

## 2023-09-21 DIAGNOSIS — C62.91 MALIGNANT NEOPLASM OF RIGHT TESTIS, UNSPECIFIED WHETHER DESCENDED OR UNDESCENDED (HCC): ICD-10-CM

## 2023-09-21 PROCEDURE — 6360000004 HC RX CONTRAST MEDICATION: Performed by: INTERNAL MEDICINE

## 2023-09-21 PROCEDURE — 74177 CT ABD & PELVIS W/CONTRAST: CPT

## 2023-09-21 RX ADMIN — IOPAMIDOL 100 ML: 755 INJECTION, SOLUTION INTRAVENOUS at 13:37

## 2023-10-10 PROBLEM — C62.90 TESTICULAR CARCINOMA (HCC): Status: ACTIVE | Noted: 2022-07-06

## 2023-10-16 ENCOUNTER — OFFICE VISIT (OUTPATIENT)
Age: 48
End: 2023-10-16
Payer: MEDICARE

## 2023-10-16 VITALS
SYSTOLIC BLOOD PRESSURE: 118 MMHG | OXYGEN SATURATION: 99 % | WEIGHT: 177 LBS | DIASTOLIC BLOOD PRESSURE: 82 MMHG | HEART RATE: 72 BPM | BODY MASS INDEX: 26.22 KG/M2 | HEIGHT: 69 IN

## 2023-10-16 DIAGNOSIS — F32.A DEPRESSION, UNSPECIFIED DEPRESSION TYPE: ICD-10-CM

## 2023-10-16 DIAGNOSIS — G40.219 LOCALIZATION-RELATED (FOCAL) (PARTIAL) SYMPTOMATIC EPILEPSY AND EPILEPTIC SYNDROMES WITH COMPLEX PARTIAL SEIZURES, INTRACTABLE, WITHOUT STATUS EPILEPTICUS (HCC): Primary | ICD-10-CM

## 2023-10-16 PROCEDURE — 1036F TOBACCO NON-USER: CPT | Performed by: PSYCHIATRY & NEUROLOGY

## 2023-10-16 PROCEDURE — G8427 DOCREV CUR MEDS BY ELIG CLIN: HCPCS | Performed by: PSYCHIATRY & NEUROLOGY

## 2023-10-16 PROCEDURE — 3079F DIAST BP 80-89 MM HG: CPT | Performed by: PSYCHIATRY & NEUROLOGY

## 2023-10-16 PROCEDURE — G8484 FLU IMMUNIZE NO ADMIN: HCPCS | Performed by: PSYCHIATRY & NEUROLOGY

## 2023-10-16 PROCEDURE — 3074F SYST BP LT 130 MM HG: CPT | Performed by: PSYCHIATRY & NEUROLOGY

## 2023-10-16 PROCEDURE — 99214 OFFICE O/P EST MOD 30 MIN: CPT | Performed by: PSYCHIATRY & NEUROLOGY

## 2023-10-16 PROCEDURE — G8419 CALC BMI OUT NRM PARAM NOF/U: HCPCS | Performed by: PSYCHIATRY & NEUROLOGY

## 2023-10-23 DIAGNOSIS — G40.219 PARTIAL SYMPTOMATIC EPILEPSY WITH COMPLEX PARTIAL SEIZURES, INTRACTABLE, WITHOUT STATUS EPILEPTICUS (HCC): ICD-10-CM

## 2023-10-23 RX ORDER — CENOBAMATE 50 MG/1
50 TABLET, FILM COATED ORAL DAILY
Qty: 30 TABLET | Refills: 3 | Status: SHIPPED | OUTPATIENT
Start: 2023-10-23

## 2023-10-23 RX ORDER — CENOBAMATE 200 MG/1
TABLET, FILM COATED ORAL
Qty: 30 TABLET | Refills: 3 | Status: SHIPPED | OUTPATIENT
Start: 2023-10-23

## 2023-10-23 NOTE — TELEPHONE ENCOUNTER
Received a message from patient's mother stating, \"Good morning Dr. Trell Alonso,  I hope all is well with you today. I just contacted [address] to refill Gustavo's two Xcopri prescriptions and the pharmacist said that they reached out to your office on  to refill the 200 mg and hadn't heard back, she also said there was only one refill left on the 50. Is there anyway you could reach out today so that we don't have a problem with him running out? Thank you so very much, Manuel Villalpando. \"     LOV: 10/16/23    -Last refill for the 50m23 with 2 refills as a 30 day supply  -Last refill for 200mg: 10/16/23 with 3 refills as a 30 day supply    Next visit: 10/17/24

## 2023-11-13 ENCOUNTER — OFFICE VISIT (OUTPATIENT)
Age: 48
End: 2023-11-13

## 2023-11-13 VITALS
SYSTOLIC BLOOD PRESSURE: 120 MMHG | RESPIRATION RATE: 16 BRPM | BODY MASS INDEX: 25.99 KG/M2 | HEART RATE: 71 BPM | OXYGEN SATURATION: 98 % | DIASTOLIC BLOOD PRESSURE: 79 MMHG | WEIGHT: 176 LBS | TEMPERATURE: 98 F

## 2023-11-13 DIAGNOSIS — B07.8 COMMON WART: Primary | ICD-10-CM

## 2024-01-19 ENCOUNTER — OFFICE VISIT (OUTPATIENT)
Age: 49
End: 2024-01-19
Payer: MEDICARE

## 2024-01-19 VITALS
DIASTOLIC BLOOD PRESSURE: 72 MMHG | TEMPERATURE: 98.1 F | HEIGHT: 69 IN | BODY MASS INDEX: 26.07 KG/M2 | WEIGHT: 176 LBS | SYSTOLIC BLOOD PRESSURE: 114 MMHG | OXYGEN SATURATION: 99 % | HEART RATE: 81 BPM

## 2024-01-19 DIAGNOSIS — C62.92 CARCINOMA OF LEFT TESTIS (HCC): ICD-10-CM

## 2024-01-19 DIAGNOSIS — Z85.47 HISTORY OF TESTICULAR CANCER: Primary | ICD-10-CM

## 2024-01-19 PROCEDURE — 99213 OFFICE O/P EST LOW 20 MIN: CPT | Performed by: INTERNAL MEDICINE

## 2024-01-19 NOTE — PROGRESS NOTES
Gustavo Campbell is a 48 y.o. male here for 6 month follow up for testicular carcinoma.  9/8/23 port removed.   Pt doing well. No concerns brought up.     1. Have you been to the ER, urgent care clinic since your last visit?  Hospitalized since your last visit?   no    2. Have you seen or consulted any other health care providers outside of the [facility] since your last visit?  Include any pap smears or colon screening.Dr Story for Aveed injection, neurologist  
TABS Take 50 mg by mouth daily Max Daily Amount: 50 mg 30 tablet 3    topiramate (TOPAMAX) 200 MG tablet Take 1 tablet by mouth 2 times daily 180 tablet 3    sertraline (ZOLOFT) 50 MG tablet Take 1 tablet by mouth daily 90 tablet 3    Brivaracetam (BRIVIACT) 100 MG TABS tablet Take 1 tablet by mouth 2 times daily for 180 days. Max Daily Amount: 200 mg 60 tablet 5    Multiple Vitamin (MULTIVITAMIN PO) Take 1 tablet by mouth daily      TESTOSTERONE IM Inject into the muscle       No current facility-administered medications for this visit.       Allergies   Allergen Reactions    Aspartame Other (See Comments)     Family believes it causes his seizures                Objective:       Vitals:    01/19/24 1439   BP: 114/72   Pulse: 81   Temp: 98.1 °F (36.7 °C)   SpO2: 99%          Pain Scale: 0 - No pain/10         Physical Exam:   GENERAL: alert, cooperative, no distress, appears stated age   EYE: left eye has bleeding around it and in the the subconjunctival space   LYMPHATIC: Cervical, supraclavicular, and axillary nodes normal.    THROAT & NECK: normal and no erythema or exudates noted.    LUNG: clear to auscultation bilaterally   HEART: regular rate and rhythm   ABDOMEN: soft, non-tender. Bowel sounds normal. No masses,  no organomegaly   EXTREMITIES:  extremities normal, atraumatic, no cyanosis or edema   SKIN: Normal.   NEUROLOGIC: AOx3.       Physical exam and ROS has been modified from a prior visit to make it relevant and current       CT Result (most recent):  CT CHEST ABDOMEN PELVIS W CONTRAST 09/21/2023    Narrative  INDICATION:Malignant neoplasm of right testis, unspecified whether descended or  undescended.    CT of the chest, abdomen and pelvis is performed with 5 mm collimation. Study is  performed with 100 cc of nonionic IV Isovue-370. Sagittal and coronal  reformatted images were also performed.    CT dose reduction was achieved with the use of the standardized protocol  tailored for this examination

## 2024-01-26 NOTE — DISCHARGE INSTRUCTIONS
History of Present Illness:   Eliseo Hussein is a 89 year old male with a past medical history significant for hypertension, hyperlipidemia, diabetes, Parkinson's, chronic renal insufficiency, hypothyroidism, and BPH who presents here today to follow-up.  He was last seen by Dr. Mcpherson in September 2023.  Since then he has undergone a vein mapping study.  His lower extremity swelling has improved.  His wife is also here accompanying him.  He is pretty much wheelchair-bound due to bilateral weakness.  Denies of having any chest pain, shortness of breath or palpitations.  Denies of waking up from sleep due to chest pain, shortness of breath or palpitations.  He has been compliant with his medications.  He has a decent appetite.  Denies of any recent hospitalization.       Review of Systems:  (For the following ROS, pertinent positives are in bold; pertinent negatives are in italics.)  CONSTITUTIONAL: fevers, chills, sweats, weight loss, weight gain, fatigue  EYES: vision changes, double vision, blurring  ENMT: hearing loss, tinnitus, epistaxis, sores, voice changes, sore throat, sinus congestion, sinus pain, rhinorrhea   CV: chest pain, dyspnea, palpitations, orthopnea, PND, LE swelling  RESP: cough, sputum, dyspnea, hemoptysis, pleuritic pain  GI: abdominal pain, bloating, nausea, vomiting, hematemesis, diarrhea, constipation, BRBPR, melena, GERD/heartburn  : hematuria, dysuria, frequency, urgency, nocturia  MUSCULOSKELETAL: pain, muscle weakness, joint pain, joint swelling, decreased ROM, back pain  SKIN: rash, pain, pruritis, lesions, lumps, skin breakdown  NEURO: headache, dizziness, LOC, weakness, paresthesias, gait problems, seizures, diplopia, numbness, memory loss  PSYCHIATRIC: depressed mood, diminished interest/pleasure, difficulty sleeping, decreased concentration, anxiety  HEMATOLOGIC: anemia, easy bruising, persistent bleeding, DVT/Clots  All the above systems were reviewed.    Past Medical  Patient Education        Learning About Stitches and Staples Removal  When are stitches and staples removed? Your doctor will tell you when to have your stitches or staples removed, usually in 7 to 14 days. How long you'll be told to wait will depend on things like where the wound is located, how big and how deep the wound is, and what your general health is like. Do not remove the stitches on your own. Stitches on the face are usually removed within a week. But stitches and staples on other areas of the body, such as on the back or belly or over a joint, may need to stay in place longer, often a week or two. Be sure to follow your doctor's instructions. How are stitches and staples removed? It usually doesn't hurt when the doctor removes the stitches or staples. You may feel a tug as each stitch or staple is removed. · You will either be seated or lying down. · To remove stitches, the doctor will use scissors to cut each of the knots and then pull the threads out. · To remove staples, the doctor will use a tool to take out the staples one at a time. · The area may still feel tender after the stitches or staples are gone. But it should feel better within a few minutes or up to a few hours. What can you expect after stitches and staples are removed? Depending on the type and location of the cut, you will have a scar. Scars usually fade over time. Keep the area clean, but you won't need a bandage. When should you call for help? Call your doctor now or seek immediate medical care if :  · You have new pain, or your pain gets worse. · You have trouble moving the area near the scar. · You have symptoms of infection, such as:  ? Increased pain, swelling, warmth, or redness around the scar. ? Red streaks leading from the scar. ? Pus draining from the scar. ? A fever. Watch closely for changes in your health, and be sure to contact your doctor if:  · The scar opens.   · You do not get better as History:  Past Medical History:   Diagnosis Date    BPH (benign prostatic hyperplasia)     Diabetes mellitus (CMD)     Hyperlipidemia     Hypertension     Hypothyroidism      Past Surgical History:  No past surgical history on file.    Allergies:  ALLERGIES:  Doxycycline, Erythromycin, and Penicillin g     Medications:  Current Outpatient Medications   Medication Sig Dispense Refill    tamsulosin (FLOMAX) 0.4 MG Cap Take 1 capsule by mouth daily after a meal. Nightly 90 capsule 3    finasteride (PROSCAR) 5 MG tablet Take 1 tablet by mouth daily. 90 tablet 3    Cholecalciferol (Vitamin D3) 1.25 mg (50,000 units) tablet Take 1 tablet by mouth 1 day a week. (for 4 weeks only) 4 tablet 0    metoPROLOL succinate (TOPROL-XL) 25 MG 24 hr tablet Take 25 mg by mouth.      carbidopa-levodopa (SINEMET)  MG per tablet Take 2 tablets by mouth 3 times daily.      venlafaxine XR (EFFEXOR XR) 37.5 MG 24 hr capsule TAKE 1 CAPSULE BY MOUTH DAILY. SWALLOW WHOLE. BEST IF TAKEN WITH FOOD      metFORMIN (GLUCOPHAGE) 500 MG tablet Take 1 tablet by mouth daily.      finasteride (PROSCAR) 5 MG tablet Take 1 tablet by mouth daily.      furosemide (LASIX) 20 MG tablet Take 20 mg by mouth every morning.      dutasteride-tamsulosin 0.5-0.4 MG capsule 1 daily 1 capsule 0    finasteride (PROSCAR) 5 MG tablet Take 1 tablet by mouth daily. 90 tablet 3    Cholecalciferol (VITAMIN D-3) 1000 units Cap Take 1,000 Units by mouth.      gabapentin (NEURONTIN) 100 MG capsule Take 200 mg by mouth.      levothyroxine (SYNTHROID, LEVOTHROID) 75 MCG tablet Take 75 mcg by mouth.      losartan (COZAAR) 100 MG tablet Take 100 mg by mouth.       No current facility-administered medications for this visit.       Social History:  Social History     Substance and Sexual Activity   Alcohol Use Not Currently     Social History     Tobacco Use   Smoking Status Never   Smokeless Tobacco Never     Social History     Substance and Sexual Activity   Drug Use Not on  expected. Follow-up care is a key part of your treatment and safety. Be sure to make and go to all appointments, and call your doctor if you do not get better as expected. It's also a good idea to keep a list of the medicines you take. Where can you learn more? Go to http://margarette-anthony.info/. Enter D144 in the search box to learn more about \"Learning About Stitches and Staples Removal.\"  Current as of: June 26, 2019  Content Version: 12.2  © 0569-0956 [employer]. Care instructions adapted under license by [employer] (which disclaims liability or warranty for this information). If you have questions about a medical condition or this instruction, always ask your healthcare professional. [address] any warranty or liability for your use of this information. file             Family History:  No family history on file.  Family History reviewed. Other than mentioned above, there is no pertinent family history.    Physical Exam:  Visit Vitals  /50   Pulse 68   Resp 17   Ht 5' 7\" (1.702 m)   Wt 63.9 kg (140 lb 12.8 oz)   SpO2 99%   BMI 22.05 kg/m²       General:  Alert and oriented x3, No apparent distress  HEENT: anicteric, moist mucous membranes, EOMI  Neck: Supple, no elevated JVD, no thyromegaly    Cardiovascular:  Heart: RRR, S1 S2, no m/r/g  Carotid Bruits: No  Pulses: 2+    Pulmonary: Lungs CTA b/l  GI: Abdomen soft, NT, ND, +BS, no HSM  Extremities: Trace - 1+ bilateral ankle pitting edema with trace edema to mid tibia, +2 pulses  Musculoskeletal: +5/5 strength in all extremities  Neuro: No focal deficits or asymmetry.  CNs grossly intact  Skin: No obvious skin lesions or rashes  Psychiatric: Normal affect    Reviewed Data:    Labs:   Office Visit on 12/01/2023   Component Date Value Ref Range Status    BLDR Scan mLs 12/01/2023 414   In process   Ancillary Procedure on 11/29/2023   Component Date Value Ref Range Status    AV Stenosis Severity Text 11/29/2023 Absent   Corrected    Aortic Valve Area 11/29/2023 2.07   Corrected    AV Mean Gradient 11/29/2023 3.70   Corrected    AV Mean Velocity 11/29/2023 0.88   Corrected    Ejection Fraction 11/29/2023 60%   Corrected    AV VTI (Previously displayed as AV* 11/29/2023 0.80   Corrected    MV Peak E Velocity 11/29/2023 0.99   Corrected    MV Peak A Velocity 11/29/2023 285   Corrected    E Wave Decelaration Time 11/29/2023 [id number]   Corrected    MV E Wave Raheem/E Tissue Raheem Med 11/29/2023 5.87   Corrected    MV E Tissue Raheem Med 11/29/2023 7.83   Corrected    MV E Tissue Raheem Lat 11/29/2023 0.856   Corrected    Tricuspid Valve Peak Regurgitation* 11/29/2023 2.9   Corrected    TV Estimated Right Arterial Pressu* 11/29/2023 12.5   Corrected    RV End Systolic Longitudinal Strai* 11/29/2023 2   Corrected    LV  outflow tract 11/29/2023 22.3   Corrected    LVOT VTI 11/29/2023 0.92   Corrected    SARAH LVOT Peak Gradient 11/29/2023 0.8   Corrected    LV end diastolic posterior wall th* 11/29/2023 4.7   Corrected    Left Ventricular Internal Dimensio* 11/29/2023 3.3   Corrected    Left Internal Dimenson in Systole 11/29/2023 0.9   Corrected    Interventricular Septum in End Shireen* 11/29/2023 1.78   Corrected   Lab Services on 09/12/2023   Component Date Value Ref Range Status    Sodium 09/12/2023 143  135 - 145 mmol/L Final    Potassium 09/12/2023 4.4  3.4 - 5.1 mmol/L Final    Chloride 09/12/2023 104  97 - 110 mmol/L Final    Carbon Dioxide 09/12/2023 34 (H)  21 - 32 mmol/L Final    Anion Gap 09/12/2023 9  7 - 19 mmol/L Final    Glucose 09/12/2023 115 (H)  70 - 99 mg/dL Final    BUN 09/12/2023 34 (H)  6 - 20 mg/dL Final    Creatinine 09/12/2023 1.00  0.67 - 1.17 mg/dL Final    Glomerular Filtration Rate 09/12/2023 68  >=60 Final    BUN/Cr 09/12/2023 34 (H)  7 - 25 Final    Calcium 09/12/2023 8.7  8.4 - 10.2 mg/dL Final    Bilirubin, Total 09/12/2023 0.6  0.2 - 1.0 mg/dL Final    GOT/AST 09/12/2023 21  <=37 Units/L Final    GPT/ALT 09/12/2023 14  <64 Units/L Final    Alkaline Phosphatase 09/12/2023 154 (H)  45 - 117 Units/L Final    Albumin 09/12/2023 4.0  3.6 - 5.1 g/dL Final    Protein, Total 09/12/2023 7.8  6.4 - 8.2 g/dL Final    Globulin 09/12/2023 3.8  2.0 - 4.0 g/dL Final    A/G Ratio 09/12/2023 1.1  1.0 - 2.4 Final    Lipoprotein(a) 09/12/2023 11  <=29 mg/dL Final    Hemoglobin A1C 09/12/2023 5.9 (H)  4.5 - 5.6 % Final    TSH 09/12/2023 1.375  0.350 - 5.000 mcUnits/mL Final    NT-proBNP 09/12/2023 250  <=450 pg/mL Final    Magnesium 09/12/2023 2.0  1.7 - 2.4 mg/dL Final    Iron 09/12/2023 78  65 - 175 mcg/dL Final    Iron Binding Capacity 09/12/2023 341  250 - 450 mcg/dL Final    Iron, Percent Saturation 09/12/2023 23  15 - 45 % Final    Vitamin D UA 09/12/2023 27.2 (L)  30.0 - 80.0 ng/mL Final    25 Hydroxy D2 09/12/2023  <1.0  ng/mL Final    25 Hydroxy D3 09/12/2023 27.2  ng/mL Final    LDL Direct 09/12/2023 104  <=129 mg/dL Final    WBC 09/12/2023 5.6  4.2 - 11.0 K/mcL Final    RBC 09/12/2023 2.96 (L)  4.50 - 5.90 mil/mcL Final    HGB 09/12/2023 11.1 (L)  13.0 - 17.0 g/dL Final    HCT 09/12/2023 31.3 (L)  39.0 - 51.0 % Final    MCV 09/12/2023 105.7 (H)  78.0 - 100.0 fl Final    MCH 09/12/2023 37.5 (H)  26.0 - 34.0 pg Final    MCHC 09/12/2023 35.5  32.0 - 36.5 g/dL Final    RDW-CV 09/12/2023 15.4 (H)  11.0 - 15.0 % Final    RDW-SD 09/12/2023 49.1  39.0 - 50.0 fL Final    PLT 09/12/2023 212  140 - 450 K/mcL Final    Neutrophil, Percent 09/12/2023 58  % Final    Lymphocytes, Percent 09/12/2023 29  % Final    Mono, Percent 09/12/2023 7  % Final    Eosinophils, Percent 09/12/2023 5  % Final    Basophils, Percent 09/12/2023 0  % Final    Immature Granulocytes 09/12/2023 1  % Final    Absolute Neutrophils 09/12/2023 3.2  1.8 - 7.7 K/mcL Final    Absolute Lymphocytes 09/12/2023 1.6  1.0 - 4.0 K/mcL Final    Absolute Monocytes 09/12/2023 0.4  0.3 - 0.9 K/mcL Final    Absolute Eosinophils  09/12/2023 0.3  0.0 - 0.5 K/mcL Final    Absolute Basophils 09/12/2023 0.0  0.0 - 0.3 K/mcL Final    Absolute Immature Granulocytes 09/12/2023 0.0  0.0 - 0.2 K/mcL Final       ECG:  Date: 09/12/2023  I have reviewed the ECG with the following interpretation:  Sinus bradycardia at 57 bpm, nonspecific T wave abnormalities    Vein mapping 10/18/2023     1. Technically very very difficult exam due to patient body habitus and     uncontrolled spasms.  2. There is no evidence of acute or chronic deep vein or superficial vein     thrombosis in the right lower extremity and left lower extremity where     visualized.  3. There is no evidence of deep vein insufficiency in the right lower     extremity and left lower extremity where visualized.  4. There is no evidence of venous insufficiency in the right great saphenous     vein and left great saphenous vein.  5.  There is no evidence of venous insufficiency in the right small saphenous     vein and left small saphenous vein.  6. Severe reflux in the right calf tributaries and left calf tributaries.    Echocardiogram 11/29/2023  1. Left ventricle: The cavity size is normal. Wall thickness is normal.     Systolic function is normal. The ejection fraction was measured by biplane     method of disks. The ejection fraction is 60%.  2. Right ventricle: The cavity size is normal. Wall thickness is normal.     Systolic function is normal.  3. Tricuspid valve: There is mild regurgitation.  4. Pulmonary arteries: Systolic pressure is within the normal range, estimated     to be 24mm Hg.  Problem List:  Patient Active Problem List   Diagnosis    Anemia    Arthritis    Benign nodular prostatic hyperplasia with lower urinary tract symptoms    Diabetes mellitus (CMD)    Essential hypertension    Parkinson's disease    Chronic diastolic heart failure (CMD)       Assessment/Plan:    1. CEAP Class III Venous Insufficiency  - Continue Lasix 20mg daily  - Recommending transthoracic echocardiogram to rule out heart failure  - Compression stockings recommended  -Reviewed vein mapping study.  - Keep legs elevated to alleviate swelling  Recommend wearing compression socks if tolerated.    2. Hypertension  - Continue Toprol XL 25m daily  - Continue Losartan 100mg daily  - Minimize Na intake      3. Hyperlipidemia  Lipoprotein a 11  03/28/2023:   01/25/2022:       4. Diabetes  Recent hemoglobin A1c of 5.9  03/28/2023: Hgb A1c 6.3  04/07/2022: Hgb A1c 7.7  - Continue Metformin  - Education provided re: detriments of poor glycemic control  - Care per PCP      5. Parkinson's Disease  - Continue Sinemet    6. Anemia  CBC and iron studies normal.    7. BPH  - Continue Finasteride  - Continue Tamsulosin    8. Hypothyroidism  - Continue Levothyroxine     Follow-up with Dr. Mcpherson in March.  Electronically signed by: Sophia Sousa PA-C   1/26/2024

## 2024-02-10 DIAGNOSIS — G40.909 SEIZURE DISORDER (HCC): ICD-10-CM

## 2024-02-12 RX ORDER — BRIVARACETAM 100 MG/1
TABLET, FILM COATED ORAL
Qty: 60 TABLET | Refills: 5 | Status: SHIPPED | OUTPATIENT
Start: 2024-02-12 | End: 2024-08-10

## 2024-02-16 DIAGNOSIS — G40.219 PARTIAL SYMPTOMATIC EPILEPSY WITH COMPLEX PARTIAL SEIZURES, INTRACTABLE, WITHOUT STATUS EPILEPTICUS (HCC): ICD-10-CM

## 2024-02-16 RX ORDER — CENOBAMATE 50 MG/1
TABLET, FILM COATED ORAL
Qty: 30 TABLET | Refills: 5 | Status: SHIPPED | OUTPATIENT
Start: 2024-02-16

## 2024-02-16 RX ORDER — CENOBAMATE 50 MG/1
50 TABLET, FILM COATED ORAL DAILY
Qty: 30 TABLET | Refills: 5 | Status: SHIPPED | OUTPATIENT
Start: 2024-02-16

## 2024-02-16 RX ORDER — CENOBAMATE 200 MG/1
TABLET, FILM COATED ORAL
Qty: 30 TABLET | Refills: 5 | Status: SHIPPED | OUTPATIENT
Start: 2024-02-16

## 2024-02-16 NOTE — TELEPHONE ENCOUNTER
LOV: 10/16/23    Last refill for the Xcopri 200mg: 10/23/23 with 3 refills as a 30 day supply  Last refill for Xcopri 50mg: 10/23/23 with 3 refills as a 30 day supply    Next visit: 10/17/24

## 2024-02-20 LAB
AFP-TM SERPL-MCNC: 5.3 NG/ML (ref 0–6.9)
HCG INTACT+B SERPL-ACNC: <1 MIU/ML (ref 0–3)

## 2024-03-19 ENCOUNTER — OFFICE VISIT (OUTPATIENT)
Facility: CLINIC | Age: 49
End: 2024-03-19
Payer: MEDICARE

## 2024-03-19 VITALS
BODY MASS INDEX: 25.56 KG/M2 | WEIGHT: 172.6 LBS | HEART RATE: 103 BPM | OXYGEN SATURATION: 98 % | HEIGHT: 69 IN | RESPIRATION RATE: 16 BRPM | TEMPERATURE: 98 F | SYSTOLIC BLOOD PRESSURE: 102 MMHG | DIASTOLIC BLOOD PRESSURE: 80 MMHG

## 2024-03-19 DIAGNOSIS — Z11.4 SCREENING FOR HIV (HUMAN IMMUNODEFICIENCY VIRUS): ICD-10-CM

## 2024-03-19 DIAGNOSIS — Z00.00 MEDICARE ANNUAL WELLNESS VISIT, SUBSEQUENT: Primary | ICD-10-CM

## 2024-03-19 DIAGNOSIS — C62.92 CARCINOMA OF LEFT TESTIS (HCC): ICD-10-CM

## 2024-03-19 DIAGNOSIS — I10 PRIMARY HYPERTENSION: ICD-10-CM

## 2024-03-19 DIAGNOSIS — G40.909 SEIZURE DISORDER (HCC): ICD-10-CM

## 2024-03-19 DIAGNOSIS — F10.20 UNCOMPLICATED ALCOHOL DEPENDENCE (HCC): ICD-10-CM

## 2024-03-19 DIAGNOSIS — R73.01 IFG (IMPAIRED FASTING GLUCOSE): ICD-10-CM

## 2024-03-19 PROBLEM — I26.99 PULMONARY EMBOLI (HCC): Status: RESOLVED | Noted: 2022-08-22 | Resolved: 2024-03-19

## 2024-03-19 PROCEDURE — 1036F TOBACCO NON-USER: CPT | Performed by: INTERNAL MEDICINE

## 2024-03-19 PROCEDURE — 3079F DIAST BP 80-89 MM HG: CPT | Performed by: INTERNAL MEDICINE

## 2024-03-19 PROCEDURE — G8484 FLU IMMUNIZE NO ADMIN: HCPCS | Performed by: INTERNAL MEDICINE

## 2024-03-19 PROCEDURE — G8419 CALC BMI OUT NRM PARAM NOF/U: HCPCS | Performed by: INTERNAL MEDICINE

## 2024-03-19 PROCEDURE — G8427 DOCREV CUR MEDS BY ELIG CLIN: HCPCS | Performed by: INTERNAL MEDICINE

## 2024-03-19 PROCEDURE — G0439 PPPS, SUBSEQ VISIT: HCPCS | Performed by: INTERNAL MEDICINE

## 2024-03-19 PROCEDURE — 99215 OFFICE O/P EST HI 40 MIN: CPT | Performed by: INTERNAL MEDICINE

## 2024-03-19 PROCEDURE — 3074F SYST BP LT 130 MM HG: CPT | Performed by: INTERNAL MEDICINE

## 2024-03-19 SDOH — ECONOMIC STABILITY: FOOD INSECURITY: WITHIN THE PAST 12 MONTHS, YOU WORRIED THAT YOUR FOOD WOULD RUN OUT BEFORE YOU GOT MONEY TO BUY MORE.: PATIENT DECLINED

## 2024-03-19 SDOH — ECONOMIC STABILITY: HOUSING INSECURITY
IN THE LAST 12 MONTHS, WAS THERE A TIME WHEN YOU DID NOT HAVE A STEADY PLACE TO SLEEP OR SLEPT IN A SHELTER (INCLUDING NOW)?: NO

## 2024-03-19 SDOH — HEALTH STABILITY: PHYSICAL HEALTH: ON AVERAGE, HOW MANY MINUTES DO YOU ENGAGE IN EXERCISE AT THIS LEVEL?: 30 MIN

## 2024-03-19 SDOH — ECONOMIC STABILITY: FOOD INSECURITY: WITHIN THE PAST 12 MONTHS, THE FOOD YOU BOUGHT JUST DIDN'T LAST AND YOU DIDN'T HAVE MONEY TO GET MORE.: PATIENT DECLINED

## 2024-03-19 SDOH — ECONOMIC STABILITY: TRANSPORTATION INSECURITY
IN THE PAST 12 MONTHS, HAS LACK OF TRANSPORTATION KEPT YOU FROM MEETINGS, WORK, OR FROM GETTING THINGS NEEDED FOR DAILY LIVING?: PATIENT DECLINED

## 2024-03-19 SDOH — HEALTH STABILITY: PHYSICAL HEALTH: ON AVERAGE, HOW MANY DAYS PER WEEK DO YOU ENGAGE IN MODERATE TO STRENUOUS EXERCISE (LIKE A BRISK WALK)?: 7 DAYS

## 2024-03-19 SDOH — ECONOMIC STABILITY: INCOME INSECURITY: HOW HARD IS IT FOR YOU TO PAY FOR THE VERY BASICS LIKE FOOD, HOUSING, MEDICAL CARE, AND HEATING?: PATIENT DECLINED

## 2024-03-19 ASSESSMENT — LIFESTYLE VARIABLES
HOW MANY STANDARD DRINKS CONTAINING ALCOHOL DO YOU HAVE ON A TYPICAL DAY: 98
HOW OFTEN DO YOU HAVE SIX OR MORE DRINKS ON ONE OCCASION: 98
HOW OFTEN DO YOU HAVE A DRINK CONTAINING ALCOHOL: PATIENT DECLINED
HOW MANY STANDARD DRINKS CONTAINING ALCOHOL DO YOU HAVE ON A TYPICAL DAY: PATIENT DECLINED
HOW OFTEN DO YOU HAVE A DRINK CONTAINING ALCOHOL: 98

## 2024-03-19 ASSESSMENT — PATIENT HEALTH QUESTIONNAIRE - PHQ9
SUM OF ALL RESPONSES TO PHQ QUESTIONS 1-9: 0
10. IF YOU CHECKED OFF ANY PROBLEMS, HOW DIFFICULT HAVE THESE PROBLEMS MADE IT FOR YOU TO DO YOUR WORK, TAKE CARE OF THINGS AT HOME, OR GET ALONG WITH OTHER PEOPLE: NOT DIFFICULT AT ALL
3. TROUBLE FALLING OR STAYING ASLEEP: NOT AT ALL
2. FEELING DOWN, DEPRESSED OR HOPELESS: NOT AT ALL
SUM OF ALL RESPONSES TO PHQ QUESTIONS 1-9: 0
SUM OF ALL RESPONSES TO PHQ9 QUESTIONS 1 & 2: 0
1. LITTLE INTEREST OR PLEASURE IN DOING THINGS: NOT AT ALL
SUM OF ALL RESPONSES TO PHQ QUESTIONS 1-9: 0
SUM OF ALL RESPONSES TO PHQ QUESTIONS 1-9: 0
5. POOR APPETITE OR OVEREATING: NOT AT ALL
SUM OF ALL RESPONSES TO PHQ QUESTIONS 1-9: 0
4. FEELING TIRED OR HAVING LITTLE ENERGY: NOT AT ALL
9. THOUGHTS THAT YOU WOULD BE BETTER OFF DEAD, OR OF HURTING YOURSELF: NOT AT ALL
SUM OF ALL RESPONSES TO PHQ QUESTIONS 1-9: 0
6. FEELING BAD ABOUT YOURSELF - OR THAT YOU ARE A FAILURE OR HAVE LET YOURSELF OR YOUR FAMILY DOWN: NOT AT ALL
SUM OF ALL RESPONSES TO PHQ QUESTIONS 1-9: 0
SUM OF ALL RESPONSES TO PHQ QUESTIONS 1-9: 0
8. MOVING OR SPEAKING SO SLOWLY THAT OTHER PEOPLE COULD HAVE NOTICED. OR THE OPPOSITE, BEING SO FIGETY OR RESTLESS THAT YOU HAVE BEEN MOVING AROUND A LOT MORE THAN USUAL: NOT AT ALL
7. TROUBLE CONCENTRATING ON THINGS, SUCH AS READING THE NEWSPAPER OR WATCHING TELEVISION: NOT AT ALL

## 2024-03-19 NOTE — PATIENT INSTRUCTIONS
Personalized Preventive Plan for Gustavo Campbell - 3/19/2024  Medicare offers a range of preventive health benefits. Some of the tests and screenings are paid in full while other may be subject to a deductible, co-insurance, and/or copay.    Some of these benefits include a comprehensive review of your medical history including lifestyle, illnesses that may run in your family, and various assessments and screenings as appropriate.    After reviewing your medical record and screening and assessments performed today your provider may have ordered immunizations, labs, imaging, and/or referrals for you.  A list of these orders (if applicable) as well as your Preventive Care list are included within your After Visit Summary for your review.    Other Preventive Recommendations:    A preventive eye exam performed by an eye specialist is recommended every 1-2 years to screen for glaucoma; cataracts, macular degeneration, and other eye disorders.  A preventive dental visit is recommended every 6 months.  Try to get at least 150 minutes of exercise per week or 10,000 steps per day on a pedometer .  Order or download the FREE \"Exercise & Physical Activity: Your Everyday Guide\" from [employer]. Call 1-837.703.5808 or search [employer] online.  You need 7364-4481 mg of calcium and 6613-6057 IU of vitamin D per day. It is possible to meet your calcium requirement with diet alone, but a vitamin D supplement is usually necessary to meet this goal.  When exposed to the sun, use a sunscreen that protects against both UVA and UVB radiation with an SPF of 30 or greater. Reapply every 2 to 3 hours or after sweating, drying off with a towel, or swimming.  Always wear a seat belt when traveling in a car. Always wear a helmet when riding a bicycle or motorcycle.

## 2024-03-19 NOTE — PROGRESS NOTES
Gustavo Campbell  Identified pt with two pt identifiers(name and ).  Chief Complaint   Patient presents with    Medicare AWV       1. Have you been to the ER, urgent care clinic since your last visit?  Hospitalized since your last visit? NO    2. Have you seen or consulted any other health care providers outside of the [facility] since your last visit?  Include any pap smears or colon screening. NO  Pt plans on getting Covid vaccine,     Provider notified of reason for visit, vitals and flowsheets obtained on patients.     Patient received paperwork for advance directive during previous visit but has not completed at this time     Reviewed record In preparation for visit, huddled with provider and have obtained necessary documentation      Health Maintenance Due   Topic    Hepatitis B vaccine (1 of 3 - 3-dose series)    HIV screen     Colorectal Cancer Screen     COVID-19 Vaccine (5 - 2023-24 season)    Annual Wellness Visit (Medicare Advantage)        Wt Readings from Last 3 Encounters:   24 78.3 kg (172 lb 9.6 oz)   24 79.8 kg (176 lb)   23 79.8 kg (176 lb)     Temp Readings from Last 3 Encounters:   24 98 °F (36.7 °C) (Oral)   24 98.1 °F (36.7 °C) (Temporal)   23 98 °F (36.7 °C) (Oral)     BP Readings from Last 3 Encounters:   24 (!) 132/91   24 114/72   23 120/79     Pulse Readings from Last 3 Encounters:   24 (!) 103   24 81   23 71          No data to display                  Learning Assessment:  :          No data to display                Fall Risk Assessment:  :         3/19/2024     8:28 AM 2022    11:22 AM 2022     4:49 PM 2022     1:00 PM 2022     1:19 PM 2022    11:08 AM 2022     1:12 PM   Amb Fall Risk Assessment and TUG Test   Do you feel unsteady or are you worried about falling?  no         2 or more falls in past year? no         Fall with injury in past year? no         Total Score

## 2024-03-19 NOTE — PROGRESS NOTES
Medicare Annual Wellness Visit    Gustavo Campbell is here for Medicare AWV    Assessment & Plan     Medicare annual wellness visit, subsequent  Primary hypertension  Controlled on no BP medication.  -     CBC with Auto Differential  -     Comprehensive Metabolic Panel  -     Lipid Panel  -     TSH  Seizure disorder (HCC)  Controlled. Continue Briactiv, Topamax, and Xcopri. He takes sertraline for generalized anxiety disorder. Follow up with Dr. Saunders as scheduled.  Carcinoma of left testis (HCC)  In remission. Follow up with Dr. Rm.  Uncomplicated alcohol dependence (HCC)  Counseled on decreasing beer intake to no more than 2 beers a day.  IFG (impaired fasting glucose)  -     Hemoglobin A1C  Screening for HIV (human immunodeficiency virus)  -     HIV 1/2 Ag/Ab, 4TH Generation,W Rflx Confirm    He was planning to have labs today but became restless and angry, yelling at his mother. He should return later this week for fasting labs.    He wants to think about whether he wants Cologuard or colonoscopy for colon cancer screening.    Time Spent: 40 mins on medical record review, history, exam, discussing problems and plan, counseling, placing orders, and documenting note.    Recommendations for Preventive Services Due: see orders and patient instructions/AVS.  Recommended screening schedule for the next 5-10 years is provided to the patient in written form: see Patient Instructions/AVS.       Return in about 6 months (around 9/19/2024), or if symptoms worsen or fail to improve, for HTN, discuss colon ca screening.       Subjective     Presents for Medicare AWV and follow up evaluation. Last seen in clinic by Alexey RUEDA on 5/31/22. Last appt with me virtually on 9/18/20 and in person on 2/21/20. He has HTN, seizure disorder since 2011, alcohol dependence, hx of testicular cancer, generalized anxiety disorder, and autism with Asperger's syndrome.     Diagnosed with testicular cancer (mixed germ cell tumor of

## 2024-03-26 ENCOUNTER — PATIENT MESSAGE (OUTPATIENT)
Facility: CLINIC | Age: 49
End: 2024-03-26

## 2024-03-26 DIAGNOSIS — F41.1 GENERALIZED ANXIETY DISORDER: Primary | ICD-10-CM

## 2024-03-28 RX ORDER — SERTRALINE HYDROCHLORIDE 100 MG/1
100 TABLET, FILM COATED ORAL DAILY
Qty: 30 TABLET | Refills: 1 | Status: SHIPPED | OUTPATIENT
Start: 2024-03-28 | End: 2024-03-29 | Stop reason: CLARIF

## 2024-03-28 NOTE — TELEPHONE ENCOUNTER
From: Gustavo Campbell  To: Dr. Irma Monet  Sent: 3/26/2024 7:54 PM EDT  Subject: Prescription     Within the last week he has 2 or 3 flare ups similar to what happened in your office. This irritability and agitation came on quick. When nurse 1st took his blood pressure it was high, his pulse rate was high. Do you have any suggestions?

## 2024-03-29 DIAGNOSIS — F41.1 GENERALIZED ANXIETY DISORDER: ICD-10-CM

## 2024-03-29 RX ORDER — SERTRALINE HYDROCHLORIDE 100 MG/1
100 TABLET, FILM COATED ORAL DAILY
Qty: 30 TABLET | Refills: 1 | Status: CANCELLED | OUTPATIENT
Start: 2024-03-29

## 2024-03-29 RX ORDER — SERTRALINE HYDROCHLORIDE 100 MG/1
100 TABLET, FILM COATED ORAL DAILY
Qty: 30 TABLET | Refills: 2 | Status: SHIPPED | OUTPATIENT
Start: 2024-03-29

## 2024-03-29 NOTE — TELEPHONE ENCOUNTER
Went to wrong pharmacy      PCP: Irma Monet MD     Last appt:  3/19/2024      Future Appointments   Date Time Provider Department Center   7/24/2024  2:00 PM Manuel Rm MD [facility] AM[facility]   10/17/2024  3:00 PM Dejuan Saunders MD [facility] AMB          Requested Prescriptions     Pending Prescriptions Disp Refills    sertraline (ZOLOFT) 100 MG tablet 30 tablet 1     Sig: Take 1 tablet by mouth daily

## 2024-04-08 ENCOUNTER — TELEPHONE (OUTPATIENT)
Facility: CLINIC | Age: 49
End: 2024-04-08

## 2024-04-08 DIAGNOSIS — I10 PRIMARY HYPERTENSION: ICD-10-CM

## 2024-04-08 DIAGNOSIS — R73.01 IFG (IMPAIRED FASTING GLUCOSE): ICD-10-CM

## 2024-04-08 DIAGNOSIS — Z11.4 SCREENING FOR HIV (HUMAN IMMUNODEFICIENCY VIRUS): ICD-10-CM

## 2024-04-09 LAB
ALBUMIN SERPL-MCNC: 4.3 G/DL (ref 4.1–5.1)
ALBUMIN/GLOB SERPL: 1.9 {RATIO} (ref 1.2–2.2)
ALP SERPL-CCNC: 77 IU/L (ref 44–121)
ALT SERPL-CCNC: 15 IU/L (ref 0–44)
AST SERPL-CCNC: 16 IU/L (ref 0–40)
BASOPHILS # BLD AUTO: 0 X10E3/UL (ref 0–0.2)
BASOPHILS NFR BLD AUTO: 1 %
BILIRUB SERPL-MCNC: 0.4 MG/DL (ref 0–1.2)
BUN SERPL-MCNC: 20 MG/DL (ref 6–24)
BUN/CREAT SERPL: 19 (ref 9–20)
CALCIUM SERPL-MCNC: 9.4 MG/DL (ref 8.7–10.2)
CHLORIDE SERPL-SCNC: 101 MMOL/L (ref 96–106)
CHOLEST SERPL-MCNC: 169 MG/DL (ref 100–199)
CO2 SERPL-SCNC: 24 MMOL/L (ref 20–29)
CREAT SERPL-MCNC: 1.08 MG/DL (ref 0.76–1.27)
EGFRCR SERPLBLD CKD-EPI 2021: 85 ML/MIN/1.73
EOSINOPHIL # BLD AUTO: 0 X10E3/UL (ref 0–0.4)
EOSINOPHIL NFR BLD AUTO: 1 %
ERYTHROCYTE [DISTWIDTH] IN BLOOD BY AUTOMATED COUNT: 13.5 % (ref 11.6–15.4)
GLOBULIN SER CALC-MCNC: 2.3 G/DL (ref 1.5–4.5)
GLUCOSE SERPL-MCNC: 86 MG/DL (ref 70–99)
HBA1C MFR BLD: 5.2 % (ref 4.8–5.6)
HCT VFR BLD AUTO: 53.6 % (ref 37.5–51)
HDLC SERPL-MCNC: 88 MG/DL
HGB BLD-MCNC: 18.1 G/DL (ref 13–17.7)
HIV 1+2 AB+HIV1 P24 AG SERPL QL IA: NON REACTIVE
IMM GRANULOCYTES # BLD AUTO: 0 X10E3/UL (ref 0–0.1)
IMM GRANULOCYTES NFR BLD AUTO: 0 %
LDLC SERPL CALC-MCNC: 57 MG/DL (ref 0–99)
LYMPHOCYTES # BLD AUTO: 1.1 X10E3/UL (ref 0.7–3.1)
LYMPHOCYTES NFR BLD AUTO: 25 %
MCH RBC QN AUTO: 32.6 PG (ref 26.6–33)
MCHC RBC AUTO-ENTMCNC: 33.8 G/DL (ref 31.5–35.7)
MCV RBC AUTO: 97 FL (ref 79–97)
MONOCYTES # BLD AUTO: 0.6 X10E3/UL (ref 0.1–0.9)
MONOCYTES NFR BLD AUTO: 13 %
NEUTROPHILS # BLD AUTO: 2.6 X10E3/UL (ref 1.4–7)
NEUTROPHILS NFR BLD AUTO: 60 %
PLATELET # BLD AUTO: 156 X10E3/UL (ref 150–450)
POTASSIUM SERPL-SCNC: 4.7 MMOL/L (ref 3.5–5.2)
PROT SERPL-MCNC: 6.6 G/DL (ref 6–8.5)
RBC # BLD AUTO: 5.55 X10E6/UL (ref 4.14–5.8)
SODIUM SERPL-SCNC: 139 MMOL/L (ref 134–144)
TRIGL SERPL-MCNC: 149 MG/DL (ref 0–149)
TSH SERPL DL<=0.005 MIU/L-ACNC: 2.63 UIU/ML (ref 0.45–4.5)
VLDLC SERPL CALC-MCNC: 24 MG/DL (ref 5–40)
WBC # BLD AUTO: 4.4 X10E3/UL (ref 3.4–10.8)

## 2024-04-10 DIAGNOSIS — G40.909 SEIZURE DISORDER (HCC): Primary | ICD-10-CM

## 2024-04-10 DIAGNOSIS — G40.219 PARTIAL SYMPTOMATIC EPILEPSY WITH COMPLEX PARTIAL SEIZURES, INTRACTABLE, WITHOUT STATUS EPILEPTICUS (HCC): ICD-10-CM

## 2024-04-10 RX ORDER — GABAPENTIN 100 MG/1
200 CAPSULE ORAL 2 TIMES DAILY
Qty: 120 CAPSULE | Refills: 1 | Status: SHIPPED | OUTPATIENT
Start: 2024-04-10 | End: 2024-06-09

## 2024-04-12 DIAGNOSIS — D75.1 POLYCYTHEMIA: Primary | ICD-10-CM

## 2024-04-18 ENCOUNTER — TELEPHONE (OUTPATIENT)
Age: 49
End: 2024-04-18

## 2024-04-18 NOTE — TELEPHONE ENCOUNTER
PT WENT TO DR. WANG AND THE MOM HAS QUESTIONS ABOUT THE SCAN ON YESTERDAY WANTS TO KNOW WILL THAT COVER THAT AREA FOR HIM

## 2024-04-18 NOTE — TELEPHONE ENCOUNTER
Returned call to pt mother.  HIPAA verified by two patient identifiers.   Pt had labs drawn earlier this month and PSA came back elevated at 3 so pt mom didn't know if the scan tomorrow will show his prostate, I assured her yes CT ABD PELV will.    She then said his HGB and HCT has been elevated and thinking it is from his testosterone so told him to go donate blood.

## 2024-04-19 ENCOUNTER — HOSPITAL ENCOUNTER (OUTPATIENT)
Facility: HOSPITAL | Age: 49
End: 2024-04-19
Payer: MEDICARE

## 2024-04-19 DIAGNOSIS — C62.92 CARCINOMA OF LEFT TESTIS (HCC): ICD-10-CM

## 2024-04-19 PROCEDURE — 6360000004 HC RX CONTRAST MEDICATION: Performed by: NURSE PRACTITIONER

## 2024-04-19 PROCEDURE — 74177 CT ABD & PELVIS W/CONTRAST: CPT

## 2024-04-19 RX ADMIN — IOPAMIDOL 100 ML: 755 INJECTION, SOLUTION INTRAVENOUS at 14:01

## 2024-05-02 ENCOUNTER — PATIENT MESSAGE (OUTPATIENT)
Facility: CLINIC | Age: 49
End: 2024-05-02

## 2024-05-02 DIAGNOSIS — R79.89 LOW TESTOSTERONE IN MALE: Primary | ICD-10-CM

## 2024-05-03 NOTE — TELEPHONE ENCOUNTER
From: Gustavo Campbell  To: Dr. Irma Monet  Sent: 5/2/2024 11:32 AM EDT  Subject: Endocrinologist     Good morning,   Gustavo needs to see an endocrinologist, can you recommend a doctor within your [facility] network? Thank you, Susana Campbell

## 2024-05-07 PROBLEM — E29.1 HYPOGONADISM IN MALE: Status: ACTIVE | Noted: 2024-05-07

## 2024-05-12 NOTE — ED NOTES
Care assumed of patient @ this time & intro with rounding done, with report from _____Noelle Almazan RN______________. Patient resting quietly on stretcher without verbal complaints. Other

## 2024-05-15 ENCOUNTER — TELEPHONE (OUTPATIENT)
Facility: CLINIC | Age: 49
End: 2024-05-15

## 2024-05-15 ENCOUNTER — PATIENT MESSAGE (OUTPATIENT)
Facility: CLINIC | Age: 49
End: 2024-05-15

## 2024-05-15 NOTE — TELEPHONE ENCOUNTER
From: Gustavo Campbell  To: Dr. Irma Monet  Sent: 5/15/2024 11:55 AM EDT  Subject: Phone call    I have tried to return Yecenia's phone call but cannot get anyone to answer my call. Perhaps she can reply to this message. Thank you, Susana Campbell

## 2024-05-31 ENCOUNTER — CLINICAL DOCUMENTATION (OUTPATIENT)
Age: 49
End: 2024-05-31

## 2024-05-31 DIAGNOSIS — G40.219 LOCALIZATION-RELATED (FOCAL) (PARTIAL) SYMPTOMATIC EPILEPSY AND EPILEPTIC SYNDROMES WITH COMPLEX PARTIAL SEIZURES, INTRACTABLE, WITHOUT STATUS EPILEPTICUS (HCC): Primary | ICD-10-CM

## 2024-06-03 ENCOUNTER — CLINICAL DOCUMENTATION (OUTPATIENT)
Age: 49
End: 2024-06-03

## 2024-07-03 ENCOUNTER — PATIENT MESSAGE (OUTPATIENT)
Facility: CLINIC | Age: 49
End: 2024-07-03

## 2024-07-03 DIAGNOSIS — F32.1 CURRENT MODERATE EPISODE OF MAJOR DEPRESSIVE DISORDER WITHOUT PRIOR EPISODE (HCC): Primary | ICD-10-CM

## 2024-07-03 RX ORDER — BUPROPION HYDROCHLORIDE 150 MG/1
150 TABLET ORAL EVERY MORNING
Qty: 30 TABLET | Refills: 1 | Status: SHIPPED | OUTPATIENT
Start: 2024-07-03

## 2024-07-03 NOTE — TELEPHONE ENCOUNTER
From: Gustavo Campbell  To: Dr. Irma Monet  Sent: 7/3/2024 9:42 AM EDT  Subject: Gustavo    Good morning Dr. Monet,  Gustavo is going through a rough patch, he is very irritable and we think he needs something for depression. Could you please call in something for him?   Thank you, Susana

## 2024-07-23 ENCOUNTER — TELEPHONE (OUTPATIENT)
Age: 49
End: 2024-07-23

## 2024-07-23 NOTE — TELEPHONE ENCOUNTER
Returned call to pt mom.  HIPAA verified by two patient identifiers.   She will likely not be here tomorrow due to her own personal medical issues.  She would like for a PSA to be drawn.  Last time pt was seen by [facility]  was 5/29/24. Labs were on 5/23/24. We will need to request those records.    March 7 was his last testosterone injection (once every 10 weeks) but after this injection he started having \"meltdowns\" and hgb and hct were extremely high and had to donate blood and PSA elevation and iron levels so he hasn't had Testerone since.    Please change his appt to 2;45pm tomorrow.

## 2024-07-24 ENCOUNTER — OFFICE VISIT (OUTPATIENT)
Age: 49
End: 2024-07-24
Payer: MEDICARE

## 2024-07-24 VITALS
DIASTOLIC BLOOD PRESSURE: 77 MMHG | WEIGHT: 167.4 LBS | HEART RATE: 84 BPM | OXYGEN SATURATION: 99 % | TEMPERATURE: 98.1 F | HEIGHT: 69 IN | BODY MASS INDEX: 24.79 KG/M2 | SYSTOLIC BLOOD PRESSURE: 113 MMHG

## 2024-07-24 DIAGNOSIS — R97.20 ELEVATED PSA: ICD-10-CM

## 2024-07-24 DIAGNOSIS — C62.91 MALIGNANT NEOPLASM OF RIGHT TESTIS, UNSPECIFIED WHETHER DESCENDED OR UNDESCENDED (HCC): ICD-10-CM

## 2024-07-24 DIAGNOSIS — Z85.47 HISTORY OF TESTICULAR CANCER: ICD-10-CM

## 2024-07-24 DIAGNOSIS — Z85.47 HISTORY OF TESTICULAR CANCER: Primary | ICD-10-CM

## 2024-07-24 LAB
BASOPHILS # BLD: 0.1 K/UL (ref 0–0.1)
BASOPHILS NFR BLD: 1 % (ref 0–1)
DIFFERENTIAL METHOD BLD: ABNORMAL
EOSINOPHIL # BLD: 0.1 K/UL (ref 0–0.4)
EOSINOPHIL NFR BLD: 1 % (ref 0–7)
ERYTHROCYTE [DISTWIDTH] IN BLOOD BY AUTOMATED COUNT: 14.6 % (ref 11.5–14.5)
HCT VFR BLD AUTO: 46.6 % (ref 36.6–50.3)
HGB BLD-MCNC: 15.5 G/DL (ref 12.1–17)
IMM GRANULOCYTES # BLD AUTO: 0 K/UL (ref 0–0.04)
IMM GRANULOCYTES NFR BLD AUTO: 1 % (ref 0–0.5)
LYMPHOCYTES # BLD: 1.4 K/UL (ref 0.8–3.5)
LYMPHOCYTES NFR BLD: 25 % (ref 12–49)
MCH RBC QN AUTO: 32 PG (ref 26–34)
MCHC RBC AUTO-ENTMCNC: 33.3 G/DL (ref 30–36.5)
MCV RBC AUTO: 96.3 FL (ref 80–99)
MONOCYTES # BLD: 0.6 K/UL (ref 0–1)
MONOCYTES NFR BLD: 11 % (ref 5–13)
NEUTS SEG # BLD: 3.4 K/UL (ref 1.8–8)
NEUTS SEG NFR BLD: 61 % (ref 32–75)
NRBC # BLD: 0 K/UL (ref 0–0.01)
NRBC BLD-RTO: 0 PER 100 WBC
PLATELET # BLD AUTO: 138 K/UL (ref 150–400)
PMV BLD AUTO: 8.9 FL (ref 8.9–12.9)
PSA SERPL-MCNC: 2.4 NG/ML (ref 0.01–4)
RBC # BLD AUTO: 4.84 M/UL (ref 4.1–5.7)
WBC # BLD AUTO: 5.6 K/UL (ref 4.1–11.1)

## 2024-07-24 PROCEDURE — G8427 DOCREV CUR MEDS BY ELIG CLIN: HCPCS | Performed by: INTERNAL MEDICINE

## 2024-07-24 PROCEDURE — 99213 OFFICE O/P EST LOW 20 MIN: CPT | Performed by: INTERNAL MEDICINE

## 2024-07-24 PROCEDURE — 1036F TOBACCO NON-USER: CPT | Performed by: INTERNAL MEDICINE

## 2024-07-24 PROCEDURE — G8420 CALC BMI NORM PARAMETERS: HCPCS | Performed by: INTERNAL MEDICINE

## 2024-07-24 PROCEDURE — 3074F SYST BP LT 130 MM HG: CPT | Performed by: INTERNAL MEDICINE

## 2024-07-24 PROCEDURE — 3078F DIAST BP <80 MM HG: CPT | Performed by: INTERNAL MEDICINE

## 2024-07-24 NOTE — PROGRESS NOTES
CELESTEB FROM DR PELAEZ CBC WITH DIFF AFP TUMOR MARKER HCG TUMOR MARKER TESTOSTERONE ADULT MALE AND PSA DX  
Gustavo Campbell is a 48 y.o. male here for 6 month follow up for testicular carcinoma.  CT done 4/19/24.   **See  note 7/23/24.  Pt states he feels well. No concerns brought up.     1. Have you been to the ER, urgent care clinic since your last visit?  Hospitalized since your last visit? no    2. Have you seen or consulted any other health care providers outside of the [facility] since your last visit?  Include any pap smears or colon screening.  no  
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 Thus I recommend administering 3 cycles of BEP as adjuvant treatment.       Patient was unable to complete Pulmonary Function testing as he could not follow instructions   Thus I changed his treatment to EP X 4      Received adjuvant chemotherapy   EP 4 cycles   CT shows stable changes in the mediastinal nodes and lung nodules   S/P left orchiectomy 01.2023  Last AFP is normal  Bronchoscopy and Bx of hilar LN - benign finding          2. Provoked Acute Pulmonary Embolism r/t Immobility and Malignancy     Took Eliquis for 6 months            Plan:           1. hCG, AFP level   2. CT chest/abd/pelvis in April 2025   3. Return in 12 months       Signed by: Manuel Rm MD                     July 24, 2024           CC. Jamil Story MD   CC. Irma Monet MD

## 2024-07-26 LAB
AFP-TM SERPL-MCNC: 6.5 NG/ML (ref 0–6.9)
HCG INTACT+B SERPL-ACNC: <1 MIU/ML (ref 0–3)
TESTOST SERPL-MCNC: 211 NG/DL (ref 264–916)

## 2024-08-01 DIAGNOSIS — F41.1 GENERALIZED ANXIETY DISORDER: ICD-10-CM

## 2024-08-01 RX ORDER — SERTRALINE HYDROCHLORIDE 100 MG/1
100 TABLET, FILM COATED ORAL DAILY
Qty: 30 TABLET | Refills: 2 | Status: SHIPPED | OUTPATIENT
Start: 2024-08-01

## 2024-08-01 NOTE — TELEPHONE ENCOUNTER
Future Appointments:  Future Appointments   Date Time Provider Department Center   9/24/2024  3:00 PM Dejuan Saunders MD [facility] BS AMB   10/28/2024  2:50 PM Abe Gaspar MD R[facility] BS AMB   7/24/2025  1:00 PM Manuel Rm MD [facility] BS AMB        Last Appointment With Me:  3/19/2024     Requested Prescriptions     Pending Prescriptions Disp Refills    sertraline (ZOLOFT) 100 MG tablet [Pharmacy Med Name: Sertraline HCl 100 MG Oral Tablet] 30 tablet 0     Sig: Take 1 tablet by mouth once daily

## 2024-08-05 DIAGNOSIS — G40.219 LOCALIZATION-RELATED (FOCAL) (PARTIAL) SYMPTOMATIC EPILEPSY AND EPILEPTIC SYNDROMES WITH COMPLEX PARTIAL SEIZURES, INTRACTABLE, WITHOUT STATUS EPILEPTICUS (HCC): ICD-10-CM

## 2024-08-11 ENCOUNTER — HOSPITAL ENCOUNTER (EMERGENCY)
Facility: HOSPITAL | Age: 49
Discharge: HOME | End: 2024-08-11
Attending: EMERGENCY MEDICINE
Payer: MEDICARE

## 2024-08-11 VITALS
OXYGEN SATURATION: 98 % | BODY MASS INDEX: 24.61 KG/M2 | WEIGHT: 166.67 LBS | SYSTOLIC BLOOD PRESSURE: 139 MMHG | RESPIRATION RATE: 18 BRPM | TEMPERATURE: 98.3 F | DIASTOLIC BLOOD PRESSURE: 92 MMHG | HEART RATE: 76 BPM

## 2024-08-11 DIAGNOSIS — F10.10 ALCOHOL ABUSE: ICD-10-CM

## 2024-08-11 DIAGNOSIS — R45.1 AGITATION: Primary | ICD-10-CM

## 2024-08-11 PROCEDURE — 99282 EMERGENCY DEPT VISIT SF MDM: CPT

## 2024-08-11 ASSESSMENT — PAIN SCALES - GENERAL: PAINLEVEL_OUTOF10: 0

## 2024-08-11 ASSESSMENT — PAIN - FUNCTIONAL ASSESSMENT: PAIN_FUNCTIONAL_ASSESSMENT: 0-10

## 2024-08-11 NOTE — DISCHARGE INSTRUCTIONS
Discussed visit today.  Please follow-up with the resources that were provided today.    Return to the emergency room with any worsening of symptoms.

## 2024-08-11 NOTE — ED TRIAGE NOTES
Pt comes in from home with his parents will CC of agitation due to medications. Pt had testicular cancer and had his testicles removed and is on Testerone. Pts mom states he has been having highs and lows. Pts dad states he has been drinking 8 beers at night and is up all hours of the evening. Denies HI or SI

## 2024-08-11 NOTE — BSMART NOTE
BSMART assessment completed, and suicide risk level noted to be NO RISK. Charge Nurse, Esthela and ED Medical Provider, MARYBETH Middleton notified. Concerns not observed. Parents, Susana and Ziyad are present at bedside.

## 2024-08-11 NOTE — BSMART NOTE
Comprehensive Assessment Form Part 1      Section I - Disposition    Autism Spectrum Disorder per Hx  ADHD per Hx  Alcohol Use Disorder, Severe    Past Medical History:   Diagnosis Date    Alcohol use     Alcohol withdrawal (HCC) 10/31/2014    Anxiety 2011    Asperger syndrome 12/14/2011    Autism     dx 2010- highly functional    Cancer (HCC)     testicular    Convulsive syncope 06/11/2019    most recent Jan 2023    Fatigue     History of pulmonary embolus (PE)     Hx antineoplastic chemo     Dr. Rm    Loss of appetite     Other ill-defined conditions(799.89)      syncopal episodes    Pneumonia 06/2022    Seizure disorder (HCC) 05/22/2012    Status post VNS (vagus nerve stimulator) placement 12/08/2020    with magnent left chest    Thromboembolus (HCC) 06/2022    lung     The Medical Doctor to Psychiatrist conference was not completed.  The Medical Doctor is in agreement with Psychiatrist disposition.  The plan is to discharge home pending medical clearance w/ community mental health resources and SA resources. [facility] tomorrow and will follow-up w/ pt's PCP, Dr. Irma Monet.  The ED Medical Provider consulted was MARYBETH Thapa.   The admitting Psychiatrist will be N/A  The admitting Diagnosis is N/A  The Payor source is [employer] & [facility] [employer].      This writer reviewed the [place] Suicide Severity Rating Scale in nursing flowsheet and the risk level assigned is no risk.  Based on this assessment, the risk of suicide is no risk.    Section II - Integrated Summary  Summary:      50 yo male arrived to the ED w/ his parents, Susana and Ziyad Campbell ([phone] & [phone]) reporting concerns of agitation.  Per triage report by JOEL Whyte, \"Pt comes in from home with his parents will CC of agitation due to medications. Pt had testicular cancer and had his testicles removed and

## 2024-08-11 NOTE — ED PROVIDER NOTES
Potential duplicate medications found. Please discuss with provider.          ALLERGIES     Aspartame    FAMILY HISTORY       Family History   Problem Relation Age of Onset    Diabetes Father     Heart Disease Father     Elevated Lipids Father     Hypertension Father     Heart Attack Father     High Blood Pressure Father     High Cholesterol Father     Other Brother         Pituitary tumor    Cancer Mother         Breast    Anxiety Disorder Mother     COPD Mother     Other Mother         Neuropathy    Sleep Apnea Mother     Osteoarthritis Mother     Arthritis Mother     Breast Cancer Mother           SOCIAL HISTORY       Social History     Socioeconomic History    Marital status: Single   Tobacco Use    Smoking status: Never     Passive exposure: Never    Smokeless tobacco: Never   Vaping Use    Vaping status: Never Used   Substance and Sexual Activity    Alcohol use: Yes     Alcohol/week: 6.0 standard drinks of alcohol     Types: 6 Cans of beer per week    Drug use: Never    Sexual activity: Not Currently     Partners: Female     Social Determinants of Health     Financial Resource Strain: Patient Declined (3/19/2024)    Overall Financial Resource Strain (CARDIA)     Difficulty of Paying Living Expenses: Patient declined   Food Insecurity: Patient Declined (3/19/2024)    Hunger Vital Sign     Worried About Running Out of Food in the Last Year: Patient declined     Ran Out of Food in the Last Year: Patient declined   Transportation Needs: Unknown (3/19/2024)    PRAPARE - Transportation     Lack of Transportation (Non-Medical): Patient declined   Physical Activity: Sufficiently Active (3/19/2024)    Exercise Vital Sign     Days of Exercise per Week: 7 days     Minutes of Exercise per Session: 30 min   Housing Stability: Unknown (3/19/2024)    Housing Stability Vital Sign     Unstable Housing in the Last Year: No       PHYSICAL EXAM       Physical Exam  Vitals reviewed.   Constitutional:       General: He is not in

## 2024-08-14 DIAGNOSIS — G40.219 PARTIAL SYMPTOMATIC EPILEPSY WITH COMPLEX PARTIAL SEIZURES, INTRACTABLE, WITHOUT STATUS EPILEPTICUS (HCC): ICD-10-CM

## 2024-08-14 RX ORDER — CENOBAMATE 200 MG/1
TABLET, FILM COATED ORAL
Qty: 30 TABLET | Refills: 5 | Status: SHIPPED | OUTPATIENT
Start: 2024-08-14 | End: 2025-02-14

## 2024-08-16 ENCOUNTER — PATIENT MESSAGE (OUTPATIENT)
Facility: CLINIC | Age: 49
End: 2024-08-16

## 2024-08-16 DIAGNOSIS — F41.1 GENERALIZED ANXIETY DISORDER: ICD-10-CM

## 2024-08-16 RX ORDER — SERTRALINE HYDROCHLORIDE 100 MG/1
100 TABLET, FILM COATED ORAL DAILY
Qty: 90 TABLET | Refills: 1 | Status: SHIPPED | OUTPATIENT
Start: 2024-08-16

## 2024-08-16 NOTE — TELEPHONE ENCOUNTER
PCP: Irma Monet MD     Last appt:  3/19/2024      Future Appointments   Date Time Provider Department Center   9/24/2024  3:00 PM Dejuan Saunders MD [facility] BS AMB   10/28/2024  2:50 PM Abe Gaspar MD RDE [facility] BS AMB   7/24/2025  1:00 PM Manuel Rm MD ON[facility] BS AMB          Requested Prescriptions     Pending Prescriptions Disp Refills    sertraline (ZOLOFT) 100 MG tablet 90 tablet 2     Sig: Take 1 tablet by mouth daily

## 2024-08-19 ENCOUNTER — TELEPHONE (OUTPATIENT)
Age: 49
End: 2024-08-19

## 2024-08-19 ENCOUNTER — PATIENT MESSAGE (OUTPATIENT)
Age: 49
End: 2024-08-19

## 2024-08-19 DIAGNOSIS — G40.909 SEIZURE DISORDER (HCC): ICD-10-CM

## 2024-08-19 NOTE — TELEPHONE ENCOUNTER
Patients mother left a voicemail on perfect serve 08/17/24 at 3:09 pm stating she has some questions regarding side effects from a medication. Susana did not specify the medication.       Requesting to be contacted

## 2024-08-19 NOTE — TELEPHONE ENCOUNTER
Called patient's mother. Mother stated since March, patient has been more betancourt. Reports trouble sleeping, irritability, depressed, feeling frustrated. Stated he was on Testerone injections and thought that was the cause but he has stopped and his last dose was on March 7. He is on Xcopri 250mg once daily, briviact 100mg BID, and topiramate 200mg BID. Wants to know if it is maybe from one of these medications causing these symptoms. Has had no seizures since 2022. PCP increased his sertaline 100mg once daily.

## 2024-09-06 DIAGNOSIS — G40.219 PARTIAL SYMPTOMATIC EPILEPSY WITH COMPLEX PARTIAL SEIZURES, INTRACTABLE, WITHOUT STATUS EPILEPTICUS (HCC): Primary | ICD-10-CM

## 2024-09-06 DIAGNOSIS — G40.219 PARTIAL SYMPTOMATIC EPILEPSY WITH COMPLEX PARTIAL SEIZURES, INTRACTABLE, WITHOUT STATUS EPILEPTICUS (HCC): ICD-10-CM

## 2024-09-06 RX ORDER — CENOBAMATE 50 MG/1
50 TABLET, FILM COATED ORAL DAILY
Qty: 90 TABLET | Refills: 1 | Status: SHIPPED | OUTPATIENT
Start: 2024-09-06

## 2024-09-09 RX ORDER — CENOBAMATE 50 MG/1
TABLET, FILM COATED ORAL
Qty: 30 TABLET | OUTPATIENT
Start: 2024-09-09

## 2024-09-09 NOTE — PROGRESS NOTES
All your labs returned normal. Your Keppra level was high. Dr. Shyann Frederick is letting Dr. Gamaliel Almeida know. Stay on your same dose of Keppra for now. N/A

## 2024-09-11 DIAGNOSIS — G40.909 SEIZURE DISORDER (HCC): ICD-10-CM

## 2024-09-11 RX ORDER — BRIVARACETAM 75 MG/1
TABLET, FILM COATED ORAL
Qty: 180 TABLET | Refills: 1 | Status: SHIPPED | OUTPATIENT
Start: 2024-09-11 | End: 2024-12-10

## 2024-09-17 ENCOUNTER — OFFICE VISIT (OUTPATIENT)
Age: 49
End: 2024-09-17
Payer: MEDICARE

## 2024-09-17 VITALS
HEART RATE: 76 BPM | DIASTOLIC BLOOD PRESSURE: 82 MMHG | SYSTOLIC BLOOD PRESSURE: 128 MMHG | BODY MASS INDEX: 24.14 KG/M2 | WEIGHT: 163 LBS | HEIGHT: 69 IN | OXYGEN SATURATION: 97 %

## 2024-09-17 DIAGNOSIS — Q28.2 CEREBRAL AV MALFORMATION: ICD-10-CM

## 2024-09-17 DIAGNOSIS — F32.A DEPRESSION, UNSPECIFIED DEPRESSION TYPE: ICD-10-CM

## 2024-09-17 DIAGNOSIS — G40.219 LOCALIZATION-RELATED (FOCAL) (PARTIAL) SYMPTOMATIC EPILEPSY AND EPILEPTIC SYNDROMES WITH COMPLEX PARTIAL SEIZURES, INTRACTABLE, WITHOUT STATUS EPILEPTICUS (HCC): Primary | ICD-10-CM

## 2024-09-17 DIAGNOSIS — Z96.89 S/P PLACEMENT OF VNS (VAGUS NERVE STIMULATION) DEVICE: ICD-10-CM

## 2024-09-17 PROCEDURE — 99214 OFFICE O/P EST MOD 30 MIN: CPT | Performed by: PSYCHIATRY & NEUROLOGY

## 2024-09-17 PROCEDURE — 3079F DIAST BP 80-89 MM HG: CPT | Performed by: PSYCHIATRY & NEUROLOGY

## 2024-09-17 PROCEDURE — G8420 CALC BMI NORM PARAMETERS: HCPCS | Performed by: PSYCHIATRY & NEUROLOGY

## 2024-09-17 PROCEDURE — 3074F SYST BP LT 130 MM HG: CPT | Performed by: PSYCHIATRY & NEUROLOGY

## 2024-09-17 PROCEDURE — 1036F TOBACCO NON-USER: CPT | Performed by: PSYCHIATRY & NEUROLOGY

## 2024-09-17 PROCEDURE — G8427 DOCREV CUR MEDS BY ELIG CLIN: HCPCS | Performed by: PSYCHIATRY & NEUROLOGY

## 2024-09-20 NOTE — PROGRESS NOTES
Port sedation record  Start time 1217  End time 1245  2 mg of versed IV with 100 mcg of fentanyl IV familia

## 2024-09-24 NOTE — TELEPHONE ENCOUNTER
Pt's mother called asking questions about the heart monitor. She is wondering how long he has to wear it being that her son has Autism.   Phone #[phone]  Thanks
Returned call to father Alec Atkins. He is concerned that his son will not keep the monitor on. He was informed that we would like him to wear it for a week but to just try best to keep on as long as can and we will work with the information that we receive. Father verbalized understanding and denies any further questions at this time.      Future Appointments   Date Time Provider Stephany Blum   7/16/2019  1:40 PM Angeles Marion  [address]   8/14/2019  2:00 PM Leola Ochoa  [facility]
100% of the time

## 2024-10-28 ENCOUNTER — OFFICE VISIT (OUTPATIENT)
Age: 49
End: 2024-10-28
Payer: MEDICARE

## 2024-10-28 VITALS
BODY MASS INDEX: 25.15 KG/M2 | SYSTOLIC BLOOD PRESSURE: 110 MMHG | DIASTOLIC BLOOD PRESSURE: 72 MMHG | HEIGHT: 69 IN | WEIGHT: 169.8 LBS | HEART RATE: 84 BPM

## 2024-10-28 DIAGNOSIS — R94.8 ABNORMAL RESULTS OF FUNCTION STUDIES OF OTHER ORGANS AND SYSTEMS: ICD-10-CM

## 2024-10-28 DIAGNOSIS — E29.1 HYPOGONADISM IN MALE: Primary | ICD-10-CM

## 2024-10-28 PROCEDURE — G8419 CALC BMI OUT NRM PARAM NOF/U: HCPCS | Performed by: INTERNAL MEDICINE

## 2024-10-28 PROCEDURE — G8484 FLU IMMUNIZE NO ADMIN: HCPCS | Performed by: INTERNAL MEDICINE

## 2024-10-28 PROCEDURE — 1036F TOBACCO NON-USER: CPT | Performed by: INTERNAL MEDICINE

## 2024-10-28 PROCEDURE — 99204 OFFICE O/P NEW MOD 45 MIN: CPT | Performed by: INTERNAL MEDICINE

## 2024-10-28 PROCEDURE — 3078F DIAST BP <80 MM HG: CPT | Performed by: INTERNAL MEDICINE

## 2024-10-28 PROCEDURE — 3074F SYST BP LT 130 MM HG: CPT | Performed by: INTERNAL MEDICINE

## 2024-10-28 PROCEDURE — G8427 DOCREV CUR MEDS BY ELIG CLIN: HCPCS | Performed by: INTERNAL MEDICINE

## 2024-10-28 RX ORDER — TESTOSTERONE 20.25 MG/1.25G
GEL TOPICAL
Qty: 75 G | Refills: 1 | Status: SHIPPED | OUTPATIENT
Start: 2024-10-28

## 2024-10-28 NOTE — PROGRESS NOTES
Pituitary tumor    Lung Cancer Maternal Grandmother     Colon Cancer Maternal Grandfather     Hypertension Maternal Grandfather     Diabetes Paternal Grandmother     Obesity Paternal Grandmother     Mult Sclerosis Paternal Grandmother     Heart Disease Paternal Grandfather      Social History     Socioeconomic History    Marital status: Single     Spouse name: Not on file    Number of children: Not on file    Years of education: Not on file    Highest education level: Not on file   Occupational History    Not on file   Tobacco Use    Smoking status: Never     Passive exposure: Never    Smokeless tobacco: Never   Vaping Use    Vaping status: Never Used   Substance and Sexual Activity    Alcohol use: Yes     Alcohol/week: 6.0 standard drinks of alcohol     Types: 6 Cans of beer per week    Drug use: Never    Sexual activity: Not Currently     Partners: Female   Other Topics Concern    Not on file   Social History Narrative    Not on file     Social Determinants of Health     Financial Resource Strain: Patient Declined (3/19/2024)    Overall Financial Resource Strain (CARDIA)     Difficulty of Paying Living Expenses: Patient declined   Food Insecurity: Patient Declined (3/19/2024)    Hunger Vital Sign     Worried About Running Out of Food in the Last Year: Patient declined     Ran Out of Food in the Last Year: Patient declined   Transportation Needs: Unknown (3/19/2024)    PRAPARE - Transportation     Lack of Transportation (Medical): Not on file     Lack of Transportation (Non-Medical): Patient declined   Physical Activity: Sufficiently Active (3/19/2024)    Exercise Vital Sign     Days of Exercise per Week: 7 days     Minutes of Exercise per Session: 30 min   Stress: Not on file   Social Connections: Not on file   Intimate Partner Violence: Not on file   Housing Stability: Unknown (3/19/2024)    Housing Stability Vital Sign     Unable to Pay for Housing in the Last Year: Not on file     Number of Places Lived in the

## 2024-10-29 LAB
ERYTHROCYTE [DISTWIDTH] IN BLOOD BY AUTOMATED COUNT: 12.7 % (ref 11.5–14.5)
HCT VFR BLD AUTO: 43.6 % (ref 36.6–50.3)
HGB BLD-MCNC: 14.9 G/DL (ref 12.1–17)
MCH RBC QN AUTO: 32.7 PG (ref 26–34)
MCHC RBC AUTO-ENTMCNC: 34.2 G/DL (ref 30–36.5)
MCV RBC AUTO: 95.6 FL (ref 80–99)
NRBC # BLD: 0 K/UL (ref 0–0.01)
NRBC BLD-RTO: 0 PER 100 WBC
PLATELET # BLD AUTO: 160 K/UL (ref 150–400)
PMV BLD AUTO: 9.2 FL (ref 8.9–12.9)
PSA SERPL-MCNC: 0.9 NG/ML (ref 0.01–4)
RBC # BLD AUTO: 4.56 M/UL (ref 4.1–5.7)
WBC # BLD AUTO: 3.8 K/UL (ref 4.1–11.1)

## 2024-10-31 LAB
TESTOST FREE SERPL-MCNC: 0.8 PG/ML (ref 6.8–21.5)
TESTOST SERPL-MCNC: 110.9 NG/DL (ref 264–916)

## 2024-11-01 DIAGNOSIS — R94.8 ABNORMAL RESULTS OF FUNCTION STUDIES OF OTHER ORGANS AND SYSTEMS: ICD-10-CM

## 2024-11-01 DIAGNOSIS — E29.1 HYPOGONADISM IN MALE: Primary | ICD-10-CM

## 2024-11-02 ENCOUNTER — APPOINTMENT (OUTPATIENT)
Facility: HOSPITAL | Age: 49
End: 2024-11-02
Payer: MEDICARE

## 2024-11-02 ENCOUNTER — HOSPITAL ENCOUNTER (EMERGENCY)
Facility: HOSPITAL | Age: 49
Discharge: HOME | End: 2024-11-03
Payer: MEDICARE

## 2024-11-02 DIAGNOSIS — R56.9 SEIZURE (HCC): Primary | ICD-10-CM

## 2024-11-02 LAB
BASOPHILS # BLD: 0 K/UL (ref 0–0.1)
BASOPHILS NFR BLD: 1 % (ref 0–1)
DIFFERENTIAL METHOD BLD: ABNORMAL
EOSINOPHIL # BLD: 0 K/UL (ref 0–0.4)
EOSINOPHIL NFR BLD: 1 % (ref 0–7)
ERYTHROCYTE [DISTWIDTH] IN BLOOD BY AUTOMATED COUNT: 12.6 % (ref 11.5–14.5)
HCT VFR BLD AUTO: 41.8 % (ref 36.6–50.3)
HGB BLD-MCNC: 14.7 G/DL (ref 12.1–17)
IMM GRANULOCYTES # BLD AUTO: 0 K/UL (ref 0–0.04)
IMM GRANULOCYTES NFR BLD AUTO: 1 % (ref 0–0.5)
LACTATE BLD-SCNC: 2.42 MMOL/L (ref 0.4–2)
LYMPHOCYTES # BLD: 1 K/UL (ref 0.8–3.5)
LYMPHOCYTES NFR BLD: 22 % (ref 12–49)
MCH RBC QN AUTO: 33.6 PG (ref 26–34)
MCHC RBC AUTO-ENTMCNC: 35.2 G/DL (ref 30–36.5)
MCV RBC AUTO: 95.7 FL (ref 80–99)
MONOCYTES # BLD: 0.5 K/UL (ref 0–1)
MONOCYTES NFR BLD: 10 % (ref 5–13)
NEUTS SEG # BLD: 3 K/UL (ref 1.8–8)
NEUTS SEG NFR BLD: 65 % (ref 32–75)
NRBC # BLD: 0 K/UL (ref 0–0.01)
NRBC BLD-RTO: 0 PER 100 WBC
PLATELET # BLD AUTO: 150 K/UL (ref 150–400)
PMV BLD AUTO: 8.8 FL (ref 8.9–12.9)
RBC # BLD AUTO: 4.37 M/UL (ref 4.1–5.7)
WBC # BLD AUTO: 4.6 K/UL (ref 4.1–11.1)

## 2024-11-02 PROCEDURE — 36415 COLL VENOUS BLD VENIPUNCTURE: CPT

## 2024-11-02 PROCEDURE — 82077 ASSAY SPEC XCP UR&BREATH IA: CPT

## 2024-11-02 PROCEDURE — 93005 ELECTROCARDIOGRAM TRACING: CPT

## 2024-11-02 PROCEDURE — 12001 RPR S/N/AX/GEN/TRNK 2.5CM/<: CPT

## 2024-11-02 PROCEDURE — 83605 ASSAY OF LACTIC ACID: CPT

## 2024-11-02 PROCEDURE — 85025 COMPLETE CBC W/AUTO DIFF WBC: CPT

## 2024-11-02 PROCEDURE — 80053 COMPREHEN METABOLIC PANEL: CPT

## 2024-11-02 PROCEDURE — 84484 ASSAY OF TROPONIN QUANT: CPT

## 2024-11-02 PROCEDURE — 99284 EMERGENCY DEPT VISIT MOD MDM: CPT

## 2024-11-02 ASSESSMENT — PAIN - FUNCTIONAL ASSESSMENT: PAIN_FUNCTIONAL_ASSESSMENT: NONE - DENIES PAIN

## 2024-11-03 ENCOUNTER — APPOINTMENT (OUTPATIENT)
Facility: HOSPITAL | Age: 49
End: 2024-11-03
Payer: MEDICARE

## 2024-11-03 VITALS
HEART RATE: 71 BPM | SYSTOLIC BLOOD PRESSURE: 140 MMHG | TEMPERATURE: 97.9 F | OXYGEN SATURATION: 98 % | RESPIRATION RATE: 17 BRPM | WEIGHT: 169 LBS | DIASTOLIC BLOOD PRESSURE: 87 MMHG | BODY MASS INDEX: 25.03 KG/M2 | HEIGHT: 69 IN

## 2024-11-03 LAB
ALBUMIN SERPL-MCNC: 3.9 G/DL (ref 3.5–5)
ALBUMIN/GLOB SERPL: 1.3 (ref 1.1–2.2)
ALP SERPL-CCNC: 74 U/L (ref 45–117)
ALT SERPL-CCNC: 22 U/L (ref 12–78)
ANION GAP SERPL CALC-SCNC: 6 MMOL/L (ref 2–12)
AST SERPL-CCNC: 18 U/L (ref 15–37)
BILIRUB SERPL-MCNC: 0.6 MG/DL (ref 0.2–1)
BUN SERPL-MCNC: 24 MG/DL (ref 6–20)
BUN/CREAT SERPL: 23 (ref 12–20)
CALCIUM SERPL-MCNC: 9.5 MG/DL (ref 8.5–10.1)
CHLORIDE SERPL-SCNC: 107 MMOL/L (ref 97–108)
CO2 SERPL-SCNC: 26 MMOL/L (ref 21–32)
CREAT SERPL-MCNC: 1.05 MG/DL (ref 0.7–1.3)
EKG ATRIAL RATE: 80 BPM
EKG DIAGNOSIS: NORMAL
EKG P AXIS: 58 DEGREES
EKG P-R INTERVAL: 138 MS
EKG Q-T INTERVAL: 398 MS
EKG QRS DURATION: 88 MS
EKG QTC CALCULATION (BAZETT): 459 MS
EKG R AXIS: 26 DEGREES
EKG T AXIS: 54 DEGREES
EKG VENTRICULAR RATE: 80 BPM
ETHANOL SERPL-MCNC: <10 MG/DL (ref 0–0.08)
GLOBULIN SER CALC-MCNC: 3 G/DL (ref 2–4)
GLUCOSE SERPL-MCNC: 125 MG/DL (ref 65–100)
POTASSIUM SERPL-SCNC: 4.1 MMOL/L (ref 3.5–5.1)
PROT SERPL-MCNC: 6.9 G/DL (ref 6.4–8.2)
SODIUM SERPL-SCNC: 139 MMOL/L (ref 136–145)
TROPONIN I SERPL HS-MCNC: 4 NG/L (ref 0–76)

## 2024-11-03 PROCEDURE — 70450 CT HEAD/BRAIN W/O DYE: CPT

## 2024-11-03 PROCEDURE — 70486 CT MAXILLOFACIAL W/O DYE: CPT

## 2024-11-03 NOTE — DISCHARGE INSTRUCTIONS
Thank You!    It was a pleasure taking care of you in our Emergency Department today. We know that when you come to our Emergency Department, you are entrusting us with your health, comfort, and safety. Our physicians and nurses honor that trust, and truly appreciate the opportunity to care for you and your loved ones.      We also value your feedback. If you receive a survey about your Emergency Department experience today, please fill it out.  We care about our patients' feedback, and we listen to what you have to say.  Thank you.    Julian Dee MD  ________________________________________________________________________  I have included a copy of your lab results and/or radiologic studies from today's visit so you can have them easily available at your follow-up visit. We hope you feel better and please do not hesitate to contact the ED if you have any questions at all!    Recent Results (from the past 12 hour(s))   CBC with Auto Differential    Collection Time: 11/02/24 11:04 PM   Result Value Ref Range    WBC 4.6 4.1 - 11.1 K/uL    RBC 4.37 4.10 - 5.70 M/uL    Hemoglobin 14.7 12.1 - 17.0 g/dL    Hematocrit 41.8 36.6 - 50.3 %    MCV 95.7 80.0 - 99.0 FL    MCH 33.6 26.0 - 34.0 PG    MCHC 35.2 30.0 - 36.5 g/dL    RDW 12.6 11.5 - 14.5 %    Platelets 150 150 - 400 K/uL    MPV 8.8 (L) 8.9 - 12.9 FL    Nucleated RBCs 0.0 0  WBC    nRBC 0.00 0.00 - 0.01 K/uL    Neutrophils % 65 32 - 75 %    Lymphocytes % 22 12 - 49 %    Monocytes % 10 5 - 13 %    Eosinophils % 1 0 - 7 %    Basophils % 1 0 - 1 %    Immature Granulocytes % 1 (H) 0.0 - 0.5 %    Neutrophils Absolute 3.0 1.8 - 8.0 K/UL    Lymphocytes Absolute 1.0 0.8 - 3.5 K/UL    Monocytes Absolute 0.5 0.0 - 1.0 K/UL    Eosinophils Absolute 0.0 0.0 - 0.4 K/UL    Basophils Absolute 0.0 0.0 - 0.1 K/UL    Immature Granulocytes Absolute 0.0 0.00 - 0.04 K/UL    Differential Type AUTOMATED     Comprehensive Metabolic Panel    Collection Time: 11/02/24 11:04 PM  improve as expected and you are unable to reach your usual health care provider, you should return to the Emergency Department. We are available 24 hours a day.    Please take your discharge instructions with you when you go to your follow-up appointment.     If a prescription has been provided, please have it filled as soon as possible to prevent a delay in treatment. Read the entire medication instruction sheet provided to you by the pharmacy. If you have any questions or reservations about taking the medication due to side effects or interactions with other medications, please call your primary care physician or contact the ER to speak with the charge nurse.     Please make an appointment with your family doctor or the physician you were referred to for follow-up of this visit as instructed on your discharge paperwork. Return to the ER if you are unable to be seen or if you are unable to be seen in a timely manner.    Should you experience abdominal pain lasting greater than 6 hours, chest pain, difficulty breathing, fever/chills, numbness/tingling, skin changes or other symptoms that concern you, return to the ED sooner. If you feel worse over the next 24 hours, please return to the ED. We are available 24 hours a day. Thank you for trusting us with your care!
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
## 2024-11-03 NOTE — ED PROVIDER NOTES
[facility] EMERGENCY DEPT  EMERGENCY DEPARTMENT ENCOUNTER    Patient Name: Gustavo Campbell  MRN: [id number]  YOB: 1975  Provider: Julian Dee MD  PCP: Irma Monet MD  Time/Date of evaluation: 1:21 AM EST on 11/3/24    History of Presenting Illness     Chief Complaint   Patient presents with    Seizures     History Provided by: Patient   History is limited by: Nothing    HISTORY (Narrative):   Gustavo Campbell is a 49 y.o. male with history listed below presenting to the ER for a witnessed seizure which reportedly lasted about 5 minutes.  Patient notes that he is taking his medications every day last seizure was 3 to 4 years prior.  Patient answering questions appropriately per EMS alert oriented, back to baseline.  Notes mild facial trauma.  Patient notes some pain to his nose.  Patient denies blurred vision, numbness, tingling, neck pain, back pain, chest pain, shortness of breath, cough, runny nose, fever, chills, and other associated symptoms.      Nursing Notes were all reviewed and agreed with or any disagreements were addressed in the HPI.    Past History     PAST MEDICAL HISTORY:  Past Medical History:   Diagnosis Date    Alcohol use     Alcohol withdrawal (HCC) 10/31/2014    Anxiety 2011    Asperger syndrome 12/14/2011    Autism     dx 2010- highly functional    Cancer (HCC)     testicular    Convulsive syncope 06/11/2019    most recent Jan 2023    Fatigue     History of pulmonary embolus (PE)     Hx antineoplastic chemo     Dr. Rm    Loss of appetite     Other ill-defined conditions(799.89)      syncopal episodes    Pneumonia 06/2022    Seizure disorder (HCC) 05/22/2012    Status post VNS (vagus nerve stimulator) placement 12/08/2020    with magnent left chest    Thromboembolus (HCC) 06/2022    lung       PAST SURGICAL HISTORY:  Past Surgical History:   Procedure Laterality Date    ADENOIDECTOMY      GI  1976    pyloric stenosis 1976    IR PORT PLACEMENT EQUAL OR GREATER THAN 5 YEARS  7/8/2022  carefully, and ask your doctor or other care provider to review them with you.                  DISCONTINUED MEDICATIONS:    Current Discharge Medication List          (Please note that parts of this dictation were completed with voice recognition software. Quite often unanticipated grammatical, syntax, homophones, and other interpretive errors are inadvertently transcribed by the computer software. Please disregards these errors. Please excuse any errors that have escaped final proofreading.)    I am the Primary Clinician of Record.   Julian Dee MD

## 2024-11-04 ENCOUNTER — TELEPHONE (OUTPATIENT)
Age: 49
End: 2024-11-04

## 2024-11-04 NOTE — TELEPHONE ENCOUNTER
Patient's mother stated patient was taken to [facility] ER on Saturday, 10/02/24, after having a seizure while in the grocery store which resulted in a fall with head injury that required stitches as well as facial bruising. Requesting call back to discuss if patient's employment restrictions and documentation for work.  Please contact.

## 2024-11-05 ENCOUNTER — PATIENT MESSAGE (OUTPATIENT)
Facility: CLINIC | Age: 49
End: 2024-11-05

## 2024-11-05 DIAGNOSIS — G40.909 SEIZURE DISORDER (HCC): ICD-10-CM

## 2024-11-06 ENCOUNTER — TELEPHONE (OUTPATIENT)
Age: 49
End: 2024-11-06

## 2024-11-08 NOTE — TELEPHONE ENCOUNTER
Patient's mother would like to know if doctor's note can be update to the week of 11/11/24 due to patient having to get stitches removed?    Please contact

## 2024-11-10 ENCOUNTER — OFFICE VISIT (OUTPATIENT)
Age: 49
End: 2024-11-10

## 2024-11-10 VITALS
TEMPERATURE: 98.8 F | HEART RATE: 90 BPM | OXYGEN SATURATION: 98 % | WEIGHT: 168 LBS | SYSTOLIC BLOOD PRESSURE: 119 MMHG | DIASTOLIC BLOOD PRESSURE: 80 MMHG | BODY MASS INDEX: 24.81 KG/M2 | RESPIRATION RATE: 16 BRPM

## 2024-11-10 DIAGNOSIS — Z48.02 VISIT FOR SUTURE REMOVAL: Primary | ICD-10-CM

## 2024-11-11 ENCOUNTER — TELEPHONE (OUTPATIENT)
Age: 49
End: 2024-11-11

## 2025-01-03 DIAGNOSIS — G40.909 SEIZURE DISORDER (HCC): ICD-10-CM

## 2025-01-05 RX ORDER — BRIVARACETAM 100 MG/1
TABLET, FILM COATED ORAL
Qty: 180 TABLET | Refills: 1 | Status: SHIPPED | OUTPATIENT
Start: 2025-01-05 | End: 2025-07-04

## 2025-01-13 DIAGNOSIS — F41.1 GENERALIZED ANXIETY DISORDER: ICD-10-CM

## 2025-01-13 RX ORDER — SERTRALINE HYDROCHLORIDE 100 MG/1
100 TABLET, FILM COATED ORAL DAILY
Qty: 90 TABLET | Refills: 3 | Status: SHIPPED | OUTPATIENT
Start: 2025-01-13

## 2025-01-13 NOTE — TELEPHONE ENCOUNTER
Future Appointments:  Future Appointments   Date Time Provider Department Center   3/4/2025  3:50 PM Abe Gaspar MD [facility] BS AMB   7/24/2025  1:00 PM Manuel Rm MD [facility] BS AMB   9/23/2025  2:00 PM Dejuan Saunders MD [facility] BS AMB        Last Appointment With Me:  3/19/2024     Requested Prescriptions     Pending Prescriptions Disp Refills    sertraline (ZOLOFT) 100 MG tablet [Pharmacy Med Name: Sertraline HCl Oral Tablet 100 MG] 90 tablet 3     Sig: TAKE 1 TABLET EVERY DAY

## 2025-02-12 DIAGNOSIS — G40.219 PARTIAL SYMPTOMATIC EPILEPSY WITH COMPLEX PARTIAL SEIZURES, INTRACTABLE, WITHOUT STATUS EPILEPTICUS (HCC): ICD-10-CM

## 2025-02-13 RX ORDER — CENOBAMATE 200 MG/1
TABLET, FILM COATED ORAL
Qty: 30 TABLET | Refills: 0 | Status: SHIPPED | OUTPATIENT
Start: 2025-02-13

## 2025-03-01 DIAGNOSIS — R94.8 ABNORMAL RESULTS OF FUNCTION STUDIES OF OTHER ORGANS AND SYSTEMS: ICD-10-CM

## 2025-03-01 DIAGNOSIS — E29.1 HYPOGONADISM IN MALE: ICD-10-CM

## 2025-03-04 ENCOUNTER — OFFICE VISIT (OUTPATIENT)
Age: 50
End: 2025-03-04
Payer: MEDICARE

## 2025-03-04 ENCOUNTER — TELEPHONE (OUTPATIENT)
Age: 50
End: 2025-03-04

## 2025-03-04 VITALS
HEART RATE: 80 BPM | SYSTOLIC BLOOD PRESSURE: 116 MMHG | HEIGHT: 69 IN | BODY MASS INDEX: 25.21 KG/M2 | DIASTOLIC BLOOD PRESSURE: 74 MMHG | WEIGHT: 170.2 LBS

## 2025-03-04 DIAGNOSIS — E29.1 HYPOGONADISM IN MALE: Primary | ICD-10-CM

## 2025-03-04 PROCEDURE — G8428 CUR MEDS NOT DOCUMENT: HCPCS | Performed by: INTERNAL MEDICINE

## 2025-03-04 PROCEDURE — 1036F TOBACCO NON-USER: CPT | Performed by: INTERNAL MEDICINE

## 2025-03-04 PROCEDURE — G8419 CALC BMI OUT NRM PARAM NOF/U: HCPCS | Performed by: INTERNAL MEDICINE

## 2025-03-04 PROCEDURE — G2211 COMPLEX E/M VISIT ADD ON: HCPCS | Performed by: INTERNAL MEDICINE

## 2025-03-04 PROCEDURE — 3078F DIAST BP <80 MM HG: CPT | Performed by: INTERNAL MEDICINE

## 2025-03-04 PROCEDURE — 3074F SYST BP LT 130 MM HG: CPT | Performed by: INTERNAL MEDICINE

## 2025-03-04 PROCEDURE — 99213 OFFICE O/P EST LOW 20 MIN: CPT | Performed by: INTERNAL MEDICINE

## 2025-03-04 NOTE — TELEPHONE ENCOUNTER
I spoke with pt's mother. She notes pt was having issues of increased frustration and \"behavioral issues\" with the Testosterone gel.  She notes it was not as severe as with the IM testosterone, but he was having some issues of increased frustration.  His mother told him to stop the testosterone gel 2 pumps daily \"about 8 weeks ago\".  She notes some improvement in his behavioral patterns.    We will discuss this at our visit this afternoon.

## 2025-03-04 NOTE — PROGRESS NOTES
Chief Complaint   Patient presents with    Hypogonadism     Pcp and pharmacy verified     History of Present Illness: Gustavo Campbell is a 50 y.o. male here for follow up of hypogonadism.  hypogonadism.    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 He received 4 cycles of EP. Pt has hx of seizures on anticonvulsives.     He is followed by Dr. Dejuan Saunders of Neurology, and Dr. Manuel Rm of Oncology.    \"Gustavo had testicular cancer and went through surgery and chemotherapy twice. His Urologist tried him on Aveed injections, but after his 3rd injection he had a bad reaction with increased aggression, mood swings and insomnia, so we stopped. He had his last injection in March 2024.  His urologist said he did not need any testosterone therapy, but we are here for a second opinion.\"    Pt is s/p bilateral orchiectomy. He was on the Aveed testosterone injection in 2023 he was getting an injection every 10 weeks but per his mother this caused him to have violent out busts and mood swings.   At our initial visit in October 2024 we agreed to start him on Testosterone gel 1.62%, one pump every day.  His mother called noting that he was again starting to have issues of \"behavioral problems\" and she had him stop the testosterone 2 months ago.    Pt denies any recent illnesses, injuries or hospitalizations.    His mother notes he did have a seizure in November 3rd 2024.     Pt notes he stopped taking the testosterone in December 2024.  \"My mother was concerned about my behavior issues\".  Per his mother she felt he was more frustrated than normal, he was \"taking things apart at home\".  He has not been experiencing the frustration issues since stopping the testosterone.    He does not recall how much of the testosterone gel he was taking. He does not

## 2025-03-10 DIAGNOSIS — G40.219 PARTIAL SYMPTOMATIC EPILEPSY WITH COMPLEX PARTIAL SEIZURES, INTRACTABLE, WITHOUT STATUS EPILEPTICUS (HCC): ICD-10-CM

## 2025-03-10 RX ORDER — CENOBAMATE 50 MG/1
TABLET, FILM COATED ORAL
Qty: 90 TABLET | Refills: 1 | Status: SHIPPED | OUTPATIENT
Start: 2025-03-10

## 2025-03-10 RX ORDER — CENOBAMATE 200 MG/1
TABLET, FILM COATED ORAL
Qty: 90 TABLET | Refills: 1 | Status: SHIPPED | OUTPATIENT
Start: 2025-03-10

## 2025-05-25 DIAGNOSIS — G40.219 LOCALIZATION-RELATED (FOCAL) (PARTIAL) SYMPTOMATIC EPILEPSY AND EPILEPTIC SYNDROMES WITH COMPLEX PARTIAL SEIZURES, INTRACTABLE, WITHOUT STATUS EPILEPTICUS (HCC): ICD-10-CM

## 2025-05-27 RX ORDER — TOPIRAMATE 200 MG/1
200 TABLET, FILM COATED ORAL 2 TIMES DAILY
Qty: 180 TABLET | Refills: 3 | OUTPATIENT
Start: 2025-05-27

## 2025-07-08 DIAGNOSIS — G40.909 SEIZURE DISORDER (HCC): ICD-10-CM

## 2025-07-08 RX ORDER — BRIVARACETAM 100 MG/1
TABLET, FILM COATED ORAL
Qty: 180 TABLET | Refills: 1 | Status: SHIPPED | OUTPATIENT
Start: 2025-07-08 | End: 2026-01-04

## 2025-08-13 DIAGNOSIS — C62.91 MALIGNANT NEOPLASM OF RIGHT TESTIS, UNSPECIFIED WHETHER DESCENDED OR UNDESCENDED (HCC): Primary | ICD-10-CM

## (undated) DEVICE — SPONGE: SPECIALTY PEANUT XR 100/CS: Brand: MEDICAL ACTION INDUSTRIES

## (undated) DEVICE — GOWN,SIRUS,NONRNF,SETINSLV,XL,20/CS: Brand: MEDLINE

## (undated) DEVICE — SUTURE MCRYL SZ 4-0 L27IN ABSRB UD L24MM PS-1 3/8 CIR PRIM Y935H

## (undated) DEVICE — SUTURE SILK PERMAHAND PRECUT 6 X 30 IN SZ 1 BLK BRAID A307H

## (undated) DEVICE — SUTURE PERMAHAND SZ 4-0 L12X30IN NONABSORBABLE BLK SILK A303H

## (undated) DEVICE — TUBING, SUCTION, 1/4" X 10', STRAIGHT: Brand: MEDLINE

## (undated) DEVICE — GLOVE ORANGE PI 7 1/2   MSG9075

## (undated) DEVICE — DERMABOND SKIN ADH 0.7ML -- DERMABOND ADVANCED 12/BX

## (undated) DEVICE — YANKAUER,BULB TIP,W/O VENT,RIGID,STERILE: Brand: MEDLINE

## (undated) DEVICE — TUBING, SUCTION, 3/16" X 6', STRAIGHT: Brand: MEDLINE

## (undated) DEVICE — SOLUTION SURG PREP 26 CC PURPREP

## (undated) DEVICE — CLIP INT SM WIDE RED TI TRNSVRS GRV CHEVRON SHP W PRECIS

## (undated) DEVICE — SUTURE PERMAHAND SZ 0 L30IN NONABSORBABLE BLK SILK BRAID A306H

## (undated) DEVICE — HYPODERMIC SAFETY NEEDLE: Brand: MONOJECT

## (undated) DEVICE — SUTURE MCRYL SZ 4-0 L27IN ABSRB UD L19MM PS-2 1/2 CIR PRIM Y426H

## (undated) DEVICE — CATH SUC CTRL PRT TRIFLO 14FR --

## (undated) DEVICE — SINGLE USE BIOPSY VALVE MAJ-210: Brand: SINGLE USE BIOPSY VALVE (STERILE)

## (undated) DEVICE — HEX-LOCKING BLADE ELECTRODE: Brand: EDGE

## (undated) DEVICE — DRAPE CAM W7XL96IN W/ BLU KRATON TIP FOR LSR VID

## (undated) DEVICE — AIRLIFE™ ADULT CUSHION NASAL CANNULA 14 FOOT (4.3) CRUSH-RESISTANT OXYGEN TUBING, AND U/CONNECT-IT ADAPTER: Brand: AIRLIFE™

## (undated) DEVICE — SUTURE PROL SZ 4-0 L18IN NONABSORBABLE BLU RB-1 L17MM 1/2 8757H

## (undated) DEVICE — SWABSTICK MEDICATED 1.75 CC SINGLE CHLORAPREP

## (undated) DEVICE — SYR LR LCK 1ML GRAD NSAF 30ML --

## (undated) DEVICE — MAJOR LAPAROTOMY-MRMC: Brand: MEDLINE INDUSTRIES, INC.

## (undated) DEVICE — QUILTED PREMIUM COMFORT UNDERPAD,EXTRA HEAVY: Brand: WINGS

## (undated) DEVICE — GAUZE,SPONGE,FLUFF,6"X6.75",STRL,5/TRAY: Brand: MEDLINE

## (undated) DEVICE — SUTURE VCRL SZ 3-0 L27IN ABSRB UD L26MM SH 1/2 CIR J416H

## (undated) DEVICE — BLOCK BITE LG PORT 60FR --

## (undated) DEVICE — SUTURE VCRL SZ 0 L27IN ABSRB UD SH L26MM 1/2 CIR TAPERPOINT J418H

## (undated) DEVICE — CATHETER IV 20GA L1IN FEP STR HUB INTROCAN SFTY

## (undated) DEVICE — STRAP,POSITIONING,KNEE/BODY,FOAM,4X60": Brand: MEDLINE

## (undated) DEVICE — SUTURE VCRL SZ 2-0 L36IN ABSRB UD L36MM CT-1 1/2 CIR J945H

## (undated) DEVICE — SOLIDIFIER BTL 3000CC --

## (undated) DEVICE — SPONGE GZ W4XL4IN COT 12 PLY TYP VII WVN C FLD DSGN

## (undated) DEVICE — TOWEL SURG W17XL27IN STD BLU COT NONFENESTRATED PREWASHED

## (undated) DEVICE — SINGLE USE ASPIRATION NEEDLE NA-U401SX-4025N: Brand: SINGLE USE ASPIRATION NEEDLE

## (undated) DEVICE — SINGLE USE SUCTION VALVE MAJ-209: Brand: SINGLE USE SUCTION VALVE (STERILE)

## (undated) DEVICE — SYR 3ML LL TIP 1/10ML GRAD --

## (undated) DEVICE — SYR 10ML LUER LOK 1/5ML GRAD --

## (undated) DEVICE — STERILE POLYISOPRENE POWDER-FREE SURGICAL GLOVES: Brand: PROTEXIS

## (undated) DEVICE — CONTAINER SPEC 20 ML LID NEUT BUFF FORMALIN 10 % POLYPR STS

## (undated) DEVICE — BLADE OPHTH D5MM 15DEG GRN W/ RND KNURLED HNDL MICRO-SHARP

## (undated) DEVICE — NEEDLE HYPO 25GA L1.5IN BVL ORIENTED ECLIPSE

## (undated) DEVICE — INTENDED FOR TISSUE SEPARATION, AND OTHER PROCEDURES THAT REQUIRE A SHARP SURGICAL BLADE TO PUNCTURE OR CUT.: Brand: BARD-PARKER ® CARBON RIB-BACK BLADES

## (undated) DEVICE — SYR 5ML 1/5 GRAD LL NSAF LF --

## (undated) DEVICE — ELECTRODE,RADIOTRANSLUCENT,FOAM,3PK: Brand: MEDLINE

## (undated) DEVICE — SYRINGE IRRIG 60ML SFT PLIABLE BLB EZ TO GRP 1 HND USE W/

## (undated) DEVICE — SOLUTION IRRIG 500ML 0.9% SOD CHLO USP POUR PLAS BTL

## (undated) DEVICE — Device: Brand: MEDICAL ACTION INDUSTRIES

## (undated) DEVICE — BLADE ASSEMB CLP HAIR FINE --

## (undated) DEVICE — TRAP,MUCUS SPECIMEN, 80CC: Brand: MEDLINE

## (undated) DEVICE — INFECTION CONTROL KIT SYS

## (undated) DEVICE — Device

## (undated) DEVICE — SUTURE PROL 5 0 L18IN NONABSORBABLE BLU RB 1 L17MM 1 2 CIR 8756H

## (undated) DEVICE — SUT CHRMC 3-0 27IN SH BRN --

## (undated) DEVICE — NEONATAL-ADULT SPO2 SENSOR: Brand: NELLCOR

## (undated) DEVICE — SUT SLK 0 30IN SH BLK --

## (undated) DEVICE — SYRINGE MED 20ML STD CLR PLAS LUERSLIP TIP N CTRL DISP

## (undated) DEVICE — BLUNTFILL WITH FILTER: Brand: MONOJECT

## (undated) DEVICE — SET ADMIN 16ML TBNG L100IN 2 Y INJ SITE IV PIGGY BK DISP (ORDER IN MULIPLES OF 48)

## (undated) DEVICE — 1200 GUARD II KIT W/5MM TUBE W/O VAC TUBE: Brand: GUARDIAN

## (undated) DEVICE — DRAIN SURG L18IN DIA025IN 100% SIL RADPQ FOR CLS WND DRNAGE

## (undated) DEVICE — REM POLYHESIVE ADULT PATIENT RETURN ELECTRODE: Brand: VALLEYLAB

## (undated) DEVICE — Device: Brand: SINGLE USE ASPIRATION NEEDLE NA-U401SX

## (undated) DEVICE — SOLUTION IV 500ML 0.9% SOD CHL FLX CONT

## (undated) DEVICE — DRAPE,LAPAROTOMY,PCH,STERILE: Brand: MEDLINE

## (undated) DEVICE — SYR 10ML ECC TIP LF --

## (undated) DEVICE — 3M™ CUROS™ DISINFECTING CAP FOR NEEDLELESS CONNECTORS 270/CARTON 20 CARTONS/CASE CFF1-270: Brand: CUROS™

## (undated) DEVICE — BASIN SPNG 32OZ BLU STRL --

## (undated) DEVICE — 3M™ IOBAN™ 2 ANTIMICROBIAL INCISE DRAPE 6651EZ: Brand: IOBAN™ 2

## (undated) DEVICE — SUTURE CHROMIC GUT SZ 2-0 L27IN ABSRB BRN L26MM SH 1/2 CIR G123H